# Patient Record
Sex: MALE | Race: WHITE | NOT HISPANIC OR LATINO | Employment: OTHER | URBAN - METROPOLITAN AREA
[De-identification: names, ages, dates, MRNs, and addresses within clinical notes are randomized per-mention and may not be internally consistent; named-entity substitution may affect disease eponyms.]

---

## 2022-09-21 ENCOUNTER — TRANSFERRED RECORDS (OUTPATIENT)
Dept: HEALTH INFORMATION MANAGEMENT | Facility: CLINIC | Age: 47
End: 2022-09-21

## 2022-10-04 ENCOUNTER — MEDICAL CORRESPONDENCE (OUTPATIENT)
Dept: HEALTH INFORMATION MANAGEMENT | Facility: CLINIC | Age: 47
End: 2022-10-04

## 2022-10-11 ENCOUNTER — REFERRAL (OUTPATIENT)
Dept: TRANSPLANT | Facility: CLINIC | Age: 47
End: 2022-10-11

## 2022-10-11 DIAGNOSIS — K75.81 LIVER CIRRHOSIS SECONDARY TO NASH (H): ICD-10-CM

## 2022-10-11 DIAGNOSIS — K76.9 LIVER LESION: ICD-10-CM

## 2022-10-11 DIAGNOSIS — K76.6 PORTAL HYPERTENSION (H): ICD-10-CM

## 2022-10-11 DIAGNOSIS — K75.81 NASH (NONALCOHOLIC STEATOHEPATITIS): Primary | ICD-10-CM

## 2022-10-11 DIAGNOSIS — K74.60 LIVER CIRRHOSIS SECONDARY TO NASH (H): ICD-10-CM

## 2022-10-11 DIAGNOSIS — C22.0 HEPATOCELLULAR CARCINOMA (H): ICD-10-CM

## 2022-10-11 NOTE — LETTER
October 14, 2022      West Van   [address]  [facility] [address]      Dear West,    Thank you for your interest in the Transplant Center at [facility]. We look forward to being a part of your care team and assisting you through the transplant process.    As we discussed, your transplant coordinator is Dillon Quinones (Liver).  You may call your coordinator at any time with questions or concerns at [phone].    Please complete the following.    1. Fill out and return the enclosed forms    Authorization for Electronic Communication    Authorization to Discuss Protected Health Information    Authorization for Release of Protected Health Information    Authorization for Care Everywhere Release of Information    2. Sign up for:    [employer], access to your electronic medical record (see enclosed pamphlet)    [employer], a transplant education website    You can use these tools to learn more about your transplant, communicate with your care team, and track your medical details      Sincerely,      Solid Organ Transplant  [facility]    cc: Referring Physician and PCP

## 2022-10-14 ENCOUNTER — TRANSCRIBE ORDERS (OUTPATIENT)
Dept: OTHER | Age: 47
End: 2022-10-14

## 2022-10-14 VITALS — HEIGHT: 74 IN | WEIGHT: 230 LBS | BODY MASS INDEX: 29.52 KG/M2

## 2022-10-14 DIAGNOSIS — C22.9 LIVER CANCER (H): ICD-10-CM

## 2022-10-14 DIAGNOSIS — K74.60 CIRRHOSIS (H): Primary | ICD-10-CM

## 2022-10-14 NOTE — TELEPHONE ENCOUNTER
Patient was asked the following questions during liver intake call.     Referring Provider: Dr. Walker Ramirez   Referring Diagnosis: RIOS/Liver Lesion   PCP: Cr Martinez, APRN-CNP    1)Do you know why you have liver disease: Yes       If Alcoholic Cirrhosis is present when was your last drink: Doesn't Drink ETOH       Have you ever been through treatment for alcohol: NA  2) Presence of Ascites: No Paracentesis: No  3) Presence of Hepatic Encephalopathy: No Medications: No  4) History of GI Bleeding: None  5) Oxygen Use: None  6) EGD: None  7) Colonoscopy: None   8) MELD Score:   9) Labs available for review from PCP/GI: Yes  10)HCC Diagnosis: No                11)Insurance information: [facility]              Policy Bone: Self               Subscriber/Policy/ID Number: [id number]             Group Number:     Referral intake process completed.  Patient is aware that after financial approval is received, medical records will be requested.   Patient confirmed for a callback from transplant coordinator on 10/19/22.  Tentative evaluation date TBD.    Confirmed coordinator will discuss evaluation process in more detail at the time of their call.   Patient is aware of the need to arrange age appropriate cancer screening, vaccinations, and dental care.  Reminded patient to complete questionnaire, complete medical records release, and review packet prior to evaluation visit .  Assessed patient for special needs (ie--wheelchair, assistance, guardian, and ):  None   Patient instructed to call [phone] with questions.     Patient gave verbal consent during intake call to obtain medical records and documents outside of [facility]:  Yes     NELSY Gambino, LPN       Solid Organ Transplant

## 2022-10-21 NOTE — PROGRESS NOTES
RECORDS STATUS - ALL OTHER DIAGNOSIS    cirrhosis, liver cancer  RECORDS RECEIVED FROM: [facility]/ [facility]/ [facility]    DATE RECEIVED: 10/24/2022    Action    Action Taken 10/21/2022 1:39pm PRETTY     I called [facility] Phone: [phone]    I called pt West  Hasn't had a biopsy, all outside records are at [facility].    2:03pm PRETTY   I resolved imaging in PACS       NOTES STATUS DETAILS   OFFICE NOTE from referring provider Complete Walker Kaiser MD   OFFICE NOTE from medical oncologist Complete [facility]    CLINICAL TRIAL TREATMENTS TO DATE     LABS     PATHOLOGY REPORTS No Biopsy Report    ANYTHING RELATED TO DIAGNOSIS Complete - Labs last updated on 10/11/2022   GENONOMIC TESTING     TYPE:     IMAGING (NEED IMAGES & REPORT)     CT SCANS Complete - [facility]  CT Abdomen Pelvis 6/30/2022    Xray Chest  Complete - [facility]     MRI [facility]  MRI Abdomen 7/14/2022   MAMMO     ULTRASOUND     PET [facility]  PET 10/13/2022

## 2022-10-24 ENCOUNTER — PRE VISIT (OUTPATIENT)
Dept: ONCOLOGY | Facility: CLINIC | Age: 47
End: 2022-10-24

## 2022-10-24 ENCOUNTER — VIRTUAL VISIT (OUTPATIENT)
Dept: ONCOLOGY | Facility: CLINIC | Age: 47
End: 2022-10-24
Attending: INTERNAL MEDICINE
Payer: COMMERCIAL

## 2022-10-24 DIAGNOSIS — K74.60 CIRRHOSIS OF LIVER WITHOUT ASCITES, UNSPECIFIED HEPATIC CIRRHOSIS TYPE (H): ICD-10-CM

## 2022-10-24 DIAGNOSIS — C22.0 HEPATOCELLULAR CARCINOMA (H): Primary | ICD-10-CM

## 2022-10-24 DIAGNOSIS — R91.8 PULMONARY NODULES: ICD-10-CM

## 2022-10-24 PROBLEM — K76.9 LESION OF LIVER: Status: ACTIVE | Noted: 2022-10-04

## 2022-10-24 PROBLEM — F51.04 PSYCHOPHYSIOLOGICAL INSOMNIA: Status: ACTIVE | Noted: 2018-11-26

## 2022-10-24 PROBLEM — M53.3 SI (SACROILIAC) JOINT DYSFUNCTION: Status: ACTIVE | Noted: 2021-09-07

## 2022-10-24 PROBLEM — E66.9 OBESITY (BMI 30-39.9): Status: ACTIVE | Noted: 2017-09-11

## 2022-10-24 PROBLEM — L30.9 ECZEMA: Status: ACTIVE | Noted: 2017-09-11

## 2022-10-24 PROBLEM — D23.4 DERMOID CYST OF SCALP: Status: ACTIVE | Noted: 2017-09-11

## 2022-10-24 PROBLEM — R79.89 ELEVATED LFTS: Status: ACTIVE | Noted: 2022-09-06

## 2022-10-24 PROBLEM — M54.31 SCIATICA OF RIGHT SIDE: Status: ACTIVE | Noted: 2021-09-07

## 2022-10-24 PROCEDURE — 99205 OFFICE O/P NEW HI 60 MIN: CPT | Mod: 95 | Performed by: INTERNAL MEDICINE

## 2022-10-24 PROCEDURE — G0463 HOSPITAL OUTPT CLINIC VISIT: HCPCS | Mod: PN,RTG | Performed by: INTERNAL MEDICINE

## 2022-10-24 RX ORDER — FLUTICASONE PROPIONATE 50 MCG
2 SPRAY, SUSPENSION (ML) NASAL
COMMUNITY
Start: 2022-10-04 | End: 2022-11-06

## 2022-10-24 RX ORDER — FLURBIPROFEN SODIUM 0.3 MG/ML
1 SOLUTION/ DROPS OPHTHALMIC AT BEDTIME
COMMUNITY
Start: 2022-08-15 | End: 2024-07-10

## 2022-10-24 RX ORDER — LISINOPRIL 10 MG/1
10 TABLET ORAL EVERY MORNING
COMMUNITY
Start: 2022-10-18 | End: 2022-12-21

## 2022-10-24 RX ORDER — LISINOPRIL/HYDROCHLOROTHIAZIDE 10-12.5 MG
1 TABLET ORAL EVERY MORNING
COMMUNITY
Start: 2021-12-17 | End: 2023-05-16

## 2022-10-24 RX ORDER — METFORMIN HCL 500 MG
500 TABLET, EXTENDED RELEASE 24 HR ORAL EVERY MORNING
COMMUNITY
Start: 2021-12-03 | End: 2023-03-06

## 2022-10-24 RX ORDER — ATENOLOL 25 MG/1
25 TABLET ORAL 2 TIMES DAILY
Status: ON HOLD | COMMUNITY
Start: 2022-09-16 | End: 2024-07-05

## 2022-10-24 NOTE — LETTER
10/24/2022      RE: West Van  [address]  [facility] [address]       West is a 46 year old who is being evaluated via a billable video visit.      How would you like to obtain your AVS? MyChart  If the video visit is dropped, the invitation should be resent by: Send to e-mail at: [email or web address]  Will anyone else be joining your video visit? No  {If patient encounters technical issues they should call [phone]}      Video-Visit Details    Video Start Time: 3:00    Type of service:  Video Visit    Video End Time:4:00    Originating Location (pt. Location): Home      Distant Location (provider location):  On-site    Platform used for Video Visit: Hai Camejo        West Van is a new patient I been asked to see for possible new hepatocellular carcinoma.    He is a 46-year-old gentleman without previously recognized liver disease who had a CT scan to evaluate for possible stone disease that revealed the presence of a nodular liver.  He has had subsequent imaging which I will describe below interpreted as possible hepatocellular carcinoma.  He tells me he currently feels quite well.  He is somewhat tired and does occasionally get some epigastric or right upper quadrant pain that comes at random intervals.  He is unsure if the symptoms started before he became aware of his possible liver disease.  He has lost a modest amount of meat weight over the last 2 years which has been somewhat intentional as he is trying to manage his diabetes.  He was started on Victoza and also his gotten a dog which is forced him to get out and walk greater than 12,000 steps a day so he significantly increased his caloric expenditure.  He denies any disruption of his sleep-wake cycle or confusion.  He has had no problems with edema or swelling of his abdomen.  He is noted no bleeding or easy bruising.  He has an aunt who was told she had fatty liver disease 40 years ago who remains healthy.  He has had  testing for viral hepatitides which has been negative.  He has been identified as having a low ceruloplasmin level of about 12 and an elevated ferritin of about 1300.    His past medical history is relatively unremarkable and most notable for obesity with associated diabetes and hypertension.  Those of both been better since he has lost some weight and he tells me his last hemoglobin A's 1C was just a little bit over 7.    The only recognized family history of liver disease is the above-mentioned fatty liver disease and 1 aunt.    There are multiple cancers in his family.  On his father side his dad had some sort of skin cancer on his lip.  An uncle had a lymphoma at about the age of 40.  An aunt had a bladder cancer in her mid 70s.  On his mother side, his mother had breast cancer at about age 70.  A half uncle had a tongue cancer.  And 2 uncles had prostate cancer at advanced ages.  A cousin with whom he was quite close on that side  of a melanoma in her 50s.    He is a lifelong non-smoker.  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    GENERAL: Healthy, alert and no distress  EYES: Eyes grossly normal to inspection.  No discharge or erythema, or obvious scleral/conjunctival abnormalities.  RESP: No audible wheeze, cough, or visible cyanosis.  No visible retractions or increased work of breathing.    SKIN: Visible skin clear. No significant rash, abnormal pigmentation or lesions.  NEURO: Cranial nerves grossly intact.  Mentation and speech appropriate for age.  PSYCH: Mentation appears normal, affect normal/bright, judgement and insight intact, normal speech and appearance well-groomed.    I personally reviewed his imaging studies and went over the results at some length with him and his friend.  He has a noncontrast CT scan which shows a nodular liver and is otherwise unremarkable.  He has an MR scan done  with Eovist which shows a 1.7 cm arterial enhancing lesion.  To my review there is little to minimal washout, although it does not retain contrast on the 20-minute biliary phase.  I do not see a visible pseudocapsule or diffusion restriction.  He has another similar size lesion without enhancement and then numerous tiny lesions scattered throughout the lesion that do not seem to enhance. He had a PET scan which shows no areas of significant uptake.  That did however reveal a little over 1 centimeter nodule on his right lung with an SUV of just 1.5. His spleen is mildly enlarged and he probably has some varices.    His outside labs over the last 2 months show normal electrolytes and renal function.   He has a normal albumin and a marginally elevated bilirubin of 2.0.  His transaminases are mildly elevated with an AST of 46 and an ALT of 71.  The alkaline phosphatase is normal.    His blood counts are normal.    An alpha-fetoprotein is mildly elevated at 24.    Hepatitis B and C serologies are negative.    Ferritin is 1300.    The ceruloplasmin is low at 12.7.      Assessment/plan:  1.  Possible hepatocellular carcinoma in a young man with a normal performance status and compensated liver disease.  He needs fresh imaging as the MR scan that is available is already 3 months old and if we do this with a different contrast agent it may more clearly identify if this is in fact hepatocellular carcinoma or not.  I will be reviewing him at our liver tumor conference this Friday to get the consensus of our group as well.  I had a very long discussion with the patient going over the possibilities and implications here.  I explained the uncertainty as to his diagnosis as of yet.  I explained if this is in fact a hepatocellular carcinoma which is certainly possible but not certain and presuming it has not progressed significantly in the last 3 months, the initial plan would be either surgical resection or ablation and in that  setting we probably would not consider a liver transplant.  If his tumor is more advanced or recurs then a transplant following some sort of bridging therapy such as ablation or other liver directed therapy might be in the cards down the road.  Certainly at present his underlying liver disease is not severe enough to be concerned about a transplant anytime in the near future if he does not have a hepatocellular carcinoma.  If the imaging remains uncertain as to the diagnosis, we could consider a biopsy, perhaps biopsy with ablation as a single procedure, or simply further observation over time.  I will follow-up with the patient by phone once we had our discussion at tumor conference and will likely tentatively arrange for MR scan and other needed evaluation down here as appropriate.    2.  Cirrhosis with portal hypertension perhaps related to ROIS or disorders of copper or iron metabolism.  He will need further follow-up with hepatology including evaluation for varices.    3.  Lung nodule-will refer to nodule clinic after we complete evaluation of liver tumor.    4. Type 2 diabetes    5.  Obesity        Michael Sanchez MD

## 2022-10-24 NOTE — NURSING NOTE
Patient states he was having pain this morning on his right side under breast bone. The pain level was around a 5/6. He currently states he does not have pain.     Gisela Camejo

## 2022-10-24 NOTE — PROGRESS NOTES
West is a 46 year old who is being evaluated via a billable video visit.      How would you like to obtain your AVS? MyChart  If the video visit is dropped, the invitation should be resent by: Send to e-mail at: [email or web address]  Will anyone else be joining your video visit? No        Video-Visit Details    Video Start Time: 3:00    Type of service:  Video Visit    Video End Time:4:00    Originating Location (pt. Location): Home      Distant Location (provider location):  On-site    Platform used for Video Visit: Hai Camejo        West Van is a new patient I have been asked to see for possible new hepatocellular carcinoma.    He is a 46-year-old gentleman without previously recognized liver disease who had a CT scan to evaluate for possible stone disease that revealed the presence of a nodular liver.  He has had subsequent imaging which I will describe below interpreted as possible hepatocellular carcinoma.  He tells me he currently feels quite well.  He is somewhat tired and does occasionally get some epigastric or right upper quadrant pain that comes at random intervals.  He is unsure if the symptoms started before he became aware of his possible liver disease.  He has lost a modest amount of meat weight over the last 2 years which has been somewhat intentional as he is trying to manage his diabetes.  He was started on Victoza and also his gotten a dog which is forced him to get out and walk greater than 12,000 steps a day so he significantly increased his caloric expenditure.  He denies any disruption of his sleep-wake cycle or confusion.  He has had no problems with edema or swelling of his abdomen.  He is noted no bleeding or easy bruising.  He has an aunt who was told she had fatty liver disease 40 years ago who remains healthy.  He has had testing for viral hepatitides which has been negative.  He has been identified as having a low ceruloplasmin level of about 12 and an elevated  ferritin of about 1300.    His past medical history is relatively unremarkable and most notable for obesity with associated diabetes and hypertension.  Those of both been better since he has lost some weight and he tells me his last hemoglobin A's 1C was just a little bit over 7.    The only recognized family history of liver disease is the above-mentioned fatty liver disease and 1 aunt.    There are multiple cancers in his family.  On his father side his dad had some sort of skin cancer on his lip.  An uncle had a lymphoma at about the age of 40.  An aunt had a bladder cancer in her mid 70s.  On his mother side, his mother had breast cancer at about age 70.  A half uncle had a tongue cancer.  And 2 uncles had prostate cancer at advanced ages.  A cousin with whom he was quite close on that side  of a melanoma in her 50s.    He is a lifelong non-smoker.  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    GENERAL: Healthy, alert and no distress  EYES: Eyes grossly normal to inspection.  No discharge or erythema, or obvious scleral/conjunctival abnormalities.  RESP: No audible wheeze, cough, or visible cyanosis.  No visible retractions or increased work of breathing.    SKIN: Visible skin clear. No significant rash, abnormal pigmentation or lesions.  NEURO: Cranial nerves grossly intact.  Mentation and speech appropriate for age.  PSYCH: Mentation appears normal, affect normal/bright, judgement and insight intact, normal speech and appearance well-groomed.    I personally reviewed his imaging studies and went over the results at some length with him and his friend.  He has a noncontrast CT scan which shows a nodular liver and is otherwise unremarkable.  He has an MR scan done with Eovist which shows a 1.7 cm arterial enhancing lesion.  To my review there is little to minimal washout, although it does not retain  contrast on the 20-minute biliary phase.  I do not see a visible pseudocapsule or diffusion restriction.  He has another similar size lesion without enhancement and then numerous tiny lesions scattered throughout the lesion that do not seem to enhance. He had a PET scan which shows no areas of significant uptake.  That did however reveal a little over 1 centimeter nodule on his right lung with an SUV of just 1.5. His spleen is mildly enlarged and he probably has some varices.    His outside labs over the last 2 months show normal electrolytes and renal function.   He has a normal albumin and a marginally elevated bilirubin of 2.0.  His transaminases are mildly elevated with an AST of 46 and an ALT of 71.  The alkaline phosphatase is normal.    His blood counts are normal.    An alpha-fetoprotein is mildly elevated at 24.    Hepatitis B and C serologies are negative.    Ferritin is 1300.    The ceruloplasmin is low at 12.7.      Assessment/plan:  1.  Possible hepatocellular carcinoma in a young man with a normal performance status and compensated liver disease.  He needs fresh imaging as the MR scan that is available is already 3 months old and if we do this with a different contrast agent it may more clearly identify if this is in fact hepatocellular carcinoma or not.  I will be reviewing him at our liver tumor conference this Friday to get the consensus of our group as well.  I had a very long discussion with the patient going over the possibilities and implications here.  I explained the uncertainty as to his diagnosis as of yet.  I explained if this is in fact a hepatocellular carcinoma which is certainly possible but not certain and presuming it has not progressed significantly in the last 3 months, the initial plan would be either surgical resection or ablation and in that setting we probably would not consider a liver transplant.  If his tumor is more advanced or recurs then a transplant following some sort of  bridging therapy such as ablation or other liver directed therapy might be in the cards down the road.  Certainly at present his underlying liver disease is not severe enough to be concerned about a transplant anytime in the near future if he does not have a hepatocellular carcinoma.  If the imaging remains uncertain as to the diagnosis, we could consider a biopsy, perhaps biopsy with ablation as a single procedure, or simply further observation over time.  I will follow-up with the patient by phone once we had our discussion at tumor conference and will likely tentatively arrange for MR scan and other needed evaluation down here as appropriate.    2.  Cirrhosis with portal hypertension perhaps related to RIOS or disorders of copper or iron metabolism.  He will need further follow-up with hepatology including evaluation for varices.    3.  Lung nodule-will refer to nodule clinic after we complete evaluation of liver tumor.    4. Type 2 diabetes    5.  Obesity

## 2022-10-24 NOTE — LETTER
10/24/2022      RE: West Van  [address]  [facility] [address]       West is a 46 year old who is being evaluated via a billable video visit.      How would you like to obtain your AVS? Luzhart  If the video visit is dropped, the invitation should be resent by: Send to e-mail at: [email or web address]  Will anyone else be joining your video visit? No        Video-Visit Details    Video Start Time: 3:00    Type of service:  Video Visit    Video End Time:4:00    Originating Location (pt. Location): Home      Distant Location (provider location):  On-site    Platform used for Video Visit: Hai Camejo        West Van is a new patient I have been asked to see for possible new hepatocellular carcinoma.    He is a 46-year-old gentleman without previously recognized liver disease who had a CT scan to evaluate for possible stone disease that revealed the presence of a nodular liver.  He has had subsequent imaging which I will describe below interpreted as possible hepatocellular carcinoma.  He tells me he currently feels quite well.  He is somewhat tired and does occasionally get some epigastric or right upper quadrant pain that comes at random intervals.  He is unsure if the symptoms started before he became aware of his possible liver disease.  He has lost a modest amount of meat weight over the last 2 years which has been somewhat intentional as he is trying to manage his diabetes.  He was started on Victoza and also his gotten a dog which is forced him to get out and walk greater than 12,000 steps a day so he significantly increased his caloric expenditure.  He denies any disruption of his sleep-wake cycle or confusion.  He has had no problems with edema or swelling of his abdomen.  He is noted no bleeding or easy bruising.  He has an aunt who was told she had fatty liver disease 40 years ago who remains healthy.  He has had testing for viral hepatitides which has been negative.  He has been  identified as having a low ceruloplasmin level of about 12 and an elevated ferritin of about 1300.    His past medical history is relatively unremarkable and most notable for obesity with associated diabetes and hypertension.  Those of both been better since he has lost some weight and he tells me his last hemoglobin A's 1C was just a little bit over 7.    The only recognized family history of liver disease is the above-mentioned fatty liver disease and 1 aunt.    There are multiple cancers in his family.  On his father side his dad had some sort of skin cancer on his lip.  An uncle had a lymphoma at about the age of 40.  An aunt had a bladder cancer in her mid 70s.  On his mother side, his mother had breast cancer at about age 70.  A half uncle had a tongue cancer.  And 2 uncles had prostate cancer at advanced ages.  A cousin with whom he was quite close on that side  of a melanoma in her 50s.    He is a lifelong non-smoker.  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    GENERAL: Healthy, alert and no distress  EYES: Eyes grossly normal to inspection.  No discharge or erythema, or obvious scleral/conjunctival abnormalities.  RESP: No audible wheeze, cough, or visible cyanosis.  No visible retractions or increased work of breathing.    SKIN: Visible skin clear. No significant rash, abnormal pigmentation or lesions.  NEURO: Cranial nerves grossly intact.  Mentation and speech appropriate for age.  PSYCH: Mentation appears normal, affect normal/bright, judgement and insight intact, normal speech and appearance well-groomed.    I personally reviewed his imaging studies and went over the results at some length with him and his friend.  He has a noncontrast CT scan which shows a nodular liver and is otherwise unremarkable.  He has an MR scan done with [facility] which shows a 1.7 cm arterial enhancing lesion.  To my  review there is little to minimal washout, although it does not retain contrast on the 20-minute biliary phase.  I do not see a visible pseudocapsule or diffusion restriction.  He has another similar size lesion without enhancement and then numerous tiny lesions scattered throughout the lesion that do not seem to enhance. He had a PET scan which shows no areas of significant uptake.  That did however reveal a little over 1 centimeter nodule on his right lung with an SUV of just 1.5. His spleen is mildly enlarged and he probably has some varices.    His outside labs over the last 2 months show normal electrolytes and renal function.   He has a normal albumin and a marginally elevated bilirubin of 2.0.  His transaminases are mildly elevated with an AST of 46 and an ALT of 71.  The alkaline phosphatase is normal.    His blood counts are normal.    An alpha-fetoprotein is mildly elevated at 24.    Hepatitis B and C serologies are negative.    Ferritin is 1300.    The ceruloplasmin is low at 12.7.      Assessment/plan:  1.  Possible hepatocellular carcinoma in a young man with a normal performance status and compensated liver disease.  He needs fresh imaging as the MR scan that is available is already 3 months old and if we do this with a different contrast agent it may more clearly identify if this is in fact hepatocellular carcinoma or not.  I will be reviewing him at our liver tumor conference this Friday to get the consensus of our group as well.  I had a very long discussion with the patient going over the possibilities and implications here.  I explained the uncertainty as to his diagnosis as of yet.  I explained if this is in fact a hepatocellular carcinoma which is certainly possible but not certain and presuming it has not progressed significantly in the last 3 months, the initial plan would be either surgical resection or ablation and in that setting we probably would not consider a liver transplant.  If his  tumor is more advanced or recurs then a transplant following some sort of bridging therapy such as ablation or other liver directed therapy might be in the cards down the road.  Certainly at present his underlying liver disease is not severe enough to be concerned about a transplant anytime in the near future if he does not have a hepatocellular carcinoma.  If the imaging remains uncertain as to the diagnosis, we could consider a biopsy, perhaps biopsy with ablation as a single procedure, or simply further observation over time.  I will follow-up with the patient by phone once we had our discussion at tumor conference and will likely tentatively arrange for MR scan and other needed evaluation down here as appropriate.    2.  Cirrhosis with portal hypertension perhaps related to RIOS or disorders of copper or iron metabolism.  He will need further follow-up with hepatology including evaluation for varices.    3.  Lung nodule-will refer to nodule clinic after we complete evaluation of liver tumor.    4. Type 2 diabetes    5.  Obesity        Michael Sanchez MD

## 2022-10-25 PROBLEM — R91.8 PULMONARY NODULES: Status: ACTIVE | Noted: 2022-10-25

## 2022-11-01 ENCOUNTER — PATIENT OUTREACH (OUTPATIENT)
Dept: ONCOLOGY | Facility: CLINIC | Age: 47
End: 2022-11-01

## 2022-11-01 NOTE — PROGRESS NOTES
[facility]: Cancer Care                                                                                          Placed call to patient to review liver conference discussion. Agreed imaging looked as though patient may not have HCC. Patient voiced understanding and would like clarification on when he should return to clinic.  RNCC reviewed with Dr Sanchez. Would like patient to have liver MRI at [facility] to ensure proper imaging is preformed 1-2 days prior to follow-up with Dr Sanchez in person. Placed return call to patient to review plan. Patient voiced understanding and agreement. He will await call from scheduling team.        Christy Bailey RN, BSN, OCN   RN Care Coordinator   [facility]

## 2022-11-10 ENCOUNTER — HOSPITAL ENCOUNTER (OUTPATIENT)
Dept: MRI IMAGING | Facility: CLINIC | Age: 47
Discharge: HOME | End: 2022-11-10
Attending: INTERNAL MEDICINE | Admitting: INTERNAL MEDICINE
Payer: COMMERCIAL

## 2022-11-10 DIAGNOSIS — K74.60 CIRRHOSIS OF LIVER WITHOUT ASCITES, UNSPECIFIED HEPATIC CIRRHOSIS TYPE (H): ICD-10-CM

## 2022-11-10 DIAGNOSIS — C22.0 HEPATOCELLULAR CARCINOMA (H): ICD-10-CM

## 2022-11-10 PROCEDURE — 74183 MRI ABD W/O CNTR FLWD CNTR: CPT

## 2022-11-10 PROCEDURE — 255N000002 HC RX 255 OP 636: Performed by: INTERNAL MEDICINE

## 2022-11-10 PROCEDURE — A9585 GADOBUTROL INJECTION: HCPCS | Performed by: INTERNAL MEDICINE

## 2022-11-10 PROCEDURE — 74183 MRI ABD W/O CNTR FLWD CNTR: CPT | Mod: 26 | Performed by: STUDENT IN AN ORGANIZED HEALTH CARE EDUCATION/TRAINING PROGRAM

## 2022-11-10 RX ORDER — GADOBUTROL 604.72 MG/ML
10 INJECTION INTRAVENOUS ONCE
Status: COMPLETED | OUTPATIENT
Start: 2022-11-10 | End: 2022-11-10

## 2022-11-10 RX ADMIN — GADOBUTROL 10 ML: 604.72 INJECTION INTRAVENOUS at 06:57

## 2022-11-14 ENCOUNTER — APPOINTMENT (OUTPATIENT)
Dept: LAB | Facility: CLINIC | Age: 47
End: 2022-11-14
Attending: INTERNAL MEDICINE
Payer: COMMERCIAL

## 2022-11-14 ENCOUNTER — TRANSCRIBE ORDERS (OUTPATIENT)
Dept: OTHER | Age: 47
End: 2022-11-14

## 2022-11-14 ENCOUNTER — ONCOLOGY VISIT (OUTPATIENT)
Dept: ONCOLOGY | Facility: CLINIC | Age: 47
End: 2022-11-14
Attending: INTERNAL MEDICINE
Payer: COMMERCIAL

## 2022-11-14 VITALS
RESPIRATION RATE: 18 BRPM | SYSTOLIC BLOOD PRESSURE: 136 MMHG | OXYGEN SATURATION: 98 % | HEART RATE: 88 BPM | DIASTOLIC BLOOD PRESSURE: 86 MMHG | WEIGHT: 226.7 LBS | TEMPERATURE: 97.4 F | BODY MASS INDEX: 29.11 KG/M2

## 2022-11-14 DIAGNOSIS — R91.8 PULMONARY NODULES: Primary | ICD-10-CM

## 2022-11-14 DIAGNOSIS — C22.0 HEPATOCELLULAR CARCINOMA (H): ICD-10-CM

## 2022-11-14 DIAGNOSIS — K74.60 CIRRHOSIS OF LIVER WITHOUT ASCITES, UNSPECIFIED HEPATIC CIRRHOSIS TYPE (H): ICD-10-CM

## 2022-11-14 LAB
AFP SERPL-MCNC: 28.7 NG/ML
ALBUMIN SERPL BCG-MCNC: 4.4 G/DL (ref 3.5–5.2)
ALP SERPL-CCNC: 95 U/L (ref 40–129)
ALT SERPL W P-5'-P-CCNC: 46 U/L (ref 10–50)
ANION GAP SERPL CALCULATED.3IONS-SCNC: 10 MMOL/L (ref 7–15)
AST SERPL W P-5'-P-CCNC: 33 U/L (ref 10–50)
BASOPHILS # BLD AUTO: 0.1 10E3/UL (ref 0–0.2)
BASOPHILS NFR BLD AUTO: 1 %
BILIRUB SERPL-MCNC: 1.4 MG/DL
BUN SERPL-MCNC: 10 MG/DL (ref 6–20)
CALCIUM SERPL-MCNC: 9.9 MG/DL (ref 8.6–10)
CHLORIDE SERPL-SCNC: 103 MMOL/L (ref 98–107)
CREAT SERPL-MCNC: 0.78 MG/DL (ref 0.67–1.17)
DEPRECATED HCO3 PLAS-SCNC: 27 MMOL/L (ref 22–29)
EOSINOPHIL # BLD AUTO: 0.1 10E3/UL (ref 0–0.7)
EOSINOPHIL NFR BLD AUTO: 1 %
ERYTHROCYTE [DISTWIDTH] IN BLOOD BY AUTOMATED COUNT: 12.8 % (ref 10–15)
GFR SERPL CREATININE-BSD FRML MDRD: >90 ML/MIN/1.73M2
GLUCOSE SERPL-MCNC: 150 MG/DL (ref 70–99)
HCT VFR BLD AUTO: 47.6 % (ref 40–53)
HGB BLD-MCNC: 16.4 G/DL (ref 13.3–17.7)
IMM GRANULOCYTES # BLD: 0 10E3/UL
IMM GRANULOCYTES NFR BLD: 0 %
INR PPP: 1.18 (ref 0.85–1.15)
LYMPHOCYTES # BLD AUTO: 2.5 10E3/UL (ref 0.8–5.3)
LYMPHOCYTES NFR BLD AUTO: 27 %
MCH RBC QN AUTO: 29.5 PG (ref 26.5–33)
MCHC RBC AUTO-ENTMCNC: 34.5 G/DL (ref 31.5–36.5)
MCV RBC AUTO: 86 FL (ref 78–100)
MONOCYTES # BLD AUTO: 1 10E3/UL (ref 0–1.3)
MONOCYTES NFR BLD AUTO: 11 %
NEUTROPHILS # BLD AUTO: 5.6 10E3/UL (ref 1.6–8.3)
NEUTROPHILS NFR BLD AUTO: 60 %
NRBC # BLD AUTO: 0 10E3/UL
NRBC BLD AUTO-RTO: 0 /100
PLATELET # BLD AUTO: 196 10E3/UL (ref 150–450)
POTASSIUM SERPL-SCNC: 3.6 MMOL/L (ref 3.4–5.3)
PROT SERPL-MCNC: 7.2 G/DL (ref 6.4–8.3)
RBC # BLD AUTO: 5.55 10E6/UL (ref 4.4–5.9)
SODIUM SERPL-SCNC: 140 MMOL/L (ref 136–145)
WBC # BLD AUTO: 9.4 10E3/UL (ref 4–11)

## 2022-11-14 PROCEDURE — 85610 PROTHROMBIN TIME: CPT | Performed by: INTERNAL MEDICINE

## 2022-11-14 PROCEDURE — 36415 COLL VENOUS BLD VENIPUNCTURE: CPT | Performed by: INTERNAL MEDICINE

## 2022-11-14 PROCEDURE — 99215 OFFICE O/P EST HI 40 MIN: CPT | Performed by: INTERNAL MEDICINE

## 2022-11-14 PROCEDURE — 82105 ALPHA-FETOPROTEIN SERUM: CPT | Performed by: INTERNAL MEDICINE

## 2022-11-14 PROCEDURE — G0463 HOSPITAL OUTPT CLINIC VISIT: HCPCS

## 2022-11-14 PROCEDURE — 85025 COMPLETE CBC W/AUTO DIFF WBC: CPT | Performed by: INTERNAL MEDICINE

## 2022-11-14 PROCEDURE — 80053 COMPREHEN METABOLIC PANEL: CPT | Performed by: INTERNAL MEDICINE

## 2022-11-14 RX ORDER — FEXOFENADINE HCL 60 MG/1
60 TABLET, FILM COATED ORAL PRN
COMMUNITY
Start: 2022-10-04 | End: 2022-12-21

## 2022-11-14 ASSESSMENT — PAIN SCALES - GENERAL: PAINLEVEL: MILD PAIN (2)

## 2022-11-14 NOTE — PROGRESS NOTES
West Van is here today in follow-up of possible hepatocellular carcinoma.    He is a 46-year-old gentleman with previously unrecognized liver disease who had an incidental finding of a liver mass during evaluation for possible kidney stones.  He had subsequent imaging which is in interpreted elsewhere to be consistent with hepatocellular carcinoma.  He was found to have a low serum neoplasm and a mildly elevated ferritin without other clear etiologies for his liver disease.  He is diabetic and had lost a fair amount of weight over the last 2 years as part of managing his diabetes.  He returns today for follow-up note we had repeat imaging.  He denies any other new events since her last visit.    On physical exam he appears well and has unremarkable vital signs.  He has no icterus.  He has no palpable adenopathy in his neck or supraclavicular spaces.  His lungs are clear to auscultation without dullness to percussion.  His heart rate and rhythm are regular without audible murmur or gallop and his jugular venous pressure appears normal.  His abdomen is soft and nontender.  His spleen tip is just palpable.  His liver margin is not palpable.  There is no demonstrable ascites.  He has no spider angiomata on his chest.  He has marked abdominal striae.  He has areas of hyperpigmentation on either side of his abdomen which he says are his sites of Victoza injections.  He has no peripheral edema and no tenderness in his calves or thighs.  His muscle mass is not very significant for his size.  His speech is fluent and his cranial nerves are grossly intact.  He has an unremarkable gait and station.    I personally reviewed his new MR scan and went over the images with him and his friend who accompanies him today.  In the dome of the liver is a small area of arterial enhancement without washout which radiology classifies as liRADS 4.  By my measurements from his original MR scan in July until now this is grown from 18  mm to 20 mm.  He has innumerable nonenhancing small nodules throughout his liver.  His spleen is moderately enlarged.  He does not have any significant intra-abdominal adenopathy.  1.4 cm right lower lung nodule appears grossly unchanged although the MRI is suboptimal for evaluating that.  I reviewed his previous chest CT and PET scan which shows the nodule to have no significant FDG uptake.    Lab results obtained today show normal electrolytes and renal function.  His albumin and liver enzymes are normal.  His bilirubin is marginally elevated at 1.4.  His blood counts are normal including a platelet count of 196.  An alpha-fetoprotein is pending.    Assessment/plan: Enhancing liver lesion that probably, but not certainly, is HCC in a patient with well compensated liver disease.  I had a long talk today with the patient to review his prognosis and treatment options.  We discussed the probability that this is a cancer but that we would need more definitive diagnostic material.  While we could observe for another few months and see how this develops my recommendations would go ahead and pursue this more actively and he was in vigorous agreement with that.  Given its location the dome of the liver be difficult to approach percutaneously and I think he will need a laparoscopic procedure either to wedge this out or probably to get a biopsy followed by microwave ablation.  We discussed that it 2 cm in size if we ablate this completely we might still consider a liver transplant primarily to remove the risk of further tumors develop in his liver, but given his relatively limited disease I would probably lean towards observation following the definitive procedure.  We talked about his lung nodule.  Given its lack of FDG uptake I am not too concerned about its nature but we deftly want to go ahead and get that worked up and I made a referral to the lung nodule clinic as well.  At the end of this long discussion the patient  had had all his questions answered and expressed a good understanding.  We will get him set up over the next couple weeks to visit with hepatobiliary surgery and the lung nodule clinic and probably be proceeding to a laparoscopic biopsy and ablation.  We will make further follow-up plans once we see the outcome of those evaluations.    Total time by me on the date of service inclusive of the above visit, review of multiple imaging studies, ordering and documentation was greater than 40 minutes.

## 2022-11-14 NOTE — NURSING NOTE
"Oncology Rooming Note    November 14, 2022 1:14 PM   West Van is a 46 year old male who presents for:    Chief Complaint   Patient presents with     Blood Draw     Vpt blood draw by lab RN. Vitals taken and appointment arrived     Oncology Clinic Visit     [facility] RETURN - [facility]     Initial Vitals: /86   Pulse 88   Temp 97.4  F (36.3  C) (Oral)   Resp 18   Wt 102.8 kg (226 lb 11.2 oz)   SpO2 98%   BMI 29.11 kg/m   Estimated body mass index is 29.11 kg/m  as calculated from the following:    Height as of 10/14/22: 1.88 m (6' 2\").    Weight as of this encounter: 102.8 kg (226 lb 11.2 oz). Body surface area is 2.32 meters squared.  Mild Pain (2) Comment: RUQ of abdomen below breast area   No LMP for male patient.  Allergies reviewed: Yes  Medications reviewed: Yes    Medications: Medication refills not needed today.  Pharmacy name entered into [facility]: [id number] - [address] [place]            "

## 2022-11-14 NOTE — LETTER
11/14/2022         RE: West Van  [address]  [facility] [address]          West Van is here today in follow-up of possible hepatocellular carcinoma.    He is a 46-year-old gentleman with previously unrecognized liver disease who had an incidental finding of a liver mass during evaluation for possible kidney stones.  He had subsequent imaging which is in interpreted elsewhere to be consistent with hepatocellular carcinoma.  He was found to have a low serum neoplasm and a mildly elevated ferritin without other clear etiologies for his liver disease.  He is diabetic and had lost a fair amount of weight over the last 2 years as part of managing his diabetes.  He returns today for follow-up note we had repeat imaging.  He denies any other new events since her last visit.    On physical exam he appears well and has unremarkable vital signs.  He has no icterus.  He has no palpable adenopathy in his neck or supraclavicular spaces.  His lungs are clear to auscultation without dullness to percussion.  His heart rate and rhythm are regular without audible murmur or gallop and his jugular venous pressure appears normal.  His abdomen is soft and nontender.  His spleen tip is just palpable.  His liver margin is not palpable.  There is no demonstrable ascites.  He has no spider angiomata on his chest.  He has marked abdominal striae.  He has areas of hyperpigmentation on either side of his abdomen which he says are his sites of Victoza injections.  He has no peripheral edema and no tenderness in his calves or thighs.  His muscle mass is not very significant for his size.  His speech is fluent and his cranial nerves are grossly intact.  He has an unremarkable gait and station.    I personally reviewed his new MR scan and went over the images with him and his friend who accompanies him today.  In the dome of the liver is a small area of arterial enhancement without washout which radiology classifies as liRADS 4.  By my  measurements from his original MR scan in July until now this is grown from 18 mm to 20 mm.  He has innumerable nonenhancing small nodules throughout his liver.  His spleen is moderately enlarged.  He does not have any significant intra-abdominal adenopathy.  1.4 cm right lower lung nodule appears grossly unchanged although the MRI is suboptimal for evaluating that.  I reviewed his previous chest CT and PET scan which shows the nodule to have no significant FDG uptake.    Lab results obtained today show normal electrolytes and renal function.  His albumin and liver enzymes are normal.  His bilirubin is marginally elevated at 1.4.  His blood counts are normal including a platelet count of 196.  An alpha-fetoprotein is pending.    Assessment/plan: Enhancing liver lesion that probably, but not certainly, is HCC in a patient with well compensated liver disease.  I had a long talk today with the patient to review his prognosis and treatment options.  We discussed the probability that this is a cancer but that we would need more definitive diagnostic material.  While we could observe for another few months and see how this develops my recommendations would go ahead and pursue this more actively and he was in vigorous agreement with that.  Given its location the dome of the liver be difficult to approach percutaneously and I think he will need a laparoscopic procedure either to wedge this out or probably to get a biopsy followed by microwave ablation.  We discussed that it 2 cm in size if we ablate this completely we might still consider a liver transplant primarily to remove the risk of further tumors develop in his liver, but given his relatively limited disease I would probably lean towards observation following the definitive procedure.  We talked about his lung nodule.  Given its lack of FDG uptake I am not too concerned about its nature but we deftly want to go ahead and get that worked up and I made a referral to  the lung nodule clinic as well.  At the end of this long discussion the patient had had all his questions answered and expressed a good understanding.  We will get him set up over the next couple weeks to visit with hepatobiliary surgery and the lung nodule clinic and probably be proceeding to a laparoscopic biopsy and ablation.  We will make further follow-up plans once we see the outcome of those evaluations.    Total time by me on the date of service inclusive of the above visit, review of multiple imaging studies, ordering and documentation was greater than 40 minutes.        Michael Sanchez MD

## 2022-11-14 NOTE — NURSING NOTE
Chief Complaint   Patient presents with     Blood Draw     Vpt blood draw by lab RN. Vitals taken and appointment arrived     Clair Valdivia RN

## 2022-11-15 NOTE — CONFIDENTIAL NOTE
RECORDS STATUS - ALL OTHER DIAGNOSIS    cirrhosis, liver cancer  RECORDS RECEIVED FROM: [facility]/ [facility]/ [facility]    DATE RECEIVED: 11/16/2022    Action    Action Taken 10/21/2022 1:39pm PRETTY     I called [facility] Phone: [phone]    I called pt West  Hasn't had a biopsy, all outside records are at [facility].    2:03pm PRETTY   I resolved imaging in PACS       NOTES STATUS DETAILS   OFFICE NOTE from referring provider [facility]   Walker Ramirez MD   OFFICE NOTE from medical oncologist Complete Epic Oncology visit with Dr. Sanchez 11/14/2022    CE- [facility]    CLINICAL TRIAL TREATMENTS TO DATE     LABS     PATHOLOGY REPORTS No Biopsy Report    ANYTHING RELATED TO DIAGNOSIS [facility]- Labs last updated on 10/11/2022   GENONOMIC TESTING     TYPE:     IMAGING (NEED IMAGES & REPORT)     CT SCANS Complete - [facility]  CT Abdomen Pelvis 6/30/2022    Xray Chest  Complete - [facility]     MRI Complete - [facility]  MRI Abdomen 7/14/2022   MAMMO     ULTRASOUND     PET Complete - [facility]  PET 10/13/2022

## 2022-11-16 ENCOUNTER — VIRTUAL VISIT (OUTPATIENT)
Dept: SURGERY | Facility: CLINIC | Age: 47
End: 2022-11-16
Attending: INTERNAL MEDICINE
Payer: COMMERCIAL

## 2022-11-16 ENCOUNTER — PRE VISIT (OUTPATIENT)
Dept: SURGERY | Facility: CLINIC | Age: 47
End: 2022-11-16

## 2022-11-16 DIAGNOSIS — R91.8 PULMONARY NODULES: ICD-10-CM

## 2022-11-16 DIAGNOSIS — K86.2 PANCREAS CYST: ICD-10-CM

## 2022-11-16 DIAGNOSIS — C22.0 HEPATOCELLULAR CARCINOMA (H): ICD-10-CM

## 2022-11-16 DIAGNOSIS — R91.8 PULMONARY NODULES: Primary | ICD-10-CM

## 2022-11-16 DIAGNOSIS — K80.20 CALCULUS OF GALLBLADDER WITHOUT CHOLECYSTITIS WITHOUT OBSTRUCTION: Primary | ICD-10-CM

## 2022-11-16 DIAGNOSIS — K74.60 CIRRHOSIS OF LIVER WITHOUT ASCITES, UNSPECIFIED HEPATIC CIRRHOSIS TYPE (H): ICD-10-CM

## 2022-11-16 PROCEDURE — G0463 HOSPITAL OUTPT CLINIC VISIT: HCPCS | Mod: PN,RTG | Performed by: SURGERY

## 2022-11-16 PROCEDURE — 99205 OFFICE O/P NEW HI 60 MIN: CPT | Mod: 95 | Performed by: SURGERY

## 2022-11-16 NOTE — LETTER
11/16/2022         RE: West Van  [address]  [place] [address]        Dear Colleague,    Thank you for referring your patient, West Van, to the [facility]. Please see a copy of my visit note below.    West is a 46 year old who is being evaluated via a billable video visit.      How would you like to obtain your AVS? MyChart  If the video visit is dropped, the invitation should be resent by: Text to cell phone: [phone]  Will anyone else be joining your video visit? Yes, Joe friend is beside him listening on the conversation.   Jayda Huerta         Video-Visit Details      Type of service:  Video Visit        Originating Location (pt. Location): Home        Distant Location (provider location):  On-site    Platform used for Video Visit: [email or web address]     REASON FOR EVALUATION:  Suspected hepatocellular cancer in the setting of cirrhosis.    HISTORY OF PRESENT ILLNESS:  Mr. Van is a 47-year-old gentleman who had an incidental finding of a liver mass during an evaluation for kidney stones.  Subsequent imaging has been found to be consistent with hepatocellular cancer.  It also shows evidence of cirrhosis of the liver although the etiology of that is not determined.  By our radiology report, he has a lesion in the dome of the liver with arterial enhancement with washout and was classified as a LI-RADS 4 lesion, although it did grow a little bit from 18 to 20 mm.  He also has innumerable nonenhancing small nodules throughout his liver, which complicates things somewhat.  He has a 1.4 cm right lung nodule that does not have any PET avidity, which is somewhat encouraging and certainly will be paid close attention to in the future.  I was asked by Dr. Luis Enrique Sanchez to see Mr. Van for consideration of liver-directed surgical therapy.    I discussed with Mr. Van that based on the size and location of this lesion and the fact that it is certainly worrisome,  although not definitive for cancer, I would agree that we should proceed with liver-directed surgical therapy in the form of a laparoscopic ultrasound-guided microwave ablation.  I also discussed that I do think a biopsy would be reasonable at the same time just to prove diagnosis prior to treatment, particularly in light of the fact that this patient might be a candidate for transplantation in the future.  I think random biopsies may also be beneficial because of the nodules throughout the liver and the uncertain etiology of that.    I discussed the details of a planned laparoscopic ablation, including risks such as bleeding, infection, wound healing complications, venous thromboembolism, and worsening liver function.  I also discussed the fact that I am consultant for [employer] and I do receive compensation to teach other surgeons across the country how to perform microwave ablation.  The patient did not have any concern for conflict of interest.    Finally, we also discussed that he has a very large, multiple centimeter stone in his gallbladder.  Based on that, I did recommend cholecystectomy to avoid future problems, particularly in light of his liver disease.  He was also agreeable to that.  I discussed the risks of that surgery including bile leak, changes to bowel habits, bleeding and infection.    Mr. Van seems to understand the situation quite well and wishes to proceed as described.  We will get him on the operating room schedule for a planned laparoscopic ultrasound-guided microwave ablation of liver tumor, ultrasound-guided liver biopsy and laparoscopic cholecystectomy.    A total of 45 minutes was spent on virtual video visit with Mr. Van today.  An additional 15 minutes was spent in review of his history, imaging and coordination of care.        Armando Munguia MD

## 2022-11-16 NOTE — PROGRESS NOTES
West is a 46 year old who is being evaluated via a billable video visit.      How would you like to obtain your AVS? MyChart  If the video visit is dropped, the invitation should be resent by: Text to cell phone: [phone]  Will anyone else be joining your video visit? Yes, Joe friend is beside him listening on the conversation.   Jayda Huerta         Video-Visit Details      Type of service:  Video Visit        Originating Location (pt. Location): Home        Distant Location (provider location):  On-site    Platform used for Video Visit: [employer]     REASON FOR EVALUATION:  Suspected hepatocellular cancer in the setting of cirrhosis.    HISTORY OF PRESENT ILLNESS:  Mr. Van is a 47-year-old gentleman who had an incidental finding of a liver mass during an evaluation for kidney stones.  Subsequent imaging has been found to be consistent with hepatocellular cancer.  It also shows evidence of cirrhosis of the liver although the etiology of that is not determined.  By our radiology report, he has a lesion in the dome of the liver with arterial enhancement with washout and was classified as a LI-RADS 4 lesion, although it did grow a little bit from 18 to 20 mm.  He also has innumerable nonenhancing small nodules throughout his liver, which complicates things somewhat.  He has a 1.4 cm right lung nodule that does not have any PET avidity, which is somewhat encouraging and certainly will be paid close attention to in the future.  I was asked by Dr. Luis Enrique Sanchez to see Mr. Van for consideration of liver-directed surgical therapy.    I discussed with Mr. Van that based on the size and location of this lesion and the fact that it is certainly worrisome, although not definitive for cancer, I would agree that we should proceed with liver-directed surgical therapy in the form of a laparoscopic ultrasound-guided microwave ablation.  I also discussed that I do think a biopsy would be reasonable at the same  time just to prove diagnosis prior to treatment, particularly in light of the fact that this patient might be a candidate for transplantation in the future.  I think random biopsies may also be beneficial because of the nodules throughout the liver and the uncertain etiology of that.    I discussed the details of a planned laparoscopic ablation, including risks such as bleeding, infection, wound healing complications, venous thromboembolism, and worsening liver function.  I also discussed the fact that I am consultant for [employer] and I do receive compensation to teach other surgeons across the country how to perform microwave ablation.  The patient did not have any concern for conflict of interest.    Finally, we also discussed that he has a very large, multiple centimeter stone in his gallbladder.  Based on that, I did recommend cholecystectomy to avoid future problems, particularly in light of his liver disease.  He was also agreeable to that.  I discussed the risks of that surgery including bile leak, changes to bowel habits, bleeding and infection.    Mr. Van seems to understand the situation quite well and wishes to proceed as described.  We will get him on the operating room schedule for a planned laparoscopic ultrasound-guided microwave ablation of liver tumor, ultrasound-guided liver biopsy and laparoscopic cholecystectomy.    A total of 45 minutes was spent on virtual video visit with Mr. Van today.  An additional 15 minutes was spent in review of his history, imaging and coordination of care.

## 2022-11-22 ENCOUNTER — PATIENT OUTREACH (OUTPATIENT)
Dept: SURGERY | Facility: CLINIC | Age: 47
End: 2022-11-22

## 2022-11-22 ENCOUNTER — MYC MEDICAL ADVICE (OUTPATIENT)
Dept: ONCOLOGY | Facility: CLINIC | Age: 47
End: 2022-11-22

## 2022-11-22 ENCOUNTER — PREP FOR PROCEDURE (OUTPATIENT)
Dept: ONCOLOGY | Facility: CLINIC | Age: 47
End: 2022-11-22

## 2022-11-22 DIAGNOSIS — C22.0 HEPATOCELLULAR CARCINOMA (H): Primary | ICD-10-CM

## 2022-11-22 NOTE — TELEPHONE ENCOUNTER
"Surgical soap mailed to pt, per request of RNCC.    \"Hey team - can someone please mail this patient a bottle of the surgical soap.\"     Edelmira Curry CMA ([facility])            "

## 2022-11-22 NOTE — PROGRESS NOTES
RN Care Coordinator: Dorothea Juarez RN     Surgery is scheduled with Dr. Armando Munguia on 12/6 at the [facility].    Scheduled per case request orders.    H&P to be completed by PAC clinic on 11/30 at 10 am - video visit.     COVID-19 test: patient confirmed he will do at home 1-2 days prior to surgery.     Home rapid test to be completed within 2 days of procedure, patient to bring picture of results. Confirmed with patient.     Post-op: will be scheduled by the clinic    Patient will receive surgery arrival and start time from PAC.      Spoke with the patient and was able to confirm all scheduled information.     The surgery packet was sent via [employer]      11/22/2022 9:04 AM - called and spoke with patient regarding planned surgery dates and pre procedure and post procedure appointment. Confirmed date of 12/6 and that we would work on getting all other appointments finalized and confirm the information prior to end of business day.       Future Appointments   Date Time Provider Department Center   11/30/2022 10:00 AM Farzaneh Omalley APRN CNP [facility]   12/19/2022  6:00 AM  PFL [facility]   12/19/2022  9:00 AM Armando Munguia MD [facility]   12/21/2022  4:00 PM Cr Gray MD [facility]   12/27/2022  1:15 PM [facility]1 [facility]     11/22/2022 2:41 PM - called and spoke with patient, confirmed all the surgery information and appointment changes. All questions answered and he is aware the packet was sent via [employer] and soap will be mailed to him for pre procedure scrub.

## 2022-11-23 NOTE — TELEPHONE ENCOUNTER
FUTURE VISIT INFORMATION      SURGERY INFORMATION:    Date: 22    Location: [facility] or    Surgeon:  Armando Munguia MD    Anesthesia Type:  General    Procedure: Laparoscopic ultrasound guided microwave ablation of liver tumor Laparoscopic cholecystectomy Laparoscopic liver biopsy    Consult: Virtual visit 22    RECORDS REQUESTED FROM:       Primary Care Provider: Cr Martinez APRN-CNP- [facility]    Pertinent Medical History: Pulmonary nodules, hypertension    Most recent EKG+ Tracin22- [facility]    Most recent PFT's: 22

## 2022-11-28 ENCOUNTER — DOCUMENTATION ONLY (OUTPATIENT)
Dept: ONCOLOGY | Facility: CLINIC | Age: 47
End: 2022-11-28

## 2022-11-28 NOTE — NURSING NOTE
"FMLA and STD forms received via fax from pt.      Forms to be completed and put in folder for provider to approve.    Fax #:  FMLA: \"[employer]\" @ [phone]   STD: \"[employer]\" @ [phone]    Edelmira Curry CMA ([facility])      "

## 2022-11-29 NOTE — NURSING NOTE
FMLA and STD forms filled out and put in providers folder for review and signature.      Edelmira Curry CMA ([facility])

## 2022-11-30 ENCOUNTER — VIRTUAL VISIT (OUTPATIENT)
Dept: SURGERY | Facility: CLINIC | Age: 47
End: 2022-11-30
Payer: COMMERCIAL

## 2022-11-30 ENCOUNTER — ANESTHESIA EVENT (OUTPATIENT)
Dept: SURGERY | Facility: CLINIC | Age: 47
End: 2022-11-30
Payer: COMMERCIAL

## 2022-11-30 ENCOUNTER — PRE VISIT (OUTPATIENT)
Dept: SURGERY | Facility: CLINIC | Age: 47
End: 2022-11-30

## 2022-11-30 DIAGNOSIS — Z01.818 PRE-OP EVALUATION: Primary | ICD-10-CM

## 2022-11-30 DIAGNOSIS — E11.9 TYPE 2 DIABETES MELLITUS WITHOUT COMPLICATION, WITHOUT LONG-TERM CURRENT USE OF INSULIN (H): ICD-10-CM

## 2022-11-30 PROCEDURE — 99204 OFFICE O/P NEW MOD 45 MIN: CPT | Mod: 95 | Performed by: NURSE PRACTITIONER

## 2022-11-30 RX ORDER — CALCIUM/MAGNESIUM/ZINC 333-133 MG
1 TABLET ORAL EVERY MORNING
Status: ON HOLD | COMMUNITY
End: 2024-06-28

## 2022-11-30 RX ORDER — ASPIRIN 81 MG/1
81 TABLET, CHEWABLE ORAL EVERY MORNING
Status: ON HOLD | COMMUNITY
End: 2022-12-06

## 2022-11-30 RX ORDER — CALCIUM CARBONATE 300MG(750)
1-2 TABLET,CHEWABLE ORAL EVERY MORNING
Status: ON HOLD | COMMUNITY
End: 2024-07-05

## 2022-11-30 ASSESSMENT — PAIN SCALES - GENERAL: PAINLEVEL: MILD PAIN (3)

## 2022-11-30 ASSESSMENT — LIFESTYLE VARIABLES: TOBACCO_USE: 0

## 2022-11-30 ASSESSMENT — ENCOUNTER SYMPTOMS: ORTHOPNEA: 0

## 2022-11-30 NOTE — PROGRESS NOTES
West is a 47 year old who is being evaluated via a billable video visit.      How would you like to obtain your AVS? MyChart    HPI       Review of Systems         Objective    Vitals - Patient Reported  Pain Score: Mild Pain (3)        Physical Exam       DILLON Zuniga LPN

## 2022-11-30 NOTE — H&P
Pre-Operative H & P     CC:  Preoperative exam to assess for increased cardiopulmonary risk while undergoing surgery and anesthesia.    Date of Encounter: 11/30/2022  Primary Care Physician:  Walker Ramirez     Reason for visit: Pre-operative evaluation    HPI  West Van is a 47 year old male who presents for pre-operative H & P in preparation for  Procedure Information     Case: [id number] Date/Time: 12/06/22 0830    Procedures:       Laparoscopic ultrasound guided microwave ablation of liver tumor (Abdomen)      Laparoscopic cholecystectomy (Abdomen)      Laparoscopic liver biopsy (Abdomen)    Anesthesia type: General    Diagnosis:       Hepatocellular carcinoma (H) [C22.0]      Calculus of gallbladder without cholecystitis without obstruction [K80.20]    Pre-op diagnosis:       Hepatocellular carcinoma (H) [C22.0]      Calculus of gallbladder without cholecystitis without obstruction [K80.20]    Location: [facility] OR  / [facility] OR    Providers: Armando Munguia MD          West Van is a 47-year-old male with a PMH significant for pulmonary nodules, HTN, DM2, obesity, anxiety, and sciatica. He recently had an incidental finding of a liver mass during an evaluation for kidney stones.  Subsequent imaging has been found to be consistent with hepatocellular cancer.  It also shows evidence of cirrhosis of the liver although the etiology of that is not determined. He consulted with Dr. Munguia and reviewed imaging that also showed very large, multiple centimeter stones in his gallbladder. He presents today in preparation for the above recommended procedures.     History is obtained from the patient and chart review    Hx of abnormal bleeding or anti-platelet use: asa 81 mg      Past Medical History  Past Medical History:   Diagnosis Date     Benign essential HTN 4/13/2016     Diabetes (H)      Liver cancer (H) 10/4/2022       Past Surgical History  No past surgical history on file.    Prior to Admission  Medications  Current Outpatient Medications   Medication Sig Dispense Refill     ASHWAGANDHA PO Take by mouth every morning       aspirin (ASA) 81 MG chewable tablet Take 81 mg by mouth every morning       atenolol (TENORMIN) 25 MG tablet Take 25 mg by mouth every morning       cholecalciferol (VITAMIN D3) 125 mcg (5000 units) capsule Take 125 mcg by mouth every morning       lisinopril-hydrochlorothiazide (ZESTORETIC) 10-12.5 MG tablet Take 1 tablet by mouth every morning       magnesium 250 MG tablet Take 1 tablet by mouth every morning       metFORMIN (GLUCOPHAGE XR) 500 MG 24 hr tablet Take 500 mg by mouth every morning       Milk Thistle-Dand-Fennel-Licor (MILK THISTLE XTRA) CAPS capsule Take by mouth every morning       TURMERIC PO Take by mouth every morning       ULTICARE MINI 31G X 6 MM insulin pen needle Inject 1 each Subcutaneous At Bedtime       fexofenadine (ALLEGRA) 60 MG tablet Take 60 mg by mouth as needed       lisinopril (ZESTRIL) 10 MG tablet Take 10 mg by mouth every morning         Allergies  No Known Allergies    Social History  Social History     Socioeconomic History     Marital status: Single     Spouse name: Not on file     Number of children: Not on file     Years of education: Not on file     Highest education level: Not on file   Occupational History     Not on file   Tobacco Use     Smoking status: Never     Smokeless tobacco: Never   Substance and Sexual Activity     Alcohol use: Not Currently     Comment: Last ETOH 1999     Drug use: Never     Sexual activity: Not on file   Other Topics Concern     Not on file   Social History Narrative     Not on file     Social Determinants of Health     Financial Resource Strain: Not on file   Food Insecurity: Not on file   Transportation Needs: Not on file   Physical Activity: Not on file   Stress: Not on file   Social Connections: Not on file   Intimate Partner Violence: Not on file   Housing Stability: Not on file       Family History  No family  history on file.    Review of Systems  The complete review of systems is negative other than noted in the HPI or here.   Anesthesia Evaluation   Pt has had prior anesthetic. Type: MAC.    No history of anesthetic complications       ROS/MED HX  ENT/Pulmonary:     (+) RADHA risk factors, hypertension, daytime somnolence,  (-) tobacco use   Neurologic:  - neg neurologic ROS     Cardiovascular:     (+) hypertension-----Taking blood thinners Pt has not received instructions: Instructions Given to patient: Hold 7 days prior to procedure. Previous cardiac testing   Echo: Date: Results:    Stress Test: Date: Results:    ECG Reviewed: Date: 9/2022 Results:  Sinus rhythm with occasional Premature ventricular complexes   Cath: Date: Results:   (-) CONTI, orthopnea/PND and irregular heartbeat/palpitations   METS/Exercise Tolerance: >4 METS    Hematologic:  - neg hematologic  ROS     Musculoskeletal: Comment: sciatica - neg musculoskeletal ROS     GI/Hepatic: Comment: Hepatocellular Carcinoma  Gallbladder Calculus    (+) cholecystitis/cholelithiasis, liver disease,     Renal/Genitourinary:       Endo:     (+) type II DM, Last HgA1c: 7.1, date: 9/2022, Not using insulin, - not using insulin pump. Normal glucose range: 140-160 , not previously admitted for DM/DKA.     Psychiatric/Substance Use:     (+) psychiatric history anxiety  (-) alcohol abuse history   Infectious Disease:       Malignancy:   (+) Malignancy, History of Other.Other CA Heptacellular carcinoma Active status post.    Other:            Virtual visit -  No vitals were obtained    Physical Exam  Constitutional: Awake, alert, cooperative, no apparent distress, and appears stated age.  Eyes: Pupils equal  HENT: Normocephalic  Respiratory: non labored breathing   Neurologic: Awake, alert, oriented to name, place and time.   Neuropsychiatric: Calm, cooperative. Normal affect.      Prior Labs/Diagnostic Studies   All labs and imaging personally reviewed     MR Abdomen  11/2022  IMPRESSION:   1.  Slight increase in size of hepatic segment 8/4A LI-RADS 4 lesion,  probable HCC compared to 7/14/2022 MRI.  2.  Cirrhosis and sequelae of portal hypertension.  3.  No definite HCC; unchanged multiple fat-containing LI-RADS 3  observation, stable, compared to MRI 7/14/2022. Consider attention on  follow-up.  4.  Based on this exam the patient is/is not within [place] criteria.  5.  Subcentimeter pancreatic cyst; may represent sidebranch IPMN  recommend attention on follow-up per the guidelines detailed below.  6.  Enlarged periportal lymph nodes and few prominent retroperitoneal  lymph nodes. Consider attention on follow-up.  7.  Indeterminate 1.4 cm right midlung nodule, better characterized on  10/13/2022 PET/CT.  7.  Cholelithiasis  OPTN/LIRADS definitions.  LIRADS v2018.    Lab Results   Component Value Date    WBC 9.4 11/14/2022     Lab Results   Component Value Date    RBC 5.55 11/14/2022     Lab Results   Component Value Date    HGB 16.4 11/14/2022     Lab Results   Component Value Date    HCT 47.6 11/14/2022     Lab Results   Component Value Date    MCV 86 11/14/2022     Lab Results   Component Value Date    MCH 29.5 11/14/2022     Lab Results   Component Value Date    MCHC 34.5 11/14/2022     Lab Results   Component Value Date    RDW 12.8 11/14/2022     Lab Results   Component Value Date     11/14/2022     Last Comprehensive Metabolic Panel:  Sodium   Date Value Ref Range Status   11/14/2022 140 136 - 145 mmol/L Final     Potassium   Date Value Ref Range Status   11/14/2022 3.6 3.4 - 5.3 mmol/L Final     Chloride   Date Value Ref Range Status   11/14/2022 103 98 - 107 mmol/L Final     Carbon Dioxide (CO2)   Date Value Ref Range Status   11/14/2022 27 22 - 29 mmol/L Final     Anion Gap   Date Value Ref Range Status   11/14/2022 10 7 - 15 mmol/L Final     Glucose   Date Value Ref Range Status   11/14/2022 150 (H) 70 - 99 mg/dL Final     Urea Nitrogen   Date Value Ref Range  Status   11/14/2022 10.0 6.0 - 20.0 mg/dL Final     Creatinine   Date Value Ref Range Status   11/14/2022 0.78 0.67 - 1.17 mg/dL Final     GFR Estimate   Date Value Ref Range Status   11/14/2022 >90 >60 mL/min/1.73m2 Final     Comment:     Effective December 21, 2021 eGFRcr in adults is calculated using the 2021 CKD-EPI creatinine equation which includes age and gender (Domonique et al., [place], DOI: 10.1056/FMGEoh6367912)     Calcium   Date Value Ref Range Status   11/14/2022 9.9 8.6 - 10.0 mg/dL Final     Lab Results   Component Value Date    AST 33 11/14/2022     Lab Results   Component Value Date    ALT 46 11/14/2022     No results found for: BILICONJ   Lab Results   Component Value Date    BILITOTAL 1.4 11/14/2022     Lab Results   Component Value Date    ALBUMIN 4.4 11/14/2022     Lab Results   Component Value Date    PROTTOTAL 7.2 11/14/2022      Lab Results   Component Value Date    ALKPHOS 95 11/14/2022         EKG/ stress test - if available please see in ROS above   No results found.  No flowsheet data found.      The patient's records and results personally reviewed by this provider.     Outside records reviewed from: Care Everywhere      Assessment      West Van is a 47 year old male seen as a PAC referral for risk assessment and optimization for anesthesia.    Plan/Recommendations  Pt will be optimized for the proposed procedure.  See below for details on the assessment, risk, and preoperative recommendations    NEUROLOGY  - No history of TIA, CVA or seizure    -Post Op delirium risk factors:  No risk identified    ENT  - No current airway concerns.  Will need to be reassessed day of surgery.  Mallampati: Unable to assess  TM: Unable to assess    CARDIAC  - No history of CAD and Afib   No anginal symptoms, Denies palpitations, PND, dizziness or syncope.   - Hypertension- managed on tenormin and Zestril. Will hold Zestril DOS  Doesn't monitor BP at home.   - METS (Metabolic Equivalents)  Patient  "performs 4 or more METS exercise without symptoms            Total Score: 0      RCRI-Very low risk: Class 1 0.4% complication rate            Total Score: 0        PULMONARY  Never smoked, denies SOB or any other pulmonary history.     RADHA Medium Risk            Total Score: 3    RAHDA: Often tired    RADHA: Hypertension    RADHA: Male      - Denies asthma or inhaler use  - Tobacco History      History   Smoking Status     Never   Smokeless Tobacco     Never       GI/LIVER  Gallbladder calculus with Cholecystitis- Procedure scheduled as above.   Hepatocellular carcinoma- Procedure scheduled as above.       PONV Medium Risk  Total Score: 2           1 AN PONV: Patient is not a current smoker    1 AN PONV: Intended Post Op Opioids        /RENAL  - Baseline Creatinine  wnl    ENDOCRINE    - BMI: Estimated body mass index is 29.11 kg/m  as calculated from the following:    Height as of 10/14/22: 1.88 m (6' 2\").    Weight as of 11/14/22: 102.8 kg (226 lb 11.2 oz).  Overweight (BMI 25.0-29.9)  - Diabetes- most recent A1c 7.1 ( 9/2022) patient notices increase in BS at home since diagnosed.   140-160 range  Diabetes Mellitus, Type 2, non-insulin dependent.  Hold morning oral hypoglycemic medications. Recommend close monitoring of the patient's blood glucose levels throughout the perioperative period.    HEME  VTE Medium Risk 1.8%            Total Score: 7    VTE: Male    VTE: Current cancer      - Platelet disfunction second to Aspirin (Dee, many others)  Will hold 7 days prior to procedures.     MSK  Patient is NOT Frail            Total Score: 0          T & S ordered for DOS - patient is not near a  facility.     Different anesthesia methods/types have been discussed with the patient, but they are aware that the final plan will be decided by the assigned anesthesia provider on the date of service.    The patient is optimized for their procedure. AVS with information on surgery time/arrival time, meds and NPO status " given by nursing staff. No further diagnostic testing indicated.    Please refer to the physical examination documented by the anesthesiologist in the anesthesia record on the day of surgery.    Video-Visit Details    Type of service:  Video Visit    Provider received verbal consent for a Video Visit from the patient? Yes    Video Start Time: 9:00am   Video End Time (time video stopped): 9:23    Originating Location (pt. Location): Other work    Distant Location (provider location): Off-site    Mode of Communication:  Video Conference via [employer]  On the day of service:     Prep time: 15 minutes  Visit time: 23 minutes  Documentation time: 10 minutes  ------------------------------------------  Total time: 48 minutes      VALENTINA Arrington CNP  Preoperative Assessment Center  [facility]  Clinic and Surgery Center  Phone: [phone]  Fax: [phone]

## 2022-11-30 NOTE — PATIENT INSTRUCTIONS
Preparing for Your Surgery      Name:  West Van   MRN:  [id number]   :  1975   Today's Date:  2022       Arriving for surgery:  Surgery date:  22  Arrival time:  06:30 am     Surgeries and procedures: Adult patients can have 2 visitors all through the surgery process.     Visiting hours: 8 a.m. to 8:30 p.m.     Hospital: Adult patients and children under age 18 can have 4 visitor at a time     No visitors under the age of 5 are allowed for hospital patients.  Double occupancy rooms: Patients can have only two visitors at a time.     Patients with disabilities: Can have a support person with them (family member, service provider     Or someone well informed about their needs) plus the allowed number of visitors     Patients confirmed or suspected to have symptoms of COVID 19 or flu:     No visitors allowed for adult patients.   Children (under age 18) can have 1 named visitor.     People who are sick or showing symptoms of COVID 19 or flu:    Are not allowed to visit patients--we can only make exceptions in special situations.       Please follow these guidelines for your visit:   Arrive wearing a mask over your mouth and nose; we will give you a medical mask to wear    If you arrive wearing a cloth mask.   Keep it on during your entire visit, even when in patient's room.   If you don't wear a mask we'll ask you to leave.     Clean your hands with alcohol hand . Do this when you arrive at and leave the building and patient room,    And again after you touch your mask or anything in the room.     You can t visit if you have a fever, cough, shortness of breath, muscle aches, headaches, sore throat    Or diarrhea      Stay 6 feet away from others during your visit and between visits     Go directly to and from the room you are visiting.     Stay in the patient s room during your visit. Limit going to other places in the hospital as much as possible     Leave bags and jackets at home  or in the car.     For everyone s health, please don t come and go during your visit. That includes for smoking   during your visit.     Please come to:   [facility] [address]  [address] [place]  [address]  - ? parking is available in front of the hospital    -    Please proceed to [address] on the 3rd floor. [phone]?     - ?If you are in need of directions, wheelchair or escort please stop at the Information Desk in the lobby.  Inform the information person that you are here for surgery; a wheelchair and escort to [address] will be provided.?     What can I eat or drink?  -  You may eat and drink normally up to 8 hours prior to arrival time.   -  You may have clear liquids until 2 hours prior to arrival time.     Examples of clear liquids:  Water  Clear broth  Juices (apple, white grape, white cranberry  and cider) without pulp  Noncarbonated, powder based beverages  (lemonade and Andrew-Aid)  Sodas (Sprite, 7-Up, ginger ale and seltzer)  Coffee or tea (without milk or cream)  Gatorade    -  No Alcohol for at least 24 hours before surgery.     Which medicines can I take?  Hold Aspirin for 7 days before surgery.   Hold Multivitamins for 7 days before surgery.  Hold Supplements for 7 days before surgery.  Hold Ibuprofen (Advil, Motrin) for 1 day before surgery--unless otherwise directed by surgeon.  Hold Naproxen (Aleve) for 4 days before surgery.  -  DO NOT take these medications the day of surgery:  Lisinopril-hydrochlorothiazide (zestoretic) + metformin.    -  PLEASE TAKE these medications the day of surgery:  Tylenol if needed; take other morning medications.    How do I prepare myself?  - Please take 2 showers before surgery using Scrubcare or Hibiclens soap.    Use this soap only from the neck to your toes.     Leave the soap on your skin for one minute--then rinse thoroughly.      You may use your own shampoo and conditioner. No other  hair products.   - Please remove all jewelry and body piercings.  - No lotions, deodorants or fragrance.  - No makeup or fingernail polish.   - Bring your ID and insurance card.    -If you have a Deep Brain Stimulator, Spinal Cord Stimulator, or any Neuro Stimulator device---you must bring the remote control to the hospital.      ALL PATIENTS GOING HOME THE SAME DAY OF SURGERY ARE REQUIRED TO HAVE A RESPONSIBLE ADULT TO DRIVE AND BE IN ATTENDANCE WITH THEM FOR 24 HOURS FOLLOWING SURGERY.      Questions or Concerns:    - For any questions regarding the day of surgery or your hospital stay, please contact the Pre Admission Nursing Office at [phone].       - If you have health changes between today and your surgery, please call your surgeon.       - For questions after surgery, please call your surgeons office.

## 2022-12-06 ENCOUNTER — HOSPITAL ENCOUNTER (OUTPATIENT)
Facility: CLINIC | Age: 47
Discharge: HOME | End: 2022-12-06
Attending: SURGERY | Admitting: SURGERY
Payer: COMMERCIAL

## 2022-12-06 ENCOUNTER — ANESTHESIA (OUTPATIENT)
Dept: SURGERY | Facility: CLINIC | Age: 47
End: 2022-12-06
Payer: COMMERCIAL

## 2022-12-06 VITALS
WEIGHT: 225.97 LBS | TEMPERATURE: 98.5 F | HEART RATE: 113 BPM | RESPIRATION RATE: 16 BRPM | OXYGEN SATURATION: 99 % | DIASTOLIC BLOOD PRESSURE: 98 MMHG | SYSTOLIC BLOOD PRESSURE: 148 MMHG | HEIGHT: 74 IN | BODY MASS INDEX: 29 KG/M2

## 2022-12-06 DIAGNOSIS — K76.9 LESION OF LIVER: Primary | ICD-10-CM

## 2022-12-06 LAB
ABO/RH(D): NORMAL
ANTIBODY SCREEN: NEGATIVE
GLUCOSE BLDC GLUCOMTR-MCNC: 177 MG/DL (ref 70–99)
GLUCOSE BLDC GLUCOMTR-MCNC: 201 MG/DL (ref 70–99)
GLUCOSE BLDC GLUCOMTR-MCNC: 227 MG/DL (ref 70–99)
SPECIMEN EXPIRATION DATE: NORMAL

## 2022-12-06 PROCEDURE — 88304 TISSUE EXAM BY PATHOLOGIST: CPT | Mod: 26 | Performed by: PATHOLOGY

## 2022-12-06 PROCEDURE — 250N000009 HC RX 250: Performed by: ANESTHESIOLOGY

## 2022-12-06 PROCEDURE — 258N000003 HC RX IP 258 OP 636: Performed by: NURSE ANESTHETIST, CERTIFIED REGISTERED

## 2022-12-06 PROCEDURE — 999N000141 HC STATISTIC PRE-PROCEDURE NURSING ASSESSMENT: Performed by: SURGERY

## 2022-12-06 PROCEDURE — 250N000011 HC RX IP 250 OP 636: Performed by: NURSE ANESTHETIST, CERTIFIED REGISTERED

## 2022-12-06 PROCEDURE — 250N000011 HC RX IP 250 OP 636

## 2022-12-06 PROCEDURE — 250N000009 HC RX 250: Performed by: NURSE ANESTHETIST, CERTIFIED REGISTERED

## 2022-12-06 PROCEDURE — 88331 PATH CONSLTJ SURG 1 BLK 1SPC: CPT | Mod: 26 | Performed by: PATHOLOGY

## 2022-12-06 PROCEDURE — 86850 RBC ANTIBODY SCREEN: CPT | Performed by: NURSE ANESTHETIST, CERTIFIED REGISTERED

## 2022-12-06 PROCEDURE — 47562 LAPAROSCOPIC CHOLECYSTECTOMY: CPT | Performed by: SURGERY

## 2022-12-06 PROCEDURE — 370N000017 HC ANESTHESIA TECHNICAL FEE, PER MIN: Performed by: SURGERY

## 2022-12-06 PROCEDURE — 76940 US GUIDE TISSUE ABLATION: CPT | Mod: 26 | Performed by: SURGERY

## 2022-12-06 PROCEDURE — 88331 PATH CONSLTJ SURG 1 BLK 1SPC: CPT | Mod: TC | Performed by: SURGERY

## 2022-12-06 PROCEDURE — 88313 SPECIAL STAINS GROUP 2: CPT | Mod: 26 | Performed by: PATHOLOGY

## 2022-12-06 PROCEDURE — 710N000010 HC RECOVERY PHASE 1, LEVEL 2, PER MIN: Performed by: SURGERY

## 2022-12-06 PROCEDURE — 250N000011 HC RX IP 250 OP 636: Performed by: SURGERY

## 2022-12-06 PROCEDURE — 250N000011 HC RX IP 250 OP 636: Performed by: ANESTHESIOLOGY

## 2022-12-06 PROCEDURE — 272N000001 HC OR GENERAL SUPPLY STERILE: Performed by: SURGERY

## 2022-12-06 PROCEDURE — 250N000025 HC SEVOFLURANE, PER MIN: Performed by: SURGERY

## 2022-12-06 PROCEDURE — 82962 GLUCOSE BLOOD TEST: CPT

## 2022-12-06 PROCEDURE — 47370 LAPARO ABLATE LIVER TUMOR RF: CPT | Mod: 22 | Performed by: SURGERY

## 2022-12-06 PROCEDURE — 88342 IMHCHEM/IMCYTCHM 1ST ANTB: CPT | Mod: 26 | Performed by: PATHOLOGY

## 2022-12-06 PROCEDURE — 36415 COLL VENOUS BLD VENIPUNCTURE: CPT | Performed by: NURSE ANESTHETIST, CERTIFIED REGISTERED

## 2022-12-06 PROCEDURE — 88307 TISSUE EXAM BY PATHOLOGIST: CPT | Mod: 26 | Performed by: PATHOLOGY

## 2022-12-06 PROCEDURE — 710N000012 HC RECOVERY PHASE 2, PER MINUTE: Performed by: SURGERY

## 2022-12-06 PROCEDURE — 86901 BLOOD TYPING SEROLOGIC RH(D): CPT | Performed by: NURSE ANESTHETIST, CERTIFIED REGISTERED

## 2022-12-06 PROCEDURE — C1888 ENDOVAS NON-CARDIAC ABL CATH: HCPCS | Performed by: SURGERY

## 2022-12-06 PROCEDURE — 360N000078 HC SURGERY LEVEL 5, PER MIN: Performed by: SURGERY

## 2022-12-06 RX ORDER — OXYCODONE HYDROCHLORIDE 5 MG/1
5 TABLET ORAL
Status: DISCONTINUED | OUTPATIENT
Start: 2022-12-06 | End: 2022-12-06 | Stop reason: HOSPADM

## 2022-12-06 RX ORDER — NALOXONE HYDROCHLORIDE 0.4 MG/ML
0.2 INJECTION, SOLUTION INTRAMUSCULAR; INTRAVENOUS; SUBCUTANEOUS
Status: DISCONTINUED | OUTPATIENT
Start: 2022-12-06 | End: 2022-12-06 | Stop reason: HOSPADM

## 2022-12-06 RX ORDER — MEPERIDINE HYDROCHLORIDE 25 MG/ML
12.5 INJECTION INTRAMUSCULAR; INTRAVENOUS; SUBCUTANEOUS
Status: DISCONTINUED | OUTPATIENT
Start: 2022-12-06 | End: 2022-12-06 | Stop reason: HOSPADM

## 2022-12-06 RX ORDER — DEXAMETHASONE SODIUM PHOSPHATE 10 MG/ML
INJECTION, SOLUTION INTRAMUSCULAR; INTRAVENOUS
Status: COMPLETED | OUTPATIENT
Start: 2022-12-06 | End: 2022-12-06

## 2022-12-06 RX ORDER — ONDANSETRON 2 MG/ML
4 INJECTION INTRAMUSCULAR; INTRAVENOUS EVERY 30 MIN PRN
Status: DISCONTINUED | OUTPATIENT
Start: 2022-12-06 | End: 2022-12-06 | Stop reason: HOSPADM

## 2022-12-06 RX ORDER — ALBUTEROL SULFATE 0.83 MG/ML
2.5 SOLUTION RESPIRATORY (INHALATION) EVERY 4 HOURS PRN
Status: DISCONTINUED | OUTPATIENT
Start: 2022-12-06 | End: 2022-12-06 | Stop reason: HOSPADM

## 2022-12-06 RX ORDER — BUPIVACAINE HYDROCHLORIDE 2.5 MG/ML
INJECTION, SOLUTION EPIDURAL; INFILTRATION; INTRACAUDAL
Status: COMPLETED | OUTPATIENT
Start: 2022-12-06 | End: 2022-12-06

## 2022-12-06 RX ORDER — ESMOLOL HYDROCHLORIDE 10 MG/ML
INJECTION INTRAVENOUS PRN
Status: DISCONTINUED | OUTPATIENT
Start: 2022-12-06 | End: 2022-12-06

## 2022-12-06 RX ORDER — LIDOCAINE HYDROCHLORIDE 20 MG/ML
INJECTION, SOLUTION INFILTRATION; PERINEURAL PRN
Status: DISCONTINUED | OUTPATIENT
Start: 2022-12-06 | End: 2022-12-06

## 2022-12-06 RX ORDER — LABETALOL 20 MG/4 ML (5 MG/ML) INTRAVENOUS SYRINGE
PRN
Status: DISCONTINUED | OUTPATIENT
Start: 2022-12-06 | End: 2022-12-06

## 2022-12-06 RX ORDER — FENTANYL CITRATE 50 UG/ML
25-50 INJECTION, SOLUTION INTRAMUSCULAR; INTRAVENOUS
Status: DISCONTINUED | OUTPATIENT
Start: 2022-12-06 | End: 2022-12-06 | Stop reason: HOSPADM

## 2022-12-06 RX ORDER — ATENOLOL 25 MG/1
25 TABLET ORAL ONCE
Status: DISCONTINUED | OUTPATIENT
Start: 2022-12-06 | End: 2022-12-06 | Stop reason: HOSPADM

## 2022-12-06 RX ORDER — NALOXONE HYDROCHLORIDE 0.4 MG/ML
0.4 INJECTION, SOLUTION INTRAMUSCULAR; INTRAVENOUS; SUBCUTANEOUS
Status: DISCONTINUED | OUTPATIENT
Start: 2022-12-06 | End: 2022-12-06 | Stop reason: HOSPADM

## 2022-12-06 RX ORDER — SODIUM CHLORIDE, SODIUM LACTATE, POTASSIUM CHLORIDE, CALCIUM CHLORIDE 600; 310; 30; 20 MG/100ML; MG/100ML; MG/100ML; MG/100ML
INJECTION, SOLUTION INTRAVENOUS CONTINUOUS PRN
Status: DISCONTINUED | OUTPATIENT
Start: 2022-12-06 | End: 2022-12-06

## 2022-12-06 RX ORDER — POLYETHYLENE GLYCOL 3350 17 G/17G
1 POWDER, FOR SOLUTION ORAL DAILY
Qty: 510 G | Refills: 1 | Status: SHIPPED | OUTPATIENT
Start: 2022-12-06 | End: 2023-03-06

## 2022-12-06 RX ORDER — DEXAMETHASONE SODIUM PHOSPHATE 4 MG/ML
INJECTION, SOLUTION INTRA-ARTICULAR; INTRALESIONAL; INTRAMUSCULAR; INTRAVENOUS; SOFT TISSUE PRN
Status: DISCONTINUED | OUTPATIENT
Start: 2022-12-06 | End: 2022-12-06

## 2022-12-06 RX ORDER — LEVOFLOXACIN 5 MG/ML
500 INJECTION, SOLUTION INTRAVENOUS ONCE
Status: COMPLETED | OUTPATIENT
Start: 2022-12-06 | End: 2022-12-06

## 2022-12-06 RX ORDER — DEXMEDETOMIDINE HYDROCHLORIDE 4 UG/ML
INJECTION, SOLUTION INTRAVENOUS
Status: COMPLETED | OUTPATIENT
Start: 2022-12-06 | End: 2022-12-06

## 2022-12-06 RX ORDER — FENTANYL CITRATE 50 UG/ML
25 INJECTION, SOLUTION INTRAMUSCULAR; INTRAVENOUS
Status: DISCONTINUED | OUTPATIENT
Start: 2022-12-06 | End: 2022-12-06 | Stop reason: HOSPADM

## 2022-12-06 RX ORDER — ASPIRIN 81 MG/1
81 TABLET, CHEWABLE ORAL EVERY MORNING
COMMUNITY
Start: 2022-12-07 | End: 2022-12-21

## 2022-12-06 RX ORDER — OXYCODONE HYDROCHLORIDE 5 MG/1
5-10 TABLET ORAL EVERY 4 HOURS PRN
Qty: 30 TABLET | Refills: 0 | Status: SHIPPED | OUTPATIENT
Start: 2022-12-06 | End: 2022-12-21

## 2022-12-06 RX ORDER — HYDROMORPHONE HCL IN WATER/PF 6 MG/30 ML
0.4 PATIENT CONTROLLED ANALGESIA SYRINGE INTRAVENOUS EVERY 5 MIN PRN
Status: DISCONTINUED | OUTPATIENT
Start: 2022-12-06 | End: 2022-12-06 | Stop reason: HOSPADM

## 2022-12-06 RX ORDER — FENTANYL CITRATE 50 UG/ML
INJECTION, SOLUTION INTRAMUSCULAR; INTRAVENOUS PRN
Status: DISCONTINUED | OUTPATIENT
Start: 2022-12-06 | End: 2022-12-06

## 2022-12-06 RX ORDER — OXYCODONE HCL 5 MG/5 ML
5 SOLUTION, ORAL ORAL EVERY 4 HOURS PRN
Status: DISCONTINUED | OUTPATIENT
Start: 2022-12-06 | End: 2022-12-06 | Stop reason: HOSPADM

## 2022-12-06 RX ORDER — LABETALOL HYDROCHLORIDE 5 MG/ML
10 INJECTION, SOLUTION INTRAVENOUS ONCE
Status: DISCONTINUED | OUTPATIENT
Start: 2022-12-06 | End: 2022-12-06

## 2022-12-06 RX ORDER — PROPOFOL 10 MG/ML
INJECTION, EMULSION INTRAVENOUS PRN
Status: DISCONTINUED | OUTPATIENT
Start: 2022-12-06 | End: 2022-12-06

## 2022-12-06 RX ORDER — FENTANYL CITRATE 50 UG/ML
25 INJECTION, SOLUTION INTRAMUSCULAR; INTRAVENOUS EVERY 5 MIN PRN
Status: DISCONTINUED | OUTPATIENT
Start: 2022-12-06 | End: 2022-12-06 | Stop reason: HOSPADM

## 2022-12-06 RX ORDER — SODIUM CHLORIDE, SODIUM LACTATE, POTASSIUM CHLORIDE, CALCIUM CHLORIDE 600; 310; 30; 20 MG/100ML; MG/100ML; MG/100ML; MG/100ML
INJECTION, SOLUTION INTRAVENOUS CONTINUOUS
Status: DISCONTINUED | OUTPATIENT
Start: 2022-12-06 | End: 2022-12-06 | Stop reason: HOSPADM

## 2022-12-06 RX ORDER — ONDANSETRON 2 MG/ML
INJECTION INTRAMUSCULAR; INTRAVENOUS PRN
Status: DISCONTINUED | OUTPATIENT
Start: 2022-12-06 | End: 2022-12-06

## 2022-12-06 RX ORDER — LABETALOL HYDROCHLORIDE 5 MG/ML
10 INJECTION, SOLUTION INTRAVENOUS ONCE
Status: COMPLETED | OUTPATIENT
Start: 2022-12-06 | End: 2022-12-06

## 2022-12-06 RX ORDER — ONDANSETRON 4 MG/1
4 TABLET, ORALLY DISINTEGRATING ORAL EVERY 30 MIN PRN
Status: DISCONTINUED | OUTPATIENT
Start: 2022-12-06 | End: 2022-12-06 | Stop reason: HOSPADM

## 2022-12-06 RX ORDER — FLUMAZENIL 0.1 MG/ML
0.2 INJECTION, SOLUTION INTRAVENOUS
Status: DISCONTINUED | OUTPATIENT
Start: 2022-12-06 | End: 2022-12-06 | Stop reason: HOSPADM

## 2022-12-06 RX ORDER — METRONIDAZOLE 500 MG/100ML
500 INJECTION, SOLUTION INTRAVENOUS ONCE
Status: COMPLETED | OUTPATIENT
Start: 2022-12-06 | End: 2022-12-06

## 2022-12-06 RX ORDER — HYDROMORPHONE HCL IN WATER/PF 6 MG/30 ML
0.2 PATIENT CONTROLLED ANALGESIA SYRINGE INTRAVENOUS EVERY 5 MIN PRN
Status: DISCONTINUED | OUTPATIENT
Start: 2022-12-06 | End: 2022-12-06 | Stop reason: HOSPADM

## 2022-12-06 RX ORDER — FENTANYL CITRATE 50 UG/ML
50 INJECTION, SOLUTION INTRAMUSCULAR; INTRAVENOUS EVERY 5 MIN PRN
Status: DISCONTINUED | OUTPATIENT
Start: 2022-12-06 | End: 2022-12-06 | Stop reason: HOSPADM

## 2022-12-06 RX ORDER — ENOXAPARIN SODIUM 100 MG/ML
40 INJECTION SUBCUTANEOUS
Status: COMPLETED | OUTPATIENT
Start: 2022-12-06 | End: 2022-12-06

## 2022-12-06 RX ADMIN — PROPOFOL 200 MG: 10 INJECTION, EMULSION INTRAVENOUS at 08:35

## 2022-12-06 RX ADMIN — Medication 10 MG: at 10:55

## 2022-12-06 RX ADMIN — PROCHLORPERAZINE EDISYLATE 5 MG: 5 INJECTION INTRAMUSCULAR; INTRAVENOUS at 13:44

## 2022-12-06 RX ADMIN — ONDANSETRON 4 MG: 2 INJECTION INTRAMUSCULAR; INTRAVENOUS at 13:08

## 2022-12-06 RX ADMIN — FENTANYL CITRATE 100 MCG: 50 INJECTION, SOLUTION INTRAMUSCULAR; INTRAVENOUS at 08:33

## 2022-12-06 RX ADMIN — PHENYLEPHRINE HYDROCHLORIDE 100 MCG: 10 INJECTION INTRAVENOUS at 09:09

## 2022-12-06 RX ADMIN — HYDROMORPHONE HYDROCHLORIDE 0.5 MG: 1 INJECTION, SOLUTION INTRAMUSCULAR; INTRAVENOUS; SUBCUTANEOUS at 10:04

## 2022-12-06 RX ADMIN — FENTANYL CITRATE 50 MCG: 50 INJECTION, SOLUTION INTRAMUSCULAR; INTRAVENOUS at 08:59

## 2022-12-06 RX ADMIN — BUPIVACAINE HYDROCHLORIDE 60 ML: 2.5 INJECTION, SOLUTION EPIDURAL; INFILTRATION; INTRACAUDAL; PERINEURAL at 07:48

## 2022-12-06 RX ADMIN — SODIUM CHLORIDE, POTASSIUM CHLORIDE, SODIUM LACTATE AND CALCIUM CHLORIDE: 600; 310; 30; 20 INJECTION, SOLUTION INTRAVENOUS at 08:29

## 2022-12-06 RX ADMIN — LEVOFLOXACIN 500 MG: 500 INJECTION, SOLUTION INTRAVENOUS at 07:17

## 2022-12-06 RX ADMIN — Medication 20 MG: at 09:53

## 2022-12-06 RX ADMIN — FENTANYL CITRATE 50 MCG: 50 INJECTION, SOLUTION INTRAMUSCULAR; INTRAVENOUS at 11:45

## 2022-12-06 RX ADMIN — DEXAMETHASONE SODIUM PHOSPHATE 4 MG: 4 INJECTION, SOLUTION INTRA-ARTICULAR; INTRALESIONAL; INTRAMUSCULAR; INTRAVENOUS; SOFT TISSUE at 08:54

## 2022-12-06 RX ADMIN — Medication 20 MG: at 09:20

## 2022-12-06 RX ADMIN — MIDAZOLAM 2 MG: 1 INJECTION INTRAMUSCULAR; INTRAVENOUS at 08:26

## 2022-12-06 RX ADMIN — FENTANYL CITRATE 25 MCG: 50 INJECTION INTRAMUSCULAR; INTRAVENOUS at 12:18

## 2022-12-06 RX ADMIN — METRONIDAZOLE 500 MG: 500 INJECTION, SOLUTION INTRAVENOUS at 08:41

## 2022-12-06 RX ADMIN — LIDOCAINE HYDROCHLORIDE 100 MG: 20 INJECTION, SOLUTION INFILTRATION; PERINEURAL at 08:33

## 2022-12-06 RX ADMIN — ENOXAPARIN SODIUM 40 MG: 40 INJECTION SUBCUTANEOUS at 08:23

## 2022-12-06 RX ADMIN — SODIUM CHLORIDE, POTASSIUM CHLORIDE, SODIUM LACTATE AND CALCIUM CHLORIDE: 600; 310; 30; 20 INJECTION, SOLUTION INTRAVENOUS at 10:21

## 2022-12-06 RX ADMIN — FENTANYL CITRATE 50 MCG: 50 INJECTION, SOLUTION INTRAMUSCULAR; INTRAVENOUS at 07:48

## 2022-12-06 RX ADMIN — FENTANYL CITRATE 50 MCG: 50 INJECTION, SOLUTION INTRAMUSCULAR; INTRAVENOUS at 10:09

## 2022-12-06 RX ADMIN — Medication 65 MG: at 08:35

## 2022-12-06 RX ADMIN — ONDANSETRON 4 MG: 2 INJECTION INTRAMUSCULAR; INTRAVENOUS at 11:09

## 2022-12-06 RX ADMIN — SUGAMMADEX 200 MG: 100 INJECTION, SOLUTION INTRAVENOUS at 11:20

## 2022-12-06 RX ADMIN — LABETALOL HYDROCHLORIDE 10 MG: 5 INJECTION, SOLUTION INTRAVENOUS at 19:35

## 2022-12-06 RX ADMIN — DEXAMETHASONE SODIUM PHOSPHATE 2 MG: 10 INJECTION, SOLUTION INTRAMUSCULAR; INTRAVENOUS at 07:48

## 2022-12-06 RX ADMIN — FENTANYL CITRATE 25 MCG: 50 INJECTION INTRAMUSCULAR; INTRAVENOUS at 12:29

## 2022-12-06 RX ADMIN — MIDAZOLAM 1 MG: 1 INJECTION INTRAMUSCULAR; INTRAVENOUS at 07:49

## 2022-12-06 RX ADMIN — DEXMEDETOMIDINE 40 MCG: 100 INJECTION, SOLUTION, CONCENTRATE INTRAVENOUS at 07:48

## 2022-12-06 RX ADMIN — HYDROMORPHONE HYDROCHLORIDE 0.5 MG: 1 INJECTION, SOLUTION INTRAMUSCULAR; INTRAVENOUS; SUBCUTANEOUS at 09:35

## 2022-12-06 RX ADMIN — FENTANYL CITRATE 50 MCG: 50 INJECTION, SOLUTION INTRAMUSCULAR; INTRAVENOUS at 09:20

## 2022-12-06 RX ADMIN — Medication 5 MG: at 08:33

## 2022-12-06 RX ADMIN — ESMOLOL HYDROCHLORIDE 20 MG: 10 INJECTION, SOLUTION INTRAVENOUS at 10:15

## 2022-12-06 RX ADMIN — PHENYLEPHRINE HYDROCHLORIDE 200 MCG: 10 INJECTION INTRAVENOUS at 09:13

## 2022-12-06 RX ADMIN — LABETALOL 20 MG/4 ML (5 MG/ML) INTRAVENOUS SYRINGE 5 MG: at 10:20

## 2022-12-06 RX ADMIN — HYDROMORPHONE HYDROCHLORIDE 0.5 MG: 1 INJECTION, SOLUTION INTRAMUSCULAR; INTRAVENOUS; SUBCUTANEOUS at 11:18

## 2022-12-06 ASSESSMENT — LIFESTYLE VARIABLES: TOBACCO_USE: 0

## 2022-12-06 ASSESSMENT — ACTIVITIES OF DAILY LIVING (ADL)
ADLS_ACUITY_SCORE: 35

## 2022-12-06 ASSESSMENT — ENCOUNTER SYMPTOMS: ORTHOPNEA: 0

## 2022-12-06 NOTE — OR NURSING
Patient unable to void post procedure. Bladder scanned patient, 400 ml in bladder. Basia Livingston PA-C paged.     PA spoke with Dr. Munguia: okay with discharge patient without voiding.

## 2022-12-06 NOTE — BRIEF OP NOTE
[facility]    Brief Operative Note    Pre-operative diagnosis: Hepatocellular carcinoma (H) [C22.0]  Calculus of gallbladder without cholecystitis without obstruction [K80.20]  Post-operative diagnosis Same as pre-operative diagnosis    Procedure: Procedure(s):  Diagnostic Laparoscopy, Hepatic Ultrasound, Laparoscopic ultrasound guided microwave ablation of liver tumor x1  Laparoscopic cholecystectomy  Laparoscopic Ultrasound Guided liver biopsy  Surgeon: Surgeon(s) and Role:     * Armando Munguia MD - Primary     * Salvador Arnold MD - Assisting  Anesthesia: General with Block   Estimated Blood Loss: 10 ml    Drains: None  Specimens:   ID Type Source Tests Collected by Time Destination   1 : Segment 8 Liver Mass Biopsy Liver SURGICAL PATHOLOGY EXAM Armando Munguia MD 12/6/2022  9:21 AM    2 : Gallbladder  Tissue Gallbladder SURGICAL PATHOLOGY EXAM Armando Munguia MD 12/6/2022 10:51 AM      Findings:   See operative note.  Complications: None.  Implants: * No implants in log *    Plan:  - Hydrate now with 1L IVFs, must be voiding well prior to discharge!  - Discharge from PACU if doing well - Basia Livingston PA-C to give final ok for discharge.    - - - - - - - - - - - - - - - - - -  Annabelle Delacruz MD  General Surgery PGY-5  See [facility] for on call information.   Of note, I was not in this operation; this note is for documentation purposes only.

## 2022-12-06 NOTE — ANESTHESIA PROCEDURE NOTES
Airway       Patient location during procedure: OR       Procedure Start/Stop Times: 12/6/2022 8:38 AM  Staff -        Anesthesiologist:  Kishan Horton MD       CRNA: Aylin Yusuf APRN CRNA       Performed By: CRNAIndications and Patient Condition       Indications for airway management: dong-procedural       Induction type:intravenous       Mask difficulty assessment: 1 - vent by mask    Final Airway Details       Final airway type: endotracheal airway       Successful airway: ETT - single  Endotracheal Airway Details        ETT size (mm): 8.0       Cuffed: yes       Successful intubation technique: direct laryngoscopy       DL Blade Type: Argueta 2       Grade View of Cords: 2       Adjucts: stylet       Position: Right       Secured at (cm): 23       Bite block used: None    Post intubation assessment        Placement verified by: capnometry, equal breath sounds and chest rise        Number of attempts at approach: 1       Number of other approaches attempted: 0       Secured with: pink tape       Ease of procedure: easy       Dentition: Intact and Unchanged    Medication(s) Administered   Medication Administration Time: 12/6/2022 8:38 AM

## 2022-12-06 NOTE — ANESTHESIA PROCEDURE NOTES
TAP Procedure Note    Pre-Procedure   Staff -        Anesthesiologist:  Stephanie Jha MD       Resident/Fellow: Mansoor Wise MD       Performed By: with residents and resident       Procedure performed by resident/fellow/CRNA in presence of a teaching physician.         Location: pre-op       Procedure Start/Stop Times: 12/6/2022 7:48 AM and 12/6/2022 7:59 AM       Pre-Anesthestic Checklist: patient identified, IV checked, site marked, risks and benefits discussed, informed consent, monitors and equipment checked, pre-op evaluation, at physician/surgeon's request and post-op pain management  Timeout:       Correct Patient: Yes        Correct Procedure: Yes        Correct Site: Yes        Correct Position: Yes        Correct Laterality: Yes        Site Marked: Yes  Procedure Documentation  Procedure: TAP       Diagnosis: POSTOPERATIVE PAIN       Laterality: bilateral       Patient Position: supine       Patient Prep/Sterile Barriers: sterile gloves, mask       Skin prep: Chloraprep       Needle Type: short bevel       Needle Gauge: 21.        Needle Length (millimeters): 110        Ultrasound guided       1. Ultrasound was used to identify targeted nerve, plexus, vascular marker, or fascial plane and place a needle adjacent to it in real-time.       2. Ultrasound was used to visualize the spread of anesthetic in close proximity to the above referenced structure.       3. A permanent image is entered into the patient's record.    Assessment/Narrative         The placement was negative for: blood aspirated, painful injection and site bleeding       Paresthesias: No.       Bolus given via needle..        Secured via.        Insertion/Infusion Method: Single Shot       Complications: none       Injection made incrementally with aspirations every 5 mL.    Medication(s) Administered   Bupivacaine 0.25% PF (Infiltration) - Infiltration   60 mL - 12/6/2022 7:48:00 AM  Dexmedetomidine 4 mcg/mL (Perineural) -  "Perineural   40 mcg - 12/6/2022 7:48:00 AM  Dexamethasone 10 mg/mL PF (Perineural) - Perineural   2 mg - 12/6/2022 7:48:00 AM  Medication Administration Time: 12/6/2022 7:48 AM     Comments:  Discussed risks of nerve block, including nerve injury, bleeding, infection, incomplete analgesia. Discussed alternative of not performing a nerve block. Ensured understanding, invited questions and all questions were answered. Patient wishes to proceed.    Informed consent was obtained.   Patient tolerated well. Incremental aspiration every 5 mL. No paresthesia, no heme. Needle tip visualized throughout with appropriate spread of local anesthetic in fascial planes bilaterally  Block was placed at the surgeon's request for post operative pain control.          FOR [facility] ONLY:   Pain Team Contact information: please page the Pain Team Via [employer]. Search \"Pain\". During daytime hours, please page the attending first. At night please page the resident first.    "

## 2022-12-06 NOTE — NURSING NOTE
Signed form received and faxed to [employer], fax # [phone]. Successful transmission verified via rightfax. Copy of forms sent to scanning, original forms mailed to patient's home address on file.    Hue Walker CMA on 12/6/2022 at 6:52 AM

## 2022-12-06 NOTE — DISCHARGE INSTRUCTIONS
[facility]  Same-Day Surgery   Adult Discharge Orders & Instructions     For 24 hours after surgery    Get plenty of rest.  A responsible adult must stay with you for at least 24 hours after you leave the hospital.   Do not drive or use heavy equipment.  If you have weakness or tingling, don't drive or use heavy equipment until this feeling goes away.  Do not drink alcohol.  Avoid strenuous or risky activities.  Ask for help when climbing stairs.   You may feel lightheaded.  IF so, sit for a few minutes before standing.  Have someone help you get up.   If you have nausea (feel sick to your stomach): Drink only clear liquids such as apple juice, ginger ale, broth or 7-Up.  Rest may also help.  Be sure to drink enough fluids.  Move to a regular diet as you feel able.  You may have a slight fever. Call the doctor if your fever is over 100 F (37.7 C) (taken under the tongue) or lasts longer than 24 hours.  You may have a dry mouth, a sore throat, muscle aches or trouble sleeping.  These should go away after 24 hours.  Do not make important or legal decisions.   Call your doctor for any of the followin.  Signs of infection (fever, growing tenderness at the surgery site, a large amount of drainage or bleeding, severe pain, foul-smelling drainage, redness, swelling).    2. It has been over 8 to 10 hours since surgery and you are still not able to urinate (pass water).    3.  Headache for over 24 hours.    4.  Numbness, tingling or weakness the day after surgery (if you had spinal anesthesia).  To contact a doctor, call Dr Munguia's office at [phone] (clinic)       or: [phone] and ask for the resident on call for general (answered 24 hours a day)  '   Emergency Department:    [facility]: [phone]       (TTY for hearing impaired: [phone])    [facility]: [phone]       (TTY for hearing impaired: [phone])

## 2022-12-06 NOTE — ANESTHESIA PREPROCEDURE EVALUATION
Pre-Operative H & P     CC:  Preoperative exam to assess for increased cardiopulmonary risk while undergoing surgery and anesthesia.    Date of Encounter: 11/30/2022  Primary Care Physician:  Walker Ramirez     Reason for visit: Pre-operative evaluation    HPI  West Van is a 47 year old male who presents for pre-operative H & P in preparation for  Procedure Information     Case: [id number] Date/Time: 12/06/22 0830    Procedures:       Laparoscopic ultrasound guided microwave ablation of liver tumor (Abdomen)      Laparoscopic cholecystectomy (Abdomen)      Laparoscopic liver biopsy (Abdomen)    Anesthesia type: General with Block    Diagnosis:       Hepatocellular carcinoma (H) [C22.0]      Calculus of gallbladder without cholecystitis without obstruction [K80.20]    Pre-op diagnosis:       Hepatocellular carcinoma (H) [C22.0]      Calculus of gallbladder without cholecystitis without obstruction [K80.20]    [facility]  / [facility]    Providers: Armando Munguia MD          West Van is a 47-year-old male with a PMH significant for pulmonary nodules, HTN, DM2, obesity, anxiety, and sciatica. He recently had an incidental finding of a liver mass during an evaluation for kidney stones.  Subsequent imaging has been found to be consistent with hepatocellular cancer.  It also shows evidence of cirrhosis of the liver although the etiology of that is not determined. He consulted with Dr. Munguia and reviewed imaging that also showed very large, multiple centimeter stones in his gallbladder. He presents today in preparation for the above recommended procedures.     History is obtained from the patient and chart review    Hx of abnormal bleeding or anti-platelet use: asa 81 mg      Past Medical History  Past Medical History:   Diagnosis Date     Benign essential HTN 4/13/2016     Diabetes (H)      Liver cancer (H) 10/4/2022       Past Surgical History  No past surgical history on file.    Prior to Admission  Medications  No current outpatient medications on file.       Allergies  No Known Allergies    Social History  Social History     Socioeconomic History     Marital status: Single     Spouse name: Not on file     Number of children: Not on file     Years of education: Not on file     Highest education level: Not on file   Occupational History     Not on file   Tobacco Use     Smoking status: Never     Smokeless tobacco: Never   Substance and Sexual Activity     Alcohol use: Not Currently     Comment: Last ETOH 1999     Drug use: Never     Sexual activity: Not on file   Other Topics Concern     Not on file   Social History Narrative     Not on file     Social Determinants of Health     Financial Resource Strain: Not on file   Food Insecurity: Not on file   Transportation Needs: Not on file   Physical Activity: Not on file   Stress: Not on file   Social Connections: Not on file   Intimate Partner Violence: Not on file   Housing Stability: Not on file       Family History  No family history on file.    Review of Systems  The complete review of systems is negative other than noted in the HPI or here.   Anesthesia Evaluation   Pt has had prior anesthetic. Type: MAC.    No history of anesthetic complications       ROS/MED HX  ENT/Pulmonary:     (+) RADHA risk factors, hypertension, daytime somnolence,  (-) tobacco use   Neurologic:  - neg neurologic ROS     Cardiovascular:     (+) hypertension-----Taking blood thinners Pt has not received instructions: Instructions Given to patient: Hold 7 days prior to procedure. Previous cardiac testing   Echo: Date: Results:    Stress Test: Date: Results:    ECG Reviewed: Date: 9/2022 Results:  Sinus rhythm with occasional Premature ventricular complexes   Cath: Date: Results:   (-) CONTI, orthopnea/PND, irregular heartbeat/palpitations and murmur   METS/Exercise Tolerance: >4 METS    Hematologic:  - neg hematologic  ROS     Musculoskeletal: Comment: sciatica - neg musculoskeletal ROS      GI/Hepatic: Comment: Hepatocellular Carcinoma  Gallbladder Calculus    (+) cholecystitis/cholelithiasis, liver disease,     Renal/Genitourinary:       Endo:     (+) type II DM, Last HgA1c: 7.1, date: 9/2022, Not using insulin, - not using insulin pump. Normal glucose range: 140-160 , not previously admitted for DM/DKA.     Psychiatric/Substance Use:     (+) psychiatric history anxiety  (-) alcohol abuse history   Infectious Disease:  - neg infectious disease ROS     Malignancy:   (+) Malignancy, History of Other.Other CA Heptacellular carcinoma Active status post.    Other:  - neg other ROS          Virtual visit -  No vitals were obtained    Physical Exam  Constitutional: Awake, alert, cooperative, no apparent distress, and appears stated age.  Eyes: Pupils equal  HENT: Normocephalic  Respiratory: non labored breathing   Neurologic: Awake, alert, oriented to name, place and time.   Neuropsychiatric: Calm, cooperative. Normal affect.      Prior Labs/Diagnostic Studies   All labs and imaging personally reviewed     MR Abdomen 11/2022  IMPRESSION:   1.  Slight increase in size of hepatic segment 8/4A LI-RADS 4 lesion,  probable HCC compared to 7/14/2022 MRI.  2.  Cirrhosis and sequelae of portal hypertension.  3.  No definite HCC; unchanged multiple fat-containing LI-RADS 3  observation, stable, compared to MRI 7/14/2022. Consider attention on  follow-up.  4.  Based on this exam the patient is/is not within [place] criteria.  5.  Subcentimeter pancreatic cyst; may represent sidebranch IPMN  recommend attention on follow-up per the guidelines detailed below.  6.  Enlarged periportal lymph nodes and few prominent retroperitoneal  lymph nodes. Consider attention on follow-up.  7.  Indeterminate 1.4 cm right midlung nodule, better characterized on  10/13/2022 PET/CT.  7.  Cholelithiasis  OPTN/LIRADS definitions.  LIRADS v2018.    Lab Results   Component Value Date    WBC 9.4 11/14/2022     Lab Results   Component Value  Date    RBC 5.55 11/14/2022     Lab Results   Component Value Date    HGB 16.4 11/14/2022     Lab Results   Component Value Date    HCT 47.6 11/14/2022     Lab Results   Component Value Date    MCV 86 11/14/2022     Lab Results   Component Value Date    MCH 29.5 11/14/2022     Lab Results   Component Value Date    MCHC 34.5 11/14/2022     Lab Results   Component Value Date    RDW 12.8 11/14/2022     Lab Results   Component Value Date     11/14/2022     Last Comprehensive Metabolic Panel:  Sodium   Date Value Ref Range Status   11/14/2022 140 136 - 145 mmol/L Final     Potassium   Date Value Ref Range Status   11/14/2022 3.6 3.4 - 5.3 mmol/L Final     Chloride   Date Value Ref Range Status   11/14/2022 103 98 - 107 mmol/L Final     Carbon Dioxide (CO2)   Date Value Ref Range Status   11/14/2022 27 22 - 29 mmol/L Final     Anion Gap   Date Value Ref Range Status   11/14/2022 10 7 - 15 mmol/L Final     GLUCOSE BY METER POCT   Date Value Ref Range Status   12/06/2022 177 (H) 70 - 99 mg/dL Final     Urea Nitrogen   Date Value Ref Range Status   11/14/2022 10.0 6.0 - 20.0 mg/dL Final     Creatinine   Date Value Ref Range Status   11/14/2022 0.78 0.67 - 1.17 mg/dL Final     GFR Estimate   Date Value Ref Range Status   11/14/2022 >90 >60 mL/min/1.73m2 Final     Comment:     Effective December 21, 2021 eGFRcr in adults is calculated using the 2021 CKD-EPI creatinine equation which includes age and gender (Domonique et al., NEJ, DOI: 10.1056/UFTInh5403276)     Calcium   Date Value Ref Range Status   11/14/2022 9.9 8.6 - 10.0 mg/dL Final     Lab Results   Component Value Date    AST 33 11/14/2022     Lab Results   Component Value Date    ALT 46 11/14/2022     No results found for: BILICONJ   Lab Results   Component Value Date    BILITOTAL 1.4 11/14/2022     Lab Results   Component Value Date    ALBUMIN 4.4 11/14/2022     Lab Results   Component Value Date    PROTTOTAL 7.2 11/14/2022      Lab Results   Component Value Date     ALKPHOS 95 11/14/2022         EKG/ stress test - if available please see in ROS above   No results found.  No flowsheet data found.      The patient's records and results personally reviewed by this provider.     Outside records reviewed from: Care Everywhere      Assessment      West Van is a 47 year old male seen as a PAC referral for risk assessment and optimization for anesthesia.    Plan/Recommendations  Pt will be optimized for the proposed procedure.  See below for details on the assessment, risk, and preoperative recommendations    NEUROLOGY  - No history of TIA, CVA or seizure    -Post Op delirium risk factors:  No risk identified    ENT  - No current airway concerns.  Will need to be reassessed day of surgery.  Mallampati: Unable to assess  TM: Unable to assess    CARDIAC  - No history of CAD and Afib   No anginal symptoms, Denies palpitations, PND, dizziness or syncope.   - Hypertension- managed on tenormin and Zestril. Will hold Zestril DOS  Doesn't monitor BP at home.   - METS (Metabolic Equivalents)  Patient performs 4 or more METS exercise without symptoms            Total Score: 0      RCRI-Very low risk: Class 1 0.4% complication rate            Total Score: 0        PULMONARY  Never smoked, denies SOB or any other pulmonary history.     RADHA Medium Risk            Total Score: 3    RADHA: Often tired    RADHA: Hypertension    RADHA: Male      - Denies asthma or inhaler use  - Tobacco History      History   Smoking Status     Never   Smokeless Tobacco     Never       GI/LIVER  Gallbladder calculus with Cholecystitis- Procedure scheduled as above.   Hepatocellular carcinoma- Procedure scheduled as above.       PONV Medium Risk  Total Score: 2           1 AN PONV: Patient is not a current smoker    1 AN PONV: Intended Post Op Opioids        /RENAL  - Baseline Creatinine  wnl    ENDOCRINE    - BMI: Estimated body mass index is 29.01 kg/m  as calculated from the following:    Height as of an  "earlier encounter on 12/6/22: 1.88 m (6' 2\").    Weight as of an earlier encounter on 12/6/22: 102.5 kg (225 lb 15.5 oz).  Overweight (BMI 25.0-29.9)  - Diabetes- most recent A1c 7.1 ( 9/2022) patient notices increase in BS at home since diagnosed.   140-160 range  Diabetes Mellitus, Type 2, non-insulin dependent.  Hold morning oral hypoglycemic medications. Recommend close monitoring of the patient's blood glucose levels throughout the perioperative period.    HEME  VTE Medium Risk 1.8%            Total Score: 7    VTE: Male    VTE: Current cancer      - Platelet disfunction second to Aspirin (Dee, many others)  Will hold 7 days prior to procedures.     MSK  Patient is NOT Frail            Total Score: 0          T & S ordered for DOS - patient is not near a  facility.     Different anesthesia methods/types have been discussed with the patient, but they are aware that the final plan will be decided by the assigned anesthesia provider on the date of service.    The patient is optimized for their procedure. AVS with information on surgery time/arrival time, meds and NPO status given by nursing staff. No further diagnostic testing indicated.    Please refer to the physical examination documented by the anesthesiologist in the anesthesia record on the day of surgery.    Video-Visit Details    Type of service:  Video Visit    Provider received verbal consent for a Video Visit from the patient? Yes    Video Start Time: 9:00am   Video End Time (time video stopped): 9:23    Originating Location (pt. Location): Other work    Distant Location (provider location): Off-site    Mode of Communication:  Video Conference via [employer]  On the day of service:     Prep time: 15 minutes  Visit time: 23 minutes  Documentation time: 10 minutes  ------------------------------------------  Total time: 48 minutes      VALENTINA Arrington CNP  Preoperative Assessment Center  [facility]  Clinic and Surgery " [place]  Phone: [phone]  Fax: [phone]    Physical Exam    Airway        Mallampati: II   TM distance: > 3 FB   Neck ROM: full   Mouth opening: > 3 cm    Respiratory Devices and Support         Dental  no notable dental history         Cardiovascular          Rhythm and rate: regular and normal (-) no murmur    Pulmonary           breath sounds clear to auscultation             Anesthesia Plan    ASA Status:  3   NPO Status:  NPO Appropriate    Anesthesia Type: General.     - Airway: ETT   Induction: Intravenous.   Maintenance: Inhalation.   Techniques and Equipment:     - Lines/Monitors: 2nd IV     - Blood: T&S     Consents    Anesthesia Plan(s) and associated risks, benefits, and realistic alternatives discussed. Questions answered and patient/representative(s) expressed understanding.    - Discussed:     - Discussed with:  Patient      - Extended Intubation/Ventilatory Support Discussed: No.      - Patient is DNR/DNI Status: No    Use of blood products discussed: No .     Postoperative Care    Pain management: IV analgesics, Oral pain medications.   PONV prophylaxis: Ondansetron (or other 5HT-3)     Comments:    Other Comments: Patient seen and examined.  Risks, benefits and alternatives to GETA discussed.  Questions answered and patient wishes to proceed

## 2022-12-06 NOTE — ANESTHESIA POSTPROCEDURE EVALUATION
Patient: West Van    Procedure: Procedure(s):  Diagnostic Laparoscopy, Hepatic Ultrasound, Laparoscopic ultrasound guided microwave ablation of liver tumor x1  Laparoscopic cholecystectomy  Laparoscopic Ultrasound Guided liver biopsy       Anesthesia Type:  General    Note:  Disposition: Outpatient   Postop Pain Control: Uneventful            Sign Out: Well controlled pain   PONV: Yes            Symptoms: Nausea only            Sign Out: Improving with treatment.   Neuro/Psych: Uneventful            Sign Out: Acceptable/Baseline neuro status   Airway/Respiratory: Uneventful            Sign Out: Acceptable/Baseline resp. status   CV/Hemodynamics: Uneventful            Sign Out: Acceptable CV status; No obvious hypovolemia; No obvious fluid overload   Other NRE: NONE   DID A NON-ROUTINE EVENT OCCUR? No           Last vitals:  Vitals Value Taken Time   /81 12/06/22 1430   Temp 36.7  C (98  F) 12/06/22 1300   Pulse 118 12/06/22 1443   Resp 14 12/06/22 1443   SpO2 97 % 12/06/22 1443   Vitals shown include unvalidated device data.    Electronically Signed By: Mansoor Wise MD  December 6, 2022  2:45 PM

## 2022-12-06 NOTE — ANESTHESIA CARE TRANSFER NOTE
Patient: West Van    Procedure: Procedure(s):  Diagnostic Laparoscopy, Hepatic Ultrasound, Laparoscopic ultrasound guided microwave ablation of liver tumor x1  Laparoscopic cholecystectomy  Laparoscopic Ultrasound Guided liver biopsy       Diagnosis: Hepatocellular carcinoma (H) [C22.0]  Calculus of gallbladder without cholecystitis without obstruction [K80.20]  Diagnosis Additional Information: No value filed.    Anesthesia Type:   General     Note:    Oropharynx: oropharynx clear of all foreign objects and spontaneously breathing  Level of Consciousness: awake  Oxygen Supplementation: face mask  Level of Supplemental Oxygen (L/min / FiO2): 6  Independent Airway: airway patency satisfactory and stable  Dentition: dentition unchanged  Vital Signs Stable: post-procedure vital signs reviewed and stable  Report to RN Given: handoff report given  Patient transferred to: PACU    Handoff Report: Identifed the Patient, Identified the Reponsible Provider, Reviewed the pertinent medical history, Discussed the surgical course, Reviewed Intra-OP anesthesia mangement and issues during anesthesia, Set expectations for post-procedure period and Allowed opportunity for questions and acknowledgement of understanding      Vitals:  Vitals Value Taken Time   /93 12/06/22 1144   Temp     Pulse 102 12/06/22 1150   Resp 15 12/06/22 1150   SpO2 100 % 12/06/22 1150   Vitals shown include unvalidated device data.    Electronically Signed By: VALENTINA Terrell CRNA  December 6, 2022  11:51 AM

## 2022-12-07 ENCOUNTER — TELEPHONE (OUTPATIENT)
Dept: SURGERY | Facility: CLINIC | Age: 47
End: 2022-12-07

## 2022-12-07 NOTE — OR NURSING
Contacted Dr. Clemons regarding patient heart of 130.     Response from Dr. Clemons: give 10mg labetolol IV

## 2022-12-07 NOTE — OP NOTE
Procedure Date: 12/06/2022    SURGEON:  Armando Munguia MD    FIRST ASSISTANT:  Salvador Arnold MD.  Please note that no surgical resident was available for this case.    PREOPERATIVE DIAGNOSES:    1.  Cirrhosis of the liver.  2.  Suspected hepatocellular cancer.  3.  Cholelithiasis with large stone in the gallbladder.    POSTOPERATIVE DIAGNOSES:    1.  Cirrhosis of the liver.  2.  Hepatocellular cancer involving segment 8.  3.  Cholelithiasis.    PROCEDURES PERFORMED:    1.  Exploratory laparoscopy.  2.  Intraoperative hepatic ultrasound.  3.  Core needle liver biopsy of liver tumor, segment 8.  4.  Ultrasound-guided percutaneous microwave ablation of liver tumor, segment 8.  5.  Laparoscopic cholecystectomy.    ANESTHESIA:  General.    ESTIMATED BLOOD LOSS:  10 mL    SPECIMENS:     1.  Segment 8 liver mass for frozen section.  2.  Gallbladder for permanent.    DESCRIPTION OF PROCEDURE:  Mr. Van was put to sleep and positioned by Anesthesia.  He was prepped and draped sterilely.  Pause for the cause was performed and was accurate.  We entered the abdomen in the left upper quadrant using the Veress technique followed by a Visiport entrance without difficulty.  We placed a 5 mm trocar in that position and inspected the serosal surfaces of the abdomen.  There was no evidence of peritoneal disease.  The liver was abnormal with evidence of macronodular cirrhosis.  We placed additional trocars under direct visualization.  We then performed an intraoperative ultrasound.  The liver was very nodular which made interpretation somewhat difficult.  There were innumerable hypoechoic and hyperechoic lesions throughout the liver.  There was 1 somewhat hypodense lesion that we identified in segment 8, it was irregular and was in the location as identified by preoperative imaging.  lesion.  There were no other lesions that matched the position where size of this lesion, and therefore, I was confident that this represented the  lesion seen on preoperative MRI.  We used a percutaneous needle biopsy through a small trocar to biopsy the lesion x 3.  Frozen section revealed evidence of well-differentiated hepatocellular cancer.  We then used 2 SR 25 microwave ablation probes using ultrasound guidance percutaneously and burned at 95 guardado for 5 minutes.  The lesion measured approximately 1.8 cm in greatest dimension on ultrasound.  After 5 minutes of 95 watt burn there was excellent micro bubbling surrounding tumor and the needles were removed.  We did track ablate the needle tracts.  We then turned our attention to the cholecystectomy.  This was actually much more difficult than expected because of the very large size of the stone along with the patient's cirrhosis.  The gallbladder was essentially completely replaced with a large stone and therefore grasping the gallbladder itself was impossible.  We used a Nissen retractor to elevate the gallbladder and then I was able to very carefully dissect out the cystic duct and artery using a Maryland dissector.  Both structures were identified, clipped twice proximally and once distally and then divided.  There was some abnormal varicosities as we were taking the gallbladder off the fossa, so there was one particularly large vein that was clipped prior to its division.  Otherwise, the gallbladder was removed from the fossa uneventfully, placed in an EndoCatch bag and removed through a right lateral 12 mm port site after enlarging it somewhat.  That was sent for permanent pathologic evaluation.  There was a generalized ooze from the undersurface of the liver, which was corrected using Bovie electrocautery followed by Hemoblast.  Beyond that hemostasis was excellent.  We closed the right flank abdominal wall using #1 Vicryl suture in a figure-of-eight fashion.  We irrigated all the incisions and desufflated the abdomen and removed our remaining 5 mm ports.  Subcutaneous tissues were irrigated and the  skin was closed using 4-0 Monocryl followed by Dermabond.  The patient tolerated the procedure well, was extubated in the operating room, transferred to recovery room in stable condition, and I was present throughout the entire procedure as described.  Please note that this case should qualify for a complexity 22 modifier because of the patient's cirrhosis, making visualization very difficult and targeting of the tumor also very difficult because of the firm parenchyma.  Similarly, the gallbladder was significantly more difficult than typical.  Again, because of the patient's cirrhosis as well as the very large stone completely replacing the volume of the gallbladder.    Armando Munguia MD        D: 2022   T: 2022   MT: GLENIS    Name:     DAYNA GREGORY  MRN:      [id number]        Account:        [id number]   :      1975           Procedure Date: 2022     Document: [id number]

## 2022-12-07 NOTE — OR NURSING
Contacted Dr. Clemons at 1950:    Patient heart rate down to 113, heart was 107 preoperatively.     Response: Okay to discharge per Dr. Clemons

## 2022-12-07 NOTE — TELEPHONE ENCOUNTER
Nurse Triage SBAR    Situation: Post operative shoulder pain    Background:    DX: Liver cancer  Provider: Dr. Munguia, Dr. Sanchez, Dorothea Juarez, RNCC  Most recent treatment: 12/6/22: Laparoscopic US guided microwave ablation of liver tumor, cholecystectomy, US guided liver bx  Most recent hospitalization: 12/6/22  Upcoming appointments: 12/19/22 with Dr. Munguia    Assessment:   Pt's friend, Joe is calling in to report that pt is experiencing sharp shoulder pain post procedure. Pt and Joe are aware that this could happen after surgery. She is wondering if there are any other ways to resolve pain other than what she has tried.  Pt has been up and moving. Has used ice. Is taking oxycodone.  Has not had a BM. Took miralax this morning.  Good urine output, no dysuria.  No fever, chills, sob, chest pain, nausea or vomiting.    Recommendation:   Advised the following:  --Increase movement/activity to promote reabsorption of air.  --Heat to shoulders.  --Continue to push fluids, try warm fluids, chicken broth  --Ok to take tylenol but no more than 3000 mg/day.  --Continue to Miralax.  --Call back this afternoon if pain worsens or pt has any other concerns.    Joe states that they live three hours away and are staying with a friend in [place]. They may stay another night to be sure pt is continuing to recover well.  Joe verbalized understanding of the above information and is in agreement with plan of care.     Routed to Dr. Munguia and Dorothea Juarez, RNCC

## 2022-12-10 ENCOUNTER — NURSE TRIAGE (OUTPATIENT)
Dept: NURSING | Facility: CLINIC | Age: 47
End: 2022-12-10

## 2022-12-10 NOTE — TELEPHONE ENCOUNTER
Had liver ablation and gall bladder removal earlier this week. Taking stool softener and Miralax and no bowel movement since Monday.  He will seek out care in the ER since he doesn't have an urgent care near him.  Jory Cordova RN  [facility]      Reason for Disposition    Last bowel movement (BM) > 4 days ago    Additional Information    Negative: Sounds like a life-threatening emergency to the triager    Negative: [1] Abdomen pain is main symptom AND [2] male    Negative: [1] Abdomen pain is main symptom AND [2] adult female    Negative: Rectal bleeding or blood in stool is main symptom    Negative: Rectal pain or itching is main symptom    Negative: [1] Neutropenia known or suspected (e.g., recent cancer chemotherapy) AND [2] fever > 100.4 F (38.0 C)    Negative: [1] Vomiting AND [2] contains bile (green color)    Negative: Patient sounds very sick or weak to the triager    Negative: [1] Vomiting AND [2] abdomen looks much more swollen than usual    Negative: [1] Constant abdominal pain AND [2] present > 2 hours    Negative: [1] Sudden onset rectal pain (straining, rectal pressure or fullness) AND [2] NOT better after SITZ bath, suppository or enema    Negative: [1] Abdominal pain (i.e., mild or intermittent) AND [2] fever    Negative: [1] Abdominal pain (i.e., mild or intermittent) AND [2] abdomen looks much more swollen than usual    Protocols used: CANCER - CONSTIPATION-A-AH

## 2022-12-12 DIAGNOSIS — C22.0 HEPATOCELLULAR CARCINOMA (H): Primary | ICD-10-CM

## 2022-12-12 LAB
PATH REPORT.COMMENTS IMP SPEC: ABNORMAL
PATH REPORT.COMMENTS IMP SPEC: YES
PATH REPORT.FINAL DX SPEC: ABNORMAL
PATH REPORT.GROSS SPEC: ABNORMAL
PATH REPORT.INTRAOP OBS SPEC DOC: ABNORMAL
PATH REPORT.MICROSCOPIC SPEC OTHER STN: ABNORMAL
PATH REPORT.RELEVANT HX SPEC: ABNORMAL
PHOTO IMAGE: ABNORMAL

## 2022-12-19 ENCOUNTER — OFFICE VISIT (OUTPATIENT)
Dept: SURGERY | Facility: CLINIC | Age: 47
End: 2022-12-19
Attending: SURGERY
Payer: COMMERCIAL

## 2022-12-19 VITALS
DIASTOLIC BLOOD PRESSURE: 89 MMHG | OXYGEN SATURATION: 98 % | SYSTOLIC BLOOD PRESSURE: 148 MMHG | WEIGHT: 223.5 LBS | HEART RATE: 104 BPM | TEMPERATURE: 98.3 F | RESPIRATION RATE: 16 BRPM | BODY MASS INDEX: 28.7 KG/M2

## 2022-12-19 DIAGNOSIS — C22.0 HCC (HEPATOCELLULAR CARCINOMA) (H): Primary | ICD-10-CM

## 2022-12-19 DIAGNOSIS — R91.8 PULMONARY NODULES: Primary | ICD-10-CM

## 2022-12-19 LAB
DLCOUNC-%PRED-PRE: 102 %
DLCOUNC-PRE: 34.65 ML/MIN/MMHG
DLCOUNC-PRED: 33.67 ML/MIN/MMHG
ERV-%PRED-PRE: 79 %
ERV-PRE: 1.29 L
ERV-PRED: 1.61 L
EXPTIME-PRE: 3.28 SEC
FEF2575-%PRED-PRE: 96 %
FEF2575-PRE: 3.77 L/SEC
FEF2575-PRED: 3.9 L/SEC
FEFMAX-%PRED-PRE: 80 %
FEFMAX-PRE: 8.8 L/SEC
FEFMAX-PRED: 10.89 L/SEC
FEV1-%PRED-PRE: 81 %
FEV1-PRE: 3.45 L
FEV1FEV6-PRE: 84 %
FEV1FEV6-PRED: 81 %
FEV1FVC-PRE: 84 %
FEV1FVC-PRED: 80 %
FEV1SVC-PRE: 78 %
FEV1SVC-PRED: 71 %
FIFMAX-PRE: 6.41 L/SEC
FRCPLETH-%PRED-PRE: 124 %
FRCPLETH-PRE: 4.66 L
FRCPLETH-PRED: 3.73 L
FVC-%PRED-PRE: 77 %
FVC-PRE: 4.13 L
FVC-PRED: 5.34 L
IC-%PRED-PRE: 71 %
IC-PRE: 3.12 L
IC-PRED: 4.38 L
RVPLETH-%PRED-PRE: 148 %
RVPLETH-PRE: 3.37 L
RVPLETH-PRED: 2.27 L
TLCPLETH-%PRED-PRE: 97 %
TLCPLETH-PRE: 7.78 L
TLCPLETH-PRED: 7.94 L
VA-%PRED-PRE: 80 %
VA-PRE: 6.15 L
VC-%PRED-PRE: 73 %
VC-PRE: 4.4 L
VC-PRED: 5.99 L

## 2022-12-19 PROCEDURE — 94729 DIFFUSING CAPACITY: CPT | Performed by: INTERNAL MEDICINE

## 2022-12-19 PROCEDURE — G0463 HOSPITAL OUTPT CLINIC VISIT: HCPCS

## 2022-12-19 PROCEDURE — 94150 VITAL CAPACITY TEST: CPT | Performed by: INTERNAL MEDICINE

## 2022-12-19 PROCEDURE — 99024 POSTOP FOLLOW-UP VISIT: CPT | Performed by: SURGERY

## 2022-12-19 PROCEDURE — 94375 RESPIRATORY FLOW VOLUME LOOP: CPT | Performed by: INTERNAL MEDICINE

## 2022-12-19 PROCEDURE — 94726 PLETHYSMOGRAPHY LUNG VOLUMES: CPT | Performed by: INTERNAL MEDICINE

## 2022-12-19 RX ORDER — AMOXICILLIN 250 MG
2 CAPSULE ORAL 2 TIMES DAILY PRN
COMMUNITY
Start: 2022-12-10 | End: 2023-03-06

## 2022-12-19 RX ORDER — PREDNISONE 20 MG/1
TABLET ORAL
COMMUNITY
Start: 2022-09-21 | End: 2022-12-21

## 2022-12-19 RX ORDER — DOXYCYCLINE 100 MG/1
CAPSULE ORAL
COMMUNITY
Start: 2022-09-21 | End: 2022-12-21

## 2022-12-19 RX ORDER — BISACODYL 10 MG
10 SUPPOSITORY, RECTAL RECTAL DAILY PRN
COMMUNITY
Start: 2022-12-10 | End: 2023-03-06

## 2022-12-19 NOTE — LETTER
[facility]  [address] [id number] [phone]          December 19, 2022    RE:  West Van                                                                                                                                                       [address]   [facility] [address]            To whom it may concern:    West Van is under my professional care following surgical intervention. He may return to work on 12/21/2022. If you have questions regarding this letter, please contact our office at [phone] or fax any additional paperwork to [phone].     Sincerely,        Armando Munguia MD

## 2022-12-19 NOTE — NURSING NOTE
"Oncology Rooming Note    December 19, 2022 8:54 AM   West Van is a 47 year old male who presents for:    Chief Complaint   Patient presents with     Oncology Clinic Visit     Liver cancer     Initial Vitals: BP (!) 148/89   Pulse 104   Temp 98.3  F (36.8  C) (Oral)   Resp 16   Wt 101.4 kg (223 lb 8 oz)   SpO2 98%   BMI 28.70 kg/m   Estimated body mass index is 28.7 kg/m  as calculated from the following:    Height as of 12/6/22: 1.88 m (6' 2\").    Weight as of this encounter: 101.4 kg (223 lb 8 oz). Body surface area is 2.3 meters squared.  Data Unavailable Comment: Data Unavailable   No LMP for male patient.  Allergies reviewed: Yes  Medications reviewed: Yes    Medications: Medication refills not needed today.  Pharmacy name entered into [facility]: [id number] - [address]    Clinical concerns: none       Deepa Polo EMT            "

## 2022-12-19 NOTE — CONFIDENTIAL NOTE
RECORDS STATUS - ALL OTHER DIAGNOSIS    Hepatocellular carcinoma (H)   Cirrhosis of liver without ascites, unspecified hepatic cirrhosis type (H)   Pulmonary nodules  RECORDS RECEIVED FROM: [facility]/ [facility]   DATE RECEIVED: 12/21/2022    Action    Action Taken 12/19/2022 3:50pm PRETTY     I called [facility] IM[facility] - they will push the Xray abdomen from Dec 10 over soon!     12/20/2022 8:07AM KEB   I resolved the Xray in PACS      NOTES STATUS DETAILS   OFFICE NOTE from referring provider Complete Michael Ren MD   OFFICE NOTE from medical oncologist Complete 11/14/2022    Pulmonary nodules    10/24/2022    Hepatocellular carcinoma (H)    DISCHARGE SUMMARY from hospital     DISCHARGE REPORT from the ER     OPERATIVE REPORT Complete General PFT 12/19/2022    See biopsy in EPIC   MEDICATION LIST Complete [facility]   CLINICAL TRIAL TREATMENTS TO DATE     LABS     PATHOLOGY REPORTS Internal biopsy  12/6/2022 internal biopsy in Epic   A. LIVER, NEEDLE BIOPSY OF A MASS LESION, SEGMENT 8:  - Hepatocellular carcinoma, well-differentiated (see comment)     B. GALLBLADDER, CHOLECYSTECTOMY:   - Chronic cholecystitis with cholelithiasis      ANYTHING RELATED TO DIAGNOSIS     GENONOMIC TESTING     TYPE:     IMAGING (NEED IMAGES & REPORT)     CT SCANS Complete CT Chest 3/10/2022   MRI Complete MRI Abdomen 3/10/2023    More in PACS    MRI Abdomen 7/14/2022 ([place])    MAMMO     Xray Abdomen  Complete- [facility]  12/10/2022   ULTRASOUND     PET Complete- [facility] 10/13/2022

## 2022-12-19 NOTE — PROGRESS NOTES
Looks well  Feels well now  Had significant pain and constipation post op - possible aggravated by snow removal - but much better now    Incisions healing well     Reviewed pathology and gave copy of report - well differentiated HCC    Discussed need for ongoing surveillance with Dr. Sanchez    Follow-up with me prn

## 2022-12-19 NOTE — LETTER
12/19/2022         RE: West Van  [address]  [facility] [address]        Dear Colleague,    Thank you for referring your patient, West Van, to the [facility]. Please see a copy of my visit note below.    Looks well  Feels well now  Had significant pain and constipation post op - possible aggravated by snow removal - but much better now    Incisions healing well     Reviewed pathology and gave copy of report - well differentiated HCC    Discussed need for ongoing surveillance with Dr. Sanchez    Follow-up with me prn    Sincerely,        Armando Munguia MD

## 2022-12-21 ENCOUNTER — PREP FOR PROCEDURE (OUTPATIENT)
Dept: MULTI SPECIALTY CLINIC | Facility: CLINIC | Age: 47
End: 2022-12-21

## 2022-12-21 ENCOUNTER — VIRTUAL VISIT (OUTPATIENT)
Dept: PULMONOLOGY | Facility: CLINIC | Age: 47
End: 2022-12-21
Attending: INTERNAL MEDICINE
Payer: COMMERCIAL

## 2022-12-21 ENCOUNTER — PRE VISIT (OUTPATIENT)
Dept: PULMONOLOGY | Facility: CLINIC | Age: 47
End: 2022-12-21

## 2022-12-21 DIAGNOSIS — R91.1 LUNG NODULE: Primary | ICD-10-CM

## 2022-12-21 DIAGNOSIS — R91.8 PULMONARY NODULES: ICD-10-CM

## 2022-12-21 DIAGNOSIS — C22.0 HEPATOCELLULAR CARCINOMA (H): ICD-10-CM

## 2022-12-21 DIAGNOSIS — K74.60 CIRRHOSIS OF LIVER WITHOUT ASCITES, UNSPECIFIED HEPATIC CIRRHOSIS TYPE (H): ICD-10-CM

## 2022-12-21 PROCEDURE — 99204 OFFICE O/P NEW MOD 45 MIN: CPT | Mod: 95 | Performed by: INTERNAL MEDICINE

## 2022-12-21 PROCEDURE — G0463 HOSPITAL OUTPT CLINIC VISIT: HCPCS | Mod: PN,GT | Performed by: INTERNAL MEDICINE

## 2022-12-21 NOTE — LETTER
12/21/2022       RE: West Van  [address]  [facility] [address]     Dear Colleague,    Thank you for referring your patient, West Van, [facility]. Please see a copy of my visit note below.    LUNG NODULE & INTERVENTIONAL PULMONARY CLINIC  [facility]     West Van MRN# [id number]   Age: 47 year old YOB: 1975       Requesting Physician: Michael Sanchez MD  [address] [address]       Assessment and Plan:    1. New solitary pulmonary lung nodule(s). Given the characteristics on current/previous imaging and risk factors; I would classify this to be Intermediate (6-65%) risk for cancer. PET not very accurate for HCC.  I recommended biopsy now.  We will arrange.    2. HCC: hopeful for cure after ablation.           History:     West Van is a 47 year old male with sig h/o for recent dx and treatment for HCC who is here for evaluation/followup of lung nodule(s).  This was identified during his evaluation for gallbladder issues.  No pulmonary history.      - My interpretation of the images relevant for this visit includes: minimally pet avid RLL nodule   - My interpretation of the PFT's relevant for this visit includes: Normal            Past Medical History:      Past Medical History:   Diagnosis Date     Benign essential HTN 4/13/2016     Diabetes (H)      Liver cancer (H) 10/4/2022           Past Surgical History:      Past Surgical History:   Procedure Laterality Date     LAPAROSCOPIC ABLATION LIVER TUMOR N/A 12/6/2022    Procedure: Diagnostic Laparoscopy, Hepatic Ultrasound, Laparoscopic ultrasound guided microwave ablation of liver tumor x1;  Surgeon: Armando Munguia MD;  Location: [facility] OR     LAPAROSCOPIC BIOPSY LIVER N/A 12/6/2022    Procedure: Laparoscopic Ultrasound Guided liver  biopsy;  Surgeon: Armando Munguia MD;  Location: [facility] OR     LAPAROSCOPIC CHOLECYSTECTOMY N/A 12/6/2022    Procedure: Laparoscopic cholecystectomy;  Surgeon: Armando Munguia MD;  Location: [facility] OR          Social History:     Social History     Tobacco Use     Smoking status: Never     Smokeless tobacco: Never   Substance Use Topics     Alcohol use: Not Currently     Comment: Last ETOH 1999          Family History:   No family history on file.        Allergies:    No Known Allergies       Medications:     Current Outpatient Medications   Medication Sig     Ashwagandha 125 MG CAPS Take 1 capsule by mouth     atenolol (TENORMIN) 25 MG tablet Take 25 mg by mouth every morning     bisacodyl (DULCOLAX) 10 MG suppository Place 20 mg rectally     cholecalciferol (VITAMIN D3) 125 mcg (5000 units) capsule Take 125 mcg by mouth every morning     lisinopril-hydrochlorothiazide (ZESTORETIC) 10-12.5 MG tablet Take 1 tablet by mouth every morning     magnesium 250 MG tablet Take 1 tablet by mouth every morning     Milk Thistle-Dand-Fennel-Licor (MILK THISTLE XTRA) CAPS capsule Take by mouth every morning     polyethylene glycol (MIRALAX) 17 GM/Dose powder Take 17 g (1 capful) by mouth daily Take while taking narcotics to prevent constipation.     senna-docusate (SENOKOT-S/PERICOLACE) 8.6-50 MG tablet Take 2 tablets by mouth     ULTICARE MINI 31G X 6 MM insulin pen needle Inject 1 each Subcutaneous At Bedtime     aspirin (ASA) 81 MG chewable tablet Take 1 tablet (81 mg) by mouth every morning Ok to restart 12/7/2022 after surgery. (Patient not taking: Reported on 12/21/2022)     doxycycline monohydrate (MONODOX) 100 MG capsule TAKE 1 CAPSULE BY MOUTH TWICE DAILY FOR INFECTION FOR 14 DAYS (Patient not taking: Reported on 12/19/2022)     fexofenadine (ALLEGRA) 60 MG tablet Take 60 mg by mouth as needed (Patient not taking: Reported on 12/19/2022)     lisinopril (ZESTRIL) 10 MG tablet Take 10 mg by mouth every morning (Patient not  taking: Reported on 12/19/2022)     metFORMIN (GLUCOPHAGE XR) 500 MG 24 hr tablet Take 500 mg by mouth every morning     oxyCODONE (ROXICODONE) 5 MG tablet Take 1-2 tablets (5-10 mg) by mouth every 4 hours as needed for severe pain (7-10) (Patient not taking: Reported on 12/19/2022)     predniSONE (DELTASONE) 20 MG tablet TAKE 2 TABLETS BY MOUTH ONCE DAILY WITH FOOD FOR 5 DAYS (Patient not taking: Reported on 12/19/2022)     TURMERIC PO Take by mouth every morning     No current facility-administered medications for this visit.          Review of Systems:     See HPI         Physical Exam:   There were no vitals taken for this visit.    Constitutional - looks well, in no apparent distress  Eyes - no redness or discharge  Respiratory -breathing appears comfortable. No wheeze or rhonchi.   Skin - No appreciable discoloration or lesions (very limited exam)  Neurological - No apparent tremors. Speech fluent and articlate  Psychiatric - no signs of delirium or anxiety          Current Laboratory Data:   All laboratory and imaging data reviewed.    No results found for this or any previous visit (from the past 24 hour(s)).                   Again, thank you for allowing me to participate in the care of your patient.      Sincerely,    Cr Gray MD

## 2022-12-21 NOTE — PROGRESS NOTES
West is a 47 year old who is being evaluated via a billable video visit.      How would you like to obtain your AVS? MyChart  If the video visit is dropped, the invitation should be resent by: Text to cell phone: [phone]  Will anyone else be joining your video visit? Zora Mccarthy        Video-Visit Details    Type of service:  Video Visit     Originating Location (pt. Location): Home    Distant Location (provider location):  On-site  Platform used for Video Visit: [facility]     Start time 355pm  415 pm finish time    LUNG NODULE & INTERVENTIONAL PULMONARY CLINIC  CLINICS & SURGERY [place]     West Van MRN# [id number]   Age: 47 year old YOB: 1975       Requesting Physician: Michael Sanchez MD  [address] [address]       Assessment and Plan:    1. New solitary pulmonary lung nodule(s). Given the characteristics on current/previous imaging and risk factors; I would classify this to be Intermediate (6-65%) risk for cancer. PET not very accurate for HCC.  I recommended biopsy now.  We will arrange.    2. HCC: hopeful for cure after ablation.           History:     West Van is a 47 year old male with sig h/o for recent dx and treatment for HCC who is here for evaluation/followup of lung nodule(s).  This was identified during his evaluation for gallbladder issues.  No pulmonary history.      - My interpretation of the images relevant for this visit includes: minimally pet avid RLL nodule   - My interpretation of the PFT's relevant for this visit includes: Normal            Past Medical History:      Past Medical History:   Diagnosis Date     Benign essential HTN 4/13/2016     Diabetes (H)      Liver cancer (H) 10/4/2022           Past Surgical History:      Past Surgical History:   Procedure Laterality Date     LAPAROSCOPIC ABLATION LIVER TUMOR N/A 12/6/2022    Procedure: Diagnostic Laparoscopy,  Hepatic Ultrasound, Laparoscopic ultrasound guided microwave ablation of liver tumor x1;  Surgeon: Armando Munguia MD;  Location: [facility] OR     LAPAROSCOPIC BIOPSY LIVER N/A 12/6/2022    Procedure: Laparoscopic Ultrasound Guided liver biopsy;  Surgeon: Armando Munguia MD;  Location: [facility] OR     LAPAROSCOPIC CHOLECYSTECTOMY N/A 12/6/2022    Procedure: Laparoscopic cholecystectomy;  Surgeon: Armando Munguia MD;  Location: [facility] OR          Social History:     Social History     Tobacco Use     Smoking status: Never     Smokeless tobacco: Never   Substance Use Topics     Alcohol use: Not Currently     Comment: Last ETOH 1999          Family History:   No family history on file.        Allergies:    No Known Allergies       Medications:     Current Outpatient Medications   Medication Sig     Ashwagandha 125 MG CAPS Take 1 capsule by mouth     atenolol (TENORMIN) 25 MG tablet Take 25 mg by mouth every morning     bisacodyl (DULCOLAX) 10 MG suppository Place 20 mg rectally     cholecalciferol (VITAMIN D3) 125 mcg (5000 units) capsule Take 125 mcg by mouth every morning     lisinopril-hydrochlorothiazide (ZESTORETIC) 10-12.5 MG tablet Take 1 tablet by mouth every morning     magnesium 250 MG tablet Take 1 tablet by mouth every morning     Milk Thistle-Dand-Fennel-Licor (MILK THISTLE XTRA) CAPS capsule Take by mouth every morning     polyethylene glycol (MIRALAX) 17 GM/Dose powder Take 17 g (1 capful) by mouth daily Take while taking narcotics to prevent constipation.     senna-docusate (SENOKOT-S/PERICOLACE) 8.6-50 MG tablet Take 2 tablets by mouth     ULTICARE MINI 31G X 6 MM insulin pen needle Inject 1 each Subcutaneous At Bedtime     aspirin (ASA) 81 MG chewable tablet Take 1 tablet (81 mg) by mouth every morning Ok to restart 12/7/2022 after surgery. (Patient not taking: Reported on 12/21/2022)     doxycycline monohydrate (MONODOX) 100 MG capsule TAKE 1 CAPSULE BY MOUTH TWICE DAILY FOR INFECTION FOR 14 DAYS (Patient  not taking: Reported on 12/19/2022)     fexofenadine (ALLEGRA) 60 MG tablet Take 60 mg by mouth as needed (Patient not taking: Reported on 12/19/2022)     lisinopril (ZESTRIL) 10 MG tablet Take 10 mg by mouth every morning (Patient not taking: Reported on 12/19/2022)     metFORMIN (GLUCOPHAGE XR) 500 MG 24 hr tablet Take 500 mg by mouth every morning     oxyCODONE (ROXICODONE) 5 MG tablet Take 1-2 tablets (5-10 mg) by mouth every 4 hours as needed for severe pain (7-10) (Patient not taking: Reported on 12/19/2022)     predniSONE (DELTASONE) 20 MG tablet TAKE 2 TABLETS BY MOUTH ONCE DAILY WITH FOOD FOR 5 DAYS (Patient not taking: Reported on 12/19/2022)     TURMERIC PO Take by mouth every morning     No current facility-administered medications for this visit.          Review of Systems:     See HPI         Physical Exam:   There were no vitals taken for this visit.    Constitutional - looks well, in no apparent distress  Eyes - no redness or discharge  Respiratory -breathing appears comfortable. No wheeze or rhonchi.   Skin - No appreciable discoloration or lesions (very limited exam)  Neurological - No apparent tremors. Speech fluent and articlate  Psychiatric - no signs of delirium or anxiety          Current Laboratory Data:   All laboratory and imaging data reviewed.    No results found for this or any previous visit (from the past 24 hour(s)).

## 2022-12-27 ENCOUNTER — ANCILLARY PROCEDURE (OUTPATIENT)
Dept: MRI IMAGING | Facility: CLINIC | Age: 47
End: 2022-12-27
Attending: SURGERY
Payer: COMMERCIAL

## 2022-12-27 DIAGNOSIS — C22.0 HEPATOCELLULAR CARCINOMA (H): ICD-10-CM

## 2022-12-27 PROCEDURE — A9581 GADOXETATE DISODIUM INJ: HCPCS | Performed by: STUDENT IN AN ORGANIZED HEALTH CARE EDUCATION/TRAINING PROGRAM

## 2022-12-27 PROCEDURE — 74182 MRI ABDOMEN W/CONTRAST: CPT | Mod: GC | Performed by: STUDENT IN AN ORGANIZED HEALTH CARE EDUCATION/TRAINING PROGRAM

## 2023-01-09 ENCOUNTER — MYC MEDICAL ADVICE (OUTPATIENT)
Dept: PULMONOLOGY | Facility: CLINIC | Age: 48
End: 2023-01-09

## 2023-01-11 ENCOUNTER — PREP FOR PROCEDURE (OUTPATIENT)
Dept: PULMONOLOGY | Facility: CLINIC | Age: 48
End: 2023-01-11

## 2023-01-11 ENCOUNTER — TELEPHONE (OUTPATIENT)
Dept: PULMONOLOGY | Facility: CLINIC | Age: 48
End: 2023-01-11

## 2023-01-11 NOTE — TELEPHONE ENCOUNTER
Surgery is scheduled with Dr. Guillaume  Date: 1/17/2023   Location: [facility]  Scheduled per: next available date      H&P already completed by Dr. Isaac GARCIA test: Patient to take an at-home COVID-19 test 1-2 days before their surgery date. Patient to take a picture of the result and either upload this to [employer], or show the surgeon the picture during pre-op.     Patient will receive a phone call from pre-admission nurses 1-2 days prior to surgery with arrival and start time.      RNCC to contact patient. RNCC will contact writer if writer needs to reach out to the patient. No further action needed at this time.       Patient questions/concerns: N/A       Surgery packet to be sent via US mail and via [employer]    __    Candace Juarez, 1/11/2023 on 2:46 PM  P: [phone]

## 2023-01-17 ENCOUNTER — APPOINTMENT (OUTPATIENT)
Dept: GENERAL RADIOLOGY | Facility: CLINIC | Age: 48
End: 2023-01-17
Attending: STUDENT IN AN ORGANIZED HEALTH CARE EDUCATION/TRAINING PROGRAM
Payer: COMMERCIAL

## 2023-01-17 ENCOUNTER — HOSPITAL ENCOUNTER (OUTPATIENT)
Dept: CT IMAGING | Facility: CLINIC | Age: 48
Discharge: HOME | End: 2023-01-17
Attending: INTERNAL MEDICINE | Admitting: STUDENT IN AN ORGANIZED HEALTH CARE EDUCATION/TRAINING PROGRAM
Payer: COMMERCIAL

## 2023-01-17 ENCOUNTER — ANESTHESIA EVENT (OUTPATIENT)
Dept: SURGERY | Facility: CLINIC | Age: 48
End: 2023-01-17
Payer: COMMERCIAL

## 2023-01-17 ENCOUNTER — HOSPITAL ENCOUNTER (OUTPATIENT)
Facility: CLINIC | Age: 48
Discharge: HOME | End: 2023-01-17
Attending: STUDENT IN AN ORGANIZED HEALTH CARE EDUCATION/TRAINING PROGRAM | Admitting: STUDENT IN AN ORGANIZED HEALTH CARE EDUCATION/TRAINING PROGRAM
Payer: COMMERCIAL

## 2023-01-17 ENCOUNTER — ANESTHESIA (OUTPATIENT)
Dept: SURGERY | Facility: CLINIC | Age: 48
End: 2023-01-17
Payer: COMMERCIAL

## 2023-01-17 VITALS
DIASTOLIC BLOOD PRESSURE: 75 MMHG | HEART RATE: 90 BPM | TEMPERATURE: 98 F | WEIGHT: 203.04 LBS | BODY MASS INDEX: 26.06 KG/M2 | RESPIRATION RATE: 18 BRPM | OXYGEN SATURATION: 99 % | HEIGHT: 74 IN | SYSTOLIC BLOOD PRESSURE: 118 MMHG

## 2023-01-17 DIAGNOSIS — R91.1 LUNG NODULE: ICD-10-CM

## 2023-01-17 LAB
GLUCOSE BLDC GLUCOMTR-MCNC: 129 MG/DL (ref 70–99)
GLUCOSE BLDC GLUCOMTR-MCNC: 186 MG/DL (ref 70–99)

## 2023-01-17 PROCEDURE — 999N000181 XR SURGERY CARM FLUORO GREATER THAN 5 MIN W STILLS: Mod: TC

## 2023-01-17 PROCEDURE — 88341 IMHCHEM/IMCYTCHM EA ADD ANTB: CPT | Mod: 26 | Performed by: PATHOLOGY

## 2023-01-17 PROCEDURE — 31627 NAVIGATIONAL BRONCHOSCOPY: CPT | Performed by: STUDENT IN AN ORGANIZED HEALTH CARE EDUCATION/TRAINING PROGRAM

## 2023-01-17 PROCEDURE — 88342 IMHCHEM/IMCYTCHM 1ST ANTB: CPT | Mod: 26 | Performed by: PATHOLOGY

## 2023-01-17 PROCEDURE — 250N000009 HC RX 250: Performed by: NURSE ANESTHETIST, CERTIFIED REGISTERED

## 2023-01-17 PROCEDURE — 88173 CYTOPATH EVAL FNA REPORT: CPT | Mod: 26 | Performed by: PATHOLOGY

## 2023-01-17 PROCEDURE — 71250 CT THORAX DX C-: CPT | Mod: 26 | Performed by: RADIOLOGY

## 2023-01-17 PROCEDURE — 31652 BRONCH EBUS SAMPLNG 1/2 NODE: CPT | Performed by: STUDENT IN AN ORGANIZED HEALTH CARE EDUCATION/TRAINING PROGRAM

## 2023-01-17 PROCEDURE — 31629 BRONCHOSCOPY/NEEDLE BX EACH: CPT | Mod: 51 | Performed by: STUDENT IN AN ORGANIZED HEALTH CARE EDUCATION/TRAINING PROGRAM

## 2023-01-17 PROCEDURE — 999N000141 HC STATISTIC PRE-PROCEDURE NURSING ASSESSMENT: Performed by: STUDENT IN AN ORGANIZED HEALTH CARE EDUCATION/TRAINING PROGRAM

## 2023-01-17 PROCEDURE — 88184 FLOWCYTOMETRY/ TC 1 MARKER: CPT | Performed by: STUDENT IN AN ORGANIZED HEALTH CARE EDUCATION/TRAINING PROGRAM

## 2023-01-17 PROCEDURE — 88360 TUMOR IMMUNOHISTOCHEM/MANUAL: CPT | Mod: 26 | Performed by: PATHOLOGY

## 2023-01-17 PROCEDURE — 88189 FLOWCYTOMETRY/READ 16 & >: CPT | Mod: GC | Performed by: PATHOLOGY

## 2023-01-17 PROCEDURE — 710N000012 HC RECOVERY PHASE 2, PER MINUTE: Performed by: STUDENT IN AN ORGANIZED HEALTH CARE EDUCATION/TRAINING PROGRAM

## 2023-01-17 PROCEDURE — 272N000001 HC OR GENERAL SUPPLY STERILE: Performed by: STUDENT IN AN ORGANIZED HEALTH CARE EDUCATION/TRAINING PROGRAM

## 2023-01-17 PROCEDURE — 82962 GLUCOSE BLOOD TEST: CPT

## 2023-01-17 PROCEDURE — 258N000003 HC RX IP 258 OP 636: Performed by: NURSE ANESTHETIST, CERTIFIED REGISTERED

## 2023-01-17 PROCEDURE — 71045 X-RAY EXAM CHEST 1 VIEW: CPT | Mod: 26 | Performed by: RADIOLOGY

## 2023-01-17 PROCEDURE — 999N000065 XR CHEST PORT 1 VIEW

## 2023-01-17 PROCEDURE — 250N000011 HC RX IP 250 OP 636: Performed by: NURSE ANESTHETIST, CERTIFIED REGISTERED

## 2023-01-17 PROCEDURE — 710N000009 HC RECOVERY PHASE 1, LEVEL 1, PER MIN: Performed by: STUDENT IN AN ORGANIZED HEALTH CARE EDUCATION/TRAINING PROGRAM

## 2023-01-17 PROCEDURE — 31654 BRONCH EBUS IVNTJ PERPH LES: CPT | Performed by: STUDENT IN AN ORGANIZED HEALTH CARE EDUCATION/TRAINING PROGRAM

## 2023-01-17 PROCEDURE — 88344 IMHCHEM/IMCYTCHM EA MLT ANTB: CPT | Mod: 26 | Performed by: PATHOLOGY

## 2023-01-17 PROCEDURE — 272N000002 HC OR SUPPLY OTHER OPNP: Performed by: STUDENT IN AN ORGANIZED HEALTH CARE EDUCATION/TRAINING PROGRAM

## 2023-01-17 PROCEDURE — 360N000084 HC SURGERY LEVEL 4 W/ FLUORO, PER MIN: Performed by: STUDENT IN AN ORGANIZED HEALTH CARE EDUCATION/TRAINING PROGRAM

## 2023-01-17 PROCEDURE — 370N000017 HC ANESTHESIA TECHNICAL FEE, PER MIN: Performed by: STUDENT IN AN ORGANIZED HEALTH CARE EDUCATION/TRAINING PROGRAM

## 2023-01-17 PROCEDURE — 88305 TISSUE EXAM BY PATHOLOGIST: CPT | Mod: 26 | Performed by: PATHOLOGY

## 2023-01-17 PROCEDURE — 88185 FLOWCYTOMETRY/TC ADD-ON: CPT | Performed by: STUDENT IN AN ORGANIZED HEALTH CARE EDUCATION/TRAINING PROGRAM

## 2023-01-17 PROCEDURE — 88342 IMHCHEM/IMCYTCHM 1ST ANTB: CPT | Mod: TC | Performed by: STUDENT IN AN ORGANIZED HEALTH CARE EDUCATION/TRAINING PROGRAM

## 2023-01-17 PROCEDURE — 71250 CT THORAX DX C-: CPT

## 2023-01-17 RX ORDER — DEXAMETHASONE SODIUM PHOSPHATE 4 MG/ML
INJECTION, SOLUTION INTRA-ARTICULAR; INTRALESIONAL; INTRAMUSCULAR; INTRAVENOUS; SOFT TISSUE PRN
Status: DISCONTINUED | OUTPATIENT
Start: 2023-01-17 | End: 2023-01-17

## 2023-01-17 RX ORDER — HYDROMORPHONE HCL IN WATER/PF 6 MG/30 ML
0.4 PATIENT CONTROLLED ANALGESIA SYRINGE INTRAVENOUS EVERY 5 MIN PRN
Status: DISCONTINUED | OUTPATIENT
Start: 2023-01-17 | End: 2023-01-17 | Stop reason: HOSPADM

## 2023-01-17 RX ORDER — SODIUM CHLORIDE, SODIUM LACTATE, POTASSIUM CHLORIDE, CALCIUM CHLORIDE 600; 310; 30; 20 MG/100ML; MG/100ML; MG/100ML; MG/100ML
INJECTION, SOLUTION INTRAVENOUS CONTINUOUS PRN
Status: DISCONTINUED | OUTPATIENT
Start: 2023-01-17 | End: 2023-01-17

## 2023-01-17 RX ORDER — FENTANYL CITRATE 50 UG/ML
25 INJECTION, SOLUTION INTRAMUSCULAR; INTRAVENOUS
Status: CANCELLED | OUTPATIENT
Start: 2023-01-17

## 2023-01-17 RX ORDER — ONDANSETRON 2 MG/ML
INJECTION INTRAMUSCULAR; INTRAVENOUS PRN
Status: DISCONTINUED | OUTPATIENT
Start: 2023-01-17 | End: 2023-01-17

## 2023-01-17 RX ORDER — FENTANYL CITRATE 50 UG/ML
50 INJECTION, SOLUTION INTRAMUSCULAR; INTRAVENOUS EVERY 5 MIN PRN
Status: DISCONTINUED | OUTPATIENT
Start: 2023-01-17 | End: 2023-01-17 | Stop reason: HOSPADM

## 2023-01-17 RX ORDER — SODIUM CHLORIDE, SODIUM LACTATE, POTASSIUM CHLORIDE, CALCIUM CHLORIDE 600; 310; 30; 20 MG/100ML; MG/100ML; MG/100ML; MG/100ML
INJECTION, SOLUTION INTRAVENOUS CONTINUOUS
Status: DISCONTINUED | OUTPATIENT
Start: 2023-01-17 | End: 2023-01-17 | Stop reason: HOSPADM

## 2023-01-17 RX ORDER — ONDANSETRON 2 MG/ML
4 INJECTION INTRAMUSCULAR; INTRAVENOUS EVERY 30 MIN PRN
Status: DISCONTINUED | OUTPATIENT
Start: 2023-01-17 | End: 2023-01-17 | Stop reason: HOSPADM

## 2023-01-17 RX ORDER — LIRAGLUTIDE 6 MG/ML
1.8 INJECTION SUBCUTANEOUS EVERY EVENING
Status: ON HOLD | COMMUNITY
End: 2024-06-26

## 2023-01-17 RX ORDER — HYDROMORPHONE HCL IN WATER/PF 6 MG/30 ML
0.2 PATIENT CONTROLLED ANALGESIA SYRINGE INTRAVENOUS EVERY 5 MIN PRN
Status: DISCONTINUED | OUTPATIENT
Start: 2023-01-17 | End: 2023-01-17 | Stop reason: HOSPADM

## 2023-01-17 RX ORDER — MEPERIDINE HYDROCHLORIDE 25 MG/ML
12.5 INJECTION INTRAMUSCULAR; INTRAVENOUS; SUBCUTANEOUS
Status: DISCONTINUED | OUTPATIENT
Start: 2023-01-17 | End: 2023-01-17 | Stop reason: HOSPADM

## 2023-01-17 RX ORDER — FENTANYL CITRATE 50 UG/ML
INJECTION, SOLUTION INTRAMUSCULAR; INTRAVENOUS PRN
Status: DISCONTINUED | OUTPATIENT
Start: 2023-01-17 | End: 2023-01-17

## 2023-01-17 RX ORDER — FENTANYL CITRATE 50 UG/ML
25 INJECTION, SOLUTION INTRAMUSCULAR; INTRAVENOUS EVERY 5 MIN PRN
Status: DISCONTINUED | OUTPATIENT
Start: 2023-01-17 | End: 2023-01-17 | Stop reason: HOSPADM

## 2023-01-17 RX ORDER — LIDOCAINE HYDROCHLORIDE 20 MG/ML
INJECTION, SOLUTION INFILTRATION; PERINEURAL PRN
Status: DISCONTINUED | OUTPATIENT
Start: 2023-01-17 | End: 2023-01-17

## 2023-01-17 RX ORDER — PROPOFOL 10 MG/ML
INJECTION, EMULSION INTRAVENOUS PRN
Status: DISCONTINUED | OUTPATIENT
Start: 2023-01-17 | End: 2023-01-17

## 2023-01-17 RX ORDER — ONDANSETRON 4 MG/1
4 TABLET, ORALLY DISINTEGRATING ORAL EVERY 30 MIN PRN
Status: DISCONTINUED | OUTPATIENT
Start: 2023-01-17 | End: 2023-01-17 | Stop reason: HOSPADM

## 2023-01-17 RX ADMIN — LIDOCAINE HYDROCHLORIDE 100 MG: 20 INJECTION, SOLUTION INFILTRATION; PERINEURAL at 10:11

## 2023-01-17 RX ADMIN — PROPOFOL 200 MG: 10 INJECTION, EMULSION INTRAVENOUS at 10:11

## 2023-01-17 RX ADMIN — Medication 50 MG: at 10:11

## 2023-01-17 RX ADMIN — Medication 10 MG: at 10:56

## 2023-01-17 RX ADMIN — ONDANSETRON 4 MG: 2 INJECTION INTRAMUSCULAR; INTRAVENOUS at 10:11

## 2023-01-17 RX ADMIN — DEXAMETHASONE SODIUM PHOSPHATE 10 MG: 4 INJECTION, SOLUTION INTRA-ARTICULAR; INTRALESIONAL; INTRAMUSCULAR; INTRAVENOUS; SOFT TISSUE at 10:11

## 2023-01-17 RX ADMIN — SUGAMMADEX 200 MG: 100 INJECTION, SOLUTION INTRAVENOUS at 11:24

## 2023-01-17 RX ADMIN — PHENYLEPHRINE HYDROCHLORIDE 100 MCG: 10 INJECTION INTRAVENOUS at 10:37

## 2023-01-17 RX ADMIN — PROPOFOL 175 MCG/KG/MIN: 10 INJECTION, EMULSION INTRAVENOUS at 10:16

## 2023-01-17 RX ADMIN — FENTANYL CITRATE 100 MCG: 50 INJECTION, SOLUTION INTRAMUSCULAR; INTRAVENOUS at 10:11

## 2023-01-17 RX ADMIN — MIDAZOLAM 2 MG: 1 INJECTION INTRAMUSCULAR; INTRAVENOUS at 09:55

## 2023-01-17 RX ADMIN — SODIUM CHLORIDE, POTASSIUM CHLORIDE, SODIUM LACTATE AND CALCIUM CHLORIDE: 600; 310; 30; 20 INJECTION, SOLUTION INTRAVENOUS at 10:11

## 2023-01-17 ASSESSMENT — ACTIVITIES OF DAILY LIVING (ADL)
ADLS_ACUITY_SCORE: 35

## 2023-01-17 ASSESSMENT — LIFESTYLE VARIABLES: TOBACCO_USE: 0

## 2023-01-17 ASSESSMENT — ENCOUNTER SYMPTOMS: ORTHOPNEA: 0

## 2023-01-17 NOTE — ANESTHESIA CARE TRANSFER NOTE
Patient: West Van    Procedure: Procedure(s):  BRONCHOSCOPY, USING OPTICAL TRACKING SYSTEM, ion  endobronchial  ultrasound and transbronchial biopsies       Diagnosis: Lung nodule [R91.1]  Diagnosis Additional Information: No value filed.    Anesthesia Type:   General     Note:    Oropharynx: oropharynx clear of all foreign objects and spontaneously breathing  Level of Consciousness: awake  Oxygen Supplementation: face mask  Level of Supplemental Oxygen (L/min / FiO2): 6  Independent Airway: airway patency satisfactory and stable  Dentition: dentition unchanged  Vital Signs Stable: post-procedure vital signs reviewed and stable  Report to RN Given: handoff report given  Patient transferred to: PACU    Handoff Report: Identifed the Patient, Identified the Reponsible Provider, Reviewed the pertinent medical history, Discussed the surgical course, Reviewed Intra-OP anesthesia mangement and issues during anesthesia, Set expectations for post-procedure period and Allowed opportunity for questions and acknowledgement of understanding      Vitals:  Vitals Value Taken Time   BP     Temp     Pulse     Resp     SpO2         Electronically Signed By: VALENTINA Augustin CRNA  January 17, 2023  11:36 AM

## 2023-01-17 NOTE — ANESTHESIA PROCEDURE NOTES
Airway       Patient location during procedure: OR       Procedure Start/Stop Times: 1/17/2023 10:16 AM  Staff -        Performed By: CRNA and resident  Consent for Airway        Urgency: elective  Indications and Patient Condition       Indications for airway management: dong-procedural       Induction type:intravenous       Mask difficulty assessment: 1 - vent by mask    Final Airway Details       Final airway type: endotracheal airway       Successful airway: ETT - single  Endotracheal Airway Details        ETT size (mm): 8.5       Cuffed: yes       Successful intubation technique: video laryngoscopy (for teaching purposes)       VL Blade Size: MAC 4       Grade View of Cords: 1       Adjucts: stylet       Position: Right       Measured from: lips       Secured at (cm): 21       Bite block used: None    Post intubation assessment        Placement verified by: capnometry, equal breath sounds and chest rise        Number of attempts at approach: 1       Secured with: pink tape       Ease of procedure: easy       Dentition: Intact and Unchanged    Medication(s) Administered   Medication Administration Time: 1/17/2023 10:16 AM    Additional Comments       Meme Shook MD PGY4 Medicine-Pediatrics

## 2023-01-17 NOTE — ANESTHESIA POSTPROCEDURE EVALUATION
Patient: West Van    Procedure: Procedure(s):  BRONCHOSCOPY, USING OPTICAL TRACKING SYSTEM, ion  endobronchial  ultrasound and transbronchial biopsies       Anesthesia Type:  General    Note:  Disposition: Outpatient   Postop Pain Control: Uneventful            Sign Out: Well controlled pain   PONV: No   Neuro/Psych: Uneventful            Sign Out: Acceptable/Baseline neuro status   Airway/Respiratory: Uneventful            Sign Out: Acceptable/Baseline resp. status   CV/Hemodynamics: Uneventful            Sign Out: Acceptable CV status; No obvious hypovolemia; No obvious fluid overload   Other NRE: NONE   DID A NON-ROUTINE EVENT OCCUR? No           Last vitals:  Vitals Value Taken Time   /80 01/17/23 1215   Temp 36.2  C (97.2  F) 01/17/23 1133   Pulse 85 01/17/23 1225   Resp 16 01/17/23 1215   SpO2 99 % 01/17/23 1225   Vitals shown include unvalidated device data.    Electronically Signed By: Leslie Goldberg, MD  January 17, 2023  12:26 PM

## 2023-01-17 NOTE — DISCHARGE INSTRUCTIONS
Post-Procedure Patient Instructions:    January 17, 2023  West Van    Your procedure navigational bronchoscopy with biopsy, endobronchial ultrasound with transbronchial needle aspiration was completed without any immediate complications.    You may cough up scant amount of blood for the next 12-24 hours. If you have excessive cough with blood, chest pain, shortness of breath or other concerning symptoms, please report to the closest emergency room.    You may experience low grade (less than 100.5 F) fever next 24 hours for which you can take Tylenol. If the fever persists more than 24 hours contact our office or your primary care provider.    You can resume your regular diet as it was prior to procedure.    You may resume your medications after the procedure unless you are instructed to do differently.     Should you have any question, please do not hesitate to call our office. [phone]    Our office (Thoracic/Pulmonary--[phone]) will call you with the results of any samples taken during the procedure.     Please note that you may get a result notification through  My Chart  before us calling you as the Laboratories are instructed to release the results as soon as they are available to the patients and providers at the same time. Please allow your provider 24 hours call you to discuss the results.      Sixto Blackman   Interventional pulmonary fellow    [facility], [place]  Same-Day Surgery   Adult Discharge Orders & Instructions     For 24 hours after surgery    Get plenty of rest.  A responsible adult must stay with you for at least 24 hours after you leave the hospital.   Do not drive or use heavy equipment.  If you have weakness or tingling, don't drive or use heavy equipment until this feeling goes away.  Do not drink alcohol.  Avoid strenuous or risky activities.  Ask for help when climbing stairs.   You may feel lightheaded.  IF so, sit for a few minutes before  standing.  Have someone help you get up.   If you have nausea (feel sick to your stomach): Drink only clear liquids such as apple juice, ginger ale, broth or 7-Up.  Rest may also help.  Be sure to drink enough fluids.  Move to a regular diet as you feel able.  You may have a slight fever. Call the doctor if your fever is over 100 F (37.7 C) (taken under the tongue) or lasts longer than 24 hours.  You may have a dry mouth, a sore throat, muscle aches or trouble sleeping.  These should go away after 24 hours.  Do not make important or legal decisions.   Call your doctor for any of the followin.  Signs of infection (fever, growing tenderness at the surgery site, a large amount of drainage or bleeding, severe pain, foul-smelling drainage, redness, swelling).    2. It has been over 8 to 10 hours since surgery and you are still not able to urinate (pass water).    3.  Headache for over 24 hours.    4.  Numbness, tingling or weakness the day after surgery (if you had spinal anesthesia).  To contact a doctor, call  Dr. Guillaume or:    [phone] and ask for the resident on call for   [facility] Pulmonary (answered 24 hours a day)

## 2023-01-17 NOTE — PROCEDURES
INTERVENTIONAL PULMONOLOGY       Procedure(s):    A flexible bronchoscopy  Airway exam  EBUS-TBNA (1 sites)  Fluoroscopic guidance   Radial EBUS Navigation (1 sites)  Therapeutic suctioning (2 sites)  Transbronchial fine needle aspiration (1 sites)  Transbronchial forcep biopsies (1 sites)  ION Robotic Bronchoscopy     Indication:  Right lung nodule    Attending of Record:  Ani Guillaume MD     Interventional Pulmonary Fellow   Sixto Blackman    Trainees Present:   None     Medications:    General Anesthesia - See anesthesia flowsheet for details    Sedation Time:   Per Anesthesia Care Provider    Time Out:  Performed    The patient's medical record has been reviewed.  The indication for the procedure was reviewed.  The necessary history and physical examination was performed and reviewed.  The risks, benefits and alternatives of the procedure were discussed with the the patient in detail and he had the opportunity to ask questions.  I discussed in particular the potential complications including risks of minor or life-threatening bleeding and/or infection, respiratory failure, vocal cord trauma / paralysis, pneumothorax, and discomfort. Sedation risks were also discussed including abnormal heart rhythms, low blood pressure, and respiratory failure. All questions were answered to the best of my ability.  Verbal and written informed consent was obtained.  The proposed procedure and the patient's identification were verified prior to the procedure by the physician and the nurse.    The patient was assessed for the adequacy for the procedure and to receive medications.   Mental Status:  Alert and oriented x 3  Airway examination:  Class I (Complete visualization of soft palate)  Pulmonary:  Non labored respirations  CV:  Regular rate  ASA Grade:  (II)  Mild systemic disease    After clinical evaluation and reviewing the indication, risks, alternatives and benefits of the procedure the patient was deemed to be in  satisfactory condition to undergo the procedure.           A Tuberculosis risk assessment was performed:  The patient has no known RISK of Tuberculosis    The procedure was performed in a negative airflow room: The patient could not be moved to a negative airflow room because of needed OR for the procedure    Maneuvers / Procedure:      Airway Examination: A complete airway examination was performed from the distal trachea to the subsegmental level in each lobe of both lungs.  Pertinent findings include normal appearing airways.       EBUS-TBNA (LN): The EBUS scope was inserted and evaluation included:  11L: < 5 mm  4L: < 5 mm  7: < 5 mm  4R: biopsied 4x  11R: unable to visualize  RLL mass: biopsied 4x.     ION Robotic Bronchoscopy: Planning was performed on the laptop prior to the procedure. The ION successfully completed its pre-procedural check phase. The ION arm was oriented towards the ETT and docked successfully. The robotic catheter was inserted into the ETT and the guide was clamped down. Registration was then completed successfully. The catheter was advanced towards the RLL lesion/nodule using the vision probe. Local confirmation of the lesion utilized with Radial EBUS and Fluoroscopy .. The lesion was biopsied using 21G FNA Needle. A total of 8 passes were performed including 3 transbronchial forcep biopsies. NADEEM was present throughout the procedure. EBL was minimal. Guide catheter and vision probe was removed successfully. The ION platform was undocked without issues.     Any disposable equipment was visually inspected and deemed to be intact immediately post procedure.      Relevant Pictures  None     Assessment and Recommendations:     1. Successful completion of bronchoscopy with biopsy RLL mass, EBUS TBNA station 4R and RLL mass.   2. Ok to discharge once medically stable.   3. Follow up FNA results.           Sixto Blackman  Interventional pulmonary fellow  Pager: [phone]

## 2023-01-17 NOTE — ANESTHESIA PREPROCEDURE EVALUATION
Anesthesia Pre-Procedure Evaluation    Patient: West Van   MRN: [id number] : 1975        Procedure : Procedure(s):  BRONCHOSCOPY, USING OPTICAL TRACKING SYSTEM, ion  endobronchial  ultrasound and transbronchial biopsies          Past Medical History:   Diagnosis Date     Benign essential HTN 2016     Diabetes (H)      Liver cancer (H) 10/04/2022     PONV (postoperative nausea and vomiting)       Past Surgical History:   Procedure Laterality Date     LAPAROSCOPIC ABLATION LIVER TUMOR N/A 2022    Procedure: Diagnostic Laparoscopy, Hepatic Ultrasound, Laparoscopic ultrasound guided microwave ablation of liver tumor x1;  Surgeon: Armando Munguia MD;  Location: [facility] OR     LAPAROSCOPIC BIOPSY LIVER N/A 2022    Procedure: Laparoscopic Ultrasound Guided liver biopsy;  Surgeon: Armando Munguia MD;  Location: [facility] OR     LAPAROSCOPIC CHOLECYSTECTOMY N/A 2022    Procedure: Laparoscopic cholecystectomy;  Surgeon: Armando Munguia MD;  Location: [facility] OR      No Known Allergies   Social History     Tobacco Use     Smoking status: Never     Smokeless tobacco: Never   Substance Use Topics     Alcohol use: Not Currently     Comment: Last ETOH       Wt Readings from Last 1 Encounters:   23 92.1 kg (203 lb 0.7 oz)        Anesthesia Evaluation   Pt has had prior anesthetic. Type: MAC and General.    No history of anesthetic complications       ROS/MED HX  ENT/Pulmonary:     (+) RADHA risk factors, hypertension, daytime somnolence,  (-) tobacco use   Neurologic:  - neg neurologic ROS     Cardiovascular:     (+) hypertension-----Taking blood thinners Pt has not received instructions: Instructions Given to patient: Hold 7 days prior to procedure. Previous cardiac testing   Echo: Date: Results:    Stress Test: Date: Results:    ECG Reviewed: Date: 2022 Results:  Sinus rhythm with occasional Premature ventricular complexes   Cath: Date: Results:   (-) CONTI, orthopnea/PND and irregular  heartbeat/palpitations   METS/Exercise Tolerance: >4 METS    Hematologic:  - neg hematologic  ROS     Musculoskeletal: Comment: sciatica - neg musculoskeletal ROS     GI/Hepatic: Comment: Hepatocellular Carcinoma  Gallbladder Calculus    (+) cholecystitis/cholelithiasis, liver disease,     Renal/Genitourinary:       Endo:     (+) type II DM, Last HgA1c: 7.1, date: 9/2022, Not using insulin, - not using insulin pump. Normal glucose range: 140-160 , not previously admitted for DM/DKA.     Psychiatric/Substance Use:     (+) psychiatric history anxiety  (-) alcohol abuse history   Infectious Disease:  - neg infectious disease ROS     Malignancy:   (+) Malignancy, History of Other.Other CA Heptacellular carcinoma Active status post.    Other:  - neg other ROS          Physical Exam    Airway        Mallampati: I   TM distance: > 3 FB   Neck ROM: full   Mouth opening: > 3 cm    Respiratory Devices and Support         Dental  no notable dental history         Cardiovascular          Rhythm and rate: regular and normal     Pulmonary           breath sounds clear to auscultation           OUTSIDE LABS:  CBC:   Lab Results   Component Value Date    WBC 9.4 11/14/2022    HGB 16.4 11/14/2022    HCT 47.6 11/14/2022     11/14/2022     BMP:   Lab Results   Component Value Date     11/14/2022    POTASSIUM 3.6 11/14/2022    CHLORIDE 103 11/14/2022    CO2 27 11/14/2022    BUN 10.0 11/14/2022    CR 0.78 11/14/2022     (H) 12/06/2022     (H) 12/06/2022     COAGS:   Lab Results   Component Value Date    INR 1.18 (H) 11/14/2022     POC: No results found for: BGM, HCG, HCGS  HEPATIC:   Lab Results   Component Value Date    ALBUMIN 4.4 11/14/2022    PROTTOTAL 7.2 11/14/2022    ALT 46 11/14/2022    AST 33 11/14/2022    ALKPHOS 95 11/14/2022    BILITOTAL 1.4 (H) 11/14/2022     OTHER:   Lab Results   Component Value Date    CHUCK 9.9 11/14/2022       Anesthesia Plan    ASA Status:  3      Anesthesia Type: General.      - Airway: ETT   Induction: Intravenous.   Maintenance: TIVA.        Consents    Anesthesia Plan(s) and associated risks, benefits, and realistic alternatives discussed. Questions answered and patient/representative(s) expressed understanding.    - Discussed:     - Discussed with:  Patient      - Extended Intubation/Ventilatory Support Discussed: No.      - Patient is DNR/DNI Status: No    Use of blood products discussed: No .     Postoperative Care    Pain management: IV analgesics.   PONV prophylaxis: Ondansetron (or other 5HT-3), Dexamethasone or Solumedrol     Comments:    Other Comments: Pt had a lot of pain after liver surgery in December, doing much better now. Had 1 episode of vomiting on the drive home, related to pain. No recent URI.        H&P reviewed: Unable to attach H&P to encounter due to EHR limitations. H&P Update: appropriate H&P reviewed, patient examined. No interval changes since H&P (within 30 days).         June Torres MD

## 2023-01-18 LAB
PATH REPORT.COMMENTS IMP SPEC: NORMAL
PATH REPORT.FINAL DX SPEC: NORMAL
PATH REPORT.MICROSCOPIC SPEC OTHER STN: NORMAL
PATH REPORT.RELEVANT HX SPEC: NORMAL

## 2023-01-19 LAB
PATH REPORT.COMMENTS IMP SPEC: ABNORMAL
PATH REPORT.COMMENTS IMP SPEC: ABNORMAL
PATH REPORT.COMMENTS IMP SPEC: YES
PATH REPORT.FINAL DX SPEC: ABNORMAL
PATH REPORT.GROSS SPEC: ABNORMAL
PATH REPORT.MICROSCOPIC SPEC OTHER STN: ABNORMAL
PATH REPORT.RELEVANT HX SPEC: ABNORMAL

## 2023-01-24 ENCOUNTER — PATIENT OUTREACH (OUTPATIENT)
Dept: ONCOLOGY | Facility: CLINIC | Age: 48
End: 2023-01-24
Payer: COMMERCIAL

## 2023-01-24 ENCOUNTER — TELEPHONE (OUTPATIENT)
Dept: ONCOLOGY | Facility: CLINIC | Age: 48
End: 2023-01-24
Payer: COMMERCIAL

## 2023-01-24 DIAGNOSIS — C7A.8 PRIMARY MALIGNANT NEUROENDOCRINE TUMOR OF LUNG (H): Primary | ICD-10-CM

## 2023-01-24 NOTE — PROGRESS NOTES
New Patient Oncology Nurse Navigator Note     Referring provider: Dr. Michael Sanchez    Referring Clinic/Organization: [facility]     Referred to: Thoracic Surgery    Requested provider (if applicable): First available - did not specify     Referral Received: 01/24/23       Evaluation for :   Diagnosis   C7A.8 (ICD-10-CM) - Primary malignant neuroendocrine tumor of lung (H       Clinical History (per Nurse review of records provided):      12/21/22 Pt seen in Interventional Pulmonology by Dr. Gray for a new solitary pulmonary nodule seen on PET scan from [place] on 10/13/2022.     01/17/23 CT chest (bookmarked) showed:   IMPRESSION:  1. Increased size of the suspicious 2.0 x 1.3 cm solid perivascular  nodule in the right lower lobe, previously 1.7 cm in greatest  dimension. There is a newly apparent abutting versus contiguous  nodularity inferomedial to this lesion.     2. Increased size of a hypodense 6.6 cm hepatic lesion in the dome,  suspicious for HCC in the setting of underlying cirrhosis. This likely  corresponds to the previous 1.7 cm LR4 observation in segment 8 on  11/10/2022 MRI.    01/17/23   BRONCHOSCOPY, USING OPTICAL TRACKING SYSTEM, ion   endobronchial  ultrasound and transbronchial biopsies     01/17/23 Fine Needle Aspirate Lung, Lower Lobe, Right (bookmarked) showed:   Final Diagnosis   Specimen A                 Interpretation:                  Positive for neuroendocrine neoplasm (see comment)                 Adequacy:                 Satisfactory for evaluation        Clinical Assessment / Barriers to Care (Per Nurse):    PFTs on file from 12/19/22      Records Location: [facility]     Records Needed:     N/A    Additional testing needed prior to consult:     Per inbasket message from Dr. Haas 1/24, Dotatate PET is not necessary prior to meeting with Thoracic surgery.    Referral updates and Plan:     Writer called West to discuss the referral. He agreed to an in person appt with   Waldo Tuesday Jan [place]st at the [facility] [place]. Dotatate PET is scheduled for Feb 7th. Message sent to Dr. Haas to see if he needs the PET done first. Will keep pt updated.    1/25/23: Writer called West and updated him that Dr. Haas does not need the dotatate PET prior to meeting with him. West agreed to the Tuesday Jan 31st appt. Scheduling instructions sent to [facility]PS. West had no further questions or concerns at this time.    LUANN Orourke, CLAUDYN, RN, LAc, OCN  [facility] Oncology Nurse Navigator  [phone] / [phone]

## 2023-01-24 NOTE — TELEPHONE ENCOUNTER
I called the patient to follow-up on his recent liver MRI and lung biopsy.    The MRI shows the expected post-ablation changes in his liver.  The lung nodule biopsy shows a low grade neuroendocrine cancer. This is probably of pulmonary origin and we have not seen evidence of metastases. He needs further [id number] a dotatate-PET scan to complete his staging. We will refer him to Thoracic surgery for curative intent resection.

## 2023-01-26 NOTE — CONFIDENTIAL NOTE
RECORDS STATUS - ALL OTHER DIAGNOSIS    Primary malignant neuroendocrine tumor of lung per Dr Sanchez  RECORDS RECEIVED FROM: [facility]   DATE RECEIVED: 1/31/2023   NOTES STATUS DETAILS   OFFICE NOTE from referring provider Complete Michael Ren MD   OFFICE NOTE from medical oncologist Complete [facility]   OPERATIVE REPORT Complete See Biopsy in EPIC   MEDICATION LIST Complete [facility]   CLINICAL TRIAL TREATMENTS TO DATE     LABS     PATHOLOGY REPORTS Complete 1/17/2023  Fine Needle Aspirate Lung, Lower Lobe, Right: [id number]   Positive for neuroendocrine neoplasm      ANYTHING RELATED TO DIAGNOSIS     GENONOMIC TESTING     TYPE:     IMAGING (NEED IMAGES & REPORT)     CT SCANS Complete CT Chest 1/17/2023   MRI Complete MRI abdomen 12/27/2022, 11/10/2022   MAMMO     ULTRASOUND     PET Complete 10/13/2022

## 2023-01-31 ENCOUNTER — VIRTUAL VISIT (OUTPATIENT)
Dept: SURGERY | Facility: CLINIC | Age: 48
End: 2023-01-31
Attending: INTERNAL MEDICINE
Payer: COMMERCIAL

## 2023-01-31 ENCOUNTER — PREP FOR PROCEDURE (OUTPATIENT)
Dept: SURGERY | Facility: CLINIC | Age: 48
End: 2023-01-31

## 2023-01-31 ENCOUNTER — PRE VISIT (OUTPATIENT)
Dept: SURGERY | Facility: CLINIC | Age: 48
End: 2023-01-31
Payer: COMMERCIAL

## 2023-01-31 DIAGNOSIS — D3A.090 CARCINOID TUMOR OF RIGHT LUNG (H): Primary | ICD-10-CM

## 2023-01-31 DIAGNOSIS — C7A.8 PRIMARY MALIGNANT NEUROENDOCRINE TUMOR OF LUNG (H): ICD-10-CM

## 2023-01-31 PROCEDURE — 99203 OFFICE O/P NEW LOW 30 MIN: CPT | Mod: 95 | Performed by: THORACIC SURGERY (CARDIOTHORACIC VASCULAR SURGERY)

## 2023-01-31 RX ORDER — ENOXAPARIN SODIUM 100 MG/ML
40 INJECTION SUBCUTANEOUS
Status: CANCELLED | OUTPATIENT
Start: 2023-01-31

## 2023-01-31 RX ORDER — CEFAZOLIN SODIUM 2 G/50ML
2 SOLUTION INTRAVENOUS
Status: CANCELLED | OUTPATIENT
Start: 2023-01-31

## 2023-01-31 RX ORDER — CEFAZOLIN SODIUM 2 G/50ML
2 SOLUTION INTRAVENOUS SEE ADMIN INSTRUCTIONS
Status: CANCELLED | OUTPATIENT
Start: 2023-01-31

## 2023-01-31 NOTE — NURSING NOTE
Patient verified medications and allergies are correct via eCheck-in. Patient confirms no changes at this time and/or since last reviewed by clinic staff.    Anna Marsh, Virtual Facilitator

## 2023-01-31 NOTE — PROGRESS NOTES
West is a 47 year old who is being evaluated via a billable video visit.      Patient stated he is in the state of [place] for the visit today.    How would you like to obtain your AVS? MyChart  If the video visit is dropped, the invitation should be resent by: Text to cell phone: [phone]  Will anyone else be joining your video visit? Yes, parents    Anna Marsh, Virtual Visit Facilitator      Video-Visit Details    Type of service:  Video Visit   Video Start Time: 2:02 PM  Video End Time:2:30 PM     Originating Location (pt. Location): Home    Distant Location (provider location):  Off-site  Platform used for Video Visit: [facility]      THORACIC SURGERY - NEW PATIENT OFFICE VISIT      Dear Dr. Ramirez,    I saw West Van at Dr. Sanchez's request in consultation for the evaluation and treatment of a nodule of the RIGHT lower lobe.    HPI  West Van is a 47 year old male patient who presents with a right lower lobe pulmonary carcinoid tumor. In September, 2022, he had cold symptoms and was found to have a lung nodule on chest x-ray. His weight has been stable. He underwent ablation of a hepatocellular carcinoma on 12/6/2023. He is back to work and feels like he is recovered from surgery at this point. He walks a couple of miles daily with his dog. He has not have COVID infection.    ECOG performance status  0- Fully active, without restriction                 Previsit Tests   PFTs (12/19/2022): FEV1 81 % predicted, 3.45 L, DLCO 102 %  Pathology (1/17/2023): Right lower lobe FNA: Low grade neuroendocrine tumor - carcinoid  CT scan (1/17/2023): RIGHT lower lobe 2 x 1.3 cm pulmonary Nodule, also abutting a new 1 x 0.5 nodule, without mediastinal adenopathy, with no pleural effusion        PET scan (10/13/2022): FDG avid 1.7 x 1.2 cm RIGHT lower lobe Nodule (Max SUV 1.5). No suspicious hypermetabolic activity elsewhere    Covid vaccination status: Vaccinated    PMH  Reviewed, as below    Past Medical History:  "  Diagnosis Date     Benign essential HTN 04/13/2016     Diabetes (H)      Liver cancer (H) 10/04/2022     PONV (postoperative nausea and vomiting)         PSH  Reviewed, as below    Past Surgical History:   Procedure Laterality Date     BRONCHOSCOPY, WITH BIOPSY, ROBOT ASSISTED N/A 1/17/2023    Procedure: BRONCHOSCOPY, USING OPTICAL TRACKING SYSTEM, ion;  Surgeon: Ani Guillaume MD;  Location: [facility]     ENDOBRONCHIAL ULTRASOUND FLEXIBLE N/A 1/17/2023    Procedure: endobronchial  ultrasound and transbronchial biopsies;  Surgeon: Ani Guillaume MD;  [facility]: [facility]     LAPAROSCOPIC ABLATION LIVER TUMOR N/A 12/6/2022    Procedure: Diagnostic Laparoscopy, Hepatic Ultrasound, Laparoscopic ultrasound guided microwave ablation of liver tumor x1;  Surgeon: Armando Munguia MD;  [facility]     LAPAROSCOPIC BIOPSY LIVER N/A 12/6/2022    Procedure: Laparoscopic Ultrasound Guided liver biopsy;  Surgeon: Armando Munguia MD;  [facility]     LAPAROSCOPIC CHOLECYSTECTOMY N/A 12/6/2022    Procedure: Laparoscopic cholecystectomy;  Surgeon: Armando Munguia MD;  Location: [facility]        No Known Allergies    Current Outpatient Medications   Medication     Ashwagandha 125 MG CAPS     atenolol (TENORMIN) 25 MG tablet     bisacodyl (DULCOLAX) 10 MG suppository     cholecalciferol (VITAMIN D3) 125 mcg (5000 units) capsule     liraglutide (VICTOZA) 18 MG/3ML solution     lisinopril-hydrochlorothiazide (ZESTORETIC) 10-12.5 MG tablet     magnesium 250 MG tablet     metFORMIN (GLUCOPHAGE XR) 500 MG 24 hr tablet     Milk Thistle-Dand-Fennel-Licor (MILK THISTLE XTRA) CAPS capsule     polyethylene glycol (MIRALAX) 17 GM/Dose powder     senna-docusate (SENOKOT-S/PERICOLACE) 8.6-50 MG tablet     TURMERIC PO     ULTICARE MINI 31G X 6 MM insulin pen needle     No current facility-administered medications for this visit.       ETOH: None (last 1999)  TOBACCO: Never smoker    Physical examination  Height: 6'2\"  Weight: 217 " pounds  Physical Exam  Constitutional:       Appearance: Normal appearance. He is normal weight.   Eyes:      Conjunctiva/sclera: Conjunctivae normal.   Pulmonary:      Effort: Pulmonary effort is normal.   Neurological:      Mental Status: He is alert and oriented to person, place, and time.   Psychiatric:         Mood and Affect: Mood normal.         Behavior: Behavior normal.         Thought Content: Thought content normal.         Judgment: Judgment normal.          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 His dad is on the video visit.     IMPRESSION   47 year old male patient with RIGHT lower lobe nodule, consistent with carcinoid tumor. He is a good surgical candidate.    Stage: Clinical stage T3N0 if both nodules are carcinoid    PLAN  I spent 40 min on the date of the encounter in chart review, patient visit, review of tests, documentation and/or discussion with other providers about the issues documented above. I reviewed the plan as follows:  Procedure planned: Robot-assisted right lower lobectomy, mediastinal lymph node dissection.  I discussed the risks and benefits of the operation, including obtaining a diagnosis, resecting and staging the cancer. The risks include bleeding, infection, arrhythmia requiring medication or anticoagulation, prolonged air leak, chylothorax, deep venous thrombosis and pulmonary embolism, and death.  There is also a risk of prolonged pain, which could require further treatment.  Prolonged air leak could be treated with bronchoscopy and endobronchial valve insertion  or going home with a chest tube.  Postoperative bleeding (rare) could require a return to the OR.     He would like to set up a stay at the [facility] for him and for his parents around the time of surgery.  Necessary Preop Tests & Appointments: Preoperative assessment clinic, Dotatate PET scan pending on 2/7/2023  Regional Anesthesia Plan: Intraoperative  intercostal nerve block and Intraoperative intercostal cryoablation  Anticoagulation Plan: Prophylactic Lovenox    I appreciate the opportunity to participate in the care of your patient and will keep you updated.  Sincerely,    Matheus Haas MD

## 2023-01-31 NOTE — LETTER
1/31/2023         RE: West Van  [address]  [facility] [address]        Dear Colleague,    Thank you for referring your patient, West Van, to the [facility]. Please see a copy of my visit note below.    THORACIC SURGERY - NEW PATIENT OFFICE VISIT      Dear Dr. Ramirez,    I saw West Van at Dr. Sanchez's request in consultation for the evaluation and treatment of a nodule of the RIGHT lower lobe.    HPI  West Van is a 47 year old male patient who presents with a right lower lobe pulmonary carcinoid tumor. In September, 2022, he had cold symptoms and was found to have a lung nodule on chest x-ray. His weight has been stable. He underwent ablation of a hepatocellular carcinoma on 12/6/2023. He is back to work and feels like he is recovered from surgery at this point. He walks a couple of miles daily with his dog. He has not have COVID infection.    ECOG performance status  0- Fully active, without restriction                 Previsit Tests   PFTs (12/19/2022): FEV1 81 % predicted, 3.45 L, DLCO 102 %  Pathology (1/17/2023): Right lower lobe FNA: Low grade neuroendocrine tumor - carcinoid  CT scan (1/17/2023): RIGHT lower lobe 2 x 1.3 cm pulmonary Nodule, also abutting a new 1 x 0.5 nodule, without mediastinal adenopathy, with no pleural effusion        PET scan (10/13/2022): FDG avid 1.7 x 1.2 cm RIGHT lower lobe Nodule (Max SUV 1.5). No suspicious hypermetabolic activity elsewhere    Covid vaccination status: Vaccinated    PMH  Reviewed, as below    Past Medical History:   Diagnosis Date     Benign essential HTN 04/13/2016     Diabetes (H)      Liver cancer (H) 10/04/2022     PONV (postoperative nausea and vomiting)         PSH  Reviewed, as below    Past Surgical History:   Procedure Laterality Date     BRONCHOSCOPY, WITH BIOPSY, ROBOT ASSISTED N/A 1/17/2023    Procedure: BRONCHOSCOPY, USING OPTICAL TRACKING SYSTEM, ion;  Surgeon: Ani Guillaume MD;  Location:   "OR     ENDOBRONCHIAL ULTRASOUND FLEXIBLE N/A 1/17/2023    Procedure: endobronchial  ultrasound and transbronchial biopsies;  Surgeon: Ani Guillaume MD;  [facility]     LAPAROSCOPIC ABLATION LIVER TUMOR N/A 12/6/2022    Procedure: Diagnostic Laparoscopy, Hepatic Ultrasound, Laparoscopic ultrasound guided microwave ablation of liver tumor x1;  Surgeon: Armando Munguia MD;  [facility]     LAPAROSCOPIC BIOPSY LIVER N/A 12/6/2022    Procedure: Laparoscopic Ultrasound Guided liver biopsy;  Surgeon: Armando Munguia MD;  [facility]     LAPAROSCOPIC CHOLECYSTECTOMY N/A 12/6/2022    Procedure: Laparoscopic cholecystectomy;  Surgeon: Armando Munguia MD;  [facility]        No Known Allergies    Current Outpatient Medications   Medication     Ashwagandha 125 MG CAPS     atenolol (TENORMIN) 25 MG tablet     bisacodyl (DULCOLAX) 10 MG suppository     cholecalciferol (VITAMIN D3) 125 mcg (5000 units) capsule     liraglutide (VICTOZA) 18 MG/3ML solution     lisinopril-hydrochlorothiazide (ZESTORETIC) 10-12.5 MG tablet     magnesium 250 MG tablet     metFORMIN (GLUCOPHAGE XR) 500 MG 24 hr tablet     Milk Thistle-Dand-Fennel-Licor (MILK THISTLE XTRA) CAPS capsule     polyethylene glycol (MIRALAX) 17 GM/Dose powder     senna-docusate (SENOKOT-S/PERICOLACE) 8.6-50 MG tablet     TURMERIC PO     ULTICARE MINI 31G X 6 MM insulin pen needle     No current facility-administered medications for this visit.       ETOH: None (last 1999)  TOBACCO: Never smoker    Physical examination  Height: 6'2\"  Weight: 217 pounds  Physical Exam  Constitutional:       Appearance: Normal appearance. He is normal weight.   Eyes:      Conjunctiva/sclera: Conjunctivae normal.   Pulmonary:      Effort: Pulmonary effort is normal.   Neurological:      Mental Status: He is alert and oriented to person, place, and time.   Psychiatric:         Mood and Affect: Mood normal.         Behavior: Behavior normal.         Thought Content: " Thought content normal.         Judgment: Judgment normal.          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 His dad is on the video visit.     IMPRESSION   47 year old male patient with RIGHT lower lobe nodule, consistent with carcinoid tumor. He is a good surgical candidate.    Stage: Clinical stage T3N0 if both nodules are carcinoid    PLAN  I spent 40 min on the date of the encounter in chart review, patient visit, review of tests, documentation and/or discussion with other providers about the issues documented above. I reviewed the plan as follows:  Procedure planned: Robot-assisted right lower lobectomy, mediastinal lymph node dissection.  I discussed the risks and benefits of the operation, including obtaining a diagnosis, resecting and staging the cancer. The risks include bleeding, infection, arrhythmia requiring medication or anticoagulation, prolonged air leak, chylothorax, deep venous thrombosis and pulmonary embolism, and death.  There is also a risk of prolonged pain, which could require further treatment.  Prolonged air leak could be treated with bronchoscopy and endobronchial valve insertion  or going home with a chest tube.  Postoperative bleeding (rare) could require a return to the OR.     He would like to set up a stay at the [facility] for him and for his parents around the time of surgery.  Necessary Preop Tests & Appointments: Preoperative assessment clinic, Dotatate PET scan pending on 2/7/2023  Regional Anesthesia Plan: Intraoperative intercostal nerve block and Intraoperative intercostal cryoablation  Anticoagulation Plan: Prophylactic Lovenox  I appreciate the opportunity to participate in the care of your patient and will keep you updated.  Sincerely,    Matheus Haas MD

## 2023-02-03 ENCOUNTER — TELEPHONE (OUTPATIENT)
Dept: ONCOLOGY | Facility: CLINIC | Age: 48
End: 2023-02-03
Payer: COMMERCIAL

## 2023-02-03 NOTE — TELEPHONE ENCOUNTER
[place] paperwork received via fax from [facility]. Will be placed in provider folder for signature upon completion.     Fax: [phone]       Herrera Randle

## 2023-02-07 ENCOUNTER — TELEPHONE (OUTPATIENT)
Dept: SURGERY | Facility: CLINIC | Age: 48
End: 2023-02-07
Payer: COMMERCIAL

## 2023-02-07 ENCOUNTER — HOSPITAL ENCOUNTER (OUTPATIENT)
Dept: PET IMAGING | Facility: CLINIC | Age: 48
Discharge: HOME | End: 2023-02-07
Attending: INTERNAL MEDICINE | Admitting: INTERNAL MEDICINE
Payer: COMMERCIAL

## 2023-02-07 DIAGNOSIS — C7A.8 PRIMARY MALIGNANT NEUROENDOCRINE TUMOR OF LUNG (H): ICD-10-CM

## 2023-02-07 LAB
CREAT BLD-MCNC: 0.7 MG/DL (ref 0.7–1.3)
GFR SERPL CREATININE-BSD FRML MDRD: >60 ML/MIN/1.73M2

## 2023-02-07 PROCEDURE — 74177 CT ABD & PELVIS W/CONTRAST: CPT

## 2023-02-07 PROCEDURE — 82565 ASSAY OF CREATININE: CPT

## 2023-02-07 PROCEDURE — 78816 PET IMAGE W/CT FULL BODY: CPT | Mod: PI

## 2023-02-07 PROCEDURE — 78816 PET IMAGE W/CT FULL BODY: CPT | Mod: 26 | Performed by: RADIOLOGY

## 2023-02-07 PROCEDURE — 250N000011 HC RX IP 250 OP 636: Performed by: INTERNAL MEDICINE

## 2023-02-07 PROCEDURE — 71260 CT THORAX DX C+: CPT | Mod: 26 | Performed by: RADIOLOGY

## 2023-02-07 PROCEDURE — A9592 HC RX IP 250 OP 636: HCPCS | Performed by: INTERNAL MEDICINE

## 2023-02-07 PROCEDURE — 74177 CT ABD & PELVIS W/CONTRAST: CPT | Mod: 26 | Performed by: RADIOLOGY

## 2023-02-07 RX ORDER — IOPAMIDOL 755 MG/ML
20-135 INJECTION, SOLUTION INTRAVASCULAR ONCE
Status: COMPLETED | OUTPATIENT
Start: 2023-02-07 | End: 2023-02-07

## 2023-02-07 RX ADMIN — COPPER CU 64 DOTATATE 4.15 MCI.: 1 INJECTION, SOLUTION INTRAVENOUS at 13:09

## 2023-02-07 RX ADMIN — IOPAMIDOL 112 ML: 755 INJECTION, SOLUTION INTRAVENOUS at 13:51

## 2023-02-07 NOTE — TELEPHONE ENCOUNTER
Spoke with patient to schedule procedure with Dr. Haas   Procedure was scheduled on 03/08/23 at  [facility]  Patient will have H&P with PAC    Patient is aware a COVID-19 test is needed before their procedure.   (Patient is aware IP/Thoracic are still requiring COVID-19 test)     Patient will complete a PCR COVID-19 test due to inpatient status, patient will schedule [facility]    Patient is aware a / is needed day of surgery.   Surgery Letter was sent via "[employer]",     Patient has my direct contact information for any further questions.     Feb 27, 2023  1:30 PM  (Arrive by 1:15 PM)  PAC EVALUATION with VALENTINA Real CNP  [facility] ([facility] [place] ) [phone]   Mar 06, 2023  2:30 PM  Testing Only with PH COVID LAB  [facility] ([facility] ) [phone]      Apr 05, 2023 12:20 PM  (Arrive by 12:05 PM)  XR CHEST 2 VIEWS with [id number]  [facility] ([facility] [place] ) [phone]

## 2023-02-07 NOTE — TELEPHONE ENCOUNTER
Signed form received and faxed to [employer], fax # [phone]. Successful transmission verified via rightfax. Copy of forms sent to scanning, original forms mailed to patient's home address on file.    Herrera Randle on 2/7/2023 at 7:56 AM

## 2023-02-07 NOTE — TELEPHONE ENCOUNTER
Called and spoke with pt while he was waiting to get checked in for his scan. Pt had very bad reception and kept cutting in and out. Informed pt writer would give him a call back once he finishes with his scan..    Will Offer 03/08 at . Per surgeon, if pt declines due to location, 3/29 at [facility] is just fine.    Padmaja Mcmahon on 2/7/2023 at 12:42 PM

## 2023-02-08 NOTE — TELEPHONE ENCOUNTER
FUTURE VISIT INFORMATION      SURGERY INFORMATION:    Date: 3/8/23    Location:  or    Surgeon:  Matheus Haas MD    Anesthesia Type:  general    Procedure: Robot-assisted right thoracoscopic lower lobectomy, mediastinal lymph node dissection    Consult: virtual visit 23    RECORDS REQUESTED FROM:        Primary Care Provider: Walker Ramirez MD    Pertinent Medical History: hypertension    Most recent EKG+ Tracin22- [facility]    Most recent PFT's: 22    Most recent Sleep Study:

## 2023-02-10 ENCOUNTER — DOCUMENTATION ONLY (OUTPATIENT)
Dept: SURGERY | Facility: CLINIC | Age: 48
End: 2023-02-10
Payer: COMMERCIAL

## 2023-02-10 NOTE — PROGRESS NOTES
[facility] paperwork faxed to [phone] regarding patient needing to be off of work for surgery.

## 2023-02-12 ENCOUNTER — HEALTH MAINTENANCE LETTER (OUTPATIENT)
Age: 48
End: 2023-02-12

## 2023-02-24 ENCOUNTER — PATIENT OUTREACH (OUTPATIENT)
Dept: CARE COORDINATION | Facility: CLINIC | Age: 48
End: 2023-02-24
Payer: COMMERCIAL

## 2023-02-27 ENCOUNTER — ANESTHESIA EVENT (OUTPATIENT)
Dept: SURGERY | Facility: CLINIC | Age: 48
End: 2023-02-27
Payer: COMMERCIAL

## 2023-02-27 ENCOUNTER — PRE VISIT (OUTPATIENT)
Dept: SURGERY | Facility: CLINIC | Age: 48
End: 2023-02-27

## 2023-02-27 NOTE — TELEPHONE ENCOUNTER
FUTURE VISIT INFORMATION        SURGERY INFORMATION:    Date: 3/8/23    Location:  or    Surgeon:  Matheus Haas MD    Anesthesia Type:  general    Procedure: Robot-assisted right thoracoscopic lower lobectomy, mediastinal lymph node dissection    Consult: virtual visit 23     RECORDS REQUESTED FROM:         Primary Care Provider: Walker Ramirez MD     Pertinent Medical History: hypertension     Most recent EKG+ Tracin22- [facility]     Most recent PFT's: 22

## 2023-02-28 NOTE — PROGRESS NOTES
Social Work Note: Telephone Call  Oncology Clinic     Data/Intervention:  Patient Name:  West Van  /Age:  1975 (47 year old)           Reason for Call:  [employer] [place] reservation needed    Intervention/Assessment:  Per Patient's request,  completed and securely emailed [employer] request for lodging dates 3/7/23-3/14/23.     SW also discussed supportive resources available to patient and per patient's request completed an application for [employer] grants ( and Deanna). SW informed patient that [employer] would be in communication with patient regarding approval and next steps with [employer]. Patient was appreciative of supportive resources. No further concerns were expressed at this time. SW provided phone for [employer] and contact information for patient to reach SW for ongoing supportive needs.       Plan:  [employer] will contact Patient for confirmation of reservation. [employer] will contact patient regarding approval of grants.  will continue to provide support as needed.    Chanell COPELAND, [facility]  - Oncology  Phone : [phone]  Pager: [phone]

## 2023-03-02 LAB
ABO/RH(D): NORMAL
ANTIBODY SCREEN: NEGATIVE
SPECIMEN EXPIRATION DATE: NORMAL

## 2023-03-03 ENCOUNTER — PRE VISIT (OUTPATIENT)
Dept: SURGERY | Facility: CLINIC | Age: 48
End: 2023-03-03

## 2023-03-03 ENCOUNTER — LAB (OUTPATIENT)
Dept: LAB | Facility: CLINIC | Age: 48
End: 2023-03-03
Payer: COMMERCIAL

## 2023-03-03 ENCOUNTER — OFFICE VISIT (OUTPATIENT)
Dept: SURGERY | Facility: CLINIC | Age: 48
End: 2023-03-03
Payer: COMMERCIAL

## 2023-03-03 VITALS
HEART RATE: 70 BPM | DIASTOLIC BLOOD PRESSURE: 84 MMHG | SYSTOLIC BLOOD PRESSURE: 130 MMHG | HEIGHT: 74 IN | WEIGHT: 224.4 LBS | BODY MASS INDEX: 28.8 KG/M2 | OXYGEN SATURATION: 97 % | RESPIRATION RATE: 16 BRPM | TEMPERATURE: 97.6 F

## 2023-03-03 DIAGNOSIS — Z01.818 PRE-OP EVALUATION: Primary | ICD-10-CM

## 2023-03-03 DIAGNOSIS — Z01.818 PRE-OP EVALUATION: ICD-10-CM

## 2023-03-03 DIAGNOSIS — C7A.8 PRIMARY MALIGNANT NEUROENDOCRINE TUMOR OF LUNG (H): ICD-10-CM

## 2023-03-03 LAB
ALBUMIN SERPL BCG-MCNC: 4 G/DL (ref 3.5–5.2)
ALP SERPL-CCNC: 105 U/L (ref 40–129)
ALT SERPL W P-5'-P-CCNC: 38 U/L (ref 10–50)
ANION GAP SERPL CALCULATED.3IONS-SCNC: 9 MMOL/L (ref 7–15)
AST SERPL W P-5'-P-CCNC: 27 U/L (ref 10–50)
BILIRUB SERPL-MCNC: 0.9 MG/DL
BUN SERPL-MCNC: 10.3 MG/DL (ref 6–20)
CALCIUM SERPL-MCNC: 9.6 MG/DL (ref 8.6–10)
CHLORIDE SERPL-SCNC: 104 MMOL/L (ref 98–107)
CREAT SERPL-MCNC: 0.67 MG/DL (ref 0.67–1.17)
DEPRECATED HCO3 PLAS-SCNC: 26 MMOL/L (ref 22–29)
ERYTHROCYTE [DISTWIDTH] IN BLOOD BY AUTOMATED COUNT: 12.6 % (ref 10–15)
GFR SERPL CREATININE-BSD FRML MDRD: >90 ML/MIN/1.73M2
GLUCOSE SERPL-MCNC: 216 MG/DL (ref 70–99)
HCT VFR BLD AUTO: 44.2 % (ref 40–53)
HGB BLD-MCNC: 15.1 G/DL (ref 13.3–17.7)
MCH RBC QN AUTO: 29.4 PG (ref 26.5–33)
MCHC RBC AUTO-ENTMCNC: 34.2 G/DL (ref 31.5–36.5)
MCV RBC AUTO: 86 FL (ref 78–100)
PLATELET # BLD AUTO: 141 10E3/UL (ref 150–450)
POTASSIUM SERPL-SCNC: 3.5 MMOL/L (ref 3.4–5.3)
PROT SERPL-MCNC: 6.6 G/DL (ref 6.4–8.3)
RBC # BLD AUTO: 5.13 10E6/UL (ref 4.4–5.9)
SARS-COV-2 RNA RESP QL NAA+PROBE: NEGATIVE
SODIUM SERPL-SCNC: 139 MMOL/L (ref 136–145)
WBC # BLD AUTO: 6.4 10E3/UL (ref 4–11)

## 2023-03-03 PROCEDURE — 99215 OFFICE O/P EST HI 40 MIN: CPT | Mod: 24 | Performed by: NURSE PRACTITIONER

## 2023-03-03 PROCEDURE — 80053 COMPREHEN METABOLIC PANEL: CPT | Performed by: PATHOLOGY

## 2023-03-03 PROCEDURE — 36415 COLL VENOUS BLD VENIPUNCTURE: CPT | Performed by: PATHOLOGY

## 2023-03-03 PROCEDURE — 86850 RBC ANTIBODY SCREEN: CPT | Performed by: PATHOLOGY

## 2023-03-03 PROCEDURE — U0003 INFECTIOUS AGENT DETECTION BY NUCLEIC ACID (DNA OR RNA); SEVERE ACUTE RESPIRATORY SYNDROME CORONAVIRUS 2 (SARS-COV-2) (CORONAVIRUS DISEASE [COVID-19]), AMPLIFIED PROBE TECHNIQUE, MAKING USE OF HIGH THROUGHPUT TECHNOLOGIES AS DESCRIBED BY CMS-2020-01-R: HCPCS | Performed by: PATHOLOGY

## 2023-03-03 PROCEDURE — 86901 BLOOD TYPING SEROLOGIC RH(D): CPT | Performed by: PATHOLOGY

## 2023-03-03 PROCEDURE — U0005 INFEC AGEN DETEC AMPLI PROBE: HCPCS | Performed by: PATHOLOGY

## 2023-03-03 PROCEDURE — 85027 COMPLETE CBC AUTOMATED: CPT | Performed by: PATHOLOGY

## 2023-03-03 PROCEDURE — 99000 SPECIMEN HANDLING OFFICE-LAB: CPT | Performed by: PATHOLOGY

## 2023-03-03 PROCEDURE — 86900 BLOOD TYPING SEROLOGIC ABO: CPT | Performed by: PATHOLOGY

## 2023-03-03 ASSESSMENT — PAIN SCALES - GENERAL: PAINLEVEL: NO PAIN (0)

## 2023-03-03 ASSESSMENT — ENCOUNTER SYMPTOMS: ORTHOPNEA: 0

## 2023-03-03 ASSESSMENT — LIFESTYLE VARIABLES: TOBACCO_USE: 0

## 2023-03-03 NOTE — OR NURSING
Was asked by Lisseth HERRERA to go over medications with West Van for the upcoming surgery at [facility] on 3/8/23. Verbally given medication instructions and written instructions. Given Sc[employer]care soap; West has no questions at this time.

## 2023-03-03 NOTE — H&P
Pre-Operative H & P     CC:  Preoperative exam to assess for increased cardiopulmonary risk while undergoing surgery and anesthesia.    Date of Encounter: 3/3/2023  Primary Care Physician:  Walker Ramirez     Reason for visit: Pre-operative evaluation    HPI  West Van is a 47 year old male who presents for pre-operative H & P in preparation for  Procedure Information     Case: [id number] Date/Time: 03/08/23 1235    Procedure: Robot-assisted right thoracoscopic lower lobectomy, mediastinal lymph node dissection (Right: Chest)    Anesthesia type: General    Diagnosis: Carcinoid tumor of right lung [D3A.090]    Pre-op diagnosis: Carcinoid tumor of right lung [D3A.090]    Location:  OR [address] OR    Providers: Matheus Haas MD           West Van is a 47-year-old male with a PMH significant for Hepatocellular carcinoma s/p ablation of liver tumor (12/6/2022) HTN, DM2,  Calculus of gallbladder s/p cholecystectomy 12/6/2022, anxiety, and sciatica. In September 2022, he had cold symptoms and was found to have a lung nodule on chest x-ray. CT showed RIGHT lower lobe 2 x 1.3 cm pulmonary Nodule, also abutting a new 1 x 0.5 nodule, without mediastinal adenopathy. Pathology (1/17/2023) confirmed low grade neuroendocrine carcinoid tumor. He met with Dr. Haas and presents today in preparation for the above recommended procedure.        History is obtained from the patient and chart review    Hx of abnormal bleeding or anti-platelet use: no      Past Medical History  Past Medical History:   Diagnosis Date     Benign essential HTN 04/13/2016     Diabetes (H)      Liver cancer (H) 10/04/2022     PONV (postoperative nausea and vomiting)        Past Surgical History  Past Surgical History:   Procedure Laterality Date     BRONCHOSCOPY, WITH BIOPSY, ROBOT ASSISTED N/A 1/17/2023    Procedure: BRONCHOSCOPY, USING OPTICAL TRACKING SYSTEM, ion;  Surgeon: Ani Guillaume MD;  Location: [facility] OR     ENDOBRONCHIAL ULTRASOUND  FLEXIBLE N/A 1/17/2023    Procedure: endobronchial  ultrasound and transbronchial biopsies;  Surgeon: Ani Guillaume MD;  Location: [facility] OR     LAPAROSCOPIC ABLATION LIVER TUMOR N/A 12/6/2022    Procedure: Diagnostic Laparoscopy, Hepatic Ultrasound, Laparoscopic ultrasound guided microwave ablation of liver tumor x1;  Surgeon: Armando Munguia MD;  Location: [facility] OR     LAPAROSCOPIC BIOPSY LIVER N/A 12/6/2022    Procedure: Laparoscopic Ultrasound Guided liver biopsy;  Surgeon: Armando Munguia MD;  Location: [facility] OR     LAPAROSCOPIC CHOLECYSTECTOMY N/A 12/6/2022    Procedure: Laparoscopic cholecystectomy;  Surgeon: Armando Munguia MD;  Location: [facility] OR       Prior to Admission Medications  Current Outpatient Medications   Medication Sig Dispense Refill     acetaminophen (TYLENOL) 500 MG tablet Take 500-1,000 mg by mouth every 8 hours as needed for mild pain       Ashwagandha 125 MG CAPS Take 1 capsule by mouth       atenolol (TENORMIN) 25 MG tablet Take 25 mg by mouth every morning       bisacodyl (DULCOLAX) 10 MG suppository Place 10 mg rectally daily as needed       cholecalciferol (VITAMIN D3) 125 mcg (5000 units) capsule Take 125 mcg by mouth every morning       liraglutide (VICTOZA) 18 MG/3ML solution Inject 1.8 mg Subcutaneous every evening       lisinopril-hydrochlorothiazide (ZESTORETIC) 10-12.5 MG tablet Take 1 tablet by mouth every morning       magnesium 250 MG tablet Take 1 tablet by mouth every morning       metFORMIN (GLUCOPHAGE XR) 500 MG 24 hr tablet Take 500 mg by mouth every morning       Milk Thistle-Dand-Fennel-Licor (MILK THISTLE XTRA) CAPS capsule Take by mouth every morning       polyethylene glycol (MIRALAX) 17 GM/Dose powder Take 17 g (1 capful) by mouth daily Take while taking narcotics to prevent constipation. (Patient taking differently: Take 1 capful. by mouth daily as needed Take while taking narcotics to prevent constipation.) 510 g 1     senna-docusate (SENOKOT-S/PERICOLACE) 8.6-50  MG tablet Take 2 tablets by mouth 2 times daily as needed       TURMERIC PO Take by mouth every morning       ULTICARE MINI 31G X 6 MM insulin pen needle Inject 1 each Subcutaneous At Bedtime         Allergies  No Known Allergies    Social History  Social History     Socioeconomic History     Marital status: Single     Spouse name: Not on file     Number of children: Not on file     Years of education: Not on file     Highest education level: Not on file   Occupational History     Not on file   Tobacco Use     Smoking status: Never     Smokeless tobacco: Never   Substance and Sexual Activity     Alcohol use: Not Currently     Comment: Last ETOH 1999     Drug use: Never     Sexual activity: Not on file   Other Topics Concern     Not on file   Social History Narrative     Not on file     Social Determinants of Health     Financial Resource Strain: Not on file   Food Insecurity: Not on file   Transportation Needs: Not on file   Physical Activity: Not on file   Stress: Not on file   Social Connections: Not on file   Intimate Partner Violence: Not on file   Housing Stability: Not on file       Family History  No family history on file.    Review of Systems  The complete review of systems is negative other than noted in the HPI or here.   Anesthesia Evaluation   Pt has had prior anesthetic. Type: MAC and General.    History of anesthetic complications  - PONV.  urinary retention s/p liver ablation 12/2022.    ROS/MED HX  ENT/Pulmonary:     (+) RADHA risk factors, hypertension, daytime somnolence,  (-) tobacco use   Neurologic:  - neg neurologic ROS     Cardiovascular:     (+) hypertension-----Previous cardiac testing   Echo: Date: Results:    Stress Test: Date: Results:    ECG Reviewed: Date: 9/2022 Results:  Sinus rhythm with occasional Premature ventricular complexes   Cath: Date: Results:   (-) taking anticoagulants/antiplatelets, CONTI, orthopnea/PND and irregular heartbeat/palpitations   METS/Exercise Tolerance: >4 METS   "  Hematologic:  - neg hematologic  ROS     Musculoskeletal: Comment: sciatica      GI/Hepatic: Comment: Hepatocellular Carcinoma s/p ablation of liver tumor (12/6/2022)  Gallbladder Calculus s/p colectomy 12/6/2022    (+) cholecystitis/cholelithiasis, liver disease,     Renal/Genitourinary:  - neg Renal ROS     Endo:     (+) type II DM, Last HgA1c: 7.1, date: 9/2022, Not using insulin, - not using insulin pump. Normal glucose range: 140-160 , not previously admitted for DM/DKA.     Psychiatric/Substance Use:     (+) psychiatric history anxiety  (-) alcohol abuse history   Infectious Disease:  - neg infectious disease ROS     Malignancy:   (+) Malignancy, History of Other and Lung.  Lung CA Active status post.  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    Other:            /84 (BP Location: Right arm, Patient Position: Sitting, Cuff Size: Adult Regular)   Pulse 70   Temp 97.6  F (36.4  C) (Oral)   Resp 16   Ht 1.88 m (6' 2\")   Wt 101.8 kg (224 lb 6.4 oz)   SpO2 97%   BMI 28.81 kg/m      Physical Exam   Constitutional: Awake, alert, cooperative, no apparent distress, and appears stated age.  Eyes: Pupils equal, round and reactive to light, extra ocular muscles intact, sclera clear, conjunctiva normal.  HENT: Normocephalic, oral pharynx with moist mucus membranes, good dentition. No goiter appreciated.   Respiratory: Clear to auscultation bilaterally, no crackles or wheezing.  Cardiovascular: Regular rate and rhythm, normal S1 and S2, and no murmur noted.  Carotids +2, no bruits. No edema. Palpable pulses to radial  DP and PT arteries.   GI: Normal bowel sounds, soft, non-distended, non-tender, no masses palpated, no hepatosplenomegaly.    Lymph/Hematologic: No cervical lymphadenopathy and no supraclavicular lymphadenopathy.  Genitourinary:  deferred  Skin: Warm and dry.  No rashes at anticipated surgical site.   Musculoskeletal: Full ROM of neck. There is no redness, warmth, or " swelling of the joints. Gross motor strength is normal.    Neurologic: Awake, alert, oriented to name, place and time. Cranial nerves II-XII are grossly intact. Gait is normal.   Neuropsychiatric: Calm, cooperative. Normal affect.     Prior Labs/Diagnostic Studies   All labs and imaging personally reviewed     EKG/ stress test - if available please see in ROS above   No results found.  PFT Latest Ref Rng & Units 12/19/2022   FVC L 4.13   FEV1 L 3.45   FVC% % 77   FEV1% % 81         The patient's records and results personally reviewed by this provider.     Outside records reviewed from: Care Everywhere    LAB/DIAGNOSTIC STUDIES TODAY:      Type & Screen  Last Comprehensive Metabolic Panel:  Lab Results   Component Value Date     03/03/2023    POTASSIUM 3.5 03/03/2023    CHLORIDE 104 03/03/2023    CO2 26 03/03/2023    ANIONGAP 9 03/03/2023     (H) 03/03/2023    BUN 10.3 03/03/2023    CR 0.67 03/03/2023    GFRESTIMATED >90 03/03/2023    CHUCK 9.6 03/03/2023       Lab Results   Component Value Date    AST 27 03/03/2023     Lab Results   Component Value Date    ALT 38 03/03/2023     No results found for: BILICONJ   Lab Results   Component Value Date    BILITOTAL 0.9 03/03/2023     Lab Results   Component Value Date    ALBUMIN 4.0 03/03/2023     Lab Results   Component Value Date    PROTTOTAL 6.6 03/03/2023      Lab Results   Component Value Date    ALKPHOS 105 03/03/2023         Lab Results   Component Value Date    WBC 6.4 03/03/2023     Lab Results   Component Value Date    RBC 5.13 03/03/2023     Lab Results   Component Value Date    HGB 15.1 03/03/2023     Lab Results   Component Value Date    HCT 44.2 03/03/2023     No components found for: MCT  Lab Results   Component Value Date    MCV 86 03/03/2023     Lab Results   Component Value Date    MCH 29.4 03/03/2023     Lab Results   Component Value Date    MCHC 34.2 03/03/2023     Lab Results   Component Value Date    RDW 12.6 03/03/2023     Lab Results  "  Component Value Date     03/03/2023         Assessment      West Van is a 47 year old male seen as a PAC referral for risk assessment and optimization for anesthesia.    Plan/Recommendations  Pt will be optimized for the proposed procedure.  See below for details on the assessment, risk, and preoperative recommendations    NEUROLOGY  - No history of TIA, CVA or seizure  -Post Op delirium risk factors:  No risk identified    ENT  - No current airway concerns.  Will need to be reassessed day of surgery.  Mallampati: I  TM: > 3    CARDIAC  - No history of CAD and Afib   No anginal symptoms, Denies palpitations, PND, dizziness or syncope.   HTN managed on atenolol and  Lisinopril /hydrochlorothiazide    - METS (Metabolic Equivalents)  Patient performs 4 or more METS exercise without symptoms            Total Score: 0      RCRI-Very low risk: Class 1 0.4% complication rate            Total Score: 0        PULMONARY    Pulmonary nodules-  Carcinoid tumor of right lung- Procedure scheduled as above.     RADHA Medium Risk            Total Score: 3    RADHA: Often tired    RADHA: Hypertension    RADHA: Male      - Denies asthma or inhaler use  - Tobacco History      History   Smoking Status     Never   Smokeless Tobacco     Never       GI  Hx of hepatocellular carcinoma s/p ablation of liver tumor (12/6/2022)  Calculus of gallbladder s/p cholecystectomy 12/6/2022    PONV High Risk  Total Score: 3           1 AN PONV: Patient is not a current smoker    1 AN PONV: Patient has history of PONV    1 AN PONV: Intended Post Op Opioids        /RENAL  - Baseline Creatinine  WNL    ENDOCRINE    - BMI: Estimated body mass index is 28.81 kg/m  as calculated from the following:    Height as of this encounter: 1.88 m (6' 2\").    Weight as of this encounter: 101.8 kg (224 lb 6.4 oz).  Overweight (BMI 25.0-29.9)  - Diabetes- most recent A1c 7.1 ( 9/2022) patient notices increase in BS at home since diagnosed. 140-160 " range  Diabetes Mellitus, Type 2, non-insulin dependent.  Hold morning oral hypoglycemic medications. Recommend close monitoring of the patient's blood glucose levels throughout the perioperative period.    HEME  VTE Medium Risk 1.8%            Total Score: 7    VTE: Male    VTE: Current cancer      - No history of abnormal bleeding or antiplatelet use.      MSK    Hx of sciatica     Patient is NOT Frail            Total Score: 0          PSYCH  - hx of anxiety        Different anesthesia methods/types have been discussed with the patient, but they are aware that the final plan will be decided by the assigned anesthesia provider on the date of service.      The patient is optimized for their procedure. AVS with information on surgery time/arrival time, meds and NPO status given by nursing staff. No further diagnostic testing indicated.      On the day of service:     Prep time: 15 minutes  Visit time: 20 minutes  Documentation time: 10 minutes  ------------------------------------------  Total time: 45 minutes      VALENTINA Arrington CNP  Preoperative Assessment Center  [facility]  Phone: [phone]  Fax: [phone]

## 2023-03-03 NOTE — PATIENT INSTRUCTIONS
Name:  West Van   MRN:  [id number]   :  1975   Today's Date:  3/3/2023         You were seen today for a pre-operative assessment in the:    Pre-operative Anesthesia Assessment Center(PAC)  [facility]  [address] [place] [id number]  phone [phone]      You will be receiving a call with location, date, arrival time and diet instructions from Preadmission Nursing at your surgical site:    -[facility]: [phone]   -[facility]: [phone]  -[facility]: [phone]  -[facility]: [phone]  -[facility]: [phone]        Anesthesia recommendations for medications:    Hold Aspirin for 7 days before procedure.  Hold Multivitamins for 7 days before procedure.   Hold Herbal medications and Supplements for 7 days before procedure. Hold Ashwagandha, Milk Thistle-Dand-Fennel-Licor, and Turmeric starting now if you have not already, and hold until surgery.  Hold Ibuprofen for 1 day before procedure.   Hold Naproxen for 4 days before procedure.         Please DO NOT take the following medications the day of procedure:  Lisinopril-Hydrochlorothiazide (Zestoretic)  Metformin  Magnesium  Senna-Docusate (Senokot)  Miralax  Vitamin D3      Please take these medications the day of procedure:  Atenolol (Tenormin)  Acetaminophen (Tylenol) if needed      For questions or appointments, call:    For further questions regarding your surgery please call your surgeon's office.

## 2023-03-06 DIAGNOSIS — Z01.818 PRE-OP EVALUATION: Primary | ICD-10-CM

## 2023-03-06 RX ORDER — METFORMIN HCL 500 MG
500 TABLET, EXTENDED RELEASE 24 HR ORAL EVERY MORNING
Status: ON HOLD | COMMUNITY
End: 2024-07-05

## 2023-03-06 NOTE — H&P (VIEW-ONLY)
THORACIC SURGERY - FOLLOW UP PATIENT OFFICE VISIT      Dear Dr. Ramirez,    I saw West Van in follow-up for the evaluation and treatment of a nodule of the RIGHT lower lobe.    HPI  West Van is a 47 year old male patient who presented last month with a right lower lobe pulmonary carcinoid tumor. In September, 2022, he had cold symptoms and was found to have a lung nodule on chest x-ray. His weight has been stable. He underwent ablation of a hepatocellular carcinoma on 12/6/2023. Since out visit last month he feels well. He underwent Dotatate Scan on 2/7/2023. Otherwise, No changes.    ECOG performance status  0- Fully active, without restriction                 Previsit Tests   PFTs (12/19/2022): FEV1 81 % predicted, 3.45 L, DLCO 102 %  Pathology (1/17/2023): Right lower lobe FNA: Low grade neuroendocrine tumor - carcinoid  CT scan (1/17/2023): RIGHT lower lobe 2 x 1.3 cm pulmonary Nodule, also abutting a new 1 x 0.5 nodule, without mediastinal adenopathy, with no pleural effusion        PET scan (10/13/2022): FDG avid 1.7 x 1.2 cm RIGHT lower lobe Nodule (Max SUV 1.5). No suspicious hypermetabolic activity elsewhere    Dotatate Scan (2/7/2023): 1.8cm dotatate positive right lower lobe nodule, adjacent 4mm nodule too small to identify on PET    Covid vaccination status: Vaccinated    PMH  Reviewed, as below    Past Medical History:   Diagnosis Date     Benign essential HTN 04/13/2016     Diabetes (H)      Liver cancer (H) 10/04/2022     PONV (postoperative nausea and vomiting)         PSH  Reviewed, as below    Past Surgical History:   Procedure Laterality Date     BRONCHOSCOPY, WITH BIOPSY, ROBOT ASSISTED N/A 1/17/2023    Procedure: BRONCHOSCOPY, USING OPTICAL TRACKING SYSTEM, ion;  Surgeon: Ani Guillaume MD;  Location: [facility] OR     ENDOBRONCHIAL ULTRASOUND FLEXIBLE N/A 1/17/2023    Procedure: endobronchial  ultrasound and transbronchial biopsies;  Surgeon: Ani Guillaume MD;  Location: [facility] OR      "LAPAROSCOPIC ABLATION LIVER TUMOR N/A 12/6/2022    Procedure: Diagnostic Laparoscopy, Hepatic Ultrasound, Laparoscopic ultrasound guided microwave ablation of liver tumor x1;  Surgeon: Armando Munguia MD;  Location: [facility] OR     LAPAROSCOPIC BIOPSY LIVER N/A 12/6/2022    Procedure: Laparoscopic Ultrasound Guided liver biopsy;  Surgeon: Armando Munguia MD;  Location: [facility]     LAPAROSCOPIC CHOLECYSTECTOMY N/A 12/6/2022    Procedure: Laparoscopic cholecystectomy;  Surgeon: Armando Munguia MD;  Location: [facility] OR        No Known Allergies    Current Outpatient Medications   Medication     Ashwagandha 125 MG CAPS     atenolol (TENORMIN) 25 MG tablet     liraglutide (VICTOZA) 18 MG/3ML solution     lisinopril-hydrochlorothiazide (ZESTORETIC) 10-12.5 MG tablet     magnesium 250 MG tablet     metFORMIN (GLUCOPHAGE XR) 500 MG 24 hr tablet     Milk Thistle-Dand-Fennel-Licor (MILK THISTLE XTRA) CAPS capsule     TURMERIC PO     ULTICARE MINI 31G X 6 MM insulin pen needle     Vitamin D3 (CHOLECALCIFEROL) 125 MCG (5000 UT) tablet     No current facility-administered medications for this visit.       ETOH: None (last 1999)  TOBACCO: Never smoker    Physical examination  Height: 6'2\"  Weight: 217 pounds  Physical Exam  Constitutional:       Appearance: Normal appearance. He is normal weight.   Eyes:      Conjunctiva/sclera: Conjunctivae normal.   Pulmonary:      Effort: Pulmonary effort is normal.   Neurological:      Mental Status: He is alert and oriented to person, place, and time.   Psychiatric:         Mood and Affect: Mood normal.         Behavior: Behavior normal.         Thought Content: Thought content normal.         Judgment: Judgment normal.          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     IMPRESSION   47 year old male patient with RIGHT lower lobe nodule, consistent with carcinoid tumor. He is a good surgical candidate.    Stage: Clinical stage T3N0 if both " nodules are carcinoid    PLAN  I spent 15 min on the date of the encounter in chart review, patient visit, review of tests, documentation and/or discussion with other providers about the issues documented above. I reviewed the plan as follows:  Procedure planned: Robot-assisted right lower lobectomy, mediastinal lymph node dissection. Scheduled for 3/8/2023  I previously discussed the risks and benefits of the operation, including obtaining a diagnosis, resecting and staging the cancer. The risks include bleeding, infection, arrhythmia requiring medication or anticoagulation, prolonged air leak, chylothorax, deep venous thrombosis and pulmonary embolism, and death.  There is also a risk of prolonged pain, which could require further treatment.  Prolonged air leak could be treated with bronchoscopy and endobronchial valve insertion  or going home with a chest tube.  Postoperative bleeding (rare) could require a return to the OR.   Surgery is currently scheduled for tomorrow 3/8/2023  Necessary Preop Tests & Appointments: None  Regional Anesthesia Plan: Intraoperative intercostal nerve block and Intraoperative intercostal cryoablation  Anticoagulation Plan: Prophylactic Lovenox    I appreciate the opportunity to participate in the care of your patient and will keep you updated.  Sincerely,    Matheus Haas MD

## 2023-03-07 ENCOUNTER — ONCOLOGY VISIT (OUTPATIENT)
Dept: SURGERY | Facility: CLINIC | Age: 48
End: 2023-03-07
Attending: THORACIC SURGERY (CARDIOTHORACIC VASCULAR SURGERY)
Payer: COMMERCIAL

## 2023-03-07 ENCOUNTER — LAB (OUTPATIENT)
Dept: LAB | Facility: CLINIC | Age: 48
End: 2023-03-07
Payer: COMMERCIAL

## 2023-03-07 VITALS
SYSTOLIC BLOOD PRESSURE: 137 MMHG | TEMPERATURE: 98.2 F | DIASTOLIC BLOOD PRESSURE: 82 MMHG | WEIGHT: 223.4 LBS | BODY MASS INDEX: 28.67 KG/M2 | HEART RATE: 90 BPM | RESPIRATION RATE: 18 BRPM | OXYGEN SATURATION: 99 % | HEIGHT: 74 IN

## 2023-03-07 DIAGNOSIS — C7A.8 PRIMARY MALIGNANT NEUROENDOCRINE TUMOR OF LUNG (H): Primary | ICD-10-CM

## 2023-03-07 DIAGNOSIS — Z01.818 PRE-OP EVALUATION: ICD-10-CM

## 2023-03-07 LAB — SARS-COV-2 RNA RESP QL NAA+PROBE: NEGATIVE

## 2023-03-07 PROCEDURE — 99000 SPECIMEN HANDLING OFFICE-LAB: CPT | Performed by: PATHOLOGY

## 2023-03-07 PROCEDURE — 99212 OFFICE O/P EST SF 10 MIN: CPT | Mod: 57 | Performed by: THORACIC SURGERY (CARDIOTHORACIC VASCULAR SURGERY)

## 2023-03-07 PROCEDURE — U0003 INFECTIOUS AGENT DETECTION BY NUCLEIC ACID (DNA OR RNA); SEVERE ACUTE RESPIRATORY SYNDROME CORONAVIRUS 2 (SARS-COV-2) (CORONAVIRUS DISEASE [COVID-19]), AMPLIFIED PROBE TECHNIQUE, MAKING USE OF HIGH THROUGHPUT TECHNOLOGIES AS DESCRIBED BY CMS-2020-01-R: HCPCS | Performed by: PATHOLOGY

## 2023-03-07 PROCEDURE — 99212 OFFICE O/P EST SF 10 MIN: CPT | Performed by: THORACIC SURGERY (CARDIOTHORACIC VASCULAR SURGERY)

## 2023-03-07 PROCEDURE — U0005 INFEC AGEN DETEC AMPLI PROBE: HCPCS | Performed by: PATHOLOGY

## 2023-03-07 RX ORDER — POLYETHYLENE GLYCOL 3350 17 G/17G
17 POWDER, FOR SOLUTION ORAL PRN
Status: ON HOLD | COMMUNITY
Start: 2022-12-06 | End: 2024-06-28

## 2023-03-07 ASSESSMENT — PAIN SCALES - GENERAL: PAINLEVEL: NO PAIN (0)

## 2023-03-07 NOTE — ANESTHESIA PREPROCEDURE EVALUATION
Anesthesia Pre-Procedure Evaluation    Patient: West Van   MRN: [id number] : 1975        Procedure : Procedure(s):  Robot-assisted right thoracoscopic lower lobectomy, mediastinal lymph node dissection          Past Medical History:   Diagnosis Date     Benign essential HTN 2016     Diabetes (H)      Liver cancer (H) 10/04/2022     PONV (postoperative nausea and vomiting)       Past Surgical History:   Procedure Laterality Date     BRONCHOSCOPY, WITH BIOPSY, ROBOT ASSISTED N/A 2023    Procedure: BRONCHOSCOPY, USING OPTICAL TRACKING SYSTEM, ion;  Surgeon: Ani Guillaume MD;  Location: [facility] OR     ENDOBRONCHIAL ULTRASOUND FLEXIBLE N/A 2023    Procedure: endobronchial  ultrasound and transbronchial biopsies;  Surgeon: Ani Guillaume MD;  Location: [facility]     LAPAROSCOPIC ABLATION LIVER TUMOR N/A 2022    Procedure: Diagnostic Laparoscopy, Hepatic Ultrasound, Laparoscopic ultrasound guided microwave ablation of liver tumor x1;  Surgeon: Armando Munguia MD;  Location: [facility]     LAPAROSCOPIC BIOPSY LIVER N/A 2022    Procedure: Laparoscopic Ultrasound Guided liver biopsy;  Surgeon: Armando Munguia MD;  Location: [facility]     LAPAROSCOPIC CHOLECYSTECTOMY N/A 2022    Procedure: Laparoscopic cholecystectomy;  Surgeon: Armando Munguia MD;  Location: [facility]      No Known Allergies   Social History     Tobacco Use     Smoking status: Never     Smokeless tobacco: Never   Substance Use Topics     Alcohol use: Not Currently     Comment: Last ETOH       Wt Readings from Last 1 Encounters:   23 101.8 kg (224 lb 6.4 oz)        Anesthesia Evaluation   Pt has had prior anesthetic. Type: General.    History of anesthetic complications  - PONV.      ROS/MED HX  ENT/Pulmonary:       Neurologic:       Cardiovascular:     (+) hypertension-----    METS/Exercise Tolerance:     Hematologic:  - neg hematologic  ROS     Musculoskeletal:  - neg musculoskeletal ROS     GI/Hepatic:  Comment: H/o hepatocellular CA s/p liver ablation 12/16/22    (+) liver disease,     Renal/Genitourinary:       Endo:     (+) type II DM, Not using insulin, Obesity,     Psychiatric/Substance Use:     (+) psychiatric history anxiety     Infectious Disease:       Malignancy:       Other:            Physical Exam    Airway        Mallampati: II   TM distance: > 3 FB   Neck ROM: full   Mouth opening: > 3 cm    Respiratory Devices and Support         Dental       (+) Minor Abnormalities - some fillings, tiny chips      Cardiovascular   cardiovascular exam normal          Pulmonary   pulmonary exam normal                OUTSIDE LABS:  CBC:   Lab Results   Component Value Date    WBC 6.4 03/03/2023    WBC 9.4 11/14/2022    HGB 15.1 03/03/2023    HGB 16.4 11/14/2022    HCT 44.2 03/03/2023    HCT 47.6 11/14/2022     (L) 03/03/2023     11/14/2022     BMP:   Lab Results   Component Value Date     03/03/2023     11/14/2022    POTASSIUM 3.5 03/03/2023    POTASSIUM 3.6 11/14/2022    CHLORIDE 104 03/03/2023    CHLORIDE 103 11/14/2022    CO2 26 03/03/2023    CO2 27 11/14/2022    BUN 10.3 03/03/2023    BUN 10.0 11/14/2022    CR 0.67 03/03/2023    CR 0.7 02/07/2023     (H) 03/03/2023     (H) 01/17/2023     COAGS:   Lab Results   Component Value Date    INR 1.18 (H) 11/14/2022     POC: No results found for: BGM, HCG, HCGS  HEPATIC:   Lab Results   Component Value Date    ALBUMIN 4.0 03/03/2023    PROTTOTAL 6.6 03/03/2023    ALT 38 03/03/2023    AST 27 03/03/2023    ALKPHOS 105 03/03/2023    BILITOTAL 0.9 03/03/2023     OTHER:   Lab Results   Component Value Date    CHUCK 9.6 03/03/2023       Anesthesia Plan    ASA Status:  3   NPO Status:  NPO Appropriate    Anesthesia Type: General.     - Airway: ETT   Induction: Intravenous, Propofol.   Maintenance: Balanced.   Techniques and Equipment:     - Airway: Double lumen ETT, Fiberoptic Bronchoscope     - Lines/Monitors: 2nd IV, Arterial Line      Consents    Anesthesia Plan(s) and associated risks, benefits, and realistic alternatives discussed. Questions answered and patient/representative(s) expressed understanding.    - Discussed:     - Discussed with:  Patient         Postoperative Care    Pain management: IV analgesics.   PONV prophylaxis: Ondansetron (or other 5HT-3), Dexamethasone or Solumedrol, Background Propofol Infusion     Comments:                Ang Frey, DO, DO

## 2023-03-07 NOTE — LETTER
3/7/2023         RE: West Van  [address]  [facility] [address]        Dear Colleague,    Thank you for referring your patient, West Van, to the [facility]. Please see a copy of my visit note below.      THORACIC SURGERY - FOLLOW UP PATIENT OFFICE VISIT      Dear Dr. Ramirez,    I saw West Van in follow-up for the evaluation and treatment of a nodule of the RIGHT lower lobe.    HPI  West Van is a 47 year old male patient who presented last month with a right lower lobe pulmonary carcinoid tumor. In September, 2022, he had cold symptoms and was found to have a lung nodule on chest x-ray. His weight has been stable. He underwent ablation of a hepatocellular carcinoma on 12/6/2023. Since out visit last month he feels well. He underwent Dotatate Scan on 2/7/2023. Otherwise, No changes.    ECOG performance status  0- Fully active, without restriction                 Previsit Tests   PFTs (12/19/2022): FEV1 81 % predicted, 3.45 L, DLCO 102 %  Pathology (1/17/2023): Right lower lobe FNA: Low grade neuroendocrine tumor - carcinoid  CT scan (1/17/2023): RIGHT lower lobe 2 x 1.3 cm pulmonary Nodule, also abutting a new 1 x 0.5 nodule, without mediastinal adenopathy, with no pleural effusion        PET scan (10/13/2022): FDG avid 1.7 x 1.2 cm RIGHT lower lobe Nodule (Max SUV 1.5). No suspicious hypermetabolic activity elsewhere    Dotatate Scan (2/7/2023): 1.8cm dotatate positive right lower lobe nodule, adjacent 4mm nodule too small to identify on PET    Covid vaccination status: Vaccinated    PMH  Reviewed, as below    Past Medical History:   Diagnosis Date     Benign essential HTN 04/13/2016     Diabetes (H)      Liver cancer (H) 10/04/2022     PONV (postoperative nausea and vomiting)         PSH  Reviewed, as below    Past Surgical History:   Procedure Laterality Date     BRONCHOSCOPY, WITH BIOPSY, ROBOT ASSISTED N/A 1/17/2023    Procedure: BRONCHOSCOPY, USING OPTICAL  "TRACKING SYSTEM, ion;  Surgeon: Ani Guillaume MD;  Location: [facility] OR     ENDOBRONCHIAL ULTRASOUND FLEXIBLE N/A 1/17/2023    Procedure: endobronchial  ultrasound and transbronchial biopsies;  Surgeon: Ani Guillaume MD;  Location: [facility] OR     LAPAROSCOPIC ABLATION LIVER TUMOR N/A 12/6/2022    Procedure: Diagnostic Laparoscopy, Hepatic Ultrasound, Laparoscopic ultrasound guided microwave ablation of liver tumor x1;  Surgeon: Armando Munguia MD;  Location: [facility] OR     LAPAROSCOPIC BIOPSY LIVER N/A 12/6/2022    Procedure: Laparoscopic Ultrasound Guided liver biopsy;  Surgeon: Armando Munguia MD;  Location: [facility] OR     LAPAROSCOPIC CHOLECYSTECTOMY N/A 12/6/2022    Procedure: Laparoscopic cholecystectomy;  Surgeon: Armando Munguia MD;  Location: [facility] OR        No Known Allergies    Current Outpatient Medications   Medication     Ashwagandha 125 MG CAPS     atenolol (TENORMIN) 25 MG tablet     liraglutide (VICTOZA) 18 MG/3ML solution     lisinopril-hydrochlorothiazide (ZESTORETIC) 10-12.5 MG tablet     magnesium 250 MG tablet     metFORMIN (GLUCOPHAGE XR) 500 MG 24 hr tablet     Milk Thistle-Dand-Fennel-Licor (MILK THISTLE XTRA) CAPS capsule     TURMERIC PO     ULTICARE MINI 31G X 6 MM insulin pen needle     Vitamin D3 (CHOLECALCIFEROL) 125 MCG (5000 UT) tablet     No current facility-administered medications for this visit.       ETOH: None (last 1999)  TOBACCO: Never smoker    Physical examination  Height: 6'2\"  Weight: 217 pounds  Physical Exam  Constitutional:       Appearance: Normal appearance. He is normal weight.   Eyes:      Conjunctiva/sclera: Conjunctivae normal.   Pulmonary:      Effort: Pulmonary effort is normal.   Neurological:      Mental Status: He is alert and oriented to person, place, and time.   Psychiatric:         Mood and Affect: Mood normal.         Behavior: Behavior normal.         Thought Content: Thought content normal.         Judgment: Judgment normal.          From a personal perspective, " he does computer/IT help desk. He lives alone with his dog.     IMPRESSION   47 year old male patient with RIGHT lower lobe nodule, consistent with carcinoid tumor. He is a good surgical candidate.    Stage: Clinical stage T3N0 if both nodules are carcinoid    PLAN  I spent 15 min on the date of the encounter in chart review, patient visit, review of tests, documentation and/or discussion with other providers about the issues documented above. I reviewed the plan as follows:  Procedure planned: Robot-assisted right lower lobectomy, mediastinal lymph node dissection. Scheduled for 3/8/2023  I previously discussed the risks and benefits of the operation, including obtaining a diagnosis, resecting and staging the cancer. The risks include bleeding, infection, arrhythmia requiring medication or anticoagulation, prolonged air leak, chylothorax, deep venous thrombosis and pulmonary embolism, and death.  There is also a risk of prolonged pain, which could require further treatment.  Prolonged air leak could be treated with bronchoscopy and endobronchial valve insertion  or going home with a chest tube.  Postoperative bleeding (rare) could require a return to the OR.   Surgery is currently scheduled for tomorrow 3/8/2023  Necessary Preop Tests & Appointments: None  Regional Anesthesia Plan: Intraoperative intercostal nerve block and Intraoperative intercostal cryoablation  Anticoagulation Plan: Prophylactic Lovenox    I appreciate the opportunity to participate in the care of your patient and will keep you updated.  Sincerely,  Matheus Haas MD

## 2023-03-07 NOTE — NURSING NOTE
"Oncology Rooming Note    March 7, 2023 4:54 PM   West Van is a 47 year old male who presents for:    Chief Complaint   Patient presents with     Oncology Clinic Visit     RETURN - MALIGNANT NEUROENDOCRINE TUMOR OF LUNG     Initial Vitals: /82 (BP Location: Right arm, Patient Position: Sitting, Cuff Size: Adult Regular)   Pulse 90   Temp 98.2  F (36.8  C) (Oral)   Resp 18   Ht 1.88 m (6' 2.02\")   Wt 101.3 kg (223 lb 6.4 oz)   SpO2 99%   BMI 28.67 kg/m   Estimated body mass index is 28.67 kg/m  as calculated from the following:    Height as of this encounter: 1.88 m (6' 2.02\").    Weight as of this encounter: 101.3 kg (223 lb 6.4 oz). Body surface area is 2.3 meters squared.  No Pain (0) Comment: Data Unavailable   No LMP for male patient.  Allergies reviewed: Yes  Medications reviewed: Yes    Medications: Medication refills not needed today.  Pharmacy name entered into [facility]: [id number] - [address]    Clinical concerns: No new clinical concerns other than reason for visit today.        Miroslava Blakely [facility]            "

## 2023-03-07 NOTE — PROGRESS NOTES
PTA medications updated by Medication Scribe prior to surgery via phone call with patient (last doses completed by Nurse)     Medication history sources: Patient, Surescripts and H&P  In the past week, patient estimated taking medication this percent of the time: Greater than 90%  Adherence assessment: N/A Not Observed    Significant changes made to the medication list:  None      Additional medication history information:   None    Medication reconciliation completed by provider prior to medication history? No    Time spent in this activity: 35 MINUTES    The information provided in this note is only as accurate as the sources available at the time of update(s)      Prior to Admission medications    Medication Sig Last Dose Taking? Auth Provider Long Term End Date   Ashwagandha 125 MG CAPS Take 1 capsule by mouth every morning  at AM Yes Reported, Patient     atenolol (TENORMIN) 25 MG tablet Take 25 mg by mouth every morning  at AM Yes Reported, Patient Yes    liraglutide (VICTOZA) 18 MG/3ML solution Inject 1.8 mg Subcutaneous every evening  at PM Yes Reported, Patient Yes    lisinopril-hydrochlorothiazide (ZESTORETIC) 10-12.5 MG tablet Take 1 tablet by mouth every morning  at AM Yes Reported, Patient Yes    magnesium 250 MG tablet Take 1 tablet by mouth every morning  at AM Yes Reported, Patient     metFORMIN (GLUCOPHAGE XR) 500 MG 24 hr tablet Take 500 mg by mouth every morning  at AM Yes Reported, Patient No    Milk Thistle-Dand-Fennel-Licor (MILK THISTLE XTRA) CAPS capsule Take 1 capsule by mouth every morning  at AM Yes Reported, Patient     TURMERIC PO Take 1 capsule by mouth every morning  at AM Yes Reported, Patient     Vitamin D3 (CHOLECALCIFEROL) 125 MCG (5000 UT) tablet Take 125 mcg by mouth every morning  at AM Yes Reported, Patient     ULTICARE MINI 31G X 6 MM insulin pen needle Inject 1 each Subcutaneous At Bedtime   Reported, Patient

## 2023-03-08 ENCOUNTER — DOCUMENTATION ONLY (OUTPATIENT)
Dept: SURGERY | Facility: CLINIC | Age: 48
End: 2023-03-08

## 2023-03-08 ENCOUNTER — HOSPITAL ENCOUNTER (INPATIENT)
Facility: CLINIC | Age: 48
LOS: 2 days | Discharge: HOME | End: 2023-03-10
Attending: THORACIC SURGERY (CARDIOTHORACIC VASCULAR SURGERY) | Admitting: THORACIC SURGERY (CARDIOTHORACIC VASCULAR SURGERY)
Payer: COMMERCIAL

## 2023-03-08 ENCOUNTER — APPOINTMENT (OUTPATIENT)
Dept: GENERAL RADIOLOGY | Facility: CLINIC | Age: 48
End: 2023-03-08
Attending: THORACIC SURGERY (CARDIOTHORACIC VASCULAR SURGERY)
Payer: COMMERCIAL

## 2023-03-08 ENCOUNTER — ANESTHESIA (OUTPATIENT)
Dept: SURGERY | Facility: CLINIC | Age: 48
End: 2023-03-08
Payer: COMMERCIAL

## 2023-03-08 DIAGNOSIS — D3A.090 CARCINOID TUMOR OF LUNG, UNSPECIFIED WHETHER MALIGNANT (H): Primary | ICD-10-CM

## 2023-03-08 LAB
ABO/RH(D): NORMAL
ANTIBODY SCREEN: NEGATIVE
BLD PROD TYP BPU: NORMAL
BLD PROD TYP BPU: NORMAL
BLOOD COMPONENT TYPE: NORMAL
BLOOD COMPONENT TYPE: NORMAL
CODING SYSTEM: NORMAL
CODING SYSTEM: NORMAL
CREAT SERPL-MCNC: 0.68 MG/DL (ref 0.67–1.17)
CROSSMATCH: NORMAL
CROSSMATCH: NORMAL
GFR SERPL CREATININE-BSD FRML MDRD: >90 ML/MIN/1.73M2
GLUCOSE BLDC GLUCOMTR-MCNC: 179 MG/DL (ref 70–99)
GLUCOSE BLDC GLUCOMTR-MCNC: 193 MG/DL (ref 70–99)
GLUCOSE BLDC GLUCOMTR-MCNC: 256 MG/DL (ref 70–99)
GLUCOSE BLDC GLUCOMTR-MCNC: 276 MG/DL (ref 70–99)
GLUCOSE SERPL-MCNC: 178 MG/DL (ref 70–99)
POTASSIUM SERPL-SCNC: 3.5 MMOL/L (ref 3.4–5.3)
SPECIMEN EXPIRATION DATE: NORMAL
UNIT ABO/RH: NORMAL
UNIT ABO/RH: NORMAL
UNIT NUMBER: NORMAL
UNIT NUMBER: NORMAL
UNIT STATUS: NORMAL
UNIT STATUS: NORMAL
UNIT TYPE ISBT: 6200
UNIT TYPE ISBT: 6200

## 2023-03-08 PROCEDURE — C2618 PROBE/NEEDLE, CRYO: HCPCS | Performed by: THORACIC SURGERY (CARDIOTHORACIC VASCULAR SURGERY)

## 2023-03-08 PROCEDURE — 07T74ZZ RESECTION OF THORAX LYMPHATIC, PERCUTANEOUS ENDOSCOPIC APPROACH: ICD-10-PCS | Performed by: THORACIC SURGERY (CARDIOTHORACIC VASCULAR SURGERY)

## 2023-03-08 PROCEDURE — 36415 COLL VENOUS BLD VENIPUNCTURE: CPT | Performed by: ANESTHESIOLOGY

## 2023-03-08 PROCEDURE — 250N000012 HC RX MED GY IP 250 OP 636 PS 637: Performed by: THORACIC SURGERY (CARDIOTHORACIC VASCULAR SURGERY)

## 2023-03-08 PROCEDURE — 250N000011 HC RX IP 250 OP 636: Performed by: ANESTHESIOLOGY

## 2023-03-08 PROCEDURE — 82947 ASSAY GLUCOSE BLOOD QUANT: CPT | Performed by: ANESTHESIOLOGY

## 2023-03-08 PROCEDURE — 250N000009 HC RX 250: Performed by: THORACIC SURGERY (CARDIOTHORACIC VASCULAR SURGERY)

## 2023-03-08 PROCEDURE — 84132 ASSAY OF SERUM POTASSIUM: CPT | Performed by: ANESTHESIOLOGY

## 2023-03-08 PROCEDURE — 32674 THORACOSCOPY LYMPH NODE EXC: CPT | Mod: GC | Performed by: THORACIC SURGERY (CARDIOTHORACIC VASCULAR SURGERY)

## 2023-03-08 PROCEDURE — 86923 COMPATIBILITY TEST ELECTRIC: CPT

## 2023-03-08 PROCEDURE — 258N000003 HC RX IP 258 OP 636: Performed by: ANESTHESIOLOGY

## 2023-03-08 PROCEDURE — 272N000001 HC OR GENERAL SUPPLY STERILE: Performed by: THORACIC SURGERY (CARDIOTHORACIC VASCULAR SURGERY)

## 2023-03-08 PROCEDURE — 88341 IMHCHEM/IMCYTCHM EA ADD ANTB: CPT | Mod: 26 | Performed by: PATHOLOGY

## 2023-03-08 PROCEDURE — 250N000025 HC SEVOFLURANE, PER MIN: Performed by: THORACIC SURGERY (CARDIOTHORACIC VASCULAR SURGERY)

## 2023-03-08 PROCEDURE — 250N000011 HC RX IP 250 OP 636: Performed by: THORACIC SURGERY (CARDIOTHORACIC VASCULAR SURGERY)

## 2023-03-08 PROCEDURE — 710N000009 HC RECOVERY PHASE 1, LEVEL 1, PER MIN: Performed by: THORACIC SURGERY (CARDIOTHORACIC VASCULAR SURGERY)

## 2023-03-08 PROCEDURE — 250N000009 HC RX 250: Performed by: ANESTHESIOLOGY

## 2023-03-08 PROCEDURE — 120N000001 HC R&B MED SURG/OB

## 2023-03-08 PROCEDURE — 999N000141 HC STATISTIC PRE-PROCEDURE NURSING ASSESSMENT: Performed by: THORACIC SURGERY (CARDIOTHORACIC VASCULAR SURGERY)

## 2023-03-08 PROCEDURE — 88305 TISSUE EXAM BY PATHOLOGIST: CPT | Mod: TC | Performed by: THORACIC SURGERY (CARDIOTHORACIC VASCULAR SURGERY)

## 2023-03-08 PROCEDURE — 0BTF4ZZ RESECTION OF RIGHT LOWER LUNG LOBE, PERCUTANEOUS ENDOSCOPIC APPROACH: ICD-10-PCS | Performed by: THORACIC SURGERY (CARDIOTHORACIC VASCULAR SURGERY)

## 2023-03-08 PROCEDURE — 999N000063 XR CHEST PORT 1 VIEW

## 2023-03-08 PROCEDURE — 360N000080 HC SURGERY LEVEL 7, PER MIN: Performed by: THORACIC SURGERY (CARDIOTHORACIC VASCULAR SURGERY)

## 2023-03-08 PROCEDURE — 370N000017 HC ANESTHESIA TECHNICAL FEE, PER MIN: Performed by: THORACIC SURGERY (CARDIOTHORACIC VASCULAR SURGERY)

## 2023-03-08 PROCEDURE — 8E0W4CZ ROBOTIC ASSISTED PROCEDURE OF TRUNK REGION, PERCUTANEOUS ENDOSCOPIC APPROACH: ICD-10-PCS | Performed by: THORACIC SURGERY (CARDIOTHORACIC VASCULAR SURGERY)

## 2023-03-08 PROCEDURE — 258N000003 HC RX IP 258 OP 636: Performed by: THORACIC SURGERY (CARDIOTHORACIC VASCULAR SURGERY)

## 2023-03-08 PROCEDURE — 88309 TISSUE EXAM BY PATHOLOGIST: CPT | Mod: 26 | Performed by: PATHOLOGY

## 2023-03-08 PROCEDURE — 88342 IMHCHEM/IMCYTCHM 1ST ANTB: CPT | Mod: 26 | Performed by: PATHOLOGY

## 2023-03-08 PROCEDURE — 86850 RBC ANTIBODY SCREEN: CPT | Performed by: CLINICAL NURSE SPECIALIST

## 2023-03-08 PROCEDURE — 32663 THORACOSCOPY W/LOBECTOMY: CPT | Mod: RT | Performed by: THORACIC SURGERY (CARDIOTHORACIC VASCULAR SURGERY)

## 2023-03-08 PROCEDURE — 88360 TUMOR IMMUNOHISTOCHEM/MANUAL: CPT | Mod: 26 | Performed by: PATHOLOGY

## 2023-03-08 PROCEDURE — 250N000013 HC RX MED GY IP 250 OP 250 PS 637: Performed by: THORACIC SURGERY (CARDIOTHORACIC VASCULAR SURGERY)

## 2023-03-08 PROCEDURE — 82565 ASSAY OF CREATININE: CPT | Performed by: THORACIC SURGERY (CARDIOTHORACIC VASCULAR SURGERY)

## 2023-03-08 PROCEDURE — 88305 TISSUE EXAM BY PATHOLOGIST: CPT | Mod: 26 | Performed by: PATHOLOGY

## 2023-03-08 RX ORDER — AMOXICILLIN 250 MG
1 CAPSULE ORAL 2 TIMES DAILY
Status: DISCONTINUED | OUTPATIENT
Start: 2023-03-08 | End: 2023-03-10 | Stop reason: HOSPADM

## 2023-03-08 RX ORDER — ENOXAPARIN SODIUM 100 MG/ML
40 INJECTION SUBCUTANEOUS
Status: COMPLETED | OUTPATIENT
Start: 2023-03-08 | End: 2023-03-08

## 2023-03-08 RX ORDER — OXYCODONE HYDROCHLORIDE 5 MG/1
5 TABLET ORAL EVERY 4 HOURS PRN
Status: DISCONTINUED | OUTPATIENT
Start: 2023-03-08 | End: 2023-03-10 | Stop reason: HOSPADM

## 2023-03-08 RX ORDER — BISACODYL 10 MG
10 SUPPOSITORY, RECTAL RECTAL DAILY PRN
Status: DISCONTINUED | OUTPATIENT
Start: 2023-03-08 | End: 2023-03-10 | Stop reason: HOSPADM

## 2023-03-08 RX ORDER — DEXAMETHASONE SODIUM PHOSPHATE 4 MG/ML
INJECTION, SOLUTION INTRA-ARTICULAR; INTRALESIONAL; INTRAMUSCULAR; INTRAVENOUS; SOFT TISSUE PRN
Status: DISCONTINUED | OUTPATIENT
Start: 2023-03-08 | End: 2023-03-08

## 2023-03-08 RX ORDER — SODIUM CHLORIDE, SODIUM LACTATE, POTASSIUM CHLORIDE, CALCIUM CHLORIDE 600; 310; 30; 20 MG/100ML; MG/100ML; MG/100ML; MG/100ML
INJECTION, SOLUTION INTRAVENOUS CONTINUOUS
Status: DISCONTINUED | OUTPATIENT
Start: 2023-03-08 | End: 2023-03-08 | Stop reason: HOSPADM

## 2023-03-08 RX ORDER — HYDROMORPHONE HCL IN WATER/PF 6 MG/30 ML
0.2 PATIENT CONTROLLED ANALGESIA SYRINGE INTRAVENOUS
Status: DISCONTINUED | OUTPATIENT
Start: 2023-03-08 | End: 2023-03-10 | Stop reason: HOSPADM

## 2023-03-08 RX ORDER — OXYCODONE HYDROCHLORIDE 5 MG/1
10 TABLET ORAL EVERY 4 HOURS PRN
Status: DISCONTINUED | OUTPATIENT
Start: 2023-03-08 | End: 2023-03-10 | Stop reason: HOSPADM

## 2023-03-08 RX ORDER — HYDROMORPHONE HCL IN WATER/PF 6 MG/30 ML
0.4 PATIENT CONTROLLED ANALGESIA SYRINGE INTRAVENOUS
Status: DISCONTINUED | OUTPATIENT
Start: 2023-03-08 | End: 2023-03-10 | Stop reason: HOSPADM

## 2023-03-08 RX ORDER — FAMOTIDINE 20 MG/1
20 TABLET, FILM COATED ORAL 2 TIMES DAILY
Status: DISCONTINUED | OUTPATIENT
Start: 2023-03-08 | End: 2023-03-10 | Stop reason: HOSPADM

## 2023-03-08 RX ORDER — PROPOFOL 10 MG/ML
INJECTION, EMULSION INTRAVENOUS PRN
Status: DISCONTINUED | OUTPATIENT
Start: 2023-03-08 | End: 2023-03-08

## 2023-03-08 RX ORDER — NALOXONE HYDROCHLORIDE 0.4 MG/ML
0.4 INJECTION, SOLUTION INTRAMUSCULAR; INTRAVENOUS; SUBCUTANEOUS
Status: DISCONTINUED | OUTPATIENT
Start: 2023-03-08 | End: 2023-03-10 | Stop reason: HOSPADM

## 2023-03-08 RX ORDER — HYDROMORPHONE HCL IN WATER/PF 6 MG/30 ML
0.4 PATIENT CONTROLLED ANALGESIA SYRINGE INTRAVENOUS EVERY 5 MIN PRN
Status: DISCONTINUED | OUTPATIENT
Start: 2023-03-08 | End: 2023-03-08 | Stop reason: HOSPADM

## 2023-03-08 RX ORDER — ONDANSETRON 2 MG/ML
INJECTION INTRAMUSCULAR; INTRAVENOUS PRN
Status: DISCONTINUED | OUTPATIENT
Start: 2023-03-08 | End: 2023-03-08

## 2023-03-08 RX ORDER — NICOTINE POLACRILEX 4 MG
15-30 LOZENGE BUCCAL
Status: DISCONTINUED | OUTPATIENT
Start: 2023-03-08 | End: 2023-03-10 | Stop reason: HOSPADM

## 2023-03-08 RX ORDER — ALBUTEROL SULFATE 90 UG/1
4 AEROSOL, METERED RESPIRATORY (INHALATION)
Status: DISCONTINUED | OUTPATIENT
Start: 2023-03-08 | End: 2023-03-10 | Stop reason: HOSPADM

## 2023-03-08 RX ORDER — DEXTROSE MONOHYDRATE 25 G/50ML
25-50 INJECTION, SOLUTION INTRAVENOUS
Status: DISCONTINUED | OUTPATIENT
Start: 2023-03-08 | End: 2023-03-10 | Stop reason: HOSPADM

## 2023-03-08 RX ORDER — PROPOFOL 10 MG/ML
INJECTION, EMULSION INTRAVENOUS CONTINUOUS PRN
Status: DISCONTINUED | OUTPATIENT
Start: 2023-03-08 | End: 2023-03-08

## 2023-03-08 RX ORDER — NALOXONE HYDROCHLORIDE 0.4 MG/ML
0.2 INJECTION, SOLUTION INTRAMUSCULAR; INTRAVENOUS; SUBCUTANEOUS
Status: DISCONTINUED | OUTPATIENT
Start: 2023-03-08 | End: 2023-03-10 | Stop reason: HOSPADM

## 2023-03-08 RX ORDER — DIPHENHYDRAMINE HCL 25 MG
25 CAPSULE ORAL EVERY 6 HOURS PRN
Status: DISCONTINUED | OUTPATIENT
Start: 2023-03-08 | End: 2023-03-10 | Stop reason: HOSPADM

## 2023-03-08 RX ORDER — ACETAMINOPHEN 325 MG/1
975 TABLET ORAL EVERY 8 HOURS SCHEDULED
Status: DISCONTINUED | OUTPATIENT
Start: 2023-03-08 | End: 2023-03-10 | Stop reason: HOSPADM

## 2023-03-08 RX ORDER — LIDOCAINE 40 MG/G
CREAM TOPICAL
Status: DISCONTINUED | OUTPATIENT
Start: 2023-03-08 | End: 2023-03-08 | Stop reason: HOSPADM

## 2023-03-08 RX ORDER — ACETAMINOPHEN 325 MG/1
650 TABLET ORAL EVERY 4 HOURS PRN
Status: DISCONTINUED | OUTPATIENT
Start: 2023-03-11 | End: 2023-03-10 | Stop reason: HOSPADM

## 2023-03-08 RX ORDER — HYDROMORPHONE HCL IN WATER/PF 6 MG/30 ML
0.2 PATIENT CONTROLLED ANALGESIA SYRINGE INTRAVENOUS EVERY 5 MIN PRN
Status: DISCONTINUED | OUTPATIENT
Start: 2023-03-08 | End: 2023-03-08 | Stop reason: HOSPADM

## 2023-03-08 RX ORDER — METOPROLOL TARTRATE 1 MG/ML
5 INJECTION, SOLUTION INTRAVENOUS EVERY 6 HOURS
Status: DISCONTINUED | OUTPATIENT
Start: 2023-03-09 | End: 2023-03-09

## 2023-03-08 RX ORDER — ONDANSETRON 4 MG/1
4 TABLET, ORALLY DISINTEGRATING ORAL EVERY 30 MIN PRN
Status: DISCONTINUED | OUTPATIENT
Start: 2023-03-08 | End: 2023-03-08 | Stop reason: HOSPADM

## 2023-03-08 RX ORDER — DEXAMETHASONE SODIUM PHOSPHATE 10 MG/ML
INJECTION INTRAMUSCULAR; INTRAVENOUS PRN
Status: DISCONTINUED | OUTPATIENT
Start: 2023-03-08 | End: 2023-03-08 | Stop reason: HOSPADM

## 2023-03-08 RX ORDER — SODIUM CHLORIDE 9 MG/ML
INJECTION, SOLUTION INTRAVENOUS CONTINUOUS PRN
Status: DISCONTINUED | OUTPATIENT
Start: 2023-03-08 | End: 2023-03-08

## 2023-03-08 RX ORDER — CEFAZOLIN SODIUM/WATER 2 G/20 ML
2 SYRINGE (ML) INTRAVENOUS SEE ADMIN INSTRUCTIONS
Status: DISCONTINUED | OUTPATIENT
Start: 2023-03-08 | End: 2023-03-08 | Stop reason: HOSPADM

## 2023-03-08 RX ORDER — FENTANYL CITRATE 0.05 MG/ML
25 INJECTION, SOLUTION INTRAMUSCULAR; INTRAVENOUS EVERY 5 MIN PRN
Status: DISCONTINUED | OUTPATIENT
Start: 2023-03-08 | End: 2023-03-08 | Stop reason: HOSPADM

## 2023-03-08 RX ORDER — DIPHENHYDRAMINE HYDROCHLORIDE 50 MG/ML
25 INJECTION INTRAMUSCULAR; INTRAVENOUS EVERY 6 HOURS PRN
Status: DISCONTINUED | OUTPATIENT
Start: 2023-03-08 | End: 2023-03-10 | Stop reason: HOSPADM

## 2023-03-08 RX ORDER — DEXMEDETOMIDINE HYDROCHLORIDE 4 UG/ML
INJECTION, SOLUTION INTRAVENOUS PRN
Status: DISCONTINUED | OUTPATIENT
Start: 2023-03-08 | End: 2023-03-08

## 2023-03-08 RX ORDER — ONDANSETRON 2 MG/ML
4 INJECTION INTRAMUSCULAR; INTRAVENOUS EVERY 30 MIN PRN
Status: DISCONTINUED | OUTPATIENT
Start: 2023-03-08 | End: 2023-03-08 | Stop reason: HOSPADM

## 2023-03-08 RX ORDER — LIDOCAINE HYDROCHLORIDE 20 MG/ML
INJECTION, SOLUTION INFILTRATION; PERINEURAL PRN
Status: DISCONTINUED | OUTPATIENT
Start: 2023-03-08 | End: 2023-03-08

## 2023-03-08 RX ORDER — CEFAZOLIN SODIUM/WATER 2 G/20 ML
2 SYRINGE (ML) INTRAVENOUS
Status: COMPLETED | OUTPATIENT
Start: 2023-03-08 | End: 2023-03-08

## 2023-03-08 RX ORDER — FENTANYL CITRATE 0.05 MG/ML
50 INJECTION, SOLUTION INTRAMUSCULAR; INTRAVENOUS EVERY 5 MIN PRN
Status: DISCONTINUED | OUTPATIENT
Start: 2023-03-08 | End: 2023-03-08 | Stop reason: HOSPADM

## 2023-03-08 RX ORDER — METHOCARBAMOL 750 MG/1
750 TABLET, FILM COATED ORAL EVERY 6 HOURS PRN
Status: DISCONTINUED | OUTPATIENT
Start: 2023-03-08 | End: 2023-03-10 | Stop reason: HOSPADM

## 2023-03-08 RX ORDER — ONDANSETRON 4 MG/1
4 TABLET, ORALLY DISINTEGRATING ORAL EVERY 6 HOURS PRN
Status: DISCONTINUED | OUTPATIENT
Start: 2023-03-08 | End: 2023-03-10 | Stop reason: HOSPADM

## 2023-03-08 RX ORDER — POLYETHYLENE GLYCOL 3350 17 G/17G
17 POWDER, FOR SOLUTION ORAL DAILY
Status: DISCONTINUED | OUTPATIENT
Start: 2023-03-09 | End: 2023-03-10 | Stop reason: HOSPADM

## 2023-03-08 RX ORDER — BUPIVACAINE HYDROCHLORIDE 2.5 MG/ML
INJECTION, SOLUTION INFILTRATION; PERINEURAL PRN
Status: DISCONTINUED | OUTPATIENT
Start: 2023-03-08 | End: 2023-03-08 | Stop reason: HOSPADM

## 2023-03-08 RX ORDER — DEXTROSE MONOHYDRATE, SODIUM CHLORIDE, AND POTASSIUM CHLORIDE 50; 1.49; 4.5 G/1000ML; G/1000ML; G/1000ML
INJECTION, SOLUTION INTRAVENOUS CONTINUOUS
Status: DISCONTINUED | OUTPATIENT
Start: 2023-03-08 | End: 2023-03-10 | Stop reason: HOSPADM

## 2023-03-08 RX ORDER — ONDANSETRON 2 MG/ML
4 INJECTION INTRAMUSCULAR; INTRAVENOUS EVERY 6 HOURS PRN
Status: DISCONTINUED | OUTPATIENT
Start: 2023-03-08 | End: 2023-03-10 | Stop reason: HOSPADM

## 2023-03-08 RX ORDER — HYDROMORPHONE HYDROCHLORIDE 1 MG/ML
INJECTION, SOLUTION INTRAMUSCULAR; INTRAVENOUS; SUBCUTANEOUS PRN
Status: DISCONTINUED | OUTPATIENT
Start: 2023-03-08 | End: 2023-03-08

## 2023-03-08 RX ORDER — FENTANYL CITRATE 50 UG/ML
INJECTION, SOLUTION INTRAMUSCULAR; INTRAVENOUS PRN
Status: DISCONTINUED | OUTPATIENT
Start: 2023-03-08 | End: 2023-03-08

## 2023-03-08 RX ORDER — ENOXAPARIN SODIUM 100 MG/ML
40 INJECTION SUBCUTANEOUS EVERY 24 HOURS
Status: DISCONTINUED | OUTPATIENT
Start: 2023-03-09 | End: 2023-03-10 | Stop reason: HOSPADM

## 2023-03-08 RX ORDER — GABAPENTIN 300 MG/1
300 CAPSULE ORAL 2 TIMES DAILY
Status: DISCONTINUED | OUTPATIENT
Start: 2023-03-08 | End: 2023-03-10 | Stop reason: HOSPADM

## 2023-03-08 RX ORDER — DEXMEDETOMIDINE HYDROCHLORIDE 4 UG/ML
INJECTION, SOLUTION INTRAVENOUS PRN
Status: DISCONTINUED | OUTPATIENT
Start: 2023-03-08 | End: 2023-03-08 | Stop reason: HOSPADM

## 2023-03-08 RX ORDER — PROCHLORPERAZINE MALEATE 10 MG
10 TABLET ORAL EVERY 6 HOURS PRN
Status: DISCONTINUED | OUTPATIENT
Start: 2023-03-08 | End: 2023-03-10 | Stop reason: HOSPADM

## 2023-03-08 RX ADMIN — SODIUM CHLORIDE, POTASSIUM CHLORIDE, SODIUM LACTATE AND CALCIUM CHLORIDE: 600; 310; 30; 20 INJECTION, SOLUTION INTRAVENOUS at 06:55

## 2023-03-08 RX ADMIN — PROPOFOL 20 MCG/KG/MIN: 10 INJECTION, EMULSION INTRAVENOUS at 08:28

## 2023-03-08 RX ADMIN — MIDAZOLAM 2 MG: 1 INJECTION INTRAMUSCULAR; INTRAVENOUS at 07:56

## 2023-03-08 RX ADMIN — GABAPENTIN 300 MG: 300 CAPSULE ORAL at 21:00

## 2023-03-08 RX ADMIN — DEXMEDETOMIDINE HYDROCHLORIDE 8 MCG: 200 INJECTION INTRAVENOUS at 09:49

## 2023-03-08 RX ADMIN — SUGAMMADEX 200 MG: 100 INJECTION, SOLUTION INTRAVENOUS at 11:46

## 2023-03-08 RX ADMIN — PHENYLEPHRINE HYDROCHLORIDE 50 MCG: 10 INJECTION INTRAVENOUS at 11:01

## 2023-03-08 RX ADMIN — PROPOFOL 20 MG: 10 INJECTION, EMULSION INTRAVENOUS at 08:35

## 2023-03-08 RX ADMIN — PHENYLEPHRINE HYDROCHLORIDE 0.4 MCG/KG/MIN: 10 INJECTION INTRAVENOUS at 08:28

## 2023-03-08 RX ADMIN — ACETAMINOPHEN 975 MG: 325 TABLET ORAL at 15:06

## 2023-03-08 RX ADMIN — PROPOFOL 40 MG: 10 INJECTION, EMULSION INTRAVENOUS at 08:04

## 2023-03-08 RX ADMIN — POTASSIUM CHLORIDE, DEXTROSE MONOHYDRATE AND SODIUM CHLORIDE: 150; 5; 450 INJECTION, SOLUTION INTRAVENOUS at 14:57

## 2023-03-08 RX ADMIN — OXYCODONE HYDROCHLORIDE 10 MG: 5 TABLET ORAL at 16:52

## 2023-03-08 RX ADMIN — PROPOFOL 200 MG: 10 INJECTION, EMULSION INTRAVENOUS at 07:59

## 2023-03-08 RX ADMIN — ACETAMINOPHEN 975 MG: 325 TABLET ORAL at 23:44

## 2023-03-08 RX ADMIN — Medication 2 G: at 07:54

## 2023-03-08 RX ADMIN — PHENYLEPHRINE HYDROCHLORIDE 50 MCG: 10 INJECTION INTRAVENOUS at 09:11

## 2023-03-08 RX ADMIN — SODIUM CHLORIDE: 9 INJECTION, SOLUTION INTRAVENOUS at 08:19

## 2023-03-08 RX ADMIN — DEXAMETHASONE SODIUM PHOSPHATE 8 MG: 4 INJECTION, SOLUTION INTRA-ARTICULAR; INTRALESIONAL; INTRAMUSCULAR; INTRAVENOUS; SOFT TISSUE at 08:33

## 2023-03-08 RX ADMIN — SENNOSIDES AND DOCUSATE SODIUM 1 TABLET: 50; 8.6 TABLET ORAL at 21:00

## 2023-03-08 RX ADMIN — ROCURONIUM BROMIDE 20 MG: 50 INJECTION, SOLUTION INTRAVENOUS at 08:31

## 2023-03-08 RX ADMIN — ENOXAPARIN SODIUM 40 MG: 40 INJECTION SUBCUTANEOUS at 07:10

## 2023-03-08 RX ADMIN — LIDOCAINE HYDROCHLORIDE 100 MG: 20 INJECTION, SOLUTION INFILTRATION; PERINEURAL at 07:59

## 2023-03-08 RX ADMIN — FENTANYL CITRATE 50 MCG: 50 INJECTION, SOLUTION INTRAMUSCULAR; INTRAVENOUS at 08:34

## 2023-03-08 RX ADMIN — OXYCODONE HYDROCHLORIDE 10 MG: 5 TABLET ORAL at 21:00

## 2023-03-08 RX ADMIN — ROCURONIUM BROMIDE 10 MG: 50 INJECTION, SOLUTION INTRAVENOUS at 08:55

## 2023-03-08 RX ADMIN — PHENYLEPHRINE HYDROCHLORIDE 100 MCG: 10 INJECTION INTRAVENOUS at 11:28

## 2023-03-08 RX ADMIN — MIDAZOLAM 2 MG: 1 INJECTION INTRAMUSCULAR; INTRAVENOUS at 07:15

## 2023-03-08 RX ADMIN — ROCURONIUM BROMIDE 50 MG: 50 INJECTION, SOLUTION INTRAVENOUS at 08:00

## 2023-03-08 RX ADMIN — PHENYLEPHRINE HYDROCHLORIDE 100 MCG: 10 INJECTION INTRAVENOUS at 11:33

## 2023-03-08 RX ADMIN — PHENYLEPHRINE HYDROCHLORIDE 100 MCG: 10 INJECTION INTRAVENOUS at 11:04

## 2023-03-08 RX ADMIN — FAMOTIDINE 20 MG: 20 TABLET ORAL at 21:00

## 2023-03-08 RX ADMIN — FENTANYL CITRATE 50 MCG: 50 INJECTION, SOLUTION INTRAMUSCULAR; INTRAVENOUS at 07:59

## 2023-03-08 RX ADMIN — ONDANSETRON 4 MG: 2 INJECTION INTRAMUSCULAR; INTRAVENOUS at 10:54

## 2023-03-08 RX ADMIN — PHENYLEPHRINE HYDROCHLORIDE 100 MCG: 10 INJECTION INTRAVENOUS at 11:08

## 2023-03-08 RX ADMIN — DEXMEDETOMIDINE HYDROCHLORIDE 4 MCG: 200 INJECTION INTRAVENOUS at 10:57

## 2023-03-08 RX ADMIN — ROCURONIUM BROMIDE 20 MG: 50 INJECTION, SOLUTION INTRAVENOUS at 10:00

## 2023-03-08 RX ADMIN — ROCURONIUM BROMIDE 20 MG: 50 INJECTION, SOLUTION INTRAVENOUS at 09:21

## 2023-03-08 RX ADMIN — INSULIN ASPART 3 UNITS: 100 INJECTION, SOLUTION INTRAVENOUS; SUBCUTANEOUS at 17:30

## 2023-03-08 RX ADMIN — FENTANYL CITRATE 50 MCG: 50 INJECTION, SOLUTION INTRAMUSCULAR; INTRAVENOUS at 13:36

## 2023-03-08 RX ADMIN — DEXMEDETOMIDINE HYDROCHLORIDE 8 MCG: 200 INJECTION INTRAVENOUS at 08:38

## 2023-03-08 RX ADMIN — ROCURONIUM BROMIDE 20 MG: 50 INJECTION, SOLUTION INTRAVENOUS at 10:27

## 2023-03-08 RX ADMIN — HYDROMORPHONE HYDROCHLORIDE 0.5 MG: 1 INJECTION, SOLUTION INTRAMUSCULAR; INTRAVENOUS; SUBCUTANEOUS at 08:40

## 2023-03-08 ASSESSMENT — ACTIVITIES OF DAILY LIVING (ADL)
ADLS_ACUITY_SCORE: 22
ADLS_ACUITY_SCORE: 28
ADLS_ACUITY_SCORE: 25
ADLS_ACUITY_SCORE: 22
ADLS_ACUITY_SCORE: 28

## 2023-03-08 NOTE — OP NOTE
Preoperative diagnosis:                         Lung carcinoid tumor  Postoperative diagnosis:                       Lung carcinoid tumor     Procedure:   1. Right robot-assisted thoracoscopic right lower lobectomy, mediastinal lymph node dissection     Anesthesia: General   Surgeon: Matheus Haas   Assistant:  Cr Preciado PA-C; Brenda Rodriguez MD  EBL: 10 mL     Complications: none immediate     Description of procedure  The patient was brought to the room and placed supine upon the table.  After confirming the patient's identity and verifying the consent, appropriate monitoring devices were placed, as well as SCD boots. General anesthesia was administered.  The patient was intubated with a double-lumen endotracheal tube.  Proper position was confirmed by fiberoptic bronchoscopy.  Intravenous antibiotic was administered within 1 hour prior to the incision. The patient was turned to the left lateral decubitus position and all pressure points were padded. The bed was flexed, and the patient was secured to the table and the right arm was placed on an airplane board.  The right chest was prepped with chlorhexidine and  draped in the standard surgical fashion.       An 8 mm incision was made in the seventh intercostal space in line with the anterior superior iliac spine.  This was carried down to the pleural cavity.  An 8 mm port was placed and an 8 mm, 30-degree thoracoscope was inserted into the pleural cavity.  There were no adhesions.  A 12 mm port was placed anteriorly and another 12 mm port placed posteriorly, approximately 7 cm apart.  A final 8 mm port was placed 7 mm more posterior from the posterior 12 mm port, approximately 8 cm anterior to the spinous processes.  Finally, a 15 mm assistant port was made in the low anterior chest.  The robot was docked without difficulty.  The inferior pulmonary ligament and posterior mediastinal pleura were divided and a level 9 lymph node station was removed.  The  level 7 packet was dissected free and removed, as was the right level 4 and right level 10 stations.  The anterior mediastinal pleura was divided and dissection continued in the fissure, at the confluence of the lobes. The inferior   pulmonary vein branches were divided with the robotic stapler with a vascular load. The lower lobe PA branches were located and the fissure was divided using bipolar and serial fires of the robotic stapler with blue loads.  The middle lobe and posterior ascending PA branches were identified. The lower lobe PA branches were divided with a vascular load of the stapler.  The lower lobe bronchus was dissected free and divided with a blue load after confirming appropriate placement with the bronchoscope. The lower lobe was brought out through the enlarged assistant port. Hemostasis was noted. A multiple level intercostal nerve block was administered and then we performed intercostal nerve cryoablation at 6 levels. A 28 Latvian chest tube was then placed going posteriorly to the apex  through the initial camera port site.  This was secured to the skin using 0 silk suture.  The lung was observed to inflate appropriately.  The utility incision was closed in layers, irrigating each layer prior to closure.  The smaller incisions were closed in layers. The areas were cleaned and dried and benzoin and steri-strips were applied. At  the end of the case, sponge, needle, and instrument counts were correct.     There were no qualified residents available to be at the bedside, so ABIGAIL Preciado acted as my assistant.

## 2023-03-08 NOTE — ANESTHESIA POSTPROCEDURE EVALUATION
Patient: West Van    Procedure: Procedure(s):  Robot-assisted right thoracoscopic lower lobectomy, mediastinal lymph node dissection, intercostal nerve cryoablation        Anesthesia Type:  General    Note:  Disposition: Admission   Postop Pain Control: Uneventful            Sign Out: Well controlled pain   PONV: No   Neuro/Psych: Uneventful            Sign Out: Acceptable/Baseline neuro status   Airway/Respiratory: Uneventful            Sign Out: Acceptable/Baseline resp. status   CV/Hemodynamics: Uneventful            Sign Out: Acceptable CV status; No obvious hypovolemia; No obvious fluid overload   Other NRE: NONE   DID A NON-ROUTINE EVENT OCCUR?            Last vitals:  Vitals Value Taken Time   /80 03/08/23 1350   Temp 36.1  C (97  F) 03/08/23 1350   Pulse 105 03/08/23 1358   Resp 21 03/08/23 1358   SpO2 99 % 03/08/23 1401   Vitals shown include unvalidated device data.    Electronically Signed By: Ang Frey DO, DO  March 8, 2023  2:51 PM

## 2023-03-08 NOTE — ANESTHESIA PROCEDURE NOTES
Arterial Line Procedure Note    Pre-Procedure   Staff -        Anesthesiologist:  Ang Frey DO       Performed By: anesthesiologist       Location: pre-op       Pre-Anesthestic Checklist: patient identified, IV checked, risks and benefits discussed, informed consent, monitors and equipment checked, pre-op evaluation and at physician/surgeon's request  Timeout:       Correct Patient: Yes        Correct Procedure: Yes        Correct Site: Yes        Correct Position: Yes   Line Placement:   This line was placed Pre Induction starting at 3/8/2023 7:01 AM and ending at 3/8/2023 7:03 AM  Procedure   Procedure: arterial line       Insertion Site: radial.  Sterile Prep        All elements of maximal sterile barrier technique followed       Patient Prep/Sterile Barriers: hand hygiene, sterile gloves, hat, mask, draped, sterile gown, draped       Skin prep: Chloraprep  Insertion/Injection        Technique: Seldinger Technique and ultrasound guided        1. Ultrasound was used to evaluate the access site.       2. Artery evaluated via ultrasound for patency/adequacy.       3. Using real-time ultrasound the needle/catheter was observed entering the artery/vein.       4. Permanent image was captured and entered into the patient's record.       5. The visualized structures were anatomically normal.       6. There were no apparent abnormal pathologic findings.       Catheter Type/Size: 20 gauge, 1.75 in/4.5 cm quick cath (integral wire)  Narrative        Tegaderm dressing used.       Arterial waveform: Yes        IBP within 10% of NIBP: Yes

## 2023-03-08 NOTE — INTERVAL H&P NOTE
"I have reviewed the surgical (or preoperative) H&P that is linked to this encounter, and examined the patient. There are no significant changes    Clinical Conditions Present on Arrival:  Clinically Significant Risk Factors Present on Admission                    # Overweight: Estimated body mass index is 28.37 kg/m  as calculated from the following:    Height as of this encounter: 1.88 m (6' 2\").    Weight as of this encounter: 100.2 kg (221 lb).       "

## 2023-03-08 NOTE — ANESTHESIA PROCEDURE NOTES
Airway       Patient location during procedure: OR       Procedure Start/Stop Times: 3/8/2023 8:03 AM  Staff -        CRNA: Sumi Garcia APRN CRNA       Other Anesthesia Staff: Sumi Santos       Performed By: SRNAIndications and Patient Condition       Indications for airway management: dong-procedural       Induction type:intravenous       Mask difficulty assessment: 1 - vent by mask    Final Airway Details       Final airway type: endotracheal airway       Successful airway: ETT - double lumen left  Endotracheal Airway Details        Cuffed: yes       Successful intubation technique: video laryngoscopy and flexible bronchoscopy       VL Blade Size: Glidescope 4       Grade View of Cords: 1       Adjucts: stylet       Position: Right       Measured from: gums/teeth       Secured at (cm): 27       ETT Double lumen (fr): 37    Post intubation assessment        Placement verified by: capnometry, equal breath sounds and chest rise        Number of attempts at approach: 1       Secured with: pink tape       Ease of procedure: easy       Dentition: Intact and Unchanged    Medication(s) Administered   Medication Administration Time: 3/8/2023 8:03 AM

## 2023-03-08 NOTE — PROGRESS NOTES
Reason for admission: Robot-assisted right thoracoscopic lower lobectomy, mediastinal lymph node dissection, intercostal nerve cryoablation   Dx: Carcinoid tumor of right lung   POD#: 0  Mental Status: x4  Activity: Due to get OOB  Diet: Regular, tolerating.  Pain: Controlled W/ PO meds  Urination: Has some acute retention after surgery. Voided around 250ml, PVR was 483. Refusing straigh cath for now and would like to try to urinate more later. Tele/Restraints/Iso: NA  LDA: CT in place fr28 posteriorly to the apex connected to a chest drainage system to -20mmhg suction..  Expected D/C Date: TBD  Other Info: Hx of Benign essential HTN, Liver cancer, Obesity, T2DM, and Calculus of gallbladder .

## 2023-03-08 NOTE — BRIEF OP NOTE
[facility]    Brief Operative Note    Pre-operative diagnosis: Carcinoid tumor of right lung [D3A.090]  Post-operative diagnosis Same as pre-operative diagnosis    Procedure: Procedure(s):  Robot-assisted right thoracoscopic lower lobectomy, mediastinal lymph node dissection  Surgeon: Surgeon(s) and Role:     * Matheus Haas MD - Primary     * Cr Preciado PA-C - Assisting  Anesthesia: General   Estimated Blood Loss: Less than 10 ml    Drains: 28 Fr chest tube  Specimens:   ID Type Source Tests Collected by Time Destination   1 : Level 9R Tissue Lymph Node(s) SURGICAL PATHOLOGY EXAM Matheus Haas MD 3/8/2023  9:02 AM    2 : Level 7 Tissue Lymph Node(s) SURGICAL PATHOLOGY EXAM Matheus Haas MD 3/8/2023  9:15 AM    3 : level 4R Tissue Lymph Node(s) SURGICAL PATHOLOGY EXAM Matheus Haas MD 3/8/2023  9:21 AM    4 : Level 10R Tissue Lymph Node(s) SURGICAL PATHOLOGY EXAM Matheus Haas MD 3/8/2023  9:26 AM    5 : Level 11R Tissue Lymph Node(s) SURGICAL PATHOLOGY EXAM Matheus Haas MD 3/8/2023  9:48 AM    6 : right lung lower lobe Tissue Lung, Lower Lobe, Right SURGICAL PATHOLOGY EXAM Matheus Haas MD 3/8/2023 10:56 AM      Findings:   None.  Complications: None.  Implants: * No implants in log *

## 2023-03-08 NOTE — PROGRESS NOTES
SPIRITUAL HEALTH SERVICES  [facility] Pre-Op  PRE-SURGERY VISIT    Had pre-surgery visit with West and his mother, [place].  Provided spiritual support, prayer.     Clair Posey  Associate   Phone: [phone]

## 2023-03-08 NOTE — ANESTHESIA CARE TRANSFER NOTE
Patient: West Van    Procedure: Procedure(s):  Robot-assisted right thoracoscopic lower lobectomy, mediastinal lymph node dissection, intercostal nerve cryoablation        Diagnosis: Carcinoid tumor of right lung [D3A.090]  Diagnosis Additional Information: No value filed.    Anesthesia Type:   General     Note:    Oropharynx: oropharynx clear of all foreign objects  Level of Consciousness: awake  Oxygen Supplementation: face mask    Independent Airway: airway patency satisfactory and stable  Dentition: dentition unchanged  Vital Signs Stable: post-procedure vital signs reviewed and stable  Report to RN Given: handoff report given  Patient transferred to: PACU    Handoff Report: Identifed the Patient, Identified the Reponsible Provider, Reviewed the pertinent medical history, Discussed the surgical course, Reviewed Intra-OP anesthesia mangement and issues during anesthesia, Set expectations for post-procedure period and Allowed opportunity for questions and acknowledgement of understanding      Vitals:  Vitals Value Taken Time   /62    Temp     Pulse 100    Resp 14    SpO2 99        Electronically Signed By: VALENTINA Patton CRNA  March 8, 2023  12:05 PM

## 2023-03-08 NOTE — OR NURSING
"PATIENT BLADDER SCAN  ML, UNABLE TO VOID ON HIS OWN. THIS WRITER ATTEMPTED TO STRAIGHT CATHETER X 3 TIMES. PATIENT STATED\" I'M TERRIFYING OF CATHETER, I WOULD RATHER TO VOID ON MY OWN\" PT REFUSED TO BE CATH. PATIENT'S MOTHER AT BEDSIDE.   "

## 2023-03-09 ENCOUNTER — APPOINTMENT (OUTPATIENT)
Dept: GENERAL RADIOLOGY | Facility: CLINIC | Age: 48
End: 2023-03-09
Attending: THORACIC SURGERY (CARDIOTHORACIC VASCULAR SURGERY)
Payer: COMMERCIAL

## 2023-03-09 ENCOUNTER — APPOINTMENT (OUTPATIENT)
Dept: PHYSICAL THERAPY | Facility: CLINIC | Age: 48
End: 2023-03-09
Attending: THORACIC SURGERY (CARDIOTHORACIC VASCULAR SURGERY)
Payer: COMMERCIAL

## 2023-03-09 LAB
ANION GAP SERPL CALCULATED.3IONS-SCNC: 7 MMOL/L (ref 7–15)
BUN SERPL-MCNC: 14.6 MG/DL (ref 6–20)
CALCIUM SERPL-MCNC: 9.2 MG/DL (ref 8.6–10)
CHLORIDE SERPL-SCNC: 104 MMOL/L (ref 98–107)
CREAT SERPL-MCNC: 0.73 MG/DL (ref 0.67–1.17)
DEPRECATED HCO3 PLAS-SCNC: 27 MMOL/L (ref 22–29)
ERYTHROCYTE [DISTWIDTH] IN BLOOD BY AUTOMATED COUNT: 12.9 % (ref 10–15)
GFR SERPL CREATININE-BSD FRML MDRD: >90 ML/MIN/1.73M2
GLUCOSE BLDC GLUCOMTR-MCNC: 253 MG/DL (ref 70–99)
GLUCOSE BLDC GLUCOMTR-MCNC: 255 MG/DL (ref 70–99)
GLUCOSE BLDC GLUCOMTR-MCNC: 257 MG/DL (ref 70–99)
GLUCOSE BLDC GLUCOMTR-MCNC: 274 MG/DL (ref 70–99)
GLUCOSE SERPL-MCNC: 227 MG/DL (ref 70–99)
HCT VFR BLD AUTO: 42.5 % (ref 40–53)
HGB BLD-MCNC: 14.5 G/DL (ref 13.3–17.7)
MCH RBC QN AUTO: 29.5 PG (ref 26.5–33)
MCHC RBC AUTO-ENTMCNC: 34.1 G/DL (ref 31.5–36.5)
MCV RBC AUTO: 87 FL (ref 78–100)
PLATELET # BLD AUTO: 155 10E3/UL (ref 150–450)
POTASSIUM SERPL-SCNC: 4.4 MMOL/L (ref 3.4–5.3)
RBC # BLD AUTO: 4.91 10E6/UL (ref 4.4–5.9)
SODIUM SERPL-SCNC: 138 MMOL/L (ref 136–145)
WBC # BLD AUTO: 14.9 10E3/UL (ref 4–11)

## 2023-03-09 PROCEDURE — 97116 GAIT TRAINING THERAPY: CPT | Mod: GP

## 2023-03-09 PROCEDURE — 71045 X-RAY EXAM CHEST 1 VIEW: CPT

## 2023-03-09 PROCEDURE — 999N000111 HC STATISTIC OT IP EVAL DEFER

## 2023-03-09 PROCEDURE — 250N000013 HC RX MED GY IP 250 OP 250 PS 637: Performed by: THORACIC SURGERY (CARDIOTHORACIC VASCULAR SURGERY)

## 2023-03-09 PROCEDURE — 120N000001 HC R&B MED SURG/OB

## 2023-03-09 PROCEDURE — 250N000011 HC RX IP 250 OP 636: Performed by: THORACIC SURGERY (CARDIOTHORACIC VASCULAR SURGERY)

## 2023-03-09 PROCEDURE — 97161 PT EVAL LOW COMPLEX 20 MIN: CPT | Mod: GP

## 2023-03-09 PROCEDURE — 250N000009 HC RX 250: Performed by: THORACIC SURGERY (CARDIOTHORACIC VASCULAR SURGERY)

## 2023-03-09 PROCEDURE — 80048 BASIC METABOLIC PNL TOTAL CA: CPT | Performed by: THORACIC SURGERY (CARDIOTHORACIC VASCULAR SURGERY)

## 2023-03-09 PROCEDURE — 36415 COLL VENOUS BLD VENIPUNCTURE: CPT | Performed by: THORACIC SURGERY (CARDIOTHORACIC VASCULAR SURGERY)

## 2023-03-09 PROCEDURE — 71045 X-RAY EXAM CHEST 1 VIEW: CPT | Mod: 77

## 2023-03-09 PROCEDURE — 85027 COMPLETE CBC AUTOMATED: CPT | Performed by: THORACIC SURGERY (CARDIOTHORACIC VASCULAR SURGERY)

## 2023-03-09 PROCEDURE — 258N000003 HC RX IP 258 OP 636: Performed by: THORACIC SURGERY (CARDIOTHORACIC VASCULAR SURGERY)

## 2023-03-09 RX ORDER — ATENOLOL 25 MG/1
25 TABLET ORAL DAILY
Status: DISCONTINUED | OUTPATIENT
Start: 2023-03-09 | End: 2023-03-10 | Stop reason: HOSPADM

## 2023-03-09 RX ORDER — METOPROLOL TARTRATE 1 MG/ML
5 INJECTION, SOLUTION INTRAVENOUS EVERY 4 HOURS PRN
Status: DISCONTINUED | OUTPATIENT
Start: 2023-03-09 | End: 2023-03-10 | Stop reason: HOSPADM

## 2023-03-09 RX ADMIN — OXYCODONE HYDROCHLORIDE 5 MG: 5 TABLET ORAL at 11:00

## 2023-03-09 RX ADMIN — GABAPENTIN 300 MG: 300 CAPSULE ORAL at 08:07

## 2023-03-09 RX ADMIN — ATENOLOL 25 MG: 25 TABLET ORAL at 11:00

## 2023-03-09 RX ADMIN — ACETAMINOPHEN 975 MG: 325 TABLET ORAL at 21:51

## 2023-03-09 RX ADMIN — METOPROLOL TARTRATE 5 MG: 5 INJECTION INTRAVENOUS at 11:00

## 2023-03-09 RX ADMIN — ENOXAPARIN SODIUM 40 MG: 40 INJECTION SUBCUTANEOUS at 08:06

## 2023-03-09 RX ADMIN — ACETAMINOPHEN 975 MG: 325 TABLET ORAL at 13:46

## 2023-03-09 RX ADMIN — OXYCODONE HYDROCHLORIDE 5 MG: 5 TABLET ORAL at 18:22

## 2023-03-09 RX ADMIN — FAMOTIDINE 20 MG: 20 TABLET ORAL at 21:51

## 2023-03-09 RX ADMIN — INSULIN ASPART 3 UNITS: 100 INJECTION, SOLUTION INTRAVENOUS; SUBCUTANEOUS at 18:27

## 2023-03-09 RX ADMIN — ACETAMINOPHEN 975 MG: 325 TABLET ORAL at 05:57

## 2023-03-09 RX ADMIN — METOPROLOL TARTRATE 5 MG: 5 INJECTION INTRAVENOUS at 08:11

## 2023-03-09 RX ADMIN — GABAPENTIN 300 MG: 300 CAPSULE ORAL at 21:51

## 2023-03-09 RX ADMIN — FAMOTIDINE 20 MG: 20 TABLET ORAL at 08:07

## 2023-03-09 RX ADMIN — INSULIN ASPART 3 UNITS: 100 INJECTION, SOLUTION INTRAVENOUS; SUBCUTANEOUS at 13:48

## 2023-03-09 RX ADMIN — POLYETHYLENE GLYCOL 3350 17 G: 17 POWDER, FOR SOLUTION ORAL at 08:07

## 2023-03-09 RX ADMIN — METHOCARBAMOL 750 MG: 750 TABLET ORAL at 18:22

## 2023-03-09 RX ADMIN — POTASSIUM CHLORIDE, DEXTROSE MONOHYDRATE AND SODIUM CHLORIDE: 150; 5; 450 INJECTION, SOLUTION INTRAVENOUS at 11:11

## 2023-03-09 RX ADMIN — INSULIN ASPART 3 UNITS: 100 INJECTION, SOLUTION INTRAVENOUS; SUBCUTANEOUS at 08:13

## 2023-03-09 RX ADMIN — SENNOSIDES AND DOCUSATE SODIUM 1 TABLET: 50; 8.6 TABLET ORAL at 21:51

## 2023-03-09 RX ADMIN — OXYCODONE HYDROCHLORIDE 10 MG: 5 TABLET ORAL at 05:01

## 2023-03-09 RX ADMIN — SENNOSIDES AND DOCUSATE SODIUM 1 TABLET: 50; 8.6 TABLET ORAL at 08:07

## 2023-03-09 RX ADMIN — METHOCARBAMOL 750 MG: 750 TABLET ORAL at 08:07

## 2023-03-09 ASSESSMENT — ACTIVITIES OF DAILY LIVING (ADL)
ADLS_ACUITY_SCORE: 25

## 2023-03-09 NOTE — PROGRESS NOTES
Occupational Therapy: Orders received. Chart reviewed and discussed with care team.? Occupational Therapy not indicated due to pt with no ADL needs or OT needs.? Defer discharge recommendations to care team.? Will complete orders.

## 2023-03-09 NOTE — PLAN OF CARE
Goal Outcome Evaluation:    POD1- Robot-assisted right thoracoscopic lower lobectomy, mediastinal lymph node dissection    Pt A&Ox4, able to let needs be known. Right posterior back dressings intact with some drainage. Chest tube to suction in place with 136ml bloody output, intermittent air leak noted- respiratory status intact. Urinary retention noted: bladder scanned pt for 568ml, pt agreeable to a straight cath- pt straight cathed for 800ml out. Tachycardia noted: 90's-1teens. Temps- 99.3, 99 and 98.8. IS explained and encouraged. Utilizing 2L of o2 via NC- weaned pt to RA and saturations >92%. Right shoulder and surgical right chest cavity pain- administered PRN Oxycodone and scheduled Tylenol- pt sleeping upon reassessments. Pt ambulated the nursing unit 1x- tolerated and did well. Chest x-ray completed- no pneumothorax seen. Call light within reach.     Writer assumed care of pt from [id number].   Kaye Pedro RN on 3/9/2023 at 6:35 AM

## 2023-03-09 NOTE — PROGRESS NOTES
THORACIC & FOREGUT SURGERY    S:  No overnight events.  Pt seen at bedside resting comfortably.       O:  Vitals:    03/09/23 0230 03/09/23 0400 03/09/23 0500 03/09/23 0719   BP:  119/84  134/88   BP Location:  Right arm  Right arm   Patient Position:  Semi-Bianchi's     Cuff Size:  Adult Regular     Pulse: 103 99  95   Resp:  18  18   Temp: 99  F (37.2  C) 98.8  F (37.1  C)  98.5  F (36.9  C)   TempSrc:  Oral  Oral   SpO2: 96% 97%  98%   Weight:   96.8 kg (213 lb 6.5 oz)    Height:       Chest tube 130, intermittent air leak      A&Ox3, NAD  Breathing non-labored  RRR  Soft, NDNT  Distal extremities warm.   No calf tenderness.  Chest tube intermittent air leak on suction    CXR: expected postop changes  Labs: OK    A/P: West Van is a 47 year old male POD# 1 s/p RLL lobectomy for carcinoid tumor  1) Chest tube to water seal  2) Repeat CXR  3) Ambulation  4) Discharge home tomorrow with [facility] if air-leak persists      POSTOP COMPLICATIONS: None     Parker Thomas MD

## 2023-03-09 NOTE — PROGRESS NOTES
THORACIC & FOREGUT SURGERY    Seen and examined.  Doing well.  Pain mainly contrlled.    Vitals:    03/08/23 1613 03/08/23 1700 03/08/23 2003 03/08/23 2145   BP: 133/86 120/81 (!) 141/98    BP Location: Right arm Right arm Right arm    Pulse: 108 114 120 117   Resp: 20 21 20    Temp: 98.8  F (37.1  C) 97.8  F (36.6  C) 97.9  F (36.6  C)    TempSrc: Oral Oral Oral    SpO2: 98% 95% 97%    Weight:       Height:            Most recent labs and Imaging Reviewed   CXR pneumothorax      A/P: 47 year old male POD# 0 status post right upper lobectomy  Pain control  Chest tube to suction  Ambulate  Incentive spirometry  Regular diet    Matheus Haas MD

## 2023-03-09 NOTE — PROGRESS NOTES
03/09/23 1400   Appointment Info   Signing Clinician's Name / Credentials (PT) KAMLA CullenT, PT   Living Environment   People in Home alone   Current Living Arrangements house   Home Accessibility stairs to enter home   Number of Stairs, Main Entrance 7   Stair Railings, Main Entrance railings safe and in good condition   Transportation Anticipated car, drives self   Living Environment Comments Pt lives IND at home alone in single story house.  works at a desk job. Laundry in basement. IND with all mobility and ADLs.   Self-Care   Usual Activity Tolerance good   Current Activity Tolerance moderate   Regular Exercise No   Equipment Currently Used at Home none   Fall history within last six months yes   Number of times patient has fallen within last six months 1  (slipped on ice while shoveling)   General Information   Onset of Illness/Injury or Date of Surgery 03/08/23   Referring Physician Matheus Haas MD   Patient/Family Therapy Goals Statement (PT) return home   Pertinent History of Current Problem (include personal factors and/or comorbidities that impact the POC) 47 year old male patient who presented last month with a right lower lobe pulmonary carcinoid tumor. In September, 2022, he had cold symptoms and was found to have a lung nodule on chest x-ray. His weight has been stable. He underwent ablation of a hepatocellular carcinoma on 12/6/2023. Since out visit last month he feels well. He underwent Dotatate Scan on 2/7/2023. Otherwise, No changes.   Existing Precautions/Restrictions no known precautions/restrictions   General Observations pleasant and agreeable to PT   Cognition   Orientation Status (Cognition) oriented x 4   Follows Commands (Cognition) WFL   Integumentary/Edema   Integumentary/Edema no deficits were identifed   Posture    Posture Not impaired   Range of Motion (ROM)   Range of Motion ROM is WNL   Strength (Manual Muscle Testing)   Strength (Manual Muscle Testing) strength is WNL    Bed Mobility   Comment, (Bed Mobility) supine to sit with IND   Transfers   Comment, (Transfers) STS with FWW and SBAx1   Gait/Stairs (Locomotion)   Comment, (Gait/Stairs) Ambulates 20ft with FWW and SBAx1   Balance   Balance Comments no balance deficits with FWW   Clinical Impression   Criteria for Skilled Therapeutic Intervention Yes, treatment indicated   PT Diagnosis (PT) impaired functional mobility   Influenced by the following impairments fatigue, SOB   Clinical Presentation (PT Evaluation Complexity) Stable/Uncomplicated   Clinical Presentation Rationale clinical judgement   Clinical Decision Making (Complexity) low complexity   Planned Therapy Interventions (PT) gait training;stair training;progressive activity/exercise   Risk & Benefits of therapy have been explained evaluation/treatment results reviewed;care plan/treatment goals reviewed;risks/benefits reviewed;current/potential barriers reviewed;participants voiced agreement with care plan;participants included;patient   PT Total Evaluation Time   PT Eval, Low Complexity Minutes (29072) 10   Plan of Care Review   Plan of Care Reviewed With patient   Physical Therapy Goals   PT Frequency One time eval and treatment only   PT Goals Transfers;Gait;Stairs   PT: Transfers Independent;Sit to/from stand;Bed to/from chair   PT: Gait Independent;100 feet   PT: Stairs Modified independent;8 stairs;Rail on left   Interventions   Interventions Quick Adds Gait Training   Gait Training   Gait Training Minutes (43377) 26   Treatment Detail/Skilled Intervention PT: Encountered sitting in recliner and agreeable to PT. Transfers and stands IND while holding urinal. Ambulates 300ft x2 progressing from FWW to IV pole to no AD.  Pt reports SOB and requires standing rest breaks x2 for 30-60 sec. Instructed pt to increase step length and speed, is able to modify parameters moderately. Ascends and descends 4 steps with single raling and SBA. No LOB.  Safe to discharge home.  Should continue amb program with RN.   PT Discharge Planning   PT Plan d/c. pt nearing baseline   PT Discharge Recommendation (DC Rec) home   PT Rationale for DC Rec Pt is nearing baseline and will not need any additional PT in order to continue progressing.  Should ambulate with nursing 4-5 x/day at least in order to increase strength and mobility without AD. safe to d/c home   PT Brief overview of current status Ambulates 300+ ft with SBAx1.   Total Session Time   Timed Code Treatment Minutes 26   Total Session Time (sum of timed and untimed services) 36

## 2023-03-09 NOTE — PROGRESS NOTES
CLINICAL NUTRITION SERVICES  -  ASSESSMENT NOTE      Recommendations Ordered by Registered Dietitian (RD):   Continue regular diet  Order Glucerna chocolate with dinner   Malnutrition: 3/9  % Weight Loss:  > 2% in 1 week (severe malnutrition)  % Intake:  Decreased intake does not meet criteria for malnutrition   Subcutaneous Fat Loss:  None observed  Muscle Loss:  Temporal region Mild depletion, Clavicle bone region Moderate depletion, Patellar region Moderate depletion and Anterior thigh region Moderate depletion  Fluid Retention:  None noted    Malnutrition Diagnosis: Moderate malnutrition  In Context of:  Acute illness or injury      REASON FOR ASSESSMENT  West Van is a 47 year old male seen by Registered Dietitian for Admission Nutrition Risk Screen for unsure weight loss      NUTRITION HISTORY  Pt presented for lobectomy for carcinoid tumor, POD1. Previous hx of liver cancer.    Visited with pt today. States a usual day of eating looks like:    B: oatmeal and [employer] breakfast sandwich  L: PB&J  D: Variety, often convenience food    Pt states typically having small meals and snacks on a given day. Says his eating habits haven't been great with all of the appointments and travel to get to appointments lately. Has had to eat on the go, often fast food. He has had a variable appetite - this summer appetite significantly decreased, currently stable. Diarrhea has also been issue on and off.    CURRENT NUTRITION ORDERS  Diet Order:     Regular     Current Intake/Tolerance:  Consumed 75% of 2 meals yest. Had breakfast today of [place] toast, eggs, yogurt, and fruit that he ate all of. He is surprised how well he is feeling after procedure as he expected worse.      NUTRITION FOCUSED PHYSICAL ASSESSMENT FOR DIAGNOSING MALNUTRITION)  Yes           Observed:    Muscle wasting (refer to documentation in Malnutrition section)    Obtained from Chart/Interdisciplinary Team:  R chest tube  No BM  "yet    ANTHROPOMETRICS  Height: 6' 2\"  Weight: 213 lbs 6.48 oz (96.8 kg)  Body mass index is 27.4 kg/m .  Weight Status:  Overweight BMI 25-29.9  IBW: 86.4 kg  % IBW: 112%  Weight History: 4.9% wt loss in 1 wk  Pt states UBW for him past 2 mo has been ~217lbs  States his dog makes him very active, he does a lot of walking  Wt Readings from Last 10 Encounters:   23 96.8 kg (213 lb 6.5 oz)   23 101.3 kg (223 lb 6.4 oz)   23 101.8 kg (224 lb 6.4 oz)   23 92.1 kg (203 lb 0.7 oz)   22 101.4 kg (223 lb 8 oz)   22 102.5 kg (225 lb 15.5 oz)   22 102.8 kg (226 lb 11.2 oz)   10/14/22 104.3 kg (230 lb)     LABS  B - 253     MEDICATIONS  Novolog  Miralax  Senna  D5 @ 50 mL/hr (204 kcals)      ASSESSED NUTRITION NEEDS PER APPROVED PRACTICE GUIDELINES:    Dosing Weight 96.8 kg  Estimated Energy Needs: 2420 - 2904 kcals (25-30 Kcal/Kg)  Justification: maintenance and post-op  Estimated Protein Needs: 116 - 145  grams protein (1.2-1.5 g pro/Kg)  Justification: post-op  Estimated Fluid Needs: 2400 - 2900 mL (1 mL/Kcal)  Justification: maintenance    MALNUTRITION:  % Weight Loss:  > 2% in 1 week (severe malnutrition)  % Intake:  Decreased intake does not meet criteria for malnutrition   Subcutaneous Fat Loss:  None observed  Muscle Loss:  Temporal region Mild depletion, Clavicle bone region Moderate depletion, Patellar region Moderate depletion and Anterior thigh region Moderate depletion  Fluid Retention:  None noted    Malnutrition Diagnosis: Moderate malnutrition  In Context of:  Acute illness or injury    NUTRITION DIAGNOSIS:  Predicted suboptimal nutrient intake related to clinical history/increased needs       NUTRITION INTERVENTIONS  Recommendations / Nutrition Prescription  Continue regular diet  Order Glucerna chocolate with dinner      Implementation  Nutrition education: Discussed dietitian role, importance of protein/energy for healing, small frequent meals, and " supplementation  Medical Food Supplement      Nutrition Goals  Pt to consume >75% meals TID      MONITORING AND EVALUATION:  Progress towards goals will be monitored and evaluated per protocol and Practice Guidelines      Lisseth Patel  Dietetic Intern

## 2023-03-10 ENCOUNTER — APPOINTMENT (OUTPATIENT)
Dept: GENERAL RADIOLOGY | Facility: CLINIC | Age: 48
End: 2023-03-10
Attending: THORACIC SURGERY (CARDIOTHORACIC VASCULAR SURGERY)
Payer: COMMERCIAL

## 2023-03-10 VITALS
OXYGEN SATURATION: 97 % | HEART RATE: 84 BPM | WEIGHT: 218.92 LBS | DIASTOLIC BLOOD PRESSURE: 80 MMHG | HEIGHT: 74 IN | BODY MASS INDEX: 28.1 KG/M2 | SYSTOLIC BLOOD PRESSURE: 129 MMHG | TEMPERATURE: 97.9 F | RESPIRATION RATE: 20 BRPM

## 2023-03-10 LAB
GLUCOSE BLDC GLUCOMTR-MCNC: 190 MG/DL (ref 70–99)
GLUCOSE BLDC GLUCOMTR-MCNC: 215 MG/DL (ref 70–99)
GLUCOSE BLDC GLUCOMTR-MCNC: 310 MG/DL (ref 70–99)

## 2023-03-10 PROCEDURE — 250N000011 HC RX IP 250 OP 636: Performed by: THORACIC SURGERY (CARDIOTHORACIC VASCULAR SURGERY)

## 2023-03-10 PROCEDURE — 71045 X-RAY EXAM CHEST 1 VIEW: CPT

## 2023-03-10 PROCEDURE — 250N000013 HC RX MED GY IP 250 OP 250 PS 637: Performed by: THORACIC SURGERY (CARDIOTHORACIC VASCULAR SURGERY)

## 2023-03-10 PROCEDURE — 258N000003 HC RX IP 258 OP 636: Performed by: THORACIC SURGERY (CARDIOTHORACIC VASCULAR SURGERY)

## 2023-03-10 RX ORDER — AMOXICILLIN 250 MG
1 CAPSULE ORAL 2 TIMES DAILY
Qty: 50 TABLET | Refills: 0 | Status: ON HOLD | OUTPATIENT
Start: 2023-03-10 | End: 2024-06-28

## 2023-03-10 RX ORDER — ACETAMINOPHEN 325 MG/1
975 TABLET ORAL EVERY 8 HOURS
COMMUNITY
Start: 2023-03-10 | End: 2024-01-29

## 2023-03-10 RX ORDER — METHOCARBAMOL 750 MG/1
750 TABLET, FILM COATED ORAL EVERY 6 HOURS PRN
Qty: 30 TABLET | Refills: 0 | Status: SHIPPED | OUTPATIENT
Start: 2023-03-10 | End: 2023-07-03

## 2023-03-10 RX ORDER — OXYCODONE HYDROCHLORIDE 5 MG/1
5-10 TABLET ORAL EVERY 4 HOURS PRN
Qty: 40 TABLET | Refills: 0 | Status: SHIPPED | OUTPATIENT
Start: 2023-03-10 | End: 2023-07-03

## 2023-03-10 RX ADMIN — INSULIN ASPART 4 UNITS: 100 INJECTION, SOLUTION INTRAVENOUS; SUBCUTANEOUS at 13:07

## 2023-03-10 RX ADMIN — GABAPENTIN 300 MG: 300 CAPSULE ORAL at 08:12

## 2023-03-10 RX ADMIN — POTASSIUM CHLORIDE, DEXTROSE MONOHYDRATE AND SODIUM CHLORIDE: 150; 5; 450 INJECTION, SOLUTION INTRAVENOUS at 06:12

## 2023-03-10 RX ADMIN — POLYETHYLENE GLYCOL 3350 17 G: 17 POWDER, FOR SOLUTION ORAL at 08:12

## 2023-03-10 RX ADMIN — ATENOLOL 25 MG: 25 TABLET ORAL at 08:12

## 2023-03-10 RX ADMIN — ACETAMINOPHEN 975 MG: 325 TABLET ORAL at 06:07

## 2023-03-10 RX ADMIN — SENNOSIDES AND DOCUSATE SODIUM 1 TABLET: 50; 8.6 TABLET ORAL at 08:12

## 2023-03-10 RX ADMIN — ENOXAPARIN SODIUM 40 MG: 40 INJECTION SUBCUTANEOUS at 08:12

## 2023-03-10 RX ADMIN — ACETAMINOPHEN 975 MG: 325 TABLET ORAL at 13:07

## 2023-03-10 RX ADMIN — INSULIN ASPART 2 UNITS: 100 INJECTION, SOLUTION INTRAVENOUS; SUBCUTANEOUS at 08:12

## 2023-03-10 RX ADMIN — FAMOTIDINE 20 MG: 20 TABLET ORAL at 08:12

## 2023-03-10 ASSESSMENT — ACTIVITIES OF DAILY LIVING (ADL)
ADLS_ACUITY_SCORE: 25

## 2023-03-10 NOTE — PROGRESS NOTES
THORACIC & FOREGUT SURGERY    S:  No overnight events.  Pt seen at bedside resting comfortably.       O:  Vitals:    03/09/23 1543 03/09/23 2330 03/10/23 0600 03/10/23 0741   BP: 116/75 102/67  119/73   BP Location: Right arm Right arm  Right arm   Patient Position:  Semi-Bianchi's     Cuff Size:  Adult Regular     Pulse: 89 81  78   Resp: 20 20  16   Temp: 98.3  F (36.8  C) 98  F (36.7  C)  98.5  F (36.9  C)   TempSrc: Oral Oral  Oral   SpO2: 96% 95%  97%   Weight:   99.3 kg (218 lb 14.7 oz)    Height:           Chest tube 25 ml/24h, tiny air leak  CXR 3/10/2023: Tiny right pneumothorax    A&Ox3, NAD  Breathing non-labored  RRR  Soft, NDNT  Distal extremities warm.   No calf tenderness.    A/P: West Van is a 47 year old male POD# 2 s/p RLL lobectomy for carcinoid tumor    1) Switched him to a mini-atrium  2) Discharge home today  3) Follow-up in clinic on Monday 3/13/2023    Parker Thomas MD

## 2023-03-10 NOTE — PLAN OF CARE
Goal Outcome Evaluation: POD #1 Right thoracoscopic lower lobectomy, mediastinal lymph node dissection. CT to water seal 14ml bloody output. Pt urine output increasing as the day goes; 150-300ml at a time. Bladder scans unremarkable. Pt is CONTI, sats WDL. Uses I.S. appropriately.Pain well managed with Oxycodone and Tylenol. Tolerating regular idet; no BM, passing flatus. Plan: Daily Xray.

## 2023-03-10 NOTE — DISCHARGE SUMMARY
NAME: West Van   MRN: [id number]   : 1975     DATE OF ADMISSION: 3/8/2023     Preoperative diagnosis:                         Lung carcinoid tumor  Postoperative diagnosis:                       Lung carcinoid tumor     Procedure:   1. Right robot-assisted thoracoscopic right lower lobectomy, mediastinal lymph node dissection    PATHOLOGY RESULTS: Final pathology pending at time of discharge.      CULTURE RESULTS: None     INTRAOPERATIVE COMPLICATIONS: None     POSTOPERATIVE MEDICAL ISSUES: Air leak, discharged with chest tube    DRAINS/TUBES PRESENT AT DISCHARGE: Chest tube    DATE OF DISCHARGE:  March 10, 2023     HOSPITAL COURSE: West Van is a 47 year old male who on 3/8/2023 underwent the above-named procedures.  He tolerated the operation well and postoperatively was transferred to the general post-surgical unit.  The remainder of his course was essentially uncomplicated.  Prior to discharge, his pain was controlled well, he was able to perform ADLs and ambulate independently without difficulty, and had full return of bowel and bladder function.  On March 10, 2023, he was discharged to home in stable condition with chest tube in place.    DISCHARGE EXAM:   A&O, NAD  Resp non-labored  Distal extremities warm    Incisions healing well     DISCHARGE INSTRUCTIONS:  Discharge Procedure Orders   XR Chest 2 Views   Standing Status: Future Standing Exp. Date: 03/10/24     Order Specific Question Answer Comments   Priority Routine      Tubes   Order Comments: CHEST TUBE INSTRUCTIONS    Chest Tube: To gravity drainage, please record drainage daily until seen in follow up to assist with the decision for removal.    DO NOT CLAMP OR PINCH YOUR CHEST TUBE.  ENSURE THAT THE TUBE IS NOT KINKED AFTER REPOSITIONING, SITTING, OR LYING DOWN.      You should change your chest tube dressing every 24-48 hours, a single split gauze with minimal tape should be adequate.    Keep a Paul tube attachment  device on your chest tube at all times for additional securement.  There should be a small amount of slack between the Steward tube attachment device and the insertion/suture site.  This will help to ensure the tube is not inadvertently removed.  ________________________________    RN- Please provide for the patient prior to discharge;  1.) 5- 60cc Luer lock syringes  2.) 5- alcohol swabs  3.) 5- drainage basins.   4.) 2- Steward tube attachment devices for securing the tube  5.) Split gauze and medi-pore tape for dressing changes    _________________________________________________     Reason for your hospital stay   Order Comments: surgery     Follow-up and recommended labs and tests    Order Comments: 1.) Follow up with primary care physician, Cr Martinez, in 1-2 weeks.  2.) Follow up with Dr. Matheus Haas, in Thoracic Surgery clinic on 3/13, prior to which a CXR should be performed (CXR should be on the same day as clinic appointment).     Activity   Order Comments: As in discharge instruction section     Order Specific Question Answer Comments   Is discharge order? Yes      Discharge Instructions   Order Comments: THORACIC SURGERY DISCHARGE INSTRUCTIONS    DIET: Regular diet - as prior to admission     If your plans upon discharge include prolonged periods of sitting (i.e a lengthy car or plane ride), it is highly beneficial to get up and walk at least once per hour to help prevent swelling and blood clots.     You may remove chest tube dressing 48 hours after tube removal and bandage the site at your own discretion thereafter.  Small amounts of leakage are normal for 2-3 days after removal.  Feel free to call with questions.    You may get incision wet 2 days after operation. Do not submerge, soak, or scrub incision or swim until seen in follow-up.    Take incentive spirometer home for continued frequent use    Activity as tolerated, no strenous activity until seen in follow-up, no lifting greater  "than 20 pounds for the next 1-2 weeks.    Stay hydrated. Take over the counter fiber (metamucil or benefiber) and stool softeners (Miralax, docusate or senna) if becoming constipated.     Call for fever greater than 101.5, chills, increased size of incision, red skin around incision, vision changes, muscle strength changes, sensation changes, shortness of breath, or other concerns.    No driving while taking narcotic pain medication.    Transition to ibuprofen or tylenol/acetaminophen for pain control. Do not take tylenol/acetaminophen and acetaminophen containing narcotic (e.g., percocet or vicodin) at the same time. If you have known ulcer problems, or kidney trouble (elevated creatinine) do not take the ibuprofen.    In emergencies, call 911    For other Questions or Concerns;   A.) During weekday working hours (Monday through Friday 8am to 4:30pm)   call [phone] and ask to speak to a thoracic surgery nurse (RN or LPN).     B.) At nights (after 4:30pm), on weekends, or if urgent call [phone] and   tell the  \"I would like to page job code [phone], the thoracic surgery   fellow on call, please.\"     Diet   Order Comments: As in discharge instruction section     Order Specific Question Answer Comments   Is discharge order? Yes        DISCHARGE MEDICATIONS:   Current Discharge Medication List      START taking these medications    Details   acetaminophen (TYLENOL) 325 MG tablet Take 3 tablets (975 mg) by mouth every 8 hours    Associated Diagnoses: Carcinoid tumor of lung, unspecified whether malignant      methocarbamol (ROBAXIN) 750 MG tablet Take 1 tablet (750 mg) by mouth every 6 hours as needed for muscle spasms  Qty: 30 tablet, Refills: 0    Associated Diagnoses: Carcinoid tumor of lung, unspecified whether malignant      oxyCODONE (ROXICODONE) 5 MG tablet Take 1-2 tablets (5-10 mg) by mouth every 4 hours as needed for moderate pain (4-6)  Qty: 40 tablet, Refills: 0    Associated " Diagnoses: Carcinoid tumor of lung, unspecified whether malignant      senna-docusate (SENOKOT-S/PERICOLACE) 8.6-50 MG tablet Take 1 tablet by mouth 2 times daily Reduce dose or temporarily discontinue if having loose stools.  Qty: 50 tablet, Refills: 0    Associated Diagnoses: Carcinoid tumor of lung, unspecified whether malignant         CONTINUE these medications which have NOT CHANGED    Details   Ashwagandha 125 MG CAPS Take 1 capsule by mouth every morning      atenolol (TENORMIN) 25 MG tablet Take 25 mg by mouth every morning      liraglutide (VICTOZA) 18 MG/3ML solution Inject 1.8 mg Subcutaneous every evening      lisinopril-hydrochlorothiazide (ZESTORETIC) 10-12.5 MG tablet Take 1 tablet by mouth every morning      magnesium 250 MG tablet Take 1 tablet by mouth every morning      metFORMIN (GLUCOPHAGE XR) 500 MG 24 hr tablet Take 500 mg by mouth every morning      Milk Thistle-Dand-Fennel-Licor (MILK THISTLE XTRA) CAPS capsule Take 1 capsule by mouth every morning      polyethylene glycol (MIRALAX) 17 GM/Dose powder Take 17 g by mouth      TURMERIC PO Take 1 capsule by mouth every morning      Vitamin D3 (CHOLECALCIFEROL) 125 MCG (5000 UT) tablet Take 125 mcg by mouth every morning      ULTICARE MINI 31G X 6 MM insulin pen needle Inject 1 each Subcutaneous At Bedtime

## 2023-03-10 NOTE — PROGRESS NOTES
Patient A/O X4, up and watching hockey games late. Drinking and voiding in large amounts without problems. Declined pain medications when offered but accepted scheduled Tylenol. Chest tube dressing CDI, 3 tiny incisions covered with dressings, small amount of dry drainage not changed. Bedtime blood glucose was 274, 2 units given per sliding scale, late night level was 215, no coverage ordered. Patient reports feeling okay, no new concerns at this time.

## 2023-03-11 NOTE — PROGRESS NOTES
Pt discharged home in stable condition with his mother. Education/teaching completed about chest tube, and pt verbalized understanding and questions answered. Pt belongings were sent with him.

## 2023-03-13 ENCOUNTER — ANCILLARY PROCEDURE (OUTPATIENT)
Dept: GENERAL RADIOLOGY | Facility: CLINIC | Age: 48
End: 2023-03-13
Attending: THORACIC SURGERY (CARDIOTHORACIC VASCULAR SURGERY)
Payer: COMMERCIAL

## 2023-03-13 ENCOUNTER — ONCOLOGY VISIT (OUTPATIENT)
Dept: ONCOLOGY | Facility: CLINIC | Age: 48
End: 2023-03-13
Attending: THORACIC SURGERY (CARDIOTHORACIC VASCULAR SURGERY)
Payer: COMMERCIAL

## 2023-03-13 ENCOUNTER — PATIENT OUTREACH (OUTPATIENT)
Dept: CARE COORDINATION | Facility: CLINIC | Age: 48
End: 2023-03-13

## 2023-03-13 ENCOUNTER — HOSPITAL ENCOUNTER (OUTPATIENT)
Dept: GENERAL RADIOLOGY | Facility: CLINIC | Age: 48
Discharge: HOME | End: 2023-03-13
Attending: PHYSICIAN ASSISTANT
Payer: COMMERCIAL

## 2023-03-13 VITALS
HEART RATE: 97 BPM | RESPIRATION RATE: 16 BRPM | DIASTOLIC BLOOD PRESSURE: 90 MMHG | OXYGEN SATURATION: 98 % | BODY MASS INDEX: 28.23 KG/M2 | WEIGHT: 220 LBS | HEIGHT: 74 IN | SYSTOLIC BLOOD PRESSURE: 137 MMHG

## 2023-03-13 DIAGNOSIS — C7A.8 PRIMARY MALIGNANT NEUROENDOCRINE TUMOR OF LUNG (H): Primary | ICD-10-CM

## 2023-03-13 DIAGNOSIS — C7A.8 PRIMARY MALIGNANT NEUROENDOCRINE TUMOR OF LUNG (H): ICD-10-CM

## 2023-03-13 DIAGNOSIS — D3A.090 CARCINOID TUMOR OF LUNG, UNSPECIFIED WHETHER MALIGNANT (H): ICD-10-CM

## 2023-03-13 LAB — RADIOLOGIST FLAGS: ABNORMAL

## 2023-03-13 PROCEDURE — 99024 POSTOP FOLLOW-UP VISIT: CPT | Performed by: THORACIC SURGERY (CARDIOTHORACIC VASCULAR SURGERY)

## 2023-03-13 PROCEDURE — 71046 X-RAY EXAM CHEST 2 VIEWS: CPT

## 2023-03-13 PROCEDURE — 99212 OFFICE O/P EST SF 10 MIN: CPT | Performed by: THORACIC SURGERY (CARDIOTHORACIC VASCULAR SURGERY)

## 2023-03-13 PROCEDURE — 71046 X-RAY EXAM CHEST 2 VIEWS: CPT | Mod: 77

## 2023-03-13 ASSESSMENT — PAIN SCALES - GENERAL: PAINLEVEL: MILD PAIN (3)

## 2023-03-13 NOTE — PROGRESS NOTES
"Oncology Rooming Note    March 13, 2023 12:52 PM   West Van is a 47 year old male who presents for:    Chief Complaint   Patient presents with     Oncology Clinic Visit     Initial Vitals: There were no vitals taken for this visit. Estimated body mass index is 28.11 kg/m  as calculated from the following:    Height as of 3/8/23: 1.88 m (6' 2\").    Weight as of 3/10/23: 99.3 kg (218 lb 14.7 oz). There is no height or weight on file to calculate BSA.  Data Unavailable Comment: Data Unavailable   No LMP for male patient.  Allergies reviewed: Yes  Medications reviewed: Yes    Medications: Medication refills not needed today.  Pharmacy name entered into [employer]: [id number] - [address]    Clinical concerns:  doctor was notified.      Babita Robins MA            "

## 2023-03-13 NOTE — LETTER
"    3/13/2023         RE: West Van  [address]  [place] [address]        Dear Colleague,    Thank you for referring your patient, West Van, to the [facility]. Please see a copy of my visit note below.    THORACIC SURGERY FOLLOW UP VISIT    Dear Dr. Martinez,  I saw Mr. Van in follow-up today. The clinical summary follows:     PREOP DIAGNOSIS   Carcinoid tumor    PROCEDURE   Right robot-assisted thoracoscopic right lower lobectomy, mediastinal lymph node dissection    DATE OF PROCEDURE  3/8/2023    HISTOPATHOLOGY   Pending    COMPLICATIONS  None Discharged home with chest tube for air leak (not yet prolonged)    INTERVAL STUDIES  CXR 3/13/2023:        BP (!) 137/90   Pulse 97   Resp 16   Ht 1.88 m (6' 2\")   Wt 99.8 kg (220 lb)   SpO2 98%   BMI 28.25 kg/m     Physical Exam  Constitutional:       Appearance: Normal appearance.   Eyes:      Conjunctiva/sclera: Conjunctivae normal.   Cardiovascular:      Rate and Rhythm: Normal rate.   Pulmonary:      Effort: Pulmonary effort is normal.      Comments: Chest tube no air leak  Musculoskeletal:         General: Normal range of motion.   Skin:     General: Skin is warm and dry.      Comments: Incisions healing well   Neurological:      Mental Status: He is alert and oriented to person, place, and time.   Psychiatric:         Mood and Affect: Mood normal.         Behavior: Behavior normal.         Thought Content: Thought content normal.         Judgment: Judgment normal.        SUBJECTIVE   Mr. Van has well-controlled pain and the most pain is in the right shoulder. He is breathing well and eating well. He is walking without difficulty.     From a personal perspective, he is here with his mother.    IMPRESSION (C7A.8) Primary malignant neuroendocrine tumor of lung (H)  (primary encounter diagnosis)      This patient is a 47 year old man who is recovering well after undergoing a right lower lobectomy for a carcinoid tumor " "of the lung. There is no air leak    PLAN  I spent 25 min on the date of the encounter in chart review, patient visit, review of tests, documentation and/or discussion with other providers about the issues documented above. I reviewed the plan as follows:  I removed the chest tube and there was significant drainage of serous fluid. I placed a stitch and then a dressing.   1. Necessary Tests & Appointments: Follow up in one week for stitch removal and three weeks for normal postoperative visit.  CXR tomorrow.  Post-pull CXR showed small pneumothorax. May be related to when the stitch was placed for the drainage.    All questions were answered and the patient and present family were in agreement with the plan.  I appreciate the opportunity to participate in the care of your patient and will keep you updated.  Sincerely,     Matheus Haas MD     Oncology Rooming Note    March 13, 2023 12:52 PM   West Van is a 47 year old male who presents for:    Chief Complaint   Patient presents with     Oncology Clinic Visit     Initial Vitals: There were no vitals taken for this visit. Estimated body mass index is 28.11 kg/m  as calculated from the following:    Height as of 3/8/23: 1.88 m (6' 2\").    Weight as of 3/10/23: 99.3 kg (218 lb 14.7 oz). There is no height or weight on file to calculate BSA.  Data Unavailable Comment: Data Unavailable   No LMP for male patient.  Allergies reviewed: Yes  Medications reviewed: Yes    Medications: Medication refills not needed today.  Pharmacy name entered into [facility]: [id number] [address]    Clinical concerns:  doctor was notified.      Babita Robins MA                Again, thank you for allowing me to participate in the care of your patient.        Sincerely,        Matheus Haas MD    "

## 2023-03-13 NOTE — PROGRESS NOTES
"THORACIC SURGERY FOLLOW UP VISIT    Dear Dr. Martinez,  I saw Mr. Van in follow-up today. The clinical summary follows:     PREOP DIAGNOSIS   Carcinoid tumor    PROCEDURE   Right robot-assisted thoracoscopic right lower lobectomy, mediastinal lymph node dissection    DATE OF PROCEDURE  3/8/2023    HISTOPATHOLOGY   Pending    COMPLICATIONS  None Discharged home with chest tube for air leak (not yet prolonged)    INTERVAL STUDIES  CXR 3/13/2023:        BP (!) 137/90   Pulse 97   Resp 16   Ht 1.88 m (6' 2\")   Wt 99.8 kg (220 lb)   SpO2 98%   BMI 28.25 kg/m     Physical Exam  Constitutional:       Appearance: Normal appearance.   Eyes:      Conjunctiva/sclera: Conjunctivae normal.   Cardiovascular:      Rate and Rhythm: Normal rate.   Pulmonary:      Effort: Pulmonary effort is normal.      Comments: Chest tube no air leak  Musculoskeletal:         General: Normal range of motion.   Skin:     General: Skin is warm and dry.      Comments: Incisions healing well   Neurological:      Mental Status: He is alert and oriented to person, place, and time.   Psychiatric:         Mood and Affect: Mood normal.         Behavior: Behavior normal.         Thought Content: Thought content normal.         Judgment: Judgment normal.        SUBJECTIVE   Mr. Van has well-controlled pain and the most pain is in the right shoulder. He is breathing well and eating well. He is walking without difficulty.     From a personal perspective, he is here with his mother.    IMPRESSION (C7A.8) Primary malignant neuroendocrine tumor of lung (H)  (primary encounter diagnosis)      This patient is a 47 year old man who is recovering well after undergoing a right lower lobectomy for a carcinoid tumor of the lung. There is no air leak    PLAN  I spent 25 min on the date of the encounter in chart review, patient visit, review of tests, documentation and/or discussion with other providers about the issues documented above. I reviewed the plan " as follows:  I removed the chest tube and there was significant drainage of serous fluid. I placed a stitch and then a dressing.   1. Necessary Tests & Appointments: Follow up in one week for stitch removal and three weeks for normal postoperative visit.  CXR tomorrow.  Post-pull CXR showed small pneumothorax. May be related to when the stitch was placed for the drainage.    All questions were answered and the patient and present family were in agreement with the plan.  I appreciate the opportunity to participate in the care of your patient and will keep you updated.  Sincerely,     Matheus Haas MD

## 2023-03-13 NOTE — PROGRESS NOTES
[facility]    Background: Transitional Care Management program auto-identified and prompting a chart review by [facility] team.    Assessment: Upon chart review, [facility] Team member will Enroll this episode of Transitional Care Management program due to reason below:    Upon chart review, patient is followed by [facility] team who follow their patients closely. [facility] will not conduct outreach to avoid duplication of outreach to patient.     Plan: Transitional Care Management episode enrolled per reason above.      *Connected Care Resource Team does NOT follow patient ongoing. Referrals are identified based on internal discharge reports and the outreach is to ensure patient has an understanding of their discharge instructions.

## 2023-03-14 ENCOUNTER — PATIENT OUTREACH (OUTPATIENT)
Dept: ONCOLOGY | Facility: CLINIC | Age: 48
End: 2023-03-14

## 2023-03-14 ENCOUNTER — ANCILLARY PROCEDURE (OUTPATIENT)
Dept: GENERAL RADIOLOGY | Facility: CLINIC | Age: 48
End: 2023-03-14
Attending: THORACIC SURGERY (CARDIOTHORACIC VASCULAR SURGERY)
Payer: COMMERCIAL

## 2023-03-14 DIAGNOSIS — C7A.8 PRIMARY MALIGNANT NEUROENDOCRINE TUMOR OF LUNG (H): Primary | ICD-10-CM

## 2023-03-14 DIAGNOSIS — C7A.8 PRIMARY MALIGNANT NEUROENDOCRINE TUMOR OF LUNG (H): ICD-10-CM

## 2023-03-14 LAB
PATH REPORT.COMMENTS IMP SPEC: ABNORMAL
PATH REPORT.COMMENTS IMP SPEC: ABNORMAL
PATH REPORT.COMMENTS IMP SPEC: YES
PATH REPORT.FINAL DX SPEC: ABNORMAL
PATH REPORT.GROSS SPEC: ABNORMAL
PATH REPORT.MICROSCOPIC SPEC OTHER STN: ABNORMAL
PATH REPORT.MICROSCOPIC SPEC OTHER STN: ABNORMAL
PATH REPORT.RELEVANT HX SPEC: ABNORMAL
PATHOLOGY SYNOPTIC REPORT: ABNORMAL
PHOTO IMAGE: ABNORMAL

## 2023-03-14 PROCEDURE — 71046 X-RAY EXAM CHEST 2 VIEWS: CPT | Mod: GC | Performed by: RADIOLOGY

## 2023-03-14 NOTE — PROGRESS NOTES
"Called patient to check status and follow up on CXR, plan moving forward   \"For the most part I feel pretty good\"  No SOB, breathing well. Slight cough.  Using IS    Sent CXR order to [facility] radiology dept to schedule CXR for tomorrow.   Fax - [phone]  "

## 2023-03-15 ENCOUNTER — PATIENT OUTREACH (OUTPATIENT)
Dept: ONCOLOGY | Facility: CLINIC | Age: 48
End: 2023-03-15
Payer: COMMERCIAL

## 2023-03-15 DIAGNOSIS — C7A.8 PRIMARY MALIGNANT NEUROENDOCRINE TUMOR OF LUNG (H): Primary | ICD-10-CM

## 2023-03-15 NOTE — PROGRESS NOTES
[facility]: Cancer Care                                                                                          Patient is doing well. CXR from today still shows pneumothorax. He is tolerating activity well with rests in between. He is still using IS. Per Dr. Haas, he will need another CXR on Monday. Will send order to facility near his home.    Signature:  Monae Hector RN

## 2023-03-21 NOTE — PROGRESS NOTES
THORACIC SURGERY FOLLOW UP VISIT    Dear Dr. Haas,  I saw Mr. Van in follow-up today. The clinical summary follows:     PREOP DIAGNOSIS   Lung carcinoid    PROCEDURE   Right robot-assisted thoracoscopic right lower lobectomy, mediastinal lymph node dissection    DATE OF PROCEDURE  03/08/2023    HISTOPATHOLOGY   Typical carcinoid    COMPLICATIONS  Air leak-managed with chest tube (chest tube was removed 03/13/2023)    INTERVAL STUDIES  CXR: no new findings, continued right hydropneumothorax without significant change and continued air-fluid level in the small effusion in the right lung base    ETOH no  TOB never smoker    SUBJECTIVE   West is doing good. He is slowly recovering from surgery and still has some surgical pain, shortness of breath and an occasional dry cough. He does notice improvement compared to just one week ago and realized it is a slow process and he will continue to slowly improve as he gets further and further out from surgery.    OBJECTIVE  /60 (BP Location: Left arm, Patient Position: Chair, Cuff Size: Adult Regular)   Pulse 102   Wt 98.9 kg (218 lb)   SpO2 97%   BMI 27.99 kg/m      His surgical incisions are healed and slightly red from irritation. No concern for infection. I removed one suture from his chest tube site.     From a personal perspective, he is here with his aunt.    IMPRESSION   47 year-old male status post right lower lobectomy for a stage IA1 typical carcinoid. He had his chest tube removed in clinic with Dr. Haas on 03/13/2023 after which Dr. Haas placed a suture to close the site. He is here today for suture removal.    PLAN  I spent 15 min on the date of the encounter in chart review, patient visit, review of tests, documentation and/or discussion with other providers about the issues documented above. I reviewed the plan as follows:  Keep your regularly scheduled one month post operative follow up with me on April 5th.  All questions were  answered and the patient and present family were in agreement with the plan.  I appreciate the opportunity to participate in the care of your patient and will keep you updated.  Sincerely,

## 2023-03-23 ENCOUNTER — PATIENT OUTREACH (OUTPATIENT)
Dept: ONCOLOGY | Facility: CLINIC | Age: 48
End: 2023-03-23

## 2023-03-23 ENCOUNTER — HOSPITAL ENCOUNTER (OUTPATIENT)
Dept: GENERAL RADIOLOGY | Facility: HOSPITAL | Age: 48
Discharge: HOME | End: 2023-03-23
Attending: THORACIC SURGERY (CARDIOTHORACIC VASCULAR SURGERY) | Admitting: THORACIC SURGERY (CARDIOTHORACIC VASCULAR SURGERY)
Payer: COMMERCIAL

## 2023-03-23 ENCOUNTER — OFFICE VISIT (OUTPATIENT)
Dept: PULMONOLOGY | Facility: CLINIC | Age: 48
End: 2023-03-23
Payer: COMMERCIAL

## 2023-03-23 VITALS
SYSTOLIC BLOOD PRESSURE: 112 MMHG | OXYGEN SATURATION: 97 % | HEART RATE: 102 BPM | DIASTOLIC BLOOD PRESSURE: 60 MMHG | WEIGHT: 218 LBS | BODY MASS INDEX: 27.99 KG/M2

## 2023-03-23 DIAGNOSIS — C7A.090 MALIGNANT CARCINOID TUMOR OF LUNG (H): Primary | ICD-10-CM

## 2023-03-23 DIAGNOSIS — C7A.8 PRIMARY MALIGNANT NEUROENDOCRINE TUMOR OF LUNG (H): ICD-10-CM

## 2023-03-23 LAB — RADIOLOGIST FLAGS: ABNORMAL

## 2023-03-23 PROCEDURE — 71046 X-RAY EXAM CHEST 2 VIEWS: CPT

## 2023-03-23 PROCEDURE — 99214 OFFICE O/P EST MOD 30 MIN: CPT | Performed by: CLINICAL NURSE SPECIALIST

## 2023-03-23 NOTE — PROGRESS NOTES
[facility]: Cancer Care                                                                                          See CXR results. Patient has appt with Jackelyn Pratt PA-C    Signature:  Monae Hector RN

## 2023-03-27 ENCOUNTER — TELEPHONE (OUTPATIENT)
Dept: ONCOLOGY | Facility: CLINIC | Age: 48
End: 2023-03-27
Payer: COMMERCIAL

## 2023-03-27 DIAGNOSIS — Z90.2 STATUS POST LOBECTOMY OF LUNG: ICD-10-CM

## 2023-03-27 DIAGNOSIS — R91.8 PULMONARY NODULES: Primary | ICD-10-CM

## 2023-03-27 NOTE — TELEPHONE ENCOUNTER
"Pt calling in to report symptoms of \"fullness\" in the right side of his chest. Pt had Right robot-assisted thoracoscopic right lower lobectomy, mediastinal lymph node dissection completed on 3/8 with Dr. Haas, and has a known hydropneumothorax that the team has been following closely.    Pt states that he noticed the increased sense of \"fullness\" yesterday, and thinks that he may have \"overdone it\" on Saturday by going to the grocery store. Pt denies pain in this area, and feels that his breathing is essentially the same as it has been. He notes that when he bends forward or lays down he can occasionally hear a \"gurgling sound\" but this does not impede his breathing.    Pt denies drainage from the incision site, fevers, pain, increased shortness of breath, swelling, chest pain, signs of bleeding, nausea, or vomiting.    He is currently in [facility] and is wondering if he should be seen by the clinic or UC in the area due to his symptoms. He is next scheduled for an CXR on Wed 4/5 for his scheduled follow-up visit.    Will route to care team for recommendations.  "

## 2023-03-27 NOTE — TELEPHONE ENCOUNTER
Writer communicated with Monae COOK about plan that was made after discussion with team. Order has been faxed to [facility] for pt to have CXR completed and they will call to have him scheduled for either today or tomorrow. If pt begins to have worsening symptoms he should be seen in the ED.    Writer called pt to inform him of these recommendations. Pt verbalized understanding and will follow recommendations. Will await CXR results.

## 2023-04-04 ENCOUNTER — ANCILLARY PROCEDURE (OUTPATIENT)
Dept: GENERAL RADIOLOGY | Facility: CLINIC | Age: 48
End: 2023-04-04
Attending: THORACIC SURGERY (CARDIOTHORACIC VASCULAR SURGERY)
Payer: COMMERCIAL

## 2023-04-04 ENCOUNTER — ONCOLOGY VISIT (OUTPATIENT)
Dept: SURGERY | Facility: CLINIC | Age: 48
End: 2023-04-04
Attending: THORACIC SURGERY (CARDIOTHORACIC VASCULAR SURGERY)
Payer: COMMERCIAL

## 2023-04-04 VITALS
OXYGEN SATURATION: 97 % | SYSTOLIC BLOOD PRESSURE: 128 MMHG | WEIGHT: 225.3 LBS | HEART RATE: 104 BPM | DIASTOLIC BLOOD PRESSURE: 81 MMHG | BODY MASS INDEX: 28.93 KG/M2 | TEMPERATURE: 97.9 F

## 2023-04-04 DIAGNOSIS — C7A.8 PRIMARY MALIGNANT NEUROENDOCRINE TUMOR OF LUNG (H): ICD-10-CM

## 2023-04-04 DIAGNOSIS — C7A.8 PRIMARY MALIGNANT NEUROENDOCRINE TUMOR OF LUNG (H): Primary | ICD-10-CM

## 2023-04-04 PROCEDURE — 99212 OFFICE O/P EST SF 10 MIN: CPT | Performed by: THORACIC SURGERY (CARDIOTHORACIC VASCULAR SURGERY)

## 2023-04-04 PROCEDURE — 71046 X-RAY EXAM CHEST 2 VIEWS: CPT | Mod: GC | Performed by: RADIOLOGY

## 2023-04-04 PROCEDURE — 99024 POSTOP FOLLOW-UP VISIT: CPT | Performed by: THORACIC SURGERY (CARDIOTHORACIC VASCULAR SURGERY)

## 2023-04-04 RX ORDER — FUROSEMIDE 20 MG
10 TABLET ORAL
COMMUNITY
Start: 2023-03-30 | End: 2023-11-21

## 2023-04-04 ASSESSMENT — PAIN SCALES - GENERAL: PAINLEVEL: MILD PAIN (3)

## 2023-04-04 NOTE — LETTER
4/4/2023         RE: West Van  [address]  [facility] [address]        Dear Colleague,    Thank you for referring your patient, West Van, to the [facility]. Please see a copy of my visit note below.    THORACIC SURGERY FOLLOW UP VISIT     Dear Dr. Martinez,    I saw Mr. Van in follow-up today. The clinical summary follows:     PREOP DIAGNOSIS   Carcinoid tumor     PROCEDURE   Right robot-assisted thoracoscopic right lower lobectomy, mediastinal lymph node dissection     DATE OF PROCEDURE  3/8/2023     HISTOPATHOLOGY   SPECIMEN   Procedure  Lobectomy    Specimen Laterality  Right    TUMOR   Tumor Focality  Single focus    Tumor Site  Lower lobe of lung    Tumor Size     Total Tumor Size (size of entire tumor)  Greatest Dimension (Centimeters): 1.1 cm   Histologic Type  Typical carcinoid / Neuroendocrine tumor, grade 1    Histologic Grade  G1, well differentiated    Spread Through Air Spaces (BRIE)  Not identified    Visceral Pleura Invasion  Not identified    Direct Invasion of Adjacent Structures  Not applicable (no adjacent structures present)    Treatment Effect  No known presurgical therapy    Lymphovascular Invasion  Not identified    MARGINS   Margin Status for Invasive Carcinoma  All margins negative for invasive carcinoma    Closest Margin(s) to Invasive Carcinoma  Parenchymal    Distance from Invasive Carcinoma to Closest Margin  2.7 cm   Margin Status for Non-Invasive Tumor  Not applicable    REGIONAL LYMPH NODES   Lymph Node(s) from Prior Procedures  No known prior lymph node sampling performed    Regional Lymph Node Status  All regional lymph nodes negative for tumor    Number of Lymph Nodes Examined  11    Flavia Site(s) Examined  4R: Lower paratracheal      9R: Pulmonary ligament      10R: Hilar      11R: Interlobar      7: Subcarinal    PATHOLOGIC STAGE CLASSIFICATION (pTNM, AJCC 8th Edition)       pT Category  pT1b    pN Category  pN0            COMPLICATIONS  None      INTERVAL STUDIES  CXR 4/4/2023:    No pneumothorax.  Small pleural effusion    CXR 3/13/2023:        /81 (BP Location: Right arm, Patient Position: Sitting, Cuff Size: Adult Regular)   Pulse 104   Temp 97.9  F (36.6  C) (Oral)   Wt 102.2 kg (225 lb 4.8 oz)   SpO2 97%   BMI 28.93 kg/m     Physical Exam  Constitutional:       Appearance: Normal appearance.   Eyes:      Conjunctiva/sclera: Conjunctivae normal.   Cardiovascular:      Rate and Rhythm: Normal rate.   Pulmonary:      Effort: Pulmonary effort is normal.   Musculoskeletal:         General: Normal range of motion.   Skin:     General: Skin is warm and dry.      Comments: Incisions healing well   Neurological:      Mental Status: He is alert and oriented to person, place, and time.   Psychiatric:         Mood and Affect: Mood normal.         Behavior: Behavior normal.         Thought Content: Thought content normal.         Judgment: Judgment normal.         SUBJECTIVE   Mr. Van has well-controlled pain with extra strength tylenol. He has no burning or shooting pain. He is breathing well and eating well. He is walking without difficulty.      From a personal perspective, he is here with his mother.     IMPRESSION (C7A.8) Primary malignant neuroendocrine tumor of lung (H)  (primary encounter diagnosis)        This patient is a 47 year old man who is recovering well after undergoing a right lower lobectomy for a carcinoid tumor of the lung.      PLAN  I spent 25 min on the date of the encounter in chart review, patient visit, review of tests, documentation and/or discussion with other providers about the issues documented above. I reviewed the plan as follows:      1. Necessary Tests & Appointments: CT chest one year       All questions were answered and the patient and present family were in agreement with the plan.    I appreciate the opportunity to participate in the care of your patient and will keep you  updated.    Sincerely,         Matheus Haas MD

## 2023-04-04 NOTE — PROGRESS NOTES
THORACIC SURGERY FOLLOW UP VISIT     Dear Dr. Martinez,    I saw Mr. Van in follow-up today. The clinical summary follows:     PREOP DIAGNOSIS   Carcinoid tumor     PROCEDURE   Right robot-assisted thoracoscopic right lower lobectomy, mediastinal lymph node dissection     DATE OF PROCEDURE  3/8/2023     HISTOPATHOLOGY   SPECIMEN   Procedure  Lobectomy    Specimen Laterality  Right    TUMOR   Tumor Focality  Single focus    Tumor Site  Lower lobe of lung    Tumor Size     Total Tumor Size (size of entire tumor)  Greatest Dimension (Centimeters): 1.1 cm   Histologic Type  Typical carcinoid / Neuroendocrine tumor, grade 1    Histologic Grade  G1, well differentiated    Spread Through Air Spaces (BRIE)  Not identified    Visceral Pleura Invasion  Not identified    Direct Invasion of Adjacent Structures  Not applicable (no adjacent structures present)    Treatment Effect  No known presurgical therapy    Lymphovascular Invasion  Not identified    MARGINS   Margin Status for Invasive Carcinoma  All margins negative for invasive carcinoma    Closest Margin(s) to Invasive Carcinoma  Parenchymal    Distance from Invasive Carcinoma to Closest Margin  2.7 cm   Margin Status for Non-Invasive Tumor  Not applicable    REGIONAL LYMPH NODES   Lymph Node(s) from Prior Procedures  No known prior lymph node sampling performed    Regional Lymph Node Status  All regional lymph nodes negative for tumor    Number of Lymph Nodes Examined  11    Flavia Site(s) Examined  4R: Lower paratracheal      9R: Pulmonary ligament      10R: Hilar      11R: Interlobar      7: Subcarinal    PATHOLOGIC STAGE CLASSIFICATION (pTNM, AJCC 8th Edition)       pT Category  pT1b    pN Category  pN0           COMPLICATIONS  None      INTERVAL STUDIES  CXR 4/4/2023:    No pneumothorax.  Small pleural effusion    CXR 3/13/2023:        /81 (BP Location: Right arm, Patient Position: Sitting, Cuff Size: Adult Regular)   Pulse 104   Temp 97.9  F (36.6  C)  (Oral)   Wt 102.2 kg (225 lb 4.8 oz)   SpO2 97%   BMI 28.93 kg/m     Physical Exam  Constitutional:       Appearance: Normal appearance.   Eyes:      Conjunctiva/sclera: Conjunctivae normal.   Cardiovascular:      Rate and Rhythm: Normal rate.   Pulmonary:      Effort: Pulmonary effort is normal.   Musculoskeletal:         General: Normal range of motion.   Skin:     General: Skin is warm and dry.      Comments: Incisions healing well   Neurological:      Mental Status: He is alert and oriented to person, place, and time.   Psychiatric:         Mood and Affect: Mood normal.         Behavior: Behavior normal.         Thought Content: Thought content normal.         Judgment: Judgment normal.         SUBJECTIVE   Mr. Van has well-controlled pain with extra strength tylenol. He has no burning or shooting pain. He is breathing well and eating well. He is walking without difficulty.      From a personal perspective, he is here with his mother.     IMPRESSION (C7A.8) Primary malignant neuroendocrine tumor of lung (H)  (primary encounter diagnosis)        This patient is a 47 year old man who is recovering well after undergoing a right lower lobectomy for a carcinoid tumor of the lung.      PLAN  I spent 25 min on the date of the encounter in chart review, patient visit, review of tests, documentation and/or discussion with other providers about the issues documented above. I reviewed the plan as follows:      1. Necessary Tests & Appointments: CT chest one year       All questions were answered and the patient and present family were in agreement with the plan.    I appreciate the opportunity to participate in the care of your patient and will keep you updated.    Sincerely,         Matheus Haas MD

## 2023-04-04 NOTE — NURSING NOTE
"Oncology Rooming Note    April 4, 2023 2:26 PM   West Van is a 47 year old male who presents for:    Chief Complaint   Patient presents with     Oncology Clinic Visit     Primary malignant neuroendocrine tumor of lung      Initial Vitals: /81 (BP Location: Right arm, Patient Position: Sitting, Cuff Size: Adult Regular)   Pulse 104   Temp 97.9  F (36.6  C) (Oral)   Wt 102.2 kg (225 lb 4.8 oz)   SpO2 97%   BMI 28.93 kg/m   Estimated body mass index is 28.93 kg/m  as calculated from the following:    Height as of 3/13/23: 1.88 m (6' 2\").    Weight as of this encounter: 102.2 kg (225 lb 4.8 oz). Body surface area is 2.31 meters squared.  Mild Pain (3) Comment: Data Unavailable   No LMP for male patient.  Allergies reviewed: Yes  Medications reviewed: Yes    Medications: Medication refills not needed today.  Pharmacy name entered into [facility]: [id number] - [address]    Clinical concerns: Would like to know which stage the cancers are in.    Would like to know if he needs to stop working or continue.       Ash Hatch            "

## 2023-04-27 ENCOUNTER — MYC MEDICAL ADVICE (OUTPATIENT)
Dept: SURGERY | Facility: CLINIC | Age: 48
End: 2023-04-27
Payer: COMMERCIAL

## 2023-04-28 NOTE — TELEPHONE ENCOUNTER
Oncology Nurse Triage - Reporting Symptoms    Situation:   West reporting the following symptoms: lingering cough after the lobectomy and returning to work    Background:   Treating Provider:    Dr. Haas: on 3/8/23: Right robot-assisted thoracoscopic right lower lobectomy, mediastinal lymph node dissection for carcinoid tumor of the lung; CT removed on 3/13/23  Dr. Sanchez: Liver Transplant referral, last visit was 11/14/22    Assessment  Onset of symptoms: After the lobectomy and returning to work    Home Treatment: Drinking water, sucking on cough drops and hard candy    Call made to West who reports:  --he sent a myc message to his PCP about his cough. PCP said to come in today, which pt did, and PCP also advised letting cancer team know about the cough. Pt states he is just letting Care Team know about his cough at the recommendation of the PCP.  --When he saw PCP today, PCP thought lisinopril was causing the cough. Discontinued lisinopril and is putting pt on atenolol and another water pill.    Pt reports that he gets up and hacks for 10-12 hours during the day.  Pt does not think he coughs during the night but PCP said it could be happening because pt wakes up not fully rested.   Coughing occurs during work when pt is doing a lot of talking, but otherwise can happen while he is sitting, and it is not associated with activity necessarily.   It is like an irritation that forces him to cough. Usually drinking water and taking cough drop or piece of hard candy helps. Pt doesn't think the cough is as bad as it was following the lobectomy.   Cough is mostly dry and hacking without mucous production.  No chest pain unless he coughs really hard which makes his chest hurt, but mostly it is throat irritation.  No fever, chills, sob, wheezing, weight loss, or night sweats.    Informed pt that this information will be sent to his Care Team as an FYI. If Care Team has further recommendations, they will be shared with  pt.   Pt voiced understanding of this and is in agreement with this plan of care.     Recommendations:   Routed to Thoracic Care Coordination, Tiffany Lepe, RNCC, Monae Hector RNCC

## 2023-05-11 ENCOUNTER — ANCILLARY PROCEDURE (OUTPATIENT)
Dept: CT IMAGING | Facility: CLINIC | Age: 48
End: 2023-05-11
Attending: INTERNAL MEDICINE
Payer: COMMERCIAL

## 2023-05-11 ENCOUNTER — LAB (OUTPATIENT)
Dept: LAB | Facility: CLINIC | Age: 48
End: 2023-05-11
Payer: COMMERCIAL

## 2023-05-11 ENCOUNTER — ANCILLARY PROCEDURE (OUTPATIENT)
Dept: MRI IMAGING | Facility: CLINIC | Age: 48
End: 2023-05-11
Attending: INTERNAL MEDICINE
Payer: COMMERCIAL

## 2023-05-11 DIAGNOSIS — C22.0 HEPATOCELLULAR CARCINOMA (H): ICD-10-CM

## 2023-05-11 LAB
AFP SERPL-MCNC: 29.9 NG/ML
ALBUMIN SERPL-MCNC: 3.9 G/DL (ref 3.4–5)
ALP SERPL-CCNC: 100 U/L (ref 40–150)
ALT SERPL W P-5'-P-CCNC: 49 U/L (ref 0–70)
ANION GAP SERPL CALCULATED.3IONS-SCNC: 5 MMOL/L (ref 3–14)
AST SERPL W P-5'-P-CCNC: 24 U/L (ref 0–45)
BASOPHILS # BLD AUTO: 0.1 10E3/UL (ref 0–0.2)
BASOPHILS NFR BLD AUTO: 1 %
BILIRUB SERPL-MCNC: 1.5 MG/DL (ref 0.2–1.3)
BUN SERPL-MCNC: 12 MG/DL (ref 7–30)
CALCIUM SERPL-MCNC: 9.5 MG/DL (ref 8.5–10.1)
CHLORIDE BLD-SCNC: 110 MMOL/L (ref 94–109)
CO2 SERPL-SCNC: 29 MMOL/L (ref 20–32)
CREAT SERPL-MCNC: 0.75 MG/DL (ref 0.66–1.25)
EOSINOPHIL # BLD AUTO: 0.2 10E3/UL (ref 0–0.7)
EOSINOPHIL NFR BLD AUTO: 2 %
ERYTHROCYTE [DISTWIDTH] IN BLOOD BY AUTOMATED COUNT: 12.4 % (ref 10–15)
GFR SERPL CREATININE-BSD FRML MDRD: >90 ML/MIN/1.73M2
GLUCOSE BLD-MCNC: 138 MG/DL (ref 70–99)
HCT VFR BLD AUTO: 45.1 % (ref 40–53)
HGB BLD-MCNC: 15.5 G/DL (ref 13.3–17.7)
IMM GRANULOCYTES # BLD: 0 10E3/UL
IMM GRANULOCYTES NFR BLD: 0 %
LYMPHOCYTES # BLD AUTO: 1.9 10E3/UL (ref 0.8–5.3)
LYMPHOCYTES NFR BLD AUTO: 27 %
MCH RBC QN AUTO: 29.6 PG (ref 26.5–33)
MCHC RBC AUTO-ENTMCNC: 34.4 G/DL (ref 31.5–36.5)
MCV RBC AUTO: 86 FL (ref 78–100)
MONOCYTES # BLD AUTO: 0.7 10E3/UL (ref 0–1.3)
MONOCYTES NFR BLD AUTO: 11 %
NEUTROPHILS # BLD AUTO: 4.2 10E3/UL (ref 1.6–8.3)
NEUTROPHILS NFR BLD AUTO: 59 %
NRBC # BLD AUTO: 0 10E3/UL
NRBC BLD AUTO-RTO: 0 /100
PLATELET # BLD AUTO: 143 10E3/UL (ref 150–450)
POTASSIUM BLD-SCNC: 4 MMOL/L (ref 3.4–5.3)
PROT SERPL-MCNC: 7.1 G/DL (ref 6.8–8.8)
RBC # BLD AUTO: 5.23 10E6/UL (ref 4.4–5.9)
SODIUM SERPL-SCNC: 144 MMOL/L (ref 133–144)
WBC # BLD AUTO: 7.1 10E3/UL (ref 4–11)

## 2023-05-11 PROCEDURE — 71260 CT THORAX DX C+: CPT | Mod: GC | Performed by: RADIOLOGY

## 2023-05-11 PROCEDURE — 36415 COLL VENOUS BLD VENIPUNCTURE: CPT

## 2023-05-11 PROCEDURE — A9585 GADOBUTROL INJECTION: HCPCS | Performed by: STUDENT IN AN ORGANIZED HEALTH CARE EDUCATION/TRAINING PROGRAM

## 2023-05-11 PROCEDURE — 80053 COMPREHEN METABOLIC PANEL: CPT

## 2023-05-11 PROCEDURE — 82105 ALPHA-FETOPROTEIN SERUM: CPT

## 2023-05-11 PROCEDURE — 85025 COMPLETE CBC W/AUTO DIFF WBC: CPT

## 2023-05-11 PROCEDURE — 74183 MRI ABD W/O CNTR FLWD CNTR: CPT | Mod: GC | Performed by: STUDENT IN AN ORGANIZED HEALTH CARE EDUCATION/TRAINING PROGRAM

## 2023-05-11 RX ORDER — IOPAMIDOL 755 MG/ML
110 INJECTION, SOLUTION INTRAVASCULAR ONCE
Status: COMPLETED | OUTPATIENT
Start: 2023-05-11 | End: 2023-05-11

## 2023-05-11 RX ORDER — GADOBUTROL 604.72 MG/ML
10 INJECTION INTRAVENOUS ONCE
Status: COMPLETED | OUTPATIENT
Start: 2023-05-11 | End: 2023-05-11

## 2023-05-11 RX ADMIN — GADOBUTROL 10 ML: 604.72 INJECTION INTRAVENOUS at 09:59

## 2023-05-11 RX ADMIN — IOPAMIDOL 110 ML: 755 INJECTION, SOLUTION INTRAVASCULAR at 10:03

## 2023-05-15 ENCOUNTER — VIRTUAL VISIT (OUTPATIENT)
Dept: ONCOLOGY | Facility: CLINIC | Age: 48
End: 2023-05-15
Attending: INTERNAL MEDICINE
Payer: COMMERCIAL

## 2023-05-15 ENCOUNTER — NURSE TRIAGE (OUTPATIENT)
Dept: ONCOLOGY | Facility: CLINIC | Age: 48
End: 2023-05-15
Payer: COMMERCIAL

## 2023-05-15 DIAGNOSIS — E11.9 TYPE 2 DIABETES MELLITUS WITHOUT COMPLICATION, WITHOUT LONG-TERM CURRENT USE OF INSULIN (H): ICD-10-CM

## 2023-05-15 DIAGNOSIS — C22.0 HEPATOCELLULAR CARCINOMA (H): Primary | ICD-10-CM

## 2023-05-15 DIAGNOSIS — C7A.8 PRIMARY MALIGNANT NEUROENDOCRINE TUMOR OF LUNG (H): ICD-10-CM

## 2023-05-15 PROCEDURE — 99214 OFFICE O/P EST MOD 30 MIN: CPT | Mod: VID | Performed by: INTERNAL MEDICINE

## 2023-05-15 NOTE — TELEPHONE ENCOUNTER
URGENT FINDING  1113Dawn from  imaging outreach center calling to let Dr. Sanchez know about URGENT FINDING.   Dr. Sanchez was paged twice that MRI of abdomen from 5/11/23 is ready for review.     1117 Dr. Zimmer paged on URGENT FINDING        1113 Per Care Team, Result is noted. Dr Sanchez will be seeing patient today and review with him.

## 2023-05-15 NOTE — LETTER
5/15/2023         RE: West Van  [address]  [facility] [address]        I am seeing West Van today in follow-up of hepatocellular carcinoma and neuroendocrine tumor of the lung.    He is 47 years old without previously recognized liver disease who had an incidental finding of a liver mass during evaluation for his kidney stones.  He has since had ablation of his solitary lesion with a biopsy confirming hepatocellular carcinoma.  During evaluation that he was also found to have a solitary lung nodule which proved to be a low-grade neuroendocrine tumor which was resected about 2 months ago.  He returns today for follow-up of both cancers.    He tells me overall he is feeling fairly well and almost back to his normal self after all of his procedures.  He still does not have quite enough stamina but he is getting out walking his dog every day and managing at least 10,000 steps.  He still has a little bit of a cough but that seems to be getting better after his lisinopril was switched to atenolol and he had a course of antibiotics.  He has some chronic pain in his right shoulder ever since his last surgery and is just starting some physical therapy for that.  He continues to work on his diabetes but his last A1c was over 7.  His appetite is certainly good and currently his weight is stable.    GENERAL: Healthy, alert and no distress  EYES: Eyes grossly normal to inspection.  No discharge or erythema, or obvious scleral/conjunctival abnormalities.  RESP: No audible wheeze, cough, or visible cyanosis.  No visible retractions or increased work of breathing.    SKIN: Visible skin clear. No significant rash, abnormal pigmentation or lesions.  NEURO: Cranial nerves grossly intact.  Mentation and speech appropriate for age.  PSYCH: Mentation appears normal, affect normal/bright, judgement and insight intact, normal speech and appearance well-groomed.    I personally reviewed his scans and went over the results with  him.  He has a little bit more of a pleural effusion on the right side.  He has the ablation defect in his liver which is getting a little bit smaller.  He has 2 stable liRADS 3 lesions in his liver.    His lab work is notable for an elevated but stable alpha-fetoprotein of about 30.  His electrolytes and renal function are normal.  His bilirubin is marginally elevated at 1.5 with a normal albumin and liver enzymes.  He has stable mild thrombocytopenia with platelets of 143 and otherwise normal blood counts.    Assessment/plan:  1.  Hepatocellular carcinoma.  Currently there is no clear radiographic evidence of disease but with his underlying chronic liver disease and to liRADS 3 lesions he warrants ongoing close surveillance.  I think the mild elevation of his alpha-fetoprotein is a function of his chronic liver disease rather than indication of residual tumor.  We will plan on follow-up imaging and labs in about 3 months.  2.  Chronic liver disease presumed secondary to metabolic syndrome.  He will need ongoing follow-up locally.  I explained to him that thing he can do most to help with this is to work on losing weight and managing his diabetes.  3.  Low-grade neuroendocrine tumor of the lung.  His risk of recurrence from this is very low and I do not think warrants any additional imaging beyond what we will be doing to follow-up for his hepatocellular carcinoma.  4.  Diabetes and obesity.  I discussed the importance of working on this with managing his diet and increasing his exercise.  We discussed making small but sustainable changes in his lifestyle to deal with this over the long haul.  He will continue to work with his primary care physician on these issues.            Michael Sanchez MD

## 2023-05-15 NOTE — PROGRESS NOTES
Virtual Visit Details    Type of service:  Video Visit   Video Start Time: 2:00  Video End Time:2:30     Originating Location (pt. Location): Home  Distant Location (provider location):  On-site  Platform used for Video Visit: Hai         I am seeing West Van today in follow-up of hepatocellular carcinoma and neuroendocrine tumor of the lung.    He is 47 years old without previously recognized liver disease who had an incidental finding of a liver mass during evaluation for his kidney stones.  He has since had ablation of his solitary lesion with a biopsy confirming hepatocellular carcinoma.  During evaluation that he was also found to have a solitary lung nodule which proved to be a low-grade neuroendocrine tumor which was resected about 2 months ago.  He returns today for follow-up of both cancers.    He tells me overall he is feeling fairly well and almost back to his normal self after all of his procedures.  He still does not have quite enough stamina but he is getting out walking his dog every day and managing at least 10,000 steps.  He still has a little bit of a cough but that seems to be getting better after his lisinopril was switched to atenolol and he had a course of antibiotics.  He has some chronic pain in his right shoulder ever since his last surgery and is just starting some physical therapy for that.  He continues to work on his diabetes but his last A1c was over 7.  His appetite is certainly good and currently his weight is stable.    GENERAL: Healthy, alert and no distress  EYES: Eyes grossly normal to inspection.  No discharge or erythema, or obvious scleral/conjunctival abnormalities.  RESP: No audible wheeze, cough, or visible cyanosis.  No visible retractions or increased work of breathing.    SKIN: Visible skin clear. No significant rash, abnormal pigmentation or lesions.  NEURO: Cranial nerves grossly intact.  Mentation and speech appropriate for age.  PSYCH: Mentation appears  normal, affect normal/bright, judgement and insight intact, normal speech and appearance well-groomed.    I personally reviewed his scans and went over the results with him.  He has a little bit more of a pleural effusion on the right side.  He has the ablation defect in his liver which is getting a little bit smaller.  He has 2 stable liRADS 3 lesions in his liver.    His lab work is notable for an elevated but stable alpha-fetoprotein of about 30.  His electrolytes and renal function are normal.  His bilirubin is marginally elevated at 1.5 with a normal albumin and liver enzymes.  He has stable mild thrombocytopenia with platelets of 143 and otherwise normal blood counts.    Assessment/plan:  1.  Hepatocellular carcinoma.  Currently there is no clear radiographic evidence of disease but with his underlying chronic liver disease and to liRADS 3 lesions he warrants ongoing close surveillance.  I think the mild elevation of his alpha-fetoprotein is a function of his chronic liver disease rather than indication of residual tumor.  We will plan on follow-up imaging and labs in about 3 months.  2.  Chronic liver disease presumed secondary to metabolic syndrome.  He will need ongoing follow-up locally.  I explained to him that thing he can do most to help with this is to work on losing weight and managing his diabetes.  3.  Low-grade neuroendocrine tumor of the lung.  His risk of recurrence from this is very low and I do not think warrants any additional imaging beyond what we will be doing to follow-up for his hepatocellular carcinoma.  4.  Diabetes and obesity.  I discussed the importance of working on this with managing his diet and increasing his exercise.  We discussed making small but sustainable changes in his lifestyle to deal with this over the long haul.  He will continue to work with his primary care physician on these issues.    5/17 Addendum: Radiology called to let me know they revised their interpretation,  noting an area of enhancement on edge of the ablation defect. We will review at tumor conference to discuss if this is clear cut enough to warrant another ablation versuss observing for now.    5/19 Addendum: Review at tumor conference confirms recurrence at edge of ablation defect. We will get him set up for another ablation. His transplant evaluation is in the works. I have called and discussed with him.

## 2023-05-15 NOTE — NURSING NOTE
Is the patient currently in the state of [place]? YES    Visit mode:VIDEO    If the visit is dropped, the patient can be reconnected by: VIDEO VISIT: Send to e-mail at: [email or web address]    Will anyone else be joining the visit? NO      How would you like to obtain your AVS? MyChart    Are changes needed to the allergy or medication list? NO  Patient verified medications and allergies are correct via eCheck-in. Patient confirms no changes at this time and/or since last reviewed by clinic staff.      Reason for visit: Oncology Video Visit Return (HCC, f/u)  West Van 47 year old male presents today for f/u on HCC and review labs/CT results.  Anna Marsh, Virtual Facilitator

## 2023-05-15 NOTE — LETTER
5/15/2023         RE: West Van  [address]  [facility] [address]        Dear Colleague,    Thank you for referring your patient, West Van, to the [facility]. Please see a copy of my visit note below.      I am seeing West Van today in follow-up of hepatocellular carcinoma and neuroendocrine tumor of the lung.    He is 47 years old without previously recognized liver disease who had an incidental finding of a liver mass during evaluation for his kidney stones.  He has since had ablation of his solitary lesion with a biopsy confirming hepatocellular carcinoma.  During evaluation that he was also found to have a solitary lung nodule which proved to be a low-grade neuroendocrine tumor which was resected about 2 months ago.  He returns today for follow-up of both cancers.    He tells me overall he is feeling fairly well and almost back to his normal self after all of his procedures.  He still does not have quite enough stamina but he is getting out walking his dog every day and managing at least 10,000 steps.  He still has a little bit of a cough but that seems to be getting better after his lisinopril was switched to atenolol and he had a course of antibiotics.  He has some chronic pain in his right shoulder ever since his last surgery and is just starting some physical therapy for that.  He continues to work on his diabetes but his last A1c was over 7.  His appetite is certainly good and currently his weight is stable.    GENERAL: Healthy, alert and no distress  EYES: Eyes grossly normal to inspection.  No discharge or erythema, or obvious scleral/conjunctival abnormalities.  RESP: No audible wheeze, cough, or visible cyanosis.  No visible retractions or increased work of breathing.    SKIN: Visible skin clear. No significant rash, abnormal pigmentation or lesions.  NEURO: Cranial nerves grossly intact.  Mentation and speech appropriate for age.  PSYCH: Mentation  appears normal, affect normal/bright, judgement and insight intact, normal speech and appearance well-groomed.    I personally reviewed his scans and went over the results with him.  He has a little bit more of a pleural effusion on the right side.  He has the ablation defect in his liver which is getting a little bit smaller.  He has 2 stable liRADS 3 lesions in his liver.    His lab work is notable for an elevated but stable alpha-fetoprotein of about 30.  His electrolytes and renal function are normal.  His bilirubin is marginally elevated at 1.5 with a normal albumin and liver enzymes.  He has stable mild thrombocytopenia with platelets of 143 and otherwise normal blood counts.    Assessment/plan:  1.  Hepatocellular carcinoma.  Currently there is no clear radiographic evidence of disease but with his underlying chronic liver disease and to liRADS 3 lesions he warrants ongoing close surveillance.  I think the mild elevation of his alpha-fetoprotein is a function of his chronic liver disease rather than indication of residual tumor.  We will plan on follow-up imaging and labs in about 3 months.  2.  Chronic liver disease presumed secondary to metabolic syndrome.  He will need ongoing follow-up locally.  I explained to him that thing he can do most to help with this is to work on losing weight and managing his diabetes.  3.  Low-grade neuroendocrine tumor of the lung.  His risk of recurrence from this is very low and I do not think warrants any additional imaging beyond what we will be doing to follow-up for his hepatocellular carcinoma.  4.  Diabetes and obesity.  I discussed the importance of working on this with managing his diet and increasing his exercise.  We discussed making small but sustainable changes in his lifestyle to deal with this over the long haul.  He will continue to work with his primary care physician on these issues.        Again, thank you for allowing me to participate in the care of your  patient.        Sincerely,        Michael Sanchez MD

## 2023-05-16 PROBLEM — F41.9 ANXIETY: Status: ACTIVE | Noted: 2018-10-08

## 2023-05-16 PROBLEM — C22.9 MALIGNANT NEOPLASM OF LIVER (H): Status: ACTIVE | Noted: 2022-10-04

## 2023-05-16 PROBLEM — C34.31 MALIGNANT NEOPLASM OF LOWER LOBE OF RIGHT LUNG (H): Status: ACTIVE | Noted: 2023-01-10

## 2023-05-16 PROBLEM — C34.31 MALIGNANT NEOPLASM OF LOWER LOBE OF RIGHT LUNG (H): Status: RESOLVED | Noted: 2023-01-10 | Resolved: 2023-05-16

## 2023-05-16 PROBLEM — C22.0 HEPATOCELLULAR CARCINOMA (H): Status: ACTIVE | Noted: 2023-01-10

## 2023-05-16 PROBLEM — K80.20 CALCULUS OF GALLBLADDER WITHOUT CHOLECYSTITIS WITHOUT OBSTRUCTION: Status: ACTIVE | Noted: 2022-10-04

## 2023-05-16 PROBLEM — E11.9 TYPE 2 DIABETES MELLITUS WITHOUT COMPLICATION, WITHOUT LONG-TERM CURRENT USE OF INSULIN (H): Status: ACTIVE | Noted: 2019-10-14

## 2023-05-16 PROBLEM — C22.0 HEPATOCELLULAR CARCINOMA (H): Status: RESOLVED | Noted: 2023-01-10 | Resolved: 2023-05-16

## 2023-05-17 LAB — RADIOLOGIST FLAGS: ABNORMAL

## 2023-05-19 ENCOUNTER — DOCUMENTATION ONLY (OUTPATIENT)
Dept: TRANSPLANT | Facility: CLINIC | Age: 48
End: 2023-05-19
Payer: COMMERCIAL

## 2023-05-19 NOTE — PROGRESS NOTES
5/11/23 MRI  1.) treatment zone of 5.4 but >   > Nodular area - 2 cm enhancement that calling residual HCC  2.) other LR-3s to watch        Recs:  - Dr. Sanchez to discuss with patient getting re-treatment with Dr. Munguia  - committee in agreement with proceeding with liver transplant evaluation

## 2023-05-24 NOTE — TELEPHONE ENCOUNTER
West Van 1975  Referral initial call 1/2/2023  MELD: 10  Blood Type: A POS  PCP:  Cr Martinez, APRN-CNP  Referring:  Dr. Walekr Ramirez   Have you had care at a [facility] facility before/seen by any of our doctors? Yes  Where do you get care/labs done? [facility] [address], [place] [address]  Insurance: [facility]    Social Hx: lives in [facility]. Originally from [place] [place] [facility].  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  Education: technical school.  Functional status: having fatigue but good functional status.   Family/social support: single, close friend Joe. Lives alone with Hermes schaffer. No kids. Parents alive, live in [place]. Brother Prabhjot lives in [place] [address]. No nieces/nephews. Last 8 months have been hell.     Liver DX/History: first told of liver disease: June 30, 2022 had kidney stone. MRI in July 2022, went in October 2022 for follow up.   Dx: RIOS/liver lesion  HE/Meds: Mild HE prior to ablation, No Meds.  Hematemesis/GIB/Dark stools: No Hematemesis, No GIB.  EGD/Colonoscopy: No EGD, No [place].  Ascites/Paracentesis/Diuretics/TIPS/SBP: No Ascites, No Para s, no Meds.  Etoh & Drug use/Legal issues/Chem Dep: last drink 1997, no drug use, no legal problems, no chem dep.  HCC diagnosis: yes, 12/6/22  ex lap, segment 8 liver biopsy, MWA, lap gris.  Surgeries: liver ablation 12/2022, 3/8/23 R lower lobectomy, mediastinal lymph node dissection. Had back surgery beginning 2022, [id number] anal fissure.  LDLT discussed: yes discussed in-detail.    Other Medical hx:   Neuro/Strokes/Seizures: none  Cardiac/MIs, Echos/Angios: none, HTN taking atenolol.   Lungs/Smoking/O2 use: pulmonary nodules. Never smoked. No O2. Allergies taking flonase / Allegra.  Kidneys/Dialysis: kidney stones.  Cancer/PSA: no skin, prostate, colon cancer.   Vaccines/Hepatitis/COVID: Vaccinated for Covid X3, unsure of other  vaccinations.  Diabetes: Yes DM2. Taking metformin. Never checks BG.  Nutrition: having weight loss. Was 217lb prior to lobectomy, 209 now. Trying to eat well.  Dentist: has a dentist, up to date on cleanings.  Mental health: anxiety, taking prednisone.    Plan: Do you have someone who can join you at the evaluation? Yes friend Joe will accompany, plan to come for evaluation on 6/20.   What is your scheduling/availability like: Works M-F 8-4:30, needs to request time off for appointments in advance.

## 2023-06-02 PROBLEM — K75.81 NASH (NONALCOHOLIC STEATOHEPATITIS): Status: ACTIVE | Noted: 2023-06-02

## 2023-06-16 ENCOUNTER — ANCILLARY PROCEDURE (OUTPATIENT)
Dept: CARDIOLOGY | Facility: CLINIC | Age: 48
End: 2023-06-16
Attending: INTERNAL MEDICINE
Payer: COMMERCIAL

## 2023-06-16 DIAGNOSIS — K75.81 NASH (NONALCOHOLIC STEATOHEPATITIS): ICD-10-CM

## 2023-06-16 DIAGNOSIS — K76.6 PORTAL HYPERTENSION (H): ICD-10-CM

## 2023-06-16 DIAGNOSIS — C22.0 HEPATOCELLULAR CARCINOMA (H): ICD-10-CM

## 2023-06-16 DIAGNOSIS — K75.81 LIVER CIRRHOSIS SECONDARY TO NASH (H): ICD-10-CM

## 2023-06-16 DIAGNOSIS — K74.60 LIVER CIRRHOSIS SECONDARY TO NASH (H): ICD-10-CM

## 2023-06-16 DIAGNOSIS — K76.9 LIVER LESION: ICD-10-CM

## 2023-06-16 LAB — LVEF ECHO: NORMAL

## 2023-06-16 PROCEDURE — 93306 TTE W/DOPPLER COMPLETE: CPT | Performed by: INTERNAL MEDICINE

## 2023-06-19 PROCEDURE — 99358 PROLONG SERVICE W/O CONTACT: CPT | Performed by: INTERNAL MEDICINE

## 2023-06-20 ENCOUNTER — COMMITTEE REVIEW (OUTPATIENT)
Dept: TRANSPLANT | Facility: CLINIC | Age: 48
End: 2023-06-20

## 2023-06-20 ENCOUNTER — OFFICE VISIT (OUTPATIENT)
Dept: GASTROENTEROLOGY | Facility: CLINIC | Age: 48
End: 2023-06-20
Attending: INTERNAL MEDICINE
Payer: COMMERCIAL

## 2023-06-20 ENCOUNTER — OFFICE VISIT (OUTPATIENT)
Dept: TRANSPLANT | Facility: CLINIC | Age: 48
End: 2023-06-20
Attending: INTERNAL MEDICINE
Payer: COMMERCIAL

## 2023-06-20 ENCOUNTER — LAB (OUTPATIENT)
Dept: LAB | Facility: CLINIC | Age: 48
End: 2023-06-20
Attending: INTERNAL MEDICINE
Payer: COMMERCIAL

## 2023-06-20 VITALS
DIASTOLIC BLOOD PRESSURE: 93 MMHG | HEART RATE: 122 BPM | WEIGHT: 214.3 LBS | BODY MASS INDEX: 26.64 KG/M2 | SYSTOLIC BLOOD PRESSURE: 149 MMHG | OXYGEN SATURATION: 96 % | HEIGHT: 75 IN

## 2023-06-20 DIAGNOSIS — C22.0 HEPATOCELLULAR CARCINOMA (H): ICD-10-CM

## 2023-06-20 DIAGNOSIS — K76.9 LIVER LESION: ICD-10-CM

## 2023-06-20 DIAGNOSIS — K75.81 LIVER CIRRHOSIS SECONDARY TO NASH (H): ICD-10-CM

## 2023-06-20 DIAGNOSIS — K76.6 PORTAL HYPERTENSION (H): ICD-10-CM

## 2023-06-20 DIAGNOSIS — N20.0 KIDNEY STONE: ICD-10-CM

## 2023-06-20 DIAGNOSIS — K74.60 LIVER CIRRHOSIS SECONDARY TO NASH (H): ICD-10-CM

## 2023-06-20 DIAGNOSIS — K75.81 NASH (NONALCOHOLIC STEATOHEPATITIS): ICD-10-CM

## 2023-06-20 DIAGNOSIS — K75.81 NASH (NONALCOHOLIC STEATOHEPATITIS): Primary | ICD-10-CM

## 2023-06-20 DIAGNOSIS — E11.69 TYPE 2 DIABETES MELLITUS WITH OTHER SPECIFIED COMPLICATION, WITHOUT LONG-TERM CURRENT USE OF INSULIN (H): ICD-10-CM

## 2023-06-20 DIAGNOSIS — K74.60 LIVER CIRRHOSIS SECONDARY TO NONALCOHOLIC STEATOHEPATITIS (NASH) (H): ICD-10-CM

## 2023-06-20 DIAGNOSIS — Z01.818 ENCOUNTER FOR PRE-TRANSPLANT EVALUATION FOR CHRONIC LIVER DISEASE: ICD-10-CM

## 2023-06-20 DIAGNOSIS — C7A.090 MALIGNANT CARCINOID TUMOR OF LUNG (H): Primary | ICD-10-CM

## 2023-06-20 DIAGNOSIS — K75.81 LIVER CIRRHOSIS SECONDARY TO NONALCOHOLIC STEATOHEPATITIS (NASH) (H): ICD-10-CM

## 2023-06-20 LAB
6 MIN WALK (FT): 1630 FT
6 MIN WALK (M): 497 M
AFP SERPL-MCNC: 28.8 NG/ML
ALBUMIN MFR UR ELPH: 13.9 MG/DL
ALBUMIN SERPL BCG-MCNC: 4.2 G/DL (ref 3.5–5.2)
ALBUMIN UR-MCNC: 10 MG/DL
ALP SERPL-CCNC: 106 U/L (ref 40–129)
ALT SERPL W P-5'-P-CCNC: 30 U/L (ref 0–70)
AMORPH CRY #/AREA URNS HPF: ABNORMAL /HPF
ANION GAP SERPL CALCULATED.3IONS-SCNC: 7 MMOL/L (ref 7–15)
APPEARANCE UR: ABNORMAL
AST SERPL W P-5'-P-CCNC: 24 U/L (ref 0–45)
BILIRUB DIRECT SERPL-MCNC: 0.42 MG/DL (ref 0–0.3)
BILIRUB SERPL-MCNC: 1.2 MG/DL
BILIRUB UR QL STRIP: NEGATIVE
BUN SERPL-MCNC: 12.5 MG/DL (ref 6–20)
CALCIUM SERPL-MCNC: 9.9 MG/DL (ref 8.6–10)
CHLORIDE SERPL-SCNC: 105 MMOL/L (ref 98–107)
CHOLEST SERPL-MCNC: 140 MG/DL
CMV IGG SERPL IA-ACNC: <0.2 U/ML
CMV IGG SERPL IA-ACNC: NORMAL
COLOR UR AUTO: YELLOW
CREAT SERPL-MCNC: 0.79 MG/DL (ref 0.67–1.17)
CREAT UR-MCNC: 229 MG/DL
DEPRECATED CALCIDIOL+CALCIFEROL SERPL-MC: 56 UG/L (ref 20–75)
DEPRECATED HCO3 PLAS-SCNC: 28 MMOL/L (ref 22–29)
EBV VCA IGG SER IA-ACNC: <10 U/ML
EBV VCA IGG SER IA-ACNC: NORMAL
ERYTHROCYTE [DISTWIDTH] IN BLOOD BY AUTOMATED COUNT: 12.5 % (ref 10–15)
FERRITIN SERPL-MCNC: 1115 NG/ML (ref 31–409)
GFR SERPL CREATININE-BSD FRML MDRD: >90 ML/MIN/1.73M2
GLUCOSE SERPL-MCNC: 162 MG/DL (ref 70–99)
GLUCOSE UR STRIP-MCNC: NEGATIVE MG/DL
HAV IGG SER QL IA: NONREACTIVE
HBA1C MFR BLD: 7 %
HBV CORE AB SERPL QL IA: NONREACTIVE
HBV SURFACE AB SERPL IA-ACNC: 0.71 M[IU]/ML
HBV SURFACE AB SERPL IA-ACNC: NONREACTIVE M[IU]/ML
HBV SURFACE AG SERPL QL IA: NONREACTIVE
HCT VFR BLD AUTO: 45.1 % (ref 40–53)
HCV AB SERPL QL IA: NONREACTIVE
HDLC SERPL-MCNC: 57 MG/DL
HGB BLD-MCNC: 15.4 G/DL (ref 13.3–17.7)
HGB UR QL STRIP: NEGATIVE
HIV 1+2 AB+HIV1 P24 AG SERPL QL IA: NONREACTIVE
INR PPP: 1.22 (ref 0.85–1.15)
IRON BINDING CAPACITY (ROCHE): 239 UG/DL (ref 240–430)
IRON SATN MFR SERPL: 56 % (ref 15–46)
IRON SERPL-MCNC: 134 UG/DL (ref 61–157)
KETONES UR STRIP-MCNC: NEGATIVE MG/DL
LDLC SERPL CALC-MCNC: 63 MG/DL
LEUKOCYTE ESTERASE UR QL STRIP: NEGATIVE
MCH RBC QN AUTO: 29.7 PG (ref 26.5–33)
MCHC RBC AUTO-ENTMCNC: 34.1 G/DL (ref 31.5–36.5)
MCV RBC AUTO: 87 FL (ref 78–100)
MUCOUS THREADS #/AREA URNS LPF: PRESENT /LPF
NITRATE UR QL: NEGATIVE
NONHDLC SERPL-MCNC: 83 MG/DL
PH UR STRIP: 6.5 [PH] (ref 5–7)
PHOSPHATE SERPL-MCNC: 2.8 MG/DL (ref 2.5–4.5)
PLATELET # BLD AUTO: 146 10E3/UL (ref 150–450)
POTASSIUM SERPL-SCNC: 4 MMOL/L (ref 3.4–5.3)
PROT SERPL-MCNC: 7.2 G/DL (ref 6.4–8.3)
PROT/CREAT 24H UR: 0.06 MG/MG CR (ref 0–0.2)
PSA SERPL DL<=0.01 NG/ML-MCNC: 1.02 NG/ML (ref 0–2.5)
RBC # BLD AUTO: 5.19 10E6/UL (ref 4.4–5.9)
RBC URINE: <1 /HPF
SODIUM SERPL-SCNC: 140 MMOL/L (ref 136–145)
SP GR UR STRIP: 1.02 (ref 1–1.03)
SQUAMOUS EPITHELIAL: <1 /HPF
T PALLIDUM AB SER QL: NONREACTIVE
TRANSFERRIN SERPL-MCNC: 204 MG/DL (ref 200–360)
TRIGL SERPL-MCNC: 102 MG/DL
TSH SERPL DL<=0.005 MIU/L-ACNC: 2.37 UIU/ML (ref 0.3–4.2)
UROBILINOGEN UR STRIP-MCNC: NORMAL MG/DL
WBC # BLD AUTO: 4.6 10E3/UL (ref 4–11)
WBC URINE: 1 /HPF

## 2023-06-20 PROCEDURE — 86481 TB AG RESPONSE T-CELL SUSP: CPT | Performed by: INTERNAL MEDICINE

## 2023-06-20 PROCEDURE — 83036 HEMOGLOBIN GLYCOSYLATED A1C: CPT

## 2023-06-20 PROCEDURE — 99215 OFFICE O/P EST HI 40 MIN: CPT | Performed by: TRANSPLANT SURGERY

## 2023-06-20 PROCEDURE — 99205 OFFICE O/P NEW HI 60 MIN: CPT | Performed by: INTERNAL MEDICINE

## 2023-06-20 PROCEDURE — 87340 HEPATITIS B SURFACE AG IA: CPT

## 2023-06-20 PROCEDURE — 83540 ASSAY OF IRON: CPT | Performed by: PATHOLOGY

## 2023-06-20 PROCEDURE — 80061 LIPID PANEL: CPT | Performed by: PATHOLOGY

## 2023-06-20 PROCEDURE — 85027 COMPLETE CBC AUTOMATED: CPT | Performed by: PATHOLOGY

## 2023-06-20 PROCEDURE — 86665 EPSTEIN-BARR CAPSID VCA: CPT

## 2023-06-20 PROCEDURE — 82306 VITAMIN D 25 HYDROXY: CPT

## 2023-06-20 PROCEDURE — 84466 ASSAY OF TRANSFERRIN: CPT

## 2023-06-20 PROCEDURE — 80053 COMPREHEN METABOLIC PANEL: CPT | Performed by: PATHOLOGY

## 2023-06-20 PROCEDURE — 86704 HEP B CORE ANTIBODY TOTAL: CPT

## 2023-06-20 PROCEDURE — 82728 ASSAY OF FERRITIN: CPT | Performed by: PATHOLOGY

## 2023-06-20 PROCEDURE — 99214 OFFICE O/P EST MOD 30 MIN: CPT | Performed by: TRANSPLANT SURGERY

## 2023-06-20 PROCEDURE — 84443 ASSAY THYROID STIM HORMONE: CPT | Performed by: PATHOLOGY

## 2023-06-20 PROCEDURE — 94729 DIFFUSING CAPACITY: CPT | Performed by: INTERNAL MEDICINE

## 2023-06-20 PROCEDURE — 86644 CMV ANTIBODY: CPT

## 2023-06-20 PROCEDURE — 94618 PULMONARY STRESS TESTING: CPT | Performed by: INTERNAL MEDICINE

## 2023-06-20 PROCEDURE — 84100 ASSAY OF PHOSPHORUS: CPT | Performed by: PATHOLOGY

## 2023-06-20 PROCEDURE — 82248 BILIRUBIN DIRECT: CPT | Performed by: PATHOLOGY

## 2023-06-20 PROCEDURE — 36415 COLL VENOUS BLD VENIPUNCTURE: CPT | Performed by: PATHOLOGY

## 2023-06-20 PROCEDURE — 94726 PLETHYSMOGRAPHY LUNG VOLUMES: CPT | Performed by: INTERNAL MEDICINE

## 2023-06-20 PROCEDURE — 86803 HEPATITIS C AB TEST: CPT

## 2023-06-20 PROCEDURE — 80321 ALCOHOLS BIOMARKERS 1OR 2: CPT | Mod: 90 | Performed by: PATHOLOGY

## 2023-06-20 PROCEDURE — 86706 HEP B SURFACE ANTIBODY: CPT

## 2023-06-20 PROCEDURE — 82105 ALPHA-FETOPROTEIN SERUM: CPT

## 2023-06-20 PROCEDURE — 83550 IRON BINDING TEST: CPT | Performed by: PATHOLOGY

## 2023-06-20 PROCEDURE — 81001 URINALYSIS AUTO W/SCOPE: CPT | Mod: XU | Performed by: PATHOLOGY

## 2023-06-20 PROCEDURE — 94375 RESPIRATORY FLOW VOLUME LOOP: CPT | Performed by: INTERNAL MEDICINE

## 2023-06-20 PROCEDURE — 84156 ASSAY OF PROTEIN URINE: CPT | Performed by: PATHOLOGY

## 2023-06-20 PROCEDURE — 85610 PROTHROMBIN TIME: CPT | Performed by: PATHOLOGY

## 2023-06-20 PROCEDURE — G0103 PSA SCREENING: HCPCS | Performed by: PATHOLOGY

## 2023-06-20 PROCEDURE — 86780 TREPONEMA PALLIDUM: CPT

## 2023-06-20 PROCEDURE — 99000 SPECIMEN HANDLING OFFICE-LAB: CPT | Performed by: PATHOLOGY

## 2023-06-20 PROCEDURE — 86708 HEPATITIS A ANTIBODY: CPT

## 2023-06-20 RX ORDER — BENZONATATE 200 MG/1
200 CAPSULE ORAL
Status: ON HOLD | COMMUNITY
Start: 2023-06-02 | End: 2024-06-26

## 2023-06-20 NOTE — PROGRESS NOTES
"HPI      ROS      Physical Exam    \"Much or all of the text in this note was generated through the use of Dragon Dictate voice to text software. Errors in spelling or words which appear to be out of context are unintentional, may be present due having escaped editing\"    Assessment and Plan:  1. liver transplant evaluation - patient is a good candidate overall. Benefits and surgical risks of a liver transplantation were discussed.  2.  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  History of lung lesion metastatic neuroendocrine tumor resected.    Surgical evaluation:  1. Portal Vein:Patent  2. Hepatic Artery: Open  3. TIPS: absent  4. Previous Abdominal Surgery: Cholecystectomy and liver ablation  5. Hepatocellular Carcinoma: Yes-ablated  6. Ascites: Present - minimal  7. Costal Angle: wide  8. Portopulmonary Hypertension: absent  9. Hepatopulmonary Syndrome: absent  10. Cardiac Evaluation: needs stress echocardiogram  11. Nutritional Status: Good    12. Meets guidelines to receive Living Donor  No  13. Potential Living donors No    Recommendations:     He needs a full work-up.  The work-up should also include further details about the neuroendocrine lesion resected from the lung advertent renal      Patients overall evaluation will be discussed at the Liver Transplant selection committee meeting with a final recommendation on the patients suitability for transplant to be made at that time.    Consult Full  Details:  West Van was seen in consultation at the request of Dr. Sanchez  for evaluation as a potential liver transplant recipient.    Reason for Visit:  West Van is a 47 year old year old male with nonalcoholic steatopheatitis, who presents for liver transplant evaluation.    HPI:  Presenting complaint: Liver cancer    Approximately a year ago, he was noted by his primary care physician to have liver issues.  He was then referred to a GI doctor.  In the meantime imaging of the " liver showed liver cancer.  He was seen by Dr. Sacnhez and then advised ablation of the liver lesion.  On the MRI multiple lesions were noted.  Last year he also had a lung lesion excised by Dr. Haas which was noted to be a low-grade neuroendocrine tumor.  He does have easy fatigability.  No ascites or variceal bleeding or encephalopathy.  He is an IT person by occupation.  Present with him were to friends who are very supportive.  Past Medical History:   Diagnosis Date     Benign essential HTN 04/13/2016     Diabetes (H)      Liver cancer (H) 10/04/2022     RIOS (nonalcoholic steatohepatitis) 6/2/2023     PONV (postoperative nausea and vomiting)      Past Surgical History:   Procedure Laterality Date     BRONCHOSCOPY, WITH BIOPSY, ROBOT ASSISTED N/A 1/17/2023    Procedure: BRONCHOSCOPY, USING OPTICAL TRACKING SYSTEM, ion;  Surgeon: Ani Guillaume MD;  Location: [facility]     DAVINCI LOBECTOMY LUNG Right 3/8/2023    Procedure: Robot-assisted right thoracoscopic lower lobectomy, mediastinal lymph node dissection, intercostal nerve cryoablation ;  Surgeon: Matheus Haas MD;  Location: [facility]     ENDOBRONCHIAL ULTRASOUND FLEXIBLE N/A 1/17/2023    Procedure: endobronchial  ultrasound and transbronchial biopsies;  Surgeon: Ani Guillaume MD;  Location: [facility]     LAPAROSCOPIC ABLATION LIVER TUMOR N/A 12/6/2022    Procedure: Diagnostic Laparoscopy, Hepatic Ultrasound, Laparoscopic ultrasound guided microwave ablation of liver tumor x1;  Surgeon: Armando Munguia MD;  Location: [facility]     LAPAROSCOPIC BIOPSY LIVER N/A 12/6/2022    Procedure: Laparoscopic Ultrasound Guided liver biopsy;  Surgeon: Armando Munguia MD;  Location: [facility]     LAPAROSCOPIC CHOLECYSTECTOMY N/A 12/6/2022    Procedure: Laparoscopic cholecystectomy;  Surgeon: Armando Munguia MD;  Location: [facility]     Past Surgical History:   Procedure Laterality Date     BRONCHOSCOPY, WITH BIOPSY, ROBOT ASSISTED N/A 1/17/2023    Procedure: BRONCHOSCOPY, USING  OPTICAL TRACKING SYSTEM, ion;  Surgeon: Ani Guillaume MD;  Location: [facility]     DAVINCI LOBECTOMY LUNG Right 3/8/2023    Procedure: Robot-assisted right thoracoscopic lower lobectomy, mediastinal lymph node dissection, intercostal nerve cryoablation ;  Surgeon: Matheus Haas MD;  Location: [facility]     ENDOBRONCHIAL ULTRASOUND FLEXIBLE N/A 1/17/2023    Procedure: endobronchial  ultrasound and transbronchial biopsies;  Surgeon: Ani Guillaume MD;  Location: [facility]     LAPAROSCOPIC ABLATION LIVER TUMOR N/A 12/6/2022    Procedure: Diagnostic Laparoscopy, Hepatic Ultrasound, Laparoscopic ultrasound guided microwave ablation of liver tumor x1;  Surgeon: Armando Munguia MD;  Location: [facility]     LAPAROSCOPIC BIOPSY LIVER N/A 12/6/2022    Procedure: Laparoscopic Ultrasound Guided liver biopsy;  Surgeon: Armando Munguia MD;  Location: [facility]     LAPAROSCOPIC CHOLECYSTECTOMY N/A 12/6/2022    Procedure: Laparoscopic cholecystectomy;  Surgeon: Armando Munguia MD;  Location: [facility]     No family history on file.  No Known Allergies  Prior to Admission medications    Medication Sig Start Date End Date Taking? Authorizing Provider   acetaminophen (TYLENOL) 325 MG tablet Take 3 tablets (975 mg) by mouth every 8 hours 3/10/23  Yes Reece Muir, TIM   Ashwagandha 125 MG CAPS Take 1 capsule by mouth every morning   Yes Reported, Patient   atenolol (TENORMIN) 25 MG tablet Take 25 mg by mouth every morning 9/16/22  Yes Reported, Patient   benzonatate (TESSALON) 200 MG capsule Take 200 mg by mouth 6/2/23  Yes Reported, Patient   liraglutide (VICTOZA) 18 MG/3ML solution Inject 1.8 mg Subcutaneous every evening   Yes Reported, Patient   magnesium 250 MG tablet Take 1 tablet by mouth every morning   Yes Reported, Patient   metFORMIN (GLUCOPHAGE XR) 500 MG 24 hr tablet Take 500 mg by mouth every morning   Yes Reported, Patient   Milk Thistle-Dand-Fennel-Licor (MILK THISTLE XTRA) CAPS capsule Take 1 capsule by mouth every  "morning   Yes Reported, Patient   polyethylene glycol (MIRALAX) 17 GM/Dose powder Take 17 g by mouth 12/6/22  Yes Reported, Patient   senna-docusate (SENOKOT-S/PERICOLACE) 8.6-50 MG tablet Take 1 tablet by mouth 2 times daily Reduce dose or temporarily discontinue if having loose stools. 3/10/23  Yes Reece Muir PA-C   TURMERIC PO Take 1 capsule by mouth every morning   Yes Reported, Patient   ULTICARE MINI 31G X 6 MM insulin pen needle Inject 1 each Subcutaneous At Bedtime 8/15/22  Yes Reported, Patient   Vitamin D3 (CHOLECALCIFEROL) 125 MCG (5000 UT) tablet Take 125 mcg by mouth every morning 10/18/22  Yes Reported, Patient   furosemide (LASIX) 20 MG tablet Take 10 mg by mouth 3/30/23 4/29/23  Reported, Patient   methocarbamol (ROBAXIN) 750 MG tablet Take 1 tablet (750 mg) by mouth every 6 hours as needed for muscle spasms  Patient not taking: Reported on 6/20/2023 3/10/23   Reece Muir PA-C   oxyCODONE (ROXICODONE) 5 MG tablet Take 1-2 tablets (5-10 mg) by mouth every 4 hours as needed for moderate pain (4-6)  Patient not taking: Reported on 6/20/2023 3/10/23   Reece Muir PA-C       Previous Transplant Hx: No    Cardiovascular Hx:       h/o Cardiac Issues: No       Exercise Tolerance: shortness of breath with exertion.    Potential Donor(s): No    ROS:    REVIEW OF SYSTEMS (check box if normal)  [x]                GENERAL  [x]                  PULMONARY [x]                 GENITOURINARY  [x]                 CNS                 [x]                  CARDIAC  [x]                  ENDOCRINE  [x]                 EARS,NOSE,THROAT [x]                  GASTROINTESTINAL [x]                  NEUROLOGIC    [x]                 MUSCLOSKELTAL  [x]                   HEMATOLOGY    Examination:     Vitals:  BP (!) 149/93   Pulse (!) 122   Ht 1.91 m (6' 3.2\")   Wt 97.2 kg (214 lb 4.8 oz)   SpO2 96%   BMI 26.65 kg/m      GENERAL APPEARANCE: alert and no distress  EYES: PERRL  HENT: mouth without ulcers " or lesions  NECK: supple, no adenopathy  RESP: lungs clear to auscultation - no rales, rhonchi or wheezes  CV: regular rhythm, normal rate, no rub   ABDOMEN:  soft, nontender, no tenderness or masses felt  MS: extremities normal- no gross deformities noted, no evidence of inflammation in joints, no muscle tenderness  SKIN: no rash  NEURO: Normal strength and tone, sensory exam grossly normal, mentation intact and speech normal  PSYCH: mentation appears normal. and affect normal/bright      Results:   Recent Results (from the past 168 hour(s))   Echocardiogram Complete    Collection Time: 06/16/23  2:19 PM   Result Value Ref Range    LVEF  55-60%    6 minute walk test    Collection Time: 06/20/23 12:00 AM   Result Value Ref Range    6 min walk (FT) 1,630 1,868 ft    6 Min Walk (M) 497 569 m   General PFT Lab (Please always keep checked)    Collection Time: 06/20/23  6:52 AM   Result Value Ref Range    FVC-Pred 5.34 L    FVC-Pre 3.91 L    FVC-%Pred-Pre 73 %    FEV1-Pre 3.13 L    FEV1-%Pred-Pre 73 %    FEV1FVC-Pred 80 %    FEV1FVC-Pre 80 %    FEFMax-Pred 10.89 L/sec    FEFMax-Pre 8.86 L/sec    FEFMax-%Pred-Pre 81 %    FEF2575-Pred 3.90 L/sec    FEF2575-Pre 2.77 L/sec    WYD7558-%Pred-Pre 70 %    ExpTime-Pre 6.59 sec    FIFMax-Pre 8.18 L/sec    VC-Pred 6.19 L    VC-Pre 3.68 L    VC-%Pred-Pre 59 %    IC-Pred 4.35 L    IC-Pre 2.24 L    IC-%Pred-Pre 51 %    ERV-Pred 1.81 L    ERV-Pre 1.44 L    ERV-%Pred-Pre 79 %    FEV1FEV6-Pred 81 %    FEV1FEV6-Pre 79 %    FRCPleth-Pred 3.98 L    FRCPleth-Pre 4.61 L    FRCPleth-%Pred-Pre 115 %    RVPleth-Pred 2.02 L    RVPleth-Pre 3.17 L    RVPleth-%Pred-Pre 156 %    TLCPleth-Pred 8.19 L    TLCPleth-Pre 6.85 L    TLCPleth-%Pred-Pre 83 %    DLCOunc-Pred 33.43 ml/min/mmHg    DLCOunc-Pre 28.77 ml/min/mmHg    DLCOunc-%Pred-Pre 86 %    VA-Pre 5.73 L    VA-%Pred-Pre 75 %    FEV1SVC-Pred 68 %    FEV1SVC-Pre 85 %   Lipid Profile    Collection Time: 06/20/23  8:13 AM   Result Value Ref Range     Cholesterol 140 <200 mg/dL    Triglycerides 102 <150 mg/dL    Direct Measure HDL 57 >=40 mg/dL    LDL Cholesterol Calculated 63 <=100 mg/dL    Non HDL Cholesterol 83 <130 mg/dL   Basic metabolic panel    Collection Time: 06/20/23  8:13 AM   Result Value Ref Range    Sodium 140 136 - 145 mmol/L    Potassium 4.0 3.4 - 5.3 mmol/L    Chloride 105 98 - 107 mmol/L    Carbon Dioxide (CO2) 28 22 - 29 mmol/L    Anion Gap 7 7 - 15 mmol/L    Urea Nitrogen 12.5 6.0 - 20.0 mg/dL    Creatinine 0.79 0.67 - 1.17 mg/dL    Calcium 9.9 8.6 - 10.0 mg/dL    Glucose 162 (H) 70 - 99 mg/dL    GFR Estimate >90 >60 mL/min/1.73m2   Hepatic panel    Collection Time: 06/20/23  8:13 AM   Result Value Ref Range    Protein Total 7.2 6.4 - 8.3 g/dL    Albumin 4.2 3.5 - 5.2 g/dL    Bilirubin Total 1.2 <=1.2 mg/dL    Alkaline Phosphatase 106 40 - 129 U/L    AST 24 0 - 45 U/L    ALT 30 0 - 70 U/L    Bilirubin Direct 0.42 (H) 0.00 - 0.30 mg/dL   Ferritin    Collection Time: 06/20/23  8:13 AM   Result Value Ref Range    Ferritin 1,115 (H) 31 - 409 ng/mL   Iron and iron binding capacity    Collection Time: 06/20/23  8:13 AM   Result Value Ref Range    Iron 134 61 - 157 ug/dL    Iron Binding Capacity 239 (L) 240 - 430 ug/dL    Iron Sat Index 56 (H) 15 - 46 %   Phosphorus    Collection Time: 06/20/23  8:13 AM   Result Value Ref Range    Phosphorus 2.8 2.5 - 4.5 mg/dL   Prostate spec antigen screen    Collection Time: 06/20/23  8:13 AM   Result Value Ref Range    Prostate Specific Antigen Screen 1.02 0.00 - 2.50 ng/mL   TSH with free T4 reflex    Collection Time: 06/20/23  8:13 AM   Result Value Ref Range    TSH 2.37 0.30 - 4.20 uIU/mL   INR    Collection Time: 06/20/23  8:13 AM   Result Value Ref Range    INR 1.22 (H) 0.85 - 1.15   CBC with platelets    Collection Time: 06/20/23  8:13 AM   Result Value Ref Range    WBC Count 4.6 4.0 - 11.0 10e3/uL    RBC Count 5.19 4.40 - 5.90 10e6/uL    Hemoglobin 15.4 13.3 - 17.7 g/dL    Hematocrit 45.1 40.0 - 53.0 %     MCV 87 78 - 100 fL    MCH 29.7 26.5 - 33.0 pg    MCHC 34.1 31.5 - 36.5 g/dL    RDW 12.5 10.0 - 15.0 %    Platelet Count 146 (L) 150 - 450 10e3/uL   Protein  random urine    Collection Time: 06/20/23  8:15 AM   Result Value Ref Range    Total Protein Urine mg/dL 13.9   mg/dL    Total Protein UR MG/MG CR 0.06 0.00 - 0.20 mg/mg Cr    Creatinine Urine mg/dL 229.0 mg/dL   UA Macroscopic with reflex to Microscopic and Culture    Collection Time: 06/20/23  8:15 AM    Specimen: Urine, Midstream   Result Value Ref Range    Color Urine Yellow Colorless, Straw, Light Yellow, Yellow    Appearance Urine Slightly Cloudy (A) Clear    Glucose Urine Negative Negative mg/dL    Bilirubin Urine Negative Negative    Ketones Urine Negative Negative mg/dL    Specific Gravity Urine 1.021 1.003 - 1.035    Blood Urine Negative Negative    pH Urine 6.5 5.0 - 7.0    Protein Albumin Urine 10 (A) Negative mg/dL    Urobilinogen Urine Normal Normal, 2.0 mg/dL    Nitrite Urine Negative Negative    Leukocyte Esterase Urine Negative Negative    Mucus Urine Present (A) None Seen /LPF    Amorphous Crystals Urine Few (A) None Seen /HPF    RBC Urine <1 <=2 /HPF    WBC Urine 1 <=5 /HPF    Squamous Epithelials Urine <1 <=1 /HPF     I had a long discussion with the patient regarding liver transplantation which included but was not limited to  the following points:    1. Liver transplant selection committee process.  2. The federal rules for cadaveric waiting list, the size and blood type matching of the organ. The availability of living-related donor transplantation.  3. The types of donors: brain death donors, non-heart beating donors, partial liver grafts: splits and living donor grafts  4. Extended criteria  Donors (older age, steasosis) and the increased  risk of primary non-function using the extended criteria donors  5. The CDC high risk donors,  Risk of donor transmitted infections and donor transmitted malignancy  6. The liver transplant operation  and the associated risks and technical complications which can include intraoperative death, post operative death,  Primary non-function, bleeding requiring re-operations, arterial and biliary complications, bowel perforations, and intra abdominal abscess. Some of these complicaitons may require a second operation.  7. The postoperative course, the ICU stay and risk of postoperative complications which can include sepsis, MI, stroke, brain injury, pneumonia, pleural effusions, and renal dysfunction.  8. The current 1 year and 5 year graft and patient survivals.  9. The need for life long immunosuppressive therapy and the side effects of these medications, including the possibility of toxicity, opportunistic infections, risk of cancer including lymphoma, and the possibility of rejection even if the patient is taking the medication exactly as prescribed.  10. The need for compliance with medications and follow-up visits in the clinic and thereafter.  11. The patient and family understand these risks and wish to proceed to transplantation  Review of prior external note(s) from - CareEverywhere information from other speciality providers reviewed  60 minutes spent by me on the date of the encounter doing chart review, history and exam, documentation and further activities per the note

## 2023-06-20 NOTE — PROGRESS NOTES
[facility]  Outpatient MNT: Liver Transplant Evaluation    Current BMI: 26.65 (HT 75 in, .3 lbs/97.2 kg)  BMI is within recommendation of <45 for liver transplant    Fried Frailty-- Not frail (2/5 points for weight loss and exhaustion)     FraiLT-- Pre Frail (index 3.96)    Recommended Interventions to Address Frailty:  None      Time Spent: 30 minutes   Visit Type: Initial   Referring Physician: Dr. Valenzuela   Pt accompanied by: Friend shital and her      History of previous txp: No    Nutrition Assessment  - Appetite: Improving compared to last fall   - Food allergies/intolerances: No  - Meal prep & grocery shopping: Pt   - Previous RD education: Couple years ago for diabetes   - Issues chewing or swallowing: No  - N/V/D/C: No  - Food access concerns: No    Vitamins, Supplements, Pertinent Meds: Lasix, Victoza, Magnesium, Metfromin, Milk Thistle Xtra, Oxycodone PRN, Miralax PRN, Senokot PRN, Vitmain D3.   Herbal Medicines/Supplements: Pt reports after discussion with MD: Estrella - pt reports being told to discontinue. Turmeric - was taking pill form and was recommended to take powder form or discontinue.   Protein supplement: No    Edema: No    Weight hx:   West reports weight loss as unintentional.   11 lb (5 %) weight loss over 2.5 months.    4.6 lb (2 %) weight loss over 3 months.    9 lb (4 %) weight loss over 6 months.    Wt Readings from Last Encounters:   06/20/23 97.2 kg (214 lb 4.8 oz)   04/04/23 102.2 kg (225 lb 4.8 oz)   03/23/23 98.9 kg (218 lb)   03/13/23 99.8 kg (220 lb)   03/10/23 99.3 kg (218 lb 14.7 oz)   03/07/23 101.3 kg (223 lb 6.4 oz)   03/03/23 101.8 kg (224 lb 6.4 oz)   01/17/23 92.1 kg (203 lb 0.7 oz)   12/19/22 101.4 kg (223 lb 8 oz)   12/06/22 102.5 kg (225 lb 15.5 oz)   11/14/22 102.8 kg (226 lb 11.2 oz)   10/14/22 104.3 kg (230 lb)     Diet Recall  Breakfast Sausage and egg and cheese biscuit    Lunch Stir mandel   Dinner Sub sandwich  yesterday due to traveling   Has typically eaten stir mandel meal    Snacks Apple   Granola bar  Fruit snacks  Mini candy   Beverages Glucerna   Water   Orange juice  Coffee   Pop when occasionally when eating out   Dining out 1-2 x week      Physical Activity  Reports walking dog almost daily for about 30 minutes. Household chores 2-3x/week. Outdoor chores about 1x every 2 weeks     Nutrition Diagnosis  No nutrition diagnosis identified at this time     Nutrition Intervention  Nutrition education provided:  Discussed sodium intake (low sodium foods and drinks, seasoning food without salt and tips for low sodium diet).    Reviewed adequate protein intake. Encouraged receiving protein from both animal and plant based sources.     Reviewed post txp diet guidelines in brief (will review in further detail post txp):  (1) Review of proper food safety measures d/t immunosuppressant therapy post-op and increased risk for food-borne illness    (2) Avoid the following post txp d/t risk for rejection, unknown effects on the organs, and/or potential interactions with immunosuppressants:  - Herbal, Chinese, holistic, chiropractic, natural, alternative medicines and supplements  - Detoxes and cleanses  - Weight loss pills  - Protein powders or other products with extracts or herbs (ie green tea extract)    (3) Med regimen and possible side effects    Patient Understanding: Pt verbalized understanding of education provided.  Expected Engagement: Good  Follow-Up Plans: PRN     Nutrition Goals  No nutrition goals identified at this time    Silvia Firoe RD, LD

## 2023-06-20 NOTE — PROGRESS NOTES
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    Was having issues with abdominal pain in the fall 2022 and underwent imaging in September which raise concerns for liver lesion.  He ultimately had follow-up dedicated imaging in November 2022 which demonstrated a 2 cm lesion that was consistent with an LR 4.  He was referred to oncology at the [facility], the imaging was reviewed at our multidisciplinary tumor board, and ultimately he underwent a surgical biopsy and microwave ablation of the lesion.  The biopsy ultimately demonstrated well differentiated hepatocellular carcinoma.  In the process of all this imaging was also noted that he had a right lower lobe nodule that was approximately 1 cm.  He ultimately underwent a right lower lobe lobectomy on March 8, 2023 with pathology demonstrating the patient had a low-grade neuroendocrine tumor consistent with a carcinoid tumor that was measured 1.1 cm and all nodes that were sampled were negative for metastatic disease.    Reports that he has had a diagnosis of diabetes for several years and that he has been on medical therapy for at least 5 years.  He denies a significant history of drinking alcohol, and notes that his last alcohol use was approximately 20 years ago.    There is no overt known history of liver disease or cirrhosis in his family.  Notes that his mother had breast cancer, his father had oropharyngeal cancer, he has a cousin who has melanoma.    He presents today on several supplements  including alfredda and turmeric    Past Medical History:  Past Medical History:   Diagnosis Date     Benign essential HTN 04/13/2016     Diabetes (H)      Kidney stone      Liver cancer (H) 10/04/2022     Liver cirrhosis secondary to nonalcoholic steatohepatitis (RIOS) (H)      RIOS (nonalcoholic steatohepatitis) 06/02/2023     Neuroendocrine carcinoma of lung (H)     right lung, lobectomy 3/2023     PONV (postoperative nausea and vomiting)        Surgical History:  Past Surgical History:   Procedure Laterality Date     BRONCHOSCOPY, WITH BIOPSY, ROBOT ASSISTED N/A 1/17/2023    Procedure: BRONCHOSCOPY, USING OPTICAL TRACKING SYSTEM, ion;  Surgeon: Ani Guillaume MD;  Location: [facility] OR     DAVINCI LOBECTOMY LUNG Right 3/8/2023    Procedure: Robot-assisted right thoracoscopic lower lobectomy, mediastinal lymph node dissection, intercostal nerve cryoablation ;  Surgeon: Matheus Haas MD;  Location: [facility] OR     ENDOBRONCHIAL ULTRASOUND FLEXIBLE N/A 1/17/2023    Procedure: endobronchial  ultrasound and transbronchial biopsies;  Surgeon: Ani Guillaume MD;  Location: [facility] OR     LAPAROSCOPIC ABLATION LIVER TUMOR N/A 12/6/2022    Procedure: Diagnostic Laparoscopy, Hepatic Ultrasound, Laparoscopic ultrasound guided microwave ablation of liver tumor x1;  Surgeon: Armando Munguia MD;  Location: [facility] OR     LAPAROSCOPIC BIOPSY LIVER N/A 12/6/2022    Procedure: Laparoscopic Ultrasound Guided liver biopsy;  Surgeon: Armando Munguia MD;  Location: [facility] OR     LAPAROSCOPIC CHOLECYSTECTOMY N/A 12/6/2022    Procedure: Laparoscopic cholecystectomy;  Surgeon: Armando Munguia MD;  Location: [facility] OR       Social History:  Social History     Tobacco Use     Smoking status: Never     Smokeless tobacco: Never   Vaping Use     Vaping Use: Never used   Substance Use Topics     Alcohol use: Not Currently     Comment: Last ETOH 1999     Drug use: Never       Family History:  Family History   Problem Relation Age of Onset     Breast Cancer  Mother      Cancer Father         lip     Melanoma Cousin      Cirrhosis No family hx of        Medications:  Current Outpatient Medications   Medication     acetaminophen (TYLENOL) 325 MG tablet     Ashwagandha 125 MG CAPS     atenolol (TENORMIN) 25 MG tablet     benzonatate (TESSALON) 200 MG capsule     furosemide (LASIX) 20 MG tablet     liraglutide (VICTOZA) 18 MG/3ML solution     magnesium 250 MG tablet     metFORMIN (GLUCOPHAGE XR) 500 MG 24 hr tablet     methocarbamol (ROBAXIN) 750 MG tablet     Milk Thistle-Dand-Fennel-Licor (MILK THISTLE XTRA) CAPS capsule     oxyCODONE (ROXICODONE) 5 MG tablet     polyethylene glycol (MIRALAX) 17 GM/Dose powder     senna-docusate (SENOKOT-S/PERICOLACE) 8.6-50 MG tablet     TURMERIC PO     ULTICARE MINI 31G X 6 MM insulin pen needle     Vitamin D3 (CHOLECALCIFEROL) 125 MCG (5000 UT) tablet     No current facility-administered medications for this visit.       Review of Systems  A complete 10 point review of systems was asked and answered in the negative unless specifically commented upon in the HPI    Objective:         There were no vitals filed for this visit.  There is no height or weight on file to calculate BMI.     Physical Exam  Constitutional: Well-developed, well-nourished, in no apparent distress.    HEENT: Normocephalic. no scleral icterus.   Neck/Lymph: Normal ROM, supple.  Cardiac:  Regular rate  Respiratory: Normal Respiratory excursion  GI:  Abdomen soft, not distended, non-tender.   Skin:  Skin is warm and dry. no jaundice. no spider nevi noted.  no palmar erythema  Peripheral Vascular: no lower extremity edema.  Musculoskeletal:  ROM intact, normal muscle bulk    Psychiatric: Normal mood and affect. Behavior is normal.  Neuro:  no asterixis    Labs and Diagnostic tests:  Lab Results   Component Value Date     06/20/2023    POTASSIUM 4.0 06/20/2023    POTASSIUM 4.0 05/11/2023    CHLORIDE 105 06/20/2023    CHLORIDE 110 05/11/2023    CO2 28 06/20/2023     CO2 29 05/11/2023    BUN 12.5 06/20/2023    BUN 12 05/11/2023    CR 0.79 06/20/2023     Lab Results   Component Value Date    BILITOTAL 1.2 06/20/2023    ALT 30 06/20/2023    AST 24 06/20/2023    ALKPHOS 106 06/20/2023     Lab Results   Component Value Date    ALBUMIN 4.2 06/20/2023    ALBUMIN 3.9 05/11/2023    PROTTOTAL 7.2 06/20/2023      Lab Results   Component Value Date    WBC 4.6 06/20/2023    HGB 15.4 06/20/2023    MCV 87 06/20/2023     06/20/2023     Lab Results   Component Value Date    INR 1.22 06/20/2023       MELD-Na score: 9 at 6/20/2023  8:13 AM  MELD score: 9 at 6/20/2023  8:13 AM  Calculated from:  Serum Creatinine: 0.79 mg/dL (Using min of 1 mg/dL) at 6/20/2023  8:13 AM  Serum Sodium: 140 mmol/L (Using max of 137 mmol/L) at 6/20/2023  8:13 AM  Total Bilirubin: 1.2 mg/dL at 6/20/2023  8:13 AM  INR(ratio): 1.22 at 6/20/2023  8:13 AM  Age: 47 years    Imaging:  MRI Abd 5/11/2023  FINDINGS:     Comparison study: MRI abdomen 12/27/2022 and 11/10/2022     Liver: Evolving posttreatment changes in the treated segment 8 lesion  which currently measure measures 5.4 x 4.0 cm, previously 6.2 cm x 4.5  cm on portal venous phase (series 15, image 61). Faint arterial phase  enhancement is seen along the superior margin (series 13, image 66)  and also along the inferior margin (series 13, image 45). There is  increased signal seen on the T2-weighted images and diffusion-weighted  images along the superior margin (series 13, image 64), series 24,  image 35 and series 5, image 33), concerning for possible  residual/recurrent tumor.     Lesion 1: There is a 15 mm nonenhancing lesion in hepatic segment  8  (series 15, image 62). Unchanged since prior.  Pseudocapsule is not  identified. There is no associated increased T2 signal.  There is no  increased signal on diffusion weighted images. No significant change  in size compared to prior imaging There is no evidence of venous  invasion.  OPTN/LIRADS class  3     Lesion 2: There is a 10 mm non-enhancing lesion in hepatic segment   5/6 (series 15, image 39. Pseudocapsule is not identified. There is no  associated increased T2 signal.  There is no increased signal on  diffusion weighted images. Previously this measured approximately 8 mm  with minimal interval growth. There is no evidence of venous invasion.   OPTN/LIRADS class 3     Similar additional observation in segment 8 measuring 1 cm (series 15,  image 66).     Gallbladder: Surgically absent     Spleen: Splenomegaly measuring 15 cm. Progressed.     Kidneys: No mass or hydronephrosis. No definite cysts or calculus  seen.     Adrenal glands: Both adrenal glands appear normal     Pancreas: Stable 8 mm cyst in the pancreatic tail, unchanged.  Pancreatic ducts are not dilated. No additional lesion.     Bowel: Visualized bowel appears unremarkable.     Lymph nodes: Similar appearing enlarged lymph node near the pancreatic  head measuring 14 mm, unchanged (series 6, image 15), unchanged left  para-aortic lymph node (series 5, image 9 measuring about 8 mm in  short axis.     Blood vessels: No abdominal aortic aneurysm. No definite thrombus  demonstrated.     Lung bases: There is new mild right-sided pleural effusion seen on  prior study. Remaining visualized lung bases appear unremarkable     Bones and soft tissues: No osseous lesion.      Mesentery and abdominal wall: Postsurgical changes in the anterior  abdominal wall      Ascites: None      CT Chest 5/11/2023  FINDINGS:     Chest:     Thyroid gland is unremarkable. Heart size is normal. No pericardial  effusion. No significant coronary artery calcifications. Thoracic  aorta and pulmonary arteries are normal in caliber with conventional  three-vessel branching pattern of the aortic arch. Esophagus is  unremarkable.     No mediastinal, hilar, or axillary adenopathy.     Postsurgical right lower lobectomy changes. Trachea and central  airways are clear. Moderate right  pleural effusion. No pneumothorax.  No suspicious pulmonary nodules.     Abdomen: The examination of the upper abdomen is limited.   Cirrhotic liver. Posttreatment changes in hepatic segment 4A measuring  5.4 x 3.9 cm, previously measuring 5.9 x 4.5 cm to 7/20/2023.  Additional lesion in hepatic segment 8 measuring 1.5 x 1.3 cm,  previously measuring 1.5 x 1.1 cm on abdominal MR 12/27/2022.  Postsurgical cholecystectomy changes. Splenomegaly. Adrenal glands are  unremarkable     Bones: No suspicious osseous lesion. Multilevel degenerative changes  of the thoracic spine with flowing anterior endplate osteophytes  compatible with DISH.       Soft Tissues: No suspicious mass.      Procedures:  Pathology:  1) Liver biopsy 12/6/2022  Hepatocellular carcinoma, well-differentiated    2) Right lobectomy, Lymph Nodes  A(1).  Lymph node biopsies, level 9R:  - One (1) lymph node negative for metastatic tumor (0/1).  B(2).  Lymph node biopsies, level 7:  - One (1) lymph node negative for metastatic tumor (0/1).  C(3).  Lymph node biopsies, level 4R:  - One (1) lymph node negative for metastatic tumor (0/1).  D(4).  Lymph node biopsies, level 10R:  - One (1) lymph node negative for metastatic tumor (0/1).  E(5).  Lymph node biopsies, level 11R:  - One (1) lymph node negative for metastatic tumor (0/1).  F(6). Right lung lower lobe, lobectomy:  -Well-differentiated neuroendocrine neoplasm, grade 1/typical carcinoid tumor (1.1 cm).  -Six (6) lymph nodes negative for metastatic carcinoma (0/6).  -See synoptic report for further details    Assessment and Plan:    Cirrhosis:    - Likely secondary to non-alcohol related steatohepatitis  - No overt risk factors for other causes of chronic liver disease  - Well compensated, my have some mild HE  - MELD-Na: 9  - Labs per routine    Liver Transplant Candidacy:   - We discussed need for transplantation, organ availability and prioritization process for liver transplantation in the [place].  We discussed the guiding principals of justice and equality that dictate transplant candidacy  - We discussed the MELD score, the variables that are followed, and how the MELD score impacts transplantation  - We discussed the evaluation process for candidacy  - We discussed donor types - including  donors (DBD, DCD) and living donors  - We discussed factors that can impact quality of organs available, including extended criteria donor.  - We specifically discussed the potential for use of organs that may test positive for infection (hepatitis B or hepatitis C.)    - Blood Type: A POS   - Outstanding Issues regarding candidacy:   - HCC surveillance   - Will need oncology clearance of lung NET in setting of transplant   - Needs EGD and colonoscopy   - Cardiac eval    Hepatocellular Cancer:   - had a 2.0cm lesion that was biopsy-proven   - Will reach out to Surg Onc for repeat ablation given residual disease on MRI from   - has had metastatic eval  - Recommend screening for HCC every 6 months with either abdominal ultrasound or by alternating abdominal ultrasound with EITHER a triple/quad phase CT Liver with IV contrast OR a Quad phase MRI Liver with IV contrast.  AFP levels should be checked every 6 months at time of imaging screen.    Ascites/Volume Overload:  - no acute issues  - Continue to follow a sodium restricted (<2g sodium diet)     Hepatic Encephalopathy:  - concern that he is having symptoms of encephalopathy  - Will start him on lactulose with goal 2-3 BMs daily    Esophageal Varices:   - given diagnosis of cirrhosis needs screening  - Recommend repeating an upper GI endoscopy now .  If evidence of medium/large esophageal varices, we recommend esophageal varix band ligation, and  if band ligation is performed, please continue to repeat every 4-8 weeks until eraditation of varices.  - If band ligation is deferred, we strongly recommend the initiation of a non-selective beta-blocker  carvedilol and titration of this medication to a heart reate of 55BPM    Carcinoid Tumor of the Lung:  - Low grade neuroendocrine tumor of the right lung  - Nodes negative for mets  - Will need formal comment from oncology on   - Risk of mets, needs for further eval prior to transplant   - Transplant effect on NET    Immobility/Frailty:   - We discussed the imperative need to remain physically active and to participate in active exercise to maintain muscle mass and mobility    Nutrition:  As with most patients with chronic liver disease, there is a significant degree of protein malnutrition.    - Dicussed need to change dietary habits to improve overall protein balance.  Discussed the importance of eating between 1.2-1.5g/kg/day lean protein like eggs, fish, poultry, nuts, legumes, and dairy - in addition to a diet rich in fresh fruits and vegetables.    - Continue to follow a sodium restricted (<2g sodium diet) and discussed the need to minimize the intake of carbohydrates and sugars, to avoid obesity.   - Strongly encourage protein supplements 2-3 times daily (Boost, Ensure, [place] Instant Milk, whey protein powder) to meet protein and caloric intake.  - Discussed important need to have a bedtime snack with protein to minimize risk of muscle breakdown    Routine Health Care in Patient with Chronic Liver Disease:  - NEEDS to stop taking ashwaganda and turmeric supplements    Follow Up:  6 months     Thank you very much for the opportunity to participate in the care of this patient.  If you have any further questions, please don't hesitate to contact our office.    Thomas M. Leventhal, M.D.   of Medicine  Advanced & Transplant Hepatology  [facility]     Approximately 35 minutes of non face-to-face time were spent in review of the patient's medical record on 6/19/2023.  This included review of previous: clinic visits, hospital records, lab results, imaging  studies, and procedural documentation.  The findings from this review are summarized in the above note.

## 2023-06-20 NOTE — PROGRESS NOTES
Psychosocial Assessment  West Van was seen in the Transplant Center as part of his evaluation as a potential liver transplant recipient.  His two friends, Joe and Abena accompanied him to the appointment.   Living Situation: West lives alone in a single family home in [place], [place]. He does not report any concerns with his living situation.   [place], [place] is about [place] miles away from our transplant center. I discussed requirement to remain local for at least 30 days with a caregiver. He indicated that his mother and father would be his caregivers post transplant. I provided local lodging resources. I encouraged him to call his insurance to inquire about travel/lodging benefits. He would also be able to stay at the [facility] for appointments and at the time of transplant.   Education/Employment:  West completed his certification in to being. He works as a health desk technician, and has been employed by the same company for 10 years. He last worked June 2, 2023 and is waiting for his short term disability to start.   Financial /Income: He does not have an income at this time. He has been utilizing credit cards and savings. I discussed SSDI, and resources through the [employer]. He does not have any financial concerns at this time.   Health Insurance:  This writer talked with West about the financial risks of transplant, particularly about the high cost of transplant related medications and the importance of maintaining adequate health insurance coverage.  Family/Social Support: This writer stressed the importance of having a stable and involved support network before and after transplant.  Provided West  with education about the relationship between a stable support system and better surgical and post-transplant outcomes compared to patients with a limited support system.    Functional Status: No functional concerns related to transplantation.   Chemical Dependency:  No concerns  with misuse/over use of alcohol. West reports that his last use of alcohol was 1997. No history of tobacco use, misuse/over use of pain medication or illicit substances.   Mental Health: West reports some anxiety, and feels like it is well managed. He had a trial of an anti-anxiety medication in the past, and did not like how it made him feel. No history of psychotherapy, psychiatric hospitalizations, or past/current suicidal thoughts.   Adjustment to Illness:  This writer provided West  with supportive counseling throughout this interview.  This writer also encouraged West to attend the liver transplant support group for additional support and encouragement.   Impression/Recommendations:   West and his two friends verbalizes understanding the psychosocial risks of transplant and teaching provided during this evaluation.  West has met the sobriety criteria recommended for listing. This writer does not recommend a substance use assessment as he does not have a history of any misuse/over use of any non-prescribed or prescribed substance. PETh pending.   There are no contraindications to transplant from a psychosocial perspective.  Wset has adequate finances and health insurance for transplant, and an intact support system. West is an acceptable transplant candidate from a psychosocial perspective.  This writer will remain available to assist patient throughout the evaluation process and will follow patient through transplant if he is listed.  It was a pleasure to evaluate this patient for liver transplant.   Teaching completed during assessment:  1.     Housing and relocation needs post transplant.  2.     Caregiver needs post transplant.  3.     Financial issues related to transplant.  4.     Risks of alcohol use post transplant.  5.     Common psychosocial stressors pre/post transplant.        6.     Liver Transplant support group availability.        7.     Advanced Health Care Directive              Psychosocial Risks of Transplant Reviewed:  1.     Increased stress related to your emotional, family, social, employment, or   financial situation.  2.     Affect on work and/or disability benefits.  3.     Affect on future health and life insurance.  4.     Transplant outcome expectations may not be met.  5.     Mental Health risks: anxiety, depression, PTSD, guilt, grief and chronic fatigue.     Twyla Ko, MIN, LICSW

## 2023-06-20 NOTE — COMMITTEE REVIEW
Abdominal Committee Review Note     Evaluation Date: 6/20/2023  Committee Review Date: 6/20/2023    Organ being evaluated for: Liver    Transplant Phase: Evaluation  Transplant Status: Active    Transplant Coordinator: Dillon Quinones  Transplant Surgeon:   Matthias Valenzuela    Referring Physician: Walker Ramirez    Primary Diagnosis: Cirrhosis: Fatty Liver (RIOS)  Secondary Diagnosis: Primary Liver Malignancy: Hepatoma, Hepatocellular Carcinoma (HCC)    Committee Review Members:  Gastroenterology Migue Menjivar MD   Nurse Medina Cope, [facility]   Nutrition Silvia Fiore RD   Pharmacist Aylin Lopez, [facility]    - Clinical Twyla Ko, [facility]   Transplant Vera Mitchell, RN, Cecilia Forbes, RN, Nellie Dumont LPN, Jr Hugh Quintero, YUNI, Alessia Saldaña, APRN CNP, Patrick Turcios, RN, Dillon Quinones, RN, Clair Meza, RN   Transplant Hepatology  Diane Samuel MD, Nando Weiner MD, Jannet Marquez MD, Agustín Dumont MD, Bright Sheffield MD, Thomas M. Leventhal, MD   Transplant Surgery Dilan Delacruz MD, Layne Bonilla, APRN CNP, Monae Connor NP, Radu Dodson MD, Matthias Valenzuela MD, Delta Palmer MD       Transplant Eligibility: HCC    Committee Review Decision: Needs Re-presentation    Relative Contraindications:     Absolute Contraindications:     Committee Chair Leventhal, Thomas Michael, MD verbally attested to the committee's decision.    Committee Discussion Details: Represent after evaluation completion and Dr. Sanchez visit on 8/17.

## 2023-06-20 NOTE — Clinical Note
Wasn't sure if anyone reached out yet on this dude but I know Ed wanted repeat ablation based on MRI from 5/11 and didn't see anything scheduled.  TL

## 2023-06-20 NOTE — LETTER
"    6/20/2023         RE: West Van  [address]  [facility] [address]        Dear Colleague,    Thank you for referring your patient, West Van, to the [facility]. Please see a copy of my visit note below.    HPI      ROS      Physical Exam    \"Much or all of the text in this note was generated through the use of Dragon Dictate voice to text software. Errors in spelling or words which appear to be out of context are unintentional, may be present due having escaped editing\"    Assessment and Plan:  1. liver transplant evaluation - patient is a good candidate overall. Benefits and surgical risks of a liver transplantation were discussed.  2.  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  History of lung lesion metastatic neuroendocrine tumor resected.    Surgical evaluation:  1. Portal Vein:Patent  2. Hepatic Artery: Open  3. TIPS: absent  4. Previous Abdominal Surgery: Cholecystectomy and liver ablation  5. Hepatocellular Carcinoma: Yes-ablated  6. Ascites: Present - minimal  7. Costal Angle: wide  8. Portopulmonary Hypertension: absent  9. Hepatopulmonary Syndrome: absent  10. Cardiac Evaluation: needs stress echocardiogram  11. Nutritional Status: Good    12. Meets guidelines to receive Living Donor  No  13. Potential Living donors No    Recommendations:     He needs a full work-up.  The work-up should also include further details about the neuroendocrine lesion resected from the lung advertent renal      Patients overall evaluation will be discussed at the Liver Transplant selection committee meeting with a final recommendation on the patients suitability for transplant to be made at that time.    Consult Full  Details:  West Van was seen in consultation at the request of Dr. Sanchez  for evaluation as a potential liver transplant recipient.    Reason for Visit:  West Van is a 47 year old year old male with nonalcoholic steatopheatitis, who " presents for liver transplant evaluation.    HPI:  Presenting complaint: Liver cancer    Approximately a year ago, he was noted by his primary care physician to have liver issues.  He was then referred to a GI doctor.  In the meantime imaging of the liver showed liver cancer.  He was seen by Dr. Sanchez and then advised ablation of the liver lesion.  On the MRI multiple lesions were noted.  Last year he also had a lung lesion excised by Dr. Haas which was noted to be a low-grade neuroendocrine tumor.  He does have easy fatigability.  No ascites or variceal bleeding or encephalopathy.  He is an IT person by occupation.  Present with him were to friends who are very supportive.  Past Medical History:   Diagnosis Date    Benign essential HTN 04/13/2016    Diabetes (H)     Liver cancer (H) 10/04/2022    RIOS (nonalcoholic steatohepatitis) 6/2/2023    PONV (postoperative nausea and vomiting)      Past Surgical History:   Procedure Laterality Date    BRONCHOSCOPY, WITH BIOPSY, ROBOT ASSISTED N/A 1/17/2023    Procedure: BRONCHOSCOPY, USING OPTICAL TRACKING SYSTEM, ion;  Surgeon: Ani Guillaume MD;  Location: [facility] OR    DAVINCI LOBECTOMY LUNG Right 3/8/2023    Procedure: Robot-assisted right thoracoscopic lower lobectomy, mediastinal lymph node dissection, intercostal nerve cryoablation ;  Surgeon: Matheus Haas MD;  Location: [facility] OR    ENDOBRONCHIAL ULTRASOUND FLEXIBLE N/A 1/17/2023    Procedure: endobronchial  ultrasound and transbronchial biopsies;  Surgeon: Ani Guillaume MD;  Location: [facility] OR    LAPAROSCOPIC ABLATION LIVER TUMOR N/A 12/6/2022    Procedure: Diagnostic Laparoscopy, Hepatic Ultrasound, Laparoscopic ultrasound guided microwave ablation of liver tumor x1;  Surgeon: Armando Munguia MD;  Location: [facility] OR    LAPAROSCOPIC BIOPSY LIVER N/A 12/6/2022    Procedure: Laparoscopic Ultrasound Guided liver biopsy;  Surgeon: Armando Munguia MD;  Location: [facility]    LAPAROSCOPIC CHOLECYSTECTOMY N/A 12/6/2022     Procedure: Laparoscopic cholecystectomy;  Surgeon: Armando Munguia MD;  Location: [facility]     Past Surgical History:   Procedure Laterality Date    BRONCHOSCOPY, WITH BIOPSY, ROBOT ASSISTED N/A 1/17/2023    Procedure: BRONCHOSCOPY, USING OPTICAL TRACKING SYSTEM, ion;  Surgeon: Ani Guillaume MD;  Location: [facility]    DAVINCI LOBECTOMY LUNG Right 3/8/2023    Procedure: Robot-assisted right thoracoscopic lower lobectomy, mediastinal lymph node dissection, intercostal nerve cryoablation ;  Surgeon: Matheus Haas MD;  Location: [facility]    ENDOBRONCHIAL ULTRASOUND FLEXIBLE N/A 1/17/2023    Procedure: endobronchial  ultrasound and transbronchial biopsies;  Surgeon: Ani Guillaume MD;  Location: [facility]    LAPAROSCOPIC ABLATION LIVER TUMOR N/A 12/6/2022    Procedure: Diagnostic Laparoscopy, Hepatic Ultrasound, Laparoscopic ultrasound guided microwave ablation of liver tumor x1;  Surgeon: Armando Munguia MD;  Location: [facility]    LAPAROSCOPIC BIOPSY LIVER N/A 12/6/2022    Procedure: Laparoscopic Ultrasound Guided liver biopsy;  Surgeon: Armando Munguia MD;  Location[facility]    LAPAROSCOPIC CHOLECYSTECTOMY N/A 12/6/2022    Procedure: Laparoscopic cholecystectomy;  Surgeon: Armando Munguia MD;  Location: [facility]     No family history on file.  No Known Allergies  Prior to Admission medications    Medication Sig Start Date End Date Taking? Authorizing Provider   acetaminophen (TYLENOL) 325 MG tablet Take 3 tablets (975 mg) by mouth every 8 hours 3/10/23  Yes Reece Muir PA-C   Ashwagandha 125 MG CAPS Take 1 capsule by mouth every morning   Yes Reported, Patient   atenolol (TENORMIN) 25 MG tablet Take 25 mg by mouth every morning 9/16/22  Yes Reported, Patient   benzonatate (TESSALON) 200 MG capsule Take 200 mg by mouth 6/2/23  Yes Reported, Patient   liraglutide (VICTOZA) 18 MG/3ML solution Inject 1.8 mg Subcutaneous every evening   Yes Reported, Patient   magnesium 250 MG tablet Take 1 tablet by mouth every morning    Yes Reported, Patient   metFORMIN (GLUCOPHAGE XR) 500 MG 24 hr tablet Take 500 mg by mouth every morning   Yes Reported, Patient   Milk Thistle-Dand-Fennel-Licor (MILK THISTLE XTRA) CAPS capsule Take 1 capsule by mouth every morning   Yes Reported, Patient   polyethylene glycol (MIRALAX) 17 GM/Dose powder Take 17 g by mouth 12/6/22  Yes Reported, Patient   senna-docusate (SENOKOT-S/PERICOLACE) 8.6-50 MG tablet Take 1 tablet by mouth 2 times daily Reduce dose or temporarily discontinue if having loose stools. 3/10/23  Yes Reece Muir PA-C   TURMERIC PO Take 1 capsule by mouth every morning   Yes Reported, Patient   ULTICARE MINI 31G X 6 MM insulin pen needle Inject 1 each Subcutaneous At Bedtime 8/15/22  Yes Reported, Patient   Vitamin D3 (CHOLECALCIFEROL) 125 MCG (5000 UT) tablet Take 125 mcg by mouth every morning 10/18/22  Yes Reported, Patient   furosemide (LASIX) 20 MG tablet Take 10 mg by mouth 3/30/23 4/29/23  Reported, Patient   methocarbamol (ROBAXIN) 750 MG tablet Take 1 tablet (750 mg) by mouth every 6 hours as needed for muscle spasms  Patient not taking: Reported on 6/20/2023 3/10/23   Reece Muir PA-C   oxyCODONE (ROXICODONE) 5 MG tablet Take 1-2 tablets (5-10 mg) by mouth every 4 hours as needed for moderate pain (4-6)  Patient not taking: Reported on 6/20/2023 3/10/23   Reece Muir PA-C       Previous Transplant Hx: No    Cardiovascular Hx:       h/o Cardiac Issues: No       Exercise Tolerance: shortness of breath with exertion.    Potential Donor(s): No    ROS:    REVIEW OF SYSTEMS (check box if normal)  [x]                GENERAL  [x]                  PULMONARY [x]                 GENITOURINARY  [x]                 CNS                 [x]                  CARDIAC  [x]                  ENDOCRINE  [x]                 EARS,NOSE,THROAT [x]                  GASTROINTESTINAL [x]                  NEUROLOGIC    [x]                 MUSCLOSKELTAL  [x]                    "HEMATOLOGY    Examination:     Vitals:  BP (!) 149/93   Pulse (!) 122   Ht 1.91 m (6' 3.2\")   Wt 97.2 kg (214 lb 4.8 oz)   SpO2 96%   BMI 26.65 kg/m      GENERAL APPEARANCE: alert and no distress  EYES: PERRL  HENT: mouth without ulcers or lesions  NECK: supple, no adenopathy  RESP: lungs clear to auscultation - no rales, rhonchi or wheezes  CV: regular rhythm, normal rate, no rub   ABDOMEN:  soft, nontender, no tenderness or masses felt  MS: extremities normal- no gross deformities noted, no evidence of inflammation in joints, no muscle tenderness  SKIN: no rash  NEURO: Normal strength and tone, sensory exam grossly normal, mentation intact and speech normal  PSYCH: mentation appears normal. and affect normal/bright      Results:   Recent Results (from the past 168 hour(s))   Echocardiogram Complete    Collection Time: 06/16/23  2:19 PM   Result Value Ref Range    LVEF  55-60%    6 minute walk test    Collection Time: 06/20/23 12:00 AM   Result Value Ref Range    6 min walk (FT) 1,630 1,868 ft    6 Min Walk (M) 497 569 m   General PFT Lab (Please always keep checked)    Collection Time: 06/20/23  6:52 AM   Result Value Ref Range    FVC-Pred 5.34 L    FVC-Pre 3.91 L    FVC-%Pred-Pre 73 %    FEV1-Pre 3.13 L    FEV1-%Pred-Pre 73 %    FEV1FVC-Pred 80 %    FEV1FVC-Pre 80 %    FEFMax-Pred 10.89 L/sec    FEFMax-Pre 8.86 L/sec    FEFMax-%Pred-Pre 81 %    FEF2575-Pred 3.90 L/sec    FEF2575-Pre 2.77 L/sec    FTY4199-%Pred-Pre 70 %    ExpTime-Pre 6.59 sec    FIFMax-Pre 8.18 L/sec    VC-Pred 6.19 L    VC-Pre 3.68 L    VC-%Pred-Pre 59 %    IC-Pred 4.35 L    IC-Pre 2.24 L    IC-%Pred-Pre 51 %    ERV-Pred 1.81 L    ERV-Pre 1.44 L    ERV-%Pred-Pre 79 %    FEV1FEV6-Pred 81 %    FEV1FEV6-Pre 79 %    FRCPleth-Pred 3.98 L    FRCPleth-Pre 4.61 L    FRCPleth-%Pred-Pre 115 %    RVPleth-Pred 2.02 L    RVPleth-Pre 3.17 L    RVPleth-%Pred-Pre 156 %    TLCPleth-Pred 8.19 L    TLCPleth-Pre 6.85 L    TLCPleth-%Pred-Pre 83 %    " DLCOunc-Pred 33.43 ml/min/mmHg    DLCOunc-Pre 28.77 ml/min/mmHg    DLCOunc-%Pred-Pre 86 %    VA-Pre 5.73 L    VA-%Pred-Pre 75 %    FEV1SVC-Pred 68 %    FEV1SVC-Pre 85 %   Lipid Profile    Collection Time: 06/20/23  8:13 AM   Result Value Ref Range    Cholesterol 140 <200 mg/dL    Triglycerides 102 <150 mg/dL    Direct Measure HDL 57 >=40 mg/dL    LDL Cholesterol Calculated 63 <=100 mg/dL    Non HDL Cholesterol 83 <130 mg/dL   Basic metabolic panel    Collection Time: 06/20/23  8:13 AM   Result Value Ref Range    Sodium 140 136 - 145 mmol/L    Potassium 4.0 3.4 - 5.3 mmol/L    Chloride 105 98 - 107 mmol/L    Carbon Dioxide (CO2) 28 22 - 29 mmol/L    Anion Gap 7 7 - 15 mmol/L    Urea Nitrogen 12.5 6.0 - 20.0 mg/dL    Creatinine 0.79 0.67 - 1.17 mg/dL    Calcium 9.9 8.6 - 10.0 mg/dL    Glucose 162 (H) 70 - 99 mg/dL    GFR Estimate >90 >60 mL/min/1.73m2   Hepatic panel    Collection Time: 06/20/23  8:13 AM   Result Value Ref Range    Protein Total 7.2 6.4 - 8.3 g/dL    Albumin 4.2 3.5 - 5.2 g/dL    Bilirubin Total 1.2 <=1.2 mg/dL    Alkaline Phosphatase 106 40 - 129 U/L    AST 24 0 - 45 U/L    ALT 30 0 - 70 U/L    Bilirubin Direct 0.42 (H) 0.00 - 0.30 mg/dL   Ferritin    Collection Time: 06/20/23  8:13 AM   Result Value Ref Range    Ferritin 1,115 (H) 31 - 409 ng/mL   Iron and iron binding capacity    Collection Time: 06/20/23  8:13 AM   Result Value Ref Range    Iron 134 61 - 157 ug/dL    Iron Binding Capacity 239 (L) 240 - 430 ug/dL    Iron Sat Index 56 (H) 15 - 46 %   Phosphorus    Collection Time: 06/20/23  8:13 AM   Result Value Ref Range    Phosphorus 2.8 2.5 - 4.5 mg/dL   Prostate spec antigen screen    Collection Time: 06/20/23  8:13 AM   Result Value Ref Range    Prostate Specific Antigen Screen 1.02 0.00 - 2.50 ng/mL   TSH with free T4 reflex    Collection Time: 06/20/23  8:13 AM   Result Value Ref Range    TSH 2.37 0.30 - 4.20 uIU/mL   INR    Collection Time: 06/20/23  8:13 AM   Result Value Ref Range    INR  1.22 (H) 0.85 - 1.15   CBC with platelets    Collection Time: 06/20/23  8:13 AM   Result Value Ref Range    WBC Count 4.6 4.0 - 11.0 10e3/uL    RBC Count 5.19 4.40 - 5.90 10e6/uL    Hemoglobin 15.4 13.3 - 17.7 g/dL    Hematocrit 45.1 40.0 - 53.0 %    MCV 87 78 - 100 fL    MCH 29.7 26.5 - 33.0 pg    MCHC 34.1 31.5 - 36.5 g/dL    RDW 12.5 10.0 - 15.0 %    Platelet Count 146 (L) 150 - 450 10e3/uL   Protein  random urine    Collection Time: 06/20/23  8:15 AM   Result Value Ref Range    Total Protein Urine mg/dL 13.9   mg/dL    Total Protein UR MG/MG CR 0.06 0.00 - 0.20 mg/mg Cr    Creatinine Urine mg/dL 229.0 mg/dL   UA Macroscopic with reflex to Microscopic and Culture    Collection Time: 06/20/23  8:15 AM    Specimen: Urine, Midstream   Result Value Ref Range    Color Urine Yellow Colorless, Straw, Light Yellow, Yellow    Appearance Urine Slightly Cloudy (A) Clear    Glucose Urine Negative Negative mg/dL    Bilirubin Urine Negative Negative    Ketones Urine Negative Negative mg/dL    Specific Gravity Urine 1.021 1.003 - 1.035    Blood Urine Negative Negative    pH Urine 6.5 5.0 - 7.0    Protein Albumin Urine 10 (A) Negative mg/dL    Urobilinogen Urine Normal Normal, 2.0 mg/dL    Nitrite Urine Negative Negative    Leukocyte Esterase Urine Negative Negative    Mucus Urine Present (A) None Seen /LPF    Amorphous Crystals Urine Few (A) None Seen /HPF    RBC Urine <1 <=2 /HPF    WBC Urine 1 <=5 /HPF    Squamous Epithelials Urine <1 <=1 /HPF     I had a long discussion with the patient regarding liver transplantation which included but was not limited to  the following points:    Liver transplant selection committee process.  The federal rules for cadaveric waiting list, the size and blood type matching of the organ. The availability of living-related donor transplantation.  The types of donors: brain death donors, non-heart beating donors, partial liver grafts: splits and living donor grafts  Extended criteria  Donors  (older age, steasosis) and the increased  risk of primary non-function using the extended criteria donors  The CDC high risk donors,  Risk of donor transmitted infections and donor transmitted malignancy  The liver transplant operation and the associated risks and technical complications which can include intraoperative death, post operative death,  Primary non-function, bleeding requiring re-operations, arterial and biliary complications, bowel perforations, and intra abdominal abscess. Some of these complicaitons may require a second operation.  The postoperative course, the ICU stay and risk of postoperative complications which can include sepsis, MI, stroke, brain injury, pneumonia, pleural effusions, and renal dysfunction.  The current 1 year and 5 year graft and patient survivals.  The need for life long immunosuppressive therapy and the side effects of these medications, including the possibility of toxicity, opportunistic infections, risk of cancer including lymphoma, and the possibility of rejection even if the patient is taking the medication exactly as prescribed.  The need for compliance with medications and follow-up visits in the clinic and thereafter.  The patient and family understand these risks and wish to proceed to transplantation  Review of prior external note(s) from - CareEverywhere information from other speciality providers reviewed  60 minutes spent by me on the date of the encounter doing chart review, history and exam, documentation and further activities per the note        Again, thank you for allowing me to participate in the care of your patient.        Sincerely,        Matthias Valenzuela MD

## 2023-06-20 NOTE — LETTER
6/20/2023         RE: West Van  [address]  [facility] [address]        Dear Colleague,    Thank you for referring your patient, West Van, to the [facility] [place]. Please see a copy of my visit note below.    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    Was having issues with abdominal pain in the fall 2022 and underwent imaging in September which raise concerns for liver lesion.  He ultimately had follow-up dedicated imaging in November 2022 which demonstrated a 2 cm lesion that was consistent with an LR 4.  He was referred to oncology at the [facility], the imaging was reviewed at our multidisciplinary tumor board, and ultimately he underwent a surgical biopsy and microwave ablation of the lesion.  The biopsy ultimately demonstrated well differentiated hepatocellular carcinoma.  In the process of all this imaging was also noted that he had a right lower lobe nodule that was approximately 1 cm.  He ultimately underwent a right lower lobe lobectomy on March 8, 2023 with pathology demonstrating the patient had a low-grade neuroendocrine tumor consistent with a carcinoid tumor that was measured 1.1 cm and all nodes that were sampled were negative for metastatic disease.    Reports that he has had a diagnosis of diabetes for several years and that he has been on medical therapy for at least 5 years.  He denies a significant history of drinking alcohol, and notes that his last alcohol use was approximately 20  years ago.    There is no overt known history of liver disease or cirrhosis in his family.  Notes that his mother had breast cancer, his father had oropharyngeal cancer, he has a cousin who has melanoma.    He presents today on several supplements including ashwaganda and turmeric    Past Medical History:  Past Medical History:   Diagnosis Date     Benign essential HTN 04/13/2016     Diabetes (H)      Kidney stone      Liver cancer (H) 10/04/2022     Liver cirrhosis secondary to nonalcoholic steatohepatitis (RIOS) (H)      RIOS (nonalcoholic steatohepatitis) 06/02/2023     Neuroendocrine carcinoma of lung (H)     right lung, lobectomy 3/2023     PONV (postoperative nausea and vomiting)        Surgical History:  Past Surgical History:   Procedure Laterality Date     BRONCHOSCOPY, WITH BIOPSY, ROBOT ASSISTED N/A 1/17/2023    Procedure: BRONCHOSCOPY, USING OPTICAL TRACKING SYSTEM, ion;  Surgeon: Ani Guillaume MD;  Location: [facility] OR     DAVINCI LOBECTOMY LUNG Right 3/8/2023    Procedure: Robot-assisted right thoracoscopic lower lobectomy, mediastinal lymph node dissection, intercostal nerve cryoablation ;  Surgeon: Matheus Haas MD;  Location: [facility] OR     ENDOBRONCHIAL ULTRASOUND FLEXIBLE N/A 1/17/2023    Procedure: endobronchial  ultrasound and transbronchial biopsies;  Surgeon: Ani Guillaume MD;  Location: [facility] OR     LAPAROSCOPIC ABLATION LIVER TUMOR N/A 12/6/2022    Procedure: Diagnostic Laparoscopy, Hepatic Ultrasound, Laparoscopic ultrasound guided microwave ablation of liver tumor x1;  Surgeon: Armando Munguia MD;  Location: [facility] OR     LAPAROSCOPIC BIOPSY LIVER N/A 12/6/2022    Procedure: Laparoscopic Ultrasound Guided liver biopsy;  Surgeon: Armando Munguia MD;  Location: [facility] OR     LAPAROSCOPIC CHOLECYSTECTOMY N/A 12/6/2022    Procedure: Laparoscopic cholecystectomy;  Surgeon: Armando Munguia MD;  Location: [facility] OR       Social History:  Social History     Tobacco Use     Smoking status: Never      Smokeless tobacco: Never   Vaping Use     Vaping Use: Never used   Substance Use Topics     Alcohol use: Not Currently     Comment: Last ETOH 1999     Drug use: Never       Family History:  Family History   Problem Relation Age of Onset     Breast Cancer Mother      Cancer Father         lip     Melanoma Cousin      Cirrhosis No family hx of        Medications:  Current Outpatient Medications   Medication     acetaminophen (TYLENOL) 325 MG tablet     Ashwagandha 125 MG CAPS     atenolol (TENORMIN) 25 MG tablet     benzonatate (TESSALON) 200 MG capsule     furosemide (LASIX) 20 MG tablet     liraglutide (VICTOZA) 18 MG/3ML solution     magnesium 250 MG tablet     metFORMIN (GLUCOPHAGE XR) 500 MG 24 hr tablet     methocarbamol (ROBAXIN) 750 MG tablet     Milk Thistle-Dand-Fennel-Licor (MILK THISTLE XTRA) CAPS capsule     oxyCODONE (ROXICODONE) 5 MG tablet     polyethylene glycol (MIRALAX) 17 GM/Dose powder     senna-docusate (SENOKOT-S/PERICOLACE) 8.6-50 MG tablet     TURMERIC PO     ULTICARE MINI 31G X 6 MM insulin pen needle     Vitamin D3 (CHOLECALCIFEROL) 125 MCG (5000 UT) tablet     No current facility-administered medications for this visit.       Review of Systems  A complete 10 point review of systems was asked and answered in the negative unless specifically commented upon in the HPI    Objective:         There were no vitals filed for this visit.  There is no height or weight on file to calculate BMI.     Physical Exam  Constitutional: Well-developed, well-nourished, in no apparent distress.    HEENT: Normocephalic. no scleral icterus.   Neck/Lymph: Normal ROM, supple.  Cardiac:  Regular rate  Respiratory: Normal Respiratory excursion  GI:  Abdomen soft, not distended, non-tender.   Skin:  Skin is warm and dry. no jaundice. no spider nevi noted.  no palmar erythema  Peripheral Vascular: no lower extremity edema.  Musculoskeletal:  ROM intact, normal muscle bulk    Psychiatric: Normal mood and affect.  Behavior is normal.  Neuro:  no asterixis    Labs and Diagnostic tests:  Lab Results   Component Value Date     06/20/2023    POTASSIUM 4.0 06/20/2023    POTASSIUM 4.0 05/11/2023    CHLORIDE 105 06/20/2023    CHLORIDE 110 05/11/2023    CO2 28 06/20/2023    CO2 29 05/11/2023    BUN 12.5 06/20/2023    BUN 12 05/11/2023    CR 0.79 06/20/2023     Lab Results   Component Value Date    BILITOTAL 1.2 06/20/2023    ALT 30 06/20/2023    AST 24 06/20/2023    ALKPHOS 106 06/20/2023     Lab Results   Component Value Date    ALBUMIN 4.2 06/20/2023    ALBUMIN 3.9 05/11/2023    PROTTOTAL 7.2 06/20/2023      Lab Results   Component Value Date    WBC 4.6 06/20/2023    HGB 15.4 06/20/2023    MCV 87 06/20/2023     06/20/2023     Lab Results   Component Value Date    INR 1.22 06/20/2023       MELD-Na score: 9 at 6/20/2023  8:13 AM  MELD score: 9 at 6/20/2023  8:13 AM  Calculated from:  Serum Creatinine: 0.79 mg/dL (Using min of 1 mg/dL) at 6/20/2023  8:13 AM  Serum Sodium: 140 mmol/L (Using max of 137 mmol/L) at 6/20/2023  8:13 AM  Total Bilirubin: 1.2 mg/dL at 6/20/2023  8:13 AM  INR(ratio): 1.22 at 6/20/2023  8:13 AM  Age: 47 years    Imaging:  MRI Abd 5/11/2023  FINDINGS:     Comparison study: MRI abdomen 12/27/2022 and 11/10/2022     Liver: Evolving posttreatment changes in the treated segment 8 lesion  which currently measure measures 5.4 x 4.0 cm, previously 6.2 cm x 4.5  cm on portal venous phase (series 15, image 61). Faint arterial phase  enhancement is seen along the superior margin (series 13, image 66)  and also along the inferior margin (series 13, image 45). There is  increased signal seen on the T2-weighted images and diffusion-weighted  images along the superior margin (series 13, image 64), series 24,  image 35 and series 5, image 33), concerning for possible  residual/recurrent tumor.     Lesion 1: There is a 15 mm nonenhancing lesion in hepatic segment  8  (series 15, image 62). Unchanged since prior.   Pseudocapsule is not  identified. There is no associated increased T2 signal.  There is no  increased signal on diffusion weighted images. No significant change  in size compared to prior imaging There is no evidence of venous  invasion.  OPTN/LIRADS class 3     Lesion 2: There is a 10 mm non-enhancing lesion in hepatic segment   5/6 (series 15, image 39. Pseudocapsule is not identified. There is no  associated increased T2 signal.  There is no increased signal on  diffusion weighted images. Previously this measured approximately 8 mm  with minimal interval growth. There is no evidence of venous invasion.   OPTN/LIRADS class 3     Similar additional observation in segment 8 measuring 1 cm (series 15,  image 66).     Gallbladder: Surgically absent     Spleen: Splenomegaly measuring 15 cm. Progressed.     Kidneys: No mass or hydronephrosis. No definite cysts or calculus  seen.     Adrenal glands: Both adrenal glands appear normal     Pancreas: Stable 8 mm cyst in the pancreatic tail, unchanged.  Pancreatic ducts are not dilated. No additional lesion.     Bowel: Visualized bowel appears unremarkable.     Lymph nodes: Similar appearing enlarged lymph node near the pancreatic  head measuring 14 mm, unchanged (series 6, image 15), unchanged left  para-aortic lymph node (series 5, image 9 measuring about 8 mm in  short axis.     Blood vessels: No abdominal aortic aneurysm. No definite thrombus  demonstrated.     Lung bases: There is new mild right-sided pleural effusion seen on  prior study. Remaining visualized lung bases appear unremarkable     Bones and soft tissues: No osseous lesion.      Mesentery and abdominal wall: Postsurgical changes in the anterior  abdominal wall      Ascites: None      CT Chest 5/11/2023  FINDINGS:     Chest:     Thyroid gland is unremarkable. Heart size is normal. No pericardial  effusion. No significant coronary artery calcifications. Thoracic  aorta and pulmonary arteries are normal in  caliber with conventional  three-vessel branching pattern of the aortic arch. Esophagus is  unremarkable.     No mediastinal, hilar, or axillary adenopathy.     Postsurgical right lower lobectomy changes. Trachea and central  airways are clear. Moderate right pleural effusion. No pneumothorax.  No suspicious pulmonary nodules.     Abdomen: The examination of the upper abdomen is limited.   Cirrhotic liver. Posttreatment changes in hepatic segment 4A measuring  5.4 x 3.9 cm, previously measuring 5.9 x 4.5 cm to 7/20/2023.  Additional lesion in hepatic segment 8 measuring 1.5 x 1.3 cm,  previously measuring 1.5 x 1.1 cm on abdominal MR 12/27/2022.  Postsurgical cholecystectomy changes. Splenomegaly. Adrenal glands are  unremarkable     Bones: No suspicious osseous lesion. Multilevel degenerative changes  of the thoracic spine with flowing anterior endplate osteophytes  compatible with DISH.       Soft Tissues: No suspicious mass.      Procedures:  Pathology:  1) Liver biopsy 12/6/2022  Hepatocellular carcinoma, well-differentiated    2) Right lobectomy, Lymph Nodes  A(1).  Lymph node biopsies, level 9R:  - One (1) lymph node negative for metastatic tumor (0/1).  B(2).  Lymph node biopsies, level 7:  - One (1) lymph node negative for metastatic tumor (0/1).  C(3).  Lymph node biopsies, level 4R:  - One (1) lymph node negative for metastatic tumor (0/1).  D(4).  Lymph node biopsies, level 10R:  - One (1) lymph node negative for metastatic tumor (0/1).  E(5).  Lymph node biopsies, level 11R:  - One (1) lymph node negative for metastatic tumor (0/1).  F(6). Right lung lower lobe, lobectomy:  -Well-differentiated neuroendocrine neoplasm, grade 1/typical carcinoid tumor (1.1 cm).  -Six (6) lymph nodes negative for metastatic carcinoma (0/6).  -See synoptic report for further details    Assessment and Plan:    Cirrhosis:    - Likely secondary to non-alcohol related steatohepatitis  - No overt risk factors for other causes of  chronic liver disease  - Well compensated, my have some mild HE  - MELD-Na: 9  - Labs per routine    Liver Transplant Candidacy:   - We discussed need for transplantation, organ availability and prioritization process for liver transplantation in the [place].  We discussed the guiding principals of justice and equality that dictate transplant candidacy  - We discussed the MELD score, the variables that are followed, and how the MELD score impacts transplantation  - We discussed the evaluation process for candidacy  - We discussed donor types - including  donors (DBD, DCD) and living donors  - We discussed factors that can impact quality of organs available, including extended criteria donor.  - We specifically discussed the potential for use of organs that may test positive for infection (hepatitis B or hepatitis C.)    - Blood Type: A POS   - Outstanding Issues regarding candidacy:   - HCC surveillance   - Will need oncology clearance of lung NET in setting of transplant   - Needs EGD and colonoscopy   - Cardiac eval    Hepatocellular Cancer:   - had a 2.0cm lesion that was biopsy-proven   - Will reach out to Surg Onc for repeat ablation given residual disease on MRI from   - has had metastatic eval  - Recommend screening for HCC every 6 months with either abdominal ultrasound or by alternating abdominal ultrasound with EITHER a triple/quad phase CT Liver with IV contrast OR a Quad phase MRI Liver with IV contrast.  AFP levels should be checked every 6 months at time of imaging screen.    Ascites/Volume Overload:  - no acute issues  - Continue to follow a sodium restricted (<2g sodium diet)     Hepatic Encephalopathy:  - concern that he is having symptoms of encephalopathy  - Will start him on lactulose with goal 2-3 BMs daily    Esophageal Varices:   - given diagnosis of cirrhosis needs screening  - Recommend repeating an upper GI endoscopy now .  If evidence of medium/large esophageal varices,  we recommend esophageal varix band ligation, and  if band ligation is performed, please continue to repeat every 4-8 weeks until eraditation of varices.  - If band ligation is deferred, we strongly recommend the initiation of a non-selective beta-blocker carvedilol and titration of this medication to a heart reate of 55BPM    Carcinoid Tumor of the Lung:  - Low grade neuroendocrine tumor of the right lung  - Nodes negative for mets  - Will need formal comment from oncology on   - Risk of mets, needs for further eval prior to transplant   - Transplant effect on NET    Immobility/Frailty:   - We discussed the imperative need to remain physically active and to participate in active exercise to maintain muscle mass and mobility    Nutrition:  As with most patients with chronic liver disease, there is a significant degree of protein malnutrition.    - Dicussed need to change dietary habits to improve overall protein balance.  Discussed the importance of eating between 1.2-1.5g/kg/day lean protein like eggs, fish, poultry, nuts, legumes, and dairy - in addition to a diet rich in fresh fruits and vegetables.    - Continue to follow a sodium restricted (<2g sodium diet) and discussed the need to minimize the intake of carbohydrates and sugars, to avoid obesity.   - Strongly encourage protein supplements 2-3 times daily (Boost, Ensure, [place] Instant Milk, whey protein powder) to meet protein and caloric intake.  - Discussed important need to have a bedtime snack with protein to minimize risk of muscle breakdown    Routine Health Care in Patient with Chronic Liver Disease:  - NEEDS to stop taking ashwaganda and turmeric supplements    Follow Up:  6 months     Thank you very much for the opportunity to participate in the care of this patient.  If you have any further questions, please don't hesitate to contact our office.    Thomas M. Leventhal, M.D.   of Medicine  Advanced & Transplant Hepatology  [facility]     Approximately 35 minutes of non face-to-face time were spent in review of the patient's medical record on 6/19/2023.  This included review of previous: clinic visits, hospital records, lab results, imaging studies, and procedural documentation.  The findings from this review are summarized in the above note.

## 2023-06-20 NOTE — PATIENT INSTRUCTIONS
- please stop the ashwaganda    - We will start a medication called lactulose.  Please take this once daily    - Okay to take the milk thistle    Cirrhosis Education  Nutrition  - For patients with cirrhosis, it is very important to eat the right types and amounts of foods.  We recommend a diet low in carbohydrates/sugars and high in fresh fruits/vegetables, with the right amount of protein.  We typically recommend 1.5gm/kg/day of protein, or  grams of protein every day.  - In regard to protein intake, you can focus on: All meats, cooked fish and seafood, vegetable-based protein meat products, tofu, eggs, legumes, dairy products (including milk and yogurt and cheese), unsalted nuts.  - You should eat at least three meals a day and three to four snacks between meals  - Bedtime snacks are especially important (preferrably something with some protein)    - Patients with malnutrition and/or loss of muscular mass can improve their nutrition and muscular mass by drinking two cans of dietary supplements daily, particularly at bedtime.  These would include: Ensure, Boost, [place] Instant Milk, Glucerna, (or similar supplements)  - Please avoid eating raw seafood, especially shellfish, because of risk of serious illness  - We recommend all patients with cirrhosis limit their daily sodium intake to less than 2,000mg      General Liver Health  - Continue to avoid the use of all non-steroid anti-inflammatory medicines (ibuprofen, Aleve, naproxen, aspirin, etc.) as this can cause serious injury to your kidneys in the setting of liver disease  - If you see a doctor and they prescribe you a new medication, please contact our clinic to let us know what changes are being made  - If you become acutely ill and present to an ER or are admitted to a hospital, please let us know as soon as you are able.  - We recommend that all patients with chronic liver disease be vaccinated against hepatitis A and B.  Please discuss with your  primary provider the need for these vaccines  - As patients with liver disease are at an increased risk of developing osteoporosis, we recommend that you have a DEXA scan with appropriate follow up and treatment.   - We recommend screening for Vitamin D deficiency (at least yearly) and aggressive replacement/supplementation as warranted.    Sleep Troubles:  - Sleep issues are very common in patients with chronic liver disease  - Recommend strict avoidance of medications such as narcotics, sedatives and sleep aids.    - Low dose benadryl or melatonin can be used for insomnia, if needed.

## 2023-06-21 LAB
DLCOUNC-%PRED-PRE: 86 %
DLCOUNC-PRE: 28.77 ML/MIN/MMHG
DLCOUNC-PRED: 33.43 ML/MIN/MMHG
ERV-%PRED-PRE: 79 %
ERV-PRE: 1.44 L
ERV-PRED: 1.81 L
EXPTIME-PRE: 6.59 SEC
FEF2575-%PRED-PRE: 70 %
FEF2575-PRE: 2.77 L/SEC
FEF2575-PRED: 3.9 L/SEC
FEFMAX-%PRED-PRE: 81 %
FEFMAX-PRE: 8.86 L/SEC
FEFMAX-PRED: 10.89 L/SEC
FEV1-%PRED-PRE: 73 %
FEV1-PRE: 3.13 L
FEV1FEV6-PRE: 79 %
FEV1FEV6-PRED: 81 %
FEV1FVC-PRE: 80 %
FEV1FVC-PRED: 80 %
FEV1SVC-PRE: 85 %
FEV1SVC-PRED: 68 %
FIFMAX-PRE: 8.18 L/SEC
FRCPLETH-%PRED-PRE: 115 %
FRCPLETH-PRE: 4.61 L
FRCPLETH-PRED: 3.98 L
FVC-%PRED-PRE: 73 %
FVC-PRE: 3.91 L
FVC-PRED: 5.34 L
GAMMA INTERFERON BACKGROUND BLD IA-ACNC: 0 IU/ML
IC-%PRED-PRE: 51 %
IC-PRE: 2.24 L
IC-PRED: 4.35 L
M TB IFN-G BLD-IMP: NEGATIVE
M TB IFN-G CD4+ BCKGRND COR BLD-ACNC: 10 IU/ML
MITOGEN IGNF BCKGRD COR BLD-ACNC: 0 IU/ML
MITOGEN IGNF BCKGRD COR BLD-ACNC: 0 IU/ML
QUANTIFERON MITOGEN: 10 IU/ML
QUANTIFERON NIL TUBE: 0 IU/ML
QUANTIFERON TB1 TUBE: 0 IU/ML
QUANTIFERON TB2 TUBE: 0
RVPLETH-%PRED-PRE: 156 %
RVPLETH-PRE: 3.17 L
RVPLETH-PRED: 2.02 L
TLCPLETH-%PRED-PRE: 83 %
TLCPLETH-PRE: 6.85 L
TLCPLETH-PRED: 8.19 L
VA-%PRED-PRE: 75 %
VA-PRE: 5.73 L
VC-%PRED-PRE: 59 %
VC-PRE: 3.68 L
VC-PRED: 6.19 L

## 2023-06-22 ENCOUNTER — HOSPITAL ENCOUNTER (OUTPATIENT)
Dept: CT IMAGING | Facility: CLINIC | Age: 48
Discharge: HOME | End: 2023-06-22
Attending: INTERNAL MEDICINE | Admitting: INTERNAL MEDICINE
Payer: COMMERCIAL

## 2023-06-22 VITALS — DIASTOLIC BLOOD PRESSURE: 80 MMHG | HEART RATE: 77 BPM | SYSTOLIC BLOOD PRESSURE: 124 MMHG

## 2023-06-22 DIAGNOSIS — K76.9 LIVER LESION: ICD-10-CM

## 2023-06-22 DIAGNOSIS — K74.60 LIVER CIRRHOSIS SECONDARY TO NASH (H): ICD-10-CM

## 2023-06-22 DIAGNOSIS — K75.81 NASH (NONALCOHOLIC STEATOHEPATITIS): ICD-10-CM

## 2023-06-22 DIAGNOSIS — K76.6 PORTAL HYPERTENSION (H): ICD-10-CM

## 2023-06-22 DIAGNOSIS — K75.81 LIVER CIRRHOSIS SECONDARY TO NASH (H): ICD-10-CM

## 2023-06-22 DIAGNOSIS — C22.0 HEPATOCELLULAR CARCINOMA (H): ICD-10-CM

## 2023-06-22 PROBLEM — N20.0 KIDNEY STONE: Status: ACTIVE | Noted: 2023-06-22

## 2023-06-22 LAB
LABORATORY REPORT: NORMAL
PLPETH BLD-MCNC: <10 NG/ML
POPETH BLD-MCNC: <10 NG/ML

## 2023-06-22 PROCEDURE — 75574 CT ANGIO HRT W/3D IMAGE: CPT | Mod: 26 | Performed by: STUDENT IN AN ORGANIZED HEALTH CARE EDUCATION/TRAINING PROGRAM

## 2023-06-22 PROCEDURE — 250N000011 HC RX IP 250 OP 636: Performed by: INTERNAL MEDICINE

## 2023-06-22 PROCEDURE — 250N000013 HC RX MED GY IP 250 OP 250 PS 637: Performed by: STUDENT IN AN ORGANIZED HEALTH CARE EDUCATION/TRAINING PROGRAM

## 2023-06-22 PROCEDURE — 250N000009 HC RX 250: Performed by: STUDENT IN AN ORGANIZED HEALTH CARE EDUCATION/TRAINING PROGRAM

## 2023-06-22 PROCEDURE — 75574 CT ANGIO HRT W/3D IMAGE: CPT

## 2023-06-22 RX ORDER — METOPROLOL TARTRATE 25 MG/1
25-100 TABLET, FILM COATED ORAL
Status: COMPLETED | OUTPATIENT
Start: 2023-06-22 | End: 2023-06-22

## 2023-06-22 RX ORDER — DIPHENHYDRAMINE HCL 25 MG
25 CAPSULE ORAL
Status: DISCONTINUED | OUTPATIENT
Start: 2023-06-22 | End: 2023-06-23 | Stop reason: HOSPADM

## 2023-06-22 RX ORDER — ONDANSETRON 2 MG/ML
4 INJECTION INTRAMUSCULAR; INTRAVENOUS
Status: DISCONTINUED | OUTPATIENT
Start: 2023-06-22 | End: 2023-06-23 | Stop reason: HOSPADM

## 2023-06-22 RX ORDER — METHYLPREDNISOLONE SODIUM SUCCINATE 125 MG/2ML
125 INJECTION, POWDER, LYOPHILIZED, FOR SOLUTION INTRAMUSCULAR; INTRAVENOUS
Status: DISCONTINUED | OUTPATIENT
Start: 2023-06-22 | End: 2023-06-23 | Stop reason: HOSPADM

## 2023-06-22 RX ORDER — DIPHENHYDRAMINE HYDROCHLORIDE 50 MG/ML
25-50 INJECTION INTRAMUSCULAR; INTRAVENOUS
Status: DISCONTINUED | OUTPATIENT
Start: 2023-06-22 | End: 2023-06-23 | Stop reason: HOSPADM

## 2023-06-22 RX ORDER — METOPROLOL TARTRATE 1 MG/ML
5-15 INJECTION, SOLUTION INTRAVENOUS
Status: DISCONTINUED | OUTPATIENT
Start: 2023-06-22 | End: 2023-06-23 | Stop reason: HOSPADM

## 2023-06-22 RX ORDER — NITROGLYCERIN 0.4 MG/1
.4-.8 TABLET SUBLINGUAL
Status: DISCONTINUED | OUTPATIENT
Start: 2023-06-22 | End: 2023-06-23 | Stop reason: HOSPADM

## 2023-06-22 RX ORDER — IVABRADINE 5 MG/1
5-15 TABLET, FILM COATED ORAL
Status: COMPLETED | OUTPATIENT
Start: 2023-06-22 | End: 2023-06-22

## 2023-06-22 RX ORDER — ACYCLOVIR 200 MG/1
0-1 CAPSULE ORAL
Status: DISCONTINUED | OUTPATIENT
Start: 2023-06-22 | End: 2023-06-23 | Stop reason: HOSPADM

## 2023-06-22 RX ORDER — IOPAMIDOL 755 MG/ML
120 INJECTION, SOLUTION INTRAVASCULAR ONCE
Status: COMPLETED | OUTPATIENT
Start: 2023-06-22 | End: 2023-06-22

## 2023-06-22 RX ADMIN — IVABRADINE 10 MG: 5 TABLET, FILM COATED ORAL at 12:13

## 2023-06-22 RX ADMIN — METOPROLOL TARTRATE 10 MG: 5 INJECTION INTRAVENOUS at 13:20

## 2023-06-22 RX ADMIN — NITROGLYCERIN 0.8 MG: 0.4 TABLET SUBLINGUAL at 13:23

## 2023-06-22 RX ADMIN — METOPROLOL TARTRATE 50 MG: 50 TABLET, FILM COATED ORAL at 12:13

## 2023-06-22 RX ADMIN — IOPAMIDOL 120 ML: 755 INJECTION, SOLUTION INTRAVENOUS at 13:16

## 2023-06-22 NOTE — PROGRESS NOTES
Pt arrived for Coronary CT angiogram. Test, meds and side effects reviewed with pt. Resting HR 76 bpm. Given 50 mg PO Metoprolol + 10 mg PO Ivabradine per verbal order. Administered 0.8 mg SL nitro and 10 mg IV metoprolol on CTA table per order. CTA completed. Patient tolerated procedure well and denies symptoms of allergic reaction. Post monitoring completed and VSS. D/C instructions reviewed with pt whom verbalized understanding of need to increase PO fluids today. D/C to gold waiting room accompanied by staff.

## 2023-06-23 RX ORDER — LACTULOSE 20 G/30ML
20 SOLUTION ORAL 2 TIMES DAILY
Qty: 946 ML | Refills: 3 | OUTPATIENT
Start: 2023-06-23 | End: 2023-06-23 | Stop reason: DRUGHIGH

## 2023-06-23 RX ORDER — LACTULOSE 20 G/30ML
20 SOLUTION ORAL DAILY
Qty: 946 ML | Refills: 3 | Status: SHIPPED | OUTPATIENT
Start: 2023-06-23 | End: 2023-07-12

## 2023-06-26 ENCOUNTER — DOCUMENTATION ONLY (OUTPATIENT)
Dept: OTHER | Facility: CLINIC | Age: 48
End: 2023-06-26
Payer: COMMERCIAL

## 2023-06-26 ENCOUNTER — TELEPHONE (OUTPATIENT)
Dept: TRANSPLANT | Facility: CLINIC | Age: 48
End: 2023-06-26
Payer: COMMERCIAL

## 2023-06-26 NOTE — TELEPHONE ENCOUNTER
Spoke to patient regarding order for Endoscopy and Colonoscopy faxed to [facility].     Patient also had questions about FMLA forms, provided SOT fax number for him to send.     NELSY Gambino, LPN   Pre-Liver/TPIAT Transplant

## 2023-06-28 NOTE — TELEPHONE ENCOUNTER
RECORDS RECEIVED FROM:    DATE RECEIVED:    NOTES STATUS DETAILS   OFFICE NOTE from referring provider  Internal SOT   OFFICE NOTE from other cardiologists  N/A    RECORDS from hospital/ED N/A    MEDICATION LIST Internal    GENERAL CARDIO RECORDS   (ALL APPOINTMENT TYPES)     LABS (CBC,BMP,CMP, TSH) Internal    EKG (STRIPS & REPORTS) In process 9-21-22 Requested   MONITORS (STRIPS & REPORTS) N/A    ECHOS (IMAGES AND REPORTS) Internal 6-16-23   STRESS TESTS (IMAGES AND REPORTS) N/A    cMRI (IMAGES AND REPORTS) N/A    CT/CTA (IMAGES AND REPORTS) Internal 6-22-23 CTA coronary     Action 6/28/23 [facility]  Fax# [phone]   Action Taken Requested EKG 9-21-22    Sent EKG to scanning 6/29

## 2023-06-29 ENCOUNTER — TELEPHONE (OUTPATIENT)
Dept: TRANSPLANT | Facility: CLINIC | Age: 48
End: 2023-06-29
Payer: COMMERCIAL

## 2023-06-29 NOTE — TELEPHONE ENCOUNTER
Transplant Social Work Services Phone Call    Data: West is in evaluation for liver transplant  Intervention: I called his mother, Virginia to provide caregiver education and confirm his caregiver plan. West has made Virginia aware that I was going to be calling him to discuss post transplant cares/cargiver. Virginia is able to stay with him post transplant locally, and be his primary caregiver. She inquired about lodging options due to his pet that he will want to bring with him. I provided information re: [facility] [place]. She reported that if they can find a place that can take a dog that would be his first choice, however they do have a family member who is able to take care of the dog while he is locally. No further questions at this time.   Assessment: West is an acceptable candidate from a psychosocial perspective  Education provided by SW: Caregiver education   Plan:This writer will continue to follow for psychosocial needs.     MIN Heller, [facility]  Liver Transplant   [facility]  Phone: [phone]  Pager: [phone]

## 2023-07-03 ENCOUNTER — OFFICE VISIT (OUTPATIENT)
Dept: CARDIOLOGY | Facility: CLINIC | Age: 48
End: 2023-07-03
Attending: INTERNAL MEDICINE
Payer: COMMERCIAL

## 2023-07-03 ENCOUNTER — ONCOLOGY VISIT (OUTPATIENT)
Dept: SURGERY | Facility: CLINIC | Age: 48
End: 2023-07-03
Attending: SURGERY
Payer: COMMERCIAL

## 2023-07-03 ENCOUNTER — PRE VISIT (OUTPATIENT)
Dept: CARDIOLOGY | Facility: CLINIC | Age: 48
End: 2023-07-03
Payer: COMMERCIAL

## 2023-07-03 VITALS
HEART RATE: 91 BPM | WEIGHT: 215.4 LBS | SYSTOLIC BLOOD PRESSURE: 146 MMHG | OXYGEN SATURATION: 94 % | BODY MASS INDEX: 26.78 KG/M2 | DIASTOLIC BLOOD PRESSURE: 91 MMHG | HEIGHT: 75 IN

## 2023-07-03 VITALS
HEIGHT: 75 IN | TEMPERATURE: 97.9 F | HEART RATE: 99 BPM | SYSTOLIC BLOOD PRESSURE: 132 MMHG | WEIGHT: 214 LBS | BODY MASS INDEX: 26.61 KG/M2 | RESPIRATION RATE: 18 BRPM | OXYGEN SATURATION: 100 % | DIASTOLIC BLOOD PRESSURE: 81 MMHG

## 2023-07-03 DIAGNOSIS — K74.60 LIVER CIRRHOSIS SECONDARY TO NASH (H): ICD-10-CM

## 2023-07-03 DIAGNOSIS — K76.9 LIVER LESION: ICD-10-CM

## 2023-07-03 DIAGNOSIS — K75.81 LIVER CIRRHOSIS SECONDARY TO NASH (H): ICD-10-CM

## 2023-07-03 DIAGNOSIS — C22.0 HEPATOCELLULAR CARCINOMA (H): ICD-10-CM

## 2023-07-03 DIAGNOSIS — K76.6 PORTAL HYPERTENSION (H): ICD-10-CM

## 2023-07-03 DIAGNOSIS — K75.81 NASH (NONALCOHOLIC STEATOHEPATITIS): ICD-10-CM

## 2023-07-03 DIAGNOSIS — C22.0 HCC (HEPATOCELLULAR CARCINOMA) (H): Primary | ICD-10-CM

## 2023-07-03 PROCEDURE — 99204 OFFICE O/P NEW MOD 45 MIN: CPT | Performed by: INTERNAL MEDICINE

## 2023-07-03 PROCEDURE — 93005 ELECTROCARDIOGRAM TRACING: CPT

## 2023-07-03 PROCEDURE — 99213 OFFICE O/P EST LOW 20 MIN: CPT | Performed by: SURGERY

## 2023-07-03 PROCEDURE — 93010 ELECTROCARDIOGRAM REPORT: CPT | Performed by: INTERNAL MEDICINE

## 2023-07-03 PROCEDURE — 99213 OFFICE O/P EST LOW 20 MIN: CPT | Mod: 27 | Performed by: INTERNAL MEDICINE

## 2023-07-03 RX ORDER — HYDROCHLOROTHIAZIDE 25 MG/1
12.5 TABLET ORAL EVERY MORNING
Status: ON HOLD | COMMUNITY
Start: 2023-04-28 | End: 2024-06-28

## 2023-07-03 ASSESSMENT — PAIN SCALES - GENERAL
PAINLEVEL: NO PAIN (0)
PAINLEVEL: NO PAIN (0)

## 2023-07-03 NOTE — PROGRESS NOTES
HPI:     I had the privilege to evaluate and examine   Mr West Van, who is a 47 yr old male with diabetes, hypertension, RIOS, liver cancer, S/P lung lobectomy for a neuroendocrine carcinoma  for pre-op clearance for liver Tx.    Patient denies chest pain, shortness of breath, palpitations and intermittent claudication.    PAST MEDICAL HISTORY:  Past Medical History:   Diagnosis Date     Benign essential HTN 04/13/2016     Diabetes (H)      Kidney stone      Liver cancer (H) 10/04/2022     Liver cirrhosis secondary to nonalcoholic steatohepatitis (RIOS) (H)      RIOS (nonalcoholic steatohepatitis) 06/02/2023     Neuroendocrine carcinoma of lung (H)     right lung, lobectomy 3/2023     PONV (postoperative nausea and vomiting)        CURRENT MEDICATIONS:  Current Outpatient Medications   Medication Sig Dispense Refill     acetaminophen (TYLENOL) 325 MG tablet Take 3 tablets (975 mg) by mouth every 8 hours       atenolol (TENORMIN) 25 MG tablet Take 25 mg by mouth every morning       benzonatate (TESSALON) 200 MG capsule Take 200 mg by mouth       hydrochlorothiazide (HYDRODIURIL) 25 MG tablet Take 12.5 mg by mouth       liraglutide (VICTOZA) 18 MG/3ML solution Inject 1.8 mg Subcutaneous every evening       magnesium 250 MG tablet Take 1 tablet by mouth every morning       metFORMIN (GLUCOPHAGE XR) 500 MG 24 hr tablet Take 500 mg by mouth every morning       Milk Thistle-Dand-Fennel-Licor (MILK THISTLE XTRA) CAPS capsule Take 1 capsule by mouth every morning       polyethylene glycol (MIRALAX) 17 GM/Dose powder Take 17 g by mouth       senna-docusate (SENOKOT-S/PERICOLACE) 8.6-50 MG tablet Take 1 tablet by mouth 2 times daily Reduce dose or temporarily discontinue if having loose stools. 50 tablet 0     TURMERIC PO Take 1 capsule by mouth every morning       ULTICARE MINI 31G X 6 MM insulin pen needle Inject 1 each Subcutaneous At Bedtime       Vitamin D3 (CHOLECALCIFEROL) 125 MCG (5000 UT) tablet Take 125 mcg  by mouth every morning       Ashwagandha 125 MG CAPS Take 1 capsule by mouth every morning (Patient not taking: Reported on 7/3/2023)       furosemide (LASIX) 20 MG tablet Take 10 mg by mouth       lactulose 20 GM/30ML solution Take 30 mLs (20 g) by mouth daily (Patient not taking: Reported on 7/3/2023) 946 mL 3       PAST SURGICAL HISTORY:  Past Surgical History:   Procedure Laterality Date     BRONCHOSCOPY, WITH BIOPSY, ROBOT ASSISTED N/A 1/17/2023    Procedure: BRONCHOSCOPY, USING OPTICAL TRACKING SYSTEM, ion;  Surgeon: Ani Guillaume MD;  Location: [facility] OR     DAVINCI LOBECTOMY LUNG Right 3/8/2023    Procedure: Robot-assisted right thoracoscopic lower lobectomy, mediastinal lymph node dissection, intercostal nerve cryoablation ;  Surgeon: Matheus Haas MD;  Location: [facility] OR     ENDOBRONCHIAL ULTRASOUND FLEXIBLE N/A 1/17/2023    Procedure: endobronchial  ultrasound and transbronchial biopsies;  Surgeon: Ani Guillaume MD;  Location: [facility] OR     LAPAROSCOPIC ABLATION LIVER TUMOR N/A 12/6/2022    Procedure: Diagnostic Laparoscopy, Hepatic Ultrasound, Laparoscopic ultrasound guided microwave ablation of liver tumor x1;  Surgeon: Armando Munguia MD;  Location: [facility] OR     LAPAROSCOPIC BIOPSY LIVER N/A 12/6/2022    Procedure: Laparoscopic Ultrasound Guided liver biopsy;  Surgeon: Armando Munguia MD;  Location: [facility] OR     LAPAROSCOPIC CHOLECYSTECTOMY N/A 12/6/2022    Procedure: Laparoscopic cholecystectomy;  Surgeon: Armando Munguia MD;  Location: [facility] OR       ALLERGIES   No Known Allergies    FAMILY HISTORY:  Family History   Problem Relation Age of Onset     Breast Cancer Mother      Cancer Father         lip     Melanoma Cousin      Cirrhosis No family hx of        SOCIAL HISTORY:  Social History     Socioeconomic History     Marital status: Single     Spouse name: None     Number of children: None     Years of education: None     Highest education level: None   Tobacco Use     Smoking status: Never      "Smokeless tobacco: Never   Vaping Use     Vaping Use: Never used   Substance and Sexual Activity     Alcohol use: Not Currently     Comment: Last ETOH 1999     Drug use: Never       ROS:   Constitutional: No fever, chills, or sweats. No weight gain/loss   ENT: No visual disturbance, ear ache, epistaxis, sore throat  Allergies/Immunologic: Negative.   Respiratory: No cough, hemoptysia  Cardiovascular: As per HPI  GI: No nausea, vomiting, hematemesis, melena, or hematochezia  : No urinary frequency, dysuria, or hematuria  Integument: Negative  Psychiatric: Negative  Neuro: Negative  Endocrinology: Negative   Musculoskeletal: Negative    EXAM:  BP (!) 146/91 (BP Location: Right arm, Patient Position: Sitting, Cuff Size: Adult Regular)   Pulse 91   Ht 1.912 m (6' 3.28\")   Wt 97.7 kg (215 lb 6.4 oz)   SpO2 94%   BMI 26.73 kg/m    In general, the patient is a pleasant male in no apparent distress.    HEENT: NC/AT.  PERRLA.  EOMI.  Sclerae white, not injected.  Nares clear.  Pharynx without erythema or exudate.  Dentition intact.    Neck: No adenopathy.  No thyromegaly. Carotids +4/4 bilaterally without bruits.  No jugular venous distension.   Heart: RRR. Normal S1, S2 splits physiologically. No murmur, rub, click, or gallop. The PMI is in the 5th ICS in the midclavicular line. There is no heave.    Lungs: CTA.  No ronchi, wheezes, rales.  No dullness to percussion.   Abdomen: Soft, nontender, nondistended. No organomegaly.  No bruits.   Extremities: No clubbing, cyanosis, or edema.  The pulses are +4/4 at the radial, brachial, femoral, popliteal, DP, and PT sites bilaterally.  No bruits are noted.  Neurologic: Alert and oriented to person/place/time, normal speech, gait and affect  Skin: No petechiae, purpura or rash.    BP at the end 132/76    Labs:  LIPID RESULTS:  Lab Results   Component Value Date    CHOL 140 06/20/2023    HDL 57 06/20/2023    LDL 63 06/20/2023    TRIG 102 06/20/2023    NHDL 83 06/20/2023 "       LIVER ENZYME RESULTS:  Lab Results   Component Value Date    AST 24 2023    ALT 30 2023       CBC RESULTS:  Lab Results   Component Value Date    WBC 4.6 2023    RBC 5.19 2023    HGB 15.4 2023    HCT 45.1 2023    MCV 87 2023    MCH 29.7 2023    MCHC 34.1 2023    RDW 12.5 2023     (L) 2023       BMP RESULTS:  Lab Results   Component Value Date     2023    POTASSIUM 4.0 2023    POTASSIUM 4.0 2023    CHLORIDE 105 2023    CHLORIDE 110 (H) 2023    CO2 28 2023    CO2 29 2023    ANIONGAP 7 2023    ANIONGAP 5 2023     (H) 2023     (H) 2023    BUN 12.5 2023    BUN 12 2023    CR 0.79 2023    GFRESTIMATED >90 2023    GFRESTIMATED >60 2023    CHUCK 9.9 2023        A1C RESULTS:  Lab Results   Component Value Date    A1C 7.0 (H) 2023       INR RESULTS:  Lab Results   Component Value Date    INR 1.22 (H) 2023    INR 1.18 (H) 2022       Procedures:    EKG: sin rhythm , occasional ventricular ectopic beat    Coronary CTA:    IMPRESSION:  1.  Normal coronary artery anatomy without detectable coronary artery  disease  2.  There is mild dilation of the proximal ascending aorta at the  level of the sinus of valsalva 4.2 cm x 4.0 cm x 3.8 cm (measured cusp  to cusp).   3.  Total Agatston score 0 placing the patient in the lowest  percentile when compared to an age- and gender-matched control group.  4.  Please review the separate Radiology report for incidental  noncardiac findings.     FINDINGS:     CORONARY CALCIUM SCORE     Total Agatston calcium score: 0   Left main: 0  Left anterior descendin  Left circumflex: 0  Right coronary artery: 0   This places the patient in the lowest  percentile when compared to an  age- and gender-matched control group.     CORONARY ANGIOGRAPHY     DOMINANCE: Left dominant system.    Normal coronary origins and course.     LEFT MAIN:   The LM is a short large caliber vessel. The left circumflex and LAD  almost have separate ostia.    The left main arises normally from the left coronary cusp and is  widely patent without detectable atherosclerosis or stenosis.      LEFT ANTERIOR DESCENDING:   The LAD is a large caliber vessel. It has a type III morphology. It  gives rise to diminutive first diagonal, medium caliber second  diagonal.   The left anterior descending and its major diagonal branches are  widely patent without detectable atherosclerosis or stenosis.     LEFT CIRCUMFLEX:   The LCx is a large caliber vessel. It gives rise to a medium caliber  first obtuse marginal, medium caliber second obtuse marginal, and  medium caliber posterior descending artery.   The left circumflex and its major branches are widely patent without  detectable atherosclerosis or stenosis.     RIGHT CORONARY ARTERY:   The RCA is a small caliber vessel.    The right coronary artery and its major branches are widely patent  without detectable atherosclerosis or stenosis.     ADDITIONAL FINDINGS:   Small accessory SERINA is present.   The proximal ascending aorta is mildly dilated at the level of the  sinus of valsalva 4.2 cm x 4.0 cm x 3.8 cm (measured cusp to cusp).   There is a single right sided pulmonary vein and two left sided  pulmonary veins. All three pulmonary veins drain into the left atrium.     There is no left ventricular mass or thrombus.   Normal pericardial thickness. There is no pericardial effusion    RX reading:  Lungs: The trachea and central airways are patent. No pneumothorax.  Trace right pleural effusion. No focal airspace opacity. 5 mm solid  nodule in the lingula on image 21, series 5 is grossly similar to  prior. Right lower lobectomy changes.     Mediastinum: The heart size is within normal limits. Incidental LV  apical anteroseptal cleft. No pericardial effusion. The ascending  aorta and  main pulmonary artery diameters are within normal limits.  Coronary artery calcification is absent. No suspicious mediastinal,  hilar, or axillary lymph nodes. Mild esophageal wall thickening.     Bones and soft tissues: No suspicious bone findings.      Echocardiogram    Global and regional left ventricular function is normal with an EF of 55-60%.  Global right ventricular function is normal.  Pulmonary artery systolic pressure is normal. The right ventricular systolic  pressure is approximated at 31 mmHg  No significant valvular abnormalities present.  Estimated mean right atrial pressure is normal.  No pericardial effusion is present.  ______________________________________________________________________________  Left Ventricle  Left ventricular size is normal. Left ventricular wall thickness is normal.  Global and regional left ventricular function is normal with an EF of 55-60%.  No regional wall motion abnormalities are seen.     Right Ventricle  The right ventricle is normal size. Global right ventricular function is  normal.     Atria  Both atria appear normal.     Mitral Valve  The mitral valve is normal. Trace mitral insufficiency is present.     Aortic Valve  Aortic valve is normal in structure and function.     Tricuspid Valve  The tricuspid valve is normal. Trace tricuspid insufficiency is present. The  right ventricular systolic pressure is approximated at 27.7 mmHg plus the  right atrial pressure. Pulmonary artery systolic pressure is normal.     Pulmonic Valve  The pulmonic valve is normal.     Vessels  The aorta root is normal. Sinuses of Valsalva 3.9 cm. Ascending aorta 3.4 cm.  Estimated mean right atrial pressure is normal.     Pericardium  No pericardial effusion is present.     Miscellaneous  No significant valvular abnormalities present.     Compared to Previous Study  Previous study not available for  comparison.  ______________________________________________________________________________  MMode/2D Measurements & Calculations  IVSd: 0.99 cm  LVIDd: 4.7 cm  LVIDs: 3.4 cm  LVPWd: 1.1 cm  FS: 27.3 %  LV mass(C)d: 168.9 grams  LV mass(C)dI: 73.9 grams/m2  Ao root diam: 3.9 cm  asc Aorta Diam: 3.4 cm  LVOT diam: 2.4 cm  LVOT area: 4.6 cm2  LA Volume (BP): 69.6 ml     LA Volume Index (BP): 30.5 ml/m2  RV Base: 3.4 cm  RWT: 0.45  TAPSE: 1.9 cm     Doppler Measurements & Calculations  MV E max herber: 88.3 cm/sec  MV A max herber: 79.4 cm/sec  MV E/A: 1.1  MV dec slope: 364.8 cm/sec2  MV dec time: 0.24 sec  PA acc time: 0.13 sec  TR max herber: 263.0 cm/sec  TR max P.7 mmHg  E/E' av.6  Lateral E/e': 7.1  Medial E/e': 10.1  RV S Herber: 14.1 cm/sec         Assessment and Plan:     I discussed the results with patient    Medication Changes: None     Account the EKG, coronary CTA and Echocardiography - patient can be cleared for liver Tx from cardiovascular point of view.    Driss Paris MD, PhD  Professor of Medicine  Division of Cardiology    CC  Patient Care Team:  Cr Martinez NP as PCP - General  Michael Sanchez MD as MD (Hematology & Oncology)  Michael Sanchez MD as Assigned Cancer Care Provider  Christy Bailey RN as Specialty Care Coordinator (Hematology & Oncology)  Armando Munguia MD as MD (Surgery)  Farzaneh Omalley APRN CNP as Nurse Practitioner (Anesthesiology)  Cr Gray MD as MD (Critical Care)  Armando Munguia MD as Assigned Surgical Provider  Cr Gray MD as Assigned Pulmonology Provider  Matheus Haas MD as MD (Cardiovascular & Thoracic Surgery)  Matheus Haas MD as Assigned Heart and Vascular Provider  Tiffany Lepe LPN as LPN (Thoracic Surgery)  Monae Hector, RN as Specialty Care Coordinator (Hematology & Oncology)  Dillon Quinones, RN as Transplant Coordinator (Transplant Hepatology )  Driss Paris MD as MD (Cardiovascular Disease)  Leventhal,  Hugh Holley MD as MD (Gastroenterology)  Ramona Redding RD as Registered Dietitian (Dietitian, Registered)  Matthias Valenzuela MD as MD (Surgery)  Twyla Ko LICSW as   Nhan Ordonez MD as MD (Critical Care)  LEVENTHAL, THOMAS MICHAEL

## 2023-07-03 NOTE — Clinical Note
7/3/2023      RE: West Van  [address]  [facility] [address]       Dear Colleague,    Thank you for the opportunity to participate in the care of your patient, West Van, at the [facility]. Please see a copy of my visit note below.    No notes on file    Please do not hesitate to contact me if you have any questions/concerns.     Sincerely,     Driss Paris MD

## 2023-07-03 NOTE — PATIENT INSTRUCTIONS
July 3, 2023    Cardiology Provider You Saw During Your Visit: Dr. Paris      Medication Changes: None      Follow Up: Dr. Paris will clear you from a cardiovascular standpoint for transplant. No further cardiology follow up or testing needed.        Follow the American Heart Association Diet and Lifestyle Recommendations:  -Limit saturated fat, trans fat, sodium, red meat, sweets and sugar-sweetened beverages. If you choose to eat red meat, compare labels and select the leanest cuts available.  -Aim for at least 150 minutes of moderate physical activity or 75 minutes of vigorous physical activity - or an equal combination of both - each week.      To Reach Us:  -During business hours: [phone], press option # 1 to schedule an appointment or to leave a message for your care team.     -After hours, weekends or holidays: [phone], press option #4 and ask to speak to the on-call cardiologist. Inform them you are a patient at the [facility].        **If you have a cardiac device, please make sure you schedule an in-person device check just prior to your cardiology provider appointments**        Danielle Burnett RN  Cardiology Care Coordinator - General Cardiology  [facility]

## 2023-07-03 NOTE — PROGRESS NOTES
REASON FOR EVALUATION:  Suspected possible local recurrence of HCC after ablation.    Mr. Van is a 47-year-old gentleman well known to me.  I performed a laparoscopic ablation as well as cholecystectomy in 12/2022 for an approximately 1.8 cm liver lesion lying in segment 4A/8.  We performed a large ablation with 2 SR 25 probes.  There has been some vascular abnormality on the periphery of the ablation since that time seen on MRI, which has raised the concern for possible residual tumor.  I was asked to see him again for consideration of retreatment.    On my review of the imaging, I am not entirely convinced that this represents recurrence and it may be a vascular anomaly related to the ablation.  The ablation zone is far larger than the original tumor was and so I am not sure it makes clinical sense that the area they are concerned about really represents recurrence.  Having said that, certainly it is a concern, and we will review it.  I will be getting a new MRI because the previous MRI is already over a month old and I think it will help us determine whether this is truly malignant versus a transient process.    In addition, I also discussed with Mr. Van that he has 2 additional LI-RADS 3 lesions in his liver, and in the event that we have to retreat his ablation site, I would plan on treating those as well just to completely eliminate them from his imaging findings in the future.  I would hate to have to see him have repeat procedures if those were to progress to LI-RADS 4 or 5.  He was very much in agreement with that.    We will get Mr. Van a new MRI to reevaluate his liver.  If we are convinced that there is an area of recurrence, we will plan for retreatment with the laparoscopic approach along with treatment of his 2 LI-RADS 3 lesions.  If it is not definitively diagnosed on MRI, I would prefer a period of longer surveillance prior to any further surgical intervention, which he was also in  agreement with.    We will ge the MRI set up and I will meet with  Janellsebastien thereafter to discuss the final plan.    A total of just 5 minutes was spent in face-to-face time today with Sravan Carlota.  10 minutes was spent in review of his history, imaging.  5 minutes was spent on documentation.

## 2023-07-03 NOTE — NURSING NOTE
"Oncology Rooming Note    July 3, 2023 12:44 PM   West Van is a 47 year old male who presents for:    Chief Complaint   Patient presents with     Oncology Clinic Visit     Discuss liver surgery      Initial Vitals: /81 (BP Location: Right arm, Patient Position: Sitting, Cuff Size: Adult Large)   Pulse 99   Temp 97.9  F (36.6  C) (Oral)   Resp 18   Ht 1.905 m (6' 3\")   Wt 97.1 kg (214 lb)   SpO2 100%   BMI 26.75 kg/m   Estimated body mass index is 26.75 kg/m  as calculated from the following:    Height as of this encounter: 1.905 m (6' 3\").    Weight as of this encounter: 97.1 kg (214 lb). Body surface area is 2.27 meters squared.  No Pain (0) Comment: Data Unavailable   No LMP for male patient.  Allergies reviewed: Yes  Medications reviewed: Yes    Medications: Medication refills not needed today.  Pharmacy name entered into [employer]: [id number] - [address]    Clinical concerns: none at this time.     Dorothea Juarez RN              "

## 2023-07-03 NOTE — NURSING NOTE
Chief Complaint   Patient presents with     New Patient     Dexter Paris pt, referred for tx clearance       Vitals were taken, medications reconciled and EKG performed.    Medina Diaz, EMT   2:30 PM

## 2023-07-03 NOTE — LETTER
7/3/2023         RE: West Van  [address]  [facility] [address]        Dear Colleague,    Thank you for referring your patient, West Van, to the [facility]. Please see a copy of my visit note below.     Dictation on: 07/03/2023  1:54 PM by: OPAL LUQUE [[id number]]         REASON FOR EVALUATION:  Suspected possible local recurrence of HCC after ablation.    Mr. Van is a 47-year-old gentleman well known to me.  I performed a laparoscopic ablation as well as cholecystectomy in 12/2022 for an approximately 1.8 cm liver lesion lying in segment 4A/8.  We performed a large ablation with 2 SR 25 probes.  There has been some vascular abnormality on the periphery of the ablation since that time seen on MRI, which has raised the concern for possible residual tumor.  I was asked to see him again for consideration of retreatment.    On my review of the imaging, I am not entirely convinced that this represents recurrence and it may be a vascular anomaly related to the ablation.  The ablation zone is far larger than the original tumor was and so I am not sure it makes clinical sense that the area they are concerned about really represents recurrence.  Having said that, certainly it is a concern, and we will review it.  I will be getting a new MRI because the previous MRI is already over a month old and I think it will help us determine whether this is truly malignant versus a transient process.    In addition, I also discussed with Mr. Van that he has 2 additional LI-RADS 3 lesions in his liver, and in the event that we have to retreat his ablation site, I would plan on treating those as well just to completely eliminate them from his imaging findings in the future.  I would hate to have to see him have repeat procedures if those were to progress to LI-RADS 4 or 5.  He was very much in agreement with that.    We will get Mr. Van a new MRI to reevaluate his liver.  If we  are convinced that there is an area of recurrence, we will plan for retreatment with the laparoscopic approach along with treatment of his 2 LI-RADS 3 lesions.  If it is not definitively diagnosed on MRI, I would prefer a period of longer surveillance prior to any further surgical intervention, which he was also in agreement with.    We will ge the MRI set up and I will meet with Mr. Van thereafter to discuss the final plan.    A total of just 5 minutes was spent in face-to-face time today with Mr. Van.  10 minutes was spent in review of his history, imaging.  5 minutes was spent on documentation.        Armando Munguia MD

## 2023-07-03 NOTE — NURSING NOTE
July 3, 2023      Medication Changes: None      Follow Up: Dr. Paris will clear pt from a cardiovascular standpoint for transplant. No further cardiology follow up or testing needed.    Patient verbalized understanding of all health information given and agreed to call with further questions or concerns.      Danielle Burnett RN

## 2023-07-06 NOTE — CONFIDENTIAL NOTE
RECORDS RECEIVED FROM: internal /ce   DATE RECEIVED: 9.6.23    NOTES STATUS DETAILS   OFFICE NOTE from referring provider frantz Webster in [facility]   OFFICE NOTE from other specialist internal  [facility] services      MEDICATION LIST internal     IMAGING  (NEED IMAGES AND REPORTS)     CT SCAN internal  5.11.23, 2/7/23, 1.17.23,    CHEST XRAY (CXR) internal /ce [facility]- 6.2.23, 5.19.23, 4.11.23, 3.30.23,     [facility]- 3.29.23, 3.20.23, 3.15.23    Internal- 4.28.23, 4.4.23, 3.23.23,    TESTS     PULMONARY FUNCTION TESTING (PFT) internal         Action 7/6/23 sv    Action Taken Imaging request sent to [facility] for    CXR- 6.2.23, 5.19.23, 4.11.23, 3.30.23, --received --

## 2023-07-08 LAB
ATRIAL RATE - MUSE: 87 BPM
DIASTOLIC BLOOD PRESSURE - MUSE: NORMAL MMHG
INTERPRETATION ECG - MUSE: NORMAL
P AXIS - MUSE: 18 DEGREES
PR INTERVAL - MUSE: 170 MS
QRS DURATION - MUSE: 100 MS
QT - MUSE: 388 MS
QTC - MUSE: 466 MS
R AXIS - MUSE: -4 DEGREES
SYSTOLIC BLOOD PRESSURE - MUSE: NORMAL MMHG
T AXIS - MUSE: 40 DEGREES
VENTRICULAR RATE- MUSE: 87 BPM

## 2023-07-12 ENCOUNTER — MYC MEDICAL ADVICE (OUTPATIENT)
Dept: OTHER | Age: 48
End: 2023-07-12

## 2023-07-12 DIAGNOSIS — K75.81 NASH (NONALCOHOLIC STEATOHEPATITIS): ICD-10-CM

## 2023-07-12 DIAGNOSIS — C22.0 HEPATOCELLULAR CARCINOMA (H): ICD-10-CM

## 2023-07-12 RX ORDER — LACTULOSE 20 G/30ML
20 SOLUTION ORAL DAILY
Qty: 946 ML | Refills: 3 | Status: ON HOLD | OUTPATIENT
Start: 2023-07-12 | End: 2024-07-05

## 2023-07-14 ENCOUNTER — ANCILLARY PROCEDURE (OUTPATIENT)
Dept: BONE DENSITY | Facility: CLINIC | Age: 48
End: 2023-07-14
Attending: INTERNAL MEDICINE
Payer: COMMERCIAL

## 2023-07-14 DIAGNOSIS — K76.6 PORTAL HYPERTENSION (H): ICD-10-CM

## 2023-07-14 DIAGNOSIS — K75.81 LIVER CIRRHOSIS SECONDARY TO NASH (H): ICD-10-CM

## 2023-07-14 DIAGNOSIS — K75.81 NASH (NONALCOHOLIC STEATOHEPATITIS): ICD-10-CM

## 2023-07-14 DIAGNOSIS — K74.60 LIVER CIRRHOSIS SECONDARY TO NASH (H): ICD-10-CM

## 2023-07-14 DIAGNOSIS — K76.9 LIVER LESION: ICD-10-CM

## 2023-07-14 DIAGNOSIS — C22.0 HEPATOCELLULAR CARCINOMA (H): ICD-10-CM

## 2023-07-14 PROCEDURE — 77080 DXA BONE DENSITY AXIAL: CPT

## 2023-07-26 ENCOUNTER — HOSPITAL ENCOUNTER (OUTPATIENT)
Dept: MRI IMAGING | Facility: CLINIC | Age: 48
Discharge: HOME | End: 2023-07-26
Attending: SURGERY
Payer: COMMERCIAL

## 2023-07-26 ENCOUNTER — HOSPITAL ENCOUNTER (OUTPATIENT)
Dept: NUCLEAR MEDICINE | Facility: CLINIC | Age: 48
Setting detail: NUCLEAR MEDICINE
Discharge: HOME | End: 2023-07-26
Attending: INTERNAL MEDICINE
Payer: COMMERCIAL

## 2023-07-26 DIAGNOSIS — K75.81 NASH (NONALCOHOLIC STEATOHEPATITIS): ICD-10-CM

## 2023-07-26 DIAGNOSIS — C22.0 HEPATOCELLULAR CARCINOMA (H): ICD-10-CM

## 2023-07-26 DIAGNOSIS — K74.60 LIVER CIRRHOSIS SECONDARY TO NASH (H): ICD-10-CM

## 2023-07-26 DIAGNOSIS — K76.6 PORTAL HYPERTENSION (H): ICD-10-CM

## 2023-07-26 DIAGNOSIS — K76.9 LIVER LESION: ICD-10-CM

## 2023-07-26 DIAGNOSIS — K75.81 LIVER CIRRHOSIS SECONDARY TO NASH (H): ICD-10-CM

## 2023-07-26 DIAGNOSIS — C22.0 HCC (HEPATOCELLULAR CARCINOMA) (H): ICD-10-CM

## 2023-07-26 PROCEDURE — 74183 MRI ABD W/O CNTR FLWD CNTR: CPT | Mod: 26 | Performed by: RADIOLOGY

## 2023-07-26 PROCEDURE — 74183 MRI ABD W/O CNTR FLWD CNTR: CPT

## 2023-07-26 PROCEDURE — A9503 TC99M MEDRONATE: HCPCS | Performed by: INTERNAL MEDICINE

## 2023-07-26 PROCEDURE — 343N000001 HC RX 343: Performed by: INTERNAL MEDICINE

## 2023-07-26 PROCEDURE — A9581 GADOXETATE DISODIUM INJ: HCPCS | Performed by: SURGERY

## 2023-07-26 PROCEDURE — 78306 BONE IMAGING WHOLE BODY: CPT

## 2023-07-26 PROCEDURE — 255N000002 HC RX 255 OP 636: Performed by: SURGERY

## 2023-07-26 PROCEDURE — 78306 BONE IMAGING WHOLE BODY: CPT | Mod: 26 | Performed by: RADIOLOGY

## 2023-07-26 RX ORDER — TC 99M MEDRONATE 20 MG/10ML
20-30 INJECTION, POWDER, LYOPHILIZED, FOR SOLUTION INTRAVENOUS ONCE
Status: COMPLETED | OUTPATIENT
Start: 2023-07-26 | End: 2023-07-26

## 2023-07-26 RX ADMIN — TC 99M MEDRONATE 27.2 MILLICURIE: 20 INJECTION, POWDER, LYOPHILIZED, FOR SOLUTION INTRAVENOUS at 10:30

## 2023-07-26 RX ADMIN — GADOXETATE DISODIUM 9.5 ML: 181.43 INJECTION, SOLUTION INTRAVENOUS at 13:23

## 2023-07-28 ENCOUNTER — DOCUMENTATION ONLY (OUTPATIENT)
Dept: TRANSPLANT | Facility: CLINIC | Age: 48
End: 2023-07-28
Payer: COMMERCIAL

## 2023-07-28 NOTE — PROGRESS NOTES
July 28, 2023 12:08 PM - Dillon Quinones RN:     [facility] LIVER TUMOR BOARD NOTE     DATE OF TUMOR BOARD: July 28, 2023      SCAN REVIEWED: 7/26/23 MRI, Reviewed by Dr. Evaristo Gibson    Discussion:   -treatment changes in R lobe, no viable tumor on MR  -CT = lobectomy changes  -no evidence of current malignancy    Plan:  -Represent to Selection Committee after patient meets with Dr. Sanchez on 8/17. Continue with surveillance monitoring.

## 2023-08-07 ENCOUNTER — VIRTUAL VISIT (OUTPATIENT)
Dept: SURGERY | Facility: CLINIC | Age: 48
End: 2023-08-07
Attending: SURGERY
Payer: COMMERCIAL

## 2023-08-07 VITALS — BODY MASS INDEX: 25.74 KG/M2 | HEIGHT: 75 IN | WEIGHT: 207 LBS

## 2023-08-07 DIAGNOSIS — C22.0 HCC (HEPATOCELLULAR CARCINOMA) (H): Primary | ICD-10-CM

## 2023-08-07 PROCEDURE — 99212 OFFICE O/P EST SF 10 MIN: CPT | Mod: VID | Performed by: SURGERY

## 2023-08-07 ASSESSMENT — PAIN SCALES - GENERAL: PAINLEVEL: MILD PAIN (2)

## 2023-08-07 NOTE — PROGRESS NOTES
Virtual Visit Details    Type of service:  Video Visit     Originating Location (pt. Location): Home    Distant Location (provider location):  On-site  Platform used for Video Visit: Hai    Patient sounds good today.  Imaging reveals a good ablation with no evidence of residual or recurrent tumor.  He has additional lesions in the liver which will be followed closely by medical oncology and hepatology.  From a surgical standpoint he has recovered well.    I will plan to see the patient on an as-needed basis moving forward with any new questions or concerns.  He was very happy with that plan.      10 minutes was spent on review of his recent imaging.  5 minutes was spent in discussion with the patient.  5 minutes was spent in documentation.

## 2023-08-07 NOTE — NURSING NOTE
Is the patient currently in the state of [place]? YES    Visit mode:VIDEO    If the visit is dropped, the patient can be reconnected by: VIDEO VISIT: Text to cell phone: [phone]    Will anyone else be joining the visit? NO    How would you like to obtain your AVS? MyChart    Are changes needed to the allergy or medication list? NO    Reason for visit: CORNELIUS Thompson, EL/JAZMINE

## 2023-08-07 NOTE — LETTER
8/7/2023         RE: West Van  [address]  [facility] [address]        Dear Colleague,    Thank you for referring your patient, West Van, to the [facility]. Please see a copy of my visit note below.    Virtual Visit Details    Type of service:  Video Visit     Originating Location (pt. Location): Home    Distant Location (provider location):  On-site  Platform used for Video Visit: AmWell    Patient sounds good today.  Imaging reveals a good ablation with no evidence of residual or recurrent tumor.  He has additional lesions in the liver which will be followed closely by medical oncology and hepatology.  From a surgical standpoint he has recovered well.    I will plan to see the patient on an as-needed basis moving forward with any new questions or concerns.  He was very happy with that plan.      10 minutes was spent on review of his recent imaging.  5 minutes was spent in discussion with the patient.  5 minutes was spent in documentation.    Sincerely,        Armando Munguia MD

## 2023-08-14 ENCOUNTER — LAB (OUTPATIENT)
Dept: LAB | Facility: CLINIC | Age: 48
End: 2023-08-14
Payer: COMMERCIAL

## 2023-08-14 ENCOUNTER — HOSPITAL ENCOUNTER (OUTPATIENT)
Dept: MRI IMAGING | Facility: CLINIC | Age: 48
Discharge: HOME | End: 2023-08-14
Attending: INTERNAL MEDICINE | Admitting: INTERNAL MEDICINE
Payer: COMMERCIAL

## 2023-08-14 DIAGNOSIS — C7A.8 PRIMARY MALIGNANT NEUROENDOCRINE TUMOR OF LUNG (H): ICD-10-CM

## 2023-08-14 DIAGNOSIS — C22.0 HEPATOCELLULAR CARCINOMA (H): ICD-10-CM

## 2023-08-14 DIAGNOSIS — E11.9 TYPE 2 DIABETES MELLITUS WITHOUT COMPLICATION, WITHOUT LONG-TERM CURRENT USE OF INSULIN (H): ICD-10-CM

## 2023-08-14 LAB
AFP SERPL-MCNC: 27.4 NG/ML
ALBUMIN SERPL BCG-MCNC: 4.3 G/DL (ref 3.5–5.2)
ALP SERPL-CCNC: 115 U/L (ref 40–129)
ALT SERPL W P-5'-P-CCNC: 37 U/L (ref 0–70)
ANION GAP SERPL CALCULATED.3IONS-SCNC: 12 MMOL/L (ref 7–15)
AST SERPL W P-5'-P-CCNC: 26 U/L (ref 0–45)
BASOPHILS # BLD AUTO: 0.1 10E3/UL (ref 0–0.2)
BASOPHILS NFR BLD AUTO: 1 %
BILIRUB DIRECT SERPL-MCNC: 0.32 MG/DL (ref 0–0.3)
BILIRUB SERPL-MCNC: 1.2 MG/DL
BUN SERPL-MCNC: 12.7 MG/DL (ref 6–20)
CALCIUM SERPL-MCNC: 9.7 MG/DL (ref 8.6–10)
CHLORIDE SERPL-SCNC: 104 MMOL/L (ref 98–107)
CREAT SERPL-MCNC: 0.79 MG/DL (ref 0.67–1.17)
DEPRECATED HCO3 PLAS-SCNC: 25 MMOL/L (ref 22–29)
EOSINOPHIL # BLD AUTO: 0.2 10E3/UL (ref 0–0.7)
EOSINOPHIL NFR BLD AUTO: 3 %
ERYTHROCYTE [DISTWIDTH] IN BLOOD BY AUTOMATED COUNT: 12.7 % (ref 10–15)
GFR SERPL CREATININE-BSD FRML MDRD: >90 ML/MIN/1.73M2
GLUCOSE SERPL-MCNC: 150 MG/DL (ref 70–99)
HCT VFR BLD AUTO: 47.1 % (ref 40–53)
HGB BLD-MCNC: 15.9 G/DL (ref 13.3–17.7)
IMM GRANULOCYTES # BLD: 0 10E3/UL
IMM GRANULOCYTES NFR BLD: 0 %
LYMPHOCYTES # BLD AUTO: 1.8 10E3/UL (ref 0.8–5.3)
LYMPHOCYTES NFR BLD AUTO: 28 %
MCH RBC QN AUTO: 29 PG (ref 26.5–33)
MCHC RBC AUTO-ENTMCNC: 33.8 G/DL (ref 31.5–36.5)
MCV RBC AUTO: 86 FL (ref 78–100)
MONOCYTES # BLD AUTO: 0.8 10E3/UL (ref 0–1.3)
MONOCYTES NFR BLD AUTO: 12 %
NEUTROPHILS # BLD AUTO: 3.6 10E3/UL (ref 1.6–8.3)
NEUTROPHILS NFR BLD AUTO: 56 %
NRBC # BLD AUTO: 0 10E3/UL
NRBC BLD AUTO-RTO: 0 /100
PLATELET # BLD AUTO: 164 10E3/UL (ref 150–450)
POTASSIUM SERPL-SCNC: 4.2 MMOL/L (ref 3.4–5.3)
PROT SERPL-MCNC: 7.4 G/DL (ref 6.4–8.3)
RBC # BLD AUTO: 5.48 10E6/UL (ref 4.4–5.9)
SODIUM SERPL-SCNC: 141 MMOL/L (ref 136–145)
WBC # BLD AUTO: 6.4 10E3/UL (ref 4–11)

## 2023-08-14 PROCEDURE — 85025 COMPLETE CBC W/AUTO DIFF WBC: CPT

## 2023-08-14 PROCEDURE — 255N000002 HC RX 255 OP 636: Mod: JZ | Performed by: INTERNAL MEDICINE

## 2023-08-14 PROCEDURE — 74183 MRI ABD W/O CNTR FLWD CNTR: CPT

## 2023-08-14 PROCEDURE — 82248 BILIRUBIN DIRECT: CPT

## 2023-08-14 PROCEDURE — 36415 COLL VENOUS BLD VENIPUNCTURE: CPT

## 2023-08-14 PROCEDURE — 82105 ALPHA-FETOPROTEIN SERUM: CPT

## 2023-08-14 PROCEDURE — A9585 GADOBUTROL INJECTION: HCPCS | Mod: JZ | Performed by: INTERNAL MEDICINE

## 2023-08-14 PROCEDURE — 80053 COMPREHEN METABOLIC PANEL: CPT

## 2023-08-14 RX ORDER — GADOBUTROL 604.72 MG/ML
10 INJECTION INTRAVENOUS ONCE
Status: COMPLETED | OUTPATIENT
Start: 2023-08-14 | End: 2023-08-14

## 2023-08-14 RX ADMIN — GADOBUTROL 10 ML: 604.72 INJECTION INTRAVENOUS at 10:33

## 2023-08-17 ENCOUNTER — VIRTUAL VISIT (OUTPATIENT)
Dept: ONCOLOGY | Facility: CLINIC | Age: 48
End: 2023-08-17
Attending: INTERNAL MEDICINE
Payer: COMMERCIAL

## 2023-08-17 VITALS — WEIGHT: 210 LBS | BODY MASS INDEX: 26.95 KG/M2 | HEIGHT: 74 IN

## 2023-08-17 DIAGNOSIS — R05.3 CHRONIC COUGH: ICD-10-CM

## 2023-08-17 DIAGNOSIS — C22.0 HEPATOCELLULAR CARCINOMA (H): Primary | ICD-10-CM

## 2023-08-17 DIAGNOSIS — E66.9 OBESITY (BMI 30-39.9): ICD-10-CM

## 2023-08-17 DIAGNOSIS — C7A.8 PRIMARY MALIGNANT NEUROENDOCRINE TUMOR OF LUNG (H): ICD-10-CM

## 2023-08-17 DIAGNOSIS — K74.60 LIVER CIRRHOSIS SECONDARY TO NONALCOHOLIC STEATOHEPATITIS (NASH) (H): ICD-10-CM

## 2023-08-17 DIAGNOSIS — E11.9 TYPE 2 DIABETES MELLITUS WITHOUT COMPLICATION, WITHOUT LONG-TERM CURRENT USE OF INSULIN (H): ICD-10-CM

## 2023-08-17 DIAGNOSIS — K75.81 LIVER CIRRHOSIS SECONDARY TO NONALCOHOLIC STEATOHEPATITIS (NASH) (H): ICD-10-CM

## 2023-08-17 PROBLEM — J30.89 ENVIRONMENTAL AND SEASONAL ALLERGIES: Status: ACTIVE | Noted: 2023-06-02

## 2023-08-17 PROCEDURE — 99215 OFFICE O/P EST HI 40 MIN: CPT | Mod: VID | Performed by: INTERNAL MEDICINE

## 2023-08-17 ASSESSMENT — PAIN SCALES - GENERAL: PAINLEVEL: MILD PAIN (2)

## 2023-08-17 NOTE — PROGRESS NOTES
West Van returns today in follow-up of hepatocellular carcinoma and neuroendocrine tumor of the lung.    He is a 47-year-old gentleman who had an incidental finding of a liver mass during evaluation for kidney stones.  He had ablation of that lesion earlier this year with a biopsy confirming that that was hepatocellular carcinoma.  He also had an incidental finding of a solitary lung nodule which has now been resected and proved to be a low-grade neuroendocrine tumor.  He returns today for follow-up of both cancers.    He tells me overall he feels relatively well.  He however has had problems with a chronic dry cough since his lung surgery.  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  He has had trouble with his appetite being up and down and he eats somewhat irregularly.  His symptoms are better since gastrology gave him some lactulose to use.  In spite of his best efforts to try and lose weight he is actually gained a couple of pounds.  He is out walking more than 10,000 steps a day with his dog.    GENERAL: Healthy, alert and no distress  EYES: Eyes grossly normal to inspection.  No discharge or erythema, or obvious scleral/conjunctival abnormalities.  RESP: No audible wheeze, cough, or visible cyanosis.  No visible retractions or increased work of breathing.    SKIN: Visible skin clear. No significant rash, abnormal pigmentation or lesions.  NEURO: Cranial nerves grossly intact.  Mentation and speech appropriate for age.  PSYCH: Mentation appears normal, affect normal/bright, judgement and insight intact, normal speech and appearance well-groomed.    I personally reviewed his MR scan and went over the results with him.  He has an unchanged ablation defect in his cirrhotic liver.  Radiology notes a faint area of abnormal diffusion within part of that lesion but nothing that is clearly representative of recurrent  tumor.    His alpha-fetoprotein is stable at 28.  His normal electrolytes and renal function.  His normal bilirubin, albumin and liver enzymes.  His blood counts are normal.    Assessment/plan:  1.  Hepatocellular carcinoma status post ablation.  He is currently without clear evidence of disease but remains at significant risk of recurrence.  He is undergoing evaluation for liver transplantation and will require ongoing surveillance for recurrence.  We will plan on seeing him back in about 3 months with a repeat MR scan and lab work.  2.  Well-differentiated neuroendocrine tumor of the lung, status post resection and currently without evidence of disease.  His risk of recurrence from this is quite low and should not interfere with any potential plans for liver transplant.  3.  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  He has ongoing close follow-up with hepatology.  4.  Chronic cough.  He does have a small pleural effusion residual from his prior surgery.  Not exactly sure the etiology of his cough and he has follow-up planned shortly with pulmonology.  5.  Diabetes mellitus.  6.  Obesity.

## 2023-08-17 NOTE — PROGRESS NOTES
Virtual Visit Details    Type of service:  Video Visit     Originating Location (pt. Location): Home    Distant Location (provider location):  Off-site  Platform used for Video Visit: Hai

## 2023-08-17 NOTE — LETTER
8/17/2023         RE: West Van  [address]  [facility] [address]        Dear Colleague,    Thank you for referring your patient, West Van, to the [facility]. Please see a copy of my visit note below.        West Van returns today in follow-up of hepatocellular carcinoma and neuroendocrine tumor of the lung.    He is a 47-year-old gentleman who had an incidental finding of a liver mass during evaluation for kidney stones.  He had ablation of that lesion earlier this year with a biopsy confirming that that was hepatocellular carcinoma.  He also had an incidental finding of a solitary lung nodule which has now been resected and proved to be a low-grade neuroendocrine tumor.  He returns today for follow-up of both cancers.    He tells me overall he feels relatively well.  He however has had problems with a chronic dry cough since his lung surgery.  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  He has had trouble with his appetite being up and down and he eats somewhat irregularly.  His symptoms are better since gastrology gave him some lactulose to use.  In spite of his best efforts to try and lose weight he is actually gained a couple of pounds.  He is out walking more than 10,000 steps a day with his dog.    GENERAL: Healthy, alert and no distress  EYES: Eyes grossly normal to inspection.  No discharge or erythema, or obvious scleral/conjunctival abnormalities.  RESP: No audible wheeze, cough, or visible cyanosis.  No visible retractions or increased work of breathing.    SKIN: Visible skin clear. No significant rash, abnormal pigmentation or lesions.  NEURO: Cranial nerves grossly intact.  Mentation and speech appropriate for age.  PSYCH: Mentation appears normal, affect normal/bright, judgement and insight intact, normal speech and appearance well-groomed.    I personally reviewed his   scan and went over the results with him.  He has an unchanged ablation defect in his cirrhotic liver.  Radiology notes a faint area of abnormal diffusion within part of that lesion but nothing that is clearly representative of recurrent tumor.    His alpha-fetoprotein is stable at 28.  His normal electrolytes and renal function.  His normal bilirubin, albumin and liver enzymes.  His blood counts are normal.    Assessment/plan:  1.  Hepatocellular carcinoma status post ablation.  He is currently without clear evidence of disease but remains at significant risk of recurrence.  He is undergoing evaluation for liver transplantation and will require ongoing surveillance for recurrence.  We will plan on seeing him back in about 3 months with a repeat MR scan and lab work.  2.  Well-differentiated neuroendocrine tumor of the lung, status post resection and currently without evidence of disease.  His risk of recurrence from this is quite low and should not interfere with any potential plans for liver transplant.  3.  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  He has ongoing close follow-up with hepatology.  4.  Chronic cough.  He does have a small pleural effusion residual from his prior surgery.  Not exactly sure the etiology of his cough and he has follow-up planned shortly with pulmonology.  5.  Diabetes mellitus.  6.  Obesity.      Again, thank you for allowing me to participate in the care of your patient.        Sincerely,        Michael Sanchez MD

## 2023-08-17 NOTE — NURSING NOTE
Pt confirmed, during eCheck-in, allergies and medications are up to date.     Is the patient currently in the state of [place]? YES    Visit mode:VIDEO    If the visit is dropped, the patient can be reconnected by: VIDEO VISIT: Send to e-mail at: [email or web address]    Will anyone else be joining the visit? NO      How would you like to obtain your AVS? MyChart    Are changes needed to the allergy or medication list? NO    Reason for visit: RECHECK      Carmela Temple

## 2023-08-17 NOTE — LETTER
8/17/2023         RE: West Van  [address]  [facility] [address]          West Van returns today in follow-up of hepatocellular carcinoma and neuroendocrine tumor of the lung.    He is a 47-year-old gentleman who had an incidental finding of a liver mass during evaluation for kidney stones.  He had ablation of that lesion earlier this year with a biopsy confirming that that was hepatocellular carcinoma.  He also had an incidental finding of a solitary lung nodule which has now been resected and proved to be a low-grade neuroendocrine tumor.  He returns today for follow-up of both cancers.    He tells me overall he feels relatively well.  He however has had problems with a chronic dry cough since his lung surgery.  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  He has had trouble with his appetite being up and down and he eats somewhat irregularly.  His symptoms are better since gastrology gave him some lactulose to use.  In spite of his best efforts to try and lose weight he is actually gained a couple of pounds.  He is out walking more than 10,000 steps a day with his dog.    GENERAL: Healthy, alert and no distress  EYES: Eyes grossly normal to inspection.  No discharge or erythema, or obvious scleral/conjunctival abnormalities.  RESP: No audible wheeze, cough, or visible cyanosis.  No visible retractions or increased work of breathing.    SKIN: Visible skin clear. No significant rash, abnormal pigmentation or lesions.  NEURO: Cranial nerves grossly intact.  Mentation and speech appropriate for age.  PSYCH: Mentation appears normal, affect normal/bright, judgement and insight intact, normal speech and appearance well-groomed.    I personally reviewed his MR scan and went over the results with him.  He has an unchanged ablation defect in his cirrhotic liver.  Radiology notes a faint area of abnormal diffusion within part of  that lesion but nothing that is clearly representative of recurrent tumor.    His alpha-fetoprotein is stable at 28.  His normal electrolytes and renal function.  His normal bilirubin, albumin and liver enzymes.  His blood counts are normal.    Assessment/plan:  1.  Hepatocellular carcinoma status post ablation.  He is currently without clear evidence of disease but remains at significant risk of recurrence.  He is undergoing evaluation for liver transplantation and will require ongoing surveillance for recurrence.  We will plan on seeing him back in about 3 months with a repeat MR scan and lab work.  2.  Well-differentiated neuroendocrine tumor of the lung, status post resection and currently without evidence of disease.  His risk of recurrence from this is quite low and should not interfere with any potential plans for liver transplant.  3.  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  He has ongoing close follow-up with hepatology.  4.  Chronic cough.  He does have a small pleural effusion residual from his prior surgery.  Not exactly sure the etiology of his cough and he has follow-up planned shortly with pulmonology.  5.  Diabetes mellitus.  6.  Obesity.            Michael Sanchez MD

## 2023-08-22 ENCOUNTER — COMMITTEE REVIEW (OUTPATIENT)
Dept: TRANSPLANT | Facility: CLINIC | Age: 48
End: 2023-08-22
Payer: COMMERCIAL

## 2023-08-22 NOTE — COMMITTEE REVIEW
Abdominal Committee Review Note     Evaluation Date: 6/20/2023  Committee Review Date: 8/22/2023    Organ being evaluated for: Liver    Transplant Phase: Evaluation  Transplant Status: Active    Transplant Coordinator: Dillon Quinones  Transplant Surgeon:   Matthias Valenzuela    Referring Physician: Walker Ramirez    Primary Diagnosis: Cirrhosis: Fatty Liver (RIOS)  Secondary Diagnosis: Primary Liver Malignancy: Hepatoma, Hepatocellular Carcinoma (HCC)    Committee Review Members:  Genetics, Clinical Tatiana Kitchen MD   Nutrition Miroslava Redding, STAICE   Pharmacist Aylin Lopez, [facility], Chris De La Paz, DO    - Clinical Kevin Scott, MSFARA, Twyla Ko, [facility]   Transplant June Stout, [facility], Brittni Freitas LPN, Cecilia Forbes, RN, Jr Hugh Quintero, RN, Alessia Saldaña, APRN CNP, Marquise Nielsen, RN, Dillon Quinones, RN, Clair Meza RN   Transplant Hepatology  Maria Dolores Ozuna MD, Diane Samuel MD, Nando Weiner MD, Dana Umaña MD, Jannet Marquez MD, Thomas M. Leventhal, MD   Transplant Surgery Maurisio Morris MD, Viri Scott NP, Monae Connor NP, Radu Dodson MD, Matthias Valenzuela MD, Delta Palmer MD       Transplant Eligibility: HCC, Cirrhosis with MELD, RIOS    Committee Review Decision: Approved    Relative Contraindications:     Absolute Contraindications:     Committee Chair Diane Samuel MD verbally attested to the committee's decision.    Committee Discussion Details: Approved to List. All results have been reviewed including PFT's, no concerns raised.

## 2023-08-25 ENCOUNTER — PATIENT OUTREACH (OUTPATIENT)
Dept: CARE COORDINATION | Facility: CLINIC | Age: 48
End: 2023-08-25
Payer: COMMERCIAL

## 2023-08-25 NOTE — PROGRESS NOTES
Social Work - Distress Screen Intervention  [facility]    Identified Concern and Score from Distress Screenin. How concerned are you about your ability to eat? 5     2. How concerned are you about unintended weight loss or your current weight? 6     3. How concerned are you about feeling depressed or very sad?  1     4. How concerned are you about feeling anxious or very scared?  2     5. Do you struggle with the loss of meaning and josie in your life?  Somewhat     6. How concerned are you about work and home life issues that may be affected by your cancer?  (!) 9     7. How concerned are you about knowing what resources are available to help you?  (!) 8     8. Do you currently have what you would describe as Advent or spiritual struggles? Somewhat     9. If you want to be contacted by one of our professionals, I can send a message to them right now.  No data recorded     Date of Distress Screen: 23  Data: At time of last visit, patient scored positive on distress screening.  outreached to patient today to follow up on elevated distress and introduce psychosocial services and support.  Intervention/Education provided:  contacted patient by phone to discuss distress screening results. No answer, Left voicemail message, and Provided  contact information and requested return call  Follow-up Required:  to remain available for support and await return call from patient    Chanell COPELAND, [facility]  - Oncology  Phone : [phone]  Pager: [phone]

## 2023-08-30 DIAGNOSIS — C22.0 HEPATOCELLULAR CARCINOMA (H): Primary | ICD-10-CM

## 2023-08-30 NOTE — LETTER
PHYSICIAN ORDERS    Faxed to [facility] at [phone]  DATE & TIME ISSUED: 2023 3:55 PM  PATIENT NAME: West Van   : 1975     [facility] MR# : [id number]     DIAGNOSIS:  Hepatocellcular carcinoma  ICD-10 CODE: C22.0     Orders  1 year after issue. Please enter as standing order to be drawn once weekly as needed, for up to 52 occurrences. These labs must be drawn all together on the same day when done:   INR   BMP   Hepatic panel   AFP     PLEASE FAX RESULTS AS SOON AS POSSIBLE TO [phone], ATTN:LabDE    FOR CLINICAL QUESTIONS, PLEASE CALL Dillon Quinones RN at [phone].  Thank you kindly,    Thomas M. Leventhal, M.D.   of Medicine  Advanced & Transplant Hepatology  [facility]

## 2023-08-30 NOTE — PROGRESS NOTES
Pre-Liver Transplant Evaluation/Teaching    TEACHING TOPICS: Evaluation Process, Evaluation Items, Diagnostic Studies, Consultation, Chemical Dependency Policy, CD Eval, Donor Source, Liver Allocation, MELD System, UNOS, Waiting List, Follow up while on transplant list, Follow up after transplantation, Infection and Rejection, Immunosuppression , Medication Teaching, Lab Recording after transplant, Laboratory Frequency after transplant , Consent for evaluation and One year survival rates    INSTRUCTIONAL MATERIAL USED/GIVEN:   Liver Transplant Handbook, MELD Booklet, Donor Booklet, Web Sites Options, Verbal instructions, Multiple Listing Brochure , Consent for evaluation signed, One year survival rates and SRTR (Scientific Registry) Data    Person(s) involved in teaching: patient, mother [place]  Asks Questions: YES  Eager to Learn: YES  Cooperative: YES  Receptive (willing/able to accept information): YES  Reason for the appointment, diagnosis and treatment plan: YES  Knowledge of proper use of medications and conditions for which they are ordered (with special attention to potential side effects or drug interactions): YES  Which situations necessitate calling provider and whom to contact: YES    Teaching Concerns Addressed   Comments: very attentive, asking several pertinent good questions  Nutritional needs and diet plan: YES  How and/when to access community resources: YES  Patient is aware of and agrees to required commitment to post-op care and long term follow-up: YES    Patient open to all Extended Criteria organ offers (underutilized donors): Yes or no: Yes  Details: Consent signed 23, open to all Extended Criteria    Patient has name and phone number of transplant coordinator.   Time Spent over the phone teachin minutes.

## 2023-08-31 ENCOUNTER — DOCUMENTATION ONLY (OUTPATIENT)
Dept: TRANSPLANT | Facility: CLINIC | Age: 48
End: 2023-08-31
Payer: COMMERCIAL

## 2023-08-31 NOTE — LETTER
August 31, 2023    [place]   [address]  [facility] [address]    Dear Mr. Van,    This letter is sent to confirm that you have completed your transplant work-up and you are a candidate in the liver transplant program at the [facility].  You were placed on the liver active waitlist on 8/31/2023.      Now that the evaluation process is complete, the general hepatology team will take over the management of your cares. This team also works with your hepatologist and will be your main contact for symptom management, appointments, medications, and any questions that are not specific to transplant. You can reach this team by calling [phone] or [employer] message your hepatology provider. The transplant team will continue to monitor MELD labs and notify you when MELD labs are due.     When you are active on the waitlist and an organ becomes available, a coordinator will need to speak to you immediately.  You could be contacted at any time during the day or night as an organ could become available at any time.  Please make certain our office always has your current telephone numbers and address.      Items we will need from you:    We have received approval from your insurance company for the transplant procedure.  It is critical that you notify us if there is any change in your insurance.  It is also important that you familiarize yourself with the details of your specific insurance policy.  Our patient  is available to assist you if you should have any questions regarding your coverage.    You will need to have labs drawn frequently while you are listed. The frequency is based on your MELD score. Your current listing MELD is 11. Below is a chart to help you understand how often to have labs drawn.  If you are uncertain of your MELD score/how often you need labs, please contact your Transplant Coordinator.  Additionally, if you would like to  have labs drawn outside the [facility] system, please notify me to make arrangements to have labs ordered.  It will be your responsibility to remind your physician to forward your results to the Transplant Office.                 MELD greater than 25 - Weekly labs              MELD 18-24 - Monthly labs        MELD 17 or less- every 3 months    During this waiting period, we may request additional periodic laboratory tests with your primary physician.  It will be your responsibility to remind your physician to forward your results to the Transplant Office.    We need to be kept informed of any changes in your medical condition such as:    changes in your medications,   significant changes in your health  significant infections (such as pneumonia or abscesses)  blood transfusions  any condition which requires hospitalization  any surgery    Remember to complete any routine cancer screening tests required before your transplant.  This includes colonoscopy; prostate screening for men, and mammogram and gynecologic testing for women, as well as dental work.  Your primary care clinic can assist you with arranging for these exams.  Remind your caregivers to forward copies of the records and final reports.    We want you to know that our program has physician and surgeon coverage 24 hours a day, 365 days a year. In addition, our transplant surgeons and physicians will not be on call for two or more transplant programs more than 30 miles apart unless the circumstances have been reviewed and approved by the [employer]aring (UN[employer]) Membership and Professional Standards Committee (MPSC). Finally, our primary physician and primary surgeons are not designated as the primary surgeon or primary physician at more than 1 transplant hospital. If this coverage changes or there are substantial program changes, you will be notified in writing by letter.     Attached is a letter from UN[employer] that describes the services  and information offered to patients by UNOS and the Organ Procurement and Transplantation Network (OPTN).    We appreciate having had the opportunity to participate in your care.  If you have questions, please feel free to call the Transplant Office at [phone] or [phone].    Sincerely,  Dillon Quinones RN  Pre-Liver Transplant Coordinator  [phone] (direct)  [phone] (fax)    Solid Organ Transplant  [facility]      Enclosures: UNOS Letter  cc: Care Team      The Organ Procurement and Transplantation Network Toll-free patient services line:  [phone]  Your resource for organ transplant information    Staffed 8:30 am - 5:00 pm ET Monday - Friday Leave a message 24/7 to receive a call back    The Organ Procurement and Transplantation Network (OPTN) is the national transplant system. It makes the policies that decide how donated organs are matched to patients waiting for a transplant. The OPTN:    Makes sure donated organs get matched to people on the transplant waiting list  Tells people about the donation and transplant processes  Makes sure that the public knows about the need for more organ and tissue donations    The OPTN has a free patient services line that you can call to:  Get more information about:  Organ donation and organ transplants  Donation and transplant policies  Get an information kit with:  A list of transplant hospitals  Waiting list information  Talk about any questions you may have about your transplant hospital or organ procurement organization. The staff will do their best to help you or point you to others who may help.  Find out how you can volunteer with the OPTN and help shape transplant policy     The patient services line number is: 2-668-652-3884    Patient services line staff CANNOT answer questions about your own medical care, including:  Waiting list  status  Test results  Medical records  You will need to call your transplant hospital for this information.    The following websites have more information about transplantation and donation:    OPTN: https://optn.transplant.hrsa.gov/    For potential living donors and transplant recipients:    Living with transplant: https://www.transplantliving.org/    Living donation process: https://optn.transplant.hrsa.gov/living-donation/    Financial assistance: https://www.livingdonorassistance.org/    Transplantation data: https://www.srtr.org/    Organ donation: https://www.organdonor.gov/    Volunteer with the OPTN: https://optn.transplant.hrsa.gov/get-involved/

## 2023-08-31 NOTE — PROGRESS NOTES
August 31, 2023 4:18 PM - Dillon Quinones RN:     New Liver Listing    Listing MELD: 11  ABO: A POS  Diagnosis: HCC    Patient is Open to All Extended Criteria. Not currently approved for Living Donor.

## 2023-09-06 ENCOUNTER — PRE VISIT (OUTPATIENT)
Dept: PULMONOLOGY | Facility: CLINIC | Age: 48
End: 2023-09-06
Payer: COMMERCIAL

## 2023-09-06 ENCOUNTER — OFFICE VISIT (OUTPATIENT)
Dept: PULMONOLOGY | Facility: CLINIC | Age: 48
End: 2023-09-06
Attending: INTERNAL MEDICINE
Payer: COMMERCIAL

## 2023-09-06 VITALS
OXYGEN SATURATION: 98 % | BODY MASS INDEX: 26.31 KG/M2 | RESPIRATION RATE: 17 BRPM | DIASTOLIC BLOOD PRESSURE: 93 MMHG | HEART RATE: 95 BPM | WEIGHT: 205 LBS | SYSTOLIC BLOOD PRESSURE: 147 MMHG | HEIGHT: 74 IN

## 2023-09-06 DIAGNOSIS — C22.0 HEPATOCELLULAR CARCINOMA (H): ICD-10-CM

## 2023-09-06 DIAGNOSIS — K74.60 LIVER CIRRHOSIS SECONDARY TO NASH (H): ICD-10-CM

## 2023-09-06 DIAGNOSIS — Z01.811 ENCOUNTER FOR PREOPERATIVE PULMONARY EXAMINATION: Primary | ICD-10-CM

## 2023-09-06 DIAGNOSIS — K75.81 LIVER CIRRHOSIS SECONDARY TO NASH (H): ICD-10-CM

## 2023-09-06 DIAGNOSIS — K76.9 LIVER LESION: ICD-10-CM

## 2023-09-06 DIAGNOSIS — K76.6 PORTAL HYPERTENSION (H): ICD-10-CM

## 2023-09-06 DIAGNOSIS — K75.81 NASH (NONALCOHOLIC STEATOHEPATITIS): ICD-10-CM

## 2023-09-06 PROCEDURE — 99214 OFFICE O/P EST MOD 30 MIN: CPT | Mod: 25 | Performed by: INTERNAL MEDICINE

## 2023-09-06 PROCEDURE — 99213 OFFICE O/P EST LOW 20 MIN: CPT | Performed by: INTERNAL MEDICINE

## 2023-09-06 ASSESSMENT — PAIN SCALES - GENERAL: PAINLEVEL: MILD PAIN (2)

## 2023-09-06 NOTE — NURSING NOTE
Chief Complaint   Patient presents with    Consult     New Transplant Eval    Medications reviewed and vital signs taken.   Amalia Munson, CMA
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
## 2023-09-22 ENCOUNTER — TELEPHONE (OUTPATIENT)
Dept: TRANSPLANT | Facility: CLINIC | Age: 48
End: 2023-09-22
Payer: COMMERCIAL

## 2023-09-22 NOTE — TELEPHONE ENCOUNTER
Transplant Social Work Services Phone Call    Data: West is listed for liver transplant.  Intervention: I received a call from Sulema, patient's [place]  (ph: [phone]). She asked about transportation resources for patient as he voiced worry about getting to our transplant center when called in for transplant. I indicated that I will call patient regarding this, but voiced that we typically talk to patient about family/friends transporting. Patient has a $10,000 life time benefit for travel/lodging. I inquired if he were to get a taxi at the time of transplant, if this would be covered. Sulema will inquire.   I called West to travel to our transplant center. I reviewed options such as family/friends, taxi, etc. He indicated that he had a falling out with some of his friends that came to clinic with him and his parents would be his primary caregivers. He will start receiving SSDI in October. He is currently paying for COBRA. He is no longer working and is unsure if he is receiving LT disability benefits through his employer.     Assessment: No new assessment at this time  Education provided by SW: N/A   Plan: I will continue to be available for ongoing psychosocial support.      MIN Heller, [facility]  Liver Transplant   [facility]  Phone: [phone]  Pager: [phone]

## 2023-10-02 ASSESSMENT — ENCOUNTER SYMPTOMS
SHORTNESS OF BREATH: 0
COUGH: 1
SPUTUM PRODUCTION: 0
HEMOPTYSIS: 0
CHILLS: 0
FEVER: 0
WHEEZING: 0

## 2023-10-22 ENCOUNTER — HEALTH MAINTENANCE LETTER (OUTPATIENT)
Age: 48
End: 2023-10-22

## 2023-11-02 ENCOUNTER — DOCUMENTATION ONLY (OUTPATIENT)
Dept: OTHER | Facility: CLINIC | Age: 48
End: 2023-11-02
Payer: COMMERCIAL

## 2023-11-02 ENCOUNTER — TELEPHONE (OUTPATIENT)
Dept: TRANSPLANT | Facility: CLINIC | Age: 48
End: 2023-11-02
Payer: COMMERCIAL

## 2023-11-02 NOTE — TELEPHONE ENCOUNTER
I received patients new health care directive and sent to honoring choices. West asked for this writer to speak his Aunt, and indicated that his aunt may be calling me. I have not heard from Darryl (Aunt) at this time. Will only discuss resources with them, as there is not a consent to communicate on file.

## 2023-11-17 ENCOUNTER — LAB (OUTPATIENT)
Dept: LAB | Facility: CLINIC | Age: 48
End: 2023-11-17
Payer: COMMERCIAL

## 2023-11-17 ENCOUNTER — HOSPITAL ENCOUNTER (OUTPATIENT)
Dept: MRI IMAGING | Facility: CLINIC | Age: 48
Discharge: HOME | End: 2023-11-17
Attending: INTERNAL MEDICINE | Admitting: INTERNAL MEDICINE
Payer: COMMERCIAL

## 2023-11-17 DIAGNOSIS — K74.60 LIVER CIRRHOSIS SECONDARY TO NONALCOHOLIC STEATOHEPATITIS (NASH) (H): ICD-10-CM

## 2023-11-17 DIAGNOSIS — E66.9 OBESITY (BMI 30-39.9): ICD-10-CM

## 2023-11-17 DIAGNOSIS — C7A.8 PRIMARY MALIGNANT NEUROENDOCRINE TUMOR OF LUNG (H): ICD-10-CM

## 2023-11-17 DIAGNOSIS — E11.9 TYPE 2 DIABETES MELLITUS WITHOUT COMPLICATION, WITHOUT LONG-TERM CURRENT USE OF INSULIN (H): ICD-10-CM

## 2023-11-17 DIAGNOSIS — K75.81 LIVER CIRRHOSIS SECONDARY TO NONALCOHOLIC STEATOHEPATITIS (NASH) (H): ICD-10-CM

## 2023-11-17 DIAGNOSIS — C22.0 HEPATOCELLULAR CARCINOMA (H): ICD-10-CM

## 2023-11-17 DIAGNOSIS — R05.3 CHRONIC COUGH: ICD-10-CM

## 2023-11-17 LAB
AFP SERPL-MCNC: 28.3 NG/ML
ALBUMIN SERPL BCG-MCNC: 4.1 G/DL (ref 3.5–5.2)
ALP SERPL-CCNC: 112 U/L (ref 40–150)
ALT SERPL W P-5'-P-CCNC: 39 U/L (ref 0–70)
ANION GAP SERPL CALCULATED.3IONS-SCNC: 8 MMOL/L (ref 7–15)
AST SERPL W P-5'-P-CCNC: 24 U/L (ref 0–45)
BASOPHILS # BLD AUTO: 0.1 10E3/UL (ref 0–0.2)
BASOPHILS NFR BLD AUTO: 1 %
BILIRUB SERPL-MCNC: 1.3 MG/DL
BUN SERPL-MCNC: 12.5 MG/DL (ref 6–20)
CALCIUM SERPL-MCNC: 9.5 MG/DL (ref 8.6–10)
CHLORIDE SERPL-SCNC: 103 MMOL/L (ref 98–107)
CREAT SERPL-MCNC: 0.81 MG/DL (ref 0.67–1.17)
DEPRECATED HCO3 PLAS-SCNC: 28 MMOL/L (ref 22–29)
EGFRCR SERPLBLD CKD-EPI 2021: >90 ML/MIN/1.73M2
EOSINOPHIL # BLD AUTO: 0.1 10E3/UL (ref 0–0.7)
EOSINOPHIL NFR BLD AUTO: 2 %
ERYTHROCYTE [DISTWIDTH] IN BLOOD BY AUTOMATED COUNT: 13.6 % (ref 10–15)
GLUCOSE SERPL-MCNC: 303 MG/DL (ref 70–99)
HCT VFR BLD AUTO: 46.4 % (ref 40–53)
HGB BLD-MCNC: 15.5 G/DL (ref 13.3–17.7)
IMM GRANULOCYTES # BLD: 0 10E3/UL
IMM GRANULOCYTES NFR BLD: 0 %
LYMPHOCYTES # BLD AUTO: 1.5 10E3/UL (ref 0.8–5.3)
LYMPHOCYTES NFR BLD AUTO: 24 %
MCH RBC QN AUTO: 29 PG (ref 26.5–33)
MCHC RBC AUTO-ENTMCNC: 33.4 G/DL (ref 31.5–36.5)
MCV RBC AUTO: 87 FL (ref 78–100)
MONOCYTES # BLD AUTO: 0.6 10E3/UL (ref 0–1.3)
MONOCYTES NFR BLD AUTO: 10 %
NEUTROPHILS # BLD AUTO: 3.9 10E3/UL (ref 1.6–8.3)
NEUTROPHILS NFR BLD AUTO: 63 %
NRBC # BLD AUTO: 0 10E3/UL
NRBC BLD AUTO-RTO: 0 /100
PLATELET # BLD AUTO: 166 10E3/UL (ref 150–450)
POTASSIUM SERPL-SCNC: 3.9 MMOL/L (ref 3.4–5.3)
PROT SERPL-MCNC: 6.8 G/DL (ref 6.4–8.3)
RBC # BLD AUTO: 5.34 10E6/UL (ref 4.4–5.9)
SODIUM SERPL-SCNC: 139 MMOL/L (ref 135–145)
WBC # BLD AUTO: 6.2 10E3/UL (ref 4–11)

## 2023-11-17 PROCEDURE — 85025 COMPLETE CBC W/AUTO DIFF WBC: CPT

## 2023-11-17 PROCEDURE — A9585 GADOBUTROL INJECTION: HCPCS | Mod: JZ | Performed by: INTERNAL MEDICINE

## 2023-11-17 PROCEDURE — 255N000002 HC RX 255 OP 636: Mod: JZ | Performed by: INTERNAL MEDICINE

## 2023-11-17 PROCEDURE — 80053 COMPREHEN METABOLIC PANEL: CPT

## 2023-11-17 PROCEDURE — 36415 COLL VENOUS BLD VENIPUNCTURE: CPT

## 2023-11-17 PROCEDURE — 74183 MRI ABD W/O CNTR FLWD CNTR: CPT

## 2023-11-17 PROCEDURE — 82105 ALPHA-FETOPROTEIN SERUM: CPT

## 2023-11-17 RX ORDER — GADOBUTROL 604.72 MG/ML
10 INJECTION INTRAVENOUS ONCE
Status: COMPLETED | OUTPATIENT
Start: 2023-11-17 | End: 2023-11-17

## 2023-11-17 RX ADMIN — GADOBUTROL 10 ML: 604.72 INJECTION INTRAVENOUS at 10:49

## 2023-11-21 ENCOUNTER — VIRTUAL VISIT (OUTPATIENT)
Dept: ONCOLOGY | Facility: CLINIC | Age: 48
End: 2023-11-21
Attending: INTERNAL MEDICINE
Payer: COMMERCIAL

## 2023-11-21 VITALS — WEIGHT: 202.2 LBS | BODY MASS INDEX: 25.95 KG/M2 | HEIGHT: 74 IN

## 2023-11-21 DIAGNOSIS — E11.9 TYPE 2 DIABETES MELLITUS WITHOUT COMPLICATION, WITHOUT LONG-TERM CURRENT USE OF INSULIN (H): ICD-10-CM

## 2023-11-21 DIAGNOSIS — C7A.8 PRIMARY MALIGNANT NEUROENDOCRINE TUMOR OF LUNG (H): ICD-10-CM

## 2023-11-21 DIAGNOSIS — R05.3 CHRONIC COUGH: ICD-10-CM

## 2023-11-21 DIAGNOSIS — K75.81 LIVER CIRRHOSIS SECONDARY TO NONALCOHOLIC STEATOHEPATITIS (NASH) (H): ICD-10-CM

## 2023-11-21 DIAGNOSIS — K74.60 LIVER CIRRHOSIS SECONDARY TO NONALCOHOLIC STEATOHEPATITIS (NASH) (H): ICD-10-CM

## 2023-11-21 DIAGNOSIS — C22.0 HEPATOCELLULAR CARCINOMA (H): ICD-10-CM

## 2023-11-21 DIAGNOSIS — E66.9 OBESITY (BMI 30-39.9): ICD-10-CM

## 2023-11-21 PROCEDURE — 99417 PROLNG OP E/M EACH 15 MIN: CPT | Mod: 95 | Performed by: NURSE PRACTITIONER

## 2023-11-21 PROCEDURE — 99215 OFFICE O/P EST HI 40 MIN: CPT | Mod: 95 | Performed by: NURSE PRACTITIONER

## 2023-11-21 ASSESSMENT — PAIN SCALES - GENERAL: PAINLEVEL: SEVERE PAIN (6)

## 2023-11-21 NOTE — PROGRESS NOTES
*Provider's note was locked, created new note*    Virtual Visit Details    Type of service:  Video Visit     Originating Location (pt. Location): Home    Distant Location (provider location):  On-site  Platform used for Video Visit: Hai    Start time: 1011  Stop time: 1040

## 2023-11-21 NOTE — PROGRESS NOTES
Reason for Visit: seen in follow-up of hepatocellular carcinoma and neuroendocrine tumor of the lung.     Oncology HPI:   He is a 48-year-old gentleman who had an incidental finding of a liver mass during evaluation for kidney stones.  He had microwave ablation of that lesion with Dr. Munguia in December 2022, with a biopsy confirming that that was hepatocellular carcinoma.  He also had an incidental finding of a solitary lung nodule, s/p right robot-assisted thoracoscopic RLL, mediastinal LN dissection on 3/8/23.  Pathology was consistent with low-grade neuroendocrine tumor.  He returns today for follow-up of both cancers.     Interval history:   West notes he has had increased anxiety in the past few weeks due to results of scans.  Over the course of the past few months, he has had persisting pain in the right chest that let to imaging of the chest.   Pain is located in the rilght, lower/anterior rib area. He notes it when walking his dog. He has sometimes felt short of breath, but no issues in the past week.  He walks 4-5 times a day and has good stamina.   -appetite is good, but eating less, snacks on Glucerna. Has lost about 30 in the past year.  -bowels: sometimes slow, then 1-2 days of looser stools.  -diabetes: on liraglutide and metformin 500 mg daily for the past 4 years, has follow-up with his PCP soon, last hgb A1C, 6.5  -no fevers or infection concerns  -has been having more stress related to his cancer diagnosis as well as the multiple medical appointments in the past year.      Current Outpatient Medications   Medication Sig Dispense Refill    atenolol (TENORMIN) 25 MG tablet Take 25 mg by mouth 2 times daily      hydrochlorothiazide (HYDRODIURIL) 25 MG tablet Take 12.5 mg by mouth      lactulose 20 GM/30ML solution Take 30 mLs (20 g) by mouth daily 946 mL 3    liraglutide (VICTOZA) 18 MG/3ML solution Inject 1.8 mg Subcutaneous every evening      metFORMIN (GLUCOPHAGE XR) 500 MG 24 hr tablet Take 500  mg by mouth every morning      Milk Thistle-Dand-Fennel-Licor (MILK THISTLE XTRA) CAPS capsule Take 1 capsule by mouth every morning      ULTICARE MINI 31G X 6 MM insulin pen needle Inject 1 each Subcutaneous At Bedtime      Vitamin D3 (CHOLECALCIFEROL) 125 MCG (5000 UT) tablet Take 125 mcg by mouth every morning      acetaminophen (TYLENOL) 325 MG tablet Take 3 tablets (975 mg) by mouth every 8 hours      benzonatate (TESSALON) 200 MG capsule Take 200 mg by mouth (Patient not taking: Reported on 11/21/2023)      CALCIUM PO Take 600 mg by mouth      magnesium 250 MG tablet Take 1 tablet by mouth every morning      polyethylene glycol (MIRALAX) 17 GM/Dose powder Take 17 g by mouth      senna-docusate (SENOKOT-S/PERICOLACE) 8.6-50 MG tablet Take 1 tablet by mouth 2 times daily Reduce dose or temporarily discontinue if having loose stools. 50 tablet 0        No Known Allergies      Video physical exam  General: Patient appears well in no acute distress.   Skin: No visualized rash or lesions on visualized skin. He was able to show me the abdomen and lower chest on video screen. He points to the right lower/anterior rib cage and notes he feels a small lump between the ribs.  Eyes: EOMI, no erythema, sclera icterus or discharge noted  Resp: Appears to be breathing comfortably without accessory muscle usage, speaking in full sentences, no cough  MSK: Appears to have normal range of motion based on visualized movements  Neurologic: No apparent tremors, facial movements symmetric  Psych: affect engaged, pleasant, alert and oriented   There were no vitals taken for this visit.  Wt Readings from Last 4 Encounters:   09/06/23 93 kg (205 lb)   08/17/23 95.3 kg (210 lb)   08/07/23 93.9 kg (207 lb)   07/03/23 97.7 kg (215 lb 6.4 oz)         Labs:    Latest Reference Range & Units 11/17/23 09:28   Sodium 135 - 145 mmol/L 139   Potassium 3.4 - 5.3 mmol/L 3.9   Chloride 98 - 107 mmol/L 103   Carbon Dioxide (CO2) 22 - 29 mmol/L 28    Urea Nitrogen 6.0 - 20.0 mg/dL 12.5   Creatinine 0.67 - 1.17 mg/dL 0.81   GFR Estimate >60 mL/min/1.73m2 >90   Calcium 8.6 - 10.0 mg/dL 9.5   Anion Gap 7 - 15 mmol/L 8   Albumin 3.5 - 5.2 g/dL 4.1   Protein Total 6.4 - 8.3 g/dL 6.8   Alkaline Phosphatase 40 - 150 U/L 112   ALT 0 - 70 U/L 39   AST 0 - 45 U/L 24   AFP tumor marker <=8.3 ng/mL 28.3 (H)   Bilirubin Total <=1.2 mg/dL 1.3 (H)   Glucose 70 - 99 mg/dL 303 (H)   WBC 4.0 - 11.0 10e3/uL 6.2   Hemoglobin 13.3 - 17.7 g/dL 15.5   Hematocrit 40.0 - 53.0 % 46.4   Platelet Count 150 - 450 10e3/uL 166   RBC Count 4.40 - 5.90 10e6/uL 5.34   MCV 78 - 100 fL 87   MCH 26.5 - 33.0 pg 29.0   MCHC 31.5 - 36.5 g/dL 33.4   RDW 10.0 - 15.0 % 13.6   % Neutrophils % 63   % Lymphocytes % 24   % Monocytes % 10   % Eosinophils % 2   % Basophils % 1   Absolute Basophils 0.0 - 0.2 10e3/uL 0.1   Absolute Eosinophils 0.0 - 0.7 10e3/uL 0.1   Absolute Immature Granulocytes <=0.4 10e3/uL 0.0   Absolute Lymphocytes 0.8 - 5.3 10e3/uL 1.5   Absolute Monocytes 0.0 - 1.3 10e3/uL 0.6   % Immature Granulocytes % 0   Absolute Neutrophils 1.6 - 8.3 10e3/uL 3.9   Absolute NRBCs 10e3/uL 0.0   NRBCs per 100 WBC <1 /100 0   (H): Data is abnormally high    Imaging: MRI ABDOMEN WITH CONTRAST     CLINICAL HISTORY:  Hepatocellular carcinoma status post ablation     TECHNIQUE: MRI of the abdomen without and with intravenous contrast.  Contrast dose: 10 ml of Gadavist     Comparison study: 8/14/2023     FINDINGS:     Liver: Cirrhotic morphology of the liver with nodular contour and T2  hyperintense reticulations. Mild hepatic steatosis.     Redemonstration of nonenhancing ablation defect in segment SHADY  measuring 5.0 x 3.6 cm (series 15, image 23) without concerning  nodular enhancement in the lesion. Mild diffusion signal along the  periphery of this lesion is stable, likely related to treatment  changes. LR-TR nonviable.     A T2 hypointense and T1 hypointense nonenhancing 1.3 cm nodule at the  junction  of segment IV and VIII and a 1.0 cm nonenhancing nodule in  segment VIII (series 15, image 19 and 16) are also stable, likely  regenerative nodules. No new suspicious lesions in the liver.     Other findings: Cholecystectomy. Stable mild splenomegaly. Stable 0.8  cm cyst in the pancreatic tail, likely a side branch intraductal  papillary mucinous neoplasm. The adrenal glands and kidneys are within  normal limits. The visualized loops of small bowel and colon are  normal in caliber. Stable mildly enlarged portacaval lymphadenopathy.  For example, a 1.2 cm peripancreatic lymph node (series 5, image 24)  and a 1.0 cm left para-aortic lymph node (series 5, image 32) are  stable. No abdominal aortic aneurysm.     Stable partially visualized small right pleural effusion. No ascites.  Stable mild wall thickening of the distal esophagus and a few  paraesophageal varices.                                                                      IMPRESSION:    1. Cirrhosis and evidence of portal hypertension.  2. Stable ablated lesion in segment SHADY. LR-TR nonviable.   3. No new lesions in the liver.  4. Based on this exam only, the patient is within Murdock criteria.  5. Stable 8 mm cyst in the pancreatic tail, likely a side branch  intraductal papillary mucinous neoplasm.  6. Stable small right pleural effusion.     CLARE PARSONS MD    CTA Chest with Contrast  Order: [id number]  Narrative       Patient Name:   DAYNA GREGORY   YOB: 1975   Procedure: CTA CHEST   Date of Service: 11/11/2023      EXAM: CTA CHEST  INDICATION: Pulmonary embolism (PE) suspected, positive D-dimer, Right lower rib pain.  Positive D-dimer.  History of lung cancer, liver cancer.  TECHNIQUE: CT angiogram of the chest with IV contrast. Axial, sagittal and coronal images obtained. 3-D MIPS reconstructions were performed.  COMPARISON: 11/9/2023.    FINDINGS:  LOWER NECK AND CHEST WALL: Within normal limits.  MEDIASTINUM AND MARIAH: No  lymphadenopathy.  CARDIOVASCULAR: Adequate study. No filling defects to suggest acute pulmonary emboli.  UPPER ABDOMEN:Unchanged multiple low-density liver lesions. Status post cholecystectomy. Cirrhosis. Splenomegaly. Unchanged 14 mm soft tissue nodule anterior to the liver (series 5 image 245).  LUNG AND PLEURA: No focal consolidation, pleural effusion, or pneumothorax. Surgical changes in the right lung.  BONES: No acute osseous abnormality.    IMPRESSION:  No acute pulmonary emboli.    Finalized by: KATI GROSS MD on 11/11/2023 7:44 PM CST     Patient Name:   DAYNA GREGORY    YOB: 1975    Procedure: CT CHEST WITH CONTRAST    Date of Service: 11/09/2023       EXAM: CT CHEST WITH CONTRAST     INDICATION: ICD-10 C34.31 Malignant neoplasm of lower lobe of right lung   pain     COMPARISON: PET/CT 10/13/2022. CT chest 5/11/2023.     TECHNIQUE: CT of the chest was performed with IV contrast. 2-D axial MIP images of the chest were generated and reviewed.     FINDINGS: Postsurgical changes of right lower lobectomy. Small amount of pleural fluid in the right lower lung has decreased since the prior exam. There are a few scattered pulmonary micronodules which are unchanged. No acute lung consolidation. No new suspicious nodules identified.     No mediastinal or hilar lymphadenopathy. No thoracic aortic aneurysm.     Heterogeneous nodular liver compatible with cirrhosis. 5.2 cm low-density lesion in segment 4A of the liver is presumed secondary to ablation changes of ablation. Additional liver nodules are grossly unchanged but suboptimally characterized on this study. Cholecystectomy. Splenomegaly and upper abdominal varices.     There is an 1.4 x 0.5 cm soft tissue nodule in the intercostal space between the right sixth and seventh ribs anteriorly which is mildly progressive (coronal image 16).     No lytic or blastic osseus lesion.     IMPRESSION:   1. Nonspecific nodule in the intercostal  space between the right sixth and seventh ribs anteriorly, mildly progressive. This is entirely nonspecific and could be benign or neoplastic. The concern is this represents seeding of the liver neoplasm ablation tract.   2. Right lower lobectomy. Decreased small right pleural effusion. No acute lung pathology identified.   3. Additional findings above.     Finalized by: Deepak Aquino MD on 11/9/2023 2:23 PM CST       Impression/plan:   Hepatocellular carcinoma s/p bx and ablation in Dec 2022. I reviewed his most recent MRI as well as outside CT imaging and prior imaging dating back to January 2023.  Within the liver there is no evidence of recurrence. There is a small soft tissue nodule near the anterior liver margin in the intercostal space. This has been present on prior imaging, dating back to January, but is clearly more prominent now.  He has been aware of these results as he was in the ER for further evaluation on 11/11/23.  We discussed recommendations to review this at tumor conference and discuss US guided bx to evaluate for recurrence vs continued observation. Our next tumor conference is on 12/1/23. We will be in contact with him regarding a plan after that.  He was in agreement.    Resected pulmonary carcinoid-no evidence of recurrence on CT imaging. Will continue to follow. Was due for a repeat CT in 1 year--April 2024.    60 minutes spent on the date of the encounter doing chart review, review of outside records, review of test results, interpretation of tests, patient visit, documentation, and discussion with other provider(s)

## 2023-11-21 NOTE — LETTER
11/21/2023         RE: West Van  [address]  [facility] [address]        Dear Colleague,    Thank you for referring your patient, West Van, to the [facility]. Please see a copy of my visit note below.    Reason for Visit: seen in follow-up of hepatocellular carcinoma and neuroendocrine tumor of the lung.     Oncology HPI:   He is a 48-year-old gentleman who had an incidental finding of a liver mass during evaluation for kidney stones.  He had microwave ablation of that lesion with Dr. Munguia in December 2022, with a biopsy confirming that that was hepatocellular carcinoma.  He also had an incidental finding of a solitary lung nodule, s/p right robot-assisted thoracoscopic RLL, mediastinal LN dissection on 3/8/23.  Pathology was consistent with low-grade neuroendocrine tumor.  He returns today for follow-up of both cancers.     Interval history:   West notes he has had increased anxiety in the past few weeks due to results of scans.  Over the course of the past few months, he has had persisting pain in the right chest that let to imaging of the chest.   Pain is located in the rilght, lower/anterior rib area. He notes it when walking his dog. He has sometimes felt short of breath, but no issues in the past week.  He walks 4-5 times a day and has good stamina.   -appetite is good, but eating less, snacks on Glucerna. Has lost about 30 in the past year.  -bowels: sometimes slow, then 1-2 days of looser stools.  -diabetes: on liraglutide and metformin 500 mg daily for the past 4 years, has follow-up with his PCP soon, last hgb A1C, 6.5  -no fevers or infection concerns  -has been having more stress related to his cancer diagnosis as well as the multiple medical appointments in the past year.      Current Outpatient Medications   Medication Sig Dispense Refill     atenolol (TENORMIN) 25 MG tablet Take 25 mg by mouth 2 times daily       hydrochlorothiazide (HYDRODIURIL) 25 MG  tablet Take 12.5 mg by mouth       lactulose 20 GM/30ML solution Take 30 mLs (20 g) by mouth daily 946 mL 3     liraglutide (VICTOZA) 18 MG/3ML solution Inject 1.8 mg Subcutaneous every evening       metFORMIN (GLUCOPHAGE XR) 500 MG 24 hr tablet Take 500 mg by mouth every morning       Milk Thistle-Dand-Fennel-Licor (MILK THISTLE XTRA) CAPS capsule Take 1 capsule by mouth every morning       ULTICARE MINI 31G X 6 MM insulin pen needle Inject 1 each Subcutaneous At Bedtime       Vitamin D3 (CHOLECALCIFEROL) 125 MCG (5000 UT) tablet Take 125 mcg by mouth every morning       acetaminophen (TYLENOL) 325 MG tablet Take 3 tablets (975 mg) by mouth every 8 hours       benzonatate (TESSALON) 200 MG capsule Take 200 mg by mouth (Patient not taking: Reported on 11/21/2023)       CALCIUM PO Take 600 mg by mouth       magnesium 250 MG tablet Take 1 tablet by mouth every morning       polyethylene glycol (MIRALAX) 17 GM/Dose powder Take 17 g by mouth       senna-docusate (SENOKOT-S/PERICOLACE) 8.6-50 MG tablet Take 1 tablet by mouth 2 times daily Reduce dose or temporarily discontinue if having loose stools. 50 tablet 0        No Known Allergies      Video physical exam  General: Patient appears well in no acute distress.   Skin: No visualized rash or lesions on visualized skin. He was able to show me the abdomen and lower chest on video screen. He points to the right lower/anterior rib cage and notes he feels a small lump between the ribs.  Eyes: EOMI, no erythema, sclera icterus or discharge noted  Resp: Appears to be breathing comfortably without accessory muscle usage, speaking in full sentences, no cough  MSK: Appears to have normal range of motion based on visualized movements  Neurologic: No apparent tremors, facial movements symmetric  Psych: affect engaged, pleasant, alert and oriented   There were no vitals taken for this visit.  Wt Readings from Last 4 Encounters:   09/06/23 93 kg (205 lb)   08/17/23 95.3 kg (210 lb)    08/07/23 93.9 kg (207 lb)   07/03/23 97.7 kg (215 lb 6.4 oz)         Labs:    Latest Reference Range & Units 11/17/23 09:28   Sodium 135 - 145 mmol/L 139   Potassium 3.4 - 5.3 mmol/L 3.9   Chloride 98 - 107 mmol/L 103   Carbon Dioxide (CO2) 22 - 29 mmol/L 28   Urea Nitrogen 6.0 - 20.0 mg/dL 12.5   Creatinine 0.67 - 1.17 mg/dL 0.81   GFR Estimate >60 mL/min/1.73m2 >90   Calcium 8.6 - 10.0 mg/dL 9.5   Anion Gap 7 - 15 mmol/L 8   Albumin 3.5 - 5.2 g/dL 4.1   Protein Total 6.4 - 8.3 g/dL 6.8   Alkaline Phosphatase 40 - 150 U/L 112   ALT 0 - 70 U/L 39   AST 0 - 45 U/L 24   AFP tumor marker <=8.3 ng/mL 28.3 (H)   Bilirubin Total <=1.2 mg/dL 1.3 (H)   Glucose 70 - 99 mg/dL 303 (H)   WBC 4.0 - 11.0 10e3/uL 6.2   Hemoglobin 13.3 - 17.7 g/dL 15.5   Hematocrit 40.0 - 53.0 % 46.4   Platelet Count 150 - 450 10e3/uL 166   RBC Count 4.40 - 5.90 10e6/uL 5.34   MCV 78 - 100 fL 87   MCH 26.5 - 33.0 pg 29.0   MCHC 31.5 - 36.5 g/dL 33.4   RDW 10.0 - 15.0 % 13.6   % Neutrophils % 63   % Lymphocytes % 24   % Monocytes % 10   % Eosinophils % 2   % Basophils % 1   Absolute Basophils 0.0 - 0.2 10e3/uL 0.1   Absolute Eosinophils 0.0 - 0.7 10e3/uL 0.1   Absolute Immature Granulocytes <=0.4 10e3/uL 0.0   Absolute Lymphocytes 0.8 - 5.3 10e3/uL 1.5   Absolute Monocytes 0.0 - 1.3 10e3/uL 0.6   % Immature Granulocytes % 0   Absolute Neutrophils 1.6 - 8.3 10e3/uL 3.9   Absolute NRBCs 10e3/uL 0.0   NRBCs per 100 WBC <1 /100 0   (H): Data is abnormally high    Imaging: MRI ABDOMEN WITH CONTRAST     CLINICAL HISTORY:  Hepatocellular carcinoma status post ablation     TECHNIQUE: MRI of the abdomen without and with intravenous contrast.  Contrast dose: 10 ml of Gadavist     Comparison study: 8/14/2023     FINDINGS:     Liver: Cirrhotic morphology of the liver with nodular contour and T2  hyperintense reticulations. Mild hepatic steatosis.     Redemonstration of nonenhancing ablation defect in segment SHADY  measuring 5.0 x 3.6 cm (series 15, image 23)  without concerning  nodular enhancement in the lesion. Mild diffusion signal along the  periphery of this lesion is stable, likely related to treatment  changes. LR-TR nonviable.     A T2 hypointense and T1 hypointense nonenhancing 1.3 cm nodule at the  junction of segment IV and VIII and a 1.0 cm nonenhancing nodule in  segment VIII (series 15, image 19 and 16) are also stable, likely  regenerative nodules. No new suspicious lesions in the liver.     Other findings: Cholecystectomy. Stable mild splenomegaly. Stable 0.8  cm cyst in the pancreatic tail, likely a side branch intraductal  papillary mucinous neoplasm. The adrenal glands and kidneys are within  normal limits. The visualized loops of small bowel and colon are  normal in caliber. Stable mildly enlarged portacaval lymphadenopathy.  For example, a 1.2 cm peripancreatic lymph node (series 5, image 24)  and a 1.0 cm left para-aortic lymph node (series 5, image 32) are  stable. No abdominal aortic aneurysm.     Stable partially visualized small right pleural effusion. No ascites.  Stable mild wall thickening of the distal esophagus and a few  paraesophageal varices.                                                                      IMPRESSION:    1. Cirrhosis and evidence of portal hypertension.  2. Stable ablated lesion in segment SHADY. LR-TR nonviable.   3. No new lesions in the liver.  4. Based on this exam only, the patient is within Shade criteria.  5. Stable 8 mm cyst in the pancreatic tail, likely a side branch  intraductal papillary mucinous neoplasm.  6. Stable small right pleural effusion.     CLARE PARSONS MD    CTA Chest with Contrast  Order: [id number]  Narrative       Patient Name:   DAYNA GREGORY   YOB: 1975   Procedure: CTA CHEST   Date of Service: 11/11/2023      EXAM: CTA CHEST  INDICATION: Pulmonary embolism (PE) suspected, positive D-dimer, Right lower rib pain.  Positive D-dimer.  History of lung cancer, liver  cancer.  TECHNIQUE: CT angiogram of the chest with IV contrast. Axial, sagittal and coronal images obtained. 3-D MIPS reconstructions were performed.  COMPARISON: 11/9/2023.    FINDINGS:  LOWER NECK AND CHEST WALL: Within normal limits.  MEDIASTINUM AND MARIAH: No lymphadenopathy.  CARDIOVASCULAR: Adequate study. No filling defects to suggest acute pulmonary emboli.  UPPER ABDOMEN:Unchanged multiple low-density liver lesions. Status post cholecystectomy. Cirrhosis. Splenomegaly. Unchanged 14 mm soft tissue nodule anterior to the liver (series 5 image 245).  LUNG AND PLEURA: No focal consolidation, pleural effusion, or pneumothorax. Surgical changes in the right lung.  BONES: No acute osseous abnormality.    IMPRESSION:  No acute pulmonary emboli.    Finalized by: KATI GROSS MD on 11/11/2023 7:44 PM CST     Patient Name:   DAYNA GREGORY    YOB: 1975    Procedure: CT CHEST WITH CONTRAST    Date of Service: 11/09/2023       EXAM: CT CHEST WITH CONTRAST     INDICATION: ICD-10 C34.31 Malignant neoplasm of lower lobe of right lung   pain     COMPARISON: PET/CT 10/13/2022. CT chest 5/11/2023.     TECHNIQUE: CT of the chest was performed with IV contrast. 2-D axial MIP images of the chest were generated and reviewed.     FINDINGS: Postsurgical changes of right lower lobectomy. Small amount of pleural fluid in the right lower lung has decreased since the prior exam. There are a few scattered pulmonary micronodules which are unchanged. No acute lung consolidation. No new suspicious nodules identified.     No mediastinal or hilar lymphadenopathy. No thoracic aortic aneurysm.     Heterogeneous nodular liver compatible with cirrhosis. 5.2 cm low-density lesion in segment 4A of the liver is presumed secondary to ablation changes of ablation. Additional liver nodules are grossly unchanged but suboptimally characterized on this study. Cholecystectomy. Splenomegaly and upper abdominal varices.     There is  an 1.4 x 0.5 cm soft tissue nodule in the intercostal space between the right sixth and seventh ribs anteriorly which is mildly progressive (coronal image 16).     No lytic or blastic osseus lesion.     IMPRESSION:   1. Nonspecific nodule in the intercostal space between the right sixth and seventh ribs anteriorly, mildly progressive. This is entirely nonspecific and could be benign or neoplastic. The concern is this represents seeding of the liver neoplasm ablation tract.   2. Right lower lobectomy. Decreased small right pleural effusion. No acute lung pathology identified.   3. Additional findings above.     Finalized by: Deepak Aquino MD on 11/9/2023 2:23 PM CST       Impression/plan:   Hepatocellular carcinoma s/p bx and ablation in Dec 2022. I reviewed his most recent MRI as well as outside CT imaging and prior imaging dating back to January 2023.  Within the liver there is no evidence of recurrence. There is a small soft tissue nodule near the anterior liver margin in the intercostal space. This has been present on prior imaging, dating back to January, but is clearly more prominent now.  He has been aware of these results as he was in the ER for further evaluation on 11/11/23.  We discussed recommendations to review this at tumor conference and discuss US guided bx to evaluate for recurrence vs continued observation. Our next tumor conference is on 12/1/23. We will be in contact with him regarding a plan after that.  He was in agreement.    Resected pulmonary carcinoid-no evidence of recurrence on CT imaging. Will continue to follow. Was due for a repeat CT in 1 year--April 2024.    60 minutes spent on the date of the encounter doing chart review, review of outside records, review of test results, interpretation of tests, patient visit, documentation, and discussion with other provider(s)         *Provider's note was locked, created new note*    Virtual Visit Details    Type of service:  Video Visit      Originating Location (pt. Location): Home    Distant Location (provider location):  On-site  Platform used for Video Visit: Hai    Start time: 1011  Stop time: 1048      Again, thank you for allowing me to participate in the care of your patient.        Sincerely,        VALENTINA Pierre CNP

## 2023-12-01 ENCOUNTER — DOCUMENTATION ONLY (OUTPATIENT)
Dept: TRANSPLANT | Facility: CLINIC | Age: 48
End: 2023-12-01
Payer: COMMERCIAL

## 2023-12-01 NOTE — PROGRESS NOTES
Review of possible met?    [facility] CTA Chest 11/11/23  1.) LN ultimately indeterminate, but still needs confirmation (neuroendocrine? HCC met? Normal LN?)  2.) lesion is growing slowly per future review      Recs:  - IR to biopsy to confirm (Sherrill, RN with IR setting up)

## 2023-12-04 ENCOUNTER — TELEPHONE (OUTPATIENT)
Dept: TRANSPLANT | Facility: CLINIC | Age: 48
End: 2023-12-04
Payer: COMMERCIAL

## 2023-12-04 DIAGNOSIS — C7A.8 PRIMARY MALIGNANT NEUROENDOCRINE TUMOR OF LUNG (H): ICD-10-CM

## 2023-12-04 DIAGNOSIS — C34.31 MALIGNANT NEOPLASM OF LOWER LOBE OF RIGHT LUNG (H): ICD-10-CM

## 2023-12-04 DIAGNOSIS — C22.0 HEPATOCELLULAR CARCINOMA (H): Primary | ICD-10-CM

## 2023-12-04 NOTE — TELEPHONE ENCOUNTER
Patient sent this writer an email indicated that he has an appointment with Dr. Leventhal next week on 12/14. He inquired if him, aunt/uncle could get into [facility]. I replied and indicated that he can self refer as he has stayed there before. I encouraged him to give them a call. He voiced understanding.

## 2023-12-13 ENCOUNTER — MYC MEDICAL ADVICE (OUTPATIENT)
Dept: INTERVENTIONAL RADIOLOGY/VASCULAR | Facility: CLINIC | Age: 48
End: 2023-12-13
Payer: COMMERCIAL

## 2023-12-14 ENCOUNTER — OFFICE VISIT (OUTPATIENT)
Dept: GASTROENTEROLOGY | Facility: CLINIC | Age: 48
End: 2023-12-14
Attending: INTERNAL MEDICINE
Payer: COMMERCIAL

## 2023-12-14 ENCOUNTER — ANCILLARY PROCEDURE (OUTPATIENT)
Dept: ULTRASOUND IMAGING | Facility: CLINIC | Age: 48
End: 2023-12-14
Attending: INTERNAL MEDICINE
Payer: COMMERCIAL

## 2023-12-14 ENCOUNTER — LAB (OUTPATIENT)
Dept: LAB | Facility: CLINIC | Age: 48
End: 2023-12-14
Payer: COMMERCIAL

## 2023-12-14 VITALS
BODY MASS INDEX: 26.32 KG/M2 | OXYGEN SATURATION: 99 % | TEMPERATURE: 98.1 F | WEIGHT: 205 LBS | DIASTOLIC BLOOD PRESSURE: 71 MMHG | HEART RATE: 100 BPM | RESPIRATION RATE: 20 BRPM | SYSTOLIC BLOOD PRESSURE: 113 MMHG

## 2023-12-14 DIAGNOSIS — R22.2 CHEST WALL MASS: ICD-10-CM

## 2023-12-14 DIAGNOSIS — C7A.8 PRIMARY MALIGNANT NEUROENDOCRINE TUMOR OF LUNG (H): Primary | ICD-10-CM

## 2023-12-14 DIAGNOSIS — K75.81 NASH (NONALCOHOLIC STEATOHEPATITIS): ICD-10-CM

## 2023-12-14 DIAGNOSIS — C22.0 HEPATOCELLULAR CARCINOMA (H): ICD-10-CM

## 2023-12-14 DIAGNOSIS — E44.1 MILD PROTEIN-CALORIE MALNUTRITION (H): ICD-10-CM

## 2023-12-14 DIAGNOSIS — Z01.818 ENCOUNTER FOR PRE-TRANSPLANT EVALUATION FOR CHRONIC LIVER DISEASE: ICD-10-CM

## 2023-12-14 LAB
AFP SERPL-MCNC: 33.5 NG/ML
ALBUMIN SERPL BCG-MCNC: 4.5 G/DL (ref 3.5–5.2)
ALP SERPL-CCNC: 107 U/L (ref 40–150)
ALT SERPL W P-5'-P-CCNC: 34 U/L (ref 0–70)
ANION GAP SERPL CALCULATED.3IONS-SCNC: 11 MMOL/L (ref 7–15)
AST SERPL W P-5'-P-CCNC: 29 U/L (ref 0–45)
BILIRUB DIRECT SERPL-MCNC: 0.57 MG/DL (ref 0–0.3)
BILIRUB SERPL-MCNC: 1.9 MG/DL
BUN SERPL-MCNC: 16.9 MG/DL (ref 6–20)
CALCIUM SERPL-MCNC: 10 MG/DL (ref 8.6–10)
CHLORIDE SERPL-SCNC: 103 MMOL/L (ref 98–107)
CREAT SERPL-MCNC: 0.81 MG/DL (ref 0.67–1.17)
DEPRECATED HCO3 PLAS-SCNC: 29 MMOL/L (ref 22–29)
EGFRCR SERPLBLD CKD-EPI 2021: >90 ML/MIN/1.73M2
ERYTHROCYTE [DISTWIDTH] IN BLOOD BY AUTOMATED COUNT: 13.1 % (ref 10–15)
GLUCOSE SERPL-MCNC: 172 MG/DL (ref 70–99)
HCT VFR BLD AUTO: 48.2 % (ref 40–53)
HGB BLD-MCNC: 16.5 G/DL (ref 13.3–17.7)
INR PPP: 1.25 (ref 0.85–1.15)
MCH RBC QN AUTO: 29.2 PG (ref 26.5–33)
MCHC RBC AUTO-ENTMCNC: 34.2 G/DL (ref 31.5–36.5)
MCV RBC AUTO: 85 FL (ref 78–100)
PLATELET # BLD AUTO: 188 10E3/UL (ref 150–450)
POTASSIUM SERPL-SCNC: 3.8 MMOL/L (ref 3.4–5.3)
PROT SERPL-MCNC: 7.7 G/DL (ref 6.4–8.3)
RBC # BLD AUTO: 5.65 10E6/UL (ref 4.4–5.9)
SODIUM SERPL-SCNC: 143 MMOL/L (ref 135–145)
WBC # BLD AUTO: 7.2 10E3/UL (ref 4–11)

## 2023-12-14 PROCEDURE — 36415 COLL VENOUS BLD VENIPUNCTURE: CPT | Performed by: PATHOLOGY

## 2023-12-14 PROCEDURE — 76700 US EXAM ABDOM COMPLETE: CPT | Mod: GC | Performed by: RADIOLOGY

## 2023-12-14 PROCEDURE — 99215 OFFICE O/P EST HI 40 MIN: CPT | Performed by: INTERNAL MEDICINE

## 2023-12-14 PROCEDURE — 99000 SPECIMEN HANDLING OFFICE-LAB: CPT | Performed by: PATHOLOGY

## 2023-12-14 PROCEDURE — 82248 BILIRUBIN DIRECT: CPT | Performed by: PATHOLOGY

## 2023-12-14 PROCEDURE — 80053 COMPREHEN METABOLIC PANEL: CPT | Performed by: PATHOLOGY

## 2023-12-14 PROCEDURE — 85027 COMPLETE CBC AUTOMATED: CPT | Performed by: PATHOLOGY

## 2023-12-14 PROCEDURE — 85610 PROTHROMBIN TIME: CPT | Performed by: PATHOLOGY

## 2023-12-14 PROCEDURE — 99213 OFFICE O/P EST LOW 20 MIN: CPT | Performed by: INTERNAL MEDICINE

## 2023-12-14 PROCEDURE — 93975 VASCULAR STUDY: CPT | Mod: GC | Performed by: RADIOLOGY

## 2023-12-14 PROCEDURE — 82105 ALPHA-FETOPROTEIN SERUM: CPT | Performed by: INTERNAL MEDICINE

## 2023-12-14 ASSESSMENT — PAIN SCALES - GENERAL: PAINLEVEL: SEVERE PAIN (7)

## 2023-12-14 NOTE — PROGRESS NOTES
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    Since last seen for his transplant evaluation, he had been doing fairly well clinically.  In late October he started noticing discomfort in his lower right ribs, and this progressed.  He underwent surveillance imaging with a CT of the chest with contrast on November 9 which demonstrated a 1.4 cm soft tissue nodule in the intercostal space between the right sixth and seventh ribs anteriorly that had progressed since previous chest imaging.  Several days later he developed significant shortness of breath, ultimately presenting to care on November 11.  He underwent a CT angio of the chest to evaluate for pulmonary embolism.  There was no evidence to suggest PE, however, a 1.4 cm soft tissue nodule was noted anterior to the liver.  Since the time of this exam in this report, he admits to experiencing significant anxiety regarding his overall health status, and what the soft tissue mass means for his overall prognosis and care plan.  His imaging was reviewed at the [facility] liver transplant tumor board on December 1 and concern was for abnormal appearing lymph node, neuroendocrine tumor metastasis, or hepatocellular carcinoma metastasis.  He has been scheduled with interventional radiology on December 27 for further sampling of this lesion.    He notes that due to frequent clinical visits for his medical issues his FMLA ran out, and ultimately was terminated from his job.  Notes that he does have Social Security at this time  and is in the process of applying for Medicaid.  He lives by himself in [facility], but does have parents, aunts and uncles, and close friends that live nearby that provide support.    He admits that he is not taking in adequate or sufficient amounts of protein, so a good deal of time was spent on discussing ways to do that today's clinic visit      Past Medical History:  Past Medical History:   Diagnosis Date    Benign essential HTN 04/13/2016    Diabetes (H)     Kidney stone     Liver cancer (H) 10/04/2022    Liver cirrhosis secondary to nonalcoholic steatohepatitis (RIOS) (H)     RIOS (nonalcoholic steatohepatitis) 06/02/2023    Neuroendocrine carcinoma of lung (H)     right lung, lobectomy 3/2023    PONV (postoperative nausea and vomiting)        Surgical History:  Past Surgical History:   Procedure Laterality Date    BRONCHOSCOPY, WITH BIOPSY, ROBOT ASSISTED N/A 1/17/2023    Procedure: BRONCHOSCOPY, USING OPTICAL TRACKING SYSTEM, ion;  Surgeon: Ani Guillaume MD;  Location: [facility]    DAVINCI LOBECTOMY LUNG Right 3/8/2023    Procedure: Robot-assisted right thoracoscopic lower lobectomy, mediastinal lymph node dissection, intercostal nerve cryoablation ;  Surgeon: Matheus Haas MD;  Location: [facility] OR    ENDOBRONCHIAL ULTRASOUND FLEXIBLE N/A 1/17/2023    Procedure: endobronchial  ultrasound and transbronchial biopsies;  Surgeon: Ani Guillaume MD;  [facility]    LAPAROSCOPIC ABLATION LIVER TUMOR N/A 12/6/2022    Procedure: Diagnostic Laparoscopy, Hepatic Ultrasound, Laparoscopic ultrasound guided microwave ablation of liver tumor x1;  Surgeon: Armando Munguia MD;  Location[facility]    LAPAROSCOPIC BIOPSY LIVER N/A 12/6/2022    Procedure: Laparoscopic Ultrasound Guided liver biopsy;  Surgeon: Armando Munguia MD;  [facility]    LAPAROSCOPIC CHOLECYSTECTOMY N/A 12/6/2022    Procedure: Laparoscopic cholecystectomy;  Surgeon: Armando Munguia MD;  Location: [facility]       Social History:  Social  History     Tobacco Use    Smoking status: Never    Smokeless tobacco: Never   Vaping Use    Vaping Use: Never used   Substance Use Topics    Alcohol use: Not Currently     Comment: Last ETOH 1999    Drug use: Never       Family History:  Family History   Problem Relation Age of Onset    Breast Cancer Mother     Cancer Father         lip    Melanoma Cousin     Cirrhosis No family hx of        Medications:  Current Outpatient Medications   Medication    acetaminophen (TYLENOL) 325 MG tablet    atenolol (TENORMIN) 25 MG tablet    benzonatate (TESSALON) 200 MG capsule    CALCIUM PO    hydrochlorothiazide (HYDRODIURIL) 25 MG tablet    lactulose 20 GM/30ML solution    liraglutide (VICTOZA) 18 MG/3ML solution    magnesium 250 MG tablet    metFORMIN (GLUCOPHAGE XR) 500 MG 24 hr tablet    Milk Thistle-Dand-Fennel-Licor (MILK THISTLE XTRA) CAPS capsule    polyethylene glycol (MIRALAX) 17 GM/Dose powder    senna-docusate (SENOKOT-S/PERICOLACE) 8.6-50 MG tablet    ULTICARE MINI 31G X 6 MM insulin pen needle    Vitamin D3 (CHOLECALCIFEROL) 125 MCG (5000 UT) tablet     No current facility-administered medications for this visit.       Review of Systems  A complete 10 point review of systems was asked and answered in the negative unless specifically commented upon in the HPI    Objective:         Vitals:    12/14/23 1037   BP: 113/71   BP Location: Left arm   Patient Position: Sitting   Cuff Size: Adult Regular   Pulse: 100   Resp: 20   Temp: 98.1  F (36.7  C)   TempSrc: Oral   SpO2: 99%   Weight: 93 kg (205 lb)     Body mass index is 26.32 kg/m .     Physical Exam  Constitutional: Well-developed, well-nourished, in no apparent distress.    HEENT: Normocephalic. no scleral icterus.   Neck/Lymph: Normal ROM, supple.  Cardiac:  Regular rate  Chest: Normal Respiratory excursion, tenderness over the anterior-axillary line of right chest on lower rib cage - no overt mass palpated.  GI:  Abdomen soft, not distended, non-tender.  Redundant tissue   Skin:  Skin is warm and dry. no jaundice. no spider nevi noted.  no palmar erythema  Peripheral Vascular: no lower extremity edema.  Musculoskeletal:  ROM intact, normal muscle bulk    Psychiatric: Anxious mood and affect. Behavior is normal.  Neuro:  no asterixis    Labs and Diagnostic tests:  MELD 3.0: 11 at 2023  9:22 AM  MELD-Na: 12 at 2023  9:22 AM  Calculated from:  Serum Creatinine: 0.81 mg/dL (Using min of 1 mg/dL) at 2023  9:22 AM  Serum Sodium: 143 mmol/L (Using max of 137 mmol/L) at 2023  9:22 AM  Total Bilirubin: 1.9 mg/dL at 2023  9:22 AM  Serum Albumin: 4.5 g/dL (Using max of 3.5 g/dL) at 2023  9:22 AM  INR(ratio): 1.25 at 2023  9:22 AM  Age at listin years  Sex: Male at 2023  9:22 AM      Imaging:  Reviewed  - CT Chest   - CT Angio Chest   - MRI Abd w/wo 2023      Procedures:  Pathology:  1) Liver biopsy 2022  Hepatocellular carcinoma, well-differentiated    2) Right lobectomy, Lymph Nodes  A(1).  Lymph node biopsies, level 9R:  - One (1) lymph node negative for metastatic tumor (0/1).  B(2).  Lymph node biopsies, level 7:  - One (1) lymph node negative for metastatic tumor (0/1).  C(3).  Lymph node biopsies, level 4R:  - One (1) lymph node negative for metastatic tumor (0/1).  D(4).  Lymph node biopsies, level 10R:  - One (1) lymph node negative for metastatic tumor (0/1).  E(5).  Lymph node biopsies, level 11R:  - One (1) lymph node negative for metastatic tumor (0/1).  F(6). Right lung lower lobe, lobectomy:  -Well-differentiated neuroendocrine neoplasm, grade 1/typical carcinoid tumor (1.1 cm).  -Six (6) lymph nodes negative for metastatic carcinoma (0/6).  -See synoptic report for further details    Colonscopy 2023  - Impression:       - Non-bleeding internal hemorrhoids.        - Two small polyps in the proximal rectum and at the recto-sigmoid        colon, removed with a jumbo cold forceps. Resected  and retrieved.        - The cecum is normal. Biopsied.        - The examination was otherwise normal on direct and retroflexion views.        - The examined portion of the ileum was normal. Biopsied.     EGD 2023:  Impression:       - Grade I esophageal varices.        - Normal esophagus. Biopsied.        - Chronic gastritis. Biopsied. (Antrum and Body)        - Normal Duodenum     Assessment and Plan:    Cirrhosis:    - Likely secondary to non-alcohol related steatohepatitis  - No overt risk factors for other causes of chronic liver disease  - Well compensated mild HE and varices  - MELD 3.0: 11  - Labs per routine    Liver Transplant Candidacy:   - We discussed need for transplantation, organ availability and prioritization process for liver transplantation in the [facility].  We discussed the guiding principals of justice and equality that dictate transplant candidacy  - We discussed the MELD score, the variables that are followed, and how the MELD score impacts transplantation  - We discussed the evaluation process for candidacy  - We discussed donor types - including  donors (DBD, DCD) and living donors  - We discussed factors that can impact quality of organs available, including extended criteria donor.  - We specifically discussed the potential for use of organs that may test positive for infection (hepatitis B or hepatitis C.)    - Blood Type: A POS   - Outstanding Issues regarding candidacy:   - At this time the only outstanding issue is this newly defined rib chest mass lesion.  This will need to be sampled and assessed, scheduled for , prior to any further plans for transplantation      - Would like to speak with SW and Financial Counselor       - Would like to speak with Dietician    Hepatocellular Cancer:   - had a 2.0cm lesion that was biopsy-proven   - Will biopsy right chest lesion this month  - MRI surveillance in 2024    Ascites/Volume Overload:  - no acute issues  -  Continue to follow a sodium restricted (<2g sodium diet)     Hepatic Encephalopathy:  - lactulose with goal 2-3 BMs daily    Esophageal Varices:   - EGD 7/2023 with small varices  - Repeat in 1 year: If evidence of medium/large esophageal varices, we recommend esophageal varix band ligation, and  if band ligation is performed, please continue to repeat every 4-8 weeks until eraditation of varices.  - If band ligation is deferred, we strongly recommend the initiation of a non-selective beta-blocker carvedilol and titration of this medication to a heart reate of 55BPM    Carcinoid Tumor of the Lung:  - Low grade neuroendocrine tumor of the right lung  - Nodes negative for mets  - NET is not considered a contraindication to his transplant candidacy    Immobility/Frailty:   - We discussed the imperative need to remain physically active and to participate in active exercise to maintain muscle mass and mobility    Nutrition:  As with most patients with chronic liver disease, there is a significant degree of protein malnutrition.    - Dicussed need to change dietary habits to improve overall protein balance.  Discussed the importance of eating between 1.2-1.5g/kg/day lean protein like eggs, fish, poultry, nuts, legumes, and dairy - in addition to a diet rich in fresh fruits and vegetables.    - Continue to follow a sodium restricted (<2g sodium diet) and discussed the need to minimize the intake of carbohydrates and sugars, to avoid obesity.   - Strongly encourage protein supplements 2-3 times daily (Boost, Ensure, [place] Instant Milk, whey protein powder) to meet protein and caloric intake.  - Discussed important need to have a bedtime snack with protein to minimize risk of muscle breakdown      Follow Up:  6 months     Thank you very much for the opportunity to participate in the care of this patient.  If you have any further questions, please don't hesitate to contact our office.    Thomas M. Leventhal,  M.D.   of Medicine  Advanced & Transplant Hepatology  [facility]

## 2023-12-14 NOTE — LETTER
12/14/2023         RE: West Van  [address]  [facility] [address]        Dear Colleague,    Thank you for referring your patient, Wets Van, to the [facility]. Please see a copy of my visit note below.    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    Since last seen for his transplant evaluation, he had been doing fairly well clinically.  In late October he started noticing discomfort in his lower right ribs, and this progressed.  He underwent surveillance imaging with a CT of the chest with contrast on November 9 which demonstrated a 1.4 cm soft tissue nodule in the intercostal space between the right sixth and seventh ribs anteriorly that had progressed since previous chest imaging.  Several days later he developed significant shortness of breath, ultimately presenting to care on November 11.  He underwent a CT angio of the chest to evaluate for pulmonary embolism.  There was no evidence to suggest PE, however, a 1.4 cm soft tissue nodule was noted anterior to the liver.  Since the time of this exam in this report, he admits to experiencing significant anxiety regarding his overall health status, and what the soft tissue mass means for his overall prognosis and care plan.  His imaging was reviewed at the [facility] liver transplant tumor board on December 1 and concern was for abnormal appearing lymph node, neuroendocrine tumor metastasis, or hepatocellular carcinoma metastasis.  He has been scheduled with  interventional radiology on December 27 for further sampling of this lesion.    He notes that due to frequent clinical visits for his medical issues his FMLA ran out, and ultimately was terminated from his job.  Notes that he does have Social Security at this time and is in the process of applying for Medicaid.  He lives by himself in [facility], but does have parents, aunts and uncles, and close friends that live nearby that provide support.    He admits that he is not taking in adequate or sufficient amounts of protein, so a good deal of time was spent on discussing ways to do that today's clinic visit      Past Medical History:  Past Medical History:   Diagnosis Date     Benign essential HTN 04/13/2016     Diabetes (H)      Kidney stone      Liver cancer (H) 10/04/2022     Liver cirrhosis secondary to nonalcoholic steatohepatitis (RIOS) (H)      RIOS (nonalcoholic steatohepatitis) 06/02/2023     Neuroendocrine carcinoma of lung (H)     right lung, lobectomy 3/2023     PONV (postoperative nausea and vomiting)        Surgical History:  Past Surgical History:   Procedure Laterality Date     BRONCHOSCOPY, WITH BIOPSY, ROBOT ASSISTED N/A 1/17/2023    Procedure: BRONCHOSCOPY, USING OPTICAL TRACKING SYSTEM, ion;  Surgeon: Ani Guillaume MD;  Location: [facility] OR     DAVINCI LOBECTOMY LUNG Right 3/8/2023    Procedure: Robot-assisted right thoracoscopic lower lobectomy, mediastinal lymph node dissection, intercostal nerve cryoablation ;  Surgeon: Matheus Haas MD;  Location: [facility] OR     ENDOBRONCHIAL ULTRASOUND FLEXIBLE N/A 1/17/2023    Procedure: endobronchial  ultrasound and transbronchial biopsies;  Surgeon: Ani Guillaume MD;  Location: [facility] OR     LAPAROSCOPIC ABLATION LIVER TUMOR N/A 12/6/2022    Procedure: Diagnostic Laparoscopy, Hepatic Ultrasound, Laparoscopic ultrasound guided microwave ablation of liver tumor x1;  Surgeon: Armando Munguia MD;  Location: [facility] OR     LAPAROSCOPIC BIOPSY LIVER N/A 12/6/2022     Procedure: Laparoscopic Ultrasound Guided liver biopsy;  Surgeon: Armando Munguia MD;  Location: [facility]     LAPAROSCOPIC CHOLECYSTECTOMY N/A 12/6/2022    Procedure: Laparoscopic cholecystectomy;  Surgeon: Armando Munguia MD;  Location: [facility] OR       Social History:  Social History     Tobacco Use     Smoking status: Never     Smokeless tobacco: Never   Vaping Use     Vaping Use: Never used   Substance Use Topics     Alcohol use: Not Currently     Comment: Last ETOH 1999     Drug use: Never       Family History:  Family History   Problem Relation Age of Onset     Breast Cancer Mother      Cancer Father         lip     Melanoma Cousin      Cirrhosis No family hx of        Medications:  Current Outpatient Medications   Medication     acetaminophen (TYLENOL) 325 MG tablet     atenolol (TENORMIN) 25 MG tablet     benzonatate (TESSALON) 200 MG capsule     CALCIUM PO     hydrochlorothiazide (HYDRODIURIL) 25 MG tablet     lactulose 20 GM/30ML solution     liraglutide (VICTOZA) 18 MG/3ML solution     magnesium 250 MG tablet     metFORMIN (GLUCOPHAGE XR) 500 MG 24 hr tablet     Milk Thistle-Dand-Fennel-Licor (MILK THISTLE XTRA) CAPS capsule     polyethylene glycol (MIRALAX) 17 GM/Dose powder     senna-docusate (SENOKOT-S/PERICOLACE) 8.6-50 MG tablet     ULTICARE MINI 31G X 6 MM insulin pen needle     Vitamin D3 (CHOLECALCIFEROL) 125 MCG (5000 UT) tablet     No current facility-administered medications for this visit.       Review of Systems  A complete 10 point review of systems was asked and answered in the negative unless specifically commented upon in the HPI    Objective:         Vitals:    12/14/23 1037   BP: 113/71   BP Location: Left arm   Patient Position: Sitting   Cuff Size: Adult Regular   Pulse: 100   Resp: 20   Temp: 98.1  F (36.7  C)   TempSrc: Oral   SpO2: 99%   Weight: 93 kg (205 lb)     Body mass index is 26.32 kg/m .     Physical Exam  Constitutional: Well-developed, well-nourished, in no apparent  distress.    HEENT: Normocephalic. no scleral icterus.   Neck/Lymph: Normal ROM, supple.  Cardiac:  Regular rate  Chest: Normal Respiratory excursion, tenderness over the anterior-axillary line of right chest on lower rib cage - no overt mass palpated.  GI:  Abdomen soft, not distended, non-tender. Redundant tissue   Skin:  Skin is warm and dry. no jaundice. no spider nevi noted.  no palmar erythema  Peripheral Vascular: no lower extremity edema.  Musculoskeletal:  ROM intact, normal muscle bulk    Psychiatric: Anxious mood and affect. Behavior is normal.  Neuro:  no asterixis    Labs and Diagnostic tests:  MELD 3.0: 11 at 2023  9:22 AM  MELD-Na: 12 at 2023  9:22 AM  Calculated from:  Serum Creatinine: 0.81 mg/dL (Using min of 1 mg/dL) at 2023  9:22 AM  Serum Sodium: 143 mmol/L (Using max of 137 mmol/L) at 2023  9:22 AM  Total Bilirubin: 1.9 mg/dL at 2023  9:22 AM  Serum Albumin: 4.5 g/dL (Using max of 3.5 g/dL) at 2023  9:22 AM  INR(ratio): 1.25 at 2023  9:22 AM  Age at listin years  Sex: Male at 2023  9:22 AM      Imaging:  Reviewed  - CT Chest   - CT Angio Chest   - MRI Abd w/wo 2023      Procedures:  Pathology:  1) Liver biopsy 2022  Hepatocellular carcinoma, well-differentiated    2) Right lobectomy, Lymph Nodes  A(1).  Lymph node biopsies, level 9R:  - One (1) lymph node negative for metastatic tumor (0/1).  B(2).  Lymph node biopsies, level 7:  - One (1) lymph node negative for metastatic tumor (0/1).  C(3).  Lymph node biopsies, level 4R:  - One (1) lymph node negative for metastatic tumor (0/1).  D(4).  Lymph node biopsies, level 10R:  - One (1) lymph node negative for metastatic tumor (0/1).  E(5).  Lymph node biopsies, level 11R:  - One (1) lymph node negative for metastatic tumor (0/1).  F(6). Right lung lower lobe, lobectomy:  -Well-differentiated neuroendocrine neoplasm, grade 1/typical carcinoid tumor (1.1 cm).  -Six (6) lymph  nodes negative for metastatic carcinoma (0/6).  -See synoptic report for further details    Colonscopy 2023  - Impression:       - Non-bleeding internal hemorrhoids.        - Two small polyps in the proximal rectum and at the recto-sigmoid        colon, removed with a jumbo cold forceps. Resected and retrieved.        - The cecum is normal. Biopsied.        - The examination was otherwise normal on direct and retroflexion views.        - The examined portion of the ileum was normal. Biopsied.     EGD 2023:  Impression:       - Grade I esophageal varices.        - Normal esophagus. Biopsied.        - Chronic gastritis. Biopsied. (Antrum and Body)        - Normal Duodenum     Assessment and Plan:    Cirrhosis:    - Likely secondary to non-alcohol related steatohepatitis  - No overt risk factors for other causes of chronic liver disease  - Well compensated mild HE and varices  - MELD 3.0: 11  - Labs per routine    Liver Transplant Candidacy:   - We discussed need for transplantation, organ availability and prioritization process for liver transplantation in the [place].  We discussed the guiding principals of justice and equality that dictate transplant candidacy  - We discussed the MELD score, the variables that are followed, and how the MELD score impacts transplantation  - We discussed the evaluation process for candidacy  - We discussed donor types - including  donors (DBD, DCD) and living donors  - We discussed factors that can impact quality of organs available, including extended criteria donor.  - We specifically discussed the potential for use of organs that may test positive for infection (hepatitis B or hepatitis C.)    - Blood Type: A POS   - Outstanding Issues regarding candidacy:   - At this time the only outstanding issue is this newly defined rib chest mass lesion.  This will need to be sampled and assessed, scheduled for , prior to any further plans for transplantation       - Would like to speak with SW and Financial Counselor       - Would like to speak with Dietician    Hepatocellular Cancer:   - had a 2.0cm lesion that was biopsy-proven   - Will biopsy right chest lesion this month  - MRI surveillance in 2/2024    Ascites/Volume Overload:  - no acute issues  - Continue to follow a sodium restricted (<2g sodium diet)     Hepatic Encephalopathy:  - lactulose with goal 2-3 BMs daily    Esophageal Varices:   - EGD 7/2023 with small varices  - Repeat in 1 year: If evidence of medium/large esophageal varices, we recommend esophageal varix band ligation, and  if band ligation is performed, please continue to repeat every 4-8 weeks until eraditation of varices.  - If band ligation is deferred, we strongly recommend the initiation of a non-selective beta-blocker carvedilol and titration of this medication to a heart reate of 55BPM    Carcinoid Tumor of the Lung:  - Low grade neuroendocrine tumor of the right lung  - Nodes negative for mets  - NET is not considered a contraindication to his transplant candidacy    Immobility/Frailty:   - We discussed the imperative need to remain physically active and to participate in active exercise to maintain muscle mass and mobility    Nutrition:  As with most patients with chronic liver disease, there is a significant degree of protein malnutrition.    - Dicussed need to change dietary habits to improve overall protein balance.  Discussed the importance of eating between 1.2-1.5g/kg/day lean protein like eggs, fish, poultry, nuts, legumes, and dairy - in addition to a diet rich in fresh fruits and vegetables.    - Continue to follow a sodium restricted (<2g sodium diet) and discussed the need to minimize the intake of carbohydrates and sugars, to avoid obesity.   - Strongly encourage protein supplements 2-3 times daily (Boost, Ensure, [place] Instant Milk, whey protein powder) to meet protein and caloric intake.  - Discussed important need to have a  bedtime snack with protein to minimize risk of muscle breakdown      Follow Up:  6 months     Thank you very much for the opportunity to participate in the care of this patient.  If you have any further questions, please don't hesitate to contact our office.    Thomas M. Leventhal, M.D.   of Medicine  Advanced & Transplant Hepatology  [facility]

## 2023-12-14 NOTE — NURSING NOTE
Chief Complaint   Patient presents with    RECHECK     Follow up liver       /71 (BP Location: Left arm, Patient Position: Sitting, Cuff Size: Adult Regular)   Pulse 100   Temp 98.1  F (36.7  C) (Oral)   Resp 20   Wt 93 kg (205 lb)   SpO2 99%   BMI 26.32 kg/m    Ady Prieto CMA on 12/14/2023 at 10:43 AM

## 2023-12-14 NOTE — Clinical Note
This is the gent I was chatting about Miroslava.  Would benefit from a re-discussion given protein calorie malnutrition. Thanks!

## 2023-12-16 DIAGNOSIS — C22.0 HEPATOCELLULAR CARCINOMA (H): Primary | ICD-10-CM

## 2023-12-18 ENCOUNTER — TELEPHONE (OUTPATIENT)
Dept: TRANSPLANT | Facility: CLINIC | Age: 48
End: 2023-12-18
Payer: COMMERCIAL

## 2023-12-18 NOTE — TELEPHONE ENCOUNTER
Patient is listed for transplantation and he had questions about insurance and finances. I called patient to review his concerns. He has not been working since June 2023, and is likely not going to return to his employer. He is utilizing COBRA at this time and paying about $1,000/mo. He wanted to make sure that if he switches to a market plan that would be OK for transplant. Message sent to Clair GARCIA re: this. I also reviewed other potential options such as [place], [employer] and potentially even [place] if he meets those guidelines. Information sent to patient via e-mail re: those programs. He also inquired about other financial assistance, information sent to his e-mail.

## 2023-12-20 ENCOUNTER — TELEPHONE (OUTPATIENT)
Dept: GASTROENTEROLOGY | Facility: CLINIC | Age: 48
End: 2023-12-20
Payer: COMMERCIAL

## 2023-12-20 NOTE — TELEPHONE ENCOUNTER
Connected with patient letting him know per Dr. Leventhal it is perfectly ok to take Cephalexin.    Abena LUO LPN  Hepatology Clinic     -----------  [facility] Call Center    Phone Message    May a detailed message be left on voicemail: yes     Reason for Call: Other: Pt got a change in medication Cephalexin recently, this was prescribed by Genesis Anguiano. Pt wanting to make sure that this is ok, pt is to take it 3 times a day for 7 days. This is for a finger that will not heal.       Action Taken: Other: hepa     Travel Screening: Not Applicable

## 2023-12-27 ENCOUNTER — APPOINTMENT (OUTPATIENT)
Dept: INTERVENTIONAL RADIOLOGY/VASCULAR | Facility: CLINIC | Age: 48
End: 2023-12-27
Attending: RADIOLOGY
Payer: COMMERCIAL

## 2023-12-27 ENCOUNTER — APPOINTMENT (OUTPATIENT)
Dept: MEDSURG UNIT | Facility: CLINIC | Age: 48
End: 2023-12-27
Attending: INTERNAL MEDICINE
Payer: COMMERCIAL

## 2023-12-27 ENCOUNTER — HOSPITAL ENCOUNTER (OUTPATIENT)
Facility: CLINIC | Age: 48
Discharge: HOME | End: 2023-12-27
Attending: INTERNAL MEDICINE | Admitting: RADIOLOGY
Payer: COMMERCIAL

## 2023-12-27 VITALS
HEART RATE: 63 BPM | RESPIRATION RATE: 14 BRPM | SYSTOLIC BLOOD PRESSURE: 106 MMHG | OXYGEN SATURATION: 99 % | DIASTOLIC BLOOD PRESSURE: 71 MMHG | TEMPERATURE: 97.5 F

## 2023-12-27 DIAGNOSIS — C22.0 HEPATOCELLULAR CARCINOMA (H): ICD-10-CM

## 2023-12-27 DIAGNOSIS — C7A.8 PRIMARY MALIGNANT NEUROENDOCRINE TUMOR OF LUNG (H): ICD-10-CM

## 2023-12-27 DIAGNOSIS — C34.31 MALIGNANT NEOPLASM OF LOWER LOBE OF RIGHT LUNG (H): ICD-10-CM

## 2023-12-27 LAB
GLUCOSE BLDC GLUCOMTR-MCNC: 128 MG/DL (ref 70–99)
GLUCOSE BLDC GLUCOMTR-MCNC: 190 MG/DL (ref 70–99)

## 2023-12-27 PROCEDURE — 258N000003 HC RX IP 258 OP 636: Performed by: NURSE PRACTITIONER

## 2023-12-27 PROCEDURE — 99152 MOD SED SAME PHYS/QHP 5/>YRS: CPT

## 2023-12-27 PROCEDURE — 20206 BIOPSY MUSCLE PERQ NEEDLE: CPT

## 2023-12-27 PROCEDURE — 272N000506 HC NEEDLE CR6

## 2023-12-27 PROCEDURE — 82962 GLUCOSE BLOOD TEST: CPT

## 2023-12-27 PROCEDURE — 20206 BIOPSY MUSCLE PERQ NEEDLE: CPT | Mod: RT | Performed by: RADIOLOGY

## 2023-12-27 PROCEDURE — 99152 MOD SED SAME PHYS/QHP 5/>YRS: CPT | Mod: GC | Performed by: RADIOLOGY

## 2023-12-27 PROCEDURE — 88333 PATH CONSLTJ SURG CYTO XM 1: CPT | Mod: 26 | Performed by: PATHOLOGY

## 2023-12-27 PROCEDURE — 250N000011 HC RX IP 250 OP 636: Performed by: STUDENT IN AN ORGANIZED HEALTH CARE EDUCATION/TRAINING PROGRAM

## 2023-12-27 PROCEDURE — 88305 TISSUE EXAM BY PATHOLOGIST: CPT | Mod: 26 | Performed by: PATHOLOGY

## 2023-12-27 PROCEDURE — 88333 PATH CONSLTJ SURG CYTO XM 1: CPT | Mod: TC | Performed by: INTERNAL MEDICINE

## 2023-12-27 PROCEDURE — 250N000009 HC RX 250: Performed by: STUDENT IN AN ORGANIZED HEALTH CARE EDUCATION/TRAINING PROGRAM

## 2023-12-27 PROCEDURE — 999N000142 HC STATISTIC PROCEDURE PREP ONLY

## 2023-12-27 PROCEDURE — 77012 CT SCAN FOR NEEDLE BIOPSY: CPT | Mod: 26 | Performed by: RADIOLOGY

## 2023-12-27 PROCEDURE — 999N000133 HC STATISTIC PP CARE STAGE 2

## 2023-12-27 RX ORDER — FENTANYL CITRATE 50 UG/ML
25-50 INJECTION, SOLUTION INTRAMUSCULAR; INTRAVENOUS EVERY 5 MIN PRN
Status: DISCONTINUED | OUTPATIENT
Start: 2023-12-27 | End: 2023-12-27 | Stop reason: HOSPADM

## 2023-12-27 RX ORDER — LIDOCAINE 40 MG/G
CREAM TOPICAL
Status: DISCONTINUED | OUTPATIENT
Start: 2023-12-27 | End: 2023-12-27 | Stop reason: HOSPADM

## 2023-12-27 RX ORDER — SODIUM CHLORIDE 9 MG/ML
INJECTION, SOLUTION INTRAVENOUS CONTINUOUS
Status: DISCONTINUED | OUTPATIENT
Start: 2023-12-27 | End: 2023-12-27 | Stop reason: HOSPADM

## 2023-12-27 RX ORDER — ONDANSETRON 2 MG/ML
4 INJECTION INTRAMUSCULAR; INTRAVENOUS
Status: DISCONTINUED | OUTPATIENT
Start: 2023-12-27 | End: 2023-12-27 | Stop reason: HOSPADM

## 2023-12-27 RX ORDER — FLUMAZENIL 0.1 MG/ML
0.2 INJECTION, SOLUTION INTRAVENOUS
Status: DISCONTINUED | OUTPATIENT
Start: 2023-12-27 | End: 2023-12-27 | Stop reason: HOSPADM

## 2023-12-27 RX ORDER — NALOXONE HYDROCHLORIDE 0.4 MG/ML
0.4 INJECTION, SOLUTION INTRAMUSCULAR; INTRAVENOUS; SUBCUTANEOUS
Status: DISCONTINUED | OUTPATIENT
Start: 2023-12-27 | End: 2023-12-27 | Stop reason: HOSPADM

## 2023-12-27 RX ORDER — DEXTROSE MONOHYDRATE 25 G/50ML
25-50 INJECTION, SOLUTION INTRAVENOUS
Status: DISCONTINUED | OUTPATIENT
Start: 2023-12-27 | End: 2023-12-27 | Stop reason: HOSPADM

## 2023-12-27 RX ORDER — NALOXONE HYDROCHLORIDE 0.4 MG/ML
0.2 INJECTION, SOLUTION INTRAMUSCULAR; INTRAVENOUS; SUBCUTANEOUS
Status: DISCONTINUED | OUTPATIENT
Start: 2023-12-27 | End: 2023-12-27 | Stop reason: HOSPADM

## 2023-12-27 RX ORDER — NICOTINE POLACRILEX 4 MG
15-30 LOZENGE BUCCAL
Status: DISCONTINUED | OUTPATIENT
Start: 2023-12-27 | End: 2023-12-27 | Stop reason: HOSPADM

## 2023-12-27 RX ADMIN — FENTANYL CITRATE 50 MCG: 50 INJECTION, SOLUTION INTRAMUSCULAR; INTRAVENOUS at 10:53

## 2023-12-27 RX ADMIN — MIDAZOLAM 1 MG: 1 INJECTION INTRAMUSCULAR; INTRAVENOUS at 11:01

## 2023-12-27 RX ADMIN — LIDOCAINE HYDROCHLORIDE 7 ML: 10 INJECTION, SOLUTION EPIDURAL; INFILTRATION; INTRACAUDAL; PERINEURAL at 11:08

## 2023-12-27 RX ADMIN — FENTANYL CITRATE 50 MCG: 50 INJECTION, SOLUTION INTRAMUSCULAR; INTRAVENOUS at 11:16

## 2023-12-27 RX ADMIN — MIDAZOLAM 1 MG: 1 INJECTION INTRAMUSCULAR; INTRAVENOUS at 11:06

## 2023-12-27 RX ADMIN — FENTANYL CITRATE 50 MCG: 50 INJECTION, SOLUTION INTRAMUSCULAR; INTRAVENOUS at 11:06

## 2023-12-27 RX ADMIN — MIDAZOLAM 1 MG: 1 INJECTION INTRAMUSCULAR; INTRAVENOUS at 11:16

## 2023-12-27 RX ADMIN — SODIUM CHLORIDE: 9 INJECTION, SOLUTION INTRAVENOUS at 09:11

## 2023-12-27 RX ADMIN — FENTANYL CITRATE 50 MCG: 50 INJECTION, SOLUTION INTRAMUSCULAR; INTRAVENOUS at 11:00

## 2023-12-27 RX ADMIN — MIDAZOLAM 1 MG: 1 INJECTION INTRAMUSCULAR; INTRAVENOUS at 10:53

## 2023-12-27 ASSESSMENT — ACTIVITIES OF DAILY LIVING (ADL): ADLS_ACUITY_SCORE: 35

## 2023-12-27 NOTE — DISCHARGE INSTRUCTIONS
[facility]    Interventional Radiology  Patient Instructions Following Biopsy    AFTER YOU GO HOME  If you were given sedation, for the first 24 hours: we recommend that an adult stay with you, DO NOT drive or operate machinery at home or at work, DO NOT smoke, DO NOT make any important or legal decisions.  DO NOT drink alcoholic beverages the day of your procedure  Drink plenty of fluids   Resume your regular diet, unless otherwise instructed by your Primary Physician  Relax and take it easy for 48 hours  DO NOT do any strenuous exercise or lifting (> 10 lbs) for at least 7 days following your procedure  Keep the dressing dry and in place for 24 hours. Replace with Band aid for 2 days.  Never leave a wet dressing in place.  Do not take a shower for at least 12 hours following your procedure. No tub bath, hot tub, or swimming for 5 days.  There should be minimum drainage from the biopsy site    CALL THE PHYSICIAN IF:  You start bleeding from the procedure site.  If you do start to bleed from that site, lie down flat and hold pressure on the site for a minimum of 10 minutes.  Your physician will tell you if you need to return to the hospital  You develop nausea or vomiting  You have excessive swelling, redness, or tenderness at the site  You have drainage that looks like it is infected.  You experience severe pain  You develop hives or a rash or unexplained itching  You develop shortness of breath  You develop a temperature of 101 degrees F or greater      ADDITIONAL INSTRUCTIONS  If you are taking Coumadin, restart tonight.  Follow up with your Coumadin Clinic or Primary Care MD to have your INR rechecked.    [facility] INTERVENTIONAL RADIOLOGY DEPARTMENT  Procedure Physician: Dr. Behzadi                              Date of procedure: December 27, 2023  Telephone Numbers: [phone]      Monday-Friday 7:30 am to 4:00 pm  [phone] After 4:00 pm Monday-Friday, Weekends & Holidays.   Ask for the  Interventional Radiologist on call.  Someone is on call 24 hrs/day  [facility] toll free number: [phone] Monday-Friday 8:00 am to 4:30 pm  [facility] Emergency Dept: [phone]

## 2023-12-27 NOTE — PROGRESS NOTES
Pt tolerated right chest wall lesion biopsy recovery well. Pt alert and oriented. VSS. Sats >92% on RA. Pt denies any biopsy pain. Pt has baseline chest pain, relieved with ice packs. Pt's biopsy on right chest WNL. No bleeding or hematoma. Primapore in place. Pt tolerated oral intake and liquids. Ambulated without any difficulty. Urinated. Discharge instructions reviewed with patient. Pt verbalized understanding of instructions. Pt's dad Judd transported patient home. Pt discharged at 1408 from .

## 2023-12-27 NOTE — PROGRESS NOTES
Pt returned from CT right chest wall lesion biopsy @ 1140 to 2A.  Pt alert and oriented. VSS. Sats >92% on RA. Pt pain is unchanged. . Pt biopsy site on right chest WNL, Drainage marked and unchanged. Continue to monitor pt status and notify MD with any changes or concerns.

## 2023-12-27 NOTE — PROCEDURES
[facility]    Procedure: CY guided right chest wall lesion biopsy    Date/Time: 12/27/2023 11:34 AM    Performed by: Behzadi, Heshmatzadeh, MD  Authorized by: Behzadi, Heshmatzadeh, MD  IR Fellow Physician: Ashkan Behzadi      UNIVERSAL PROTOCOL   Site Marked: NA  Prior Images Obtained and Reviewed:  Yes  Required items: Required blood products, implants, devices and special equipment available    Patient identity confirmed:  Verbally with patient, arm band, provided demographic data and hospital-assigned identification number  Patient was reevaluated immediately before administering moderate or deep sedation or anesthesia  Confirmation Checklist:  Patient's identity using two indicators, relevant allergies, procedure was appropriate and matched the consent or emergent situation and correct equipment/implants were available  Time out: Immediately prior to the procedure a time out was called    Universal Protocol: the Joint Commission Universal Protocol was followed    Preparation: Patient was prepped and draped in usual sterile fashion       ANESTHESIA    Anesthesia: Local infiltration  Local Anesthetic:  Lidocaine 1% without epinephrine      SEDATION  Patient Sedated: Yes    Sedation:  Fentanyl and midazolam  Vital signs: Vital signs monitored during sedation    See dictated procedure note for full details.  Findings: -    Specimens: none    Complications: None    Condition: Stable    Plan: Bedrest for 1 hour      PROCEDURE  Describe Procedure: Successful CT guided right chest wall lesion biopsy.   Patient Tolerance:  Patient tolerated the procedure well with no immediate complications  Length of time physician/provider present for 1:1 monitoring during sedation: 20

## 2023-12-27 NOTE — PRE-PROCEDURE
GENERAL PRE-PROCEDURE:   Procedure:  CT guided right chest wall biopsy.   Date/Time:  12/27/2023 9:30 AM    Verbal consent obtained?: Yes    Written consent obtained?: Yes    Risks and benefits: Risks, benefits and alternatives were discussed    Consent given by:  Parent  Patient states understanding of procedure being performed: Yes    Patient's understanding of procedure matches consent: Yes    Procedure consent matches procedure scheduled: Yes    Appropriately NPO:  Yes  ASA Class:  2  Mallampati  :  Grade 2- soft palate, base of uvula, tonsillar pillars, and portion of posterior pharyngeal wall visible  Lungs:  Lungs clear with good breath sounds bilaterally  Heart:  Normal heart sounds and rate  History & Physical reviewed:  History and physical reviewed and no updates needed  Statement of review:  I have reviewed the lab findings, diagnostic data, medications, and the plan for sedation

## 2023-12-27 NOTE — IR NOTE
Patient Name: West Van  Medical Record Number: [id number]  Today's Date: 12/27/2023    Procedure: CT-Guided Right Chest Wall Lesion Biopsy  Proceduralist: Dr. Yung and Dr. Heshmatzaday Behzadi.    Pathology present: Yes    Procedure Start: 1050  Procedure end: 1125  Sedation medications administered: Midazolam 4 mg, Fentanyl 200 mcg       Report given to:  RN  : NA    Other Notes: Pt arrived to IR room CT-2 from . Consent reviewed. Pt denies any questions or concerns regarding procedure. Pt positioned supine and monitored per protocol. Pt tolerated procedure without any noted complications. Pt transferred back to .    5 passes made and samples sent to lab as ordered. Patient to be on bedrest for 2 hours.

## 2023-12-27 NOTE — PROGRESS NOTES
Pt prepped for chest wall biopsy. Pt alert and oriented. VSS. Sats >92% on RA. Pt has been having intermittent baseline pain on chest from mass growth. Pt took Tylenol this morning w/some relief.  NaCl infusing @ 75ml/hr. Pt's audrey Whaley at bedside and will transport patient home.

## 2023-12-28 LAB
PATH REPORT.COMMENTS IMP SPEC: NORMAL
PATH REPORT.FINAL DX SPEC: NORMAL
PATH REPORT.GROSS SPEC: NORMAL
PATH REPORT.MICROSCOPIC SPEC OTHER STN: NORMAL
PATH REPORT.RELEVANT HX SPEC: NORMAL
PHOTO IMAGE: NORMAL

## 2024-01-04 NOTE — PROGRESS NOTES
Pt evaluated via billable telephone visit. Time spent: 15 min  [place] [place]     [facility]  OUTPATIENT MNT: TRANSPLANT WAITLIST    TIME SPENT: 15 minutes  VISIT TYPE: follow up (saw RD for eval 6/2023)   REFERRING PHYSICIAN: Leventhal   PT ACCOMPANIED BY: self    History of previous txp: no; active on liver waitlist since 8/2023    NUTRITION ASSESSMENT  RIOS, DM II- last A1c 6.6 in Dec 2023  Has been IP lately  Dr Leventhal suggest we meet to revisit adequate dietary protein intake  West reports lunch and dinner meals may be challenging- due to living alone/cooking for one, meal prep, not always sure what to make.    Protein supplement: Glucerna at night (a few times/week); [employer] [place] sweet & salty nut bars (occasional)    Has tried whey protein powder many years ago- is open to trying this again     Weight hx: down ~10 lbs since eval in June; ?mild muscle loss per pt  Wt Readings from Last 10 Encounters:   12/14/23 93 kg (205 lb)   11/21/23 91.7 kg (202 lb 3.2 oz)   09/06/23 93 kg (205 lb)   08/17/23 95.3 kg (210 lb)   08/07/23 93.9 kg (207 lb)   07/03/23 97.7 kg (215 lb 6.4 oz)   07/03/23 97.1 kg (214 lb)   06/20/23 97.2 kg (214 lb 4.8 oz)   04/04/23 102.2 kg (225 lb 4.8 oz)   03/23/23 98.9 kg (218 lb)     DIET RECALL   Breakfast Oatmeal, egg, sausage biscuit    Lunch Tries to get some sort of protein    Dinner Pork chops & baked potato; chicken & baked potato; some beef/fish as well    Snacks Cashews, almonds, yogurt, applesauce    Beverages Water, seldom soda (1-2/month), orange juice (4 oz/day), 1-2 cups coffee   Alcohol Not asked    Dining out If out- stops at [employer] for fish meal      PHYSICAL ACTIVITY  Walks the dog- pending weather- was doing 8-10 k steps/day until recently    Unable to lift weights currently d/t some concern with his chest    NUTRITION DIAGNOSIS  Predicted suboptimal nutrient intake (protein) r/t increased needs w/ liver disease.      NUTRITION INTERVENTION  Nutrition education provided:  - Encouraged 1 protein supplement/day- Glucerna, protein bars, protein powder. Can make shake w/ protein powder or mix into oatmeal, etc.   - Reviewed some quick protein options for meals if not wanting to meal prep/cook as much. Consider boiled eggs, egg muffin cups, tuna fish, peanut butter, cheese sticks, crockpot protein, etc    Patient Understanding: Pt verbalized understanding of education provided.  Expected Engagement: Good  Follow-Up Plans: PRN     NUTRITION GOALS  Aim for 1 protein supplement/day     Miroslava Redding, RD, LD, CCTD

## 2024-01-05 DIAGNOSIS — C22.0 HEPATOCELLULAR CARCINOMA (H): Primary | ICD-10-CM

## 2024-01-05 NOTE — LETTER
PHYSICIAN ORDERS    Faxed to [facility] at [phone]  DATE & TIME ISSUED: 2024 8:36 AM  PATIENT NAME: West Van   : 1975     [facility] MR# : [id number]     DIAGNOSIS:  Hepatocellular carcinoma  ICD-10 CODE: C22     Orders  1 year after issue. Please enter as standing order to be completed every 3 months as needed, for up to 4 occurrences.   MRI abdomen w/ & w/o contrast     PLEASE FAX RESULTS AS SOON AS POSSIBLE TO [phone], ATTN:Bernie    FOR CLINICAL QUESTIONS, PLEASE CALL Dillon Quinones RN at [phone].    Thank you kindly,    Thomas M. Leventhal, M.D.   of Medicine  Advanced & Transplant Hepatology  [facility]

## 2024-01-05 NOTE — PROGRESS NOTES
January 5, 2024 8:42 AM - Dillon Quinones RN:     Order for MRI abdomen w/ & w/o contrast faxed to [facility] at [phone].    January 5, 2024 1:50 PM - Dillon Quinones RN:     Urgent referral to general surgery for excisional biopsy of nodule in the intercostal space between the right sixth and seventh ribs anterior ordered per Dr. Leventhal/Dr. Mccoy.

## 2024-01-06 ENCOUNTER — TELEPHONE (OUTPATIENT)
Dept: NURSING | Facility: CLINIC | Age: 49
End: 2024-01-06
Payer: COMMERCIAL

## 2024-01-06 NOTE — TELEPHONE ENCOUNTER
Pt calling to report that he was prescribed Paxlovid for COVID-19 and has started it for a couple of days and is asking about comparability with his current diagnosis list. He was advised to follow up with his PCP as he does not have a PCP at [facility]. He states that he will contact his PCP.     Herlinda Alcantara RN  [facility] Nurse Advisor 2:22 PM 1/6/2024

## 2024-01-08 ENCOUNTER — VIRTUAL VISIT (OUTPATIENT)
Dept: TRANSPLANT | Facility: CLINIC | Age: 49
End: 2024-01-08
Attending: INTERNAL MEDICINE
Payer: COMMERCIAL

## 2024-01-08 DIAGNOSIS — C22.0 HEPATOCELLULAR CARCINOMA (H): ICD-10-CM

## 2024-01-08 NOTE — LETTER
1/8/2024         RE: West Van  [address]  [place] [address]        Dear Colleague,    Thank you for referring your patient, West Van, to the [facility] TRANSPLANT [facility]. Please see a copy of my visit note below.    Pt evaluated via billable telephone visit. Time spent: 15 min  Provider location: onsite ([facility] [place]     [facility] SOLID ORGAN TRANSPLANT  OUTPATIENT MNT: TRANSPLANT WAITLIST    TIME SPENT: 15 minutes  VISIT TYPE: follow up (saw RD for eval 6/2023)   REFERRING PHYSICIAN: Leventhal   PT ACCOMPANIED BY: self    History of previous txp: no; active on liver waitlist since 8/2023    NUTRITION ASSESSMENT  RIOS, DM II- last A1c 6.6 in Dec 2023  Has been IP lately  Dr Leventhal suggest we meet to revisit adequate dietary protein intake  Wets reports lunch and dinner meals may be challenging- due to living alone/cooking for one, meal prep, not always sure what to make.    Protein supplement: Glucerna at night (a few times/week); [employer] [place] sweet & salty nut bars (occasional)    Has tried whey protein powder many years ago- is open to trying this again     Weight hx: down ~10 lbs since eval in June; ?mild muscle loss per pt  Wt Readings from Last 10 Encounters:   12/14/23 93 kg (205 lb)   11/21/23 91.7 kg (202 lb 3.2 oz)   09/06/23 93 kg (205 lb)   08/17/23 95.3 kg (210 lb)   08/07/23 93.9 kg (207 lb)   07/03/23 97.7 kg (215 lb 6.4 oz)   07/03/23 97.1 kg (214 lb)   06/20/23 97.2 kg (214 lb 4.8 oz)   04/04/23 102.2 kg (225 lb 4.8 oz)   03/23/23 98.9 kg (218 lb)     DIET RECALL   Breakfast Oatmeal, egg, sausage biscuit    Lunch Tries to get some sort of protein    Dinner Pork chops & baked potato; chicken & baked potato; some beef/fish as well    Snacks Cashews, almonds, yogurt, applesauce    Beverages Water, seldom soda (1-2/month), orange juice (4 oz/day), 1-2 cups coffee   Alcohol Not asked    Dining out If out- stops at [employer] for fish meal      PHYSICAL ACTIVITY  Walks the dog-  pending weather- was doing 8-10 k steps/day until recently    Unable to lift weights currently d/t some concern with his chest    NUTRITION DIAGNOSIS  Predicted suboptimal nutrient intake (protein) r/t increased needs w/ liver disease.     NUTRITION INTERVENTION  Nutrition education provided:  - Encouraged 1 protein supplement/day- Glucerna, protein bars, protein powder. Can make shake w/ protein powder or mix into oatmeal, etc.   - Reviewed some quick protein options for meals if not wanting to meal prep/cook as much. Consider boiled eggs, egg muffin cups, tuna fish, peanut butter, cheese sticks, crockpot protein, etc    Patient Understanding: Pt verbalized understanding of education provided.  Expected Engagement: Good  Follow-Up Plans: PRN     NUTRITION GOALS  Aim for 1 protein supplement/day     Miroslava Redding RD, LD, CCTD                                  Again, thank you for allowing me to participate in the care of your patient.        Sincerely,        Miroslava Redding RD

## 2024-01-08 NOTE — PATIENT INSTRUCTIONS
"Manjit Dodson,   Nice talking with you today.   - I would aim for 1 protein drink or bar per day. This could be your Glucerna, a protein bar, or whey protein powder. You can mix whey into oatmeal or in a smoothie with milk, Greek yogurt, fruit, peanut butter, etc.  - Some good protein bars I like: Atkins wafer cookie, Power Crunch protein bar, RX Bar, Quest.   - Consider using tuna fish for quick protein, boiled eggs, making egg muffin cups (mix eggs + add veggies and sprinkle of cheese + seasonings- cumin, garlic powder, onion powder, etc and bake), Greek yogurt, cottage cheese, low sodium deli meat, nuts/peanut butter, string cheese sticks.  - Cook up a pack of chicken or pork in the crockpot to limit time cooking. Freeze portion of pulled pork if it makes too much. Make a batch of taco meat with low sodium taco seasoning and freeze in ziploc bags to have smaller portions ready later.     Nutrition & Frailty for Transplant Recipients  With end-stage organ disease, you are at higher risk for malnutrition, deconditioning, frailty, and a reduced functional status. The goal is to have a well-maintained nutritional status (or a good nutritional reserve). Once you \"lose\" your nutritional buffer or functional status, it is very hard to get back. The more functional you are, the better you will do with recovering from surgery, etc.     Functional Status:  - Combined with good nutrition, maintaining functional status will help keep you strong enough for surgery.   - Do what activity you can tolerate. Please ask your Physician for a Physical Therapy referral if you would find this helpful.     Nutrition:  - Protein foods are the building blocks of muscle. Try to get protein at each meal and snack time. Including protein before bed is also helpful to carry you through the night when your muscle is more prone to breakdown.   - Sometimes eating protein is more work than drinking liquids. If this is the case, please supplement with " a pre-made protein shake or make your own smoothie with protein powder, milk/water, yogurt, fruit, nuts/bolivar seeds, etc    Sources of Protein  For animal proteins (chicken, beef, fish), deck of cards size is approximately 3 ounces or 24 grams protein.  Food Portion  Grams of Protein   Animal proteins (chicken, turkey, venison, fish/seafood, red meat) 1 ounce  7-9   Egg  1  6   Cottage cheese  1/4 cup  7    Cheese  1 ounce (1 slice, 1 cheese stick) 6   Milk (cow's) 4 ounces or 1/2 cup  4   Dry skim milk powder 2 Tbsp  4   Ricotta cheese  1/4 cup  7    [place] Instant Breakfast (made with milk) 1/2 cup  7   Pudding 1/2 cup  4   Yogurt (ie Yoplait) 1/2 cup  5   Greek yogurt 5 oz container 15   Tofu  1/4 cup  5   Soymilk  1/2 cup  3   Tempeh  1/4 cup  8   Lentils  1/4 cup 5   Kidney beans, black beans, etc 1/4 cup  4   Chickpeas 1/4 cup  4   Veggie burger  1 zeenat  7   Soy nuts  1/4 cup  17   Sunflower seeds  2 Tbsp  8   Peanuts  1 ounce 7   Almonds  15 6   Pistachios 25 6   Peanut/almond butter 2 Tbsp  8        Liver Health & Nutrition     Visit [email or web address] to review valuable information about nutrition and exercise     Nutrition  - For patients with cirrhosis, it is very important to eat the right types and amounts of foods.  We recommend a diet low in carbohydrates/sugars and high in fresh fruits/vegetables, with the right amount of protein.  We typically recommend  grams of protein every day. Your Registered Dietitian will be able to calculate your estimated protein needs based on your weight and degree of muscle wasting associated with your liver disease.  - In regard to protein intake, you can focus on: red meat, poultry, cooked fish and seafood, vegetable-based protein meat products/meat substitutes (Beyond Burgers, etc), tofu and other soy products, eggs, beans/legumes, dairy products (including milk and yogurt and cheese), unsalted nuts, nut butters, etc.  - You should eat at least three  "meals a day and three to four snacks between meals. Your goal is to include protein at each meal and snack. Focus on eating protein first, then if still hungry, go for the vegetables, fruit, starches, etc.   - Bedtime snacks are especially important (preferrably something with some protein) - toast or fruit with peanut butter, spoonful of peanut butter, Greek yogurt, handful of nuts, string cheese stick, boiled egg, protein bar  - Patients with malnutrition and/or loss of muscle mass can improve their nutrition and muscle mass by drinking at least 2 protein drinks per day. A good time of day to drink one of these is before bed, when your body is preparing to be in the \"fasted\" state all night and muscle mass may break down more readily. Some options include: Ensure, Boost, Glucerna, Premier Protein, FairLife, Orgain, or powdered whey protein (or similar supplements) mixed with milk, yogurt, fruit, peanut butter, etc.   - Please avoid eating raw seafood, especially shellfish, because of risk of serious illness  - We recommend all patients with cirrhosis limit their daily sodium intake to less than 2000 mg. Consider tracking your current sodium intake for 3 days on pen/paper or with an mathew like [employer] to understand where most of your sodium is coming from, what you might need to modify in your diet to remain within the 2000 mg/day budget, etc.     Miroslava LAWRENCE [facility]  Transplant Dietitian         "

## 2024-01-16 ENCOUNTER — VIRTUAL VISIT (OUTPATIENT)
Dept: SURGERY | Facility: CLINIC | Age: 49
End: 2024-01-16
Attending: THORACIC SURGERY (CARDIOTHORACIC VASCULAR SURGERY)
Payer: COMMERCIAL

## 2024-01-16 ENCOUNTER — PREP FOR PROCEDURE (OUTPATIENT)
Dept: SURGERY | Facility: CLINIC | Age: 49
End: 2024-01-16
Payer: COMMERCIAL

## 2024-01-16 VITALS
SYSTOLIC BLOOD PRESSURE: 112 MMHG | HEIGHT: 74 IN | BODY MASS INDEX: 27.46 KG/M2 | WEIGHT: 214 LBS | DIASTOLIC BLOOD PRESSURE: 62 MMHG

## 2024-01-16 DIAGNOSIS — R22.2 MASS OF CHEST WALL, RIGHT: Primary | ICD-10-CM

## 2024-01-16 DIAGNOSIS — R22.2 CHEST WALL MASS: Primary | ICD-10-CM

## 2024-01-16 PROCEDURE — 99214 OFFICE O/P EST MOD 30 MIN: CPT | Mod: 95 | Performed by: THORACIC SURGERY (CARDIOTHORACIC VASCULAR SURGERY)

## 2024-01-16 RX ORDER — ENOXAPARIN SODIUM 100 MG/ML
40 INJECTION SUBCUTANEOUS
Status: CANCELLED | OUTPATIENT
Start: 2024-01-16

## 2024-01-16 ASSESSMENT — PAIN SCALES - GENERAL: PAINLEVEL: MODERATE PAIN (4)

## 2024-01-16 NOTE — LETTER
1/16/2024         RE: West Van  [address]  [facility] [address]        Dear Colleague,    Thank you for referring your patient, West Van, to the [facility]. Please see a copy of my visit note below.    Virtual Visit Details    Type of service:  Video Visit     Originating Location (pt. Location): Home    Distant Location (provider location):  On-site  Platform used for Video Visit: [facility]      THORACIC SURGERY FOLLOW UP VISIT     Dear Dr. Martinez,     I saw Mr. Van in follow-up today. The clinical summary follows:     PREOP DIAGNOSIS   Carcinoid tumor     PROCEDURE   Right robot-assisted thoracoscopic right lower lobectomy, mediastinal lymph node dissection     DATE OF PROCEDURE  3/8/2023     HISTOPATHOLOGY        SPECIMEN   Procedure   Lobectomy    Specimen Laterality   Right    TUMOR   Tumor Focality   Single focus    Tumor Site   Lower lobe of lung    Tumor Size       Total Tumor Size (size of entire tumor)   Greatest Dimension (Centimeters): 1.1 cm   Histologic Type   Typical carcinoid / Neuroendocrine tumor, grade 1    Histologic Grade   G1, well differentiated    Spread Through Air Spaces (BRIE)   Not identified    Visceral Pleura Invasion   Not identified    Direct Invasion of Adjacent Structures   Not applicable (no adjacent structures present)    Treatment Effect   No known presurgical therapy    Lymphovascular Invasion   Not identified    MARGINS   Margin Status for Invasive Carcinoma   All margins negative for invasive carcinoma    Closest Margin(s) to Invasive Carcinoma   Parenchymal    Distance from Invasive Carcinoma to Closest Margin   2.7 cm   Margin Status for Non-Invasive Tumor   Not applicable    REGIONAL LYMPH NODES   Lymph Node(s) from Prior Procedures   No known prior lymph node sampling performed    Regional Lymph Node Status   All regional lymph nodes negative for tumor    Number of Lymph Nodes Examined   11    Flavia Site(s) Examined   4R:  Lower paratracheal        9R: Pulmonary ligament        10R: Hilar        11R: Interlobar        7: Subcarinal    PATHOLOGIC STAGE CLASSIFICATION (pTNM, AJCC 8th Edition)       pT Category   pT1b    pN Category   pN0             COMPLICATIONS  None      INTERVAL STUDIES  Pathology 12/27/2023:  Soft tissue, right chest wall nodule, needle biopsy:  - Skeletal muscle and fibroadipose tissue    CTA chest 11/9/2023:  1.4 x 0.5 cm soft tissue nodule in intercostal space between right sixth and 7th ribs          CT chest 5/11/2023:      PET CT 2/7/2023:         ETOH: None (last 1999)  TOBACCO: Never smoker       SUBJECTIVE   Mr. Van has been undergoing workup for possible liver transplant. He was found to have a slowly growing nodule in the anterior sixth intercostal space. He has some pain above that area in the right chest above that area. His weight has been stable.   He probably walks 5-6,000 steps in the winter. This week he has been down to 1-2,000 steps.     From a personal perspective, he is here with his mother.     IMPRESSION (C7A.8) Primary malignant neuroendocrine tumor of lung (H)  (primary encounter diagnosis)        This patient is a 48 year-old man with a slowly growing nodule in the 6th intercostal space anteriorly.       PLAN  I spent 30 min on the date of the encounter in chart review, patient visit, review of tests, documentation and/or discussion with other providers about the issues documented above. I reviewed the plan as follows:    Procedure planned: Right chest wall mass excision.    We discussed the risks and benefits, which include bleeding and infection and pain.     Necessary Preop Tests & Appointments: Preoperative assessment clinic and PET scan    Regional Anesthesia Plan: Intraoperative intercostal nerve block    Anticoagulation Plan: Prophylactic Lovenox       I appreciate the opportunity to participate in the care of your patient and will keep you updated.      Sincerely,      Matheus  MD Waldo

## 2024-01-16 NOTE — NURSING NOTE
Is the patient currently in the state of [place]? YES    Visit mode:VIDEO    If the visit is dropped, the patient can be reconnected by: VIDEO VISIT: Send to e-mail at: [email or web address]    Will anyone else be joining the visit? NO  (If patient encounters technical issues they should call [phone]    How would you like to obtain your AVS? MyChart    Are changes needed to the allergy or medication list? Pt stated no changes to allergies and Pt stated no med changes    Reason for visit: CORNELIUS DAMONF

## 2024-01-16 NOTE — PROGRESS NOTES
Virtual Visit Details    Type of service:  Video Visit     Originating Location (pt. Location): Home    Distant Location (provider location):  On-site  Platform used for Video Visit: [facility]      THORACIC SURGERY FOLLOW UP VISIT     Dear Dr. Martinez,     I saw Mr. Van in follow-up today. The clinical summary follows:     PREOP DIAGNOSIS   Carcinoid tumor     PROCEDURE   Right robot-assisted thoracoscopic right lower lobectomy, mediastinal lymph node dissection     DATE OF PROCEDURE  3/8/2023     HISTOPATHOLOGY        SPECIMEN   Procedure   Lobectomy    Specimen Laterality   Right    TUMOR   Tumor Focality   Single focus    Tumor Site   Lower lobe of lung    Tumor Size       Total Tumor Size (size of entire tumor)   Greatest Dimension (Centimeters): 1.1 cm   Histologic Type   Typical carcinoid / Neuroendocrine tumor, grade 1    Histologic Grade   G1, well differentiated    Spread Through Air Spaces (BRIE)   Not identified    Visceral Pleura Invasion   Not identified    Direct Invasion of Adjacent Structures   Not applicable (no adjacent structures present)    Treatment Effect   No known presurgical therapy    Lymphovascular Invasion   Not identified    MARGINS   Margin Status for Invasive Carcinoma   All margins negative for invasive carcinoma    Closest Margin(s) to Invasive Carcinoma   Parenchymal    Distance from Invasive Carcinoma to Closest Margin   2.7 cm   Margin Status for Non-Invasive Tumor   Not applicable    REGIONAL LYMPH NODES   Lymph Node(s) from Prior Procedures   No known prior lymph node sampling performed    Regional Lymph Node Status   All regional lymph nodes negative for tumor    Number of Lymph Nodes Examined   11    Flavia Site(s) Examined   4R: Lower paratracheal        9R: Pulmonary ligament        10R: Hilar        11R: Interlobar        7: Subcarinal    PATHOLOGIC STAGE CLASSIFICATION (pTNM, AJCC 8th Edition)       pT Category   pT1b    pN Category   pN0             COMPLICATIONS  None       INTERVAL STUDIES  Pathology 12/27/2023:  Soft tissue, right chest wall nodule, needle biopsy:  - Skeletal muscle and fibroadipose tissue    CTA chest 11/9/2023:  1.4 x 0.5 cm soft tissue nodule in intercostal space between right sixth and 7th ribs          CT chest 5/11/2023:      PET CT 2/7/2023:         ETOH: None (last 1999)  TOBACCO: Never smoker       SUBJECTIVE   Mr. Van has been undergoing workup for possible liver transplant. He was found to have a slowly growing nodule in the anterior sixth intercostal space. He has some pain above that area in the right chest above that area. His weight has been stable.   He probably walks 5-6,000 steps in the winter. This week he has been down to 1-2,000 steps.     From a personal perspective, he is here with his mother.     IMPRESSION (C7A.8) Primary malignant neuroendocrine tumor of lung (H)  (primary encounter diagnosis)        This patient is a 48 year-old man with a slowly growing nodule in the 6th intercostal space anteriorly.       PLAN  I spent 30 min on the date of the encounter in chart review, patient visit, review of tests, documentation and/or discussion with other providers about the issues documented above. I reviewed the plan as follows:    Procedure planned: Right chest wall mass excision.    We discussed the risks and benefits, which include bleeding and infection and pain.     Necessary Preop Tests & Appointments: Preoperative assessment clinic and PET scan    Regional Anesthesia Plan: Intraoperative intercostal nerve block    Anticoagulation Plan: Prophylactic Lovenox       I appreciate the opportunity to participate in the care of your patient and will keep you updated.      Sincerely,      Matheus Haas MD

## 2024-01-18 ENCOUNTER — TELEPHONE (OUTPATIENT)
Dept: ONCOLOGY | Facility: CLINIC | Age: 49
End: 2024-01-18
Payer: COMMERCIAL

## 2024-01-18 ENCOUNTER — DOCUMENTATION ONLY (OUTPATIENT)
Dept: TRANSPLANT | Facility: CLINIC | Age: 49
End: 2024-01-18
Payer: COMMERCIAL

## 2024-01-18 DIAGNOSIS — R22.2 MASS OF CHEST WALL, RIGHT: Primary | ICD-10-CM

## 2024-01-18 DIAGNOSIS — D3A.090 CARCINOID TUMOR OF RIGHT LUNG (H): ICD-10-CM

## 2024-01-18 DIAGNOSIS — R22.2 CHEST WALL MASS: ICD-10-CM

## 2024-01-18 DIAGNOSIS — C22.0 HEPATOCELLULAR CARCINOMA (H): Primary | ICD-10-CM

## 2024-01-18 RX ORDER — OXYCODONE HYDROCHLORIDE 5 MG/1
5 TABLET ORAL EVERY 6 HOURS PRN
Qty: 20 TABLET | Refills: 0 | Status: SHIPPED | OUTPATIENT
Start: 2024-01-18 | End: 2024-01-23

## 2024-01-18 NOTE — PROGRESS NOTES
January 18, 2024 3:50 PM - Dillon Quinones, RN:     This RN called and spoke with patient, who reported continual R upper chest/nipple pain unrelieved with Tylenol. Dr. Leventhal to send order for one-time prescription for PRN Oxycodone to [facility] pharmacy in [facility].    This RN instructed patient to keep his upcoming appointment on Monday 1/22 with his PCP to discuss further.    Will message providers to verify timing of upcoming MRI on 2/5, prior to 2/8 scheduled chest node resection.

## 2024-01-18 NOTE — TELEPHONE ENCOUNTER
Called and spoke with pt to let him know we can move his surgery up from 2/15 to 2/8. Pt stated that would be his preference to go earlier.     Padmaja Mcmahon on 1/18/2024 at 12:10 PM

## 2024-01-18 NOTE — TELEPHONE ENCOUNTER
Spoke with patient to schedule procedure with Dr. Haas   Procedure was scheduled on 2/15 at [facility] OR  Patient will have H&P with PAC    Informed pt of surgery details:  Date:    Thursday, February 15, 2023  Arrival Time:  5:30 am    Pt is aware surgery start time may change, and someone will reach out with the new arrival time, if so.    Patient is aware a COVID-19 test is needed before their procedure ONLY IF symptomatic.   (Patient is aware Thoracic is no longer requiring COVID-19 test)       Patient is aware a / is needed day of surgery.   Surgery Letter was sent via [employer] ([facility]),     Pt stated he will need to get [facility] set up prior to and a couple of days after surgery. Pt stated he wants to remain close to the  before traveling back home, so he Is requesting a day or 2 stay after surgery as well as the day before surgery.     Patient has my direct contact information for any further questions.

## 2024-01-19 ENCOUNTER — DOCUMENTATION ONLY (OUTPATIENT)
Dept: SURGERY | Facility: CLINIC | Age: 49
End: 2024-01-19
Payer: COMMERCIAL

## 2024-01-19 NOTE — TELEPHONE ENCOUNTER
FUTURE VISIT INFORMATION      SURGERY INFORMATION:  Date: 24  Location: uu or  Surgeon:  Matheus Haas MD   Anesthesia Type:  general  Procedure: xcision virtual visit wall mass   Consult: virtual visit 24    RECORDS REQUESTED FROM:       Primary Care Provider: David    Most recent EKG+ Tracin/3/23    Most recent ECHO: 23       room air

## 2024-01-24 ENCOUNTER — PATIENT OUTREACH (OUTPATIENT)
Dept: CARE COORDINATION | Facility: CLINIC | Age: 49
End: 2024-01-24
Payer: COMMERCIAL

## 2024-01-24 NOTE — PROGRESS NOTES
Social Work - Follow-Up  [facility]    Data/Intervention:    Patient Name: West Van Goes By: West    CHARLIE/Age: 1975 (48 year old)    Reason for Follow-Up:  lodging    Collaborated With:    -patient  -    Intervention/Education/Resources Provided:  Patient will be traveling from [place] qualifies for [facility] stay due to distance traveling.   Patient is familiar with [employer] and has stayed at [facility] in the past.     Patient not interested in staying at [facility]. Patient prefers not to drive into the city to get to [facility]. Patient states this causes some stress and has made alternative arrangements instead.     SW validated patient's concerns and  reviewed free parking at [facility] and patient family shuttle available to patient and family, should they change arrangements or for future reference /need.       Assessment/Plan:  Patient has made arrangements for lodging at a hotel in [place] and plans to take an uber into their appointment.     Patient agreed to follow up with SW as needed for ongoing supports.     Previously provided patient/family with writer's contact information and availability.      Chanell COPELAND, [facility] HospitalSW  - Oncology  Phone : [phone]  Pager: [phone]

## 2024-01-26 DIAGNOSIS — D3A.090 CARCINOID TUMOR OF RIGHT LUNG (H): Primary | ICD-10-CM

## 2024-01-26 NOTE — TELEPHONE ENCOUNTER
Called and spoke with pt to let him  know we have a small opening on 2/1 and pt agreed. Needing approval from  manager and will follow up with pt to confirm next week. Pt understood and agreed.    Padmaja Mcmahon on 1/26/2024 at 4:51 PM

## 2024-01-28 LAB
ABO/RH(D): NORMAL
ANTIBODY SCREEN: NEGATIVE
SPECIMEN EXPIRATION DATE: NORMAL

## 2024-01-29 ENCOUNTER — HOSPITAL ENCOUNTER (OUTPATIENT)
Dept: PET IMAGING | Facility: CLINIC | Age: 49
Discharge: HOME | End: 2024-01-29
Attending: THORACIC SURGERY (CARDIOTHORACIC VASCULAR SURGERY) | Admitting: THORACIC SURGERY (CARDIOTHORACIC VASCULAR SURGERY)
Payer: COMMERCIAL

## 2024-01-29 ENCOUNTER — PRE VISIT (OUTPATIENT)
Dept: SURGERY | Facility: CLINIC | Age: 49
End: 2024-01-29

## 2024-01-29 ENCOUNTER — ANESTHESIA EVENT (OUTPATIENT)
Dept: SURGERY | Facility: CLINIC | Age: 49
End: 2024-01-29
Payer: COMMERCIAL

## 2024-01-29 ENCOUNTER — OFFICE VISIT (OUTPATIENT)
Dept: SURGERY | Facility: CLINIC | Age: 49
End: 2024-01-29
Payer: COMMERCIAL

## 2024-01-29 ENCOUNTER — LAB (OUTPATIENT)
Dept: LAB | Facility: CLINIC | Age: 49
End: 2024-01-29
Payer: COMMERCIAL

## 2024-01-29 VITALS
OXYGEN SATURATION: 99 % | HEART RATE: 66 BPM | DIASTOLIC BLOOD PRESSURE: 92 MMHG | WEIGHT: 211 LBS | SYSTOLIC BLOOD PRESSURE: 131 MMHG | HEIGHT: 74 IN | RESPIRATION RATE: 16 BRPM | TEMPERATURE: 97.6 F | BODY MASS INDEX: 27.08 KG/M2

## 2024-01-29 DIAGNOSIS — Z01.818 PRE-OP EVALUATION: Primary | ICD-10-CM

## 2024-01-29 DIAGNOSIS — Z01.818 PRE-OP EVALUATION: ICD-10-CM

## 2024-01-29 DIAGNOSIS — D3A.090 CARCINOID TUMOR OF RIGHT LUNG (H): ICD-10-CM

## 2024-01-29 PROCEDURE — 36415 COLL VENOUS BLD VENIPUNCTURE: CPT | Performed by: PATHOLOGY

## 2024-01-29 PROCEDURE — 250N000011 HC RX IP 250 OP 636: Performed by: THORACIC SURGERY (CARDIOTHORACIC VASCULAR SURGERY)

## 2024-01-29 PROCEDURE — 71260 CT THORAX DX C+: CPT | Mod: 26 | Performed by: STUDENT IN AN ORGANIZED HEALTH CARE EDUCATION/TRAINING PROGRAM

## 2024-01-29 PROCEDURE — 86900 BLOOD TYPING SEROLOGIC ABO: CPT | Performed by: NURSE PRACTITIONER

## 2024-01-29 PROCEDURE — 343N000001 HC RX 343: Performed by: THORACIC SURGERY (CARDIOTHORACIC VASCULAR SURGERY)

## 2024-01-29 PROCEDURE — A9552 F18 FDG: HCPCS | Performed by: THORACIC SURGERY (CARDIOTHORACIC VASCULAR SURGERY)

## 2024-01-29 PROCEDURE — 78815 PET IMAGE W/CT SKULL-THIGH: CPT | Mod: 26 | Performed by: STUDENT IN AN ORGANIZED HEALTH CARE EDUCATION/TRAINING PROGRAM

## 2024-01-29 PROCEDURE — 74177 CT ABD & PELVIS W/CONTRAST: CPT | Mod: 26 | Performed by: STUDENT IN AN ORGANIZED HEALTH CARE EDUCATION/TRAINING PROGRAM

## 2024-01-29 PROCEDURE — 99215 OFFICE O/P EST HI 40 MIN: CPT | Performed by: NURSE PRACTITIONER

## 2024-01-29 PROCEDURE — 71260 CT THORAX DX C+: CPT

## 2024-01-29 PROCEDURE — 78815 PET IMAGE W/CT SKULL-THIGH: CPT | Mod: PS

## 2024-01-29 RX ORDER — IOPAMIDOL 755 MG/ML
10-135 INJECTION, SOLUTION INTRAVASCULAR ONCE
Status: COMPLETED | OUTPATIENT
Start: 2024-01-29 | End: 2024-01-29

## 2024-01-29 RX ORDER — ACETAMINOPHEN 500 MG
1000 TABLET ORAL 2 TIMES DAILY
Status: ON HOLD | COMMUNITY
End: 2024-06-26

## 2024-01-29 RX ADMIN — FLUDEOXYGLUCOSE F-18 12.58 MILLICURIE: 500 INJECTION, SOLUTION INTRAVENOUS at 14:37

## 2024-01-29 RX ADMIN — IOPAMIDOL 130 ML: 755 INJECTION, SOLUTION INTRAVENOUS at 15:28

## 2024-01-29 ASSESSMENT — LIFESTYLE VARIABLES: TOBACCO_USE: 0

## 2024-01-29 ASSESSMENT — PAIN SCALES - GENERAL: PAINLEVEL: SEVERE PAIN (7)

## 2024-01-29 ASSESSMENT — ENCOUNTER SYMPTOMS: ORTHOPNEA: 0

## 2024-01-29 NOTE — PATIENT INSTRUCTIONS
Name:  West Van   MRN:  [id number]   :  1975   Today's Date:  2024         You were seen today for a pre-operative assessment in the:    Pre-operative Anesthesia Assessment Center(PAC)  [facility]  [address] [place] [id number]  phone [phone]      You will be receiving a call with location, date, arrival time and diet instructions from Preadmission Nursing at your surgical site:    -[facility]: [phone]   -[facility]: [phone]  -[facility]: [phone]  -[facility]: [phone]  -[facility]: [phone]  -[facility]: [phone]  -[facility]: [phone]        Anesthesia recommendations for medications:    Hold Aspirin for 7 days before procedure.  Hold Multivitamins for 7 days before procedure.   Hold Herbal medications and Supplements for 7 days before procedure.  Hold Ibuprofen for 1 day before procedure.   Hold Naproxen for 4 days before procedure.     No alcohol or cannabis products for 24 hours before your procedure           Please DO NOT take the following medications the day of procedure:    Calcium  Hydrochlorothiazide  Lactulose  Do not take Liraglutide (Victoza) the evening before surgery  Magnesium  Metformin (Glucophage)  Milk thistle - stop now  Polyethylene glycol (Miralax)  Senokot  Vitamin D3    Please take these medications per your usual routine:    Tylenol  Atenolol (Tenormin)  Tessalon if needed      How do I prepare myself?  - Please take 2 showers (one the night prior to surgery and one the morning of surgery) using Scrubcare or Hibiclens soap.    Use this soap only from the neck to your toes.     Leave the soap on your skin for one minute--then rinse thoroughly.      You may use your own shampoo and conditioner. No other hair products.   - Please remove all jewelry and body piercings.  - No lotions, deodorants or fragrance.  - No makeup or fingernail  polish.   - Bring your ID and insurance card.    -If you have a Deep Brain Stimulator, a Spinal Cord Stimulator, or any implanted Neuro Device, you must bring the remote to your appointment         For further questions regarding your surgery please call your surgeon's office.

## 2024-01-29 NOTE — H&P
Pre-Operative H & P     CC:  Preoperative exam to assess for increased cardiopulmonary risk while undergoing surgery and anesthesia.    Date of Encounter: 1/29/2024  Primary Care Physician:  Reddy Hood     Reason for visit: Pre-operative evaluation      HPI  West Van is a 48 year old male who presents for pre-operative H & P in preparation for  Procedure Information       Case: [id number] Date/Time: 02/01/24 1030    Procedure: Excision chest wall mass (Right: Chest)    Anesthesia type: General    Diagnosis: Chest wall mass [R22.2]    Pre-op diagnosis: Chest wall mass [R22.2]    Location:  OR  30 /  OR    Providers: Matheus Haas MD          West Van is a 48 year old male with a PMH  significant for Hepatocellular carcinoma s/p ablation of liver tumor (12/6/2022), 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 He has been undergoing workup for possible liver transplant. He was found to have a slowly growing nodule in the right anterior sixth intercostal space. He has some pain above that area in the right chest above that area. He has met with Dr. Haas and presents today in preparation for the above recommended procedure.         History is obtained from the patient and chart review    Hx of abnormal bleeding or anti-platelet use: denies      Past Medical History  Past Medical History:   Diagnosis Date    Benign essential HTN 04/13/2016    Diabetes (H)     Kidney stone     Liver cancer (H) 10/04/2022    Liver cirrhosis secondary to nonalcoholic steatohepatitis (RIOS) (H)     RIOS (nonalcoholic steatohepatitis) 06/02/2023    Neuroendocrine carcinoma of lung (H)     right lung, lobectomy 3/2023    PONV (postoperative nausea and vomiting)        Past Surgical History  Past Surgical History:   Procedure Laterality Date    BRONCHOSCOPY, WITH BIOPSY,  ROBOT ASSISTED N/A 1/17/2023    Procedure: BRONCHOSCOPY, USING OPTICAL TRACKING SYSTEM, ion;  Surgeon: Ani Guillaume MD;  Location: [facility] OR    DAVINCI LOBECTOMY LUNG Right 3/8/2023    Procedure: Robot-assisted right thoracoscopic lower lobectomy, mediastinal lymph node dissection, intercostal nerve cryoablation ;  Surgeon: Matheus Haas MD;  Location: [facility] OR    ENDOBRONCHIAL ULTRASOUND FLEXIBLE N/A 1/17/2023    Procedure: endobronchial  ultrasound and transbronchial biopsies;  Surgeon: Ani Guillaume MD;  Location: [facility] OR    LAPAROSCOPIC ABLATION LIVER TUMOR N/A 12/6/2022    Procedure: Diagnostic Laparoscopy, Hepatic Ultrasound, Laparoscopic ultrasound guided microwave ablation of liver tumor x1;  Surgeon: Armando Munguia MD;  Location: [facility] OR    LAPAROSCOPIC BIOPSY LIVER N/A 12/6/2022    Procedure: Laparoscopic Ultrasound Guided liver biopsy;  Surgeon: Armando Munguia MD;  Location: [facility] OR    LAPAROSCOPIC CHOLECYSTECTOMY N/A 12/6/2022    Procedure: Laparoscopic cholecystectomy;  Surgeon: Armando Munguia MD;  Location: [facility] OR       Prior to Admission Medications  Current Outpatient Medications   Medication Sig Dispense Refill    acetaminophen (TYLENOL) 325 MG tablet Take 3 tablets (975 mg) by mouth every 8 hours      acetaminophen (TYLENOL) 500 MG tablet Take 1,000 mg by mouth 2 times daily      atenolol (TENORMIN) 25 MG tablet Take 25 mg by mouth 2 times daily      benzonatate (TESSALON) 200 MG capsule Take 200 mg by mouth      CALCIUM PO Take 600 mg by mouth every morning      hydrochlorothiazide (HYDRODIURIL) 25 MG tablet Take 12.5 mg by mouth every morning      lactulose 20 GM/30ML solution Take 30 mLs (20 g) by mouth daily (Patient taking differently: Take 20 g by mouth every morning) 946 mL 3    liraglutide (VICTOZA) 18 MG/3ML solution Inject 1.8 mg Subcutaneous every evening      magnesium 250 MG tablet Take 1 tablet by mouth every morning      metFORMIN (GLUCOPHAGE XR) 500 MG 24 hr tablet Take 500 mg  by mouth every morning      Milk Thistle-Dand-Fennel-Licor (MILK THISTLE XTRA) CAPS capsule Take 1 capsule by mouth every morning      polyethylene glycol (MIRALAX) 17 GM/Dose powder Take 17 g by mouth as needed      senna-docusate (SENOKOT-S/PERICOLACE) 8.6-50 MG tablet Take 1 tablet by mouth 2 times daily Reduce dose or temporarily discontinue if having loose stools. (Patient taking differently: Take 1 tablet by mouth daily as needed Reduce dose or temporarily discontinue if having loose stools.) 50 tablet 0    Vitamin D3 (CHOLECALCIFEROL) 125 MCG (5000 UT) tablet Take 125 mcg by mouth every morning      ULTICARE MINI 31G X 6 MM insulin pen needle Inject 1 each Subcutaneous At Bedtime         Allergies  No Known Allergies    Social History  Social History     Socioeconomic History    Marital status: Single     Spouse name: Not on file    Number of children: Not on file    Years of education: Not on file    Highest education level: Not on file   Occupational History    Not on file   Tobacco Use    Smoking status: Never     Passive exposure: Past    Smokeless tobacco: Never   Vaping Use    Vaping Use: Never used   Substance and Sexual Activity    Alcohol use: Not Currently     Comment: Last ETOH 1999    Drug use: Never    Sexual activity: Not on file   Other Topics Concern    Not on file   Social History Narrative    Not on file     Social Determinants of Health     Financial Resource Strain: Not on file   Food Insecurity: Not on file   Transportation Needs: Not on file   Physical Activity: Not on file   Stress: Not on file   Social Connections: Not on file   Interpersonal Safety: Not on file   Housing Stability: Not on file       Family History  Family History   Problem Relation Age of Onset    Breast Cancer Mother     Cancer Father         lip    Melanoma Cousin     Cirrhosis No family hx of        Review of Systems  The complete review of systems is negative other than noted in the HPI or here.   Anesthesia  Evaluation   Pt has had prior anesthetic. Type: MAC and General.    History of anesthetic complications  - PONV.  urinary retention s/p liver ablation 12/2022 and again after lobectomy 3/2023.    ROS/MED HX  ENT/Pulmonary: Comment: Pulmonary nodules    (+)     RADHA risk factors,  hypertension,  daytime somnolence,                            (-) tobacco use   Neurologic:  - neg neurologic ROS     Cardiovascular:     (+)  hypertension- -   -  - -                                 Previous cardiac testing   Echo: Date: 6/2023 Results:    Stress Test:  Date: Results:    ECG Reviewed:  Date: 7/2023 Results:  Sinus rhythm with occasional Premature ventricular complexes   Cath:  Date: Results:   (-) taking anticoagulants/antiplatelets, CONTI, orthopnea/PND and irregular heartbeat/palpitations   METS/Exercise Tolerance: >4 METS    Hematologic:  - neg hematologic  ROS     Musculoskeletal: Comment: sciatica      GI/Hepatic: Comment: Hepatocellular Carcinoma s/p ablation of liver tumor (12/6/2022)  Gallbladder Calculus s/p colectomy 12/6/2022  RIOS (nonalcoholic steatohepatitis)   Liver lesion   Liver cirrhosis secondary to RIOS   Portal hypertension (H)         (+)          cholecystitis/cholelithiasis,   liver disease,       Renal/Genitourinary:  - neg Renal ROS     Endo:     (+)  type II DM, Last HgA1c: 7.0, date: 6/2023, Not using insulin, - not using insulin pump. Normal glucose range: 140-160 , not previously admitted for DM/DKA.              Psychiatric/Substance Use:     (+) psychiatric history anxiety    (-) alcohol abuse history   Infectious Disease: Comment: + covid ( 1/5/2024)  Asymptomatic currently - neg infectious disease ROS     Malignancy:   (+) Malignancy, History of Other and Lung.  Lung CA Remission status post Surgery.  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    Other: Comment: Chest wall mass            BP (!) 131/92 (BP Location: Right arm, Patient Position: Sitting, Cuff Size: Adult  "Regular)   Pulse 66   Temp 97.6  F (36.4  C) (Oral)   Resp 16   Ht 1.877 m (6' 1.9\")   Wt 95.7 kg (211 lb)   SpO2 99%   BMI 27.17 kg/m      Physical Exam   Constitutional: Awake, alert, cooperative, no apparent distress, and appears stated age.  Eyes: Pupils equal, round and reactive to light, extra ocular muscles intact, sclera clear, conjunctiva normal.  HENT: Normocephalic, oral pharynx with moist mucus membranes, good dentition. No goiter appreciated.   Respiratory: Clear to auscultation bilaterally, no crackles or wheezing.  Cardiovascular: Regular rate and rhythm, normal S1 and S2, and no murmur noted.  Carotids +2, no bruits. No edema. Palpable pulses to radial  DP and PT arteries.   GI: Normal bowel sounds, soft, non-distended, non-tender, no masses palpated, no hepatosplenomegaly.  Surgical scars: healed  Lymph/Hematologic: No cervical lymphadenopathy and no supraclavicular lymphadenopathy.  Genitourinary:  deferred  Skin: Warm and dry.  No rashes at anticipated surgical site.   Musculoskeletal: Full ROM of neck. There is no redness, warmth, or swelling of the joints. Gross motor strength is normal.    Neurologic: Awake, alert, oriented to name, place and time. Cranial nerves II-XII are grossly intact. Gait is normal.   Neuropsychiatric: Calm, cooperative. Normal affect.     Prior Labs/Diagnostic Studies   All labs and imaging personally reviewed     Lab Results   Component Value Date    WBC 7.2 12/14/2023     Lab Results   Component Value Date    RBC 5.65 12/14/2023     Lab Results   Component Value Date    HGB 16.5 12/14/2023     Lab Results   Component Value Date    HCT 48.2 12/14/2023     No components found for: \"MCT\"  Lab Results   Component Value Date    MCV 85 12/14/2023     Lab Results   Component Value Date    MCH 29.2 12/14/2023     Lab Results   Component Value Date    MCHC 34.2 12/14/2023     Lab Results   Component Value Date    RDW 13.1 12/14/2023     Lab Results   Component Value Date " "    12/14/2023     Last Comprehensive Metabolic Panel:  Lab Results   Component Value Date     12/14/2023    POTASSIUM 3.8 12/14/2023    CHLORIDE 103 12/14/2023    CO2 29 12/14/2023    ANIONGAP 11 12/14/2023     (H) 12/27/2023    BUN 16.9 12/14/2023    CR 0.81 12/14/2023    GFRESTIMATED >90 12/14/2023    CHUCK 10.0 12/14/2023       Lab Results   Component Value Date    AST 29 12/14/2023     Lab Results   Component Value Date    ALT 34 12/14/2023     No results found for: \"BILICONJ\"   Lab Results   Component Value Date    BILITOTAL 1.9 12/14/2023     Lab Results   Component Value Date    ALBUMIN 4.5 12/14/2023    ALBUMIN 3.9 05/11/2023     Lab Results   Component Value Date    PROTTOTAL 7.7 12/14/2023      Lab Results   Component Value Date    ALKPHOS 107 12/14/2023         CT chest 11/9/2023  IMPRESSION:   1. Nonspecific nodule in the intercostal space between the right sixth and seventh ribs anteriorly, mildly progressive. This is entirely nonspecific and could be benign or neoplastic. The concern is this represents seeding of the liver neoplasm ablation tract.   2. Right lower lobectomy. Decreased small right pleural effusion. No acute lung pathology identified.     -1.4 x 0.5 cm soft tissue nodule in intercostal space between right sixth and 7th ribs      CTA angiogram coronary 6/22/23  IMPRESSION:  1.  Normal coronary artery anatomy without detectable coronary artery  disease  2.  There is mild dilation of the proximal ascending aorta at the  level of the sinus of valsalva 4.2 cm x 4.0 cm x 3.8 cm (measured cusp  to cusp).   3.  Total Agatston score 0 placing the patient in the lowest  percentile when compared to an age- and gender-matched control group.  4.  Please review the separate Radiology report for incidental  noncardiac findings.     6/16/2023  Echo result w/o MOPS: Interpretation SummaryGlobal and regional left ventricular function is normal with an EF of 55-60%.Global right " ventricular function is normal.Pulmonary artery systolic pressure is normal. The right ventricular systolicpressure is approximated at 31 mmHgNo significant valvular abnormalities present.Estimated mean right atrial pressure is normal.No pericardial effusion is present.          Latest Ref Rng & Units 6/20/2023     6:52 AM   PFT   FVC L 3.91    FEV1 L 3.13    FVC% % 73    FEV1% % 73          The patient's records and results personally reviewed by this provider.     Outside records reviewed from: Care Everywhere    LAB/DIAGNOSTIC STUDIES TODAY:    Type and Screen     Assessment    West Van is a 48 year old male seen as a PAC referral for risk assessment and optimization for anesthesia.    Plan/Recommendations  Pt will be optimized for the proposed procedure.  See below for details on the assessment, risk, and preoperative recommendations    NEUROLOGY  - No history of TIA, CVA or seizure    -Post Op delirium risk factors:  No risk identified    ENT  - No current airway concerns.  Will need to be reassessed day of surgery.  Mallampati: I  TM: > 3    CARDIAC  - No history of CAD and Afib    No anginal symptoms, Denies palpitations, PND, dizziness or syncope.   HTN managed on atenolol and  (Lisinopril /hydrochlorothiazide- hold DOS)  - METS (Metabolic Equivalents)  Patient performs 4 or more METS exercise without symptoms            Total Score: 0      RCRI-Very low risk: Class 1 0.4% complication rate            Total Score: 0        PULMONARY    Hx of Pulmonary nodules - carcinoid tumor right lung s/p Robot-assisted right thoracoscopic lower lobectomy, mediastinal lymph node dissection 3/2023 with Dr. Haas.        RADHA Medium Risk            Total Score: 3    RADHA: Often tired    RADHA: Hypertension    RADHA: Male      - Denies asthma or inhaler use  - Tobacco History    History   Smoking Status    Never   Smokeless Tobacco    Never       GI/Hepatic     PONV High Risk  Total Score: 3           1 AN PONV: Patient  "is not a current smoker    1 AN PONV: Patient has history of PONV    1 AN PONV: Intended Post Op Opioids      Hx of Hepatocellular carcinoma s/p ablation of liver tumor ( 12/6/2022)  Calculus of gallbladder s/p cholecystectomy 12/6/2022   RIOS (nonalcoholic steatohepatitis)   Liver lesion   Liver cirrhosis secondary to RIOS   Portal hypertension (H)     /RENAL  - Baseline Creatinine  WNL    ENDOCRINE    - BMI: Estimated body mass index is 27.17 kg/m  as calculated from the following:    Height as of this encounter: 1.877 m (6' 1.9\").    Weight as of this encounter: 95.7 kg (211 lb).  Overweight (BMI 25.0-29.9)  - Diabetes  Hemoglobin A1C (%)   Date Value   06/20/2023 7.0 (H)     Diabetes Mellitus, Type 2, non-insulin dependent.  Hold morning oral hypoglycemic medications. Recommend close monitoring of the patient's blood glucose levels throughout the perioperative period.    HEME  VTE Medium Risk 1.8%            Total Score: 7    VTE: Male    VTE: Current cancer      - No history of abnormal bleeding or antiplatelet use.      MSK  Patient is NOT Frail            Total Score: 0       sciatica    Other:  Right Chest wall mass Procedure scheduled as above.       Different anesthesia methods/types have been discussed with the patient, but they are aware that the final plan will be decided by the assigned anesthesia provider on the date of service.      The patient is optimized for their procedure. AVS with information on surgery time/arrival time, meds and NPO status given by nursing staff. No further diagnostic testing indicated.      On the day of service:     Prep time: 15 minutes  Visit time: 22 minutes  Documentation time: 10 minutes  ------------------------------------------  Total time: 47 minutes      VALENTINA Arrington CNP  Preoperative Assessment Center  [facility]  Phone: [phone]  Fax: [phone]    "

## 2024-01-29 NOTE — TELEPHONE ENCOUNTER
FUTURE VISIT INFORMATION        SURGERY INFORMATION:  Date: 24  [facility] or  Surgeon:  Matheus Haas MD   Anesthesia Type:  general  Procedure: xcision virtual visit wall mass   Consult: virtual visit 24     RECORDS REQUESTED FROM:         Primary Care Provider: David     Most recent EKG+ Tracin/3/23     Most recent ECHO: 23

## 2024-01-30 NOTE — TELEPHONE ENCOUNTER
Called and spoke with pt to let him know the surgery [facility] has changed from  to [facility] Same day but a 2pm start with a noon arrival. Pt understood and agreed.    Padmaja Mcmahon on 1/30/2024 at 10:42 AM

## 2024-01-31 ASSESSMENT — LIFESTYLE VARIABLES: TOBACCO_USE: 0

## 2024-01-31 ASSESSMENT — ENCOUNTER SYMPTOMS: ORTHOPNEA: 0

## 2024-01-31 NOTE — ANESTHESIA PREPROCEDURE EVALUATION
Anesthesia Pre-Procedure Evaluation    Patient: West Van   MRN: [id number] : 1975        Procedure : Procedure(s):  Excision chest wall mass          Past Medical History:   Diagnosis Date    Benign essential HTN 2016    Diabetes (H)     Kidney stone     Liver cancer (H) 10/04/2022    Liver cirrhosis secondary to nonalcoholic steatohepatitis (RIOS) (H)     RIOS (nonalcoholic steatohepatitis) 2023    Neuroendocrine carcinoma of lung (H)     right lung, lobectomy 3/2023    PONV (postoperative nausea and vomiting)       Past Surgical History:   Procedure Laterality Date    BRONCHOSCOPY, WITH BIOPSY, ROBOT ASSISTED N/A 2023    Procedure: BRONCHOSCOPY, USING OPTICAL TRACKING SYSTEM, ion;  Surgeon: Ani Guillaume MD;  [facility]    DAVINCI LOBECTOMY LUNG Right 3/8/2023    Procedure: Robot-assisted right thoracoscopic lower lobectomy, mediastinal lymph node dissection, intercostal nerve cryoablation ;  Surgeon: Matheus Haas MD;  [facility]    ENDOBRONCHIAL ULTRASOUND FLEXIBLE N/A 2023    Procedure: endobronchial  ultrasound and transbronchial biopsies;  Surgeon: Ani Guillaume MD;  [facility]    LAPAROSCOPIC ABLATION LIVER TUMOR N/A 2022    Procedure: Diagnostic Laparoscopy, Hepatic Ultrasound, Laparoscopic ultrasound guided microwave ablation of liver tumor x1;  Surgeon: Armando Munguia MD;  [facility]    LAPAROSCOPIC BIOPSY LIVER N/A 2022    Procedure: Laparoscopic Ultrasound Guided liver biopsy;  Surgeon: Armando Munguia MD;  [facility]    LAPAROSCOPIC CHOLECYSTECTOMY N/A 2022    Procedure: Laparoscopic cholecystectomy;  Surgeon: Armando Munguia MD;  [facility]      No Known Allergies   Social History     Tobacco Use    Smoking status: Never     Passive exposure: Past    Smokeless tobacco: Never   Substance Use Topics    Alcohol use: Not Currently     Comment: Last ETOH       Wt Readings from Last 1 Encounters:    01/29/24 95.7 kg (211 lb)        Anesthesia Evaluation   Pt has had prior anesthetic. Type: MAC and General.    History of anesthetic complications  - PONV.  urinary retention s/p liver ablation 12/2022 and again after lobectomy 3/2023.    ROS/MED HX  ENT/Pulmonary: Comment: Pulmonary nodules    (+)     RADHA risk factors,  hypertension,  daytime somnolence,                            (-) tobacco use   Neurologic:  - neg neurologic ROS     Cardiovascular:     (+)  hypertension- -   -  - -                                 Previous cardiac testing   Echo: Date: 6/2023 Results:  Interpretation Summary  Global and regional left ventricular function is normal with an EF of 55-60%.  Global right ventricular function is normal.  Pulmonary artery systolic pressure is normal. The right ventricular systolic  pressure is approximated at 31 mmHg  No significant valvular abnormalities present.  Estimated mean right atrial pressure is normal.  No pericardial effusion is present.  ______________________________________________________________________________  Left Ventricle  Left ventricular size is normal. Left ventricular wall thickness is normal.  Global and regional left ventricular function is normal with an EF of 55-60%.  No regional wall motion abnormalities are seen.     Right Ventricle  The right ventricle is normal size. Global right ventricular function is  normal.     Atria  Both atria appear normal.     Mitral Valve  The mitral valve is normal. Trace mitral insufficiency is present.     Aortic Valve  Aortic valve is normal in structure and function.     Tricuspid Valve  The tricuspid valve is normal. Trace tricuspid insufficiency is present. The  right ventricular systolic pressure is approximated at 27.7 mmHg plus the  right atrial pressure. Pulmonary artery systolic pressure is normal.     Pulmonic Valve  The pulmonic valve is normal.     Vessels  The aorta root is normal. Sinuses of Valsalva 3.9 cm. Ascending aorta  3.4 cm.  Estimated mean right atrial pressure is normal.     Pericardium  No pericardial effusion is present.     Miscellaneous  No significant valvular abnormalities present.     Compared to Previous Study  Previous study not available for comparison.    Stress Test:  Date: Results:    ECG Reviewed:  Date: 7/2023 Results:  Sinus rhythm with occasional Premature ventricular complexes   Cath:  Date: Results:   (-) taking anticoagulants/antiplatelets, CONTI, orthopnea/PND and irregular heartbeat/palpitations   METS/Exercise Tolerance: >4 METS    Hematologic:  - neg hematologic  ROS     Musculoskeletal: Comment: sciatica      GI/Hepatic: Comment: Hepatocellular Carcinoma s/p ablation of liver tumor (12/6/2022)  Gallbladder Calculus s/p colectomy 12/6/2022  RIOS (nonalcoholic steatohepatitis)   Liver lesion   Liver cirrhosis secondary to RIOS   Portal hypertension (H)         (+)          cholecystitis/cholelithiasis,   liver disease,       Renal/Genitourinary:  - neg Renal ROS     Endo:     (+)  type II DM, Last HgA1c: 7.0, date: 6/2023, Not using insulin, - not using insulin pump. Normal glucose range: 140-160 , not previously admitted for DM/DKA.       Obesity,       Psychiatric/Substance Use:     (+) psychiatric history anxiety    (-) alcohol abuse history   Infectious Disease: Comment: + covid ( 1/5/2024)  Asymptomatic currently - neg infectious disease ROS     Malignancy:   (+) Malignancy, History of Other and Lung.  Lung CA Remission status post Surgery.  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    Other: Comment: Chest wall mass            Physical Exam    Airway  airway exam normal      Mallampati: I   TM distance: > 3 FB   Neck ROM: full   Mouth opening: > 3 cm    Respiratory Devices and Support         Dental       (+) Minor Abnormalities - some fillings, tiny chips      Cardiovascular   cardiovascular exam normal       Rhythm and rate: regular and normal     Pulmonary   pulmonary exam  "normal                OUTSIDE LABS:  CBC:   Lab Results   Component Value Date    WBC 7.2 12/14/2023    WBC 6.2 11/17/2023    HGB 16.5 12/14/2023    HGB 15.5 11/17/2023    HCT 48.2 12/14/2023    HCT 46.4 11/17/2023     12/14/2023     11/17/2023     BMP:   Lab Results   Component Value Date     12/14/2023     11/17/2023    POTASSIUM 3.8 12/14/2023    POTASSIUM 3.9 11/17/2023    CHLORIDE 103 12/14/2023    CHLORIDE 103 11/17/2023    CO2 29 12/14/2023    CO2 28 11/17/2023    BUN 16.9 12/14/2023    BUN 12.5 11/17/2023    CR 0.81 12/14/2023    CR 0.81 11/17/2023     (H) 12/27/2023     (H) 12/27/2023     COAGS:   Lab Results   Component Value Date    INR 1.25 (H) 12/14/2023     POC: No results found for: \"BGM\", \"HCG\", \"HCGS\"  HEPATIC:   Lab Results   Component Value Date    ALBUMIN 4.5 12/14/2023    PROTTOTAL 7.7 12/14/2023    ALT 34 12/14/2023    AST 29 12/14/2023    ALKPHOS 107 12/14/2023    BILITOTAL 1.9 (H) 12/14/2023     OTHER:   Lab Results   Component Value Date    A1C 7.0 (H) 06/20/2023    CHUCK 10.0 12/14/2023    PHOS 2.8 06/20/2023    TSH 2.37 06/20/2023       Anesthesia Plan    ASA Status:  3    NPO Status:  NPO Appropriate    Anesthesia Type: MAC.              Consents    Anesthesia Plan(s) and associated risks, benefits, and realistic alternatives discussed. Questions answered and patient/representative(s) expressed understanding.     - Discussed: Risks, Benefits and Alternatives for BOTH SEDATION and the PROCEDURE were discussed     - Discussed with:  Patient, Parent (Mother and/or Father)      - Extended Intubation/Ventilatory Support Discussed: Yes.      Use of blood products discussed: Yes.     - Discussed with: Patient.     - Consented: consented to blood products     Postoperative Care    Pain management: IV analgesics.   PONV prophylaxis: Ondansetron (or other 5HT-3), Aprepitant     Comments:               Corrie Corrales MD    I have reviewed the pertinent notes and " "labs in the chart from the past 30 days and (re)examined the patient.  Any updates or changes from those notes are reflected in this note.              # Overweight: Estimated body mass index is 27.17 kg/m  as calculated from the following:    Height as of 1/29/24: 1.877 m (6' 1.9\").    Weight as of 1/29/24: 95.7 kg (211 lb).      "

## 2024-02-01 ENCOUNTER — HOSPITAL ENCOUNTER (OUTPATIENT)
Facility: CLINIC | Age: 49
Discharge: HOME | End: 2024-02-01
Attending: THORACIC SURGERY (CARDIOTHORACIC VASCULAR SURGERY) | Admitting: THORACIC SURGERY (CARDIOTHORACIC VASCULAR SURGERY)
Payer: COMMERCIAL

## 2024-02-01 ENCOUNTER — APPOINTMENT (OUTPATIENT)
Dept: GENERAL RADIOLOGY | Facility: CLINIC | Age: 49
End: 2024-02-01
Payer: COMMERCIAL

## 2024-02-01 ENCOUNTER — ANESTHESIA (OUTPATIENT)
Dept: SURGERY | Facility: CLINIC | Age: 49
End: 2024-02-01
Payer: COMMERCIAL

## 2024-02-01 VITALS
SYSTOLIC BLOOD PRESSURE: 125 MMHG | RESPIRATION RATE: 16 BRPM | HEIGHT: 74 IN | HEART RATE: 80 BPM | TEMPERATURE: 98.5 F | BODY MASS INDEX: 26.91 KG/M2 | DIASTOLIC BLOOD PRESSURE: 80 MMHG | OXYGEN SATURATION: 96 % | WEIGHT: 209.66 LBS

## 2024-02-01 DIAGNOSIS — R22.2 MASS OF CHEST WALL, RIGHT: Primary | ICD-10-CM

## 2024-02-01 PROCEDURE — 272N000001 HC OR GENERAL SUPPLY STERILE: Performed by: THORACIC SURGERY (CARDIOTHORACIC VASCULAR SURGERY)

## 2024-02-01 PROCEDURE — 250N000009 HC RX 250: Performed by: THORACIC SURGERY (CARDIOTHORACIC VASCULAR SURGERY)

## 2024-02-01 PROCEDURE — 71045 X-RAY EXAM CHEST 1 VIEW: CPT | Mod: 26 | Performed by: RADIOLOGY

## 2024-02-01 PROCEDURE — 250N000011 HC RX IP 250 OP 636: Performed by: STUDENT IN AN ORGANIZED HEALTH CARE EDUCATION/TRAINING PROGRAM

## 2024-02-01 PROCEDURE — 999N000065 XR CHEST PORT 1 VIEW

## 2024-02-01 PROCEDURE — 250N000011 HC RX IP 250 OP 636: Performed by: THORACIC SURGERY (CARDIOTHORACIC VASCULAR SURGERY)

## 2024-02-01 PROCEDURE — 88305 TISSUE EXAM BY PATHOLOGIST: CPT | Mod: 26 | Performed by: PATHOLOGY

## 2024-02-01 PROCEDURE — 250N000013 HC RX MED GY IP 250 OP 250 PS 637

## 2024-02-01 PROCEDURE — 88304 TISSUE EXAM BY PATHOLOGIST: CPT | Mod: TC | Performed by: THORACIC SURGERY (CARDIOTHORACIC VASCULAR SURGERY)

## 2024-02-01 PROCEDURE — 82962 GLUCOSE BLOOD TEST: CPT

## 2024-02-01 PROCEDURE — 250N000013 HC RX MED GY IP 250 OP 250 PS 637: Performed by: STUDENT IN AN ORGANIZED HEALTH CARE EDUCATION/TRAINING PROGRAM

## 2024-02-01 PROCEDURE — 370N000017 HC ANESTHESIA TECHNICAL FEE, PER MIN: Performed by: THORACIC SURGERY (CARDIOTHORACIC VASCULAR SURGERY)

## 2024-02-01 PROCEDURE — 999N000141 HC STATISTIC PRE-PROCEDURE NURSING ASSESSMENT: Performed by: THORACIC SURGERY (CARDIOTHORACIC VASCULAR SURGERY)

## 2024-02-01 PROCEDURE — 360N000078 HC SURGERY LEVEL 5, PER MIN: Performed by: THORACIC SURGERY (CARDIOTHORACIC VASCULAR SURGERY)

## 2024-02-01 PROCEDURE — 258N000003 HC RX IP 258 OP 636: Performed by: NURSE ANESTHETIST, CERTIFIED REGISTERED

## 2024-02-01 PROCEDURE — 710N000010 HC RECOVERY PHASE 1, LEVEL 2, PER MIN: Performed by: THORACIC SURGERY (CARDIOTHORACIC VASCULAR SURGERY)

## 2024-02-01 PROCEDURE — 88331 PATH CONSLTJ SURG 1 BLK 1SPC: CPT | Mod: TC | Performed by: THORACIC SURGERY (CARDIOTHORACIC VASCULAR SURGERY)

## 2024-02-01 PROCEDURE — 250N000011 HC RX IP 250 OP 636: Performed by: NURSE ANESTHETIST, CERTIFIED REGISTERED

## 2024-02-01 PROCEDURE — 88304 TISSUE EXAM BY PATHOLOGIST: CPT | Mod: 26 | Performed by: PATHOLOGY

## 2024-02-01 PROCEDURE — 710N000012 HC RECOVERY PHASE 2, PER MINUTE: Performed by: THORACIC SURGERY (CARDIOTHORACIC VASCULAR SURGERY)

## 2024-02-01 PROCEDURE — 258N000003 HC RX IP 258 OP 636: Performed by: STUDENT IN AN ORGANIZED HEALTH CARE EDUCATION/TRAINING PROGRAM

## 2024-02-01 PROCEDURE — 21550 BIOPSY OF NECK/CHEST: CPT | Mod: GC | Performed by: THORACIC SURGERY (CARDIOTHORACIC VASCULAR SURGERY)

## 2024-02-01 PROCEDURE — 250N000009 HC RX 250: Performed by: NURSE ANESTHETIST, CERTIFIED REGISTERED

## 2024-02-01 PROCEDURE — 88331 PATH CONSLTJ SURG 1 BLK 1SPC: CPT | Mod: 26 | Performed by: STUDENT IN AN ORGANIZED HEALTH CARE EDUCATION/TRAINING PROGRAM

## 2024-02-01 RX ORDER — OXYCODONE HYDROCHLORIDE 5 MG/1
5-10 TABLET ORAL EVERY 4 HOURS PRN
Qty: 15 TABLET | Refills: 0 | Status: ON HOLD | OUTPATIENT
Start: 2024-02-01 | End: 2024-06-28

## 2024-02-01 RX ORDER — SODIUM CHLORIDE, SODIUM LACTATE, POTASSIUM CHLORIDE, CALCIUM CHLORIDE 600; 310; 30; 20 MG/100ML; MG/100ML; MG/100ML; MG/100ML
INJECTION, SOLUTION INTRAVENOUS CONTINUOUS
Status: DISCONTINUED | OUTPATIENT
Start: 2024-02-01 | End: 2024-02-01 | Stop reason: HOSPADM

## 2024-02-01 RX ORDER — ACETAMINOPHEN 325 MG/1
650 TABLET ORAL
Status: DISCONTINUED | OUTPATIENT
Start: 2024-02-01 | End: 2024-02-01 | Stop reason: HOSPADM

## 2024-02-01 RX ORDER — SODIUM CHLORIDE, SODIUM LACTATE, POTASSIUM CHLORIDE, CALCIUM CHLORIDE 600; 310; 30; 20 MG/100ML; MG/100ML; MG/100ML; MG/100ML
INJECTION, SOLUTION INTRAVENOUS CONTINUOUS PRN
Status: DISCONTINUED | OUTPATIENT
Start: 2024-02-01 | End: 2024-02-01

## 2024-02-01 RX ORDER — ENOXAPARIN SODIUM 100 MG/ML
40 INJECTION SUBCUTANEOUS
Status: COMPLETED | OUTPATIENT
Start: 2024-02-01 | End: 2024-02-01

## 2024-02-01 RX ORDER — PROPOFOL 10 MG/ML
INJECTION, EMULSION INTRAVENOUS CONTINUOUS PRN
Status: DISCONTINUED | OUTPATIENT
Start: 2024-02-01 | End: 2024-02-01

## 2024-02-01 RX ORDER — CEFAZOLIN SODIUM/WATER 2 G/20 ML
2 SYRINGE (ML) INTRAVENOUS
Status: DISCONTINUED | OUTPATIENT
Start: 2024-02-01 | End: 2024-02-01 | Stop reason: HOSPADM

## 2024-02-01 RX ORDER — FENTANYL CITRATE 50 UG/ML
INJECTION, SOLUTION INTRAMUSCULAR; INTRAVENOUS PRN
Status: DISCONTINUED | OUTPATIENT
Start: 2024-02-01 | End: 2024-02-01

## 2024-02-01 RX ORDER — ACETAMINOPHEN 325 MG/1
975 TABLET ORAL ONCE
Status: COMPLETED | OUTPATIENT
Start: 2024-02-01 | End: 2024-02-01

## 2024-02-01 RX ORDER — ONDANSETRON 4 MG/1
4 TABLET, ORALLY DISINTEGRATING ORAL EVERY 30 MIN PRN
Status: DISCONTINUED | OUTPATIENT
Start: 2024-02-01 | End: 2024-02-01 | Stop reason: HOSPADM

## 2024-02-01 RX ORDER — FENTANYL CITRATE 50 UG/ML
50 INJECTION, SOLUTION INTRAMUSCULAR; INTRAVENOUS EVERY 5 MIN PRN
Status: DISCONTINUED | OUTPATIENT
Start: 2024-02-01 | End: 2024-02-01 | Stop reason: HOSPADM

## 2024-02-01 RX ORDER — HYDROMORPHONE HCL IN WATER/PF 6 MG/30 ML
0.4 PATIENT CONTROLLED ANALGESIA SYRINGE INTRAVENOUS EVERY 5 MIN PRN
Status: DISCONTINUED | OUTPATIENT
Start: 2024-02-01 | End: 2024-02-01 | Stop reason: HOSPADM

## 2024-02-01 RX ORDER — CEFAZOLIN SODIUM/WATER 2 G/20 ML
2 SYRINGE (ML) INTRAVENOUS SEE ADMIN INSTRUCTIONS
Status: DISCONTINUED | OUTPATIENT
Start: 2024-02-01 | End: 2024-02-01 | Stop reason: HOSPADM

## 2024-02-01 RX ORDER — OXYCODONE HYDROCHLORIDE 10 MG/1
10 TABLET ORAL
Status: DISCONTINUED | OUTPATIENT
Start: 2024-02-01 | End: 2024-02-01 | Stop reason: HOSPADM

## 2024-02-01 RX ORDER — HYDROMORPHONE HCL IN WATER/PF 6 MG/30 ML
0.2 PATIENT CONTROLLED ANALGESIA SYRINGE INTRAVENOUS EVERY 5 MIN PRN
Status: DISCONTINUED | OUTPATIENT
Start: 2024-02-01 | End: 2024-02-01 | Stop reason: HOSPADM

## 2024-02-01 RX ORDER — LIDOCAINE 40 MG/G
CREAM TOPICAL
Status: DISCONTINUED | OUTPATIENT
Start: 2024-02-01 | End: 2024-02-01 | Stop reason: HOSPADM

## 2024-02-01 RX ORDER — DEXAMETHASONE SODIUM PHOSPHATE 4 MG/ML
INJECTION, SOLUTION INTRA-ARTICULAR; INTRALESIONAL; INTRAMUSCULAR; INTRAVENOUS; SOFT TISSUE PRN
Status: DISCONTINUED | OUTPATIENT
Start: 2024-02-01 | End: 2024-02-01

## 2024-02-01 RX ORDER — ONDANSETRON 2 MG/ML
INJECTION INTRAMUSCULAR; INTRAVENOUS PRN
Status: DISCONTINUED | OUTPATIENT
Start: 2024-02-01 | End: 2024-02-01

## 2024-02-01 RX ORDER — SODIUM CHLORIDE, SODIUM GLUCONATE, SODIUM ACETATE, POTASSIUM CHLORIDE AND MAGNESIUM CHLORIDE 526; 502; 368; 37; 30 MG/100ML; MG/100ML; MG/100ML; MG/100ML; MG/100ML
INJECTION, SOLUTION INTRAVENOUS CONTINUOUS PRN
Status: DISCONTINUED | OUTPATIENT
Start: 2024-02-01 | End: 2024-02-01

## 2024-02-01 RX ORDER — BUPIVACAINE HYDROCHLORIDE AND EPINEPHRINE 2.5; 5 MG/ML; UG/ML
INJECTION, SOLUTION EPIDURAL; INFILTRATION; INTRACAUDAL; PERINEURAL PRN
Status: DISCONTINUED | OUTPATIENT
Start: 2024-02-01 | End: 2024-02-01 | Stop reason: HOSPADM

## 2024-02-01 RX ORDER — ACETAMINOPHEN 325 MG/1
650 TABLET ORAL EVERY 4 HOURS PRN
Qty: 50 TABLET | Refills: 0 | Status: ON HOLD | OUTPATIENT
Start: 2024-02-01 | End: 2024-06-26

## 2024-02-01 RX ORDER — OXYCODONE HYDROCHLORIDE 5 MG/1
5 TABLET ORAL
Status: COMPLETED | OUTPATIENT
Start: 2024-02-01 | End: 2024-02-01

## 2024-02-01 RX ORDER — ONDANSETRON 2 MG/ML
4 INJECTION INTRAMUSCULAR; INTRAVENOUS EVERY 30 MIN PRN
Status: DISCONTINUED | OUTPATIENT
Start: 2024-02-01 | End: 2024-02-01 | Stop reason: HOSPADM

## 2024-02-01 RX ORDER — FENTANYL CITRATE 50 UG/ML
25 INJECTION, SOLUTION INTRAMUSCULAR; INTRAVENOUS EVERY 5 MIN PRN
Status: DISCONTINUED | OUTPATIENT
Start: 2024-02-01 | End: 2024-02-01 | Stop reason: HOSPADM

## 2024-02-01 RX ADMIN — SODIUM CHLORIDE, SODIUM GLUCONATE, SODIUM ACETATE, POTASSIUM CHLORIDE AND MAGNESIUM CHLORIDE: 526; 502; 368; 37; 30 INJECTION, SOLUTION INTRAVENOUS at 14:54

## 2024-02-01 RX ADMIN — FENTANYL CITRATE 25 MCG: 50 INJECTION INTRAMUSCULAR; INTRAVENOUS at 15:08

## 2024-02-01 RX ADMIN — DEXMEDETOMIDINE HYDROCHLORIDE 8 MCG: 100 INJECTION, SOLUTION INTRAVENOUS at 15:40

## 2024-02-01 RX ADMIN — ACETAMINOPHEN 975 MG: 325 TABLET, FILM COATED ORAL at 12:43

## 2024-02-01 RX ADMIN — ENOXAPARIN SODIUM 40 MG: 40 INJECTION SUBCUTANEOUS at 12:41

## 2024-02-01 RX ADMIN — Medication 2 G: at 15:13

## 2024-02-01 RX ADMIN — PROPOFOL 100 MCG/KG/MIN: 10 INJECTION, EMULSION INTRAVENOUS at 15:00

## 2024-02-01 RX ADMIN — OXYCODONE HYDROCHLORIDE 5 MG: 5 TABLET ORAL at 17:23

## 2024-02-01 RX ADMIN — DEXAMETHASONE SODIUM PHOSPHATE 4 MG: 4 INJECTION, SOLUTION INTRA-ARTICULAR; INTRALESIONAL; INTRAMUSCULAR; INTRAVENOUS; SOFT TISSUE at 14:54

## 2024-02-01 RX ADMIN — FENTANYL CITRATE 25 MCG: 50 INJECTION INTRAMUSCULAR; INTRAVENOUS at 15:04

## 2024-02-01 RX ADMIN — PHENYLEPHRINE HYDROCHLORIDE 100 MCG: 10 INJECTION INTRAVENOUS at 15:35

## 2024-02-01 RX ADMIN — FENTANYL CITRATE 50 MCG: 50 INJECTION INTRAMUSCULAR; INTRAVENOUS at 15:45

## 2024-02-01 RX ADMIN — FOSAPREPITANT 150 MG: 150 INJECTION, POWDER, LYOPHILIZED, FOR SOLUTION INTRAVENOUS at 15:27

## 2024-02-01 RX ADMIN — PHENYLEPHRINE HYDROCHLORIDE 100 MCG: 10 INJECTION INTRAVENOUS at 15:49

## 2024-02-01 RX ADMIN — FENTANYL CITRATE 50 MCG: 50 INJECTION INTRAMUSCULAR; INTRAVENOUS at 15:44

## 2024-02-01 RX ADMIN — DEXMEDETOMIDINE HYDROCHLORIDE 8 MCG: 100 INJECTION, SOLUTION INTRAVENOUS at 15:11

## 2024-02-01 RX ADMIN — FENTANYL CITRATE 50 MCG: 50 INJECTION INTRAMUSCULAR; INTRAVENOUS at 15:20

## 2024-02-01 RX ADMIN — DEXMEDETOMIDINE HYDROCHLORIDE 12 MCG: 100 INJECTION, SOLUTION INTRAVENOUS at 15:21

## 2024-02-01 RX ADMIN — ACETAMINOPHEN 650 MG: 325 TABLET, FILM COATED ORAL at 18:10

## 2024-02-01 RX ADMIN — ONDANSETRON 4 MG: 2 INJECTION INTRAMUSCULAR; INTRAVENOUS at 14:54

## 2024-02-01 RX ADMIN — MIDAZOLAM 2 MG: 1 INJECTION INTRAMUSCULAR; INTRAVENOUS at 14:54

## 2024-02-01 RX ADMIN — PHENYLEPHRINE HYDROCHLORIDE 100 MCG: 10 INJECTION INTRAVENOUS at 15:58

## 2024-02-01 RX ADMIN — PHENYLEPHRINE HYDROCHLORIDE 50 MCG: 10 INJECTION INTRAVENOUS at 15:31

## 2024-02-01 RX ADMIN — PHENYLEPHRINE HYDROCHLORIDE 50 MCG: 10 INJECTION INTRAVENOUS at 15:25

## 2024-02-01 ASSESSMENT — ACTIVITIES OF DAILY LIVING (ADL)
ADLS_ACUITY_SCORE: 35

## 2024-02-01 NOTE — BRIEF OP NOTE
[facility]    Brief Operative Note    Pre-operative diagnosis: Chest wall mass [R22.2]  Post-operative diagnosis Same as pre-operative diagnosis    Procedure: Excision chest wall mass, Right - Chest    Surgeon: Surgeon(s) and Role:     * Matheus Haas MD - Primary     * Fifi Salinas MD - Resident - Assisting     * Gavin Jacinto MD - Fellow - Assisting  Anesthesia: MAC   Estimated Blood Loss: Minimal    Drains: None  Specimens:   ID Type Source Tests Collected by Time Destination   1 : Right  Chest wall mass Tissue Chest SURGICAL PATHOLOGY EXAM Matheus Haas MD 2/1/2024  3:34 PM    2 : Right Chest wall Nodule Tissue Chest SURGICAL PATHOLOGY EXAM Matheus Haas MD 2/1/2024  3:49 PM      Findings:   Small, smooth mass excised, sent for permanent .  Complications: None.  Implants: * No implants in log *      CXR in PACU  Okay to discharge     Fifi Salinas MD  Surgery PGY-3

## 2024-02-01 NOTE — DISCHARGE INSTRUCTIONS
Contacting your Doctor -   To contact a doctor, call Dr Haas's clinic at [phone]  or:  [phone] and ask for the resident on call for Thoracic surgery (answered 24 hours a day)   Emergency Department:  [facility]: [phone]  [facility]: [phone] 911 if you are in need of immediate or emergent help

## 2024-02-01 NOTE — ANESTHESIA POSTPROCEDURE EVALUATION
Patient: West Van    Procedure: Procedure(s):  Excision chest wall mass       Anesthesia Type:  MAC    Note:  Disposition: Outpatient   Postop Pain Control: Uneventful            Sign Out: Well controlled pain   PONV: No   Neuro/Psych: Uneventful            Sign Out: Acceptable/Baseline neuro status   Airway/Respiratory: Uneventful            Sign Out: Acceptable/Baseline resp. status   CV/Hemodynamics: Uneventful            Sign Out: Acceptable CV status; No obvious hypovolemia; No obvious fluid overload   Other NRE: NONE   DID A NON-ROUTINE EVENT OCCUR? No           Last vitals:  Vitals Value Taken Time   /70 02/01/24 1720   Temp 36.5  C (97.7  F) 02/01/24 1715   Pulse 92 02/01/24 1725   Resp 16 02/01/24 1715   SpO2 95 % 02/01/24 1725   Vitals shown include unfiled device data.    Electronically Signed By: Ang Tristan MD  February 1, 2024  5:26 PM

## 2024-02-01 NOTE — ANESTHESIA CARE TRANSFER NOTE
Patient: West Van    Procedure: Procedure(s):  Excision chest wall mass       Diagnosis: Chest wall mass [R22.2]  Diagnosis Additional Information: No value filed.    Anesthesia Type:   MAC     Note:    Oropharynx: spontaneously breathing and oropharynx clear of all foreign objects  Level of Consciousness: drowsy  Oxygen Supplementation: face mask  Level of Supplemental Oxygen (L/min / FiO2): 6  Independent Airway: airway patency satisfactory and stable  Dentition: dentition unchanged  Vital Signs Stable: post-procedure vital signs reviewed and stable  Report to RN Given: handoff report given  Patient transferred to: PACU    Handoff Report: Identifed the Patient, Identified the Reponsible Provider, Reviewed the pertinent medical history, Discussed the surgical course, Reviewed Intra-OP anesthesia mangement and issues during anesthesia, Set expectations for post-procedure period and Allowed opportunity for questions and acknowledgement of understanding      Vitals:  Vitals Value Taken Time   BP 97/56 02/01/24 1622   Temp     Pulse 84 02/01/24 1625   Resp     SpO2 98 % 02/01/24 1625   Vitals shown include unfiled device data.    Electronically Signed By: VALENTINA Brasher CRNA  February 1, 2024  4:26 PM

## 2024-02-01 NOTE — OP NOTE
Preoperative diagnosis:                         Chest wall mass  Postoperative diagnosis:                       As above    Procedure:   Right excision of chest wall mass    Anesthesia: General   Surgeon: Matheus Haas   Resident surgeon: Gavin Jacitno MD; Fifi Salinas MD  EBL:   1 ml     Complications: none immediate     Description of procedure    The patient was brought to the room and placed supine upon the table.  After confirming the patient's identity and verifying the consent, appropriate monitoring devices were placed as well as SCD boots.  Moderate sedation was administered. Intravenous Ancef was administered within one hour prior to the incision.  The patient was secured to the table while remaining supine.  The right chest and abdomen was prepped with chlorhexidine and draped in standard surgical fashion. We localized the area using landmarks and infiltrated with local. An incision was made and carried down to the 6th intercostal space. The space was opened and a flat nodule was located. This was passed off for permanent pathologic analysis. Hemostasis was noted and the area was irrigated. We closed in layers with 0 vicryl, 2-0 vicryl and 4-0 monocryl. The area was cleaned and dried and benzoin and steri-strips applied.

## 2024-02-02 ENCOUNTER — PATIENT OUTREACH (OUTPATIENT)
Dept: SURGERY | Facility: CLINIC | Age: 49
End: 2024-02-02
Payer: COMMERCIAL

## 2024-02-02 LAB
GLUCOSE BLDC GLUCOMTR-MCNC: 147 MG/DL (ref 70–99)
GLUCOSE BLDC GLUCOMTR-MCNC: 155 MG/DL (ref 70–99)
GLUCOSE BLDC GLUCOMTR-MCNC: 178 MG/DL (ref 70–99)
GLUCOSE BLDC GLUCOMTR-MCNC: 187 MG/DL (ref 70–99)

## 2024-02-02 NOTE — PROGRESS NOTES
Post Op Discharge Call    Surgery: Right excision of chest wall mass     Surgery date: 2/1/24    Discharge Date:  2/1/24    Immediate Concerns:  pain- what to expect    Pain:  Using pain medications as recommended with appropriate relief.   Discussed use of ice and heat, recommended alternating between the two and using 20 mins on and 20 mins off.     Incision:   No concerns, healing well, no redness, drainage or edema reported.     Drains:   No drain.     Diet:   Regular diet     Bowels:   Patient reports he has NOT had bowel movement post op. Encouraged West to take Miralax if he is going to take Oxycodone as we do not want him to be constipated.    Activity:   No difficulty with ADLs reported.   Patient is up independently at home.   Encourage patient to continue to stay active.     Post op/follow up plans:   Post op appointment scheduled,confirmed date and time with patient.       Future Appointments   Date Time Provider Department Center   3/11/2024  3:40 PM [id number]   3/11/2024  4:30 PM Jackelyn Pratt APRN [facility]   5/7/2024  2:00 PM Matheus Haas MD [facility]   7/1/2024  7:00 AM [facility]   7/1/2024  8:00 AM Leventhal, Thomas Michael, MD [facility]     He would like to know if there was anything concerning found on his PET Scan from 1/29/24. Will send a message to Dr. Haas and call him next week.    Patient has our direct number for any questions or concerns that may arise.      JAZMINE Allen  Thoracic Surgery

## 2024-02-06 LAB
PATH REPORT.COMMENTS IMP SPEC: NORMAL
PATH REPORT.COMMENTS IMP SPEC: NORMAL
PATH REPORT.FINAL DX SPEC: NORMAL
PATH REPORT.GROSS SPEC: NORMAL
PATH REPORT.INTRAOP OBS SPEC DOC: NORMAL
PATH REPORT.MICROSCOPIC SPEC OTHER STN: NORMAL
PATH REPORT.RELEVANT HX SPEC: NORMAL
PHOTO IMAGE: NORMAL

## 2024-02-16 ENCOUNTER — DOCUMENTATION ONLY (OUTPATIENT)
Dept: TRANSPLANT | Facility: CLINIC | Age: 49
End: 2024-02-16
Payer: COMMERCIAL

## 2024-02-16 NOTE — PROGRESS NOTES
[facility] LIVER TUMOR BOARD NOTE     DATE OF TUMOR BOARD: February 16, 2024      SCAN REVIEWED: Reviewed by Dr. Deion Dias    Discussion:  -Lesion in segment 4a, well treated, no residual disease.  -2/12/14 MRI showed a new 1.6 cm arterially enhancing lesion in segment 8, no washout, no capsule, LR-3.  -Within [place]    Plan:  -Continue with 3 month surveillance imaging.

## 2024-02-19 DIAGNOSIS — K74.60 LIVER CIRRHOSIS SECONDARY TO NONALCOHOLIC STEATOHEPATITIS (NASH) (H): ICD-10-CM

## 2024-02-19 DIAGNOSIS — C7A.8 PRIMARY MALIGNANT NEUROENDOCRINE TUMOR OF LUNG (H): ICD-10-CM

## 2024-02-19 DIAGNOSIS — C22.0 HEPATOCELLULAR CARCINOMA (H): Primary | ICD-10-CM

## 2024-02-19 DIAGNOSIS — K75.81 LIVER CIRRHOSIS SECONDARY TO NONALCOHOLIC STEATOHEPATITIS (NASH) (H): ICD-10-CM

## 2024-03-07 ENCOUNTER — TELEPHONE (OUTPATIENT)
Dept: TRANSPLANT | Facility: CLINIC | Age: 49
End: 2024-03-07
Payer: COMMERCIAL

## 2024-03-07 NOTE — TELEPHONE ENCOUNTER
Transplant Social Work Services Phone Call    Data: West is listed for liver transplant   Intervention:   [facility]  - Miroslava ([phone]). I received a call from Miroslava inquiring about caregiver support as patient is worried about his support. I called Miroslvaa and provided education on what we ask of patients and limited resources in the community. I will reach out to patient re: his plan.   [facility]  - Sulema ([phone]). I received a VM from Sulema indicating that she spoke with Miroslava and indicated that patient would appreciate a call re: this. She is planning to call patient today and will let him know that I will reach out to him as well.   I called West to provide information and discuss his caregiver plan. West indicated that his mother still feels comfortable providing care for him. He was a little worried about wound care if he needs that extensively. I reviewed that woundcare does happen, but somewhat rare post transplant. I provided information that we could look into things such as home care, clinic etc to bridge some further assistance if she is not able to provide this level of care. He does have a limited support network. He inquired about his transplant and if he were to get the exception points, if he should go on hold d/t other friend dynamics at home. I reported that it would be a person decision. I asked him to connect with Dillon re: this, but reported that in general our team would say the sooner the transplant the better. He voiced understanding.    I called Sulema and left a VM with information above.     Assessment: West continues to have an adequate caregiver plan post transplant. His mother is elderly, and may not be able to assist with this such as wound care if this is needed extensively.   Education provided by SW: Caregiver and resources  Plan: West knows how to reach out to me if needed.      Twyla Ko, MSW, [facility]  Liver Transplant   [facility]  [place]  Phone: [phone]

## 2024-03-10 ENCOUNTER — HEALTH MAINTENANCE LETTER (OUTPATIENT)
Age: 49
End: 2024-03-10

## 2024-03-11 ENCOUNTER — ANCILLARY PROCEDURE (OUTPATIENT)
Dept: GENERAL RADIOLOGY | Facility: CLINIC | Age: 49
End: 2024-03-11
Attending: THORACIC SURGERY (CARDIOTHORACIC VASCULAR SURGERY)
Payer: COMMERCIAL

## 2024-03-11 ENCOUNTER — ONCOLOGY VISIT (OUTPATIENT)
Dept: SURGERY | Facility: CLINIC | Age: 49
End: 2024-03-11
Payer: COMMERCIAL

## 2024-03-11 VITALS
DIASTOLIC BLOOD PRESSURE: 71 MMHG | TEMPERATURE: 98 F | WEIGHT: 206.1 LBS | SYSTOLIC BLOOD PRESSURE: 114 MMHG | HEART RATE: 95 BPM | RESPIRATION RATE: 16 BRPM | OXYGEN SATURATION: 100 % | BODY MASS INDEX: 26.46 KG/M2

## 2024-03-11 DIAGNOSIS — Z90.2 STATUS POST LOBECTOMY OF LUNG: ICD-10-CM

## 2024-03-11 DIAGNOSIS — R91.8 PULMONARY NODULES: ICD-10-CM

## 2024-03-11 DIAGNOSIS — R22.2 MASS OF CHEST WALL, RIGHT: Primary | ICD-10-CM

## 2024-03-11 PROCEDURE — 99213 OFFICE O/P EST LOW 20 MIN: CPT | Performed by: CLINICAL NURSE SPECIALIST

## 2024-03-11 PROCEDURE — 71046 X-RAY EXAM CHEST 2 VIEWS: CPT | Mod: GC | Performed by: STUDENT IN AN ORGANIZED HEALTH CARE EDUCATION/TRAINING PROGRAM

## 2024-03-11 RX ORDER — SEMAGLUTIDE 0.68 MG/ML
0.5 INJECTION, SOLUTION SUBCUTANEOUS
Status: ON HOLD | COMMUNITY
Start: 2024-02-26 | End: 2024-07-05

## 2024-03-11 ASSESSMENT — PAIN SCALES - GENERAL: PAINLEVEL: MODERATE PAIN (4)

## 2024-03-11 NOTE — PROGRESS NOTES
THORACIC SURGERY FOLLOW UP VISIT    I saw Mr. Van in follow-up today. The clinical summary follows:     PREOP DIAGNOSIS   Chest wall mass  PROCEDURE   Excision of right chest wall mass    DATE OF PROCEDURE  02/01/2024    HISTOPATHOLOGY   A.  Soft tissue, right chest wall mass, excision:  - Mature adipose tissue, consistent with lipoma     B.  Soft tissue, right chest wall nodule, excision:  - 1 benign lymph node    COMPLICATIONS  None    INTERVAL STUDIES  CXR 03/11/2024: Postoperative changes of right lower lobectomy with mild  associated volume loss, streaky opacity in the right lower zone favor  atelectasis.    Past Medical History:   Diagnosis Date    Benign essential HTN 04/13/2016    Diabetes (H)     Kidney stone     Liver cancer (H) 10/04/2022    Liver cirrhosis secondary to nonalcoholic steatohepatitis (RIOS) (H)     RIOS (nonalcoholic steatohepatitis) 06/02/2023    Neuroendocrine carcinoma of lung (H)     right lung, lobectomy 3/2023    PONV (postoperative nausea and vomiting)       Past Surgical History:   Procedure Laterality Date    BRONCHOSCOPY, WITH BIOPSY, ROBOT ASSISTED N/A 1/17/2023    Procedure: BRONCHOSCOPY, USING OPTICAL TRACKING SYSTEM, ion;  Surgeon: Ani Guillaume MD;  Location: [facility] OR    DAVINCI LOBECTOMY LUNG Right 3/8/2023    Procedure: Robot-assisted right thoracoscopic lower lobectomy, mediastinal lymph node dissection, intercostal nerve cryoablation ;  Surgeon: Matheus Haas MD;  Location: [facility] OR    ENDOBRONCHIAL ULTRASOUND FLEXIBLE N/A 1/17/2023    Procedure: endobronchial  ultrasound and transbronchial biopsies;  Surgeon: Ani Guillaume MD;  Location: [facility]    EXCISE TUMOR CHEST WALL Right 2/1/2024    Procedure: Excision chest wall mass;  Surgeon: Matheus Haas MD;  Location: [facility]    LAPAROSCOPIC ABLATION LIVER TUMOR N/A 12/6/2022    Procedure: Diagnostic Laparoscopy, Hepatic Ultrasound, Laparoscopic ultrasound guided microwave ablation of liver tumor x1;  Surgeon: Ezra  Armando Rosas MD;  Location: [facility]    LAPAROSCOPIC BIOPSY LIVER N/A 12/6/2022    Procedure: Laparoscopic Ultrasound Guided liver biopsy;  Surgeon: Armando Munguia MD;  Location: [facility]    LAPAROSCOPIC CHOLECYSTECTOMY N/A 12/6/2022    Procedure: Laparoscopic cholecystectomy;  Surgeon: Armando Munguia MD;  Location: [facility] OR      Social History     Socioeconomic History    Marital status: Single     Spouse name: Not on file    Number of children: Not on file    Years of education: Not on file    Highest education level: Not on file   Occupational History    Not on file   Tobacco Use    Smoking status: Never     Passive exposure: Past    Smokeless tobacco: Never   Vaping Use    Vaping Use: Never used   Substance and Sexual Activity    Alcohol use: Not Currently     Comment: Last ETOH 1999    Drug use: Never    Sexual activity: Not on file   Other Topics Concern    Not on file   Social History Narrative    Not on file     Social Determinants of Health     Financial Resource Strain: Not on file   Food Insecurity: Not on file   Transportation Needs: Not on file   Physical Activity: Not on file   Stress: Not on file   Social Connections: Not on file   Interpersonal Safety: Not on file   Housing Stability: Not on file      SUBJECTIVE   West is still having pain and swelling around the incision site and also includes his right breast and nipple. He has been taking Tylenol three times a day but this doesn't help. He has some oxycodone at home but has not tried that. He has not used heat or ice to the area. He describes the pain as burning and sharp.    OBJECTIVE  /71 (BP Location: Right arm, Patient Position: Sitting, Cuff Size: Adult Regular)   Pulse 95   Temp 98  F (36.7  C) (Oral)   Resp 16   Wt 93.5 kg (206 lb 1.6 oz)   SpO2 100%   BMI 26.46 kg/m       From a personal perspective, he is here alone today.    IMPRESSION  48 year-old male status post excision of a right chest wall mass. He is here today for post  operative follow up.     We reviewed his pathology which was benign.     PLAN  I spent 25 min on the date of the encounter in chart review, patient visit, review of tests, documentation and/or discussion with other providers about the issues documented above. I reviewed the plan as follows:  Try oxycodone and ice to area. If this does not help, we can consider trying gabapentin. Resume follow up with Dr. Sanchez in Medical Oncology.    All questions were answered and the patient and present family were in agreement with the plan.  I appreciate the opportunity to participate in the care of your patient and will keep you updated.  Sincerely,

## 2024-03-11 NOTE — NURSING NOTE
"Oncology Rooming Note    March 11, 2024 4:21 PM   West Van is a 48 year old male who presents for:    Chief Complaint   Patient presents with    Oncology Clinic Visit     Pulmonary nodules; Status post lobectomy of lung     Initial Vitals: /71 (BP Location: Right arm, Patient Position: Sitting, Cuff Size: Adult Regular)   Pulse 95   Temp 98  F (36.7  C) (Oral)   Resp 16   Wt 93.5 kg (206 lb 1.6 oz)   SpO2 100%   BMI 26.46 kg/m   Estimated body mass index is 26.46 kg/m  as calculated from the following:    Height as of 2/1/24: 1.88 m (6' 2\").    Weight as of this encounter: 93.5 kg (206 lb 1.6 oz). Body surface area is 2.21 meters squared.  Moderate Pain (4) Comment: intermittent   No LMP for male patient.  Allergies reviewed: Yes  Medications reviewed: Yes    Medications: Medication refills not needed today.  Pharmacy name entered into [facility]: [id number] - [address]    Frailty Screening:   Is the patient here for a new oncology consult visit in cancer care? 2. No      Clinical concerns: none       Marisela Mills              "

## 2024-03-11 NOTE — LETTER
3/11/2024         RE: West Van  [address]  [facility] [address]        Dear Colleague,    Thank you for referring your patient, West Van, to the [facility]. Please see a copy of my visit note below.    THORACIC SURGERY FOLLOW UP VISIT    I saw Mr. Van in follow-up today. The clinical summary follows:     PREOP DIAGNOSIS   Chest wall mass  PROCEDURE   Excision of right chest wall mass    DATE OF PROCEDURE  02/01/2024    HISTOPATHOLOGY   A.  Soft tissue, right chest wall mass, excision:  - Mature adipose tissue, consistent with lipoma     B.  Soft tissue, right chest wall nodule, excision:  - 1 benign lymph node    COMPLICATIONS  None    INTERVAL STUDIES  CXR 03/11/2024: Postoperative changes of right lower lobectomy with mild  associated volume loss, streaky opacity in the right lower zone favor  atelectasis.    Past Medical History:   Diagnosis Date    Benign essential HTN 04/13/2016    Diabetes (H)     Kidney stone     Liver cancer (H) 10/04/2022    Liver cirrhosis secondary to nonalcoholic steatohepatitis (RIOS) (H)     RIOS (nonalcoholic steatohepatitis) 06/02/2023    Neuroendocrine carcinoma of lung (H)     right lung, lobectomy 3/2023    PONV (postoperative nausea and vomiting)       Past Surgical History:   Procedure Laterality Date    BRONCHOSCOPY, WITH BIOPSY, ROBOT ASSISTED N/A 1/17/2023    Procedure: BRONCHOSCOPY, USING OPTICAL TRACKING SYSTEM, ion;  Surgeon: Ani Guillaume MD;  [facility]    DAVINCI LOBECTOMY LUNG Right 3/8/2023    Procedure: Robot-assisted right thoracoscopic lower lobectomy, mediastinal lymph node dissection, intercostal nerve cryoablation ;  Surgeon: Matheus Haas MD;  Location:  OR    ENDOBRONCHIAL ULTRASOUND FLEXIBLE N/A 1/17/2023    Procedure: endobronchial  ultrasound and transbronchial biopsies;  Surgeon: Ani Guillaume MD;  [facility]    EXCISE TUMOR CHEST WALL Right 2/1/2024    Procedure: Excision chest wall mass;   Surgeon: Matheus Haas MD;  [facility]    LAPAROSCOPIC ABLATION LIVER TUMOR N/A 12/6/2022    Procedure: Diagnostic Laparoscopy, Hepatic Ultrasound, Laparoscopic ultrasound guided microwave ablation of liver tumor x1;  Surgeon: Armando Munguia MD;  [facility]    LAPAROSCOPIC BIOPSY LIVER N/A 12/6/2022    Procedure: Laparoscopic Ultrasound Guided liver biopsy;  Surgeon: Armando Munguia MD;  [facility]    LAPAROSCOPIC CHOLECYSTECTOMY N/A 12/6/2022    Procedure: Laparoscopic cholecystectomy;  Surgeon: Armando Munguia MD;  [facility]      Social History     Socioeconomic History    Marital status: Single     Spouse name: Not on file    Number of children: Not on file    Years of education: Not on file    Highest education level: Not on file   Occupational History    Not on file   Tobacco Use    Smoking status: Never     Passive exposure: Past    Smokeless tobacco: Never   Vaping Use    Vaping Use: Never used   Substance and Sexual Activity    Alcohol use: Not Currently     Comment: Last ETOH 1999    Drug use: Never    Sexual activity: Not on file   Other Topics Concern    Not on file   Social History Narrative    Not on file     Social Determinants of Health     Financial Resource Strain: Not on file   Food Insecurity: Not on file   Transportation Needs: Not on file   Physical Activity: Not on file   Stress: Not on file   Social Connections: Not on file   Interpersonal Safety: Not on file   Housing Stability: Not on file      SUBJECTIVE   West is still having pain and swelling around the incision site and also includes his right breast and nipple. He has been taking Tylenol three times a day but this doesn't help. He has some oxycodone at home but has not tried that. He has not used heat or ice to the area. He describes the pain as burning and sharp.    OBJECTIVE  /71 (BP Location: Right arm, Patient Position: Sitting, Cuff Size: Adult Regular)   Pulse 95   Temp 98  F (36.7  C)  (Oral)   Resp 16   Wt 93.5 kg (206 lb 1.6 oz)   SpO2 100%   BMI 26.46 kg/m       From a personal perspective, he is here alone today.    IMPRESSION  48 year-old male status post excision of a right chest wall mass. He is here today for post operative follow up.     We reviewed his pathology which was benign.     PLAN  I spent 25 min on the date of the encounter in chart review, patient visit, review of tests, documentation and/or discussion with other providers about the issues documented above. I reviewed the plan as follows:  Try oxycodone and ice to area. If this does not help, we can consider trying gabapentin. Resume follow up with Dr. Sanchez in Medical Oncology.    All questions were answered and the patient and present family were in agreement with the plan.  I appreciate the opportunity to participate in the care of your patient and will keep you updated.  Sincerely,  VALENTINA Pantoja CNS

## 2024-03-13 ENCOUNTER — PATIENT OUTREACH (OUTPATIENT)
Dept: SURGERY | Facility: CLINIC | Age: 49
End: 2024-03-13
Payer: COMMERCIAL

## 2024-03-13 DIAGNOSIS — M79.2 NEUROPATHIC PAIN: Primary | ICD-10-CM

## 2024-03-13 RX ORDER — GABAPENTIN 100 MG/1
100 CAPSULE ORAL 2 TIMES DAILY
Qty: 60 CAPSULE | Refills: 1 | Status: ON HOLD | OUTPATIENT
Start: 2024-03-13 | End: 2024-06-28

## 2024-03-13 NOTE — PROGRESS NOTES
Spoke with West, he said icing helped a little bit along with a hot shower. He will try using a heating pad to see if that helps. He felt that the Oxycodone did not help his pain very much, if at all. He will feel like he turned a corner and then will have increased pain- burning/stabbing sensation in the right breast/nipple area. His PCP recommended to take Tylenol and he has been taking Tylenol which hasn't seemed to help with his pain. A message was sent to VALENTINA Cam CNS to update her. Patient may be able to try Gabapentin depending on his current medical conditions.    Update: called West to let him know that Jackelyn sent the Gabapentin prescription to his local pharmacy. He should keep using heat and ice since it does help the burning sensation/stabbing pain. I will give him a call in 2 weeks to see is the Gabapentin has helped. He appreciated the call and will reach out with any questions or concerns.

## 2024-05-20 ENCOUNTER — HOSPITAL ENCOUNTER (OUTPATIENT)
Dept: MRI IMAGING | Facility: CLINIC | Age: 49
Discharge: HOME | End: 2024-05-20
Attending: NURSE PRACTITIONER
Payer: COMMERCIAL

## 2024-05-20 ENCOUNTER — LAB (OUTPATIENT)
Dept: LAB | Facility: CLINIC | Age: 49
End: 2024-05-20
Payer: COMMERCIAL

## 2024-05-20 ENCOUNTER — HOSPITAL ENCOUNTER (OUTPATIENT)
Dept: CT IMAGING | Facility: CLINIC | Age: 49
Discharge: HOME | End: 2024-05-20
Attending: NURSE PRACTITIONER
Payer: COMMERCIAL

## 2024-05-20 DIAGNOSIS — C22.0 HEPATOCELLULAR CARCINOMA (H): ICD-10-CM

## 2024-05-20 DIAGNOSIS — K75.81 LIVER CIRRHOSIS SECONDARY TO NONALCOHOLIC STEATOHEPATITIS (NASH) (H): ICD-10-CM

## 2024-05-20 DIAGNOSIS — K74.60 LIVER CIRRHOSIS SECONDARY TO NONALCOHOLIC STEATOHEPATITIS (NASH) (H): ICD-10-CM

## 2024-05-20 DIAGNOSIS — C7A.8 PRIMARY MALIGNANT NEUROENDOCRINE TUMOR OF LUNG (H): ICD-10-CM

## 2024-05-20 LAB
AFP SERPL-MCNC: 31.4 NG/ML
ALBUMIN SERPL BCG-MCNC: 4.2 G/DL (ref 3.5–5.2)
ALP SERPL-CCNC: 94 U/L (ref 40–150)
ALT SERPL W P-5'-P-CCNC: 39 U/L (ref 0–70)
ANION GAP SERPL CALCULATED.3IONS-SCNC: 9 MMOL/L (ref 7–15)
AST SERPL W P-5'-P-CCNC: 26 U/L (ref 0–45)
BASOPHILS # BLD AUTO: 0.1 10E3/UL (ref 0–0.2)
BASOPHILS NFR BLD AUTO: 1 %
BILIRUB SERPL-MCNC: 1.1 MG/DL
BUN SERPL-MCNC: 17.2 MG/DL (ref 6–20)
CALCIUM SERPL-MCNC: 9.9 MG/DL (ref 8.6–10)
CHLORIDE SERPL-SCNC: 104 MMOL/L (ref 98–107)
CREAT BLD-MCNC: 0.8 MG/DL (ref 0.7–1.3)
CREAT SERPL-MCNC: 0.79 MG/DL (ref 0.67–1.17)
DEPRECATED HCO3 PLAS-SCNC: 27 MMOL/L (ref 22–29)
EGFRCR SERPLBLD CKD-EPI 2021: >60 ML/MIN/1.73M2
EGFRCR SERPLBLD CKD-EPI 2021: >90 ML/MIN/1.73M2
EOSINOPHIL # BLD AUTO: 0.1 10E3/UL (ref 0–0.7)
EOSINOPHIL NFR BLD AUTO: 2 %
ERYTHROCYTE [DISTWIDTH] IN BLOOD BY AUTOMATED COUNT: 13 % (ref 10–15)
GLUCOSE SERPL-MCNC: 157 MG/DL (ref 70–99)
HCT VFR BLD AUTO: 42.9 % (ref 40–53)
HGB BLD-MCNC: 15 G/DL (ref 13.3–17.7)
IMM GRANULOCYTES # BLD: 0 10E3/UL
IMM GRANULOCYTES NFR BLD: 0 %
LYMPHOCYTES # BLD AUTO: 1.3 10E3/UL (ref 0.8–5.3)
LYMPHOCYTES NFR BLD AUTO: 28 %
MCH RBC QN AUTO: 29.9 PG (ref 26.5–33)
MCHC RBC AUTO-ENTMCNC: 35 G/DL (ref 31.5–36.5)
MCV RBC AUTO: 86 FL (ref 78–100)
MONOCYTES # BLD AUTO: 0.6 10E3/UL (ref 0–1.3)
MONOCYTES NFR BLD AUTO: 12 %
NEUTROPHILS # BLD AUTO: 2.7 10E3/UL (ref 1.6–8.3)
NEUTROPHILS NFR BLD AUTO: 57 %
NRBC # BLD AUTO: 0 10E3/UL
NRBC BLD AUTO-RTO: 0 /100
PLATELET # BLD AUTO: 143 10E3/UL (ref 150–450)
POTASSIUM SERPL-SCNC: 3.9 MMOL/L (ref 3.4–5.3)
PROT SERPL-MCNC: 6.9 G/DL (ref 6.4–8.3)
RBC # BLD AUTO: 5.01 10E6/UL (ref 4.4–5.9)
SODIUM SERPL-SCNC: 140 MMOL/L (ref 135–145)
WBC # BLD AUTO: 4.8 10E3/UL (ref 4–11)

## 2024-05-20 PROCEDURE — 250N000011 HC RX IP 250 OP 636: Performed by: NURSE PRACTITIONER

## 2024-05-20 PROCEDURE — A9585 GADOBUTROL INJECTION: HCPCS | Performed by: NURSE PRACTITIONER

## 2024-05-20 PROCEDURE — 71260 CT THORAX DX C+: CPT

## 2024-05-20 PROCEDURE — 82565 ASSAY OF CREATININE: CPT

## 2024-05-20 PROCEDURE — 255N000002 HC RX 255 OP 636: Performed by: NURSE PRACTITIONER

## 2024-05-20 PROCEDURE — 36415 COLL VENOUS BLD VENIPUNCTURE: CPT

## 2024-05-20 PROCEDURE — 250N000009 HC RX 250: Performed by: NURSE PRACTITIONER

## 2024-05-20 PROCEDURE — 82105 ALPHA-FETOPROTEIN SERUM: CPT

## 2024-05-20 PROCEDURE — 85025 COMPLETE CBC W/AUTO DIFF WBC: CPT

## 2024-05-20 PROCEDURE — 74183 MRI ABD W/O CNTR FLWD CNTR: CPT

## 2024-05-20 RX ORDER — GADOBUTROL 604.72 MG/ML
10 INJECTION INTRAVENOUS ONCE
Status: COMPLETED | OUTPATIENT
Start: 2024-05-20 | End: 2024-05-20

## 2024-05-20 RX ORDER — IOPAMIDOL 755 MG/ML
500 INJECTION, SOLUTION INTRAVASCULAR ONCE
Status: COMPLETED | OUTPATIENT
Start: 2024-05-20 | End: 2024-05-20

## 2024-05-20 RX ADMIN — SODIUM CHLORIDE 60 ML: 9 INJECTION, SOLUTION INTRAVENOUS at 08:16

## 2024-05-20 RX ADMIN — GADOBUTROL 9.5 ML: 604.72 INJECTION INTRAVENOUS at 09:04

## 2024-05-20 RX ADMIN — IOPAMIDOL 81 ML: 755 INJECTION, SOLUTION INTRAVENOUS at 08:21

## 2024-05-22 ENCOUNTER — VIRTUAL VISIT (OUTPATIENT)
Dept: SURGERY | Facility: CLINIC | Age: 49
End: 2024-05-22
Payer: COMMERCIAL

## 2024-05-22 DIAGNOSIS — M79.2 NEUROPATHIC PAIN: Primary | ICD-10-CM

## 2024-05-22 PROCEDURE — 99213 OFFICE O/P EST LOW 20 MIN: CPT | Mod: 95 | Performed by: CLINICAL NURSE SPECIALIST

## 2024-05-22 RX ORDER — PHENOL 1.4 %
600 AEROSOL, SPRAY (ML) MUCOUS MEMBRANE
Status: ON HOLD | COMMUNITY
End: 2024-06-28

## 2024-05-22 RX ORDER — FLUTICASONE PROPIONATE 50 MCG
1 SPRAY, SUSPENSION (ML) NASAL DAILY PRN
COMMUNITY
Start: 2023-06-24

## 2024-05-22 RX ORDER — BISACODYL 5 MG/1
TABLET, COATED ORAL
Status: ON HOLD | COMMUNITY
Start: 2023-06-27 | End: 2024-06-28

## 2024-05-22 RX ORDER — CEPHALEXIN 500 MG/1
CAPSULE ORAL
Status: ON HOLD | COMMUNITY
Start: 2023-12-20 | End: 2024-06-26

## 2024-05-22 RX ORDER — FEXOFENADINE HCL 180 MG/1
TABLET ORAL
Status: ON HOLD | COMMUNITY
End: 2024-06-26

## 2024-05-22 NOTE — PROGRESS NOTES
West is a 48 year old who is being evaluated via a billable video visit.    How would you like to obtain your AVS? MyChart  If the video visit is dropped, the invitation should be resent by: Send to e-mail at: [email or web address]  Will anyone else be joining your video visit? Yes     Send link to mother at [email or web address]    THORACIC SURGERY FOLLOW UP VISIT      I saw Mr. Van in follow-up today. The clinical summary follows:     PREOP DIAGNOSIS   Chest wall mass  PROCEDURE   Excision of right chest wall mass    DATE OF PROCEDURE  02/01/2024    HISTOPATHOLOGY   A.  Soft tissue, right chest wall mass, excision:  - Mature adipose tissue, consistent with lipoma     B.  Soft tissue, right chest wall nodule, excision:  - 1 benign lymph node    COMPLICATIONS  None    INTERVAL STUDIES  CT chest 05/20/2024:  1.  Two small left lung nodules measure 0.4 cm each, unchanged.  2.  No new or enlarging pulmonary nodules.  3.  Cirrhotic configuration of the liver and splenomegaly.  Past Medical History:   Diagnosis Date    Benign essential HTN 04/13/2016    Diabetes (H)     Kidney stone     Liver cancer (H) 10/04/2022    Liver cirrhosis secondary to nonalcoholic steatohepatitis (RIOS) (H)     RIOS (nonalcoholic steatohepatitis) 06/02/2023    Neuroendocrine carcinoma of lung (H)     right lung, lobectomy 3/2023    PONV (postoperative nausea and vomiting)       Past Surgical History:   Procedure Laterality Date    BRONCHOSCOPY, WITH BIOPSY, ROBOT ASSISTED N/A 1/17/2023    Procedure: BRONCHOSCOPY, USING OPTICAL TRACKING SYSTEM, ion;  Surgeon: Ani Guillaume MD;  [facility]    DAVINCI LOBECTOMY LUNG Right 3/8/2023    Procedure: Robot-assisted right thoracoscopic lower lobectomy, mediastinal lymph node dissection, intercostal nerve cryoablation ;  Surgeon: Matheus aHas MD;  Location:  OR    ENDOBRONCHIAL ULTRASOUND FLEXIBLE N/A 1/17/2023    Procedure: endobronchial  ultrasound and transbronchial biopsies;  Surgeon:  Ani Guillaume MD;  [facility]    EXCISE TUMOR CHEST WALL Right 2/1/2024    Procedure: Excision chest wall mass;  Surgeon: Matheus Haas MD;  [facility]    LAPAROSCOPIC ABLATION LIVER TUMOR N/A 12/6/2022    Procedure: Diagnostic Laparoscopy, Hepatic Ultrasound, Laparoscopic ultrasound guided microwave ablation of liver tumor x1;  Surgeon: Armando Munguia MD;  [facility]    LAPAROSCOPIC BIOPSY LIVER N/A 12/6/2022    Procedure: Laparoscopic Ultrasound Guided liver biopsy;  Surgeon: Armando Munguia MD;  [facility]    LAPAROSCOPIC CHOLECYSTECTOMY N/A 12/6/2022    Procedure: Laparoscopic cholecystectomy;  Surgeon: Armando Munguia MD;  [facility]      Social History     Socioeconomic History    Marital status: Single     Spouse name: Not on file    Number of children: Not on file    Years of education: Not on file    Highest education level: Not on file   Occupational History    Not on file   Tobacco Use    Smoking status: Never     Passive exposure: Past    Smokeless tobacco: Never   Vaping Use    Vaping status: Never Used   Substance and Sexual Activity    Alcohol use: Not Currently     Comment: Last ETOH 1999    Drug use: Never    Sexual activity: Not on file   Other Topics Concern    Not on file   Social History Narrative    Not on file     Social Determinants of Health     Financial Resource Strain: Medium Risk (1/8/2023)    [facility]    Overall Financial Resource Strain (CARDIA)     Difficulty of Paying Living Expenses: Somewhat hard   Food Insecurity: No Food Insecurity (1/8/2023)    [facility], [facility]    Hunger Vital Sign     Worried About Running Out of Food in the Last Year: Never true     Ran Out of Food in the Last Year: Never true   Transportation Needs: Unknown (1/8/2023)    [facility], [facility]     PRAPARE - Transportation     Lack of Transportation (Medical): Patient declined     Lack of Transportation (Non-Medical): No   Physical Activity: Sufficiently Active (1/8/2023)    Received from [facility], [facility]    Exercise Vital Sign     Days of Exercise per Week: 5 days     Minutes of Exercise per Session: 40 min   Stress: Patient Declined (1/8/2023)    Received from [facility], [facility]    [place] citizen [place] of Occupational Health - Occupational Stress Questionnaire     Feeling of Stress : Patient declined   Social Connections: Unknown (1/8/2023)    Received from [facility], [facility]    Social Connection and Isolation Panel [NHANES]     Frequency of Communication with Friends and Family: More than three times a week     Frequency of Social Gatherings with Friends and Family: Patient declined     Attends Pentecostalism Services: Patient declined     Active Member of Clubs or Organizations: No     Attends Club or Organization Meetings: Never     Marital Status: Never    Interpersonal Safety: Not At Risk (1/8/2023)    Received from [facility], [facility]    Humiliation, Afraid, Rape, and Kick questionnaire     Fear of Current or Ex-Partner: No     Emotionally Abused: No     Physically Abused: No     Sexually Abused: No   Housing Stability: Low Risk  (1/8/2023)    Received from [facility], [facility]    Housing Stability Vital Sign     Unable to Pay for Housing in the Last Year: No     Number of Places Lived in the Last Year: 1     Unstable Housing in the Last Year: No      SUBJECTIVE   West is still having a lot of nerve pain on his right chest wall. He started the gabapentin in March at the does we initially prescribed. He recently saw his local doctor who had him increase the dose last month. The only issue he has  with the increased dosage is more fatigue. The nerve pain is manageable right now but he still can't face forward in the shower because the water touching his skin is painful.     From a personal perspective, he meets with his transplant team in July and is hoping to be able to go on the active list soon after.    ELLE Dodson is a 48 year-old male status post excision of a right chest wall mass. His visit today is to discuss other options for his neuropathic pain.    I recommended West meet with an Interventional Pain Specialist to discuss other modalities of treating his neuropathy (such as a nerve injection). West is open to this but would like to wait until after his transplant. He does not want anything slowing that process down. He is able to manage the nerve pain right now with the current dose of gabapentin.     PLAN  I spent 15 min on the date of the encounter in chart review, patient visit, review of tests, documentation and/or discussion with other providers about the issues documented above. I reviewed the plan as follows:  Follow up with me as needed after your transplant to discuss possible referral to the Pain Clinic for consideration of a nerve injection to treat the neuropathic pain.  All questions were answered and the patient and present family were in agreement with the plan.  I appreciate the opportunity to participate in the care of your patient and will keep you updated.  Sincerely,

## 2024-05-22 NOTE — LETTER
5/22/2024         RE: West Van  [address]  [facility] [address]        Dear Colleague,    Thank you for referring your patient, West Van, to the [facility]. Please see a copy of my visit note below.    West is a 48 year old who is being evaluated via a billable video visit.    How would you like to obtain your AVS? MyChart  If the video visit is dropped, the invitation should be resent by: Send to e-mail at: [email or web address]  Will anyone else be joining your video visit? Yes     Send link to mother at [email or web address]    THORACIC SURGERY FOLLOW UP VISIT      I saw Mr. Van in follow-up today. The clinical summary follows:     PREOP DIAGNOSIS   Chest wall mass  PROCEDURE   Excision of right chest wall mass    DATE OF PROCEDURE  02/01/2024    HISTOPATHOLOGY   A.  Soft tissue, right chest wall mass, excision:  - Mature adipose tissue, consistent with lipoma     B.  Soft tissue, right chest wall nodule, excision:  - 1 benign lymph node    COMPLICATIONS  None    INTERVAL STUDIES  CT chest 05/20/2024:  1.  Two small left lung nodules measure 0.4 cm each, unchanged.  2.  No new or enlarging pulmonary nodules.  3.  Cirrhotic configuration of the liver and splenomegaly.  Past Medical History:   Diagnosis Date     Benign essential HTN 04/13/2016     Diabetes (H)      Kidney stone      Liver cancer (H) 10/04/2022     Liver cirrhosis secondary to nonalcoholic steatohepatitis (RIOS) (H)      RIOS (nonalcoholic steatohepatitis) 06/02/2023     Neuroendocrine carcinoma of lung (H)     right lung, lobectomy 3/2023     PONV (postoperative nausea and vomiting)       Past Surgical History:   Procedure Laterality Date     BRONCHOSCOPY, WITH BIOPSY, ROBOT ASSISTED N/A 1/17/2023    Procedure: BRONCHOSCOPY, USING OPTICAL TRACKING SYSTEM, ion;  Surgeon: Ani Guillaume MD;  Location:  OR     [facility] LOBECTOMY LUNG Right 3/8/2023    Procedure: Robot-assisted right thoracoscopic lower  lobectomy, mediastinal lymph node dissection, intercostal nerve cryoablation ;  Surgeon: Matheus Haas MD;  Location[facility]     ENDOBRONCHIAL ULTRASOUND FLEXIBLE N/A 1/17/2023    Procedure: endobronchial  ultrasound and transbronchial biopsies;  Surgeon: Ani Guillaume MD;  [facility]     EXCISE TUMOR CHEST WALL Right 2/1/2024    Procedure: Excision chest wall mass;  Surgeon: Matheus Haas MD;  [facility]     LAPAROSCOPIC ABLATION LIVER TUMOR N/A 12/6/2022    Procedure: Diagnostic Laparoscopy, Hepatic Ultrasound, Laparoscopic ultrasound guided microwave ablation of liver tumor x1;  Surgeon: Armando Munguia MD;  [facility]     LAPAROSCOPIC BIOPSY LIVER N/A 12/6/2022    Procedure: Laparoscopic Ultrasound Guided liver biopsy;  Surgeon: Armando Munguia MD;  [facility]     LAPAROSCOPIC CHOLECYSTECTOMY N/A 12/6/2022    Procedure: Laparoscopic cholecystectomy;  Surgeon: Armando Munguia MD;  [facility]      Social History     Socioeconomic History     Marital status: Single     Spouse name: Not on file     Number of children: Not on file     Years of education: Not on file     Highest education level: Not on file   Occupational History     Not on file   Tobacco Use     Smoking status: Never     Passive exposure: Past     Smokeless tobacco: Never   Vaping Use     Vaping status: Never Used   Substance and Sexual Activity     Alcohol use: Not Currently     Comment: Last ETOH 1999     Drug use: Never     Sexual activity: Not on file   Other Topics Concern     Not on file   Social History Narrative     Not on file     Social Determinants of Health     Financial Resource Strain: Medium Risk (1/8/2023)    [facility], [facility]    Overall Financial Resource Strain (CARDIA)      Difficulty of Paying Living Expenses: Somewhat hard   Food Insecurity: No Food Insecurity (1/8/2023)    Received [facility]    Hunger Vital Sign      Worried About Running Out of Food in the Last Year: Never true      Ran Out of Food in the Last Year: Never true   Transportation Needs: Unknown (1/8/2023)    Received from [facility], [facility]    PRAPARE - Transportation      Lack of Transportation (Medical): Patient declined      Lack of Transportation (Non-Medical): No   Physical Activity: Sufficiently Active (1/8/2023)    Received from [facility], [facility]    Exercise Vital Sign      Days of Exercise per Week: 5 days      Minutes of Exercise per Session: 40 min   Stress: Patient Declined (1/8/2023)    Received from [facility], [facility]    [place]er [place] of Occupational Health - Occupational Stress Questionnaire      Feeling of Stress : Patient declined   Social Connections: Unknown (1/8/2023)    Received from [facility], [facility]    Social Connection and Isolation Panel [NHANES]      Frequency of Communication with Friends and Family: More than three times a week      Frequency of Social Gatherings with Friends and Family: Patient declined      Attends Anglican Services: Patient declined      Active Member of Clubs or Organizations: No      Attends Club or Organization Meetings: Never      Marital Status: Never    Interpersonal Safety: Not At Risk (1/8/2023)    Received from [facility], [facility]    Humiliation, Afraid, Rape, and Kick questionnaire      Fear of Current or Ex-Partner: No      Emotionally Abused: No      Physically Abused: No      Sexually Abused: No   Housing Stability: Low Risk  (1/8/2023)    Received from [facility], [facility]    Housing Stability Vital Sign      Unable to Pay for Housing in the Last Year: No      Number of Places Lived in the Last Year: 1       Unstable Housing in the Last Year: No      SUBJECTIVE   West is still having a lot of nerve pain on his right chest wall. He started the gabapentin in March at the does we initially prescribed. He recently saw his local doctor who had him increase the dose last month. The only issue he has with the increased dosage is more fatigue. The nerve pain is manageable right now but he still can't face forward in the shower because the water touching his skin is painful.     From a personal perspective, he meets with his transplant team in July and is hoping to be able to go on the active list soon after.    IMPRESSION   West is a 48 year-old male status post excision of a right chest wall mass. His visit today is to discuss other options for his neuropathic pain.    I recommended West meet with an Interventional Pain Specialist to discuss other modalities of treating his neuropathy (such as a nerve injection). West is open to this but would like to wait until after his transplant. He does not want anything slowing that process down. He is able to manage the nerve pain right now with the current dose of gabapentin.     PLAN  I spent 15 min on the date of the encounter in chart review, patient visit, review of tests, documentation and/or discussion with other providers about the issues documented above. I reviewed the plan as follows:  Follow up with me as needed after your transplant to discuss possible referral to the Pain Clinic for consideration of a nerve injection to treat the neuropathic pain.  All questions were answered and the patient and present family were in agreement with the plan.  I appreciate the opportunity to participate in the care of your patient and will keep you updated.  Sincerely,                  Again, thank you for allowing me to participate in the care of your patient.        Sincerely,        Jackelyn Pratt, VALENTINA CNS

## 2024-05-23 ENCOUNTER — VIRTUAL VISIT (OUTPATIENT)
Dept: ONCOLOGY | Facility: CLINIC | Age: 49
End: 2024-05-23
Attending: INTERNAL MEDICINE
Payer: COMMERCIAL

## 2024-05-23 VITALS — WEIGHT: 200 LBS | BODY MASS INDEX: 25.67 KG/M2 | HEIGHT: 74 IN

## 2024-05-23 DIAGNOSIS — E11.9 TYPE 2 DIABETES MELLITUS WITHOUT COMPLICATION, WITHOUT LONG-TERM CURRENT USE OF INSULIN (H): ICD-10-CM

## 2024-05-23 DIAGNOSIS — C7A.8 PRIMARY MALIGNANT NEUROENDOCRINE TUMOR OF LUNG (H): ICD-10-CM

## 2024-05-23 DIAGNOSIS — C22.0 HEPATOCELLULAR CARCINOMA (H): Primary | ICD-10-CM

## 2024-05-23 DIAGNOSIS — K75.81 LIVER CIRRHOSIS SECONDARY TO NONALCOHOLIC STEATOHEPATITIS (NASH) (H): ICD-10-CM

## 2024-05-23 DIAGNOSIS — K74.60 LIVER CIRRHOSIS SECONDARY TO NONALCOHOLIC STEATOHEPATITIS (NASH) (H): ICD-10-CM

## 2024-05-23 PROBLEM — B02.24: Status: RESOLVED | Noted: 2024-03-29 | Resolved: 2024-05-23

## 2024-05-23 PROBLEM — Z86.59 HISTORY OF RECENT STRESSFUL LIFE EVENT: Status: ACTIVE | Noted: 2021-03-01

## 2024-05-23 PROBLEM — F41.9 ANXIETY: Chronic | Status: ACTIVE | Noted: 2018-10-08

## 2024-05-23 PROBLEM — L82.1 SEBORRHEIC KERATOSES: Status: ACTIVE | Noted: 2023-09-12

## 2024-05-23 PROBLEM — B02.24: Status: ACTIVE | Noted: 2024-03-29

## 2024-05-23 PROBLEM — C34.31 MALIGNANT NEOPLASM OF LOWER LOBE OF RIGHT LUNG (H): Status: RESOLVED | Noted: 2023-01-10 | Resolved: 2024-05-23

## 2024-05-23 PROBLEM — F43.23 ADJUSTMENT DISORDER WITH MIXED ANXIETY AND DEPRESSED MOOD: Status: ACTIVE | Noted: 2023-11-13

## 2024-05-23 PROCEDURE — 99215 OFFICE O/P EST HI 40 MIN: CPT | Mod: 95 | Performed by: INTERNAL MEDICINE

## 2024-05-23 ASSESSMENT — PAIN SCALES - GENERAL: PAINLEVEL: MILD PAIN (2)

## 2024-05-23 NOTE — LETTER
5/23/2024         RE: West Van  [address]  [facility] [address]        Dear Colleague,    Thank you for referring your patient, West Van, to the [facility]. Please see a copy of my visit note below.    Virtual Visit Details    Type of service:  Video Visit   Originating Location (pt. Location): Home  Distant Location (provider location):  Off-site  Platform used for Video Visit: Hai    I am seeing West Van today in follow-up of hepatocellular carcinoma and neuroendocrine tumor of the lung.    He is 48 years old and had an incidental finding of a liver mass while undergoing evaluation for kidney stones.  Biopsy confirmed hepatocellular carcinoma and he had it ablated.  Through this it was identified that he had cirrhosis related to fatty liver disease.  He also had an incidental finding of a lung nodule which has been resected and proved to be a low-grade neuroendocrine tumor.  Since I saw him last he also developed an intercostal mass in his right chest wall and he had that resected and fortunately that turned out to be a lipoma.  He returns today for ongoing surveillance for recurrence.    He reports that his biggest ongoing problem is chest wall pain that seems related to his thoracic surgery.  He was recently started on gabapentin for that and that which has made a significant improvement although it still quite bothersome to him.  He does not have any respiratory symptoms.  He has had difficulty with his appetite related to starting Ozempic and initially had very severe nausea and vomiting and fatigue, but that seems improved since he has had his dose adjusted.  He has not had any bleeding of which he is aware.  He reports his thinking is been clear and he sleeps well at night with only a rare need for a nap.  He is spending his time with household activities and walking his dog.  He lost his job because of missing so much work because of his  illness.    GENERAL: alert and no distress  EYES: Eyes grossly normal to inspection.  No discharge or erythema, or obvious scleral/conjunctival abnormalities.  RESP: No audible wheeze, cough, or visible cyanosis.    SKIN: Visible skin clear. No significant rash, abnormal pigmentation or lesions.  NEURO: Cranial nerves grossly intact.  Mentation and speech appropriate for age.  PSYCH: Appropriate affect, tone, and pace of words    I personally reviewed his liver MRI and chest CT and reviewed the results with him.  He has the ablation defect in his liver which appears stable to improved.  He has changes of portal hypertension and cirrhosis.  He has some tiny lung nodules that are stable and probably not of clinical significance.    His alpha-fetoprotein is stable but mildly elevated at 31.  His electrolytes and renal function are normal.  His bilirubin, albumin and liver enzymes are normal.  His blood counts are very nearly normal with very mild thrombocytopenia.    Assessment/plan:  1.  Hepatocellular carcinoma.  At present he has no evidence of disease I think the mild chronic elevation of his AFP is just a function of his cirrhosis.  He should be to a point of being active on the transplant list sometime this summer.  We will plan on follow-up with repeat imaging in about 3 months for ongoing surveillance for recurrence of development of new HCC.  2.  Compensated cirrhosis.  He has ongoing follow-up with gastroenterology.  3.  Pulmonary carcinoid.  This is very low risk of recurrence at this point.  He will be getting regular chest CTs as part of his surveillance related to his HCC.  4.  Post surgical chest wall pain.  He has partial but probably inadequate control with the gabapentin.  He was not too interested in pursuing much more at this point but might benefit at some point if his symptoms continue from a nerve injection.  5.  Diabetes mellitus.  He is doing reasonably well with control of this at present and  has close ongoing follow-up through his primary care.    Total time by me on the date of service inclusive of the above visit, review of multiple imaging studies and internal and external records, ordering, and documentation was 40 minutes.      Again, thank you for allowing me to participate in the care of your patient.        Sincerely,        Michael Sanchez MD

## 2024-05-23 NOTE — PROGRESS NOTES
Virtual Visit Details    Type of service:  Video Visit   Originating Location (pt. Location): Home  Distant Location (provider location):  Off-site  Platform used for Video Visit: Hai    I am seeing West Van today in follow-up of hepatocellular carcinoma and neuroendocrine tumor of the lung.    He is 48 years old and had an incidental finding of a liver mass while undergoing evaluation for kidney stones.  Biopsy confirmed hepatocellular carcinoma and he had it ablated.  Through this it was identified that he had cirrhosis related to fatty liver disease.  He also had an incidental finding of a lung nodule which has been resected and proved to be a low-grade neuroendocrine tumor.  Since I saw him last he also developed an intercostal mass in his right chest wall and he had that resected and fortunately that turned out to be a lipoma.  He returns today for ongoing surveillance for recurrence.    He reports that his biggest ongoing problem is chest wall pain that seems related to his thoracic surgery.  He was recently started on gabapentin for that and that which has made a significant improvement although it still quite bothersome to him.  He does not have any respiratory symptoms.  He has had difficulty with his appetite related to starting Ozempic and initially had very severe nausea and vomiting and fatigue, but that seems improved since he has had his dose adjusted.  He has not had any bleeding of which he is aware.  He reports his thinking is been clear and he sleeps well at night with only a rare need for a nap.  He is spending his time with household activities and walking his dog.  He lost his job because of missing so much work because of his illness.    GENERAL: alert and no distress  EYES: Eyes grossly normal to inspection.  No discharge or erythema, or obvious scleral/conjunctival abnormalities.  RESP: No audible wheeze, cough, or visible cyanosis.    SKIN: Visible skin clear. No significant rash,  abnormal pigmentation or lesions.  NEURO: Cranial nerves grossly intact.  Mentation and speech appropriate for age.  PSYCH: Appropriate affect, tone, and pace of words    I personally reviewed his liver MRI and chest CT and reviewed the results with him.  He has the ablation defect in his liver which appears stable to improved.  He has changes of portal hypertension and cirrhosis.  He has some tiny lung nodules that are stable and probably not of clinical significance.    His alpha-fetoprotein is stable but mildly elevated at 31.  His electrolytes and renal function are normal.  His bilirubin, albumin and liver enzymes are normal.  His blood counts are very nearly normal with very mild thrombocytopenia.    Assessment/plan:  1.  Hepatocellular carcinoma.  At present he has no evidence of disease I think the mild chronic elevation of his AFP is just a function of his cirrhosis.  He should be to a point of being active on the transplant list sometime this summer.  We will plan on follow-up with repeat imaging in about 3 months for ongoing surveillance for recurrence of development of new HCC.  2.  Compensated cirrhosis.  He has ongoing follow-up with gastroenterology.  3.  Pulmonary carcinoid.  This is very low risk of recurrence at this point.  He will be getting regular chest CTs as part of his surveillance related to his HCC.  4.  Post surgical chest wall pain.  He has partial but probably inadequate control with the gabapentin.  He was not too interested in pursuing much more at this point but might benefit at some point if his symptoms continue from a nerve injection.  5.  Diabetes mellitus.  He is doing reasonably well with control of this at present and has close ongoing follow-up through his primary care.    Total time by me on the date of service inclusive of the above visit, review of multiple imaging studies and internal and external records, ordering, and documentation was 40 minutes.

## 2024-05-23 NOTE — LETTER
5/23/2024         RE: West Van  [address]  [facility] [address]      Virtual Visit Details    Type of service:  Video Visit   Originating Location (pt. Location): Home  Distant Location (provider location):  Off-site  Platform used for Video Visit: Hai    I am seeing West Van today in follow-up of hepatocellular carcinoma and neuroendocrine tumor of the lung.    He is 48 years old and had an incidental finding of a liver mass while undergoing evaluation for kidney stones.  Biopsy confirmed hepatocellular carcinoma and he had it ablated.  Through this it was identified that he had cirrhosis related to fatty liver disease.  He also had an incidental finding of a lung nodule which has been resected and proved to be a low-grade neuroendocrine tumor.  Since I saw him last he also developed an intercostal mass in his right chest wall and he had that resected and fortunately that turned out to be a lipoma.  He returns today for ongoing surveillance for recurrence.    He reports that his biggest ongoing problem is chest wall pain that seems related to his thoracic surgery.  He was recently started on gabapentin for that and that which has made a significant improvement although it still quite bothersome to him.  He does not have any respiratory symptoms.  He has had difficulty with his appetite related to starting Ozempic and initially had very severe nausea and vomiting and fatigue, but that seems improved since he has had his dose adjusted.  He has not had any bleeding of which he is aware.  He reports his thinking is been clear and he sleeps well at night with only a rare need for a nap.  He is spending his time with household activities and walking his dog.  He lost his job because of missing so much work because of his illness.    GENERAL: alert and no distress  EYES: Eyes grossly normal to inspection.  No discharge or erythema, or obvious scleral/conjunctival abnormalities.  RESP: No audible wheeze,  cough, or visible cyanosis.    SKIN: Visible skin clear. No significant rash, abnormal pigmentation or lesions.  NEURO: Cranial nerves grossly intact.  Mentation and speech appropriate for age.  PSYCH: Appropriate affect, tone, and pace of words    I personally reviewed his liver MRI and chest CT and reviewed the results with him.  He has the ablation defect in his liver which appears stable to improved.  He has changes of portal hypertension and cirrhosis.  He has some tiny lung nodules that are stable and probably not of clinical significance.    His alpha-fetoprotein is stable but mildly elevated at 31.  His electrolytes and renal function are normal.  His bilirubin, albumin and liver enzymes are normal.  His blood counts are very nearly normal with very mild thrombocytopenia.    Assessment/plan:  1.  Hepatocellular carcinoma.  At present he has no evidence of disease I think the mild chronic elevation of his AFP is just a function of his cirrhosis.  He should be to a point of being active on the transplant list sometime this summer.  We will plan on follow-up with repeat imaging in about 3 months for ongoing surveillance for recurrence of development of new HCC.  2.  Compensated cirrhosis.  He has ongoing follow-up with gastroenterology.  3.  Pulmonary carcinoid.  This is very low risk of recurrence at this point.  He will be getting regular chest CTs as part of his surveillance related to his HCC.  4.  Post surgical chest wall pain.  He has partial but probably inadequate control with the gabapentin.  He was not too interested in pursuing much more at this point but might benefit at some point if his symptoms continue from a nerve injection.  5.  Diabetes mellitus.  He is doing reasonably well with control of this at present and has close ongoing follow-up through his primary care.    Total time by me on the date of service inclusive of the above visit, review of multiple imaging studies and internal and  external records, ordering, and documentation was 40 minutes.        Michael Sanchez MD

## 2024-06-11 ENCOUNTER — DOCUMENTATION ONLY (OUTPATIENT)
Dept: TRANSPLANT | Facility: CLINIC | Age: 49
End: 2024-06-11
Payer: COMMERCIAL

## 2024-06-11 NOTE — PROGRESS NOTES
[facility] LIVER TUMOR BOARD NOTE  Patient discussed at the multidisciplinary tumor board on 5-. The attendees may consist of representatives from hepatology, oncology, radiation oncology, surgical subspecialty including liver transplant and radiology.     DATE OF TUMOR BOARD: May 24, 2024      SCAN REVIEWED: 5/20 MRI, Reviewed by Dr. Eva Deras    Discussion:   -Lesion 2022: HCC in segment 8, ablated.  -Right Middle Lobe endocrine tumor removed.  -No recurrence in 2023  -5/20/24: similar findings. Post-op treatment zone LR-TR non-viable.  -Several non-enhancing in segment 8 & near dome. No focal suspicious liver imaging.  -Within [place] criteria    Plan:  -Repeat in 3 months

## 2024-06-26 ENCOUNTER — APPOINTMENT (OUTPATIENT)
Dept: GENERAL RADIOLOGY | Facility: CLINIC | Age: 49
End: 2024-06-26
Payer: COMMERCIAL

## 2024-06-26 ENCOUNTER — HOSPITAL ENCOUNTER (INPATIENT)
Facility: CLINIC | Age: 49
LOS: 8 days | Discharge: HOME | End: 2024-07-05
Attending: SURGERY | Admitting: SURGERY
Payer: COMMERCIAL

## 2024-06-26 ENCOUNTER — ANESTHESIA EVENT (OUTPATIENT)
Dept: SURGERY | Facility: CLINIC | Age: 49
End: 2024-06-26
Payer: COMMERCIAL

## 2024-06-26 ENCOUNTER — ORGAN (OUTPATIENT)
Dept: TRANSPLANT | Facility: CLINIC | Age: 49
End: 2024-06-26

## 2024-06-26 DIAGNOSIS — E11.9 TYPE 2 DIABETES MELLITUS WITHOUT COMPLICATION, WITHOUT LONG-TERM CURRENT USE OF INSULIN (H): ICD-10-CM

## 2024-06-26 DIAGNOSIS — T38.0X5A STEROID-INDUCED HYPERGLYCEMIA: ICD-10-CM

## 2024-06-26 DIAGNOSIS — T50.905A DRUG-INDUCED HYPERGLYCEMIA: ICD-10-CM

## 2024-06-26 DIAGNOSIS — R73.9 DRUG-INDUCED HYPERGLYCEMIA: ICD-10-CM

## 2024-06-26 DIAGNOSIS — Z76.82 LIVER TRANSPLANT CANDIDATE: Primary | ICD-10-CM

## 2024-06-26 DIAGNOSIS — R73.9 STEROID-INDUCED HYPERGLYCEMIA: ICD-10-CM

## 2024-06-26 DIAGNOSIS — Z94.4 S/P LIVER TRANSPLANT (H): ICD-10-CM

## 2024-06-26 LAB
ABO/RH(D): NORMAL
ALBUMIN SERPL BCG-MCNC: 4.6 G/DL (ref 3.5–5.2)
ALBUMIN UR-MCNC: NEGATIVE MG/DL
ALP SERPL-CCNC: 104 U/L (ref 40–150)
ALT SERPL W P-5'-P-CCNC: 33 U/L (ref 0–70)
AMYLASE SERPL-CCNC: 38 U/L (ref 28–100)
ANION GAP SERPL CALCULATED.3IONS-SCNC: 12 MMOL/L (ref 7–15)
ANTIBODY SCREEN: NEGATIVE
APPEARANCE UR: CLEAR
APTT PPP: 31 SECONDS (ref 22–38)
AST SERPL W P-5'-P-CCNC: 27 U/L (ref 0–45)
BASOPHILS # BLD AUTO: 0.1 10E3/UL (ref 0–0.2)
BASOPHILS NFR BLD AUTO: 1 %
BILIRUB SERPL-MCNC: 1.7 MG/DL
BILIRUB UR QL STRIP: NEGATIVE
BLD PROD TYP BPU: NORMAL
BLOOD COMPONENT TYPE: NORMAL
BUN SERPL-MCNC: 11.8 MG/DL (ref 6–20)
CALCIUM SERPL-MCNC: 10.3 MG/DL (ref 8.6–10)
CHLORIDE SERPL-SCNC: 101 MMOL/L (ref 98–107)
CODING SYSTEM: NORMAL
COLOR UR AUTO: ABNORMAL
CREAT SERPL-MCNC: 0.86 MG/DL (ref 0.67–1.17)
CROSSMATCH: NORMAL
DEPRECATED HCO3 PLAS-SCNC: 27 MMOL/L (ref 22–29)
EGFRCR SERPLBLD CKD-EPI 2021: >90 ML/MIN/1.73M2
EOSINOPHIL # BLD AUTO: 0.1 10E3/UL (ref 0–0.7)
EOSINOPHIL NFR BLD AUTO: 1 %
ERYTHROCYTE [DISTWIDTH] IN BLOOD BY AUTOMATED COUNT: 12.9 % (ref 10–15)
FIBRINOGEN PPP-MCNC: 366 MG/DL (ref 170–490)
GLUCOSE BLDC GLUCOMTR-MCNC: 126 MG/DL (ref 70–99)
GLUCOSE SERPL-MCNC: 138 MG/DL (ref 70–99)
GLUCOSE UR STRIP-MCNC: NEGATIVE MG/DL
HBV CORE AB SERPL QL IA: NONREACTIVE
HBV SURFACE AB SERPL IA-ACNC: 477 M[IU]/ML
HBV SURFACE AB SERPL IA-ACNC: REACTIVE M[IU]/ML
HBV SURFACE AG SERPL QL IA: NONREACTIVE
HCT VFR BLD AUTO: 46 % (ref 40–53)
HCV AB SERPL QL IA: NONREACTIVE
HGB BLD-MCNC: 15.7 G/DL (ref 13.3–17.7)
HGB UR QL STRIP: NEGATIVE
HIV 1+2 AB+HIV1 P24 AG SERPL QL IA: NONREACTIVE
IMM GRANULOCYTES # BLD: 0 10E3/UL
IMM GRANULOCYTES NFR BLD: 0 %
INR PPP: 1.24 (ref 0.85–1.15)
ISSUE DATE AND TIME: NORMAL
KETONES UR STRIP-MCNC: NEGATIVE MG/DL
LEUKOCYTE ESTERASE UR QL STRIP: NEGATIVE
LYMPHOCYTES # BLD AUTO: 1.6 10E3/UL (ref 0.8–5.3)
LYMPHOCYTES NFR BLD AUTO: 17 %
MAGNESIUM SERPL-MCNC: 2.1 MG/DL (ref 1.7–2.3)
MCH RBC QN AUTO: 29.3 PG (ref 26.5–33)
MCHC RBC AUTO-ENTMCNC: 34.1 G/DL (ref 31.5–36.5)
MCV RBC AUTO: 86 FL (ref 78–100)
MONOCYTES # BLD AUTO: 1.1 10E3/UL (ref 0–1.3)
MONOCYTES NFR BLD AUTO: 11 %
MUCOUS THREADS #/AREA URNS LPF: PRESENT /LPF
NEUTROPHILS # BLD AUTO: 6.5 10E3/UL (ref 1.6–8.3)
NEUTROPHILS NFR BLD AUTO: 70 %
NITRATE UR QL: NEGATIVE
NRBC # BLD AUTO: 0 10E3/UL
NRBC BLD AUTO-RTO: 0 /100
PH UR STRIP: 7.5 [PH] (ref 5–7)
PHOSPHATE SERPL-MCNC: 3.1 MG/DL (ref 2.5–4.5)
PLATELET # BLD AUTO: 146 10E3/UL (ref 150–450)
POTASSIUM SERPL-SCNC: 3.8 MMOL/L (ref 3.4–5.3)
PROT SERPL-MCNC: 7.8 G/DL (ref 6.4–8.3)
RBC # BLD AUTO: 5.36 10E6/UL (ref 4.4–5.9)
RBC URINE: 1 /HPF
SARS-COV-2 RNA RESP QL NAA+PROBE: NEGATIVE
SODIUM SERPL-SCNC: 140 MMOL/L (ref 135–145)
SP GR UR STRIP: 1.01 (ref 1–1.03)
SPECIMEN EXPIRATION DATE: NORMAL
UNIT ABO/RH: NORMAL
UNIT NUMBER: NORMAL
UNIT STATUS: NORMAL
UNIT TYPE ISBT: 6200
UROBILINOGEN UR STRIP-MCNC: NORMAL MG/DL
WBC # BLD AUTO: 9.4 10E3/UL (ref 4–11)
WBC URINE: 1 /HPF

## 2024-06-26 PROCEDURE — 85384 FIBRINOGEN ACTIVITY: CPT

## 2024-06-26 PROCEDURE — 87081 CULTURE SCREEN ONLY: CPT

## 2024-06-26 PROCEDURE — 82247 BILIRUBIN TOTAL: CPT

## 2024-06-26 PROCEDURE — 86923 COMPATIBILITY TEST ELECTRIC: CPT

## 2024-06-26 PROCEDURE — 86644 CMV ANTIBODY: CPT

## 2024-06-26 PROCEDURE — 86704 HEP B CORE ANTIBODY TOTAL: CPT

## 2024-06-26 PROCEDURE — 85610 PROTHROMBIN TIME: CPT

## 2024-06-26 PROCEDURE — 86645 CMV ANTIBODY IGM: CPT

## 2024-06-26 PROCEDURE — 85730 THROMBOPLASTIN TIME PARTIAL: CPT

## 2024-06-26 PROCEDURE — 84100 ASSAY OF PHOSPHORUS: CPT

## 2024-06-26 PROCEDURE — 999N000054 HC STATISTIC EKG NON-CHARGEABLE

## 2024-06-26 PROCEDURE — 71046 X-RAY EXAM CHEST 2 VIEWS: CPT

## 2024-06-26 PROCEDURE — 85025 COMPLETE CBC W/AUTO DIFF WBC: CPT

## 2024-06-26 PROCEDURE — 82962 GLUCOSE BLOOD TEST: CPT

## 2024-06-26 PROCEDURE — 82150 ASSAY OF AMYLASE: CPT

## 2024-06-26 PROCEDURE — 87635 SARS-COV-2 COVID-19 AMP PRB: CPT

## 2024-06-26 PROCEDURE — 36415 COLL VENOUS BLD VENIPUNCTURE: CPT

## 2024-06-26 PROCEDURE — 999N000128 HC STATISTIC PERIPHERAL IV START W/O US GUIDANCE

## 2024-06-26 PROCEDURE — 81001 URINALYSIS AUTO W/SCOPE: CPT

## 2024-06-26 PROCEDURE — 83735 ASSAY OF MAGNESIUM: CPT

## 2024-06-26 PROCEDURE — 86665 EPSTEIN-BARR CAPSID VCA: CPT

## 2024-06-26 PROCEDURE — 86803 HEPATITIS C AB TEST: CPT

## 2024-06-26 PROCEDURE — 87516 HEPATITIS B DNA AMP PROBE: CPT

## 2024-06-26 PROCEDURE — 71046 X-RAY EXAM CHEST 2 VIEWS: CPT | Mod: 26 | Performed by: STUDENT IN AN ORGANIZED HEALTH CARE EDUCATION/TRAINING PROGRAM

## 2024-06-26 PROCEDURE — 87340 HEPATITIS B SURFACE AG IA: CPT

## 2024-06-26 PROCEDURE — 87389 HIV-1 AG W/HIV-1&-2 AB AG IA: CPT

## 2024-06-26 PROCEDURE — 86900 BLOOD TYPING SEROLOGIC ABO: CPT

## 2024-06-26 PROCEDURE — 86706 HEP B SURFACE ANTIBODY: CPT

## 2024-06-26 RX ORDER — SODIUM CHLORIDE, SODIUM LACTATE, POTASSIUM CHLORIDE, CALCIUM CHLORIDE 600; 310; 30; 20 MG/100ML; MG/100ML; MG/100ML; MG/100ML
INJECTION, SOLUTION INTRAVENOUS CONTINUOUS
Status: CANCELLED | OUTPATIENT
Start: 2024-06-26

## 2024-06-26 RX ORDER — ACETAMINOPHEN 325 MG/1
975 TABLET ORAL ONCE
Status: CANCELLED | OUTPATIENT
Start: 2024-06-26 | End: 2024-06-26

## 2024-06-26 RX ORDER — LIDOCAINE 40 MG/G
CREAM TOPICAL
Status: DISCONTINUED | OUTPATIENT
Start: 2024-06-26 | End: 2024-06-27 | Stop reason: HOSPADM

## 2024-06-26 RX ORDER — PIPERACILLIN SODIUM, TAZOBACTAM SODIUM 3; .375 G/15ML; G/15ML
3.38 INJECTION, POWDER, LYOPHILIZED, FOR SOLUTION INTRAVENOUS ONCE
Status: COMPLETED | OUTPATIENT
Start: 2024-06-26 | End: 2024-06-27

## 2024-06-26 RX ORDER — PIPERACILLIN SODIUM, TAZOBACTAM SODIUM 3; .375 G/15ML; G/15ML
3.38 INJECTION, POWDER, LYOPHILIZED, FOR SOLUTION INTRAVENOUS
Status: DISCONTINUED | OUTPATIENT
Start: 2024-06-26 | End: 2024-06-27 | Stop reason: HOSPADM

## 2024-06-26 RX ORDER — FLUCONAZOLE 2 MG/ML
400 INJECTION, SOLUTION INTRAVENOUS ONCE
Status: COMPLETED | OUTPATIENT
Start: 2024-06-27 | End: 2024-06-27

## 2024-06-26 RX ORDER — LIDOCAINE 40 MG/G
CREAM TOPICAL
Status: CANCELLED | OUTPATIENT
Start: 2024-06-26

## 2024-06-26 ASSESSMENT — COLUMBIA-SUICIDE SEVERITY RATING SCALE - C-SSRS
6. HAVE YOU EVER DONE ANYTHING, STARTED TO DO ANYTHING, OR PREPARED TO DO ANYTHING TO END YOUR LIFE?: NO
1. IN THE PAST MONTH, HAVE YOU WISHED YOU WERE DEAD OR WISHED YOU COULD GO TO SLEEP AND NOT WAKE UP?: NO
2. HAVE YOU ACTUALLY HAD ANY THOUGHTS OF KILLING YOURSELF IN THE PAST MONTH?: NO

## 2024-06-26 ASSESSMENT — ACTIVITIES OF DAILY LIVING (ADL)
ADLS_ACUITY_SCORE: 22
ADLS_ACUITY_SCORE: 37

## 2024-06-26 ASSESSMENT — LIFESTYLE VARIABLES: TOBACCO_USE: 0

## 2024-06-26 ASSESSMENT — ENCOUNTER SYMPTOMS: SEIZURES: 0

## 2024-06-26 NOTE — TELEPHONE ENCOUNTER
"TRANSPLANT OR REPORT    Organ: Liver  Laterality (if known): N/A  Organ Location: Local    UN ID: [id number]  Donor OR Time: 6/27 @ 0600  Expected/Actual Cross Clamp Time: 6/27 @ 0700  Expected Organ Arrival Time: 6/27 @ 0900    Surgeon: Dr. Dodson  Time in OR: 6/27 @ 0800  Time in 3C (N/A for AILYN): N/A    Recipient Details  Admission ETA: Already admitted  Unit: 7A  Isolation: None  Latex Allergy: No  : N/A  Diagnosis: ESLD    Liver Transplants  Bypass/Perfusion: Yes  Cellsaver: Yes  Hemodialysis: No  ~ \"RENAL STAFF TEACHING SERVICE MEDICINE\" : Remind AILYN Fellow to discuss with nephrology on call.  ~ CRRT Resource Nurse: N/A  (Telephone Number for CRRT [phone]. When prompted, the caller should say  CRRT Resource 1\")    Kidney/Panc Transplants  XM Status (Need to wait for XM?): No    Liver or KP/PA Recipients - Vessel Banking:  Donor has positive serologies for HIV/HCV/HBV: No  Donor has risk criteria for HIV/HCV/HBV: No      Transplant Coordinator Contact Info: Kerri [phone] until 0700      Vessel Bank Information  Transplant hospitals must not store a donor s extra vessels if the donor has tested positive for any of the following:   - HIV by antibody, antigen, or nucleic acid test (PEDRO)   - Hepatitis B surface antigen (HBsAg)   - Hepatitis B (HBV) by PEDRO   - Hepatitis C (HCV) by antibody or PEDRO     Extra vessels from donors that do not test positive for HIV, HBV, or HCV as above may be stored    "

## 2024-06-26 NOTE — PLAN OF CARE
Goal Outcome Evaluation:       BP (!) 121/100 (BP Location: Left arm)   Pulse 94   Temp 97.3  F (36.3  C) (Oral)   Resp 20   Wt 92.7 kg (204 lb 4.8 oz)   SpO2 95%   BMI 26.23 kg/m      Shift: 3227-8116  Isolation Status: NA  VSS on RA, afebrile  Neuro: Aox4  Behaviors: pleasant & cooperative  BG: NA  Labs: Pending  Respiratory: WNL  Cardiac: WNL  Pain/Nausea: Denies  PRN: NA  Diet: NPO  IV Access: PIV  Infusion(s): NA  Lines/Drains: NA  GI/: voiding adequately, LBM 6/25  Skin: No concerns  Mobility: UAL  Plan: Liver transplant tomorrow AM

## 2024-06-26 NOTE — PLAN OF CARE
Problem: Adult Inpatient Plan of Care  Goal: Plan of Care Review  Description: The Plan of Care Review/Shift note should be completed every shift.  The Outcome Evaluation is a brief statement about your assessment that the patient is improving, declining, or no change.  This information will be displayed automatically on your shift  note.  Outcome: Progressing  Goal: Absence of Hospital-Acquired Illness or Injury  Intervention: Identify and Manage Fall Risk  Recent Flowsheet Documentation  Taken 6/26/2024 1628 by Hailee Noble RN  Safety Promotion/Fall Prevention: activity supervised  Intervention: Prevent Skin Injury  Recent Flowsheet Documentation  Taken 6/26/2024 1628 by Hailee Noble, RN  Body Position: position changed independently  Goal: Readiness for Transition of Care  Intervention: Mutually Develop Transition Plan  Recent Flowsheet Documentation  Taken 6/26/2024 1600 by Hailee Noble, RN  Equipment Currently Used at Home: none   Goal Outcome Evaluation:

## 2024-06-26 NOTE — PLAN OF CARE
Problem: Adult Inpatient Plan of Care  Goal: Absence of Hospital-Acquired Illness or Injury  Intervention: Identify and Manage Fall Risk  Recent Flowsheet Documentation  Taken 6/26/2024 1628 by Hailee Noble, RN  Safety Promotion/Fall Prevention: activity supervised  Intervention: Prevent Skin Injury  Recent Flowsheet Documentation  Taken 6/26/2024 1628 by Hailee Noble, RN  Body Position: position changed independently  Goal: Readiness for Transition of Care  Intervention: Mutually Develop Transition Plan  Recent Flowsheet Documentation  Taken 6/26/2024 1600 by Hailee Noble, RN  Equipment Currently Used at Home: none   Goal Outcome Evaluation:

## 2024-06-26 NOTE — H&P
[facility]    History and Physical  Solid Organ Transplant     Date of Admission:  6/26/24    Assessment & Plan   West Van is a 48 year old male with a past medical history significant for End stage liver disease secondary to non-alcoholic steatohepatitis. This is on a background past medical history of Hepatocellular carcinoma s/p ablation of liver tumor (12/6/2022).     Plan:  -[place] to outpatient for pre-op work-up  -Pre-op labs, including BMP, CBC, coag panel, viral serologies  -EKG, CXR        Delroy Perdomo MD    Code Status   Full Code      Chief Complaint   Liver transplant candidate.     History of Present Illness   West Van is a 48 year old male with a past medical history significant for end stage liver disease secondary to steatohepatitis. He had a right chest wall mass (lipoma) excised on February 1st, 2024; the incision to excise the chest wall mass was carried down to the 6th ICS.  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     Since his last clinic visit, he had no recent hospitalizations or visits to the ED. He had no further surgeries after Feb. 1st, 2024. The only infection he had was a non-healing wound on his 5th digit for which he took PO ABX and is now healed. His last clinic visit for his HCCa was on May 2023 with no signs of recurrence, and is currently on a 3-monthly visit. He never had lung or leg clots. Previous reaction to anesthesia: n/v and delay in urinary passing. No allergies. Never had a blood transfusion.     He explained he has baseline right chest and RUQ pain controlled on Gabapentin.     Patient is very active at baseline and walks 10,000 steps with his dog.          Past Medical History    I have reviewed this patient's medical history and updated it with pertinent information if needed.   Past Medical History:   Diagnosis Date    Benign essential HTN 04/13/2016    Diabetes (H)     Kidney stone     Liver cancer (H) 10/04/2022    Liver cirrhosis secondary to nonalcoholic steatohepatitis (RIOS) (H)     RIOS (nonalcoholic steatohepatitis) 06/02/2023    Neuroendocrine carcinoma of lung (H)     right lung, lobectomy 3/2023    PONV (postoperative nausea and vomiting)        Past Surgical History   I have reviewed this patient's surgical history and updated it with pertinent information if needed.  Past Surgical History:   Procedure Laterality Date    BRONCHOSCOPY, WITH BIOPSY, ROBOT ASSISTED N/A 1/17/2023    Procedure: BRONCHOSCOPY, USING OPTICAL TRACKING SYSTEM, ion;  Surgeon: Ani Guillaume MD;  Location: [facility] OR    DAVINCI LOBECTOMY LUNG Right 3/8/2023    Procedure: Robot-assisted right thoracoscopic lower lobectomy, mediastinal lymph node dissection, intercostal nerve cryoablation ;  Surgeon: Matheus Haas MD;  Location: [facility] OR    ENDOBRONCHIAL ULTRASOUND FLEXIBLE N/A 1/17/2023    Procedure: endobronchial  ultrasound and transbronchial biopsies;  Surgeon: Ani Guillaume MD;  Location: [facility] OR    EXCISE TUMOR CHEST WALL Right 2/1/2024    Procedure: Excision chest wall mass;  Surgeon: Matheus Haas MD;  Location: [facility] OR    LAPAROSCOPIC ABLATION LIVER TUMOR N/A 12/6/2022    Procedure: Diagnostic Laparoscopy, Hepatic Ultrasound, Laparoscopic ultrasound guided microwave ablation of liver tumor x1;  Surgeon: Armando Munguia MD;  Location: [facility] OR    LAPAROSCOPIC BIOPSY LIVER N/A 12/6/2022    Procedure: Laparoscopic Ultrasound Guided liver biopsy;  Surgeon: Armando Munguia MD;  Location: [facility] OR    LAPAROSCOPIC CHOLECYSTECTOMY N/A 12/6/2022    Procedure: Laparoscopic cholecystectomy;  Surgeon: Armando Munguia MD;  Location: [facility] OR       Prior to Admission Medications   Prior  to Admission Medications   Prescriptions Last Dose Informant Patient Reported? Taking?   CALCIUM PO   Yes No   Sig: Take 600 mg by mouth every morning   Milk Thistle-Dand-Fennel-Licor (MILK THISTLE XTRA) CAPS capsule  Self Yes No   Sig: Take 1 capsule by mouth every morning   OZEMPIC, 0.25 OR 0.5 MG/DOSE, 2 MG/3ML pen Past Week  Yes Yes   Sig: Inject 0.5 mg Subcutaneous every 7 days   SM GENTLE LAXATIVE 5 MG EC tablet   Yes No   Sig: TAKE 4 TABLETS (20 MG) BY MOUTH 1 TIME FOR 1 DOSE   ULTICARE MINI 31G X 6 MM insulin pen needle Unknown Self Yes Yes   Sig: Inject 1 each Subcutaneous At Bedtime   atenolol (TENORMIN) 25 MG tablet 6/26/2024 at 0800 Self Yes Yes   Sig: Take 25 mg by mouth 2 times daily   calcium carbonate (OS-CHUCK) 600 MG tablet 6/26/2024  Yes Yes   Sig: Take 600 mg by mouth   fluticasone (FLONASE) 50 MCG/ACT nasal spray More than a month  Yes Yes   gabapentin (NEURONTIN) 100 MG capsule 6/26/2024  No Yes   Sig: Take 1 capsule (100 mg) by mouth 2 times daily   hydrochlorothiazide (HYDRODIURIL) 25 MG tablet 6/26/2024  Yes Yes   Sig: Take 12.5 mg by mouth every morning   lactulose 20 GM/30ML solution Past Week  No Yes   Sig: Take 30 mLs (20 g) by mouth daily   Patient taking differently: Take 20 g by mouth every morning   magnesium 250 MG tablet  Self Yes No   Sig: Take 1 tablet by mouth every morning   metFORMIN (GLUCOPHAGE XR) 500 MG 24 hr tablet 6/26/2024 Self Yes Yes   Sig: Take 500 mg by mouth every morning   oxyCODONE (ROXICODONE) 5 MG tablet More than a month  No Yes   Sig: Take 1-2 tablets (5-10 mg) by mouth every 4 hours as needed for moderate to severe pain   polyethylene glycol (MIRALAX) 17 GM/Dose powder Unknown  Yes Yes   Sig: Take 17 g by mouth as needed   senna-docusate (SENOKOT-S/PERICOLACE) 8.6-50 MG tablet Unknown  No Yes   Sig: Take 1 tablet by mouth 2 times daily Reduce dose or temporarily discontinue if having loose stools.   Patient taking differently: Take 1 tablet by mouth daily as  needed Reduce dose or temporarily discontinue if having loose stools.      Facility-Administered Medications: None     Allergies   No Known Allergies     Social History   I have reviewed this patient's social history and updated it with pertinent information if needed. West Van has never smoked or done recreational drugs. He last consumed alcohol in 1999.     Family History   I have reviewed this patient's family history and updated it with pertinent information if needed.   Family History   Problem Relation Age of Onset    Breast Cancer Mother     Cancer Father         lip    Melanoma Cousin     Cirrhosis No family hx of        Review of Systems   The 10 point Review of Systems is negative other than noted in the HPI or here. .   Review Of Systems  Skin: negative, negative for  Eyes: negative  Ears/Nose/Throat: negative  Respiratory: No shortness of breath, dyspnea on exertion, cough, or hemoptysis  Cardiovascular: negative and negative for  Gastrointestinal: negative and negative for  Genitourinary: negative  Musculoskeletal: negative  Neurologic: negative and negative for  Psychiatric: negative and negative for  Hematologic/Lymphatic/Immunologic: negative  Endocrine: negative and negative for   Physical Exam   Temp: 97.3  F (36.3  C) Temp src: Oral BP: (!) 121/100 Pulse: 94   Resp: 20 SpO2: 95 % O2 Device: None (Room air)    Vital Signs with Ranges  Temp:  [97.3  F (36.3  C)] 97.3  F (36.3  C)  Pulse:  [94] 94  Resp:  [20] 20  BP: (121)/(100) 121/100  SpO2:  [95 %] 95 %  204 lbs 4.8 oz    General: Alert, well-appearing, talkative and in no acute distress.  Neuro: Awake, alert and oriented.   HEENT: Normocephalic, atraumatic. Patent nares.    Neck: No cervical lymphadenopathy. No thyromegaly.   Respiratory: Non-labored breathing. Lung sounds clear to auscultation bilaterally without rhonchi, crackles or wheeze  Cardiovascular: Regular rate and rhythm.   Gastrointestinal: Abdomen soft, non-distended,  slightly tender to palpation in RUQ. No organomegaly or masses appreciated. No inguinal hernias. No ascites.   MSK/Extremities: Moving all four extremities. No limb deformities. No peripheral edema. peripheral pulses intact, extremities well perfused.   Incisions/Skin: As noted above. No rashes appreciated. Laparoscopic scars, and bilateral lower quadrant bruising from insulin injections.       Data   Results for orders placed or performed during the hospital encounter of 06/26/24 (from the past 24 hour(s))   CBC with platelets differential    Narrative    The following orders were created for panel order CBC with platelets differential.  Procedure                               Abnormality         Status                     ---------                               -----------         ------                     CBC with platelets and d...[[id number]]                      In process                   Please view results for these tests on the individual orders.   INR   Result Value Ref Range    INR 1.24 (H) 0.85 - 1.15   Partial thromboplastin time   Result Value Ref Range    aPTT 31 22 - 38 Seconds   Fibrinogen activity   Result Value Ref Range    Fibrinogen Activity 366 170 - 490 mg/dL   ABO/Rh type and screen    Narrative    The following orders were created for panel order ABO/Rh type and screen.  Procedure                               Abnormality         Status                     ---------                               -----------         ------                     Adult Type and Screen[[id number]]                            In process                   Please view results for these tests on the individual orders.       I have reviewed history, examined patient and discussed plan with the fellow/resident/JAVIER.    I concur with the findings in this note.    Time spent on admission activities: 45 minutes.

## 2024-06-27 ENCOUNTER — APPOINTMENT (OUTPATIENT)
Dept: GENERAL RADIOLOGY | Facility: CLINIC | Age: 49
End: 2024-06-27
Attending: SURGERY
Payer: COMMERCIAL

## 2024-06-27 ENCOUNTER — APPOINTMENT (OUTPATIENT)
Dept: ULTRASOUND IMAGING | Facility: CLINIC | Age: 49
End: 2024-06-27
Attending: STUDENT IN AN ORGANIZED HEALTH CARE EDUCATION/TRAINING PROGRAM
Payer: COMMERCIAL

## 2024-06-27 ENCOUNTER — APPOINTMENT (OUTPATIENT)
Dept: CARDIOLOGY | Facility: CLINIC | Age: 49
End: 2024-06-27
Attending: ANESTHESIOLOGY
Payer: COMMERCIAL

## 2024-06-27 ENCOUNTER — APPOINTMENT (OUTPATIENT)
Dept: GENERAL RADIOLOGY | Facility: CLINIC | Age: 49
End: 2024-06-27
Attending: STUDENT IN AN ORGANIZED HEALTH CARE EDUCATION/TRAINING PROGRAM
Payer: COMMERCIAL

## 2024-06-27 ENCOUNTER — ANESTHESIA (OUTPATIENT)
Dept: SURGERY | Facility: CLINIC | Age: 49
End: 2024-06-27
Payer: COMMERCIAL

## 2024-06-27 PROBLEM — C22.0 HCC (HEPATOCELLULAR CARCINOMA) (H): Status: ACTIVE | Noted: 2024-06-27

## 2024-06-27 LAB
ALBUMIN SERPL BCG-MCNC: 4 G/DL (ref 3.5–5.2)
ALLEN'S TEST: ABNORMAL
ALP SERPL-CCNC: 96 U/L (ref 40–150)
ALT SERPL W P-5'-P-CCNC: 212 U/L (ref 0–70)
ANION GAP SERPL CALCULATED.3IONS-SCNC: 21 MMOL/L (ref 7–15)
ANION GAP SERPL CALCULATED.3IONS-SCNC: 25 MMOL/L (ref 7–15)
APTT PPP: 39 SECONDS (ref 22–38)
APTT PPP: 71 SECONDS (ref 22–38)
AST SERPL W P-5'-P-CCNC: 416 U/L (ref 0–45)
BASE EXCESS BLDA CALC-SCNC: -0.3 MMOL/L (ref -3–3)
BASE EXCESS BLDA CALC-SCNC: -0.5 MMOL/L (ref -3–3)
BASE EXCESS BLDA CALC-SCNC: -1.3 MMOL/L (ref -3–3)
BASE EXCESS BLDA CALC-SCNC: -2.4 MMOL/L (ref -3–3)
BASE EXCESS BLDA CALC-SCNC: -3.4 MMOL/L (ref -3–3)
BASE EXCESS BLDA CALC-SCNC: -4.3 MMOL/L (ref -3–3)
BASE EXCESS BLDA CALC-SCNC: -4.7 MMOL/L (ref -3–3)
BASE EXCESS BLDA CALC-SCNC: -4.9 MMOL/L (ref -3–3)
BASE EXCESS BLDA CALC-SCNC: -5.5 MMOL/L (ref -3–3)
BASE EXCESS BLDA CALC-SCNC: -5.7 MMOL/L (ref -3–3)
BASE EXCESS BLDA CALC-SCNC: -6.1 MMOL/L (ref -3–3)
BASE EXCESS BLDA CALC-SCNC: -6.4 MMOL/L (ref -3–3)
BASE EXCESS BLDA CALC-SCNC: -6.7 MMOL/L (ref -3–3)
BASE EXCESS BLDA CALC-SCNC: -6.8 MMOL/L (ref -3–3)
BASE EXCESS BLDA CALC-SCNC: -7 MMOL/L (ref -3–3)
BASOPHILS # BLD AUTO: 0 10E3/UL (ref 0–0.2)
BASOPHILS # BLD AUTO: 0 10E3/UL (ref 0–0.2)
BASOPHILS NFR BLD AUTO: 0 %
BASOPHILS NFR BLD AUTO: 0 %
BILIRUB DIRECT SERPL-MCNC: 1.97 MG/DL (ref 0–0.3)
BILIRUB SERPL-MCNC: 3.9 MG/DL
BLD PROD TYP BPU: NORMAL
BLOOD COMPONENT TYPE: NORMAL
BUN SERPL-MCNC: 12 MG/DL (ref 6–20)
BUN SERPL-MCNC: 12.8 MG/DL (ref 6–20)
CA-I BLD-MCNC: 4.1 MG/DL (ref 4.4–5.2)
CA-I BLD-MCNC: 4.5 MG/DL (ref 4.4–5.2)
CA-I BLD-MCNC: 4.6 MG/DL (ref 4.4–5.2)
CA-I BLD-MCNC: 4.6 MG/DL (ref 4.4–5.2)
CA-I BLD-MCNC: 4.7 MG/DL (ref 4.4–5.2)
CA-I BLD-MCNC: 4.7 MG/DL (ref 4.4–5.2)
CA-I BLD-MCNC: 4.8 MG/DL (ref 4.4–5.2)
CA-I BLD-MCNC: 4.9 MG/DL (ref 4.4–5.2)
CA-I BLD-MCNC: 5 MG/DL (ref 4.4–5.2)
CA-I BLD-MCNC: 5.1 MG/DL (ref 4.4–5.2)
CALCIUM SERPL-MCNC: 9.6 MG/DL (ref 8.6–10)
CALCIUM SERPL-MCNC: 9.8 MG/DL (ref 8.6–10)
CHLORIDE SERPL-SCNC: 103 MMOL/L (ref 98–107)
CHLORIDE SERPL-SCNC: 104 MMOL/L (ref 98–107)
CLOT INIT KAOL IND TO POST HEP NEUT TRTO: 1 {RATIO}
CLOT INIT KAOL IND TO POST HEP NEUT TRTO: 1.1 {RATIO}
CLOT INIT KAOLIN IND BLD US: 147 SEC (ref 113–166)
CLOT INIT KAOLIN IND BLD US: 151 SEC (ref 113–166)
CLOT INIT KAOLIN IND BLD US: 157 SEC (ref 113–166)
CLOT INIT KAOLIN IND BLD US: 163 SEC (ref 113–166)
CLOT INIT KAOLIN IND P HEP NEUT BLD US: 136 SEC (ref 103–153)
CLOT INIT KAOLIN IND P HEP NEUT BLD US: 140 SEC (ref 103–153)
CLOT INIT KAOLIN IND P HEP NEUT BLD US: 148 SEC (ref 103–153)
CLOT INIT KAOLIN IND P HEP NEUT BLD US: 150 SEC (ref 103–153)
CLOT STIFF PLT CONT BLD CALC: 11.1 HPA (ref 11.9–29.8)
CLOT STIFF PLT CONT BLD CALC: 16.9 HPA (ref 11.9–29.8)
CLOT STIFF PLT CONT BLD CALC: 4.7 HPA (ref 11.9–29.8)
CLOT STIFF PLT CONT BLD CALC: 6.1 HPA (ref 11.9–29.8)
CLOT STIFF TF IND P HEP NEUT BLD US: 11.8 HPA (ref 13–33.2)
CLOT STIFF TF IND P HEP NEUT BLD US: 18.6 HPA (ref 13–33.2)
CLOT STIFF TF IND P HEP NEUT BLD US: 4.9 HPA (ref 13–33.2)
CLOT STIFF TF IND P HEP NEUT BLD US: 6.6 HPA (ref 13–33.2)
CLOT STIFF TF IND+IIB-IIIA INH P HEP NEU: 0.2 HPA (ref 1–3.7)
CLOT STIFF TF IND+IIB-IIIA INH P HEP NEU: 0.5 HPA (ref 1–3.7)
CLOT STIFF TF IND+IIB-IIIA INH P HEP NEU: 0.7 HPA (ref 1–3.7)
CLOT STIFF TF IND+IIB-IIIA INH P HEP NEU: 1.7 HPA (ref 1–3.7)
CODING SYSTEM: NORMAL
COHGB MFR BLD: >100 % (ref 96–97)
CREAT SERPL-MCNC: 0.79 MG/DL (ref 0.67–1.17)
CREAT SERPL-MCNC: 0.82 MG/DL (ref 0.67–1.17)
DEPRECATED HCO3 PLAS-SCNC: 19 MMOL/L (ref 22–29)
DEPRECATED HCO3 PLAS-SCNC: 20 MMOL/L (ref 22–29)
EGFRCR SERPLBLD CKD-EPI 2021: >90 ML/MIN/1.73M2
EGFRCR SERPLBLD CKD-EPI 2021: >90 ML/MIN/1.73M2
EOSINOPHIL # BLD AUTO: 0 10E3/UL (ref 0–0.7)
EOSINOPHIL # BLD AUTO: 0 10E3/UL (ref 0–0.7)
EOSINOPHIL NFR BLD AUTO: 0 %
EOSINOPHIL NFR BLD AUTO: 0 %
ERYTHROCYTE [DISTWIDTH] IN BLOOD BY AUTOMATED COUNT: 13.2 % (ref 10–15)
ERYTHROCYTE [DISTWIDTH] IN BLOOD BY AUTOMATED COUNT: 13.3 % (ref 10–15)
FIBRINOGEN PPP-MCNC: 227 MG/DL (ref 170–490)
FIBRINOGEN PPP-MCNC: 239 MG/DL (ref 170–490)
FIBRINOGEN PPP-MCNC: 91 MG/DL (ref 170–490)
GLUCOSE BLD-MCNC: 108 MG/DL (ref 70–99)
GLUCOSE BLD-MCNC: 112 MG/DL (ref 70–99)
GLUCOSE BLD-MCNC: 115 MG/DL (ref 70–99)
GLUCOSE BLD-MCNC: 129 MG/DL (ref 70–99)
GLUCOSE BLD-MCNC: 129 MG/DL (ref 70–99)
GLUCOSE BLD-MCNC: 131 MG/DL (ref 70–99)
GLUCOSE BLD-MCNC: 137 MG/DL (ref 70–99)
GLUCOSE BLD-MCNC: 143 MG/DL (ref 70–99)
GLUCOSE BLD-MCNC: 148 MG/DL (ref 70–99)
GLUCOSE BLD-MCNC: 155 MG/DL (ref 70–99)
GLUCOSE BLD-MCNC: 159 MG/DL (ref 70–99)
GLUCOSE BLD-MCNC: 80 MG/DL (ref 70–99)
GLUCOSE BLD-MCNC: 88 MG/DL (ref 70–99)
GLUCOSE BLD-MCNC: 98 MG/DL (ref 70–99)
GLUCOSE BLDC GLUCOMTR-MCNC: 170 MG/DL (ref 70–99)
GLUCOSE BLDC GLUCOMTR-MCNC: 218 MG/DL (ref 70–99)
GLUCOSE BLDC GLUCOMTR-MCNC: 221 MG/DL (ref 70–99)
GLUCOSE BLDC GLUCOMTR-MCNC: 224 MG/DL (ref 70–99)
GLUCOSE BLDC GLUCOMTR-MCNC: 247 MG/DL (ref 70–99)
GLUCOSE SERPL-MCNC: 183 MG/DL (ref 70–99)
GLUCOSE SERPL-MCNC: 242 MG/DL (ref 70–99)
HCO3 BLD-SCNC: 20 MMOL/L (ref 21–28)
HCO3 BLDA-SCNC: 19 MMOL/L (ref 21–28)
HCO3 BLDA-SCNC: 20 MMOL/L (ref 21–28)
HCO3 BLDA-SCNC: 21 MMOL/L (ref 21–28)
HCO3 BLDA-SCNC: 22 MMOL/L (ref 21–28)
HCO3 BLDA-SCNC: 23 MMOL/L (ref 21–28)
HCO3 BLDA-SCNC: 24 MMOL/L (ref 21–28)
HCO3 BLDA-SCNC: 25 MMOL/L (ref 21–28)
HCT VFR BLD AUTO: 35.4 % (ref 40–53)
HCT VFR BLD AUTO: 36.4 % (ref 40–53)
HGB BLD-MCNC: 10 G/DL (ref 13.3–17.7)
HGB BLD-MCNC: 10.2 G/DL (ref 13.3–17.7)
HGB BLD-MCNC: 11.4 G/DL (ref 13.3–17.7)
HGB BLD-MCNC: 11.9 G/DL (ref 13.3–17.7)
HGB BLD-MCNC: 12 G/DL (ref 13.3–17.7)
HGB BLD-MCNC: 12.1 G/DL (ref 13.3–17.7)
HGB BLD-MCNC: 12.1 G/DL (ref 13.3–17.7)
HGB BLD-MCNC: 12.3 G/DL (ref 13.3–17.7)
HGB BLD-MCNC: 13 G/DL (ref 13.3–17.7)
HGB BLD-MCNC: 13.1 G/DL (ref 13.3–17.7)
HGB BLD-MCNC: 13.4 G/DL (ref 13.3–17.7)
HGB BLD-MCNC: 13.5 G/DL (ref 13.3–17.7)
HGB BLD-MCNC: 13.5 G/DL (ref 13.3–17.7)
HGB BLD-MCNC: 14.4 G/DL (ref 13.3–17.7)
HGB BLD-MCNC: 14.9 G/DL (ref 13.3–17.7)
HGB BLD-MCNC: 14.9 G/DL (ref 13.3–17.7)
IMM GRANULOCYTES # BLD: 0.1 10E3/UL
IMM GRANULOCYTES # BLD: 0.1 10E3/UL
IMM GRANULOCYTES NFR BLD: 0 %
IMM GRANULOCYTES NFR BLD: 1 %
INR PPP: 1.73 (ref 0.85–1.15)
INR PPP: 1.83 (ref 0.85–1.15)
INR PPP: 2.92 (ref 0.85–1.15)
ISSUE DATE AND TIME: NORMAL
LACTATE BLD-SCNC: 1.4 MMOL/L (ref 0.7–2)
LACTATE BLD-SCNC: 1.7 MMOL/L (ref 0.7–2)
LACTATE BLD-SCNC: 10.2 MMOL/L (ref 0.7–2)
LACTATE BLD-SCNC: 10.8 MMOL/L (ref 0.7–2)
LACTATE BLD-SCNC: 10.9 MMOL/L (ref 0.7–2)
LACTATE BLD-SCNC: 11 MMOL/L (ref 0.7–2)
LACTATE BLD-SCNC: 11.2 MMOL/L (ref 0.7–2)
LACTATE BLD-SCNC: 11.7 MMOL/L (ref 0.7–2)
LACTATE BLD-SCNC: 2.2 MMOL/L (ref 0.7–2)
LACTATE BLD-SCNC: 4.1 MMOL/L (ref 0.7–2)
LACTATE BLD-SCNC: 5.8 MMOL/L (ref 0.7–2)
LACTATE BLD-SCNC: 7 MMOL/L (ref 0.7–2)
LACTATE BLD-SCNC: 7.9 MMOL/L (ref 0.7–2)
LACTATE BLD-SCNC: 9.4 MMOL/L (ref 0.7–2)
LACTATE SERPL-SCNC: 11.8 MMOL/L (ref 0.7–2)
LACTATE SERPL-SCNC: 9.9 MMOL/L (ref 0.7–2)
LYMPHOCYTES # BLD AUTO: 0.6 10E3/UL (ref 0.8–5.3)
LYMPHOCYTES # BLD AUTO: 0.6 10E3/UL (ref 0.8–5.3)
LYMPHOCYTES NFR BLD AUTO: 4 %
LYMPHOCYTES NFR BLD AUTO: 4 %
MAGNESIUM SERPL-MCNC: 1.9 MG/DL (ref 1.7–2.3)
MCH RBC QN AUTO: 29.6 PG (ref 26.5–33)
MCH RBC QN AUTO: 29.7 PG (ref 26.5–33)
MCHC RBC AUTO-ENTMCNC: 33.8 G/DL (ref 31.5–36.5)
MCHC RBC AUTO-ENTMCNC: 34.2 G/DL (ref 31.5–36.5)
MCV RBC AUTO: 87 FL (ref 78–100)
MCV RBC AUTO: 88 FL (ref 78–100)
MONOCYTES # BLD AUTO: 1.8 10E3/UL (ref 0–1.3)
MONOCYTES # BLD AUTO: 2.3 10E3/UL (ref 0–1.3)
MONOCYTES NFR BLD AUTO: 13 %
MONOCYTES NFR BLD AUTO: 16 %
MRSA DNA SPEC QL NAA+PROBE: NEGATIVE
NEUTROPHILS # BLD AUTO: 11.2 10E3/UL (ref 1.6–8.3)
NEUTROPHILS # BLD AUTO: 11.3 10E3/UL (ref 1.6–8.3)
NEUTROPHILS NFR BLD AUTO: 79 %
NEUTROPHILS NFR BLD AUTO: 83 %
NRBC # BLD AUTO: 0 10E3/UL
NRBC # BLD AUTO: 0 10E3/UL
NRBC BLD AUTO-RTO: 0 /100
NRBC BLD AUTO-RTO: 0 /100
O2/TOTAL GAS SETTING VFR VENT: 34 %
O2/TOTAL GAS SETTING VFR VENT: 35 %
O2/TOTAL GAS SETTING VFR VENT: 37 %
O2/TOTAL GAS SETTING VFR VENT: 38 %
O2/TOTAL GAS SETTING VFR VENT: 39 %
O2/TOTAL GAS SETTING VFR VENT: 41 %
O2/TOTAL GAS SETTING VFR VENT: 41 %
O2/TOTAL GAS SETTING VFR VENT: 50 %
OXYHGB MFR BLDA: 97 % (ref 92–100)
OXYHGB MFR BLDA: 98 % (ref 92–100)
PCO2 BLD: 40 MM HG (ref 35–45)
PCO2 BLDA: 35 MM HG (ref 35–45)
PCO2 BLDA: 36 MM HG (ref 35–45)
PCO2 BLDA: 37 MM HG (ref 35–45)
PCO2 BLDA: 38 MM HG (ref 35–45)
PCO2 BLDA: 40 MM HG (ref 35–45)
PCO2 BLDA: 42 MM HG (ref 35–45)
PH BLD: 7.31 [PH] (ref 7.35–7.45)
PH BLDA: 7.3 [PH] (ref 7.35–7.45)
PH BLDA: 7.31 [PH] (ref 7.35–7.45)
PH BLDA: 7.31 [PH] (ref 7.35–7.45)
PH BLDA: 7.32 [PH] (ref 7.35–7.45)
PH BLDA: 7.33 [PH] (ref 7.35–7.45)
PH BLDA: 7.33 [PH] (ref 7.35–7.45)
PH BLDA: 7.34 [PH] (ref 7.35–7.45)
PH BLDA: 7.35 [PH] (ref 7.35–7.45)
PH BLDA: 7.35 [PH] (ref 7.35–7.45)
PH BLDA: 7.38 [PH] (ref 7.35–7.45)
PH BLDA: 7.38 [PH] (ref 7.35–7.45)
PH BLDA: 7.39 [PH] (ref 7.35–7.45)
PH BLDA: 7.4 [PH] (ref 7.35–7.45)
PH BLDA: 7.42 [PH] (ref 7.35–7.45)
PHOSPHATE SERPL-MCNC: 4 MG/DL (ref 2.5–4.5)
PLATELET # BLD AUTO: 147 10E3/UL (ref 150–450)
PLATELET # BLD AUTO: 205 10E3/UL (ref 150–450)
PLATELET # BLD AUTO: 215 10E3/UL (ref 150–450)
PO2 BLD: 182 MM HG (ref 80–105)
PO2 BLDA: 132 MM HG (ref 80–105)
PO2 BLDA: 138 MM HG (ref 80–105)
PO2 BLDA: 151 MM HG (ref 80–105)
PO2 BLDA: 161 MM HG (ref 80–105)
PO2 BLDA: 162 MM HG (ref 80–105)
PO2 BLDA: 172 MM HG (ref 80–105)
PO2 BLDA: 172 MM HG (ref 80–105)
PO2 BLDA: 176 MM HG (ref 80–105)
PO2 BLDA: 176 MM HG (ref 80–105)
PO2 BLDA: 179 MM HG (ref 80–105)
PO2 BLDA: 182 MM HG (ref 80–105)
PO2 BLDA: 196 MM HG (ref 80–105)
PO2 BLDA: 200 MM HG (ref 80–105)
PO2 BLDA: 202 MM HG (ref 80–105)
POTASSIUM BLD-SCNC: 3 MMOL/L (ref 3.4–5.3)
POTASSIUM BLD-SCNC: 3.2 MMOL/L (ref 3.4–5.3)
POTASSIUM BLD-SCNC: 3.4 MMOL/L (ref 3.4–5.3)
POTASSIUM BLD-SCNC: 3.7 MMOL/L (ref 3.4–5.3)
POTASSIUM BLD-SCNC: 3.7 MMOL/L (ref 3.4–5.3)
POTASSIUM BLD-SCNC: 3.8 MMOL/L (ref 3.4–5.3)
POTASSIUM BLD-SCNC: 3.9 MMOL/L (ref 3.4–5.3)
POTASSIUM BLD-SCNC: 4 MMOL/L (ref 3.4–5.3)
POTASSIUM BLD-SCNC: 4.1 MMOL/L (ref 3.4–5.3)
POTASSIUM SERPL-SCNC: 3.5 MMOL/L (ref 3.4–5.3)
POTASSIUM SERPL-SCNC: 3.8 MMOL/L (ref 3.4–5.3)
PROT SERPL-MCNC: 5.8 G/DL (ref 6.4–8.3)
RBC # BLD AUTO: 4.08 10E6/UL (ref 4.4–5.9)
RBC # BLD AUTO: 4.15 10E6/UL (ref 4.4–5.9)
SA TARGET DNA: POSITIVE
SAO2 % BLDA: 100 % (ref 96–97)
SAO2 % BLDA: 98 % (ref 92–100)
SAO2 % BLDA: 99 % (ref 96–97)
SAO2 % BLDA: 99 % (ref 96–97)
SODIUM BLD-SCNC: 140 MMOL/L (ref 135–145)
SODIUM BLD-SCNC: 140 MMOL/L (ref 135–145)
SODIUM BLD-SCNC: 141 MMOL/L (ref 135–145)
SODIUM BLD-SCNC: 142 MMOL/L (ref 135–145)
SODIUM BLD-SCNC: 142 MMOL/L (ref 135–145)
SODIUM BLD-SCNC: 144 MMOL/L (ref 135–145)
SODIUM BLD-SCNC: 145 MMOL/L (ref 135–145)
SODIUM BLD-SCNC: 146 MMOL/L (ref 135–145)
SODIUM BLD-SCNC: 147 MMOL/L (ref 135–145)
SODIUM BLD-SCNC: 147 MMOL/L (ref 135–145)
SODIUM SERPL-SCNC: 145 MMOL/L (ref 135–145)
SODIUM SERPL-SCNC: 147 MMOL/L (ref 135–145)
UNIT ABO/RH: NORMAL
UNIT NUMBER: NORMAL
UNIT STATUS: NORMAL
UNIT TYPE ISBT: 600
UNIT TYPE ISBT: 600
UNIT TYPE ISBT: 6200
WBC # BLD AUTO: 13.6 10E3/UL (ref 4–11)
WBC # BLD AUTO: 14.3 10E3/UL (ref 4–11)

## 2024-06-27 PROCEDURE — 250N000011 HC RX IP 250 OP 636: Performed by: SURGERY

## 2024-06-27 PROCEDURE — 370N000017 HC ANESTHESIA TECHNICAL FEE, PER MIN: Performed by: SURGERY

## 2024-06-27 PROCEDURE — 71045 X-RAY EXAM CHEST 1 VIEW: CPT | Mod: 26 | Performed by: RADIOLOGY

## 2024-06-27 PROCEDURE — P9059 PLASMA, FRZ BETWEEN 8-24HOUR: HCPCS

## 2024-06-27 PROCEDURE — 250N000011 HC RX IP 250 OP 636: Performed by: STUDENT IN AN ORGANIZED HEALTH CARE EDUCATION/TRAINING PROGRAM

## 2024-06-27 PROCEDURE — 82330 ASSAY OF CALCIUM: CPT

## 2024-06-27 PROCEDURE — 83036 HEMOGLOBIN GLYCOSYLATED A1C: CPT

## 2024-06-27 PROCEDURE — 999N000157 HC STATISTIC RCP TIME EA 10 MIN

## 2024-06-27 PROCEDURE — 84132 ASSAY OF SERUM POTASSIUM: CPT | Performed by: STUDENT IN AN ORGANIZED HEALTH CARE EDUCATION/TRAINING PROGRAM

## 2024-06-27 PROCEDURE — 93975 VASCULAR STUDY: CPT

## 2024-06-27 PROCEDURE — 87641 MR-STAPH DNA AMP PROBE: CPT | Performed by: STUDENT IN AN ORGANIZED HEALTH CARE EDUCATION/TRAINING PROGRAM

## 2024-06-27 PROCEDURE — 272N000001 HC OR GENERAL SUPPLY STERILE: Performed by: SURGERY

## 2024-06-27 PROCEDURE — 410N000004: Performed by: SURGERY

## 2024-06-27 PROCEDURE — 83605 ASSAY OF LACTIC ACID: CPT | Performed by: STUDENT IN AN ORGANIZED HEALTH CARE EDUCATION/TRAINING PROGRAM

## 2024-06-27 PROCEDURE — 258N000003 HC RX IP 258 OP 636

## 2024-06-27 PROCEDURE — 88342 IMHCHEM/IMCYTCHM 1ST ANTB: CPT | Mod: 26 | Performed by: PATHOLOGY

## 2024-06-27 PROCEDURE — 812N000006 HC ACQUISITION LIVER CADAVER DONOR

## 2024-06-27 PROCEDURE — 85730 THROMBOPLASTIN TIME PARTIAL: CPT | Performed by: SURGERY

## 2024-06-27 PROCEDURE — 82810 BLOOD GASES O2 SAT ONLY: CPT

## 2024-06-27 PROCEDURE — 250N000012 HC RX MED GY IP 250 OP 636 PS 637: Performed by: STUDENT IN AN ORGANIZED HEALTH CARE EDUCATION/TRAINING PROGRAM

## 2024-06-27 PROCEDURE — 999N000063 XR ABDOMEN PORT 1 VIEW

## 2024-06-27 PROCEDURE — 85049 AUTOMATED PLATELET COUNT: CPT | Performed by: SURGERY

## 2024-06-27 PROCEDURE — 360N000078 HC SURGERY LEVEL 5, PER MIN: Performed by: SURGERY

## 2024-06-27 PROCEDURE — 250N000011 HC RX IP 250 OP 636

## 2024-06-27 PROCEDURE — P9045 ALBUMIN (HUMAN), 5%, 250 ML: HCPCS | Performed by: SURGERY

## 2024-06-27 PROCEDURE — 258N000003 HC RX IP 258 OP 636: Performed by: STUDENT IN AN ORGANIZED HEALTH CARE EDUCATION/TRAINING PROGRAM

## 2024-06-27 PROCEDURE — 250N000009 HC RX 250: Performed by: STUDENT IN AN ORGANIZED HEALTH CARE EDUCATION/TRAINING PROGRAM

## 2024-06-27 PROCEDURE — 250N000025 HC SEVOFLURANE, PER MIN: Performed by: SURGERY

## 2024-06-27 PROCEDURE — 85396 CLOTTING ASSAY WHOLE BLOOD: CPT

## 2024-06-27 PROCEDURE — 84100 ASSAY OF PHOSPHORUS: CPT

## 2024-06-27 PROCEDURE — 85049 AUTOMATED PLATELET COUNT: CPT

## 2024-06-27 PROCEDURE — 99223 1ST HOSP IP/OBS HIGH 75: CPT | Mod: GC | Performed by: INTERNAL MEDICINE

## 2024-06-27 PROCEDURE — P9045 ALBUMIN (HUMAN), 5%, 250 ML: HCPCS

## 2024-06-27 PROCEDURE — 250N000013 HC RX MED GY IP 250 OP 250 PS 637: Performed by: STUDENT IN AN ORGANIZED HEALTH CARE EDUCATION/TRAINING PROGRAM

## 2024-06-27 PROCEDURE — 85610 PROTHROMBIN TIME: CPT | Performed by: SURGERY

## 2024-06-27 PROCEDURE — 85730 THROMBOPLASTIN TIME PARTIAL: CPT | Performed by: STUDENT IN AN ORGANIZED HEALTH CARE EDUCATION/TRAINING PROGRAM

## 2024-06-27 PROCEDURE — S5010 5% DEXTROSE AND 0.45% SALINE: HCPCS | Performed by: STUDENT IN AN ORGANIZED HEALTH CARE EDUCATION/TRAINING PROGRAM

## 2024-06-27 PROCEDURE — 250N000013 HC RX MED GY IP 250 OP 250 PS 637

## 2024-06-27 PROCEDURE — 80076 HEPATIC FUNCTION PANEL: CPT | Performed by: STUDENT IN AN ORGANIZED HEALTH CARE EDUCATION/TRAINING PROGRAM

## 2024-06-27 PROCEDURE — P9016 RBC LEUKOCYTES REDUCED: HCPCS

## 2024-06-27 PROCEDURE — 272N000086 HC PACK RI-RAPID INFUSION: Performed by: SURGERY

## 2024-06-27 PROCEDURE — 88309 TISSUE EXAM BY PATHOLOGIST: CPT | Mod: 26 | Performed by: PATHOLOGY

## 2024-06-27 PROCEDURE — 88304 TISSUE EXAM BY PATHOLOGIST: CPT | Mod: 26 | Performed by: PATHOLOGY

## 2024-06-27 PROCEDURE — 88313 SPECIAL STAINS GROUP 2: CPT | Mod: TC | Performed by: SURGERY

## 2024-06-27 PROCEDURE — 93975 VASCULAR STUDY: CPT | Mod: 26 | Performed by: RADIOLOGY

## 2024-06-27 PROCEDURE — 82310 ASSAY OF CALCIUM: CPT

## 2024-06-27 PROCEDURE — 85384 FIBRINOGEN ACTIVITY: CPT

## 2024-06-27 PROCEDURE — 250N000009 HC RX 250

## 2024-06-27 PROCEDURE — 85384 FIBRINOGEN ACTIVITY: CPT | Performed by: STUDENT IN AN ORGANIZED HEALTH CARE EDUCATION/TRAINING PROGRAM

## 2024-06-27 PROCEDURE — 85610 PROTHROMBIN TIME: CPT | Performed by: STUDENT IN AN ORGANIZED HEALTH CARE EDUCATION/TRAINING PROGRAM

## 2024-06-27 PROCEDURE — 272N000085 HC PACK CELL SAVER CSP: Performed by: SURGERY

## 2024-06-27 PROCEDURE — 83735 ASSAY OF MAGNESIUM: CPT

## 2024-06-27 PROCEDURE — 85049 AUTOMATED PLATELET COUNT: CPT | Performed by: STUDENT IN AN ORGANIZED HEALTH CARE EDUCATION/TRAINING PROGRAM

## 2024-06-27 PROCEDURE — 85610 PROTHROMBIN TIME: CPT

## 2024-06-27 PROCEDURE — 82805 BLOOD GASES W/O2 SATURATION: CPT | Performed by: STUDENT IN AN ORGANIZED HEALTH CARE EDUCATION/TRAINING PROGRAM

## 2024-06-27 PROCEDURE — 94002 VENT MGMT INPAT INIT DAY: CPT

## 2024-06-27 PROCEDURE — 88313 SPECIAL STAINS GROUP 2: CPT | Mod: 26 | Performed by: PATHOLOGY

## 2024-06-27 PROCEDURE — 200N000002 HC R&B ICU UMMC

## 2024-06-27 PROCEDURE — 258N000003 HC RX IP 258 OP 636: Performed by: SURGERY

## 2024-06-27 PROCEDURE — 410N000003 HC PER-PERFUSION 1ST 30 MIN: Performed by: SURGERY

## 2024-06-27 PROCEDURE — P9037 PLATE PHERES LEUKOREDU IRRAD: HCPCS

## 2024-06-27 PROCEDURE — 74018 RADEX ABDOMEN 1 VIEW: CPT | Mod: 26 | Performed by: RADIOLOGY

## 2024-06-27 PROCEDURE — 999N000065 XR CHEST PORT 1 VIEW

## 2024-06-27 PROCEDURE — 85384 FIBRINOGEN ACTIVITY: CPT | Performed by: SURGERY

## 2024-06-27 RX ORDER — MYCOPHENOLATE MOFETIL 250 MG/1
750 CAPSULE ORAL
Status: DISCONTINUED | OUTPATIENT
Start: 2024-06-27 | End: 2024-07-05 | Stop reason: HOSPADM

## 2024-06-27 RX ORDER — PROPOFOL 10 MG/ML
5-75 INJECTION, EMULSION INTRAVENOUS CONTINUOUS
Status: DISCONTINUED | OUTPATIENT
Start: 2024-06-27 | End: 2024-06-28

## 2024-06-27 RX ORDER — DEXTROSE MONOHYDRATE 25 G/50ML
25-50 INJECTION, SOLUTION INTRAVENOUS
Status: DISCONTINUED | OUTPATIENT
Start: 2024-06-27 | End: 2024-06-27

## 2024-06-27 RX ORDER — NALOXONE HYDROCHLORIDE 0.4 MG/ML
0.4 INJECTION, SOLUTION INTRAMUSCULAR; INTRAVENOUS; SUBCUTANEOUS
Status: DISCONTINUED | OUTPATIENT
Start: 2024-06-27 | End: 2024-07-05 | Stop reason: HOSPADM

## 2024-06-27 RX ORDER — PROPOFOL 10 MG/ML
INJECTION, EMULSION INTRAVENOUS PRN
Status: DISCONTINUED | OUTPATIENT
Start: 2024-06-27 | End: 2024-06-27

## 2024-06-27 RX ORDER — NALOXONE HYDROCHLORIDE 0.4 MG/ML
0.2 INJECTION, SOLUTION INTRAMUSCULAR; INTRAVENOUS; SUBCUTANEOUS
Status: DISCONTINUED | OUTPATIENT
Start: 2024-06-27 | End: 2024-07-05 | Stop reason: HOSPADM

## 2024-06-27 RX ORDER — NALOXONE HYDROCHLORIDE 0.4 MG/ML
0.1 INJECTION, SOLUTION INTRAMUSCULAR; INTRAVENOUS; SUBCUTANEOUS
Status: DISCONTINUED | OUTPATIENT
Start: 2024-06-27 | End: 2024-06-27

## 2024-06-27 RX ORDER — FENTANYL CITRATE 50 UG/ML
50 INJECTION, SOLUTION INTRAMUSCULAR; INTRAVENOUS EVERY 5 MIN PRN
Status: DISCONTINUED | OUTPATIENT
Start: 2024-06-27 | End: 2024-06-27

## 2024-06-27 RX ORDER — MYCOPHENOLATE MOFETIL 200 MG/ML
750 POWDER, FOR SUSPENSION ORAL
Status: DISCONTINUED | OUTPATIENT
Start: 2024-06-27 | End: 2024-06-29 | Stop reason: ALTCHOICE

## 2024-06-27 RX ORDER — PANTOPRAZOLE SODIUM 40 MG/1
40 TABLET, DELAYED RELEASE ORAL
Status: DISCONTINUED | OUTPATIENT
Start: 2024-06-28 | End: 2024-06-27

## 2024-06-27 RX ORDER — FIBRINOGEN (HUMAN) 700-1300MG
1150 KIT INTRAVENOUS
Status: COMPLETED | OUTPATIENT
Start: 2024-06-27 | End: 2024-06-27

## 2024-06-27 RX ORDER — PREDNISONE 10 MG/1
10 TABLET ORAL ONCE
Status: COMPLETED | OUTPATIENT
Start: 2024-07-02 | End: 2024-07-02

## 2024-06-27 RX ORDER — VASOPRESSIN IN 0.9 % NACL 2 UNIT/2ML
SYRINGE (ML) INTRAVENOUS PRN
Status: DISCONTINUED | OUTPATIENT
Start: 2024-06-27 | End: 2024-06-27

## 2024-06-27 RX ORDER — ONDANSETRON 2 MG/ML
4 INJECTION INTRAMUSCULAR; INTRAVENOUS EVERY 30 MIN PRN
Status: DISCONTINUED | OUTPATIENT
Start: 2024-06-27 | End: 2024-06-27

## 2024-06-27 RX ORDER — SODIUM CHLORIDE, SODIUM LACTATE, POTASSIUM CHLORIDE, CALCIUM CHLORIDE 600; 310; 30; 20 MG/100ML; MG/100ML; MG/100ML; MG/100ML
INJECTION, SOLUTION INTRAVENOUS CONTINUOUS
Status: DISCONTINUED | OUTPATIENT
Start: 2024-06-27 | End: 2024-06-27

## 2024-06-27 RX ORDER — OXYCODONE HYDROCHLORIDE 5 MG/1
5 TABLET ORAL
Status: DISCONTINUED | OUTPATIENT
Start: 2024-06-27 | End: 2024-06-27

## 2024-06-27 RX ORDER — ESMOLOL HYDROCHLORIDE 10 MG/ML
INJECTION INTRAVENOUS PRN
Status: DISCONTINUED | OUTPATIENT
Start: 2024-06-27 | End: 2024-06-27

## 2024-06-27 RX ORDER — SENNOSIDES 8.6 MG
8.6 TABLET ORAL 2 TIMES DAILY
Status: DISCONTINUED | OUTPATIENT
Start: 2024-06-28 | End: 2024-07-05 | Stop reason: HOSPADM

## 2024-06-27 RX ORDER — FENTANYL CITRATE 50 UG/ML
25 INJECTION, SOLUTION INTRAMUSCULAR; INTRAVENOUS EVERY 5 MIN PRN
Status: DISCONTINUED | OUTPATIENT
Start: 2024-06-27 | End: 2024-06-27

## 2024-06-27 RX ORDER — URSODIOL 300 MG/1
300 CAPSULE ORAL 2 TIMES DAILY
Status: DISCONTINUED | OUTPATIENT
Start: 2024-06-27 | End: 2024-07-05 | Stop reason: HOSPADM

## 2024-06-27 RX ORDER — OXYCODONE HYDROCHLORIDE 10 MG/1
10 TABLET ORAL
Status: DISCONTINUED | OUTPATIENT
Start: 2024-06-27 | End: 2024-06-27

## 2024-06-27 RX ORDER — SULFAMETHOXAZOLE AND TRIMETHOPRIM 400; 80 MG/1; MG/1
1 TABLET ORAL DAILY
Status: DISCONTINUED | OUTPATIENT
Start: 2024-06-28 | End: 2024-07-05 | Stop reason: HOSPADM

## 2024-06-27 RX ORDER — LIDOCAINE HYDROCHLORIDE 20 MG/ML
INJECTION, SOLUTION INFILTRATION; PERINEURAL PRN
Status: DISCONTINUED | OUTPATIENT
Start: 2024-06-27 | End: 2024-06-27

## 2024-06-27 RX ORDER — ONDANSETRON 4 MG/1
4 TABLET, ORALLY DISINTEGRATING ORAL EVERY 30 MIN PRN
Status: DISCONTINUED | OUTPATIENT
Start: 2024-06-27 | End: 2024-06-27

## 2024-06-27 RX ORDER — NOREPINEPHRINE BITARTRATE 0.06 MG/ML
.01-.6 INJECTION, SOLUTION INTRAVENOUS CONTINUOUS
Status: CANCELLED | OUTPATIENT
Start: 2024-06-27

## 2024-06-27 RX ORDER — FIBRINOGEN (HUMAN) 700-1300MG
2 KIT INTRAVENOUS ONCE
Status: DISCONTINUED | OUTPATIENT
Start: 2024-06-27 | End: 2024-06-27

## 2024-06-27 RX ORDER — CALCIUM CHLORIDE 100 MG/ML
INJECTION INTRAVENOUS; INTRAVENTRICULAR PRN
Status: DISCONTINUED | OUTPATIENT
Start: 2024-06-27 | End: 2024-06-27

## 2024-06-27 RX ORDER — SODIUM CHLORIDE, SODIUM LACTATE, POTASSIUM CHLORIDE, CALCIUM CHLORIDE 600; 310; 30; 20 MG/100ML; MG/100ML; MG/100ML; MG/100ML
INJECTION, SOLUTION INTRAVENOUS CONTINUOUS
Status: DISCONTINUED | OUTPATIENT
Start: 2024-06-27 | End: 2024-06-29

## 2024-06-27 RX ORDER — TACROLIMUS 1 MG/1
2 CAPSULE ORAL
Status: DISCONTINUED | OUTPATIENT
Start: 2024-06-27 | End: 2024-07-02

## 2024-06-27 RX ORDER — HYDROMORPHONE HCL IN WATER/PF 6 MG/30 ML
0.4 PATIENT CONTROLLED ANALGESIA SYRINGE INTRAVENOUS EVERY 5 MIN PRN
Status: DISCONTINUED | OUTPATIENT
Start: 2024-06-27 | End: 2024-06-27

## 2024-06-27 RX ORDER — VALGANCICLOVIR 450 MG/1
450 TABLET, FILM COATED ORAL EVERY EVENING
Status: DISCONTINUED | OUTPATIENT
Start: 2024-06-27 | End: 2024-07-05 | Stop reason: HOSPADM

## 2024-06-27 RX ORDER — VALGANCICLOVIR HYDROCHLORIDE 50 MG/ML
450 POWDER, FOR SOLUTION ORAL EVERY EVENING
Status: DISCONTINUED | OUTPATIENT
Start: 2024-06-27 | End: 2024-06-29 | Stop reason: ALTCHOICE

## 2024-06-27 RX ORDER — NOREPINEPHRINE BITARTRATE 0.02 MG/ML
INJECTION, SOLUTION INTRAVENOUS CONTINUOUS PRN
Status: DISCONTINUED | OUTPATIENT
Start: 2024-06-27 | End: 2024-06-27

## 2024-06-27 RX ORDER — PIPERACILLIN SODIUM, TAZOBACTAM SODIUM 3; .375 G/15ML; G/15ML
3.38 INJECTION, POWDER, LYOPHILIZED, FOR SOLUTION INTRAVENOUS EVERY 6 HOURS
Status: DISPENSED | OUTPATIENT
Start: 2024-06-27 | End: 2024-06-29

## 2024-06-27 RX ORDER — DEXTROSE MONOHYDRATE 100 MG/ML
INJECTION, SOLUTION INTRAVENOUS CONTINUOUS PRN
Status: DISCONTINUED | OUTPATIENT
Start: 2024-06-27 | End: 2024-07-04

## 2024-06-27 RX ORDER — FENTANYL CITRATE 50 UG/ML
INJECTION, SOLUTION INTRAMUSCULAR; INTRAVENOUS PRN
Status: DISCONTINUED | OUTPATIENT
Start: 2024-06-27 | End: 2024-06-27

## 2024-06-27 RX ORDER — PROPOFOL 10 MG/ML
INJECTION, EMULSION INTRAVENOUS CONTINUOUS PRN
Status: DISCONTINUED | OUTPATIENT
Start: 2024-06-27 | End: 2024-06-27

## 2024-06-27 RX ORDER — DEXTROSE MONOHYDRATE 25 G/50ML
25-50 INJECTION, SOLUTION INTRAVENOUS
Status: DISCONTINUED | OUTPATIENT
Start: 2024-06-27 | End: 2024-07-05 | Stop reason: HOSPADM

## 2024-06-27 RX ORDER — MAGNESIUM SULFATE HEPTAHYDRATE 40 MG/ML
2 INJECTION, SOLUTION INTRAVENOUS ONCE
Status: COMPLETED | OUTPATIENT
Start: 2024-06-28 | End: 2024-06-28

## 2024-06-27 RX ORDER — POTASSIUM CHLORIDE 29.8 MG/ML
20 INJECTION INTRAVENOUS ONCE
Status: COMPLETED | OUTPATIENT
Start: 2024-06-27 | End: 2024-06-27

## 2024-06-27 RX ORDER — POLYETHYLENE GLYCOL 3350 17 G/17G
17 POWDER, FOR SOLUTION ORAL DAILY
Status: DISCONTINUED | OUTPATIENT
Start: 2024-06-28 | End: 2024-07-05 | Stop reason: HOSPADM

## 2024-06-27 RX ORDER — FLUCONAZOLE 2 MG/ML
400 INJECTION, SOLUTION INTRAVENOUS ONCE
Status: COMPLETED | OUTPATIENT
Start: 2024-06-28 | End: 2024-06-28

## 2024-06-27 RX ORDER — NICOTINE POLACRILEX 4 MG
15-30 LOZENGE BUCCAL
Status: DISCONTINUED | OUTPATIENT
Start: 2024-06-27 | End: 2024-06-27

## 2024-06-27 RX ORDER — FUROSEMIDE 10 MG/ML
INJECTION INTRAMUSCULAR; INTRAVENOUS PRN
Status: DISCONTINUED | OUTPATIENT
Start: 2024-06-27 | End: 2024-06-27

## 2024-06-27 RX ORDER — NICOTINE POLACRILEX 4 MG
15-30 LOZENGE BUCCAL
Status: DISCONTINUED | OUTPATIENT
Start: 2024-06-27 | End: 2024-07-05 | Stop reason: HOSPADM

## 2024-06-27 RX ORDER — METHYLPREDNISOLONE SODIUM SUCCINATE 125 MG/2ML
100 INJECTION, POWDER, LYOPHILIZED, FOR SOLUTION INTRAMUSCULAR; INTRAVENOUS ONCE
Status: COMPLETED | OUTPATIENT
Start: 2024-06-29 | End: 2024-06-29

## 2024-06-27 RX ORDER — CHLORHEXIDINE GLUCONATE ORAL RINSE 1.2 MG/ML
15 SOLUTION DENTAL EVERY 12 HOURS
Status: DISCONTINUED | OUTPATIENT
Start: 2024-06-27 | End: 2024-06-29

## 2024-06-27 RX ORDER — SODIUM CHLORIDE, SODIUM LACTATE, POTASSIUM CHLORIDE, CALCIUM CHLORIDE 600; 310; 30; 20 MG/100ML; MG/100ML; MG/100ML; MG/100ML
INJECTION, SOLUTION INTRAVENOUS CONTINUOUS PRN
Status: DISCONTINUED | OUTPATIENT
Start: 2024-06-27 | End: 2024-06-27

## 2024-06-27 RX ORDER — METHYLPREDNISOLONE SODIUM SUCCINATE 125 MG/2ML
50 INJECTION, POWDER, LYOPHILIZED, FOR SOLUTION INTRAMUSCULAR; INTRAVENOUS ONCE
Status: COMPLETED | OUTPATIENT
Start: 2024-06-30 | End: 2024-06-30

## 2024-06-27 RX ORDER — SODIUM CHLORIDE, SODIUM GLUCONATE, SODIUM ACETATE, POTASSIUM CHLORIDE AND MAGNESIUM CHLORIDE 526; 502; 368; 37; 30 MG/100ML; MG/100ML; MG/100ML; MG/100ML; MG/100ML
INJECTION, SOLUTION INTRAVENOUS CONTINUOUS PRN
Status: DISCONTINUED | OUTPATIENT
Start: 2024-06-27 | End: 2024-06-27

## 2024-06-27 RX ORDER — FIBRINOGEN (HUMAN) 700-1300MG
1350 KIT INTRAVENOUS
Status: COMPLETED | OUTPATIENT
Start: 2024-06-27 | End: 2024-06-27

## 2024-06-27 RX ORDER — HYDROMORPHONE HCL IN WATER/PF 6 MG/30 ML
0.2 PATIENT CONTROLLED ANALGESIA SYRINGE INTRAVENOUS EVERY 5 MIN PRN
Status: DISCONTINUED | OUTPATIENT
Start: 2024-06-27 | End: 2024-06-27

## 2024-06-27 RX ORDER — DEXTROSE MONOHYDRATE AND SODIUM CHLORIDE 5; .45 G/100ML; G/100ML
INJECTION, SOLUTION INTRAVENOUS CONTINUOUS
Status: DISCONTINUED | OUTPATIENT
Start: 2024-06-27 | End: 2024-06-27

## 2024-06-27 RX ORDER — DEXAMETHASONE SODIUM PHOSPHATE 4 MG/ML
4 INJECTION, SOLUTION INTRA-ARTICULAR; INTRALESIONAL; INTRAMUSCULAR; INTRAVENOUS; SOFT TISSUE
Status: DISCONTINUED | OUTPATIENT
Start: 2024-06-27 | End: 2024-06-27

## 2024-06-27 RX ADMIN — FIBRINOGEN (HUMAN) 1150 MG: KIT INTRAVENOUS at 17:40

## 2024-06-27 RX ADMIN — FENTANYL CITRATE 50 MCG: 50 INJECTION INTRAMUSCULAR; INTRAVENOUS at 15:55

## 2024-06-27 RX ADMIN — ALBUMIN (HUMAN): 12.5 SOLUTION INTRAVENOUS at 12:21

## 2024-06-27 RX ADMIN — FENTANYL CITRATE 50 MCG: 50 INJECTION INTRAMUSCULAR; INTRAVENOUS at 16:30

## 2024-06-27 RX ADMIN — CALCIUM CHLORIDE INJECTION 0.5 G: 100 INJECTION, SOLUTION INTRAVENOUS at 15:10

## 2024-06-27 RX ADMIN — SODIUM CHLORIDE, POTASSIUM CHLORIDE, SODIUM LACTATE AND CALCIUM CHLORIDE: 600; 310; 30; 20 INJECTION, SOLUTION INTRAVENOUS at 22:56

## 2024-06-27 RX ADMIN — LIDOCAINE HYDROCHLORIDE 100 MG: 20 INJECTION, SOLUTION INFILTRATION; PERINEURAL at 08:57

## 2024-06-27 RX ADMIN — ESMOLOL HYDROCHLORIDE 20 MG: 10 INJECTION, SOLUTION INTRAVENOUS at 16:49

## 2024-06-27 RX ADMIN — ESMOLOL HYDROCHLORIDE 20 MG: 10 INJECTION, SOLUTION INTRAVENOUS at 16:54

## 2024-06-27 RX ADMIN — FENTANYL CITRATE 100 MCG: 50 INJECTION INTRAMUSCULAR; INTRAVENOUS at 17:45

## 2024-06-27 RX ADMIN — DEXTROSE AND SODIUM CHLORIDE: 5; 450 INJECTION, SOLUTION INTRAVENOUS at 18:40

## 2024-06-27 RX ADMIN — INSULIN HUMAN 1 UNITS/HR: 1 INJECTION, SOLUTION INTRAVENOUS at 10:55

## 2024-06-27 RX ADMIN — ESMOLOL HYDROCHLORIDE 20 MG: 10 INJECTION, SOLUTION INTRAVENOUS at 16:52

## 2024-06-27 RX ADMIN — FIBRINOGEN (HUMAN) 1150 MG: KIT INTRAVENOUS at 15:57

## 2024-06-27 RX ADMIN — PIPERACILLIN AND TAZOBACTAM 3.38 G: 3; .375 INJECTION, POWDER, FOR SOLUTION INTRAVENOUS at 11:43

## 2024-06-27 RX ADMIN — FENTANYL CITRATE 50 MCG: 50 INJECTION INTRAMUSCULAR; INTRAVENOUS at 10:14

## 2024-06-27 RX ADMIN — Medication 20 MG: at 09:58

## 2024-06-27 RX ADMIN — CALCIUM CHLORIDE INJECTION 0.5 G: 100 INJECTION, SOLUTION INTRAVENOUS at 15:03

## 2024-06-27 RX ADMIN — FENTANYL CITRATE 50 MCG: 50 INJECTION INTRAMUSCULAR; INTRAVENOUS at 10:01

## 2024-06-27 RX ADMIN — URSODIOL 300 MG: 300 CAPSULE ORAL at 20:20

## 2024-06-27 RX ADMIN — MIDAZOLAM 2 MG: 1 INJECTION INTRAMUSCULAR; INTRAVENOUS at 08:52

## 2024-06-27 RX ADMIN — Medication 30 MG: at 10:39

## 2024-06-27 RX ADMIN — PROPOFOL 150 MG: 10 INJECTION, EMULSION INTRAVENOUS at 08:57

## 2024-06-27 RX ADMIN — CALCIUM CHLORIDE INJECTION 0.5 G: 100 INJECTION, SOLUTION INTRAVENOUS at 11:41

## 2024-06-27 RX ADMIN — SODIUM BICARBONATE 50 MEQ: 84 INJECTION, SOLUTION INTRAVENOUS at 14:31

## 2024-06-27 RX ADMIN — CHLORHEXIDINE GLUCONATE 0.12% ORAL RINSE 15 ML: 1.2 LIQUID ORAL at 19:52

## 2024-06-27 RX ADMIN — Medication 20 MG: at 13:49

## 2024-06-27 RX ADMIN — PIPERACILLIN AND TAZOBACTAM 3.38 G: 3; .375 INJECTION, POWDER, LYOPHILIZED, FOR SOLUTION INTRAVENOUS at 21:44

## 2024-06-27 RX ADMIN — PIPERACILLIN AND TAZOBACTAM 3.38 G: 3; .375 INJECTION, POWDER, FOR SOLUTION INTRAVENOUS at 15:44

## 2024-06-27 RX ADMIN — Medication 100 MG: at 17:28

## 2024-06-27 RX ADMIN — EPINEPHRINE 0.03 MCG/KG/MIN: 1 INJECTION INTRAMUSCULAR; INTRAVENOUS; SUBCUTANEOUS at 14:45

## 2024-06-27 RX ADMIN — Medication 100 MG: at 17:37

## 2024-06-27 RX ADMIN — PROPOFOL 50 MCG/KG/MIN: 10 INJECTION, EMULSION INTRAVENOUS at 17:12

## 2024-06-27 RX ADMIN — FLUCONAZOLE 400 MG: 2 INJECTION, SOLUTION INTRAVENOUS at 09:30

## 2024-06-27 RX ADMIN — FENTANYL CITRATE 50 MCG: 50 INJECTION INTRAMUSCULAR; INTRAVENOUS at 13:49

## 2024-06-27 RX ADMIN — FIBRINOGEN (HUMAN) 1350 MG: KIT INTRAVENOUS at 15:52

## 2024-06-27 RX ADMIN — FENTANYL CITRATE 50 MCG: 50 INJECTION INTRAMUSCULAR; INTRAVENOUS at 11:30

## 2024-06-27 RX ADMIN — POTASSIUM CHLORIDE 20 MEQ: 29.8 INJECTION, SOLUTION INTRAVENOUS at 20:26

## 2024-06-27 RX ADMIN — Medication 25 MCG/HR: at 18:45

## 2024-06-27 RX ADMIN — Medication 0.5 UNITS: at 13:54

## 2024-06-27 RX ADMIN — ESMOLOL HYDROCHLORIDE 20 MG: 10 INJECTION, SOLUTION INTRAVENOUS at 16:57

## 2024-06-27 RX ADMIN — PIPERACILLIN AND TAZOBACTAM 3.38 G: 3; .375 INJECTION, POWDER, FOR SOLUTION INTRAVENOUS at 09:41

## 2024-06-27 RX ADMIN — CALCIUM CHLORIDE INJECTION 1 G: 100 INJECTION, SOLUTION INTRAVENOUS at 12:39

## 2024-06-27 RX ADMIN — SODIUM CHLORIDE 1000 MG: 9 INJECTION, SOLUTION INTRAVENOUS at 16:27

## 2024-06-27 RX ADMIN — VALGANCICLOVIR HYDROCHLORIDE 450 MG: 50 POWDER, FOR SOLUTION ORAL at 21:05

## 2024-06-27 RX ADMIN — ESMOLOL HYDROCHLORIDE 20 MG: 10 INJECTION, SOLUTION INTRAVENOUS at 16:59

## 2024-06-27 RX ADMIN — Medication 100 MG: at 09:00

## 2024-06-27 RX ADMIN — FENTANYL CITRATE 50 MCG: 50 INJECTION INTRAMUSCULAR; INTRAVENOUS at 11:08

## 2024-06-27 RX ADMIN — ALBUMIN (HUMAN): 12.5 SOLUTION INTRAVENOUS at 12:28

## 2024-06-27 RX ADMIN — FENTANYL CITRATE 100 MCG: 50 INJECTION INTRAMUSCULAR; INTRAVENOUS at 08:57

## 2024-06-27 RX ADMIN — HYDROMORPHONE HYDROCHLORIDE 0.5 MG: 1 INJECTION, SOLUTION INTRAMUSCULAR; INTRAVENOUS; SUBCUTANEOUS at 16:53

## 2024-06-27 RX ADMIN — FENTANYL CITRATE 50 MCG: 50 INJECTION INTRAMUSCULAR; INTRAVENOUS at 16:47

## 2024-06-27 RX ADMIN — PROPOFOL 60 MCG/KG/MIN: 10 INJECTION, EMULSION INTRAVENOUS at 18:37

## 2024-06-27 RX ADMIN — PROPOFOL 60 MCG/KG/MIN: 10 INJECTION, EMULSION INTRAVENOUS at 19:33

## 2024-06-27 RX ADMIN — CALCIUM CHLORIDE INJECTION 1 G: 100 INJECTION, SOLUTION INTRAVENOUS at 16:43

## 2024-06-27 RX ADMIN — SODIUM BICARBONATE 50 MEQ: 84 INJECTION, SOLUTION INTRAVENOUS at 14:47

## 2024-06-27 RX ADMIN — Medication 20 MG: at 12:46

## 2024-06-27 RX ADMIN — VASOPRESSIN 0.5 UNITS/HR: 20 INJECTION, SOLUTION INTRAMUSCULAR; SUBCUTANEOUS at 15:00

## 2024-06-27 RX ADMIN — PROPOFOL 55 MCG/KG/MIN: 10 INJECTION, EMULSION INTRAVENOUS at 21:53

## 2024-06-27 RX ADMIN — FENTANYL CITRATE 50 MCG: 50 INJECTION INTRAMUSCULAR; INTRAVENOUS at 09:25

## 2024-06-27 RX ADMIN — Medication 10 MG: at 16:21

## 2024-06-27 RX ADMIN — Medication 10 MG: at 16:46

## 2024-06-27 RX ADMIN — MYCOPHENOLATE MOFETIL 750 MG: 200 POWDER, FOR SUSPENSION ORAL at 21:05

## 2024-06-27 RX ADMIN — FUROSEMIDE 40 MG: 10 INJECTION, SOLUTION INTRAVENOUS at 15:03

## 2024-06-27 RX ADMIN — Medication 0.03 MCG/KG/MIN: at 09:37

## 2024-06-27 RX ADMIN — ALBUMIN (HUMAN): 12.5 SOLUTION INTRAVENOUS at 14:10

## 2024-06-27 RX ADMIN — CALCIUM CHLORIDE INJECTION 0.5 G: 100 INJECTION, SOLUTION INTRAVENOUS at 10:48

## 2024-06-27 RX ADMIN — PIPERACILLIN AND TAZOBACTAM 3.38 G: 3; .375 INJECTION, POWDER, FOR SOLUTION INTRAVENOUS at 13:45

## 2024-06-27 RX ADMIN — FENTANYL CITRATE 50 MCG: 50 INJECTION INTRAMUSCULAR; INTRAVENOUS at 11:44

## 2024-06-27 RX ADMIN — SODIUM BICARBONATE 50 MEQ: 84 INJECTION, SOLUTION INTRAVENOUS at 16:50

## 2024-06-27 RX ADMIN — CALCIUM CHLORIDE INJECTION 0.5 G: 100 INJECTION, SOLUTION INTRAVENOUS at 13:58

## 2024-06-27 RX ADMIN — NOREPINEPHRINE BITARTRATE 6.4 MCG: 1 INJECTION, SOLUTION, CONCENTRATE INTRAVENOUS at 14:58

## 2024-06-27 RX ADMIN — SODIUM BICARBONATE 50 MEQ: 84 INJECTION, SOLUTION INTRAVENOUS at 15:03

## 2024-06-27 RX ADMIN — Medication 30 MG: at 11:26

## 2024-06-27 RX ADMIN — FENTANYL CITRATE 50 MCG: 50 INJECTION INTRAMUSCULAR; INTRAVENOUS at 12:40

## 2024-06-27 RX ADMIN — CALCIUM CHLORIDE INJECTION 0.5 G: 100 INJECTION, SOLUTION INTRAVENOUS at 14:47

## 2024-06-27 RX ADMIN — NOREPINEPHRINE BITARTRATE 6.4 MCG: 1 INJECTION, SOLUTION, CONCENTRATE INTRAVENOUS at 14:55

## 2024-06-27 RX ADMIN — EPINEPHRINE 10 MCG: 1 INJECTION INTRAMUSCULAR; INTRAVENOUS; SUBCUTANEOUS at 14:55

## 2024-06-27 RX ADMIN — FENTANYL CITRATE 50 MCG: 50 INJECTION INTRAMUSCULAR; INTRAVENOUS at 12:15

## 2024-06-27 RX ADMIN — SODIUM CHLORIDE, POTASSIUM CHLORIDE, SODIUM LACTATE AND CALCIUM CHLORIDE: 600; 310; 30; 20 INJECTION, SOLUTION INTRAVENOUS at 09:31

## 2024-06-27 RX ADMIN — TACROLIMUS 2 MG: 5 CAPSULE ORAL at 21:06

## 2024-06-27 RX ADMIN — SODIUM CHLORIDE, SODIUM GLUCONATE, SODIUM ACETATE, POTASSIUM CHLORIDE AND MAGNESIUM CHLORIDE: 526; 502; 368; 37; 30 INJECTION, SOLUTION INTRAVENOUS at 08:45

## 2024-06-27 ASSESSMENT — ACTIVITIES OF DAILY LIVING (ADL)
ADLS_ACUITY_SCORE: 22
ADLS_ACUITY_SCORE: 34
ADLS_ACUITY_SCORE: 22
ADLS_ACUITY_SCORE: 36
ADLS_ACUITY_SCORE: 22

## 2024-06-27 NOTE — ANESTHESIA PROCEDURE NOTES
Central Line/PA Catheter Placement    Pre-Procedure   Staff -        Anesthesiologist:  Chris De La Paz DO       Resident/Fellow: West Galo MD       Performed By: resident       Location: OR       Pre-Anesthestic Checklist: patient identified, IV checked, site marked, risks and benefits discussed, informed consent, monitors and equipment checked, pre-op evaluation and at physician/surgeon's request  Timeout:       Correct Patient: Yes        Correct Procedure: Yes        Correct Site: Yes        Correct Position: Yes        Correct Laterality: Yes   Line Placement:   This line was placed Post Induction starting at 6/27/2024 9:16 AM and ending at 6/27/2024 9:24 AM    Procedure   Procedure: central line       Diagnosis: Medication admnistration       Laterality: right       Insertion Site: internal jugular.       Patient Position: Trendelenburg  Sterile Prep        All elements of maximal sterile barrier technique followed       Patient Prep/Sterile Barriers: draped, hand hygiene, gloves , hat , mask , draped, gown, sterile gel and probe cover       Skin prep: Chloraprep  Insertion/Injection        Technique: ultrasound guided and Seldinger Technique        1. Ultrasound was used to evaluate the access site.       2. Vein evaluated via ultrasound for patency/adequacy.       3. Using real-time ultrasound the needle/catheter was observed entering the artery/vein.       Introducer Type: 9 Fr, 2-lumen MAC        Type: Introducer  Narrative         Secured by: suture       Tegaderm and Biopatch dressing used.       Complications: None apparent,        blood aspirated from all lumens,        All lumens flushed: Yes       Verification method: Ultrasound and Placement to be verified post-op   Comments:  Routine placement on first attempt. No apparent complications.

## 2024-06-27 NOTE — CONSULTS
SURGICAL ICU ADMISSION NOTE  04/22/2024        PRIMARY TEAM: Liver Transplant Surgery  PRIMARY PHYSICIAN: Dr. Dodson  REASON FOR CRITICAL CARE ADMISSION: Post-operative management, hemodynamic monitoring, ventilator management    ADMITTING PHYSICIAN: Dr. 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     Intraoperative events: Tachycardic throughout the case, elevated lactate. Transiently on low dose pressor.     Product totals: 1 pRBC, 2 FFP, 3 platelets, 3 fibryga.     PLAN:     Neurological:  # Acute post-operative pain  - Monitor neurological status. Delirium preventions and precautions.   - Pain: Fentanyl gtt                           - Sedation plan: propofol gtt   - Holding PTA gabapentin  - Discontinue PTA lactulose      Pulmonary:   # Post operative ventilatory support  - VENT: AC 16/550/5/50%.   Continue full vent support. PST when meets criteria.  Ventilatory bundle. HOB elevation   - CXR to confirm ETT placement      Cardiovascular:    # Shock-hemorrhagic, resolved  # Hx HTN  - Monitor hemodynamic status. MAP goal >65. No pressor requirements on arrival to SICU  - Hold PTA Atenolol      Gastroenterology/Nutrition:  # Hx of cirrhosis secondary to steatohepatitis  # S/p DDLT 6/27/24  # Hepatocellular carcinoma s/p liver ablation 2022  - Liver US pendfing   - Post-transplant rejection ppx with tacrolimus and mycophenolate  - Ursodiol per transplant protocol   - Monitor DEE drain output  - Q6h hepatic panel    MELD 3.0: 18 at 6/27/2024  3:50 PM  MELD-Na: 20 at 6/27/2024  3:50 PM  Calculated from:  Serum Creatinine: 0.86 mg/dL (Using min of 1 mg/dL) at  2024  3:46 PM  Serum Sodium: 140 mmol/L (Using max of 137 mmol/L) at 2024  3:46 PM  Total Bilirubin: 1.7 mg/dL at 2024  3:46 PM  Serum Albumin: 4.6 g/dL (Using max of 3.5 g/dL) at 2024  3:46 PM  INR(ratio): 2.92 at 2024  3:50 PM  Age at listin years  Sex: Male at 2024  3:50 PM     # Protein calorie deficit malnutrition   - Diet: NPO  - Likely feeding tube placement tomorrow. Nutrition consulted. Appreciate recommendations.   - OGT in place to LIS.   - OG tube to LIS, 1:1 replacement with 1/2 NS   - PPI   - Bowel regimen: senna, miralax     Renal/Fluids/Electrolytes/Renal:   # Electrolyte management  - Baseline creatinine 0.7-0.8  - D5 0.45 NaCl at 100 ml/h for IV fluid hydration   - Will monitor intake and output, and Q6 BMP  - Urine output is adequate . Will continue to monitor intake and output via du catheter.   - Holding PTA diuretics   - ICU electrolyte replacement protocol    # Lactic Acidosis  - Lactate 11 at the end of the case, possibly related to anhepatic time. Repeat now  - Continue to monitor with q2h lactate  - mIVF as above     Endocrine:  # Stress hyperglycemia   # DM2  - HgbA1C pending  - Blood glucose goal < 180   - Insulin gtt   - Steroid taper per transplant team   - Hold PTA metformin and ozempic       ID:  # Immunosuppression needs  - Monitor CBC q6h  - Surgical prophylaxis includes: piperacillin-tazobactam IV, fluconazole.   - Opportunistic pathogen prophylaxis includes: trimethoprim/sulfamethoxazole, valganciclovir, and topical antifungal coverage to begin once systemic antifungal therapy is complete.     Heme:     # Severe coagulopathy in setting of liver transplant   # Acute blood loss anemia   # Thrombocytopenia   - Monitor CBC, coags (PT/PTT/Fibrinogen), sent stat labs now, then q6h  - Goals per transplant surgery, Hgb > 8, platelets > 50, fibrinogen > 200, INR < 2, transfuse as needed    - Monitoring drain output closely      Musculoskeletal/Skin:  #  Surgical incision   # Weakness and deconditioning of critical illness   - Physical and occupational therapy consult      General Cares/Prophylaxis:    DVT Prophylaxis: Pneumatic Compression Devices  GI Prophylaxis: PPI  Restraints: Restraints for medical healing needed: NO     Lines/ tubes/ drains:  - ETT  - OGT  - RIJ MAC line x 2   - PIV  - Left radial arterial line  - DEE x 2  - Martinez      Disposition:  - SICU     Patient seen, findings and plan discussed with surgical ICU staff, Dr. Perlman.    Lakshmi Lo, DO  PGY-2 General Surgery   - - - - - - - - - - - - - - - - - - - - - - - - - - - - - - - - - - - - - - - - - - - - - -   HISTORY PRESENTING ILLNESS:   West Van is a 48 year old male with history of significant for end stage liver disease secondary to steatohepatitis. Pertinent past medical history hepatocellular carcinoma s/p ablation of liver tumor 2022, neuroendocrine carcinoid tumor s/p RL lobectomy and mediastinal lymph node dissection 3/2023, HTN, DM2, calculus gallbladder s/p cholecystectomy 2022, anxiety and sciatic. Pertinent surgical history chest wall mass (lipoma) excised on 2024; the incision to excise the chest wall mass was carried down to the 6th ICS. He presents today for  donor liver transplant.     REVIEW OF SYSTEMS: 10 point ROS neg other than the symptoms noted above in the HPI.    PAST MEDICAL HISTORY:   Past Medical History:   Diagnosis Date    Benign essential HTN 2016    Diabetes (H)     Kidney stone     Liver cancer (H) 10/04/2022    Liver cirrhosis secondary to nonalcoholic steatohepatitis (RIOS) (H)     RIOS (nonalcoholic steatohepatitis) 2023    Neuroendocrine carcinoma of lung (H)     right lung, lobectomy 3/2023    PONV (postoperative nausea and vomiting)        SURGICAL HISTORY:   Past Surgical History:   Procedure Laterality Date    BRONCHOSCOPY, WITH BIOPSY, ROBOT ASSISTED N/A 2023    Procedure: BRONCHOSCOPY, USING  OPTICAL TRACKING SYSTEM, ion;  Surgeon: Ani Guillaume MD;  Location: [facility] OR    DAVINCI LOBECTOMY LUNG Right 3/8/2023    Procedure: Robot-assisted right thoracoscopic lower lobectomy, mediastinal lymph node dissection, intercostal nerve cryoablation ;  Surgeon: Matheus Haas MD;  Location: [facility] OR    ENDOBRONCHIAL ULTRASOUND FLEXIBLE N/A 1/17/2023    Procedure: endobronchial  ultrasound and transbronchial biopsies;  Surgeon: Ani Guillaume MD;  Location: [facility] OR    EXCISE TUMOR CHEST WALL Right 2/1/2024    Procedure: Excision chest wall mass;  Surgeon: Matheus Haas MD;  Location: [facility] OR    LAPAROSCOPIC ABLATION LIVER TUMOR N/A 12/6/2022    Procedure: Diagnostic Laparoscopy, Hepatic Ultrasound, Laparoscopic ultrasound guided microwave ablation of liver tumor x1;  Surgeon: Armando Munguia MD;  Location: [facility] OR    LAPAROSCOPIC BIOPSY LIVER N/A 12/6/2022    Procedure: Laparoscopic Ultrasound Guided liver biopsy;  Surgeon: Armando Munguia MD;  Location: [facility] OR    LAPAROSCOPIC CHOLECYSTECTOMY N/A 12/6/2022    Procedure: Laparoscopic cholecystectomy;  Surgeon: Armando Munguia MD;  Location: [facility] OR       SOCIAL HISTORY:   Social History     Socioeconomic History    Marital status: Single   Tobacco Use    Smoking status: Never     Passive exposure: Past    Smokeless tobacco: Never   Vaping Use    Vaping status: Never Used   Substance and Sexual Activity    Alcohol use: Not Currently     Comment: Last ETOH 1999    Drug use: Never     Social Determinants of Health     Financial Resource Strain: Medium Risk (1/8/2023)    Received from [facility], [facility]    Overall Financial Resource Strain (CARDIA)     Difficulty of Paying Living Expenses: Somewhat hard   Food Insecurity: No Food Insecurity (1/8/2023)    Received from [facility], [facility]    Hunger Vital Sign     Worried About Running Out of Food in the Last Year: Never true     Ran Out of  Food in the Last Year: Never true   Transportation Needs: Unknown (1/8/2023)    Received from [facility], [facility]    PRAPARE - Transportation     Lack of Transportation (Medical): Patient declined     Lack of Transportation (Non-Medical): No   Physical Activity: Sufficiently Active (1/8/2023)    Received from [facility], [facility]    Exercise Vital Sign     Days of Exercise per Week: 5 days     Minutes of Exercise per Session: 40 min   Stress: Patient Declined (1/8/2023)    Received from [facility], [facility]    [place] [employer] - Occupational Stress Questionnaire     Feeling of Stress : Patient declined   Social Connections: Unknown (1/8/2023)    Received from [facility], [facility]    Social Connection and Isolation Panel [NHANES]     Frequency of Communication with Friends and Family: More than three times a week     Frequency of Social Gatherings with Friends and Family: Patient declined     Attends Confucianism Services: Patient declined     Active Member of Clubs or Organizations: No     Attends Club or Organization Meetings: Never     Marital Status: Never    Interpersonal Safety: Not At Risk (1/8/2023)    Received from [facility], [facility]    Humiliation, Afraid, Rape, and Kick questionnaire     Fear of Current or Ex-Partner: No     Emotionally Abused: No     Physically Abused: No     Sexually Abused: No   Housing Stability: Low Risk  (1/8/2023)    Received from [facility], [facility]    Housing Stability Vital Sign     Unable to Pay for Housing in the Last Year: No     Number of Places Lived in the Last Year: 1     Unstable Housing in the Last Year: No       ALLERGIES:   No Known Allergies    MEDICATIONS:  Current Facility-Administered Medications    Medication Dose Route Frequency Provider Last Rate Last Admin    albumin human 5 % injection    Continuous PRN Radu Dodson MD 4,000 mL/hr at 06/27/24 1422 1,000 mL at 06/27/24 1422    calcium chloride 1 g in sodium chloride 0.9 % 100 mL intermittent infusion  1 g Intravenous Q1H PRN Leticia Marks MD        heparin 5,000 units in 500 mL 0.9% sodium chloride    PRN Radu Dodson MD   150 mL at 06/27/24 1610    lidocaine (LMX4) cream   Topical Q1H PRN Leticia Marks MD        lidocaine 1 % 0.1-1 mL  0.1-1 mL Other Q1H PRN Leticia Marks MD        piperacillin-tazobactam (ZOSYN) 3.375 g vial to attach to  mL bag  3.375 g Intravenous Q2H PRN Leticia Marks MD        sodium chloride (PF) 0.9% PF flush 3 mL  3 mL Intracatheter Q8H Leticia Marks MD   3 mL at 06/26/24 2240    sodium chloride (PF) 0.9% PF flush 3 mL  3 mL Intracatheter q1 min prn Leticia Marks MD         Facility-Administered Medications Ordered in Other Encounters   Medication Dose Route Frequency Provider Last Rate Last Admin    calcium chloride injection   Intravenous PRN West Galo MD   1 g at 06/27/24 1643    EPINEPHRINE BOLUS 100 MCG/10 ML ANESTHESIA                              [id number]   Intravenous Continuous PRN West Galo MD   10 mcg at 06/27/24 1455    EPINEPHrine drip 0.02 mg/mL (5 mg/250 mL)   Intravenous Continuous PRN West Galo MD   Stopped at 06/27/24 1456    esmolol (BREVIBLOC) injection   Intravenous PRN West Galo MD   20 mg at 06/27/24 1659    fentaNYL (PF) (SUBLIMAZE) injection   Intravenous PRN West Galo MD   50 mcg at 06/27/24 1647    furosemide (LASIX) injection   Intravenous PRN West Galo MD   40 mg at 06/27/24 1503    HYDROmorphone (DILAUDID) injection   Intravenous PRN West Galo MD   0.5 mg at 06/27/24 1653    insulin (regular) (NovoLIN-R, HumuLIN-R) BOLUS from syringe or bag ADULT/PEDS   Intravenous PRN West Galo MD   1 Units at 06/27/24 1139    insulin  regular (MYXREDLIN) 1 unit/mL infusion   Intravenous Continuous PRN West Galo MD 2 mL/hr at 06/27/24 1646 2 Units/hr at 06/27/24 1646    lactated ringers infusion   Intravenous Continuous PRN West Galo MD 50 mL/hr at 06/27/24 1545 Rate Change at 06/27/24 1545    lidocaine 2%   Other PRN West Galo MD   100 mg at 06/27/24 0857    midazolam (VERSED) injection   Intravenous PRN West Galo MD   2 mg at 06/27/24 0852    norepinephrine  bolus 6.4 mcg/mL   Intravenous Continuous PRN West Galo MD   6.4 mcg at 06/27/24 1458    norepinephrine (LEVOPHED) 4 mg in  mL infusion PREMIX   Intravenous Continuous PRN West Galo MD   Stopped at 06/27/24 1542    Plasma-Lyte A (electrolyte A) BOLUS   Intravenous Continuous PRN West Galo MD   New Bag at 06/27/24 0845    propofol (DIPRIVAN) infusion   Intravenous Continuous PRN West Galo MD 27.81 mL/hr at 06/27/24 1712 50 mcg/kg/min at 06/27/24 1712    propofol (DIPRIVAN) injection 10 mg/mL vial   Intravenous PRN West Galo MD   150 mg at 06/27/24 0857    rocuronium injection   Intravenous PRN West Galo MD   10 mg at 06/27/24 1646    sodium bicarbonate 8.4 % injection   Intravenous PRN West Galo MD   50 mEq at 06/27/24 1650    vasopressin 1 unit/mL syringe   Intravenous PRN West Galo MD   0.5 Units at 06/27/24 1354    vasopressin drip 1 unit/mL ADULT   Intravenous Continuous PRN West Galo MD   Stopped at 06/27/24 1538       PHYSICAL EXAMINATION:  Temp:  [96.9  F (36.1  C)-98.3  F (36.8  C)] 98.1  F (36.7  C)  Pulse:  [77-91] 89  Resp:  [16-18] 18  BP: (117-124)/(85-88) 117/88  SpO2:  [95 %-100 %] 100 %  General: Intubated and sedated  HEENT:ETT in place, OGT currently clamped, RIJ MAC lines   Neuro:Sedated   Pulm/Resp: Mechanically ventilated  CV: Tachycardic to 100-110s  Abdomen: Soft, minimally distended, incision with overlying dressing with bloody drainage on the right side, DEE x2 with thin, sanguinous output   :  du catheter  in place, urine yellow and clear  MSK/Extremities: no peripheral edema, peripheral pulses intact    LABS: Reviewed.   Arterial Blood Gases   Recent Labs   Lab 06/27/24  1711 06/27/24  1639 06/27/24  1558 06/27/24  1549   PH 7.35 7.31* 7.31* 7.33*   PCO2 38 38 37 37   PO2 132* 161* 138* 151*   HCO3 21 19* 19* 19*     Complete Blood Count   Recent Labs   Lab 06/27/24  1711 06/27/24  1639 06/27/24  1558 06/27/24  1550 06/27/24  1549 06/27/24  0951 06/26/24  1736   WBC  --   --   --   --   --   --  9.4   HGB 10.2* 10.0* 12.0*  --  12.1*   < > 15.7   PLT  --   --   --  147*  --   --  146*    < > = values in this interval not displayed.     Basic Metabolic Panel  Recent Labs   Lab 06/27/24 1711 06/27/24  1639 06/27/24  1558 06/27/24  1549 06/26/24  2157 06/26/24  1546   * 146* 146* 146*   < > 140   POTASSIUM 3.7 3.8 3.7 3.4   < > 3.8   CHLORIDE  --   --   --   --   --  101   CO2  --   --   --   --   --  27   BUN  --   --   --   --   --  11.8   CR  --   --   --   --   --  0.86   * 155* 129* 129*   < > 138*    < > = values in this interval not displayed.     Liver Function Tests  Recent Labs   Lab 06/27/24  1550 06/26/24  1546   AST  --  27   ALT  --  33   ALKPHOS  --  104   BILITOTAL  --  1.7*   ALBUMIN  --  4.6   INR 2.92* 1.24*     Pancreatic Enzymes  Recent Labs   Lab 06/26/24  1546   AMYLASE 38     Coagulation Profile  Recent Labs   Lab 06/27/24  1550 06/26/24  1546   INR 2.92* 1.24*   PTT 71* 31       IMAGING:  Recent Results (from the past 24 hour(s))   DIDI with Report    Narrative    West Galo MD     6/27/2024  5:07 PM  Perioperative DIDI Procedure Note    Staff -        Anesthesiologist:  Chris De La Paz DO       Resident/Fellow: West Galo MD       Performed By: resident  Preanesthesia Checklist:  Patient identified, IV assessed, risks and   benefits discussed, monitors and equipment assessed, procedure being   performed at surgeon's request and anesthesia consent obtained.    DIDI Probe  Insertion    Probe Status PRE Insertion: NO obvious damage  Probe type:  Adult 2D  Bite block used:   Soft  Insertion Technique: Jaw Lift  Insertion complications: None obvious  Billing Report:A DIDI report is NOT being generated.  Probe Status POST Removal: NO obvious damage

## 2024-06-27 NOTE — ANESTHESIA PROCEDURE NOTES
Airway       Patient location during procedure: OR       Procedure Start/Stop Times: 6/27/2024 9:01 AM  Staff -        Anesthesiologist:  Chris De La Paz DO       Resident/Fellow: West Galo MD       Performed By: resident  Consent for Airway        Urgency: elective  Indications and Patient Condition       Indications for airway management: dong-procedural       Induction type:intravenous       Mask difficulty assessment: 1 - vent by mask    Final Airway Details       Final airway type: endotracheal airway       Successful airway: ETT - single  Endotracheal Airway Details        ETT size (mm): 7.5       Cuffed: yes       Successful intubation technique: direct laryngoscopy       DL Blade Type: MAC 4       Grade View of Cords: 1       Adjucts: stylet       Position: Right       Measured from: lips       Secured at (cm): 23       Bite block used: None    Post intubation assessment        Placement verified by: capnometry, equal breath sounds and chest rise        Number of attempts at approach: 1       Number of other approaches attempted: 0       Secured with: tape       Ease of procedure: easy       Dentition: Intact and Unchanged    Medication(s) Administered   Medication Administration Time: 6/27/2024 9:01 AM

## 2024-06-27 NOTE — ANESTHESIA PROCEDURE NOTES
Perioperative DIDI Procedure Note    Staff -        Anesthesiologist:  Chris De La Paz DO       Resident/Fellow: West Galo MD       Performed By: resident  Preanesthesia Checklist:  Patient identified, IV assessed, risks and benefits discussed, monitors and equipment assessed, procedure being performed at surgeon's request and anesthesia consent obtained.    DIDI Probe Insertion    Probe Status PRE Insertion: NO obvious damage  Probe type:  Adult 2D  Bite block used:   Soft  Insertion Technique: Jaw Lift  Insertion complications: None obvious  Billing Report:A DIDI report is NOT being generated.  Probe Status POST Removal: NO obvious damage

## 2024-06-27 NOTE — ANESTHESIA PREPROCEDURE EVALUATION
Anesthesia Pre-Procedure Evaluation    Patient: West Van   MRN: [id number] : 1975        Procedure : Procedure(s):  Transplant liver recipient  donor          Past Medical History:   Diagnosis Date    Benign essential HTN 2016    Diabetes (H)     Kidney stone     Liver cancer (H) 10/04/2022    Liver cirrhosis secondary to nonalcoholic steatohepatitis (RIOS) (H)     RIOS (nonalcoholic steatohepatitis) 2023    Neuroendocrine carcinoma of lung (H)     right lung, lobectomy 3/2023    PONV (postoperative nausea and vomiting)       Past Surgical History:   Procedure Laterality Date    BRONCHOSCOPY, WITH BIOPSY, ROBOT ASSISTED N/A 2023    Procedure: BRONCHOSCOPY, USING OPTICAL TRACKING SYSTEM, ion;  Surgeon: Ani Guillaume MD;  Location: [facility] OR    DAVINCI LOBECTOMY LUNG Right 3/8/2023    Procedure: Robot-assisted right thoracoscopic lower lobectomy, mediastinal lymph node dissection, intercostal nerve cryoablation ;  Surgeon: Matheus Haas MD;  Location: [facility] OR    ENDOBRONCHIAL ULTRASOUND FLEXIBLE N/A 2023    Procedure: endobronchial  ultrasound and transbronchial biopsies;  Surgeon: Ani Guillaume MD;  Location: [facility] OR    EXCISE TUMOR CHEST WALL Right 2024    Procedure: Excision chest wall mass;  Surgeon: Matheus Haas MD;  Location: [facility] OR    LAPAROSCOPIC ABLATION LIVER TUMOR N/A 2022    Procedure: Diagnostic Laparoscopy, Hepatic Ultrasound, Laparoscopic ultrasound guided microwave ablation of liver tumor x1;  Surgeon: Armando Munguia MD;  Location: [facility]    LAPAROSCOPIC BIOPSY LIVER N/A 2022    Procedure: Laparoscopic Ultrasound Guided liver biopsy;  Surgeon: Armando Munguia MD;  Location: [facility]    LAPAROSCOPIC CHOLECYSTECTOMY N/A 2022    Procedure: Laparoscopic cholecystectomy;  Surgeon: Armando Munguia MD;  Location: [facility]      No Known Allergies   Social History     Tobacco Use    Smoking status: Never     Passive exposure: Past    Smokeless  tobacco: Never   Substance Use Topics    Alcohol use: Not Currently     Comment: Last ETOH 1999      Wt Readings from Last 1 Encounters:   06/26/24 92.7 kg (204 lb 4.8 oz)        Anesthesia Evaluation   Pt has had prior anesthetic. Type: General and MAC.    History of anesthetic complications  -  and PONV.  2x episode of urinary retention after liver ablation 12/22 and RL lobectomy 3/23.    ROS/MED HX  ENT/Pulmonary: Comment: Primary malignant neuroendocrine tumor sp S/p RL lobectomy and mediastinal LN dissection 3/23    (+)     RADHA risk factors,  hypertension,  daytime somnolence,                            (-) tobacco use   Neurologic:     (+)    no peripheral neuropathy                         (-) no seizures and no CVA   Cardiovascular: Comment: CT Angiogram 6/22/23  1.  Normal coronary artery anatomy without detectable coronary artery   disease   2. There is mild dilation of the proximal ascending aorta at the   level of the sinus of valsalva 4.2 cm x 4.0 cm x 3.8 cm (measured cusp   to cusp).   3.  Total Agatston score 0 placing the patient in the lowest   percentile when compared to an age- and gender-matched control group.   4.  Please review the separate Radiology report for incidental   noncardiac findings. ...         (+)  hypertension- -   -  - -                                 Previous cardiac testing   Echo: Date: 6/16/23 Results:  ECHO:  Global and regional left ventricular function is normal with an EF of 55-60%.  Global right ventricular function is normal.  Pulmonary artery systolic pressure is normal. The right ventricular systolic  pressure is approximated at 31 mmHg  No significant valvular abnormalities present.  Estimated mean right atrial pressure is normal.  No pericardial effusion is present.    Stress Test:  Date: Results:    ECG Reviewed:  Date: 6/26/24 Results:  Sinus rhythm with occasional Premature ventricular complexes   Otherwise normal ECG   When compared with ECG of 03-JUL-2023  14:31,   No significant change was found     Cath:  Date: Results:      METS/Exercise Tolerance: >4 METS    Hematologic:  - neg hematologic  ROS  (-) history of blood clots, anemia and history of blood transfusion   Musculoskeletal: Comment: Sciatica  Chronic r chest wall pain sp lipoma resection 2/24  Chronic RUQ pain      GI/Hepatic: Comment: end stage liver disease secondary to steatohepatitis  Hepatocellular carcinoma s/p ablation of liver tumor (12/6/2022)  Calculus of gallbladder s/p cholecystectomy 12/6/2022    Meld 3.0 score of 10 with most recent labs    (+)             liver disease,       Renal/Genitourinary:       Endo:     (+)  type II DM, Last HgA1c: 7.0, date: 6/23, Not using insulin,              (-) obesity   Psychiatric/Substance Use:     (+) psychiatric history anxiety       Infectious Disease:       Malignancy: Comment: Neuroendocrine tumor  (+) Malignancy, History of GI and Lung.  Lung CA Remission status post Surgery.  GI CA  Remission status post Surgery.      Other:      (+)  , H/O Chronic Pain,         Physical Exam    Airway        Mallampati: II   TM distance: > 3 FB   Neck ROM: full   Mouth opening: > 3 cm    Respiratory Devices and Support         Dental       (+) Completely normal teeth      Cardiovascular          Rhythm and rate: regular and normal     Pulmonary           breath sounds clear to auscultation         OUTSIDE LABS:  CBC:   Lab Results   Component Value Date    WBC 9.4 06/26/2024    WBC 4.8 05/20/2024    HGB 15.7 06/26/2024    HGB 15.0 05/20/2024    HCT 46.0 06/26/2024    HCT 42.9 05/20/2024     (L) 06/26/2024     (L) 05/20/2024     BMP:   Lab Results   Component Value Date     06/26/2024     05/20/2024    POTASSIUM 3.8 06/26/2024    POTASSIUM 3.9 05/20/2024    CHLORIDE 101 06/26/2024    CHLORIDE 104 05/20/2024    CO2 27 06/26/2024    CO2 27 05/20/2024    BUN 11.8 06/26/2024    BUN 17.2 05/20/2024    CR 0.86 06/26/2024    CR 0.8 05/20/2024    GLC  "138 (H) 06/26/2024     (H) 05/20/2024     COAGS:   Lab Results   Component Value Date    PTT 31 06/26/2024    INR 1.24 (H) 06/26/2024    FIBR 366 06/26/2024     POC: No results found for: \"BGM\", \"HCG\", \"HCGS\"  HEPATIC:   Lab Results   Component Value Date    ALBUMIN 4.6 06/26/2024    PROTTOTAL 7.8 06/26/2024    ALT 33 06/26/2024    AST 27 06/26/2024    ALKPHOS 104 06/26/2024    BILITOTAL 1.7 (H) 06/26/2024     OTHER:   Lab Results   Component Value Date    A1C 7.0 (H) 06/20/2023    CHUCK 10.3 (H) 06/26/2024    PHOS 3.1 06/26/2024    MAG 2.1 06/26/2024    AMYLASE 38 06/26/2024    TSH 2.37 06/20/2023       Anesthesia Plan    ASA Status:  4    NPO Status:  NPO Appropriate    Anesthesia Type: General.     - Airway: ETT   Induction: Intravenous.   Maintenance: Balanced.   Techniques and Equipment:     - Lines/Monitors: 2nd IV, Arterial Line, Central Line, PAC, CVP, BIS, DIDI            DIDI Absolute Contra-indication: NONE            DIDI Relative Contra-indication: Coagulopathy     - Blood: Blood in Room, PRBC, Cell Saver, FFP, PLT, Cryo, T&C     - Drips/Meds: Vasopressin, Epinephrine, Norepi     Consents    Anesthesia Plan(s) and associated risks, benefits, and realistic alternatives discussed. Questions answered and patient/representative(s) expressed understanding.     - Discussed: Risks, Benefits and Alternatives for the PROCEDURE were discussed     - Discussed with:  Patient      - Extended Intubation/Ventilatory Support Discussed: Yes.      - Patient is DNR/DNI Status: No     Use of blood products discussed: Yes.     - Discussed with: Patient.     - Consented: consented to blood products     Postoperative Care    Pain management: IV analgesics.   PONV prophylaxis: Ondansetron (or other 5HT-3), Dexamethasone or Solumedrol     Comments:             Chris De La Paz, DO    I have reviewed the pertinent notes and labs in the chart from the past 30 days and (re)examined the patient.  Any updates or changes from those " notes are reflected in this note.      # Hypercalcemia: Highest Ca = 10.3 mg/dL in last 2 days, will monitor as appropriate       # Coagulation Defect: INR = 1.24 (Ref range: 0.85 - 1.15) and/or PTT = 31 Seconds (Ref range: 22 - 38 Seconds), will monitor for bleeding

## 2024-06-27 NOTE — ANESTHESIA CARE TRANSFER NOTE
Patient: West Van    Procedure: Procedure(s):  Transplant liver recipient  donor       Diagnosis: End stage liver disease (H) [K72.10]  Diagnosis Additional Information: No value filed.    Anesthesia Type:   General     Note:    Oropharynx: endotracheal tube in place  Level of Consciousness: iatrogenic sedation  Patient oxygen source: ETT.    Independent Airway: airway patency not satisfactory and stable  Dentition: dentition unchanged  Vital Signs Stable: post-procedure vital signs reviewed and stable  Report to RN Given: handoff report given  Patient transferred to: ICU    ICU Handoff: Call for PAUSE to initiate/utilize ICU HANDOFF, Identified Patient, Identified Responsible Provider, Reviewed the Pertinent Medical History, Discussed Surgical Course, Reviewed Intra-OP Anesthesia Management and Issues during Anesthesia, Set Expectations for Post Procedure Period and Allowed Opportunity for Questions and Acknowledgement of Understanding      Vitals:  Vitals Value Taken Time   BP     Temp     Pulse 110 24 1813   Resp     SpO2 100 % 24 1813   Vitals shown include unfiled device data.    Electronically Signed By: West Galo MD  2024  6:13 PM

## 2024-06-27 NOTE — ANESTHESIA PROCEDURE NOTES
Arterial Line Procedure Note    Pre-Procedure   Staff -        Anesthesiologist:  Chris De La Paz DO       Resident/Fellow: West Galo MD       Performed By: resident       Location: OR       Pre-Anesthestic Checklist: patient identified, IV checked, risks and benefits discussed, informed consent, monitors and equipment checked, pre-op evaluation and at physician/surgeon's request  Timeout:       Correct Patient: Yes        Correct Procedure: Yes        Correct Site: Yes        Correct Position: Yes   Line Placement:   This line was placed Post Induction  Procedure   Procedure: arterial line       Diagnosis: Hemodynamic monitoring       Laterality: left       Insertion Site: radial.  Sterile Prep        Standard elements of sterile barrier followed       Skin prep: Chloraprep  Insertion/Injection        Technique: ultrasound guided and Seldinger Technique        1. Ultrasound was used to evaluate the access site.       2. Artery evaluated via ultrasound for patency/adequacy.       3. Using real-time ultrasound the needle/catheter was observed entering the artery/vein.       Catheter Type/Size: 20 G, 12 cm  Narrative         Secured by: suture       Tegaderm and Biopatch dressing used.       Complications: None apparent,        Arterial waveform: Yes        IBP within 10% of NIBP: Yes   Comments:  Routine placement on first attempt. No apparent complications.

## 2024-06-27 NOTE — ANESTHESIA PROCEDURE NOTES
Central Line/PA Catheter Placement    Pre-Procedure   Staff -        Anesthesiologist:  Chris De La Paz DO       Resident/Fellow: West Galo MD       Performed By: resident       Location: OR       Pre-Anesthestic Checklist: patient identified, IV checked, site marked, risks and benefits discussed, informed consent, monitors and equipment checked, pre-op evaluation and at physician/surgeon's request  Timeout:       Correct Patient: Yes        Correct Procedure: Yes        Correct Site: Yes        Correct Position: Yes        Correct Laterality: Yes   Line Placement:   This line was placed Post Induction starting at 6/27/2024 9:09 AM and ending at 6/27/2024 9:15 AM    Procedure   Procedure: central line       Diagnosis: Medication administration       Laterality: right       Insertion Site: internal jugular.       Patient Position: Trendelenburg  Sterile Prep        All elements of maximal sterile barrier technique followed       Patient Prep/Sterile Barriers: draped, hand hygiene, gloves , hat , mask , draped, gown, sterile gel and probe cover       Skin prep: Chloraprep  Insertion/Injection        Technique: ultrasound guided and Seldinger Technique        1. Ultrasound was used to evaluate the access site.       2. Vein evaluated via ultrasound for patency/adequacy.       3. Using real-time ultrasound the needle/catheter was observed entering the artery/vein.       Introducer Type: 9 Fr, 2-lumen MAC        Type: Introducer  Narrative         Secured by: suture       Tegaderm and Biopatch dressing used.       Complications: None apparent,        blood aspirated from all lumens,        All lumens flushed: Yes       Verification method: Ultrasound and Placement to be verified post-op   Comments:  Routine placement on fist attempt. No apparent complications.

## 2024-06-27 NOTE — PLAN OF CARE
Goal Outcome Evaluation:      Plan of Care Reviewed With: patient    Overall Patient Progress: no changeOverall Patient Progress: no change    /85 (BP Location: Right arm)   Pulse 77   Temp 96.9  F (36.1  C) (Oral)   Resp 16   Wt 92.7 kg (204 lb 4.8 oz)   SpO2 95%   BMI 26.23 kg/m      Shift: 9406-4847  Isolation Status: N/A  VS: stable on RA, afebrile  Neuro: A&O x4  Behaviors: receptive to cares, anxious  B  Labs/Imaging: Tbili 1.7,Calcium 10.3, Platelets 146  Respiratory: WDL  Cardiac: WDL  Pain/Nausea: Denied overnight  PRN: N/A  Diet: NPO  IV Access: R PIV  Infusion(s): N/A  Lines/Drains: N/A  GI/: No BM on shift, LBM . Voiding adequately, hat in toilet to measure  Skin: RUQ incision healed, scattered bruising, L forearm bruising (see skin note)  Mobility: [facility]  Events/Education: patient prepped for surgery, showers done x2  Plan: Tentative OR time of 0800 on 24. Notify MD of any significant changes, continue plan of care.     Hand off to be given to oncoming RN.

## 2024-06-27 NOTE — DISCHARGE INSTRUCTIONS
Nutrition Services - Post-Transplant Diet Guidelines   Follow recommendations on the Guide to Your Diet after Transplant handout (summary below).    You have increased energy and protein needs for six to eight weeks after transplant. Your goal is to consume at least 140 grams of protein per day during this time frame.   Eat a heart-healthy diet (low sodium, low saturated and trans-fat) once you are outside of the 6-8 week post-transplant window, and once your appetite improves to normal  In some cases (but not in all cases), adjust potassium intake   Take calcium and vitamin D supplement if your doctor or team orders  Take measures to prevent food poisoning: store and prepare foods to the proper temperature, practice good handwashing, heat all deli meat (to 165 degrees Fahrenheit), avoid raw fish and meats (including uncooked eggs, non-pasteurized or raw milk, and mold-ripened, non-pasteurized, and raw cheeses [including Brie, Camembert, Roquefort, Stilton, Gorgonzola and Chuck or other soft, unpasteurized cheeses such as Mexican queso fresco]), and throw out leftovers older than two days.   Do not consume grapefruit or pomegranate-containing products. These can interact with your medications.   Avoid the following post txp d/t risk for rejection, unknown effects on the organs, and/or potential interactions with immunosuppressants:       - Herbal, Chinese, holistic, chiropractic, natural, alternative medicines and supplements       - Detoxes and cleanses       - Weight loss pills       - Protein powders or other products with extracts or herbs (ie green tea extract)  If you have any nutrition-related questions or concerns after discharge, please contact our outpatient transplant dietitian, Miroslava Redding at [phone]        Inpatient Diabetes Service Recommendations for Discharge:   Blood Glucose Checks: Three times daily before meals, and at bedtime.      Medications:   1)  Hold your Metformin and Ozempic    2) Long Acting Insulin :Glargine 25 units once daily at 9:00 am                 Decrease by 3 units daily if fasting BG <100  3) Short Acting Meal Insulin:  Novolog 6 units three times daily with large meals plus correction scale:   4) Short Acting Correction Insulin: Novolog 1:50>140 before meals and 1:50 >200 at bedtime.     Correction Scale - MEDIUM INSULIN RESISTANCE DOSING     Do Not give Correction Insulin if fasting BG less than 140 mg/dL.   For  - 189 give 1 unit.   For  - 239 give 2 units.   For  - 289 give 3 units.   For  - 339 give 4 units.   For  - 389 give 5 units.   For  - 439 give 6 units   For BG greater than or equal to 440 give 7 units.      To be given with prandial (meal) insulin, and based on pre-meal/FASTING blood glucose. Administering insulin within 5 minutes of the start of the meal is ideal. Administer insulin no more than 30 minutes after the start of the meal, unless directed otherwise by provider.            Bedtime Scale. Take to correct high blood sugar around 10pm before going to bed.   Do Not give Bedtime Correction Insulin if fasting BG less than 200 mg/dL.   For  - 249 give 1 unit.   For  - 299 give 2 units.   For  - 349 give 3 units.   For  - 399 give 4 units.   For BG greater than or equal to 400 give 5 units.   Notify provider if glucose greater than or equal to 350 mg/dL after administration of correction dose.      How to treat a low blood sugar:  You may be experiencing hypoglycemia (low blood sugar) if:  BG less than 65 mg/dL or when feeling symptoms of hypoglycemia such as  Sweating  Shakiness/Tremors  Increased appetite  Nausea  Dizziness or light-headedness  Sleepiness  Weakness  Rapid heart rate  Headache  Tingling around mouth and tongue    If you are having a low blood sugar, do the following steps:  -Eat 15 grams simple Carbs (1/2 cup orange juice, 4 glucose tablets, 1/2 cup regular soda)  -Wait 15  minutes  -Test with your blood glucose meter again  -If your blood sugar is above 70, then continue normally  -If still less than 70, repeat above process until over 70  -Call clinic for recurrent low blood glucose (more than 2 a week or 2 a day)     Diabetes Outpatient Follow-up: Follow up with Cr Martinez APRN-CNP  your PCP,  in 1-2 weeks after discharge for your diabetes. We have also placed a referral for you to follow up with Endocrinology while you are staying in the [place], in 1-2 weeks after discharge. An appointment request was sent to the [facility] coordinator to schedule your outpatient diabetes appointment. Please call the clinic at [phone] if you do not hear from them after discharge, or if you have non-urgent questions regarding your blood sugars or insulin. Follow up sooner if blood glucose runs consistently greater than 180 mg/dL or any episodes less than 70 mg/dL.      Glucose Goals:  . Your target A1c value is less than 7%.      Thank you for letting the Diabetes Management Team be involved in your care!

## 2024-06-27 NOTE — CONSULTS
Transplant Admission Psychosocial Assessment    Patient Name: West Van  : 1975  Age: 48 year old  MRN: [id number]  Date of Initial Social Work Evaluation: 2023    Patient is admitted for a liver transplant and in the OR. I spoke with Virginia via phone to update psychosocial assessment and provide education about SW role while inpatient, and to begin discussion of expectations/requirements, caregiver needs and follow up needs post-transplant.     Presenting Information   Living Situation: West lives alone in a single family home in [place], [place].   If not local, plans for short term stay:  Virginia reports that she spoke with West re: local stay yesterday and they are planning to stay at the [facility]. Both Virginia and West have stayed there before. I reviewed that they do not need a referral. Virginia confirmed that she has their number and will call them to discuss lodging.     Previous Functional Status: No functional concerns related to transplantation.   Cultural/Language/Spiritual Considerations: None identified.     Support System  Primary Support Person Parents, Virginia will be primary.   Other support:  Brother and Aunt  Plan for support in immediate post-transplant period: Virginia     Health Care Directive  Decision Maker: Pt when able   Alternate Decision Maker: Per health care directive - Virginia (mother), Prabhjot (Brother) and Darryl (Darryl).   Health Care Directive: Copy in Chart    Mental Health/Coping:   History of Mental Health: West has a history of some anxiety, but has felt like it is well managed. No history of psychotherapy, psychiatric hospitalizations or past/current suicidal thoughts.   History of Chemical Health: No chemical dependency concerns noted.   Current status: Unable to assess - patient in OR  Coping: Unable to assess - patient in OR  Services Needed/Recommended: None at this time - will review liver transplant support group.     Financial   Income: He does  not have income at this time.   Impact of transplant on income: None at this time - he will be unable to work for a period of time.   Insurance and medication coverage: [employer] [place]  Financial concerns: None at this time.  Resources needed: None at this time.     Education provided by DONOVAN: Social Work role inpatient setting.    Assessment and recommendations and plan:    West was admitted for liver transplant. I spoke with his mother/health care agent via phone as patient is in OR. No changes to his psychosocial areas. He continues to have stable living, adequate health insurance, and no mental or chemical health concerns. Virginia confirmed that she would be able to provide care for him locally post transplantation. They are planning to stay at the [facility] and she will call over there today. No additional referral needed as they have stayed there before. Virginia did not have any additional questions and has my direct phone number if needs arise.     Transplant  will continue to follow for psychosocial needs and discharge to TCU/ARU if needed.       MIN Heller, [facility]  Liver Transplant   [facility]  Phone: [phone]     Addendum 1400: I received a call from Jannet at [facility] confirming his transplant. I provided information and Jannet will schedule them to stay there starting Sunday (6/30/2024).

## 2024-06-27 NOTE — PROGRESS NOTES
Admitted/transferred from: home  Time of arrival on unit ~1600  2 RN full  skin assessment completed by Joan GARCIA and Nando BROOKS  Skin assessment finding: issues found R PIV, RUQ scar from tumor removal per pt, R groin bruise/scar from skin tag removal per pt, bilateral abdomen discoloration from frequent insulin pokes, L AC bruising from blood draw, other generalized bruising    Interventions/actions: other encourage activity OOB, educated about rotating insulin sites      Will continue to monitor.

## 2024-06-27 NOTE — PROGRESS NOTES
/88 (BP Location: Right arm)   Pulse 89   Temp 98.1  F (36.7  C) (Oral)   Resp 18   Wt 92.7 kg (204 lb 4.8 oz)   SpO2 100%   BMI 26.23 kg/m      Shift: 8746-1737  Isolation Status: NA  VSS on RA, afebrile  Neuro: Aox4  Behaviors: pleasant, cooperative anxious.  BG: NA  Labs: stable  Respiratory: WNL  Cardiac: WNL  Pain/Nausea: Denies  PRN: NA  Diet: NPO  IV Access: PIV  Infusion(s): NA  Lines/Drains: NA  GI/: voiding adequatley, LBM 6/25  Skin: healed incision adominal RUQ, bruising throughout, including L forearm.  Mobility: UAL  Plan: Liver transplant this morning.

## 2024-06-27 NOTE — PROGRESS NOTES
"CLINICAL NUTRITION SERVICES - ASSESSMENT NOTE     Nutrition Prescription    RECOMMENDATIONS FOR MDs/PROVIDERS TO ORDER:  If unable to extubate and advance diet within next 48 hours, recommend initiate nutrition support (recommend EN pending GI status and hemodynamic stability).   --> If/when nutrition support becomes POC, please consult RD for TF and FT placement:   \"Registered Dietitian to Order TF per Medical Nutrition Therapy Guidelines\"                                ---  and ---  \"NUTR Post Pyloric Bedside Feeding Tube Placement ADULT\"    Malnutrition Status:    Unable to determine due to pt in OR    Recommendations already ordered by Registered Dietitian (RD):  None at this time    Future/Additional Recommendations:  EN recs via post-pyloric FT once/if placed:  TwoCal HN @ 55 mL/hr (1320 mL/day) + 1 pkt ProSource TF20 TID (3 pkts total) to provide 2880 kcals (31 kcal/kg/day), 171 g PRO (1.8 g/kg/day), 924 mL H2O, 289 g CHO and 7 g Fiber daily.   - Initiate @ 15 mL/hr and advance by 10 mL q8hr as tolerated to goal rate.   - Do not advance unless K+ >/= 3, Mg++ >/=1.5, and phos >/=1.9  - 30 mL q4hr fluid flushes for tube patency. Additional fluids and/or adjustments per MD.   - Multivitamin/mineral (15 mL/day via FT) to help ensure micronutrient needs being met with suspected hypermetabolic demands and potential interruptions to TF infusions.    Post-transplant diet education as able/appropriate    ONS/kcal cts once diet advanced     REASON FOR ASSESSMENT  West J Kevinprasannasebastien is a/an 48 year old male assessed by the dietitian for Provider Order - Liver Transplant Pre Op     NUTRITION HISTORY  Pt known to outpatient SOT RD - most recently 1/8/2024, a visit to discuss adequate dietary protein intake. RD encouraged 1 protein supplement daily and reviewed quick protein options for meals at that time.  DIET RECALL (per 1/8/2024 note)  Breakfast Oatmeal, egg, sausage biscuit    Lunch Tries to get some sort of protein  " "  Dinner Pork chops & baked potato; chicken & baked potato; some beef/fish as well    Snacks Cashews, almonds, yogurt, applesauce    Beverages Water, seldom soda (1-2/month), orange juice (4 oz/day), 1-2 cups coffee   Alcohol Not asked    Dining out If out- stops at [employer] for fish meal       No known allergies.    Pt in OR for liver transplant - unable to obtain updated nutrition hx at this time.      CURRENT NUTRITION ORDERS  Diet: NPO    LABS  Labs reviewed    MEDICATIONS  Medications reviewed    ANTHROPOMETRICS  Height:   Ht Readings from Last 1 Encounters:   05/23/24 1.88 m (6' 2\")   Most Recent Weight: 92.7 kg (204 lb 4.8 oz)    IBW: 86.4 kg (107% IBW)  BMI: 26.23 kg/m2; Overweight BMI 25-29.9  Weight History: Wt fairly stable over recent months  Wt Readings from Last 20 Encounters:   06/26/24 92.7 kg (204 lb 4.8 oz)   05/23/24 90.7 kg (200 lb)   03/11/24 93.5 kg (206 lb 1.6 oz)   02/01/24 95.1 kg (209 lb 10.5 oz)   01/29/24 95.7 kg (211 lb)   01/16/24 97.1 kg (214 lb)   12/14/23 93 kg (205 lb)   11/21/23 91.7 kg (202 lb 3.2 oz)   09/06/23 93 kg (205 lb)   08/17/23 95.3 kg (210 lb)   08/07/23 93.9 kg (207 lb)   07/03/23 97.7 kg (215 lb 6.4 oz)   07/03/23 97.1 kg (214 lb)   06/20/23 97.2 kg (214 lb 4.8 oz)   04/04/23 102.2 kg (225 lb 4.8 oz)   03/23/23 98.9 kg (218 lb)   03/13/23 99.8 kg (220 lb)   03/10/23 99.3 kg (218 lb 14.7 oz)   03/07/23 101.3 kg (223 lb 6.4 oz)   03/03/23 101.8 kg (224 lb 6.4 oz)   Dosing Weight: 93 kg (actual, based on admit wt of 92.7 kg on 6/26)    ASSESSED NUTRITION NEEDS  Estimated Energy Needs: 4132-6207 kcals/day (30 - 35 kcals/kg )  Justification: Increased needs post SOT  Estimated Protein Needs: 140-186 grams protein/day (1.5 - 2 grams of pro/kg)  Justification: Increased needs post SOT  Estimated Fluid Needs: Per provider pending fluid status    PHYSICAL FINDINGS  See malnutrition section below.  -ETT  -OGT (not yet imaged)    MALNUTRITION  % Intake: Unable to assess  % Weight " Loss: Weight loss does not meet criteria  Subcutaneous Fat Loss: Unable to assess - pt in OR  Muscle Loss: Unable to assess  Fluid Accumulation/Edema: None noted  Malnutrition Diagnosis: Unable to determine due to pt in OR    NUTRITION DIAGNOSIS  Inadequate protein-energy intake related to NPO status in setting of intubation as evidenced by pt currently meeting 0% minimum assessed needs (not accounting for kcal from lipid/dextrose-containing medications).    INTERVENTIONS  Implementation  -Nutrition Education: Not appropriate at this time due to patient condition (in OR)   -Enteral Nutrition - Recs     Goals  Diet adv v nutrition support within 2-3 days.     Monitoring/Evaluation  Progress toward goals will be monitored and evaluated per protocol.  Jenny Blanco RD, LD  Available on Vocera - can search by name or unit Dietitian  **Clinical Nutrition is no longer available via pager

## 2024-06-28 ENCOUNTER — APPOINTMENT (OUTPATIENT)
Dept: OCCUPATIONAL THERAPY | Facility: CLINIC | Age: 49
End: 2024-06-28
Payer: COMMERCIAL

## 2024-06-28 ENCOUNTER — DOCUMENTATION ONLY (OUTPATIENT)
Dept: TRANSPLANT | Facility: CLINIC | Age: 49
End: 2024-06-28
Payer: COMMERCIAL

## 2024-06-28 ENCOUNTER — APPOINTMENT (OUTPATIENT)
Dept: PHYSICAL THERAPY | Facility: CLINIC | Age: 49
End: 2024-06-28
Payer: COMMERCIAL

## 2024-06-28 LAB
ALBUMIN SERPL BCG-MCNC: 2.7 G/DL (ref 3.5–5.2)
ALBUMIN SERPL BCG-MCNC: 3.1 G/DL (ref 3.5–5.2)
ALBUMIN SERPL BCG-MCNC: 3.2 G/DL (ref 3.5–5.2)
ALBUMIN SERPL BCG-MCNC: 3.4 G/DL (ref 3.5–5.2)
ALP SERPL-CCNC: 59 U/L (ref 40–150)
ALP SERPL-CCNC: 67 U/L (ref 40–150)
ALP SERPL-CCNC: 70 U/L (ref 40–150)
ALP SERPL-CCNC: 73 U/L (ref 40–150)
ALT SERPL W P-5'-P-CCNC: 113 U/L (ref 0–70)
ALT SERPL W P-5'-P-CCNC: 122 U/L (ref 0–70)
ALT SERPL W P-5'-P-CCNC: 146 U/L (ref 0–70)
ALT SERPL W P-5'-P-CCNC: 168 U/L (ref 0–70)
AMYLASE SERPL-CCNC: 601 U/L (ref 28–100)
ANION GAP SERPL CALCULATED.3IONS-SCNC: 10 MMOL/L (ref 7–15)
ANION GAP SERPL CALCULATED.3IONS-SCNC: 8 MMOL/L (ref 7–15)
AST SERPL W P-5'-P-CCNC: 131 U/L (ref 0–45)
AST SERPL W P-5'-P-CCNC: 152 U/L (ref 0–45)
AST SERPL W P-5'-P-CCNC: 220 U/L (ref 0–45)
AST SERPL W P-5'-P-CCNC: 296 U/L (ref 0–45)
ATRIAL RATE - MUSE: 88 BPM
BACTERIA SPEC CULT: NORMAL
BASOPHILS # BLD AUTO: 0 10E3/UL (ref 0–0.2)
BASOPHILS NFR BLD AUTO: 0 %
BILIRUB DIRECT SERPL-MCNC: 0.78 MG/DL (ref 0–0.3)
BILIRUB DIRECT SERPL-MCNC: 1.01 MG/DL (ref 0–0.3)
BILIRUB DIRECT SERPL-MCNC: 1.24 MG/DL (ref 0–0.3)
BILIRUB DIRECT SERPL-MCNC: ABNORMAL MG/DL
BILIRUB SERPL-MCNC: 1.5 MG/DL
BILIRUB SERPL-MCNC: 1.5 MG/DL
BILIRUB SERPL-MCNC: 1.8 MG/DL
BILIRUB SERPL-MCNC: 2.3 MG/DL
BUN SERPL-MCNC: 13.2 MG/DL (ref 6–20)
BUN SERPL-MCNC: 17.6 MG/DL (ref 6–20)
CA-I BLD-MCNC: 5 MG/DL (ref 4.4–5.2)
CALCIUM SERPL-MCNC: 8.3 MG/DL (ref 8.6–10)
CALCIUM SERPL-MCNC: 9.1 MG/DL (ref 8.6–10)
CHLORIDE SERPL-SCNC: 109 MMOL/L (ref 98–107)
CHLORIDE SERPL-SCNC: 110 MMOL/L (ref 98–107)
CMV IGG SERPL IA-ACNC: <0.2 U/ML
CMV IGG SERPL IA-ACNC: NORMAL
CMV IGM SERPL IA-ACNC: <8 AU/ML
CMV IGM SERPL IA-ACNC: NEGATIVE
CREAT SERPL-MCNC: 0.8 MG/DL (ref 0.67–1.17)
CREAT SERPL-MCNC: 0.83 MG/DL (ref 0.67–1.17)
DEPRECATED HCO3 PLAS-SCNC: 27 MMOL/L (ref 22–29)
DEPRECATED HCO3 PLAS-SCNC: 27 MMOL/L (ref 22–29)
DIASTOLIC BLOOD PRESSURE - MUSE: NORMAL MMHG
EBV VCA IGG SER IA-ACNC: <10 U/ML
EBV VCA IGG SER IA-ACNC: NORMAL
EBV VCA IGM SER IA-ACNC: <10 U/ML
EBV VCA IGM SER IA-ACNC: NORMAL
EGFRCR SERPLBLD CKD-EPI 2021: >90 ML/MIN/1.73M2
EGFRCR SERPLBLD CKD-EPI 2021: >90 ML/MIN/1.73M2
EOSINOPHIL # BLD AUTO: 0 10E3/UL (ref 0–0.7)
EOSINOPHIL NFR BLD AUTO: 0 %
ERYTHROCYTE [DISTWIDTH] IN BLOOD BY AUTOMATED COUNT: 13.2 % (ref 10–15)
ERYTHROCYTE [DISTWIDTH] IN BLOOD BY AUTOMATED COUNT: 13.6 % (ref 10–15)
FIBRINOGEN PPP-MCNC: 185 MG/DL (ref 170–490)
FIBRINOGEN PPP-MCNC: 254 MG/DL (ref 170–490)
FIBRINOGEN PPP-MCNC: 265 MG/DL (ref 170–490)
FIBRINOGEN PPP-MCNC: 291 MG/DL (ref 170–490)
GLUCOSE BLDC GLUCOMTR-MCNC: 100 MG/DL (ref 70–99)
GLUCOSE BLDC GLUCOMTR-MCNC: 112 MG/DL (ref 70–99)
GLUCOSE BLDC GLUCOMTR-MCNC: 117 MG/DL (ref 70–99)
GLUCOSE BLDC GLUCOMTR-MCNC: 122 MG/DL (ref 70–99)
GLUCOSE BLDC GLUCOMTR-MCNC: 128 MG/DL (ref 70–99)
GLUCOSE BLDC GLUCOMTR-MCNC: 130 MG/DL (ref 70–99)
GLUCOSE BLDC GLUCOMTR-MCNC: 133 MG/DL (ref 70–99)
GLUCOSE BLDC GLUCOMTR-MCNC: 141 MG/DL (ref 70–99)
GLUCOSE BLDC GLUCOMTR-MCNC: 144 MG/DL (ref 70–99)
GLUCOSE BLDC GLUCOMTR-MCNC: 145 MG/DL (ref 70–99)
GLUCOSE BLDC GLUCOMTR-MCNC: 152 MG/DL (ref 70–99)
GLUCOSE BLDC GLUCOMTR-MCNC: 152 MG/DL (ref 70–99)
GLUCOSE BLDC GLUCOMTR-MCNC: 155 MG/DL (ref 70–99)
GLUCOSE BLDC GLUCOMTR-MCNC: 157 MG/DL (ref 70–99)
GLUCOSE BLDC GLUCOMTR-MCNC: 180 MG/DL (ref 70–99)
GLUCOSE BLDC GLUCOMTR-MCNC: 185 MG/DL (ref 70–99)
GLUCOSE BLDC GLUCOMTR-MCNC: 238 MG/DL (ref 70–99)
GLUCOSE SERPL-MCNC: 150 MG/DL (ref 70–99)
GLUCOSE SERPL-MCNC: 192 MG/DL (ref 70–99)
HBA1C MFR BLD: 6.1 %
HBV DNA SERPL QL NAA+PROBE: NORMAL
HCT VFR BLD AUTO: 29.8 % (ref 40–53)
HCT VFR BLD AUTO: 31.5 % (ref 40–53)
HCV RNA SERPL QL NAA+PROBE: NORMAL
HGB BLD-MCNC: 10.3 G/DL (ref 13.3–17.7)
HGB BLD-MCNC: 11.2 G/DL (ref 13.3–17.7)
HIV1+2 RNA SERPL QL NAA+PROBE: NORMAL
IMM GRANULOCYTES # BLD: 0.1 10E3/UL
IMM GRANULOCYTES NFR BLD: 0 %
INR PPP: 1.7 (ref 0.85–1.15)
INTERPRETATION ECG - MUSE: NORMAL
LACTATE SERPL-SCNC: 1.4 MMOL/L (ref 0.7–2)
LACTATE SERPL-SCNC: 1.5 MMOL/L (ref 0.7–2)
LACTATE SERPL-SCNC: 1.9 MMOL/L (ref 0.7–2)
LACTATE SERPL-SCNC: 2.4 MMOL/L (ref 0.7–2)
LACTATE SERPL-SCNC: 3.8 MMOL/L (ref 0.7–2)
LIPASE SERPL-CCNC: 1669 U/L (ref 13–60)
LYMPHOCYTES # BLD AUTO: 0.5 10E3/UL (ref 0.8–5.3)
LYMPHOCYTES NFR BLD AUTO: 4 %
MAGNESIUM SERPL-MCNC: 2 MG/DL (ref 1.7–2.3)
MAGNESIUM SERPL-MCNC: 2.3 MG/DL (ref 1.7–2.3)
MCH RBC QN AUTO: 29.7 PG (ref 26.5–33)
MCH RBC QN AUTO: 29.8 PG (ref 26.5–33)
MCHC RBC AUTO-ENTMCNC: 34.6 G/DL (ref 31.5–36.5)
MCHC RBC AUTO-ENTMCNC: 35.6 G/DL (ref 31.5–36.5)
MCV RBC AUTO: 84 FL (ref 78–100)
MCV RBC AUTO: 86 FL (ref 78–100)
MONOCYTES # BLD AUTO: 1.2 10E3/UL (ref 0–1.3)
MONOCYTES NFR BLD AUTO: 11 %
NEUTROPHILS # BLD AUTO: 9.9 10E3/UL (ref 1.6–8.3)
NEUTROPHILS NFR BLD AUTO: 85 %
NRBC # BLD AUTO: 0 10E3/UL
NRBC BLD AUTO-RTO: 0 /100
P AXIS - MUSE: 4 DEGREES
PHOSPHATE SERPL-MCNC: 0.9 MG/DL (ref 2.5–4.5)
PHOSPHATE SERPL-MCNC: 2.7 MG/DL (ref 2.5–4.5)
PHOSPHATE SERPL-MCNC: 3 MG/DL (ref 2.5–4.5)
PLATELET # BLD AUTO: 137 10E3/UL (ref 150–450)
PLATELET # BLD AUTO: 156 10E3/UL (ref 150–450)
POTASSIUM SERPL-SCNC: 3 MMOL/L (ref 3.4–5.3)
POTASSIUM SERPL-SCNC: 3.7 MMOL/L (ref 3.4–5.3)
POTASSIUM SERPL-SCNC: 4 MMOL/L (ref 3.4–5.3)
PR INTERVAL - MUSE: 158 MS
PROT SERPL-MCNC: 4.4 G/DL (ref 6.4–8.3)
PROT SERPL-MCNC: 4.9 G/DL (ref 6.4–8.3)
PROT SERPL-MCNC: 5 G/DL (ref 6.4–8.3)
PROT SERPL-MCNC: 5.3 G/DL (ref 6.4–8.3)
QRS DURATION - MUSE: 94 MS
QT - MUSE: 368 MS
QTC - MUSE: 445 MS
R AXIS - MUSE: -24 DEGREES
RBC # BLD AUTO: 3.46 10E6/UL (ref 4.4–5.9)
RBC # BLD AUTO: 3.77 10E6/UL (ref 4.4–5.9)
SODIUM SERPL-SCNC: 145 MMOL/L (ref 135–145)
SODIUM SERPL-SCNC: 146 MMOL/L (ref 135–145)
SYSTOLIC BLOOD PRESSURE - MUSE: NORMAL MMHG
T AXIS - MUSE: 46 DEGREES
VENTRICULAR RATE- MUSE: 88 BPM
WBC # BLD AUTO: 11.7 10E3/UL (ref 4–11)
WBC # BLD AUTO: 16.1 10E3/UL (ref 4–11)

## 2024-06-28 PROCEDURE — 83735 ASSAY OF MAGNESIUM: CPT | Performed by: SURGERY

## 2024-06-28 PROCEDURE — 250N000009 HC RX 250: Performed by: SURGERY

## 2024-06-28 PROCEDURE — 258N000003 HC RX IP 258 OP 636

## 2024-06-28 PROCEDURE — 250N000013 HC RX MED GY IP 250 OP 250 PS 637: Performed by: SURGERY

## 2024-06-28 PROCEDURE — 82247 BILIRUBIN TOTAL: CPT

## 2024-06-28 PROCEDURE — 999N000157 HC STATISTIC RCP TIME EA 10 MIN

## 2024-06-28 PROCEDURE — 84100 ASSAY OF PHOSPHORUS: CPT | Performed by: STUDENT IN AN ORGANIZED HEALTH CARE EDUCATION/TRAINING PROGRAM

## 2024-06-28 PROCEDURE — 97530 THERAPEUTIC ACTIVITIES: CPT | Mod: GP

## 2024-06-28 PROCEDURE — 250N000012 HC RX MED GY IP 250 OP 636 PS 637: Performed by: STUDENT IN AN ORGANIZED HEALTH CARE EDUCATION/TRAINING PROGRAM

## 2024-06-28 PROCEDURE — 250N000013 HC RX MED GY IP 250 OP 250 PS 637: Performed by: STUDENT IN AN ORGANIZED HEALTH CARE EDUCATION/TRAINING PROGRAM

## 2024-06-28 PROCEDURE — 97535 SELF CARE MNGMENT TRAINING: CPT | Mod: GO

## 2024-06-28 PROCEDURE — 258N000002 HC RX IP 258 OP 250: Performed by: STUDENT IN AN ORGANIZED HEALTH CARE EDUCATION/TRAINING PROGRAM

## 2024-06-28 PROCEDURE — 258N000003 HC RX IP 258 OP 636: Performed by: SURGERY

## 2024-06-28 PROCEDURE — 82248 BILIRUBIN DIRECT: CPT

## 2024-06-28 PROCEDURE — 85384 FIBRINOGEN ACTIVITY: CPT

## 2024-06-28 PROCEDURE — 85025 COMPLETE CBC W/AUTO DIFF WBC: CPT

## 2024-06-28 PROCEDURE — 250N000013 HC RX MED GY IP 250 OP 250 PS 637

## 2024-06-28 PROCEDURE — 85014 HEMATOCRIT: CPT | Performed by: STUDENT IN AN ORGANIZED HEALTH CARE EDUCATION/TRAINING PROGRAM

## 2024-06-28 PROCEDURE — 200N000002 HC R&B ICU UMMC

## 2024-06-28 PROCEDURE — 250N000011 HC RX IP 250 OP 636: Performed by: STUDENT IN AN ORGANIZED HEALTH CARE EDUCATION/TRAINING PROGRAM

## 2024-06-28 PROCEDURE — 80048 BASIC METABOLIC PNL TOTAL CA: CPT

## 2024-06-28 PROCEDURE — 250N000009 HC RX 250: Performed by: STUDENT IN AN ORGANIZED HEALTH CARE EDUCATION/TRAINING PROGRAM

## 2024-06-28 PROCEDURE — 250N000009 HC RX 250

## 2024-06-28 PROCEDURE — 0FY00Z0 TRANSPLANTATION OF LIVER, ALLOGENEIC, OPEN APPROACH: ICD-10-PCS | Performed by: SURGERY

## 2024-06-28 PROCEDURE — 250N000011 HC RX IP 250 OP 636

## 2024-06-28 PROCEDURE — 84132 ASSAY OF SERUM POTASSIUM: CPT | Performed by: STUDENT IN AN ORGANIZED HEALTH CARE EDUCATION/TRAINING PROGRAM

## 2024-06-28 PROCEDURE — 83690 ASSAY OF LIPASE: CPT | Performed by: STUDENT IN AN ORGANIZED HEALTH CARE EDUCATION/TRAINING PROGRAM

## 2024-06-28 PROCEDURE — 94003 VENT MGMT INPAT SUBQ DAY: CPT

## 2024-06-28 PROCEDURE — 83605 ASSAY OF LACTIC ACID: CPT | Performed by: STUDENT IN AN ORGANIZED HEALTH CARE EDUCATION/TRAINING PROGRAM

## 2024-06-28 PROCEDURE — 97162 PT EVAL MOD COMPLEX 30 MIN: CPT | Mod: GP

## 2024-06-28 PROCEDURE — 85610 PROTHROMBIN TIME: CPT

## 2024-06-28 PROCEDURE — 97165 OT EVAL LOW COMPLEX 30 MIN: CPT | Mod: GO

## 2024-06-28 PROCEDURE — 80053 COMPREHEN METABOLIC PANEL: CPT | Performed by: SURGERY

## 2024-06-28 PROCEDURE — 99232 SBSQ HOSP IP/OBS MODERATE 35: CPT | Mod: GC | Performed by: SURGERY

## 2024-06-28 PROCEDURE — 83735 ASSAY OF MAGNESIUM: CPT | Performed by: STUDENT IN AN ORGANIZED HEALTH CARE EDUCATION/TRAINING PROGRAM

## 2024-06-28 PROCEDURE — 82150 ASSAY OF AMYLASE: CPT | Performed by: STUDENT IN AN ORGANIZED HEALTH CARE EDUCATION/TRAINING PROGRAM

## 2024-06-28 PROCEDURE — 82330 ASSAY OF CALCIUM: CPT | Performed by: STUDENT IN AN ORGANIZED HEALTH CARE EDUCATION/TRAINING PROGRAM

## 2024-06-28 PROCEDURE — 250N000011 HC RX IP 250 OP 636: Performed by: SURGERY

## 2024-06-28 PROCEDURE — 84450 TRANSFERASE (AST) (SGOT): CPT

## 2024-06-28 PROCEDURE — 84100 ASSAY OF PHOSPHORUS: CPT

## 2024-06-28 PROCEDURE — 97530 THERAPEUTIC ACTIVITIES: CPT | Mod: GO

## 2024-06-28 PROCEDURE — 84100 ASSAY OF PHOSPHORUS: CPT | Performed by: SURGERY

## 2024-06-28 PROCEDURE — 258N000003 HC RX IP 258 OP 636: Performed by: STUDENT IN AN ORGANIZED HEALTH CARE EDUCATION/TRAINING PROGRAM

## 2024-06-28 RX ORDER — GABAPENTIN 600 MG/1
600 TABLET ORAL 3 TIMES DAILY
Status: ON HOLD | COMMUNITY
End: 2024-07-05

## 2024-06-28 RX ORDER — ASPIRIN 81 MG/1
81 TABLET, CHEWABLE ORAL DAILY
Status: DISCONTINUED | OUTPATIENT
Start: 2024-06-28 | End: 2024-06-28

## 2024-06-28 RX ORDER — OXYCODONE HYDROCHLORIDE 5 MG/1
5 TABLET ORAL EVERY 4 HOURS PRN
Status: DISCONTINUED | OUTPATIENT
Start: 2024-06-28 | End: 2024-06-28

## 2024-06-28 RX ORDER — HYDROCHLOROTHIAZIDE 25 MG/1
12.5 TABLET ORAL DAILY
Status: ON HOLD | COMMUNITY
End: 2024-07-05

## 2024-06-28 RX ORDER — ASPIRIN 81 MG/1
81 TABLET, CHEWABLE ORAL DAILY
Status: DISCONTINUED | OUTPATIENT
Start: 2024-06-28 | End: 2024-07-05 | Stop reason: HOSPADM

## 2024-06-28 RX ORDER — POTASSIUM PHOS IN 0.9 % NACL 15MMOL/250
15 PLASTIC BAG, INJECTION (ML) INTRAVENOUS
Status: COMPLETED | OUTPATIENT
Start: 2024-06-28 | End: 2024-06-28

## 2024-06-28 RX ORDER — OXYCODONE HYDROCHLORIDE 5 MG/1
5 TABLET ORAL
Status: DISCONTINUED | OUTPATIENT
Start: 2024-06-28 | End: 2024-07-01

## 2024-06-28 RX ORDER — MAGNESIUM SULFATE HEPTAHYDRATE 40 MG/ML
2 INJECTION, SOLUTION INTRAVENOUS ONCE
Status: COMPLETED | OUTPATIENT
Start: 2024-06-28 | End: 2024-06-28

## 2024-06-28 RX ORDER — LABETALOL HYDROCHLORIDE 5 MG/ML
10 INJECTION, SOLUTION INTRAVENOUS EVERY 6 HOURS PRN
Status: DISCONTINUED | OUTPATIENT
Start: 2024-06-28 | End: 2024-07-03

## 2024-06-28 RX ORDER — HYDROMORPHONE HCL IN WATER/PF 6 MG/30 ML
.2-.4 PATIENT CONTROLLED ANALGESIA SYRINGE INTRAVENOUS
Status: DISCONTINUED | OUTPATIENT
Start: 2024-06-28 | End: 2024-06-30

## 2024-06-28 RX ORDER — POTASSIUM CHLORIDE 29.8 MG/ML
20 INJECTION INTRAVENOUS
Status: COMPLETED | OUTPATIENT
Start: 2024-06-28 | End: 2024-06-28

## 2024-06-28 RX ORDER — POTASSIUM CHLORIDE 29.8 MG/ML
20 INJECTION INTRAVENOUS ONCE
Status: COMPLETED | OUTPATIENT
Start: 2024-06-28 | End: 2024-06-28

## 2024-06-28 RX ADMIN — POTASSIUM CHLORIDE 20 MEQ: 29.8 INJECTION, SOLUTION INTRAVENOUS at 05:31

## 2024-06-28 RX ADMIN — VALGANCICLOVIR HYDROCHLORIDE 450 MG: 450 TABLET ORAL at 19:30

## 2024-06-28 RX ADMIN — URSODIOL 300 MG: 300 CAPSULE ORAL at 19:30

## 2024-06-28 RX ADMIN — SODIUM CHLORIDE: 4.5 INJECTION, SOLUTION INTRAVENOUS at 05:41

## 2024-06-28 RX ADMIN — POTASSIUM CHLORIDE 20 MEQ: 29.8 INJECTION, SOLUTION INTRAVENOUS at 19:30

## 2024-06-28 RX ADMIN — URSODIOL 300 MG: 300 CAPSULE ORAL at 08:22

## 2024-06-28 RX ADMIN — PROPOFOL 45 MCG/KG/MIN: 10 INJECTION, EMULSION INTRAVENOUS at 00:57

## 2024-06-28 RX ADMIN — PIPERACILLIN AND TAZOBACTAM 3.38 G: 3; .375 INJECTION, POWDER, LYOPHILIZED, FOR SOLUTION INTRAVENOUS at 04:12

## 2024-06-28 RX ADMIN — SULFAMETHOXAZOLE AND TRIMETHOPRIM 1 TABLET: 400; 80 TABLET ORAL at 08:22

## 2024-06-28 RX ADMIN — POTASSIUM CHLORIDE 20 MEQ: 29.8 INJECTION, SOLUTION INTRAVENOUS at 08:20

## 2024-06-28 RX ADMIN — OXYCODONE HYDROCHLORIDE 5 MG: 5 TABLET ORAL at 18:29

## 2024-06-28 RX ADMIN — MAGNESIUM SULFATE HEPTAHYDRATE 2 G: 2 INJECTION, SOLUTION INTRAVENOUS at 21:28

## 2024-06-28 RX ADMIN — SENNOSIDES 8.6 MG: 8.6 TABLET, FILM COATED ORAL at 08:22

## 2024-06-28 RX ADMIN — SODIUM PHOSPHATE, MONOBASIC, MONOHYDRATE AND SODIUM PHOSPHATE, DIBASIC, ANHYDROUS 15 MMOL: 142; 276 INJECTION, SOLUTION INTRAVENOUS at 10:43

## 2024-06-28 RX ADMIN — MYCOPHENOLATE MOFETIL 750 MG: 200 POWDER, FOR SUSPENSION ORAL at 08:22

## 2024-06-28 RX ADMIN — PIPERACILLIN AND TAZOBACTAM 3.38 G: 3; .375 INJECTION, POWDER, LYOPHILIZED, FOR SOLUTION INTRAVENOUS at 16:14

## 2024-06-28 RX ADMIN — MYCOPHENOLATE MOFETIL 750 MG: 250 CAPSULE ORAL at 17:49

## 2024-06-28 RX ADMIN — OXYCODONE HYDROCHLORIDE 5 MG: 5 TABLET ORAL at 23:45

## 2024-06-28 RX ADMIN — PROPOFOL 45 MCG/KG/MIN: 10 INJECTION, EMULSION INTRAVENOUS at 08:51

## 2024-06-28 RX ADMIN — SENNOSIDES 8.6 MG: 8.6 TABLET, FILM COATED ORAL at 19:30

## 2024-06-28 RX ADMIN — POLYETHYLENE GLYCOL 3350 17 G: 17 POWDER, FOR SOLUTION ORAL at 08:22

## 2024-06-28 RX ADMIN — SODIUM PHOSPHATE, MONOBASIC, MONOHYDRATE AND SODIUM PHOSPHATE, DIBASIC, ANHYDROUS 15 MMOL: 142; 276 INJECTION, SOLUTION INTRAVENOUS at 12:57

## 2024-06-28 RX ADMIN — LABETALOL HYDROCHLORIDE 10 MG: 5 INJECTION, SOLUTION INTRAVENOUS at 23:39

## 2024-06-28 RX ADMIN — TACROLIMUS 2 MG: 1 CAPSULE ORAL at 17:49

## 2024-06-28 RX ADMIN — CHLORHEXIDINE GLUCONATE 0.12% ORAL RINSE 15 ML: 1.2 LIQUID ORAL at 08:47

## 2024-06-28 RX ADMIN — MAGNESIUM SULFATE HEPTAHYDRATE 2 G: 2 INJECTION, SOLUTION INTRAVENOUS at 00:20

## 2024-06-28 RX ADMIN — SODIUM CHLORIDE, POTASSIUM CHLORIDE, SODIUM LACTATE AND CALCIUM CHLORIDE: 600; 310; 30; 20 INJECTION, SOLUTION INTRAVENOUS at 18:51

## 2024-06-28 RX ADMIN — PIPERACILLIN AND TAZOBACTAM 3.38 G: 3; .375 INJECTION, POWDER, LYOPHILIZED, FOR SOLUTION INTRAVENOUS at 10:05

## 2024-06-28 RX ADMIN — POTASSIUM CHLORIDE 20 MEQ: 29.8 INJECTION, SOLUTION INTRAVENOUS at 06:32

## 2024-06-28 RX ADMIN — Medication 25 MCG: at 10:08

## 2024-06-28 RX ADMIN — PROPOFOL 35 MCG/KG/MIN: 10 INJECTION, EMULSION INTRAVENOUS at 05:10

## 2024-06-28 RX ADMIN — INSULIN HUMAN 2 UNITS/HR: 1 INJECTION, SOLUTION INTRAVENOUS at 04:16

## 2024-06-28 RX ADMIN — TACROLIMUS 2 MG: 5 CAPSULE ORAL at 08:22

## 2024-06-28 RX ADMIN — ASPIRIN 81 MG CHEWABLE TABLET 81 MG: 81 TABLET CHEWABLE at 10:13

## 2024-06-28 RX ADMIN — OXYCODONE HYDROCHLORIDE 5 MG: 5 TABLET ORAL at 12:05

## 2024-06-28 RX ADMIN — SODIUM CHLORIDE 200 MG: 9 INJECTION, SOLUTION INTRAVENOUS at 09:23

## 2024-06-28 RX ADMIN — FLUCONAZOLE 400 MG: 400 INJECTION, SOLUTION INTRAVENOUS at 09:25

## 2024-06-28 ASSESSMENT — ACTIVITIES OF DAILY LIVING (ADL)
ADLS_ACUITY_SCORE: 34
ADLS_ACUITY_SCORE: 38
PREVIOUS_RESPONSIBILITIES: MEAL PREP;HOUSEKEEPING;LAUNDRY;SHOPPING;YARDWORK;MEDICATION MANAGEMENT;FINANCES;DRIVING
ADLS_ACUITY_SCORE: 34
ADLS_ACUITY_SCORE: 38
ADLS_ACUITY_SCORE: 34

## 2024-06-28 NOTE — OP NOTE
Transplant Center  Operative Note   Procedure date:  06/27/24    Preoperative diagnosis: 1. 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 HCC    Postoperative diagnosis:  Same,     Procedure:  1. Donation after Brain Death liver transplant   2. End-to-end Choledochocholedochostomy over an 8 pediatric feeding tube   3. Lysis of adhesions taking an additional 45 minutes of operating time   4. Venovenous bypass       Surgeon:  Surgeons and Role:     * Radu Dodson MD - Primary     * Leticia Marks MD - Resident - Assisting     * Chelsea Dill DO - Resident - Assisting     * Driss Khalil MD - Fellow - Assisting     * Marquise Rendon MD - Fellow - Assisting    Fellow/assistant:  Marquise Rendon    Anesthesia:  General    Specimen:  Native liver.  Donor gallbladder.    Drains:  x2    Urine output:  yes    Estimated blood loss:  500ml    Fluids administered:   See anesthesia record.     Intraoperative Events: none    Complications: None.    Findings: adhesions from prior surgery for HCC.       None.    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 A suitable donor offer has become available. After discussing the risks and benefits of proceeding, the patient agreed to proceed with surgery and provided informed consent.    Final ABO/Crossmatch verification: After the donor organ arrived to the operating room and prior to anastomosis, I participated in the transplant pre-verification upon organ receipt timeout by visually verifying the donor ID, organ and laterality, donor blood type, recipient unique identifier, recipient blood type, and that the donor and recipient are blood type compatible.     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    Fellow/Assistant:  Salvador Khalil    Anesthesia:  None    Graft biopsy:  No    Macroscopic steatosis:  N/a    Back table reconstruction:  no    Intimal flap repair:  no    # of hepatic arteries:  2    # of portal veins:  1    Accessory arteries:  Yes, left    # of hepatic veins:  3    # of bile ducts:  1     Findings:  Liver laceration: no  Overall quality of liver: good    Back Table Procedure: The liver allograft was received and inspected and the aforementioned findings were noted. It was flushed with UW. The donor liver was placed in fresh ice-cold preservation solution. The inferior vena cava was identified. Two stay sutures were placed on the supra-hepatic portion of the cava. Two stay sutures were placed on the infra-hepatic portion of the cava. The fibro-fatty tissue and adrenal gland was cleared of inferior vena cava. The phrenic vein was ligated. The adrenal vein was ligated. The IVC was tested for leaks by using a bulb syringe. All the leaks identified were suture ligated. The portal vein was identified. All the fibro-fatty area or tissue around the portal vein was removed and the portal vein was dissected up to its bifurcation. An 8-Comoran cannula was placed in  the portal vein and fixed with a stitch. The portal vein was tested for leaks. All the leaks identified were suture ligated. The cannula was left in place to be used for flushing the liver at the time of implantation. The hepatic artery anatomy was identified. The celiac axis  was traced all the way from the aortic patch to the level of the gastro-duodenal artery. Dissection was stopped at the level of the gastro-duodenal artery. All the leaks in the hepatic artery tributaries were suture ligated. The bile duct was inspected and flushed. No reconstruction was required. The liver was placed back in ice-cold preservation solution until ready for transplantation. Faculty was present for the critical portions of the procedure.    Operative Procedure:   Arterial anastomosis start:  6/27/2024  2:05 PM    Recipient arterial unclamp:  6/27/2024  3:43 PM    Extra vessels used:   no    Extra vessels banked:  no    Previous upper abd surgery?  Yes    Previous cholecystectomy?  Yes    Portal vein:  Thrombus? No   Patent? Yes-        On portal bypass?  Yes-      Arterial flow:  Sufficient? Yes-        Bile duct anastomosis:  To bowel? No     To duct? Yes-         [place] was brought to the operating room, placed in a supine position, and a time out was performed. Sequential compression devices were placed on both lower extremities and general endotracheal anesthesia was induced.  The patient was given IV antibiotics, and  Cellcept and Solumedrol. A Martinez catheter was placed. A central line was placed by Anesthesia service. The abdomen was then shaved, prepped, and draped in the usual sterile fashion.  The backtable preparation occurred prior to implantation.    We entered the peritoneal cavity and made a bilateral subcostal incision. Upon opening the abdomen, we visualized liver. The falciform ligament was divided. The left triangular ligament was divided, following by division of the gastrohepatic omentum. The right  and left hepatic arteries were identified and divided, followed by identification and division of the bile duct. The abdomen was examined. Lysis of adhesions took  an additional 45  minutes of operating time. Coagulopathy and bleeding was encountered due to ESLD.    We cleared off all the fibrofatty areolar tissue to expose the portal vein. The portal vein was ligated at the hepatic hilum and we went on portal bypass. The right hepatic lobe was mobilized.     Next, we mobilized the right lobe of the liver, dividing small branches off the IVC. We also mobilized the caudate lobe. The middle and left hepatic veins were divided using the white load LETICIA followed by division of the right hepatic vein. At this point, the remainder of the attachments were divided and the liver was passed off the operative field.     We then brought the donor liver into the field. We constructed the side-to-side anastomoses between the IVC of the recipient and the donor using 4-0 Prolene. Next, we removed the portal venous cannula. We freshened the edges of both donor and recipient portal veins and performed end-to-end anastomosis using continuous 6-0 Prolene with a growth factor.     We then released the clamps and flushed the liver with 1L of cold albumin followed by reclamping both juan jose and IVC. The inferior portion of the donor IVC was ligated. Liver was reperfused and we started arterial anastomoses. We performed arterial anastomosis between donor common hepatic artery using Carrel patch and the bifurcation of the left and right recipient hepatic arteries.     The donor cholecystectomy was performed. Next, we freshened the edges of the donor bile duct and performed a duct-to-duct anastomosis using 6-0 PDS over pediatric feeding tube. Complete hemostasis was achieved and 2 Chaka drains were placed in 1 under the right diaphragm and the other nearby biliary anastomosis.    The fascia was closed in 2 layers using 0 looped PDS followed by  closure of the skin with the stapler. All needle, sponge, and instrument counts were accurate. The patient tolerated the procedure well without apparent complications and was transferred to the ICU in good condition.     I was present during the key portions of the procedure, and I was immediately available for the entire procedure. There was no qualified resident available to assist with the entire procedure. The fellow noted above Dr Rendon participated as the first assistant in all parts of the dictated procedure except closing and Dr Marks was the primary in the closure with assistance from Dr foster as needed. Dr Khalil was the primary in the benchwork

## 2024-06-28 NOTE — PROGRESS NOTES
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 Dodson on 6/27/24.     Graft function: POD#1. Transaminases downtrending, Tbil 2.3. Post op US with patent vasculature. LA normalized.   - Continue ursodiol 300mg BID and ASA 81mg daily  - DEE drain x2  Immunosuppression management:   Induction: Steroid taper per protocol.  Maintenance:    -  mg BID    - Tacrolimus 2 mg BID. Goal level 8-12.    - Prednisone 5 mg daily x 3 months following taper.  Neurology:   Acute pain: Controlled w IV infusions while intubated per SICU. Will transition to orals afterward. Holding PTA gabapentin 100 BID.   Hematology:   Acute blood loss anemia: hgb 11.2, monitor. No transfusion needs post op  Thrombocytopenia: resolved. Plt 156  Cardiorespiratory:   HTN: Holding atenolol and HCTZ  GI/Nutrition: Once extubated ADAT   Moderate malnutrition in the context of chronic illness: Nutrition consulted, will monitor PO intake after extubation.   Bowel regimen: senna, miralax  Endocrine:   Steroid induced hyperglycemia: On insulin gtt   T2DM: Hgb A1c 6.1%. Holding PTA metformin, ozempic  Hx of neuroendocrine tumor s/p lobectomy: incentive spirometer when awake  Fluid/Electrolytes: LR @ 100, wean with increased oral intake  Hypokalemia: K 3.0, will replace per ICU electrolyte protocol   Hypophosphatemia: Phos 0.9, will replace per ICU electrolyte protocol   : Martinez in place  Infectious disease: no acute concerns  Prophylaxis: DVT (mechanical), GI (PPI), viral (Valcyte), PJP (Bactrim), fungal (fluconazole, nystatin after)   Disposition: SICU, possibly transfer to floor tmrw if stable after extubation    JAVIER/Fellow/Resident Provider: Leticia Marks MD    Faculty: Radu Dodson M.D.    Clinically Significant Risk Factors Present on Admission        # Hypokalemia: Lowest K = 3  mmol/L in last 2 days, will replace as needed  # Hypernatremia: Highest Na = 147 mmol/L in last 2 days, will monitor as appropriate  # Hypocalcemia: Lowest iCa = 4.1 mg/dL in last 2 days, will monitor and replace as appropriate  # Hypercalcemia: Highest Ca = 10.3 mg/dL in last 2 days, will monitor as appropriate   # Anion Gap Metabolic Acidosis: Highest Anion Gap = 25 mmol/L in last 2 days, will monitor and treat as appropriate  # Hypoalbuminemia: Lowest albumin = 3.4 g/dL at 6/28/2024  4:08 AM, will monitor as appropriate  # Coagulation Defect: INR = 1.70 (Ref range: 0.85 - 1.15) and/or PTT = 39 Seconds (Ref range: 22 - 38 Seconds), will monitor for bleeding    # Hypertension: Noted on problem list          #Precipitous drop in Hgb/Hct: Lowest Hgb this hospitalization: 10 g/dL. Will continue to monitor and treat/transfuse as appropriate.                  __________________________________________________________________  Transplant History: Admitted 6/26/2024 for liver transplant.   The patient has a history of liver failure due to HCC and steatohepatitis.    6/27/2024 (Liver), Postoperative day: 1     Interval History:   Overnight events: Returned from OR late last night. Sedated this morning    ROS:   A 10-point review of systems was negative except as noted above.    Curent Meds:  Current Facility-Administered Medications   Medication Dose Route Frequency Provider Last Rate Last Admin    chlorhexidine (PERIDEX) 0.12 % solution 15 mL  15 mL Mouth/Throat Q12H Lakshmi Lo MD   15 mL at 06/28/24 0847    fluconazole (DIFLUCAN) intermittent infusion 400 mg  400 mg Intravenous Once Marquise Rendon MD        methylPREDNISolone sodium succinate (solu-MEDROL) 200 mg in sodium chloride 0.9 % 58.2 mL intermittent infusion  200 mg Intravenous Once Marquise Rendon MD        Followed by    [START ON 6/29/2024] methylPREDNISolone sodium succinate (solu-MEDROL) injection 100 mg  100 mg Intravenous Once Marquise Rendon  MD        Followed by    [START ON 6/30/2024] methylPREDNISolone sodium succinate (solu-MEDROL) injection 50 mg  50 mg Intravenous Once Marquise Rendon MD        Followed by    [START ON 7/1/2024] predniSONE (DELTASONE) tablet 25 mg  25 mg Oral Once Marquise Rendon MD        Followed by    [START ON 7/2/2024] predniSONE (DELTASONE) tablet 10 mg  10 mg Oral Once Marquise Rendon MD        mycophenolate (GENERIC EQUIVALENT) capsule 750 mg  750 mg Oral BID IS Marquise Rendon MD        Or    mycophenolate (CELLCEPT BRAND) suspension 750 mg  750 mg Oral or NG Tube BID IS Marquise Rendon MD   750 mg at 06/28/24 0822    pantoprazole (PROTONIX) 2 mg/mL suspension 40 mg  40 mg Per Feeding Tube QAM AC Lakshmi Lo MD   40 mg at 06/28/24 0822    Or    pantoprazole (PROTONIX) IV push injection 40 mg  40 mg Intravenous QAM AC Lakshmi Lo MD        piperacillin-tazobactam (ZOSYN) 3.375 g vial to attach to  mL bag  3.375 g Intravenous Q6H Marquise Rendon MD   3.375 g at 06/28/24 0412    polyethylene glycol (MIRALAX) Packet 17 g  17 g Oral or Feeding Tube Daily Lakshmi Lo MD   17 g at 06/28/24 0822    potassium chloride 20 mEq in 50 mL intermittent infusion  20 mEq Intravenous Q1H Radu Dodson MD 50 mL/hr at 06/28/24 0820 20 mEq at 06/28/24 0820    Potassium Phosphate 15 mmol in NS intermittent infusion 15 mmol  15 mmol Intravenous Q2H Jayden Rios,         sennosides (SENOKOT) tablet 8.6 mg  8.6 mg Oral or Feeding Tube BID Lakshmi Lo MD   8.6 mg at 06/28/24 0822    sodium chloride (PF) 0.9% PF flush 3 mL  3 mL Intravenous Q8H Marquise Rendon MD        sulfamethoxazole-trimethoprim (BACTRIM) 400-80 MG per tablet 1 tablet  1 tablet Oral Daily Lakshmi Lo MD   1 tablet at 06/28/24 0822    tacrolimus (GENERIC EQUIVALENT) capsule 2 mg  2 mg Oral BID IS Marquise Rendon MD        Or    tacrolimus (GENERIC) suspension 2 mg  2 mg Oral or NG Tube BID IS Marquise Rendon MD   2  mg at 06/28/24 0822    ursodiol (ACTIGALL) capsule 300 mg  300 mg Oral BID Marquise Rendon MD        Or    ursodiol (ACTIGALL) suspension 300 mg  300 mg Oral or NG Tube BID Marquise Rendon MD   300 mg at 06/28/24 0822    valGANciclovir (VALCYTE) tablet 450 mg  450 mg Oral QPM Marquise Rendon MD        Or    valGANciclovir (VALCYTE) solution 450 mg  450 mg Oral or NG Tube QPM Marquise Rendon MD   450 mg at 06/27/24 2105       Physical Exam:     Admit Weight: 92.7 kg (204 lb 4.8 oz)    Current Vitals:   /88 (BP Location: Right arm)   Pulse 99   Temp 97.9  F (36.6  C) (Axillary)   Resp 16   Wt 91.7 kg (202 lb 3.2 oz)   SpO2 100%   BMI 25.96 kg/m      Vital sign ranges:    Temp:  [97.5  F (36.4  C)-99.1  F (37.3  C)] 97.9  F (36.6  C)  Pulse:  [] 99  Resp:  [16-18] 16  MAP:  [67 mmHg-109 mmHg] 76 mmHg  Arterial Line BP: ()/(55-89) 102/64  FiO2 (%):  [40 %-50 %] 40 %  SpO2:  [100 %] 100 %  Patient Vitals for the past 24 hrs:   Temp Temp src Pulse Resp SpO2 Weight   06/28/24 0900 -- -- 99 -- 100 % --   06/28/24 0800 97.9  F (36.6  C) Axillary 99 -- 100 % --   06/28/24 0700 -- -- 101 -- 100 % --   06/28/24 0600 -- -- 103 -- 100 % --   06/28/24 0530 -- -- 100 -- 100 % --   06/28/24 0500 -- -- 99 -- 100 % --   06/28/24 0430 -- -- 103 -- 100 % --   06/28/24 0400 99.1  F (37.3  C) Axillary 105 16 100 % --   06/28/24 0330 -- -- 106 -- -- --   06/28/24 0300 -- -- 105 16 100 % --   06/28/24 0230 -- -- 105 -- -- --   06/28/24 0205 -- -- 107 -- 100 % --   06/28/24 0200 -- -- 107 16 100 % 91.7 kg (202 lb 3.2 oz)   06/28/24 0130 -- -- 108 -- 100 % --   06/28/24 0100 -- -- 108 16 100 % --   06/28/24 0030 -- -- 112 -- 100 % --   06/28/24 0000 98.9  F (37.2  C) Axillary 109 16 100 % --   06/27/24 2330 -- -- 111 -- 100 % --   06/27/24 2300 -- -- 110 -- 100 % --   06/27/24 2230 -- -- 111 -- 100 % --   06/27/24 2200 -- -- 111 16 100 % --   06/27/24 2130 -- -- 114 -- -- --   06/27/24 2100 -- -- 113 18 100 % --    06/27/24 2030 -- -- 117 -- 100 % --   06/27/24 2000 98.4  F (36.9  C) Axillary 116 16 100 % --   06/27/24 1930 -- -- 114 -- 100 % --   06/27/24 1900 -- -- 113 -- 100 % --   06/27/24 1845 -- -- 112 -- 100 % --   06/27/24 1830 -- -- 111 -- 100 % --   06/27/24 1815 -- -- 117 -- 100 % --   06/27/24 1800 97.5  F (36.4  C) Oral 108 -- 100 % --     General: sedated  Neuro: sedated  Respiratory: mechanically ventillated  Cardiovascular: Regular rate and rhythm.  Gastrointestinal: Abdomen soft, non-distended, Jpx2 with serosanguinous   MSK/Extremities: No limb deformities. Minimal edema  Incisions/Skin: As noted above. No rashes or lesions appreciated.      Frailty Scores  More data may exist         1/1/2024 12/10/2023 12/7/2023 6/20/2023 6/13/2023   Frailty Scores   Final Score Not Frail Not Frail Not Frail Not Frail Not Frail Not Frail   Final Score Number 1 1 1 2 2 2      Details          Multiple values from one day are sorted in reverse-chronological order               Data:   CMP  Recent Labs   Lab 06/28/24  0844 06/28/24  0650 06/28/24  0539 06/28/24  0537 06/28/24  0411 06/28/24  0408 06/27/24  2103 06/27/24  1945 06/27/24  1807 06/27/24  1804 06/27/24  1711 06/26/24  2157 06/26/24  1546   NA  --   --   --   --   --  146*  --  145 147*  --  147*   < > 140   POTASSIUM  --   --   --   --   --  3.0*  --  3.5 3.8  --  3.7   < > 3.8   CHLORIDE  --   --   --   --   --  109*  --  104 103  --   --   --  101   CO2  --   --   --   --   --  27  --  20* 19*  --   --   --  27   * 133*   < >  --    < > 150*   < > 218*  242* 183*   < > 159*   < > 138*   BUN  --   --   --   --   --  13.2  --  12.8 12.0  --   --   --  11.8   CR  --   --   --   --   --  0.83  --  0.79 0.82  --   --   --  0.86   GFRESTIMATED  --   --   --   --   --  >90  --  >90 >90  --   --   --  >90   CHUCK  --   --   --   --   --  9.1  --  9.6 9.8  --   --   --  10.3*   ICAW  --   --   --   --   --  5.0  --   --   --   --  5.0   < >  --    MAG  --   --    --   --   --  2.3  --  1.9  --   --   --   --  2.1   PHOS  --   --   --  0.9*  --   --   --  4.0  --   --   --   --  3.1   AMYLASE  --   --   --   --   --  601*  --   --   --   --   --   --  38   LIPASE  --   --   --   --   --  1,669*  --   --   --   --   --   --   --    ALBUMIN  --   --   --   --   --  3.4*  --   --  4.0  --   --   --  4.6   BILITOTAL  --   --   --   --   --  2.3*  --   --  3.9*  --   --   --  1.7*   ALKPHOS  --   --   --   --   --  73  --   --  96  --   --   --  104   AST  --   --   --   --   --  296*  --   --  416*  --   --   --  27   ALT  --   --   --   --   --  168*  --   --  212*  --   --   --  33    < > = values in this interval not displayed.     CBC  Recent Labs   Lab 06/28/24 0408 06/27/24 1945 06/27/24 1807   HGB 11.2* 12.3* 12.1*   WBC 11.7* 13.6* 14.3*    205 215   A1C  --   --  6.1*     COAGS  Recent Labs   Lab 06/28/24 0408 06/27/24 1945 06/27/24 1807 06/27/24  1550   INR 1.70* 1.73* 1.83* 2.92*   PTT  --   --  39* 71*      Urinalysis  Recent Labs   Lab Test 06/26/24  1748 06/20/23  0815   COLOR Light Yellow Yellow   APPEARANCE Clear Slightly Cloudy*   URINEGLC Negative Negative   URINEBILI Negative Negative   URINEKETONE Negative Negative   SG 1.015 1.021   UBLD Negative Negative   URINEPH 7.5* 6.5   PROTEIN Negative 10*   NITRITE Negative Negative   LEUKEST Negative Negative   RBCU 1 <1   WBCU 1 1     Virology:  Hepatitis C Antibody   Date Value Ref Range Status   06/26/2024 Nonreactive Nonreactive Final     Comment:     A nonreactive screening test result does not exclude the possibility of exposure to or infection with HCV. Nonreactive screening test results in individuals with prior exposure to HCV may be due to antibody levels below the limit of detection of this assay or lack of reactivity to the HCV antigens used in this assay. Patients with recent HCV infections (<3 months from time of exposure) may have false-negative HCV antibody results due to the time needed  for seroconversion (average of 8 to 9 weeks).

## 2024-06-28 NOTE — PROGRESS NOTES
"CLINICAL NUTRITION SERVICES - BRIEF NOTE     Reason for RD note: Provider order for TF assess and order (auto consult s/p liver transplant)    New Findings/Chart Review:  POD 1 DDLT  Pt intubated with plans to extubate per rounds  Per transplant team, no plans for FT    MALNUTRITION  % Intake: Unable to assess  % Weight Loss: Weight loss does not meet criteria  Subcutaneous Fat Loss: Facial region:  mild, Upper arm:  mild, Lower arm:  mild, and Thoracic/intercostal:  mild to moderate  Muscle Loss: Temporal:  mild, Facial & jaw region:  mild, Thoracic region (clavicle, acromium bone, deltoid, trapezius, pectoral):  moderate, Upper arm (bicep, tricep):  mild, Dorsal hand:  mild, and Upper leg (quadricep, hamstring):  moderate  Fluid Accumulation/Edema: Does not meet criteria  Malnutrition Diagnosis: Moderate malnutrition in the context of acute illness (minimally, pending PO intake pre op)    Interventions:  Collaboration with providers - SICU team    Future/Additional Recommendations:  Follow for po diet post extubation with ONS and calorie counts  Post transplant diet education as able    Nutrition will continue to follow per protocol.    Courtney Lau, RD, LD, CNSC  Available on [employer] - can search by name or 4E Clinical Dietitian  **Clinical Nutrition is no longer available via pager  Weekend/Holiday RD - \"Weekend Clinical Dietitian\" on [employer]    "

## 2024-06-28 NOTE — PROGRESS NOTES
Shift summary 0700 to 1930 today  Neuro: Alert and oriented x4. , good pain control on oxycodone 5 mg q 4 hr. Up to chair with assist of two. Tolerated well.   CV: Increasingly tachycardic through afternoon, now .Adequate BP. Peripheral pulses palpable. IV lines patent.   Pulm: Extubated to 2 litres NC at 1045 this morning. Weaned to room air. Lungs clear/diminished.   GI: tolerates clear liquid diet, no BM this shift.   : Urine outputs via du 30cc/hr much of the day despite large quantity of IV fluids, now more like 45cc/hr. Du catheter remains overnight to monitor progress.   Skin: Abdominal incision drainage minimal. Drainage via DEE bulb dark bloody, 350cc combined since 0700 today.   Parents in to visit. Continue plan of care.

## 2024-06-28 NOTE — PROGRESS NOTES
"   06/28/24 1508   Appointment Info   Signing Clinician's Name / Credentials (PT) Jaelyn Hernandez, PT, DPT   Rehab Comments (PT) abd. precs, tangential, co-tx with OT       Present no   Living Environment   People in Home alone   Current Living Arrangements house   Home Accessibility stairs to enter home;stairs within home   Number of Stairs, Main Entrance 3   Stair Railings, Main Entrance railing on left side (ascending)   Number of Stairs, Within Home, Primary ten   Stair Railings, Within Home, Primary railing on left side (ascending)   Transportation Anticipated car, drives self   Living Environment Comments Pt reports he lives in a one-level home + basement alone. No support nearby. 3 ZHENG with L handrail. Must negotiate 1 flight of stairs (L handrail) down to basement for laundry. (+) driving.   Self-Care   Usual Activity Tolerance good   Current Activity Tolerance moderate   Regular Exercise No   Equipment Currently Used at Home none   Fall history within last six months no   Activity/Exercise/Self-Care Comment IND with ADL's and mobility. Enjoy walking dog 3-4x/day and watching wresting. No falls.   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 Dodson on 6/27/24.\"   Existing Precautions/Restrictions abdominal;fall   Weight-Bearing Status - LUE partial weight-bearing (% in comments)  (no lifting >10lbs)   Weight-Bearing Status - RUE partial weight-bearing (% in comments)  (no lifting >10lbs)   Weight-Bearing Status - LLE full weight-bearing   Weight-Bearing Status - RLE full weight-bearing   General Observations Activity: Adult " ICU Early Mobility Protocol   Cognition   Orientation Status (Cognition) oriented x 4   Pain Assessment   Patient Currently in Pain Yes, see Vital Sign flowsheet   Integumentary/Edema   Integumentary/Edema no deficits were identifed   Posture    Posture Forward head position;Protracted shoulders   Range of Motion (ROM)   Range of Motion ROM is WFL   Strength (Manual Muscle Testing)   Strength (Manual Muscle Testing) Deficits observed during functional mobility   Strength Comments grossly 3/5 in BLE; limited by increased pain   Bed Mobility   Comment, (Bed Mobility) supine > sit with min/mod A x2   Transfers   Comment, (Transfers) Sit > stand with min A x2 and HHA   Gait/Stairs (Locomotion)   Comment, (Gait/Stairs) gait impaired   Balance   Balance other (describe)   Balance Comments fair(+) sitting balance; fair standing balance   Sensory Examination   Sensory Perception patient reports no sensory changes   Coordination   Coordination no deficits were identified   Muscle Tone   Muscle Tone no deficits were identified   Clinical Impression   Criteria for Skilled Therapeutic Intervention Yes, treatment indicated   PT Diagnosis (PT) impaired functional mobility   Influenced by the following impairments decreased balance, strength, and endurance; increased pain   Functional limitations due to impairments difficulty with functional mobility   Clinical Presentation (PT Evaluation Complexity) evolving   Clinical Presentation Rationale per clinical judgment   Clinical Decision Making (Complexity) moderate complexity   Planned Therapy Interventions (PT) balance training;bed mobility training;gait training;home exercise program;motor coordination training;neuromuscular re-education;patient/family education;postural re-education;ROM (range of motion);stair training;strengthening;stretching;transfer training;progressive activity/exercise;risk factor education;home program guidelines   Risk & Benefits of therapy have been  explained evaluation/treatment results reviewed;risks/benefits reviewed;care plan/treatment goals reviewed;current/potential barriers reviewed;participants voiced agreement with care plan;participants included;patient   PT Total Evaluation Time   PT Eval, Moderate Complexity Minutes (68800) 10   Plan of Care Review   Plan of Care Reviewed With patient   Physical Therapy Goals   PT Frequency 6x/week   PT Predicted Duration/Target Date for Goal Attainment 07/12/24   PT: Bed Mobility Independent;Supine to/from sit;Rolling;Bridging;Within precautions  (with HOB flat)   PT: Transfers Independent;Sit to/from stand;Bed to/from chair;Within precautions   PT: Gait Independent;Within precautions;Greater than 200 feet   PT: Stairs Modified independent;Greater than 10 stairs;Rail on left   PT Discharge Planning   PT Plan review precs, flat bed mobility as able, STS, pre-gait > gait as able   PT Discharge Recommendation (DC Rec) Acute [facility]-Motivated patient will benefit from intensive, interdisciplinary therapy.  Anticipate will be able to tolerate 3 hours of therapy per day   PT Rationale for DC Rec Pt is below baseline for functional mobility. Limited by increased pain which is impacting activity tolerance. Pt must be fully IND to return home 2/2 no support available. Recommend ARU to improve safety and IND prior to returning home. Anticipated to tolerate and benefit from 3 hours of therapy. Pt is willing and motivated to participate.   PT Brief overview of current status Ax2 for bed mobility and transfers bed <> recliner throughout day; log roll technique for bed mobility   PT Equipment Needed at Discharge   (tbd)

## 2024-06-28 NOTE — PHARMACY-ADMISSION MEDICATION HISTORY
Pharmacist Admission Medication History    Admission medication history is complete. The information provided in this note is only as accurate as the sources available at the time of the update.    Information Source(s): Patient and CareEverywhere/SureScripts via in-person    Pertinent Information: N/A    Changes made to PTA medication list:  Added: vitamin D  Deleted: milk thistle, oxycodone, senna-docusate, bisacodyl, polyethylene glycol - per patient, not taking  Changed: gabapentin from 100mg BID to 600mg TID    Allergies reviewed with patient and updates made in EHR: yes    Medication History Completed By: Ashley Keller [facility] 6/28/2024 3:36 PM    PTA Med List   Medication Sig Last Dose    atenolol (TENORMIN) 25 MG tablet Take 25 mg by mouth 2 times daily Past Week at 0800    CALCIUM PO Take 600 mg by mouth every morning Past Week    fluticasone (FLONASE) 50 MCG/ACT nasal spray Spray 1 spray into both nostrils daily as needed for allergies or rhinitis More than a month    gabapentin (NEURONTIN) 600 MG tablet Take 600 mg by mouth 3 times daily Past Week    hydrochlorothiazide (HYDRODIURIL) 25 MG tablet Take 12.5 mg by mouth daily Past Week    lactulose 20 GM/30ML solution Take 30 mLs (20 g) by mouth daily (Patient taking differently: Take 20 g by mouth daily as needed) Past Week    Magnesium 400 MG TABS Take 1-2 tablets by mouth every morning Past Week    metFORMIN (GLUCOPHAGE XR) 500 MG 24 hr tablet Take 500 mg by mouth every morning Past Week    OZEMPIC, 0.25 OR 0.5 MG/DOSE, 2 MG/3ML pen Inject 0.5 mg Subcutaneous every 7 days Takes on Mondays 6/23/2024    Vitamin D3 (CHOLECALCIFEROL) 125 MCG (5000 UT) tablet Take 1 tablet by mouth daily Past Week

## 2024-06-28 NOTE — PROGRESS NOTES
06/28/24 1315   Appointment Info   Signing Clinician's Name / Credentials (OT) Azeb Jimenez OTR/L   Rehab Comments (OT) abdominal prec, handout in room   Living Environment   People in Home alone   Current Living Arrangements house   Home Accessibility stairs to enter home;stairs within home   Number of Stairs, Main Entrance 3   Stair Railings, Main Entrance railing on left side (ascending)   Number of Stairs, Within Home, Primary ten   Stair Railings, Within Home, Primary railing on left side (ascending)   Living Environment Comments Pt lives IND at home alone in single story house with 30# dauchshund. Pt has tub shower. Likely has stadard height toilet. Laundry in basement. IND with all mobility and ADLs.   Self-Care   Usual Activity Tolerance good   Current Activity Tolerance moderate   Regular Exercise No   Equipment Currently Used at Home none   Fall history within last six months no   Activity/Exercise/Self-Care Comment Currently unemployed, reports ambulating dog 3-4x/day. Reports I with ADL/IADLs. Enjoys watching wrestling.   Instrumental Activities of Daily Living (IADL)   Previous Responsibilities meal prep;housekeeping;laundry;shopping;yardwork;medication management;finances;driving   IADL Comments limited assist for IADLs, parents are elderly and live in [place].   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 Dodson on 6/27/24.   Existing Precautions/Restrictions abdominal   Left Upper Extremity (Weight-bearing Status)   (<10#)   Right Upper Extremity (Weight-bearing Status)   (<10#)   Cognitive Status Examination   Orientation Status person;place   Cognitive Status Comments  "Pt believes it is Thursday, knows it is June, knows he is in the hospital for a liver transplant. Pt tangential and verbose in speech, requiring frequent redirection. Cues to keep eyes open. Will monitor cognition.   Visual Perception   Impact of Vision Impairment on Function (Vision) wears glasses, not present   Sensory   Sensory Comments denies n/t but then states \"you could hit me with a metal chair and I wouldn't know it\"   Pain Assessment   Patient Currently in Pain Yes, see Vital Sign flowsheet   Range of Motion Comprehensive   Comment, General Range of Motion B UE appears WNL   Strength Comprehensive (MMT)   Comment, General Manual Muscle Testing (MMT) Assessment B UE strength appears functional, defer formal MMT assessment due to precautions   Coordination   Coordination Comments B UE appears WFL, feels generally weak, able to open self care items with additional time   Bed Mobility   Comment (Bed Mobility) Min-mod A x 2   Transfers   Transfer Comments min A x 2 STS from EOB, CGA x 2 once standing to pivot to chair   Balance   Balance Comments Cued for upright posture and extended knees upon standing   Bathing Assessment/Intervention   Comment, (Bathing) Sponge bath max A per clinical reasoning   Lower Body Dressing Assessment/Training   Comment, (Lower Body Dressing) Max A adjust socks   Grooming Assessment/Training   Comment, (Grooming) Brush teeth/wash face set up/SBA   Toileting   Comment, (Toileting) A x 2 to use BSC per clinical judgment   Clinical Impression   Criteria for Skilled Therapeutic Interventions Met (OT) Yes, treatment indicated   OT Diagnosis Decreased ADL I   OT Problem List-Impairments impacting ADL problems related to;activity tolerance impaired;balance;cognition;mobility;strength;pain;post-surgical precautions   Assessment of Occupational Performance 5 or more Performance Deficits   Identified Performance Deficits dressing, bathing, toileting, mobility, transfers, home management "   Planned Therapy Interventions (OT) IADL retraining;ADL retraining;bed mobility training;cognition;strengthening;transfer training;home program guidelines;progressive activity/exercise;risk factor education   Clinical Decision Making Complexity (OT) problem focused assessment/low complexity   Risk & Benefits of therapy have been explained evaluation/treatment results reviewed;care plan/treatment goals reviewed   Clinical Impression Comments Pt to benefit from skilled OT to address above deficits. See daily flowsheet for details regarding tx provided.   OT Total Evaluation Time   OT Eval, Low Complexity Minutes (11235) 8   OT Goals   Therapy Frequency (OT) 6 times/week   OT Predicted Duration/Target Date for Goal Attainment 07/18/24   OT Goals Hygiene/Grooming;Lower Body Dressing;Lower Body Bathing;Toilet Transfer/Toileting;Cognition   OT: Hygiene/Grooming modified independent   OT: Lower Body Dressing Modified independent   OT: Lower Body Bathing Modified independent   OT: Toilet Transfer/Toileting Modified independent   OT: Cognitive Patient/caregiver will verbalize understanding of cognitive assessment results/recommendations as needed for safe discharge planning   OT Discharge Planning   OT Plan OT: monitor cog, up to chair, STS   OT Discharge Recommendation (DC Rec) [facility] patient will benefit from intensive, interdisciplinary therapy.  Anticipate will be able to tolerate 3 hours of therapy per day   OT Rationale for DC Rec Pt lives alone and reports he won't have help for tasks when he returns home. Currently, is significantly below functional baseline demonstrating cognitive deficits as well. Previously was totally IND. Would be a good ARU candidate.   OT Brief overview of current status Up A x 2   Total Session Time   Total Session Time (sum of timed and untimed services) 8

## 2024-06-28 NOTE — PROGRESS NOTES
[facility], Procedure Note          Extubation:       [place]  MRN# [id number]   June 28, 2024, 11:00 AM         Patient extubated at: June 28, 2024, 11:00 AM   Supplemental Oxygen: Via nasal cannula at 4 liters per minute   Cough: The cough is strong   Secretion Mode: Able to clear   Secretion Amount: Small amount, thin and white / yellow in color   Respiratory Exam:: Breath sounds: equal and clear     Location: all lobes   Skin Exam:: Patient color: natural   Patient Status: Currently appears comfortable   Arterial Blood Gasses: pH Arterial (no units)   Date Value   06/27/2024 7.31 (L)     pH Arterial POCT (no units)   Date Value   06/27/2024 7.35     pO2 Arterial (mm Hg)   Date Value   06/27/2024 182 (H)     pO2 Arterial POCT (mm Hg)   Date Value   06/27/2024 132 (H)     pCO2 Arterial (mm Hg)   Date Value   06/27/2024 40     pCO2 Arterial POCT (mm Hg)   Date Value   06/27/2024 38     Bicarbonate Arterial (mmol/L)   Date Value   06/27/2024 20 (L)     Bicarbonate Arterial POCT (mmol/L)   Date Value   06/27/2024 21            Recorded by Eve Angela RT

## 2024-06-28 NOTE — PLAN OF CARE
Neuro: RAY, pupils reactive, soft unrestrained mitts in place, afebrile  CV: CVP 6-8, sinus tach, BP stable in goal  Resp: vc-ac 40%/550/12/5, minimal/no secretions  : du ~100-150q2  GI: OG--> LIS 100mL out, replaced w/ 1/2NS  Skin: clamshell incision oozy--> reinforced, 2 DEE sanguinous output per flowsheet. R thigh/L arm bruising  Labs: Mag/K/Phos replaced per protocols, hgb stable 11.2, LA normalized  Drips: prop @35, fent@50, LR@100, insulin alg 4 @4    See MAR and flowsheets for additional data    Goal Outcome Evaluation:      Plan of Care Reviewed With: patient, parent    Overall Patient Progress: improvingOverall Patient Progress: improving         Problem: Adult Inpatient Plan of Care  Goal: Plan of Care Review  Description: The Plan of Care Review/Shift note should be completed every shift.  The Outcome Evaluation is a brief statement about your assessment that the patient is improving, declining, or no change.  This information will be displayed automatically on your shift  note.  Outcome: Progressing  Flowsheets (Taken 6/28/2024 0638)  Plan of Care Reviewed With:   patient   parent  Overall Patient Progress: improving     Problem: Adult Inpatient Plan of Care  Goal: Absence of Hospital-Acquired Illness or Injury  Intervention: Prevent Skin Injury  Recent Flowsheet Documentation  Taken 6/28/2024 0600 by Yeni Borrero RN  Body Position:   turned   heels elevated   upper extremity elevated  Taken 6/28/2024 0400 by Yeni Borrero RN  Body Position:   turned   heels elevated   upper extremity elevated  Skin Protection:   incontinence pads utilized   mittens applied to hands   silicone foam dressing in place   skin to device areas padded  Device Skin Pressure Protection:   tubing/devices free from skin contact   skin-to-device areas padded   positioning supports utilized   mittens applied to hands   absorbent pad utilized/changed  Taken 6/28/2024 0200 by Yeni Borrero RN  Body Position:    turned   heels elevated   upper extremity elevated  Taken 6/28/2024 0000 by Yeni Borrero RN  Body Position:   turned   heels elevated   upper extremity elevated  Skin Protection:   incontinence pads utilized   mittens applied to hands   silicone foam dressing in place   skin to device areas padded  Device Skin Pressure Protection:   tubing/devices free from skin contact   skin-to-device areas padded   positioning supports utilized   mittens applied to hands   absorbent pad utilized/changed  Taken 6/27/2024 2200 by Yeni Borrero RN  Body Position:   turned   heels elevated   upper extremity elevated  Taken 6/27/2024 2000 by Yeni Borrero RN  Body Position:   turned   heels elevated   upper extremity elevated  Skin Protection:   incontinence pads utilized   mittens applied to hands   silicone foam dressing in place   skin to device areas padded  Device Skin Pressure Protection:   tubing/devices free from skin contact   skin-to-device areas padded   positioning supports utilized   mittens applied to hands   absorbent pad utilized/changed     Problem: Adult Inpatient Plan of Care  Goal: Absence of Hospital-Acquired Illness or Injury  Intervention: Prevent Infection  Recent Flowsheet Documentation  Taken 6/28/2024 0400 by Yeni Borrero RN  Infection Prevention:   cohorting utilized   hand hygiene promoted   rest/sleep promoted   single patient room provided   environmental surveillance performed  Taken 6/28/2024 0000 by Yeni Borrero RN  Infection Prevention:   cohorting utilized   hand hygiene promoted   rest/sleep promoted   single patient room provided   environmental surveillance performed  Taken 6/27/2024 2000 by Yeni Borrero RN  Infection Prevention:   cohorting utilized   hand hygiene promoted   rest/sleep promoted   single patient room provided   environmental surveillance performed     Problem: Adult Inpatient Plan of Care  Goal: Optimal Comfort and Wellbeing  Intervention: Monitor Pain  and Promote Comfort  Recent Flowsheet Documentation  Taken 6/28/2024 0400 by Yeni Borrero, RN  Pain Management Interventions:   care clustered   pillow support provided   rest   repositioned   around-the-clock dosing utilized  Taken 6/28/2024 0000 by Yeni Borrero, RN  Pain Management Interventions:   care clustered   pillow support provided   rest   repositioned   around-the-clock dosing utilized  Taken 6/27/2024 2000 by Yeni Borrero, RN  Pain Management Interventions:   care clustered   pillow support provided   rest   repositioned

## 2024-06-28 NOTE — PROGRESS NOTES
SURGICAL ICU PROGRESS NOTE  06/28/2024      PRIMARY TEAM: Liver Transplant Surgery   PRIMARY PHYSICIAN: Dr. Dodson    REASON FOR CRITICAL CARE ADMISSION: Post-operative management, hemodynamic monitoring, ventilator management    ADMITTING PHYSICIAN: Dr. 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     Intraoperative events: Tachycardic throughout the case, elevated lactate. Transiently on low dose pressor.     Product totals: 1 pRBC, 2 FFP, 3 platelets, 3 fibryga.      Overnight Events:  -No acute events overnight   -Resting comfortably     Changes Today:  - Extubate   - Discontinue q6hr lactate   - Replace MAC with central line   - Per transplant able to start po intake      Neurological:  # Acute post-operative pain  - Monitor neurological status. Delirium preventions and precautions.   - Pain: Discontinue fentanyl gtt                           - Sedation plan: Discontinue propofol gtt   - Holding PTA gabapentin  - Discontinue PTA lactulose      Pulmonary:   # Post operative ventilatory support  - VENT: AC 16/550/5/50%.   Continue full vent support. PST when meets criteria.  Ventilatory bundle. HOB elevation   - CXR to confirm ETT placement   - Extubated       Cardiovascular:    # Shock-hemorrhagic, resolved  # Hx HTN  - Monitor hemodynamic status. MAP goal >65. No pressor requirements on arrival to SICU  - Hold PTA Atenolol      Gastroenterology/Nutrition:  # Hx of cirrhosis secondary to steatohepatitis  # S/p DDLT 6/27/24  # Hepatocellular carcinoma s/p liver ablation 2022  - Liver US revealed normal appearance of transplant liver and normal doppler   - Post-transplant rejection ppx with tacrolimus and  mycophenolate  - Ursodiol per transplant protocol   - , , Tbili 2.3, INR 1.7  - Monitor DEE drain output  - Q6h hepatic panel        MELD 3.0: 18 at 2024  3:50 PM  MELD-Na: 20 at 2024  3:50 PM  Calculated from:  Serum Creatinine: 0.86 mg/dL (Using min of 1 mg/dL) at 2024  3:46 PM  Serum Sodium: 140 mmol/L (Using max of 137 mmol/L) at 2024  3:46 PM  Total Bilirubin: 1.7 mg/dL at 2024  3:46 PM  Serum Albumin: 4.6 g/dL (Using max of 3.5 g/dL) at 2024  3:46 PM  INR(ratio): 2.92 at 2024  3:50 PM  Age at listin years  Sex: Male at 2024  3:50 PM     # Protein calorie deficit malnutrition   - Diet: NPO  - Likely feeding tube placement tomorrow. Nutrition consulted. Appreciate recommendations.   - OG tube to LIS, 1:1 replacement with 1/2 NS   - PPI   - Bowel regimen: senna, miralax  - Per transplant able to start po intake      Renal/Fluids/Electrolytes/Renal:   # Electrolyte management  - Baseline creatinine 0.7-0.8, Cr 0.83 today  - K 3, Phos 0.9  -Phos and potassium replaced this morning   - D5 0.45 NaCl at 100 ml/h for IV fluid hydration change to  mL  - Will monitor intake and output, and Q6 BMP  - Urine output is adequate. Will continue to monitor intake and output via du catheter.   - Holding PTA diuretics   - ICU electrolyte replacement protocol  - Consider removing du post extubation      # Lactic Acidosis  - Lactate 11 at the end of the case, possibly related to anhepatic time. AM Lactate 1.5   - mIVF as above  - Discontinue q6hr lactate      Endocrine:  # Stress hyperglycemia   # DM2  - HgbA1C 6.1, AM Glucose 150  - Blood glucose goal < 180   - Insulin gtt   - Steroid taper per transplant team   - Hold PTA metformin and ozempic       ID:  # Immunosuppression needs  - WBC 11.7  - Monitor CBC q6h  - Surgical prophylaxis includes: piperacillin-tazobactam IV, fluconazole.   - Opportunistic pathogen prophylaxis includes:  trimethoprim/sulfamethoxazole, valganciclovir, and topical antifungal coverage to begin once systemic antifungal therapy is complete.     Heme:     # Severe coagulopathy in setting of liver transplant   # Acute blood loss anemia   # Thrombocytopenia   - Monitor CBC, coags (PT/PTT/Fibrinogen), sent stat labs now, then q6h  - AM Hb 11.2, Plts 156,  INR 1.7, Fibrinogen 254  - Goals per transplant surgery, Hgb > 8, platelets > 50, fibrinogen > 200, INR < 2, transfuse as needed    - Monitoring drain output closely      Musculoskeletal/Skin:  # Surgical incision   # Weakness and deconditioning of critical illness   - Physical and occupational therapy consult      General Cares/Prophylaxis:    DVT Prophylaxis: Pneumatic Compression Devices  GI Prophylaxis: PPI  Restraints: Restraints for medical healing needed: Yes     Lines/ tubes/ drains:  - RIJ MAC line x 2   - PIV x3  - Left radial arterial line  - DEE x 2  - Martinez      Disposition:  - SICU    Patient seen and discussed with staff.    Jayden Rios DO, MS   General Surgery, PGY 1      Clinically Significant Risk Factors Present on Admission        # Hypokalemia: Lowest K = 3 mmol/L in last 2 days, will replace as needed  # Hypernatremia: Highest Na = 147 mmol/L in last 2 days, will monitor as appropriate  # Hypocalcemia: Lowest iCa = 4.1 mg/dL in last 2 days, will monitor and replace as appropriate  # Hypercalcemia: Highest Ca = 10.3 mg/dL in last 2 days, will monitor as appropriate   # Anion Gap Metabolic Acidosis: Highest Anion Gap = 25 mmol/L in last 2 days, will monitor and treat as appropriate  # Hypoalbuminemia: Lowest albumin = 3.4 g/dL at 6/28/2024  4:08 AM, will monitor as appropriate  # Coagulation Defect: INR = 1.70 (Ref range: 0.85 - 1.15) and/or PTT = 39 Seconds (Ref range: 22 - 38 Seconds), will monitor for bleeding    # Hypertension: Noted on problem list          #Precipitous drop in Hgb/Hct: Lowest Hgb this hospitalization: 10 g/dL. Will continue to  monitor and treat/transfuse as appropriate.                    ====================================      OBJECTIVE:     Temp:  [97.5  F (36.4  C)-99.1  F (37.3  C)] 99.1  F (37.3  C)  Pulse:  [] 103  Resp:  [16-18] 16  MAP:  [71 mmHg-109 mmHg] 78 mmHg  Arterial Line BP: ()/(59-89) 112/64  FiO2 (%):  [40 %-50 %] 40 %  SpO2:  [100 %] 100 %  Vent Mode: CMV/AC  (Continuous Mandatory Ventilation/ Assist Control)  FiO2 (%): 40 %  Resp Rate (Set): 16 breaths/min  Tidal Volume (Set, mL): 550 mL  PEEP (cm H2O): 5 cmH2O  Resp: 16      I/O last 3 completed shifts:  In: 7006.8 [I.V.:4315.8; NG/GT:40]  Out: 4818 [Urine:4365; Drains:453]    General/Neuro: intubated and sedated    CV: RRR  Pulm: Mechanically ventilated  Abd: soft; NT, ND, incisions C/D/I, DEE drain x2  Extremities: no edema  Skin: warm and well-perfused.     LABS:   Arterial Blood Gases   Recent Labs   Lab 06/27/24  1810 06/27/24  1711 06/27/24  1639 06/27/24  1558   PH 7.31* 7.35 7.31* 7.31*   PCO2 40 38 38 37   PO2 182* 132* 161* 138*   HCO3 20* 21 19* 19*     Complete Blood Count   Recent Labs   Lab 06/28/24  0408 06/27/24  1945 06/27/24  1807 06/27/24  1711 06/27/24  1558 06/27/24  1550 06/27/24  0951 06/26/24  1736   WBC 11.7* 13.6* 14.3*  --   --   --   --  9.4   HGB 11.2* 12.3* 12.1* 10.2*   < >  --    < > 15.7    205 215  --   --  147*  --  146*    < > = values in this interval not displayed.     Basic Metabolic Panel  Recent Labs   Lab 06/28/24  0539 06/28/24  0505 06/28/24  0411 06/28/24  0408 06/27/24  2103 06/27/24  1945 06/27/24  1807 06/27/24  1804 06/27/24  1711 06/26/24  2157 06/26/24  1546   NA  --   --   --  146*  --  145 147*  --  147*   < > 140   POTASSIUM  --   --   --  3.0*  --  3.5 3.8  --  3.7   < > 3.8   CHLORIDE  --   --   --  109*  --  104 103  --   --   --  101   CO2  --   --   --  27  --  20* 19*  --   --   --  27   BUN  --   --   --  13.2  --  12.8 12.0  --   --   --  11.8   CR  --   --   --  0.83  --  0.79 0.82  --    --   --  0.86   * 130* 100* 150*   < > 218*  242* 183*   < > 159*   < > 138*    < > = values in this interval not displayed.     Liver Function Tests  Recent Labs   Lab 06/28/24 0408 06/27/24 1945 06/27/24  1807 06/27/24  1550 06/26/24  1546   *  --  416*  --  27   *  --  212*  --  33   ALKPHOS 73  --  96  --  104   BILITOTAL 2.3*  --  3.9*  --  1.7*   ALBUMIN 3.4*  --  4.0  --  4.6   INR 1.70* 1.73* 1.83* 2.92* 1.24*     Pancreatic Enzymes  Recent Labs   Lab 06/28/24 0408 06/26/24  1546   LIPASE 1,669*  --    AMYLASE 601* 38     Coagulation Profile  Recent Labs   Lab 06/28/24 0408 06/27/24 1945 06/27/24 1807 06/27/24  1550 06/26/24  1546   INR 1.70* 1.73* 1.83* 2.92* 1.24*   PTT  --   --  39* 71* 31         IMAGING:   Recent Results (from the past 24 hour(s))   DIDI with Report    Narrative    West Galo MD     6/27/2024  5:07 PM  Perioperative DIDI Procedure Note    Staff -        Anesthesiologist:  Chris De La Paz DO       Resident/Fellow: West Galo MD       Performed By: resident  Preanesthesia Checklist:  Patient identified, IV assessed, risks and   benefits discussed, monitors and equipment assessed, procedure being   performed at surgeon's request and anesthesia consent obtained.    DIDI Probe Insertion    Probe Status PRE Insertion: NO obvious damage  Probe type:  Adult 2D  Bite block used:   Soft  Insertion Technique: Jaw Lift  Insertion complications: None obvious  Billing Report:A DIDI report is NOT being generated.  Probe Status POST Removal: NO obvious damage         XR Abdomen Port 1 View    Narrative    Exam: XR Abdomen Port 1 View, 6/27/2024 5:05 PM    Indication: Incomplete count on vascular clamp instruments    Comparison: None    Findings:   Intraoperative supine view of the abdomen. Partially visualized  enteric tube at the upper border of the image. There are surgical  clips over the right upper quadrant and upper midline abdomen. Tubing  courses over the right  hemiabdomen. No radiopaque surgical instruments  are visualized. No abnormally dilated, air-filled loops of bowel.      Impression    Impression: Intraoperative radiograph with no radiopaque surgical  instruments in the field of view.  Expected surgical clips in the  upper abdomen.     Findings were communicated to ARABELLA Mir team via telephone on 5:06 PM  6/27/2024    I have personally reviewed the examination and initial interpretation  and I agree with the findings.    SHLOMO MOY MD         SYSTEM ID:  [id number]   XR Chest Port 1 View    Narrative    Exam: XR CHEST PORT 1 VIEW, 6/27/2024 6:29 PM    Comparison: 6/26/2024    History: cvc    Findings:  Portable AP view of the chest. Endotracheal tube within the thoracic  trachea. Two right IJ central venous catheters terminating in the area  of the innominate vein or high SVC. An enteric tube courses below the  diaphragm and inferior to the field of view. Stable cardiac  silhouette. No pneumothorax or pleural effusion. Similar streaky  opacity in the right lateral lung, likely scarring/atelectasis.      Impression    Impression: Endotracheal tube within the thoracic trachea. Two right  IJ central venous catheters terminate in the area of the high SVC or  the innominate vein.    I have personally reviewed the examination and initial interpretation  and I agree with the findings.    SHLOMO MOY MD         SYSTEM ID:  [id number]   US Liver Transplant    Narrative    EXAMINATION: US LIVER TRANSPLANT, 6/27/2024 7:34 PM     COMPARISON: None    HISTORY: Post op liver transplant    TECHNIQUE:  Gray-scale, color Doppler and spectral flow analysis.    FINDINGS:   There is no ascites.    Liver:   The liver demonstrates normal homogeneous echotexture. No  evidence of a focal hepatic mass.     Bile Ducts: Both the intra- and extrahepatic biliary system are of  normal caliber.  The common bile duct is not well seen.    Gallbladder: The gallbladder is surgically  absent.    Kidneys:   Right kidney:  The right kidney demonstrates normal echotexture with  no evidence of a shadowing stone, focal mass or hydronephrosis.   12.2  cm in long axis dimension.  Left kidney:  The left kidney demonstrates normal echotexture with no  evidence of a shadowing stone, focal mass or hydronephrosis.   13.5 cm  in long axis dimension.    Pancreas: Visualized portions of the pancreas are normal in  appearance.    Spleen:  The spleen is homogeneous, measuring 13.1 cm.    Visualized portions of the aorta are unremarkable.    LIVER DOPPLER:  Splenic vein:  Patent continuous normal antegrade direction flow  towards the liver, 50 cm/sec.  Extrahepatic portal vein:  Patent continuous antegrade flow, 68  cm/sec.  Portal vein at anastomosis: Patent continuous antegrade flow, 78  cm/sec.  Intrahepatic portal vein:  Patent continuous antegrade flow, 63  cm/sec.  Right portal vein flow is antegrade, measuring 85 cm/sec.  Left portal vein flow is antegrade, measuring 43 cm/sec.    Inferior vena cava: patent with flow toward the heart throughout..  IVC above anastomosis:  35 cm/sec.  IVC at anastomosis:  52 cm/sec.  Intrahepatic IVC:  21 cm/sec.  Extrahepatic IVC:  18 cm/sec.    Right, mid, left hepatic veins: Patent with flow towards the inferior  vena cava.    Extrahepatic hepatic artery: Low resistance waveform with flow towards  the liver. 45 cm/sec with resistive index 0.45.  Right hepatic artery: 43 cm/sec with resistive index 0.41.  Left hepatic artery: 75 cm/sec with resistive index 0.37.      Impression    Impression:   1.  Normal grayscale appearance of the transplant liver.  2.  Low resistive indices in the hepatic arteries, recommend attention  on close follow-up.    I have personally reviewed the examination and initial interpretation  and I agree with the findings.    SHLOMO MOY MD         SYSTEM ID:  [id number]

## 2024-06-28 NOTE — PHARMACY-TRANSPLANT NOTE
Adult Liver Transplant Post Operative Note    48 year old male s/p  donor liver transplant on 24 for RIOS and hepatocellular carcinoma.    Planned immunosuppression regimen to include:   INDUCTION with: methylprednisolone/prednisone taper through POD #6.  MAINTENANCE to include mycophenolate and tacrolimus with initial goal trough levels of 8-12 (closer to 10) mcg/L for 0-3 months post-transplant.  Patient may continue prednisone at 5 mg PO daily until tacrolimus level is therapeutic.     Surgical prophylaxis includes: piperacillin-tazobactam IV for 48 hours and fluconazole IV for 24 hours.      Opportunistic pathogen prophylaxis includes: trimethoprim/sulfamethoxazole, valganciclovir, and nystatin (topical antifungal coverage to begin once systemic antifungal therapy is complete).    Patient is not enrolled in medication study.    Pharmacy will monitor for medication interactions and immunosuppression levels in conjunction with the team. Medication therapy needs for discharge planning will continue to be addressed throughout the current admission via multidisciplinary rounds and order review.  Pharmacy will make recommendations as appropriate.    Ashley Keller, LeslyD, BCCCP

## 2024-06-28 NOTE — PROGRESS NOTES
Admitted/transferred from: OR  Reason for admission/transfer: DDLR  2 RN skin assessment: completed by Azul BARROW and Jarad WOLFE  Result of skin assessment and interventions/actions: Abd clamshell incision, R DEE x2, L arm bruise  Height, weight, drug calc weight: Not Done - need weight  Patient belongings (see Flowsheet)  MDRO education added to care planYes  ?

## 2024-06-29 LAB
ALBUMIN SERPL BCG-MCNC: 3.1 G/DL (ref 3.5–5.2)
ALP SERPL-CCNC: 63 U/L (ref 40–150)
ALT SERPL W P-5'-P-CCNC: 111 U/L (ref 0–70)
ANION GAP SERPL CALCULATED.3IONS-SCNC: 6 MMOL/L (ref 7–15)
AST SERPL W P-5'-P-CCNC: 101 U/L (ref 0–45)
BASOPHILS # BLD AUTO: 0 10E3/UL (ref 0–0.2)
BASOPHILS NFR BLD AUTO: 0 %
BILIRUB DIRECT SERPL-MCNC: 0.85 MG/DL (ref 0–0.3)
BILIRUB SERPL-MCNC: 1.5 MG/DL
BUN SERPL-MCNC: 16.2 MG/DL (ref 6–20)
CA-I BLD-MCNC: 4.6 MG/DL (ref 4.4–5.2)
CALCIUM SERPL-MCNC: 8.1 MG/DL (ref 8.6–10)
CHLORIDE SERPL-SCNC: 108 MMOL/L (ref 98–107)
CREAT SERPL-MCNC: 0.78 MG/DL (ref 0.67–1.17)
DEPRECATED HCO3 PLAS-SCNC: 29 MMOL/L (ref 22–29)
EGFRCR SERPLBLD CKD-EPI 2021: >90 ML/MIN/1.73M2
EOSINOPHIL # BLD AUTO: 0 10E3/UL (ref 0–0.7)
EOSINOPHIL NFR BLD AUTO: 0 %
ERYTHROCYTE [DISTWIDTH] IN BLOOD BY AUTOMATED COUNT: 13.7 % (ref 10–15)
FIBRINOGEN PPP-MCNC: 284 MG/DL (ref 170–490)
GLUCOSE BLDC GLUCOMTR-MCNC: 125 MG/DL (ref 70–99)
GLUCOSE BLDC GLUCOMTR-MCNC: 128 MG/DL (ref 70–99)
GLUCOSE BLDC GLUCOMTR-MCNC: 129 MG/DL (ref 70–99)
GLUCOSE BLDC GLUCOMTR-MCNC: 132 MG/DL (ref 70–99)
GLUCOSE BLDC GLUCOMTR-MCNC: 142 MG/DL (ref 70–99)
GLUCOSE BLDC GLUCOMTR-MCNC: 143 MG/DL (ref 70–99)
GLUCOSE BLDC GLUCOMTR-MCNC: 145 MG/DL (ref 70–99)
GLUCOSE BLDC GLUCOMTR-MCNC: 145 MG/DL (ref 70–99)
GLUCOSE BLDC GLUCOMTR-MCNC: 148 MG/DL (ref 70–99)
GLUCOSE BLDC GLUCOMTR-MCNC: 150 MG/DL (ref 70–99)
GLUCOSE BLDC GLUCOMTR-MCNC: 153 MG/DL (ref 70–99)
GLUCOSE BLDC GLUCOMTR-MCNC: 159 MG/DL (ref 70–99)
GLUCOSE BLDC GLUCOMTR-MCNC: 163 MG/DL (ref 70–99)
GLUCOSE BLDC GLUCOMTR-MCNC: 166 MG/DL (ref 70–99)
GLUCOSE BLDC GLUCOMTR-MCNC: 167 MG/DL (ref 70–99)
GLUCOSE BLDC GLUCOMTR-MCNC: 170 MG/DL (ref 70–99)
GLUCOSE BLDC GLUCOMTR-MCNC: 173 MG/DL (ref 70–99)
GLUCOSE BLDC GLUCOMTR-MCNC: 174 MG/DL (ref 70–99)
GLUCOSE SERPL-MCNC: 163 MG/DL (ref 70–99)
HCT VFR BLD AUTO: 27.3 % (ref 40–53)
HGB BLD-MCNC: 9.5 G/DL (ref 13.3–17.7)
IMM GRANULOCYTES # BLD: 0.1 10E3/UL
IMM GRANULOCYTES NFR BLD: 0 %
INR PPP: 1.52 (ref 0.85–1.15)
LYMPHOCYTES # BLD AUTO: 0.6 10E3/UL (ref 0.8–5.3)
LYMPHOCYTES NFR BLD AUTO: 4 %
MAGNESIUM SERPL-MCNC: 2.4 MG/DL (ref 1.7–2.3)
MCH RBC QN AUTO: 29.6 PG (ref 26.5–33)
MCHC RBC AUTO-ENTMCNC: 34.8 G/DL (ref 31.5–36.5)
MCV RBC AUTO: 85 FL (ref 78–100)
MONOCYTES # BLD AUTO: 1.5 10E3/UL (ref 0–1.3)
MONOCYTES NFR BLD AUTO: 11 %
NEUTROPHILS # BLD AUTO: 11.9 10E3/UL (ref 1.6–8.3)
NEUTROPHILS NFR BLD AUTO: 85 %
NRBC # BLD AUTO: 0 10E3/UL
NRBC BLD AUTO-RTO: 0 /100
PHOSPHATE SERPL-MCNC: 2.9 MG/DL (ref 2.5–4.5)
PLATELET # BLD AUTO: 125 10E3/UL (ref 150–450)
POTASSIUM SERPL-SCNC: 3.8 MMOL/L (ref 3.4–5.3)
PROT SERPL-MCNC: 4.7 G/DL (ref 6.4–8.3)
RBC # BLD AUTO: 3.21 10E6/UL (ref 4.4–5.9)
SODIUM SERPL-SCNC: 143 MMOL/L (ref 135–145)
WBC # BLD AUTO: 14.1 10E3/UL (ref 4–11)

## 2024-06-29 PROCEDURE — 250N000013 HC RX MED GY IP 250 OP 250 PS 637

## 2024-06-29 PROCEDURE — 84100 ASSAY OF PHOSPHORUS: CPT | Performed by: SURGERY

## 2024-06-29 PROCEDURE — 258N000003 HC RX IP 258 OP 636

## 2024-06-29 PROCEDURE — 250N000013 HC RX MED GY IP 250 OP 250 PS 637: Performed by: STUDENT IN AN ORGANIZED HEALTH CARE EDUCATION/TRAINING PROGRAM

## 2024-06-29 PROCEDURE — 83735 ASSAY OF MAGNESIUM: CPT | Performed by: SURGERY

## 2024-06-29 PROCEDURE — 99024 POSTOP FOLLOW-UP VISIT: CPT | Mod: GC | Performed by: TRANSPLANT SURGERY

## 2024-06-29 PROCEDURE — 250N000012 HC RX MED GY IP 250 OP 636 PS 637: Performed by: STUDENT IN AN ORGANIZED HEALTH CARE EDUCATION/TRAINING PROGRAM

## 2024-06-29 PROCEDURE — 250N000011 HC RX IP 250 OP 636: Performed by: STUDENT IN AN ORGANIZED HEALTH CARE EDUCATION/TRAINING PROGRAM

## 2024-06-29 PROCEDURE — 250N000011 HC RX IP 250 OP 636

## 2024-06-29 PROCEDURE — 120N000011 HC R&B TRANSPLANT UMMC

## 2024-06-29 PROCEDURE — 250N000013 HC RX MED GY IP 250 OP 250 PS 637: Performed by: SURGERY

## 2024-06-29 PROCEDURE — 80053 COMPREHEN METABOLIC PANEL: CPT

## 2024-06-29 PROCEDURE — 85610 PROTHROMBIN TIME: CPT | Performed by: STUDENT IN AN ORGANIZED HEALTH CARE EDUCATION/TRAINING PROGRAM

## 2024-06-29 PROCEDURE — 85025 COMPLETE CBC W/AUTO DIFF WBC: CPT | Performed by: STUDENT IN AN ORGANIZED HEALTH CARE EDUCATION/TRAINING PROGRAM

## 2024-06-29 PROCEDURE — 82330 ASSAY OF CALCIUM: CPT | Performed by: STUDENT IN AN ORGANIZED HEALTH CARE EDUCATION/TRAINING PROGRAM

## 2024-06-29 PROCEDURE — 85384 FIBRINOGEN ACTIVITY: CPT

## 2024-06-29 PROCEDURE — 250N000009 HC RX 250

## 2024-06-29 PROCEDURE — 250N000011 HC RX IP 250 OP 636: Performed by: SURGERY

## 2024-06-29 RX ORDER — TAMSULOSIN HYDROCHLORIDE 0.4 MG/1
0.4 CAPSULE ORAL DAILY
Status: DISCONTINUED | OUTPATIENT
Start: 2024-06-29 | End: 2024-07-03

## 2024-06-29 RX ORDER — METOPROLOL TARTRATE 25 MG/1
25 TABLET, FILM COATED ORAL 2 TIMES DAILY
Status: DISCONTINUED | OUTPATIENT
Start: 2024-06-29 | End: 2024-06-29

## 2024-06-29 RX ORDER — GABAPENTIN 600 MG/1
600 TABLET ORAL 3 TIMES DAILY
Status: DISCONTINUED | OUTPATIENT
Start: 2024-06-29 | End: 2024-07-05 | Stop reason: HOSPADM

## 2024-06-29 RX ORDER — NYSTATIN 100000/ML
500000 SUSPENSION, ORAL (FINAL DOSE FORM) ORAL 4 TIMES DAILY
Status: DISCONTINUED | OUTPATIENT
Start: 2024-06-29 | End: 2024-07-05 | Stop reason: HOSPADM

## 2024-06-29 RX ORDER — ATENOLOL 25 MG/1
25 TABLET ORAL 2 TIMES DAILY
Status: DISCONTINUED | OUTPATIENT
Start: 2024-06-29 | End: 2024-06-30

## 2024-06-29 RX ORDER — POTASSIUM CHLORIDE 29.8 MG/ML
20 INJECTION INTRAVENOUS ONCE
Status: COMPLETED | OUTPATIENT
Start: 2024-06-29 | End: 2024-06-29

## 2024-06-29 RX ORDER — PANTOPRAZOLE SODIUM 40 MG/1
40 TABLET, DELAYED RELEASE ORAL
Status: DISCONTINUED | OUTPATIENT
Start: 2024-06-29 | End: 2024-06-30

## 2024-06-29 RX ADMIN — OXYCODONE HYDROCHLORIDE 5 MG: 5 TABLET ORAL at 08:34

## 2024-06-29 RX ADMIN — MYCOPHENOLATE MOFETIL 750 MG: 250 CAPSULE ORAL at 18:04

## 2024-06-29 RX ADMIN — TAMSULOSIN HYDROCHLORIDE 0.4 MG: 0.4 CAPSULE ORAL at 13:59

## 2024-06-29 RX ADMIN — ATENOLOL 25 MG: 25 TABLET ORAL at 19:15

## 2024-06-29 RX ADMIN — URSODIOL 300 MG: 300 CAPSULE ORAL at 08:34

## 2024-06-29 RX ADMIN — PIPERACILLIN AND TAZOBACTAM 3.38 G: 3; .375 INJECTION, POWDER, LYOPHILIZED, FOR SOLUTION INTRAVENOUS at 00:28

## 2024-06-29 RX ADMIN — NYSTATIN 500000 UNITS: 100000 SUSPENSION ORAL at 12:38

## 2024-06-29 RX ADMIN — NYSTATIN 500000 UNITS: 100000 SUSPENSION ORAL at 15:37

## 2024-06-29 RX ADMIN — OXYCODONE HYDROCHLORIDE 5 MG: 5 TABLET ORAL at 19:30

## 2024-06-29 RX ADMIN — METHYLPREDNISOLONE SODIUM SUCCINATE 100 MG: 125 INJECTION, POWDER, FOR SOLUTION INTRAMUSCULAR; INTRAVENOUS at 08:34

## 2024-06-29 RX ADMIN — URSODIOL 300 MG: 300 CAPSULE ORAL at 19:16

## 2024-06-29 RX ADMIN — POLYETHYLENE GLYCOL 3350 17 G: 17 POWDER, FOR SOLUTION ORAL at 08:34

## 2024-06-29 RX ADMIN — SENNOSIDES 8.6 MG: 8.6 TABLET, FILM COATED ORAL at 08:34

## 2024-06-29 RX ADMIN — GABAPENTIN 600 MG: 600 TABLET, FILM COATED ORAL at 19:16

## 2024-06-29 RX ADMIN — INSULIN HUMAN 6 UNITS/HR: 1 INJECTION, SOLUTION INTRAVENOUS at 18:08

## 2024-06-29 RX ADMIN — PIPERACILLIN AND TAZOBACTAM 3.38 G: 3; .375 INJECTION, POWDER, LYOPHILIZED, FOR SOLUTION INTRAVENOUS at 13:59

## 2024-06-29 RX ADMIN — TACROLIMUS 2 MG: 1 CAPSULE ORAL at 18:04

## 2024-06-29 RX ADMIN — PANTOPRAZOLE SODIUM 40 MG: 40 TABLET, DELAYED RELEASE ORAL at 10:34

## 2024-06-29 RX ADMIN — POTASSIUM CHLORIDE 20 MEQ: 29.8 INJECTION, SOLUTION INTRAVENOUS at 05:53

## 2024-06-29 RX ADMIN — SULFAMETHOXAZOLE AND TRIMETHOPRIM 1 TABLET: 400; 80 TABLET ORAL at 08:34

## 2024-06-29 RX ADMIN — HYDROMORPHONE HYDROCHLORIDE 0.2 MG: 0.2 INJECTION, SOLUTION INTRAMUSCULAR; INTRAVENOUS; SUBCUTANEOUS at 13:37

## 2024-06-29 RX ADMIN — PIPERACILLIN AND TAZOBACTAM 3.38 G: 3; .375 INJECTION, POWDER, LYOPHILIZED, FOR SOLUTION INTRAVENOUS at 08:34

## 2024-06-29 RX ADMIN — GABAPENTIN 600 MG: 600 TABLET, FILM COATED ORAL at 10:34

## 2024-06-29 RX ADMIN — HYDROMORPHONE HYDROCHLORIDE 0.2 MG: 0.2 INJECTION, SOLUTION INTRAMUSCULAR; INTRAVENOUS; SUBCUTANEOUS at 21:10

## 2024-06-29 RX ADMIN — ATENOLOL 25 MG: 25 TABLET ORAL at 10:34

## 2024-06-29 RX ADMIN — ASPIRIN 81 MG CHEWABLE TABLET 81 MG: 81 TABLET CHEWABLE at 08:35

## 2024-06-29 RX ADMIN — OXYCODONE HYDROCHLORIDE 5 MG: 5 TABLET ORAL at 12:38

## 2024-06-29 RX ADMIN — GABAPENTIN 600 MG: 600 TABLET, FILM COATED ORAL at 13:59

## 2024-06-29 RX ADMIN — INSULIN HUMAN 4 UNITS/HR: 1 INJECTION, SOLUTION INTRAVENOUS at 00:20

## 2024-06-29 RX ADMIN — VALGANCICLOVIR HYDROCHLORIDE 450 MG: 450 TABLET ORAL at 19:16

## 2024-06-29 RX ADMIN — NYSTATIN 500000 UNITS: 100000 SUSPENSION ORAL at 19:16

## 2024-06-29 RX ADMIN — SODIUM CHLORIDE, POTASSIUM CHLORIDE, SODIUM LACTATE AND CALCIUM CHLORIDE: 600; 310; 30; 20 INJECTION, SOLUTION INTRAVENOUS at 04:58

## 2024-06-29 RX ADMIN — SENNOSIDES 8.6 MG: 8.6 TABLET, FILM COATED ORAL at 19:16

## 2024-06-29 RX ADMIN — TACROLIMUS 2 MG: 1 CAPSULE ORAL at 08:34

## 2024-06-29 RX ADMIN — MYCOPHENOLATE MOFETIL 750 MG: 250 CAPSULE ORAL at 08:34

## 2024-06-29 ASSESSMENT — ACTIVITIES OF DAILY LIVING (ADL)
ADLS_ACUITY_SCORE: 34
ADLS_ACUITY_SCORE: 32
ADLS_ACUITY_SCORE: 32
ADLS_ACUITY_SCORE: 34
ADLS_ACUITY_SCORE: 33
ADLS_ACUITY_SCORE: 32
ADLS_ACUITY_SCORE: 34

## 2024-06-29 NOTE — PROGRESS NOTES
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 Dodson on 6/27/24.     Graft function: POD#1. Transaminases downtrending, Tbil 1.5. Post op US with patent vasculature. LA normalized.   - Continue ursodiol 300mg BID and ASA 81mg daily  - DEE drain x2  Immunosuppression management:   Induction: Steroid taper per protocol.  Maintenance:    -  mg BID    - Tacrolimus 2 mg BID. Goal level 8-12.    - Prednisone 5 mg daily x 3 months following taper.  Neurology:   Acute pain: prn oxycodone, prn diluadid. Resume PTA gabapentin   Hematology:   Acute blood loss anemia: hgb  9.5, monitor. No transfusion needs post op  Thrombocytopenia: resolved. Plt 125  Cardiorespiratory:   HTN: Holding hydrochlorothiazide. Resume atenolol  GI/Nutrition: ADAT   Moderate malnutrition in the context of chronic illness: Nutrition consulted, will monitor PO intake after extubation.   Bowel regimen: senna, miralax  Endocrine:   Steroid induced hyperglycemia: On insulin gtt   T2DM: Hgb A1c 6.1%. Holding PTA metformin, ozempic  Hx of neuroendocrine tumor s/p lobectomy: incentive spirometer when awake  Fluid/Electrolytes: stop LR. Flomax (requested by patient due to problems with retention in past)  Hypokalemia:  resolved after repletion with ICU electrolyte protocol   Hypophosphatemia: resolved after repletion with ICU electrolyte protocol   : remove du  Infectious disease: no acute concerns  Prophylaxis: DVT (mechanical), GI (PPI), viral (Valcyte), PJP (Bactrim), fungal (fluconazole, nystatin after)   Disposition: Tx to 7A    JAVIER/Fellow/Resident Provider: Leticia Marks MD    Faculty: Radu Dodson M.D.    Clinically Significant Risk Factors        # Hypokalemia: Lowest K = 3 mmol/L in last 2 days, will replace as needed  # Hypernatremia: Highest Na = 147 mmol/L in last 2  days, will monitor as appropriate  # Hypocalcemia: Lowest iCa = 4.1 mg/dL in last 2 days, will monitor and replace as appropriate  # Hypercalcemia: corrected calcium is >10.1, will monitor as appropriate   # Anion Gap Metabolic Acidosis: Highest Anion Gap = 25 mmol/L in last 2 days, will monitor and treat as appropriate  # Hypoalbuminemia: Lowest albumin = 2.7 g/dL at 6/28/2024  3:23 PM, will monitor as appropriate    # Coagulation Defect: INR = 1.52 (Ref range: 0.85 - 1.15) and/or PTT = 39 Seconds (Ref range: 22 - 38 Seconds), will monitor for bleeding  # Thrombocytopenia: Lowest platelets = 125 in last 2 days, will monitor for bleeding   # Hypertension: Noted on problem list             #Precipitous drop in Hgb/Hct: Lowest Hgb this hospitalization: 9.5 g/dL. Will continue to monitor and treat/transfuse as appropriate.                  __________________________________________________________________  Transplant History: Admitted 6/26/2024 for liver transplant.   The patient has a history of liver failure due to HCC and steatohepatitis.    6/27/2024 (Liver), Postoperative day: 2     Interval History:   Overnight events: Returned from OR late last night. Sedated this morning    ROS:   A 10-point review of systems was negative except as noted above.    Curent Meds:  Current Facility-Administered Medications   Medication Dose Route Frequency Provider Last Rate Last Admin    aspirin (ASA) chewable tablet 81 mg  81 mg Oral or Feeding Tube Daily Radu Dodson MD   81 mg at 06/29/24 0835    chlorhexidine (PERIDEX) 0.12 % solution 15 mL  15 mL Mouth/Throat Q12H Lakshmi Lo MD   15 mL at 06/28/24 0847    [START ON 6/30/2024] methylPREDNISolone sodium succinate (solu-MEDROL) injection 50 mg  50 mg Intravenous Once Marquise Rendon MD        Followed by    [START ON 7/1/2024] predniSONE (DELTASONE) tablet 25 mg  25 mg Oral Once Marquise Rendon MD        Followed by    [START ON 7/2/2024] predniSONE (DELTASONE)  tablet 10 mg  10 mg Oral Once Marquise Rendon MD        mycophenolate (GENERIC EQUIVALENT) capsule 750 mg  750 mg Oral BID IS Marquise Rendon MD   750 mg at 06/29/24 0834    Or    mycophenolate (CELLCEPT BRAND) suspension 750 mg  750 mg Oral or NG Tube BID IS Marquise Rendon MD   750 mg at 06/28/24 0822    pantoprazole (PROTONIX) 2 mg/mL suspension 40 mg  40 mg Per Feeding Tube QAM AC Lakshmi Lo MD   40 mg at 06/28/24 0822    piperacillin-tazobactam (ZOSYN) 3.375 g vial to attach to  mL bag  3.375 g Intravenous Q6H Marquise Rendon  mL/hr at 06/28/24 1614 3.375 g at 06/29/24 0834    polyethylene glycol (MIRALAX) Packet 17 g  17 g Oral or Feeding Tube Daily Lakshmi Lo MD   17 g at 06/29/24 0834    sennosides (SENOKOT) tablet 8.6 mg  8.6 mg Oral or Feeding Tube BID Lakshmi Lo MD   8.6 mg at 06/29/24 0834    sodium chloride (PF) 0.9% PF flush 3 mL  3 mL Intravenous Q8H Marquise Rendon MD   3 mL at 06/28/24 1758    sulfamethoxazole-trimethoprim (BACTRIM) 400-80 MG per tablet 1 tablet  1 tablet Oral Daily Lakshmi Lo MD   1 tablet at 06/29/24 0834    tacrolimus (GENERIC EQUIVALENT) capsule 2 mg  2 mg Oral BID IS Marquise Rendon MD   2 mg at 06/29/24 0834    Or    tacrolimus (GENERIC) suspension 2 mg  2 mg Oral or NG Tube BID IS Marquise Rendon MD   2 mg at 06/28/24 0822    ursodiol (ACTIGALL) capsule 300 mg  300 mg Oral BID Marquise Rendon MD   300 mg at 06/29/24 0834    Or    ursodiol (ACTIGALL) suspension 300 mg  300 mg Oral or NG Tube BID Marquise Rendon MD   300 mg at 06/28/24 0822    valGANciclovir (VALCYTE) tablet 450 mg  450 mg Oral QPM Marquise Rendon MD   450 mg at 06/28/24 1930    Or    valGANciclovir (VALCYTE) solution 450 mg  450 mg Oral or NG Tube QPM Marquise Rendon MD   450 mg at 06/27/24 2104       Physical Exam:     Admit Weight: 92.7 kg (204 lb 4.8 oz)    Current Vitals:   BP (!) 158/94 (BP Location: Left arm)   Pulse 112   Temp 97.9  F (36.6   C) (Oral)   Resp 20   Wt 91.7 kg (202 lb 3.2 oz)   SpO2 99%   BMI 25.96 kg/m      Vital sign ranges:    Temp:  [97.9  F (36.6  C)-98.3  F (36.8  C)] 97.9  F (36.6  C)  Pulse:  [105-125] 112  Resp:  [16-20] 20  BP: (146-165)/() 158/94  Cuff Mean (mmHg):  [118] 118  MAP:  [76 mmHg-145 mmHg] 130 mmHg  Arterial Line BP: (109-157)/() 143/123  SpO2:  [95 %-100 %] 99 %  Patient Vitals for the past 24 hrs:   BP Temp Temp src Pulse Resp SpO2   06/29/24 0800 (!) 158/94 97.9  F (36.6  C) Oral 112 20 99 %   06/29/24 0700 (!) 153/94 -- -- 106 -- --   06/29/24 0630 (!) 146/99 -- -- -- -- --   06/29/24 0600 (!) 163/95 -- -- 107 -- 97 %   06/29/24 0500 (!) 154/102 -- -- 114 -- 97 %   06/29/24 0400 (!) 146/95 -- -- 108 -- 96 %   06/29/24 0300 (!) 147/98 -- -- 112 -- 95 %   06/29/24 0200 (!) 148/99 -- -- 108 -- 97 %   06/29/24 0100 (!) 153/97 -- -- 105 -- 96 %   06/29/24 0000 (!) 147/95 98.3  F (36.8  C) Oral 112 18 97 %   06/28/24 2300 (!) 164/103 -- -- 114 -- 97 %   06/28/24 2200 (!) 161/96 -- -- 116 -- 96 %   06/28/24 2100 (!) 159/99 -- -- 120 -- 97 %   06/28/24 2000 (!) 165/98 98.2  F (36.8  C) Oral (!) 125 16 96 %   06/28/24 1900 (!) 164/100 -- -- (!) 122 -- 97 %   06/28/24 1800 -- -- -- (!) 121 -- 96 %   06/28/24 1700 -- -- -- 117 -- 96 %   06/28/24 1600 -- 98.3  F (36.8  C) Oral (!) 122 16 98 %   06/28/24 1500 -- -- -- 120 -- 97 %   06/28/24 1400 -- -- -- 120 -- 96 %   06/28/24 1300 -- -- -- 113 -- 99 %   06/28/24 1200 -- 98  F (36.7  C) Oral 110 16 100 %   06/28/24 1116 -- -- -- -- -- 100 %   06/28/24 1100 -- -- -- 112 -- 100 %   06/28/24 1000 -- -- -- 107 -- 100 %     General: sedated  Neuro: sedated  Respiratory: mechanically ventillated  Cardiovascular: Regular rate and rhythm.  Gastrointestinal: Abdomen soft, non-distended, Jpx2 with serosanguinous   MSK/Extremities: No limb deformities. Minimal edema  Incisions/Skin: As noted above. No rashes or lesions appreciated.      Frailty Scores  More data may  exist         1/1/2024 12/10/2023 12/7/2023 6/20/2023 6/13/2023   Frailty Scores   Final Score Not Frail Not Frail Not Frail Not Frail Not Frail Not Frail   Final Score Number 1 1 1 2 2 2      Details          Multiple values from one day are sorted in reverse-chronological order               Data:   CMP  Recent Labs   Lab 06/29/24  0828 06/29/24  0557 06/29/24  0306 06/29/24  0302 06/28/24  2140 06/28/24  2135 06/28/24  1757 06/28/24  1751 06/28/24  0411 06/28/24  0408 06/26/24 2157 06/26/24  1546   NA  --   --   --  143  --   --   --  145  --  146*   < > 140   POTASSIUM  --   --   --  3.8  --   --   --  3.7   < > 3.0*   < > 3.8   CHLORIDE  --   --   --  108*  --   --   --  110*  --  109*   < > 101   CO2  --   --   --  29  --   --   --  27  --  27   < > 27   * 142*   < > 163*   < >  --    < > 192*   < > 150*   < > 138*   BUN  --   --   --  16.2  --   --   --  17.6  --  13.2   < > 11.8   CR  --   --   --  0.78  --   --   --  0.80  --  0.83   < > 0.86   GFRESTIMATED  --   --   --  >90  --   --   --  >90  --  >90   < > >90   CHUCK  --   --   --  8.1*  --   --   --  8.3*  --  9.1   < > 10.3*   ICAW  --   --   --  4.6  --   --   --   --   --  5.0   < >  --    MAG  --   --   --  2.4*  --   --   --  2.0  --  2.3   < > 2.1   PHOS  --   --   --  2.9  --   --   --  3.0   < >  --    < > 3.1   AMYLASE  --   --   --   --   --   --   --   --   --  601*  --  38   LIPASE  --   --   --   --   --   --   --   --   --  1,669*  --   --    ALBUMIN  --   --   --  3.1*  --  3.1*  --   --    < > 3.4*   < > 4.6   BILITOTAL  --   --   --  1.5*  --  1.5*  --   --    < > 2.3*   < > 1.7*   ALKPHOS  --   --   --  63  --  67  --   --    < > 73   < > 104   AST  --   --   --  101*  --  131*  --   --    < > 296*   < > 27   ALT  --   --   --  111*  --  122*  --   --    < > 168*   < > 33    < > = values in this interval not displayed.     CBC  Recent Labs   Lab 06/29/24  0302 06/28/24  1751 06/27/24  1945 06/27/24  1807   HGB 9.5* 10.3*   < >  12.1*   WBC 14.1* 16.1*   < > 14.3*   * 137*   < > 215   A1C  --   --   --  6.1*    < > = values in this interval not displayed.     COAGS  Recent Labs   Lab 06/29/24  0302 06/28/24  0408 06/27/24  1945 06/27/24  1807 06/27/24  1550   INR 1.52* 1.70*   < > 1.83* 2.92*   PTT  --   --   --  39* 71*    < > = values in this interval not displayed.      Urinalysis  Recent Labs   Lab Test 06/26/24  1748 06/20/23  0815   COLOR Light Yellow Yellow   APPEARANCE Clear Slightly Cloudy*   URINEGLC Negative Negative   URINEBILI Negative Negative   URINEKETONE Negative Negative   SG 1.015 1.021   UBLD Negative Negative   URINEPH 7.5* 6.5   PROTEIN Negative 10*   NITRITE Negative Negative   LEUKEST Negative Negative   RBCU 1 <1   WBCU 1 1     Virology:  Hepatitis C Antibody   Date Value Ref Range Status   06/26/2024 Nonreactive Nonreactive Final     Comment:     A nonreactive screening test result does not exclude the possibility of exposure to or infection with HCV. Nonreactive screening test results in individuals with prior exposure to HCV may be due to antibody levels below the limit of detection of this assay or lack of reactivity to the HCV antigens used in this assay. Patients with recent HCV infections (<3 months from time of exposure) may have false-negative HCV antibody results due to the time needed for seroconversion (average of 8 to 9 weeks).

## 2024-06-29 NOTE — PLAN OF CARE
Vitals: 146/90, afebrile, room air.   Blood glucose: insulin drip, alg 4. BG 160s.   Pain/nausea: denies.   Diet: regular.   Lines: PIV R x 2 infusing insulin and tko ns.   : du  ml. Yellow.   GI:no bm, passing gas.   Drains: DEE R1 OP 30  ml. serosang DEE R OP 2 30 ml serosang  Skin: Admitted/transferred from:   Time of arrival on unit 7503  2 RN full  skin assessment completed by Esther BARROW & michael BARROW  Skin assessment finding: incision staples. Left side leaky. Abd on. DEE R x 2 dressing leaky bloody, changed. Bruises arms  Interventions/actions:  no other issues noted.   Mobility: a x 1-2 x walker.   Education : needs med/lab, needs spt, needs MTP.

## 2024-06-29 NOTE — PROGRESS NOTES
Transferred to: Unit 7A  at (1600)  Belongings: sent with patient, phone, clothes,   Martinez removed? No: keep in place per team   Central line removed? Yes  Chart and medications sent with patient Yes  Family notified: Yes   ?

## 2024-06-29 NOTE — PROGRESS NOTES
SURGICAL ICU PROGRESS NOTE  06/29/2024      PRIMARY TEAM: Liver Transplant Surgery   PRIMARY PHYSICIAN: Dr. Dodson     REASON FOR CRITICAL CARE ADMISSION: Post-operative management, hemodynamic monitoring, ventilator management    ADMITTING PHYSICIAN: Dr. 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     Intraoperative events: Tachycardic throughout the case, elevated lactate. Transiently on low dose pressor.     Product totals: 1 pRBC, 2 FFP, 3 platelets, 3 fibryga.      Overnight Events:  -No acute events overnight     Changes Today:  - Start PTA atenolol   - Remove MAC cath and arterial line   - Regular diet per primary team   - Start Flomax, maintain du   - Discontinue mIVF   - Transfer to floor     Neurological:  # Acute post-operative pain  - Monitor neurological status. Delirium preventions and precautions.   - Pain: Oxycodone   - Holding PTA gabapentin  - Discontinue PTA lactulose      Pulmonary:   # Post operative ventilatory support  - Extubated 06/28  - PRN NC O2     Cardiovascular:    # Shock-hemorrhagic, resolved  # Hx HTN  - Monitor hemodynamic status. MAP goal >65. No pressor requirements on arrival to SICU  - Hypertensive pressures to 160s  - PRN labetalol   - Previously holding PTA Atenolol   - Start PTA Atenolol per primary team      Gastroenterology/Nutrition:  # Hx of cirrhosis secondary to steatohepatitis  # S/p DDLT 6/27/24  # Hepatocellular carcinoma s/p liver ablation 2022  - Liver US revealed normal appearance of transplant liver and normal doppler   - Post-transplant rejection ppx with tacrolimus and mycophenolate  - Ursodiol per transplant protocol   - 06/29-, , Tbili  1.5, INR 1.52  - Monitor DEE drain output  - Q6h hepatic panel         MELD 3.0: 18 at 2024  3:50 PM  MELD-Na: 20 at 2024  3:50 PM  Calculated from:  Serum Creatinine: 0.86 mg/dL (Using min of 1 mg/dL) at 2024  3:46 PM  Serum Sodium: 140 mmol/L (Using max of 137 mmol/L) at 2024  3:46 PM  Total Bilirubin: 1.7 mg/dL at 2024  3:46 PM  Serum Albumin: 4.6 g/dL (Using max of 3.5 g/dL) at 2024  3:46 PM  INR(ratio): 2.92 at 2024  3:50 PM  Age at listin years  Sex: Male at 2024  3:50 PM     # Protein calorie deficit malnutrition   - Diet: CLD, advance as tolerated   - Nutrition consulted. Appreciate recommendations.   - PPI   - Start regular diet per primary team   - Bowel regimen: senna, miralax      Renal/Fluids/Electrolytes/Renal:   # Electrolyte management  - Baseline creatinine 0.7-0.8,  Cr 0.78   - K 3.8, Phos 3.1, Mg 2.4  - Replete K   -  mL/hr  - Will monitor intake and output  - Urine output is adequate. Will continue to monitor intake and output via du  - Holding PTA diuretics   - ICU electrolyte replacement protocol  - Start Flomax given patient's reported previous hx of urinary retention postop   - Discontinue mIVF      # Lactic Acidosis  - Resolved     Endocrine:  # Stress hyperglycemia   # DM2  - HgbA1C 6.1, AM Glucose 163  - Blood glucose goal < 180   - Insulin gtt,    - Steroid taper per transplant team   - Hold PTA metformin and ozempic       ID:  # Immunosuppression needs  - WBC 11.7  - Monitor CBC q6h  - Surgical prophylaxis includes: piperacillin-tazobactam IV, nystatin.   - Opportunistic pathogen prophylaxis includes: trimethoprim/sulfamethoxazole, valganciclovir, and nystatin      Heme:     # Severe coagulopathy in setting of liver transplant   # Acute blood loss anemia   # Thrombocytopenia   - Monitor CBC, coags (PT/PTT/Fibrinogen), daily CBC  - AM Hb 9.5, Plts 125,  INR 1.52, Fibrinogen 284  - Goals per transplant surgery, Hgb > 8, platelets >  50, fibrinogen > 200, INR < 2, transfuse as needed    - Monitoring drain output closely      Musculoskeletal/Skin:  # Surgical incision   # Weakness and deconditioning of critical illness   - Physical and occupational therapy consult   - Encouraged ambulation      General Cares/Prophylaxis:    DVT Prophylaxis: Pneumatic Compression Devices  GI Prophylaxis: PPI  Restraints: Restraints for medical healing needed: No      Lines/ tubes/ drains:  - RIJ MAC line x 2   - PIV x3  - Left radial arterial line  - DEE x 2     Disposition:  - SICU     Patient seen and discussed with staff.     Jayden Rios DO, MS   General Surgery, PGY 1    Clinically Significant Risk Factors        # Hypokalemia: Lowest K = 3 mmol/L in last 2 days, will replace as needed  # Hypernatremia: Highest Na = 147 mmol/L in last 2 days, will monitor as appropriate  # Hypocalcemia: Lowest iCa = 4.1 mg/dL in last 2 days, will monitor and replace as appropriate  # Hypercalcemia: corrected calcium is >10.1, will monitor as appropriate   # Anion Gap Metabolic Acidosis: Highest Anion Gap = 25 mmol/L in last 2 days, will monitor and treat as appropriate  # Hypoalbuminemia: Lowest albumin = 2.7 g/dL at 6/28/2024  3:23 PM, will monitor as appropriate  # Coagulation Defect: INR = 1.52 (Ref range: 0.85 - 1.15) and/or PTT = 39 Seconds (Ref range: 22 - 38 Seconds), will monitor for bleeding  # Thrombocytopenia: Lowest platelets = 125 in last 2 days, will monitor for bleeding   # Hypertension: Noted on problem list           #Precipitous drop in Hgb/Hct: Lowest Hgb this hospitalization: 9.5 g/dL. Will continue to monitor and treat/transfuse as appropriate.                     ====================================      OBJECTIVE:     Temp:  [97.9  F (36.6  C)-98.3  F (36.8  C)] 98.3  F (36.8  C)  Pulse:  [] 107  Resp:  [16-18] 18  BP: (146-165)/() 163/95  Cuff Mean (mmHg):  [118] 118  MAP:  [67 mmHg-145 mmHg] 136 mmHg  Arterial Line BP: ()/()  149/129  FiO2 (%):  [40 %] 40 %  SpO2:  [95 %-100 %] 97 %  Vent Mode: CMV/AC  (Continuous Mandatory Ventilation/ Assist Control)  FiO2 (%): 40 %  Resp Rate (Set): 16 breaths/min  Tidal Volume (Set, mL): 550 mL  PEEP (cm H2O): 5 cmH2O  Resp: 18      I/O last 3 completed shifts:  In: 5676.55 [P.O.:1200; I.V.:4371.55; NG/GT:105]  Out: 2213 [Urine:1375; Emesis/NG output:100; Drains:738]    General/Neuro: No acute distress, alert  CV: Tachycardic and hypertensive   Pulm: Clear   Abd: soft; NT, ND, stapled incision, DEE drain x2   Extremities: no edema, SCDs   Skin: warm and well-perfused.     LABS:   Arterial Blood Gases   Recent Labs   Lab 06/27/24  1810 06/27/24  1711 06/27/24  1639 06/27/24  1558   PH 7.31* 7.35 7.31* 7.31*   PCO2 40 38 38 37   PO2 182* 132* 161* 138*   HCO3 20* 21 19* 19*     Complete Blood Count   Recent Labs   Lab 06/29/24  0302 06/28/24  1751 06/28/24  0408 06/27/24 1945   WBC 14.1* 16.1* 11.7* 13.6*   HGB 9.5* 10.3* 11.2* 12.3*   * 137* 156 205     Basic Metabolic Panel  Recent Labs   Lab 06/29/24  0557 06/29/24  0502 06/29/24  0402 06/29/24  0306 06/29/24  0302 06/28/24  1757 06/28/24  1751 06/28/24  0940 06/28/24  0934 06/28/24  0411 06/28/24  0408 06/27/24  2103 06/27/24 1945   NA  --   --   --   --  143  --  145  --   --   --  146*  --  145   POTASSIUM  --   --   --   --  3.8  --  3.7  --  4.0  --  3.0*  --  3.5   CHLORIDE  --   --   --   --  108*  --  110*  --   --   --  109*  --  104   CO2  --   --   --   --  29  --  27  --   --   --  27  --  20*   BUN  --   --   --   --  16.2  --  17.6  --   --   --  13.2  --  12.8   CR  --   --   --   --  0.78  --  0.80  --   --   --  0.83  --  0.79   * 148* 145* 150* 163*   < > 192*   < >  --    < > 150*   < > 218*  242*    < > = values in this interval not displayed.     Liver Function Tests  Recent Labs   Lab 06/29/24  0302 06/28/24  2135 06/28/24  1523 06/28/24  0934 06/28/24  0408 06/27/24  1945 06/27/24  1807   * 131* 152*  220* 296*  --  416*   * 122* 113* 146* 168*  --  212*   ALKPHOS 63 67 59 70 73  --  96   BILITOTAL 1.5* 1.5* 1.5* 1.8* 2.3*  --  3.9*   ALBUMIN 3.1* 3.1* 2.7* 3.2* 3.4*  --  4.0   INR 1.52*  --   --   --  1.70* 1.73* 1.83*     Pancreatic Enzymes  Recent Labs   Lab 06/28/24  0408 06/26/24  1546   LIPASE 1,669*  --    AMYLASE 601* 38     Coagulation Profile  Recent Labs   Lab 06/29/24  0302 06/28/24  0408 06/27/24  1945 06/27/24  1807 06/27/24  1550 06/26/24  1546   INR 1.52* 1.70* 1.73* 1.83* 2.92* 1.24*   PTT  --   --   --  39* 71* 31         IMAGING:   No results found for this or any previous visit (from the past 24 hour(s)).

## 2024-06-29 NOTE — PLAN OF CARE
Major Shift Events:  A/Ox4, RAY, pain controlled adequately w/ oxy. ST, SBP<160, PRN labetalol given x1, afebrile. RA. Tolerating clear liq diet, voiding adequately via du, no BM. X2 DEE.  Plan: Continue plan of Care  For vital signs and complete assessments, please see documentation flowsheets.

## 2024-06-30 ENCOUNTER — APPOINTMENT (OUTPATIENT)
Dept: OCCUPATIONAL THERAPY | Facility: CLINIC | Age: 49
End: 2024-06-30
Attending: SURGERY
Payer: COMMERCIAL

## 2024-06-30 ENCOUNTER — APPOINTMENT (OUTPATIENT)
Dept: PHYSICAL THERAPY | Facility: CLINIC | Age: 49
End: 2024-06-30
Attending: SURGERY
Payer: COMMERCIAL

## 2024-06-30 LAB
ALBUMIN SERPL BCG-MCNC: 3.1 G/DL (ref 3.5–5.2)
ALP SERPL-CCNC: 78 U/L (ref 40–150)
ALT SERPL W P-5'-P-CCNC: 97 U/L (ref 0–70)
ANION GAP SERPL CALCULATED.3IONS-SCNC: 5 MMOL/L (ref 7–15)
AST SERPL W P-5'-P-CCNC: 66 U/L (ref 0–45)
BASOPHILS # BLD AUTO: 0 10E3/UL (ref 0–0.2)
BASOPHILS NFR BLD AUTO: 0 %
BILIRUB DIRECT SERPL-MCNC: 0.49 MG/DL (ref 0–0.3)
BILIRUB SERPL-MCNC: 1.1 MG/DL
BUN SERPL-MCNC: 21.8 MG/DL (ref 6–20)
CA-I BLD-MCNC: 4.7 MG/DL (ref 4.4–5.2)
CALCIUM SERPL-MCNC: 8.5 MG/DL (ref 8.6–10)
CHLORIDE SERPL-SCNC: 109 MMOL/L (ref 98–107)
CREAT SERPL-MCNC: 0.71 MG/DL (ref 0.67–1.17)
DEPRECATED HCO3 PLAS-SCNC: 28 MMOL/L (ref 22–29)
EGFRCR SERPLBLD CKD-EPI 2021: >90 ML/MIN/1.73M2
EOSINOPHIL # BLD AUTO: 0 10E3/UL (ref 0–0.7)
EOSINOPHIL NFR BLD AUTO: 0 %
ERYTHROCYTE [DISTWIDTH] IN BLOOD BY AUTOMATED COUNT: 13.5 % (ref 10–15)
GLUCOSE BLDC GLUCOMTR-MCNC: 118 MG/DL (ref 70–99)
GLUCOSE BLDC GLUCOMTR-MCNC: 121 MG/DL (ref 70–99)
GLUCOSE BLDC GLUCOMTR-MCNC: 129 MG/DL (ref 70–99)
GLUCOSE BLDC GLUCOMTR-MCNC: 131 MG/DL (ref 70–99)
GLUCOSE BLDC GLUCOMTR-MCNC: 131 MG/DL (ref 70–99)
GLUCOSE BLDC GLUCOMTR-MCNC: 139 MG/DL (ref 70–99)
GLUCOSE BLDC GLUCOMTR-MCNC: 142 MG/DL (ref 70–99)
GLUCOSE BLDC GLUCOMTR-MCNC: 146 MG/DL (ref 70–99)
GLUCOSE BLDC GLUCOMTR-MCNC: 147 MG/DL (ref 70–99)
GLUCOSE BLDC GLUCOMTR-MCNC: 148 MG/DL (ref 70–99)
GLUCOSE BLDC GLUCOMTR-MCNC: 151 MG/DL (ref 70–99)
GLUCOSE BLDC GLUCOMTR-MCNC: 153 MG/DL (ref 70–99)
GLUCOSE BLDC GLUCOMTR-MCNC: 162 MG/DL (ref 70–99)
GLUCOSE BLDC GLUCOMTR-MCNC: 166 MG/DL (ref 70–99)
GLUCOSE SERPL-MCNC: 121 MG/DL (ref 70–99)
HCT VFR BLD AUTO: 28 % (ref 40–53)
HGB BLD-MCNC: 9.7 G/DL (ref 13.3–17.7)
IMM GRANULOCYTES # BLD: 0.2 10E3/UL
IMM GRANULOCYTES NFR BLD: 1 %
INR PPP: 1.34 (ref 0.85–1.15)
LYMPHOCYTES # BLD AUTO: 1.3 10E3/UL (ref 0.8–5.3)
LYMPHOCYTES NFR BLD AUTO: 7 %
MAGNESIUM SERPL-MCNC: 2.2 MG/DL (ref 1.7–2.3)
MCH RBC QN AUTO: 29.7 PG (ref 26.5–33)
MCHC RBC AUTO-ENTMCNC: 34.6 G/DL (ref 31.5–36.5)
MCV RBC AUTO: 86 FL (ref 78–100)
MONOCYTES # BLD AUTO: 2.2 10E3/UL (ref 0–1.3)
MONOCYTES NFR BLD AUTO: 13 %
NEUTROPHILS # BLD AUTO: 13.5 10E3/UL (ref 1.6–8.3)
NEUTROPHILS NFR BLD AUTO: 79 %
NRBC # BLD AUTO: 0 10E3/UL
NRBC BLD AUTO-RTO: 0 /100
PHOSPHATE SERPL-MCNC: 2.8 MG/DL (ref 2.5–4.5)
PLATELET # BLD AUTO: 150 10E3/UL (ref 150–450)
POTASSIUM SERPL-SCNC: 4.4 MMOL/L (ref 3.4–5.3)
PROT SERPL-MCNC: 5 G/DL (ref 6.4–8.3)
RBC # BLD AUTO: 3.27 10E6/UL (ref 4.4–5.9)
SODIUM SERPL-SCNC: 142 MMOL/L (ref 135–145)
TACROLIMUS BLD-MCNC: 9.4 UG/L (ref 5–15)
TME LAST DOSE: NORMAL H
TME LAST DOSE: NORMAL H
WBC # BLD AUTO: 17.1 10E3/UL (ref 4–11)

## 2024-06-30 PROCEDURE — 97530 THERAPEUTIC ACTIVITIES: CPT | Mod: GP

## 2024-06-30 PROCEDURE — 250N000009 HC RX 250

## 2024-06-30 PROCEDURE — 97535 SELF CARE MNGMENT TRAINING: CPT | Mod: GO | Performed by: OCCUPATIONAL THERAPIST

## 2024-06-30 PROCEDURE — 80053 COMPREHEN METABOLIC PANEL: CPT

## 2024-06-30 PROCEDURE — 36415 COLL VENOUS BLD VENIPUNCTURE: CPT | Performed by: STUDENT IN AN ORGANIZED HEALTH CARE EDUCATION/TRAINING PROGRAM

## 2024-06-30 PROCEDURE — 83735 ASSAY OF MAGNESIUM: CPT | Performed by: STUDENT IN AN ORGANIZED HEALTH CARE EDUCATION/TRAINING PROGRAM

## 2024-06-30 PROCEDURE — 250N000012 HC RX MED GY IP 250 OP 636 PS 637: Performed by: NURSE PRACTITIONER

## 2024-06-30 PROCEDURE — 84100 ASSAY OF PHOSPHORUS: CPT | Performed by: STUDENT IN AN ORGANIZED HEALTH CARE EDUCATION/TRAINING PROGRAM

## 2024-06-30 PROCEDURE — 85610 PROTHROMBIN TIME: CPT | Performed by: STUDENT IN AN ORGANIZED HEALTH CARE EDUCATION/TRAINING PROGRAM

## 2024-06-30 PROCEDURE — 99024 POSTOP FOLLOW-UP VISIT: CPT | Mod: FS | Performed by: NURSE PRACTITIONER

## 2024-06-30 PROCEDURE — 250N000013 HC RX MED GY IP 250 OP 250 PS 637: Performed by: SURGERY

## 2024-06-30 PROCEDURE — 250N000011 HC RX IP 250 OP 636: Performed by: STUDENT IN AN ORGANIZED HEALTH CARE EDUCATION/TRAINING PROGRAM

## 2024-06-30 PROCEDURE — 250N000013 HC RX MED GY IP 250 OP 250 PS 637

## 2024-06-30 PROCEDURE — 97530 THERAPEUTIC ACTIVITIES: CPT | Mod: GO | Performed by: OCCUPATIONAL THERAPIST

## 2024-06-30 PROCEDURE — 120N000011 HC R&B TRANSPLANT UMMC

## 2024-06-30 PROCEDURE — 250N000012 HC RX MED GY IP 250 OP 636 PS 637: Performed by: STUDENT IN AN ORGANIZED HEALTH CARE EDUCATION/TRAINING PROGRAM

## 2024-06-30 PROCEDURE — 97116 GAIT TRAINING THERAPY: CPT | Mod: GP

## 2024-06-30 PROCEDURE — 82330 ASSAY OF CALCIUM: CPT | Performed by: STUDENT IN AN ORGANIZED HEALTH CARE EDUCATION/TRAINING PROGRAM

## 2024-06-30 PROCEDURE — 250N000013 HC RX MED GY IP 250 OP 250 PS 637: Performed by: STUDENT IN AN ORGANIZED HEALTH CARE EDUCATION/TRAINING PROGRAM

## 2024-06-30 PROCEDURE — 97110 THERAPEUTIC EXERCISES: CPT | Mod: GP

## 2024-06-30 PROCEDURE — 85025 COMPLETE CBC W/AUTO DIFF WBC: CPT | Performed by: STUDENT IN AN ORGANIZED HEALTH CARE EDUCATION/TRAINING PROGRAM

## 2024-06-30 PROCEDURE — 80197 ASSAY OF TACROLIMUS: CPT | Performed by: SURGERY

## 2024-06-30 RX ORDER — PANTOPRAZOLE SODIUM 40 MG/1
40 TABLET, DELAYED RELEASE ORAL DAILY
Status: DISCONTINUED | OUTPATIENT
Start: 2024-07-01 | End: 2024-07-05 | Stop reason: HOSPADM

## 2024-06-30 RX ADMIN — SENNOSIDES 8.6 MG: 8.6 TABLET, FILM COATED ORAL at 19:31

## 2024-06-30 RX ADMIN — OXYCODONE HYDROCHLORIDE 5 MG: 5 TABLET ORAL at 06:45

## 2024-06-30 RX ADMIN — SENNOSIDES 8.6 MG: 8.6 TABLET, FILM COATED ORAL at 07:55

## 2024-06-30 RX ADMIN — GABAPENTIN 600 MG: 600 TABLET, FILM COATED ORAL at 07:55

## 2024-06-30 RX ADMIN — NYSTATIN 500000 UNITS: 100000 SUSPENSION ORAL at 07:55

## 2024-06-30 RX ADMIN — NYSTATIN 500000 UNITS: 100000 SUSPENSION ORAL at 19:31

## 2024-06-30 RX ADMIN — MYCOPHENOLATE MOFETIL 750 MG: 250 CAPSULE ORAL at 18:09

## 2024-06-30 RX ADMIN — GABAPENTIN 600 MG: 600 TABLET, FILM COATED ORAL at 13:56

## 2024-06-30 RX ADMIN — ATENOLOL 25 MG: 25 TABLET ORAL at 07:55

## 2024-06-30 RX ADMIN — METHYLPREDNISOLONE SODIUM SUCCINATE 50 MG: 125 INJECTION, POWDER, FOR SOLUTION INTRAMUSCULAR; INTRAVENOUS at 07:56

## 2024-06-30 RX ADMIN — INSULIN ASPART 1 UNITS: 100 INJECTION, SOLUTION INTRAVENOUS; SUBCUTANEOUS at 12:47

## 2024-06-30 RX ADMIN — INSULIN ASPART 1 UNITS: 100 INJECTION, SOLUTION INTRAVENOUS; SUBCUTANEOUS at 18:42

## 2024-06-30 RX ADMIN — OXYCODONE HYDROCHLORIDE 5 MG: 5 TABLET ORAL at 01:24

## 2024-06-30 RX ADMIN — PANTOPRAZOLE SODIUM 40 MG: 40 TABLET, DELAYED RELEASE ORAL at 07:55

## 2024-06-30 RX ADMIN — ASPIRIN 81 MG CHEWABLE TABLET 81 MG: 81 TABLET CHEWABLE at 07:55

## 2024-06-30 RX ADMIN — OXYCODONE HYDROCHLORIDE 5 MG: 5 TABLET ORAL at 12:11

## 2024-06-30 RX ADMIN — URSODIOL 300 MG: 300 CAPSULE ORAL at 07:55

## 2024-06-30 RX ADMIN — NYSTATIN 500000 UNITS: 100000 SUSPENSION ORAL at 18:09

## 2024-06-30 RX ADMIN — TACROLIMUS 2 MG: 1 CAPSULE ORAL at 18:09

## 2024-06-30 RX ADMIN — SULFAMETHOXAZOLE AND TRIMETHOPRIM 1 TABLET: 400; 80 TABLET ORAL at 07:55

## 2024-06-30 RX ADMIN — MYCOPHENOLATE MOFETIL 750 MG: 250 CAPSULE ORAL at 07:55

## 2024-06-30 RX ADMIN — INSULIN ASPART 1 UNITS: 100 INJECTION, SOLUTION INTRAVENOUS; SUBCUTANEOUS at 09:54

## 2024-06-30 RX ADMIN — TACROLIMUS 2 MG: 1 CAPSULE ORAL at 07:55

## 2024-06-30 RX ADMIN — VALGANCICLOVIR HYDROCHLORIDE 450 MG: 450 TABLET ORAL at 19:31

## 2024-06-30 RX ADMIN — GABAPENTIN 600 MG: 600 TABLET, FILM COATED ORAL at 19:31

## 2024-06-30 RX ADMIN — INSULIN HUMAN 4 UNITS/HR: 1 INJECTION, SOLUTION INTRAVENOUS at 13:56

## 2024-06-30 RX ADMIN — OXYCODONE HYDROCHLORIDE 5 MG: 5 TABLET ORAL at 19:31

## 2024-06-30 RX ADMIN — TAMSULOSIN HYDROCHLORIDE 0.4 MG: 0.4 CAPSULE ORAL at 13:56

## 2024-06-30 RX ADMIN — URSODIOL 300 MG: 300 CAPSULE ORAL at 19:31

## 2024-06-30 RX ADMIN — NYSTATIN 500000 UNITS: 100000 SUSPENSION ORAL at 12:11

## 2024-06-30 RX ADMIN — POLYETHYLENE GLYCOL 3350 17 G: 17 POWDER, FOR SOLUTION ORAL at 07:55

## 2024-06-30 ASSESSMENT — ACTIVITIES OF DAILY LIVING (ADL)
ADLS_ACUITY_SCORE: 32
ADLS_ACUITY_SCORE: 34
ADLS_ACUITY_SCORE: 32
ADLS_ACUITY_SCORE: 34
ADLS_ACUITY_SCORE: 32
ADLS_ACUITY_SCORE: 34
ADLS_ACUITY_SCORE: 32
ADLS_ACUITY_SCORE: 34
ADLS_ACUITY_SCORE: 32
ADLS_ACUITY_SCORE: 34
ADLS_ACUITY_SCORE: 32
ADLS_ACUITY_SCORE: 34
ADLS_ACUITY_SCORE: 32
ADLS_ACUITY_SCORE: 32
ADLS_ACUITY_SCORE: 34
ADLS_ACUITY_SCORE: 32
ADLS_ACUITY_SCORE: 34
ADLS_ACUITY_SCORE: 34
ADLS_ACUITY_SCORE: 32

## 2024-06-30 NOTE — PROGRESS NOTES
Transplant Surgery  Inpatient Daily Progress Note  2024    Assessment & Plan: 48 year old male with PMHx of hepatocellcular carcinoma s/p liver ablation 2022 and steatohepatitis, neuroendocrine tumor s/p right lobectomy, and DM2, s/p  donor liver transplant w/ bilary stent  with Dr. Dodson on 24.     Graft function: POD #3. Transaminases downtrending, Tbil 1.5->1.1. Post op US with patent vasculature.   - Continue ursodiol 300mg BID and ASA 81mg daily  - DEE drain x2    Immunosuppression management:   Induction: Steroid taper per protocol.  Maintenance:    -  mg BID    - Tacrolimus goal level 8-12.    - Prednisone 5 mg daily x 3 months following taper.    Neurology:   Acute pain: prn oxycodone and PTA gabapentin     Hematology:   Acute blood loss anemia: Hgb  9.7, monitor. No transfusion needs post op    Cardiorespiratory:   HTN: Stop atenolol due to high likelihood of post-transplant CKD. Start amlodipine if needed.  Hx of neuroendocrine tumor s/p right lobectomy: Continue IS.    GI/Nutrition:   Moderate malnutrition in the context of chronic illness: Nutrition consulted, on regular diet.  Bowel regimen: senna, miralax    Endocrine:   DM2, Steroid induced hyperglycemia: A1c 6.1%. Holding PTA metformin, ozempic. On insulin gtt. Add carb coverage today and advised pt to avoid juice (several glasses on breakfast tray).    Fluid/Electrolytes: No acute issues.     :   History of post-op urinary retention: Flomax started post-op. Check bladder scan after du removal.     Infectious disease: No acute concerns    Prophylaxis: DVT (mechanical), GI (PPI), viral (Valcyte), PJP (Bactrim), fungal (fluconazole, nystatin after)     Disposition: 7A    JAVIER/Fellow/Resident Provider: Layne Bonilla NP     Faculty: Dilan Delacruz MD     __________________________________________________________________    Interval History:   Overnight events: Moderate surgical pain. Up and active in room.     ROS:   A  10-point review of systems was negative except as noted above.    Curent Meds:  Current Facility-Administered Medications   Medication Dose Route Frequency Provider Last Rate Last Admin    aspirin (ASA) chewable tablet 81 mg  81 mg Oral or Feeding Tube Daily Radu Dodson MD   81 mg at 06/30/24 0755    gabapentin (NEURONTIN) tablet 600 mg  600 mg Oral TID Leticia Marks MD   600 mg at 06/30/24 0755    insulin aspart (NovoLOG) injection (RAPID ACTING)   Subcutaneous TID w/meals Layne Bonilla, APRN CNP   1 Units at 06/30/24 1247    mycophenolate (GENERIC EQUIVALENT) capsule 750 mg  750 mg Oral BID IS Marquise Rendon MD   750 mg at 06/30/24 0755    nystatin (MYCOSTATIN) suspension 500,000 Units  500,000 Units Swish & Swallow 4x Daily Leticia Marks MD   500,000 Units at 06/30/24 1211    [START ON 7/1/2024] pantoprazole (PROTONIX) EC tablet 40 mg  40 mg Oral Daily Radu Dodson MD        polyethylene glycol (MIRALAX) Packet 17 g  17 g Oral or Feeding Tube Daily Lakshmi Lo MD   17 g at 06/30/24 0755    [START ON 7/1/2024] predniSONE (DELTASONE) tablet 25 mg  25 mg Oral Once Marquise Rendon MD        Followed by    [START ON 7/2/2024] predniSONE (DELTASONE) tablet 10 mg  10 mg Oral Once Marquise Rendon MD        sennosides (SENOKOT) tablet 8.6 mg  8.6 mg Oral or Feeding Tube BID Lakshmi Lo MD   8.6 mg at 06/30/24 0755    sodium chloride (PF) 0.9% PF flush 3 mL  3 mL Intravenous Q8H Marquise Rendon MD   3 mL at 06/29/24 1238    sulfamethoxazole-trimethoprim (BACTRIM) 400-80 MG per tablet 1 tablet  1 tablet Oral Daily Lakshmi Lo MD   1 tablet at 06/30/24 0755    tacrolimus (GENERIC EQUIVALENT) capsule 2 mg  2 mg Oral BID IS Marquise Rendon MD   2 mg at 06/30/24 0755    tamsulosin (FLOMAX) capsule 0.4 mg  0.4 mg Oral Daily Karl Dominique MD   0.4 mg at 06/29/24 8465    ursodiol (ACTIGALL) capsule 300 mg  300 mg Oral BID Marquise Rendon MD   300 mg at 06/30/24 6548     valGANciclovir (VALCYTE) tablet 450 mg  450 mg Oral QPM Marquise Rendon MD   450 mg at 06/29/24 1916       Physical Exam:     Admit Weight: 92.7 kg (204 lb 4.8 oz)    Current Vitals:   BP (!) 148/89   Pulse 98   Temp 98.1  F (36.7  C)   Resp 16   Wt 97.7 kg (215 lb 6.4 oz)   SpO2 96%   BMI 27.66 kg/m      Vital sign ranges:    Temp:  [98.1  F (36.7  C)-98.6  F (37  C)] 98.1  F (36.7  C)  Pulse:  [] 98  Resp:  [13-18] 16  BP: (140-150)/(89-98) 148/89  SpO2:  [96 %-99 %] 96 %  Patient Vitals for the past 24 hrs:   BP Temp Temp src Pulse Resp SpO2 Weight   06/30/24 0913 (!) 148/89 98.1  F (36.7  C) -- 98 16 96 % 97.7 kg (215 lb 6.4 oz)   06/30/24 0534 (!) 140/94 98.3  F (36.8  C) Oral 95 16 96 % --   06/30/24 0207 (!) 150/95 98.1  F (36.7  C) Oral 90 18 99 % --   06/29/24 2131 (!) 141/93 98.3  F (36.8  C) Oral 102 16 98 % --   06/29/24 1803 (!) 143/90 98.4  F (36.9  C) Oral 114 -- 98 % --   06/29/24 1700 -- -- -- 107 -- 96 % --   06/29/24 1614 -- -- -- 113 -- 96 % --   06/29/24 1601 (!) 146/90 98.6  F (37  C) Oral 114 16 97 % --   06/29/24 1500 -- -- -- 120 13 97 % --   06/29/24 1400 (!) 148/98 -- -- (!) 126 14 97 % --   06/29/24 1300 -- -- -- 115 -- 96 % --     General: NAD  Neuro: A&Ox4  Respiratory: Unlabored, RA  Cardiovascular: Perfused  Gastrointestinal: Abdomen soft, non-distended, Jpx2 with serosanguinous, incision CDI  MSK/Extremities: No limb deformities. Minimal edema  Incisions/Skin: Incision CDI      Frailty Scores  More data may exist         1/1/2024 12/10/2023 12/7/2023 6/20/2023 6/13/2023   Frailty Scores   Final Score Not Frail Not Frail Not Frail Not Frail Not Frail Not Frail   Final Score Number 1 1 1 2 2 2      Details          Multiple values from one day are sorted in reverse-chronological order               Data:   CMP  Recent Labs   Lab 06/30/24  1112 06/30/24  0902 06/30/24  0733 06/30/24  0542 06/29/24  0306 06/29/24  0302 06/28/24  0411 06/28/24  0408 06/26/24  2157  06/26/24  1546   NA  --   --   --  142  --  143   < > 146*   < > 140   POTASSIUM  --   --   --  4.4  --  3.8   < > 3.0*   < > 3.8   CHLORIDE  --   --   --  109*  --  108*   < > 109*   < > 101   CO2  --   --   --  28  --  29   < > 27   < > 27   * 153*   < > 121*   < > 163*   < > 150*   < > 138*   BUN  --   --   --  21.8*  --  16.2   < > 13.2   < > 11.8   CR  --   --   --  0.71  --  0.78   < > 0.83   < > 0.86   GFRESTIMATED  --   --   --  >90  --  >90   < > >90   < > >90   CHUCK  --   --   --  8.5*  --  8.1*   < > 9.1   < > 10.3*   ICAW  --   --   --  4.7  --  4.6  --  5.0   < >  --    MAG  --   --   --  2.2  --  2.4*   < > 2.3   < > 2.1   PHOS  --   --   --  2.8  --  2.9   < >  --    < > 3.1   AMYLASE  --   --   --   --   --   --   --  601*  --  38   LIPASE  --   --   --   --   --   --   --  1,669*  --   --    ALBUMIN  --   --   --  3.1*  --  3.1*   < > 3.4*   < > 4.6   BILITOTAL  --   --   --  1.1  --  1.5*   < > 2.3*   < > 1.7*   ALKPHOS  --   --   --  78  --  63   < > 73   < > 104   AST  --   --   --  66*  --  101*   < > 296*   < > 27   ALT  --   --   --  97*  --  111*   < > 168*   < > 33    < > = values in this interval not displayed.     CBC  Recent Labs   Lab 06/30/24  0542 06/29/24  0302 06/27/24  1945 06/27/24  1807   HGB 9.7* 9.5*   < > 12.1*   WBC 17.1* 14.1*   < > 14.3*    125*   < > 215   A1C  --   --   --  6.1*    < > = values in this interval not displayed.     COAGS  Recent Labs   Lab 06/30/24  0542 06/29/24  0302 06/27/24  1945 06/27/24  1807 06/27/24  1550   INR 1.34* 1.52*   < > 1.83* 2.92*   PTT  --   --   --  39* 71*    < > = values in this interval not displayed.      Urinalysis  Recent Labs   Lab Test 06/26/24  1748 06/20/23  0815   COLOR Light Yellow Yellow   APPEARANCE Clear Slightly Cloudy*   URINEGLC Negative Negative   URINEBILI Negative Negative   URINEKETONE Negative Negative   SG 1.015 1.021   UBLD Negative Negative   URINEPH 7.5* 6.5   PROTEIN Negative 10*   NITRITE Negative  Negative   LEUKEST Negative Negative   RBCU 1 <1   WBCU 1 1     Virology:  Hepatitis C Antibody   Date Value Ref Range Status   06/26/2024 Nonreactive Nonreactive Final     Comment:     A nonreactive screening test result does not exclude the possibility of exposure to or infection with HCV. Nonreactive screening test results in individuals with prior exposure to HCV may be due to antibody levels below the limit of detection of this assay or lack of reactivity to the HCV antigens used in this assay. Patients with recent HCV infections (<3 months from time of exposure) may have false-negative HCV antibody results due to the time needed for seroconversion (average of 8 to 9 weeks).

## 2024-06-30 NOTE — PLAN OF CARE
BP (!) 140/94 (BP Location: Left arm)   Pulse 95   Temp 98.3  F (36.8  C) (Oral)   Resp 16   Wt 91.7 kg (202 lb 3.2 oz)   SpO2 96%   BMI 25.96 kg/m      SHIFT: 4480-6564  ISOLATION: NA  VITALS: AVSS on RA  BG: Q1H. Insulin gtt on algorithm IV  Recent Labs   Lab 06/30/24  0542 06/30/24  0530 06/30/24  0327 06/30/24  0202 06/30/24  0126 06/30/24  0031   * 121* 148* 129* 118* 131*   NEURO: A&O x4.  Diet: Room Service  Regular Diet Adult    PRN: No reported nausea. Pain managed with oxycodone and hydromorphone.  RESPIRATORY: WDL  CARDIAC: WDL  : Martinez cath w/ 825 mLs  GI: LBM: PTOR  TUBES: R PIV x2, R DEE x2, Martinez cath to be discontinued  MIVF/GTT/ABX: NS TKO w/ Insulin gtt   ASSIST: Ao2 w/ GB walker  SAFETY: Bed alarm on  SKIN: Abdominal incision stapled and FADIA. Partially dressed for scant drainage.  LABS:   Recent Labs   Lab 06/30/24  0542 06/29/24  0302 06/28/24  1751   POTASSIUM 4.4 3.8 3.7   PHOS 2.8 2.9 3.0   MAG 2.2 2.4* 2.0   HGB 9.7* 9.5* 10.3*   CR 0.71 0.78 0.80   PLAN: Leticia Jorge MD:    prn oxycodone, prn diluadid. Resume PTA gabapentin   Holding hydrochlorothiazide. Resume atenolol

## 2024-06-30 NOTE — PLAN OF CARE
Vitals: BP (!) 144/86 (BP Location: Right arm)   Pulse 101   Temp 98.4  F (36.9  C) (Oral)   Resp 18   Wt 97.7 kg (215 lb 6.4 oz)   SpO2 98%   BMI 27.66 kg/m    Room air.   Blood glucose: insulin drip q2 hrs: alg 4, -166. Carb covered.   Pain/nausea: oxy x 1 for incisional pain.   Diet: regular. Fair appetite.   Lines: PIV x 2 infusing insulin with TKO ns.   : du removed this morning. PVR x 2 376 ml and 109 ml and PVR third one is 7 ml.   GI: no bm, good bowel sounds.   Drains: leaky JPs. DEE 1  ml serosang. DEE 2 OP 50 ml Serosang. Dressing changed.   Skin: incision clamshell staples, R side leaky, abd on.   Mobility: up x 1 x walker.   Education : med.lab book updated.   Labs : RN managed mg and phos all wnl. Recheck am.

## 2024-07-01 ENCOUNTER — APPOINTMENT (OUTPATIENT)
Dept: PHYSICAL THERAPY | Facility: CLINIC | Age: 49
End: 2024-07-01
Attending: SURGERY
Payer: COMMERCIAL

## 2024-07-01 ENCOUNTER — TELEPHONE (OUTPATIENT)
Dept: PHARMACY | Facility: CLINIC | Age: 49
End: 2024-07-01

## 2024-07-01 DIAGNOSIS — Z94.4 LIVER REPLACED BY TRANSPLANT (H): Primary | ICD-10-CM

## 2024-07-01 DIAGNOSIS — Z13.220 LIPID SCREENING: ICD-10-CM

## 2024-07-01 DIAGNOSIS — C22.0 HCC (HEPATOCELLULAR CARCINOMA) (H): ICD-10-CM

## 2024-07-01 LAB
ALBUMIN SERPL BCG-MCNC: 2.9 G/DL (ref 3.5–5.2)
ALP SERPL-CCNC: 65 U/L (ref 40–150)
ALT SERPL W P-5'-P-CCNC: 66 U/L (ref 0–70)
ANION GAP SERPL CALCULATED.3IONS-SCNC: 6 MMOL/L (ref 7–15)
AST SERPL W P-5'-P-CCNC: 32 U/L (ref 0–45)
BILIRUB SERPL-MCNC: 1 MG/DL
BUN SERPL-MCNC: 20.6 MG/DL (ref 6–20)
CALCIUM SERPL-MCNC: 8.2 MG/DL (ref 8.6–10)
CHLORIDE SERPL-SCNC: 110 MMOL/L (ref 98–107)
CREAT SERPL-MCNC: 0.77 MG/DL (ref 0.67–1.17)
DEPRECATED HCO3 PLAS-SCNC: 26 MMOL/L (ref 22–29)
EGFRCR SERPLBLD CKD-EPI 2021: >90 ML/MIN/1.73M2
ERYTHROCYTE [DISTWIDTH] IN BLOOD BY AUTOMATED COUNT: 13.2 % (ref 10–15)
GLUCOSE BLDC GLUCOMTR-MCNC: 105 MG/DL (ref 70–99)
GLUCOSE BLDC GLUCOMTR-MCNC: 106 MG/DL (ref 70–99)
GLUCOSE BLDC GLUCOMTR-MCNC: 115 MG/DL (ref 70–99)
GLUCOSE BLDC GLUCOMTR-MCNC: 116 MG/DL (ref 70–99)
GLUCOSE BLDC GLUCOMTR-MCNC: 117 MG/DL (ref 70–99)
GLUCOSE BLDC GLUCOMTR-MCNC: 117 MG/DL (ref 70–99)
GLUCOSE BLDC GLUCOMTR-MCNC: 118 MG/DL (ref 70–99)
GLUCOSE BLDC GLUCOMTR-MCNC: 131 MG/DL (ref 70–99)
GLUCOSE BLDC GLUCOMTR-MCNC: 136 MG/DL (ref 70–99)
GLUCOSE BLDC GLUCOMTR-MCNC: 140 MG/DL (ref 70–99)
GLUCOSE BLDC GLUCOMTR-MCNC: 143 MG/DL (ref 70–99)
GLUCOSE BLDC GLUCOMTR-MCNC: 149 MG/DL (ref 70–99)
GLUCOSE BLDC GLUCOMTR-MCNC: 150 MG/DL (ref 70–99)
GLUCOSE BLDC GLUCOMTR-MCNC: 155 MG/DL (ref 70–99)
GLUCOSE BLDC GLUCOMTR-MCNC: 155 MG/DL (ref 70–99)
GLUCOSE BLDC GLUCOMTR-MCNC: 157 MG/DL (ref 70–99)
GLUCOSE BLDC GLUCOMTR-MCNC: 170 MG/DL (ref 70–99)
GLUCOSE BLDC GLUCOMTR-MCNC: 176 MG/DL (ref 70–99)
GLUCOSE BLDC GLUCOMTR-MCNC: 181 MG/DL (ref 70–99)
GLUCOSE BLDC GLUCOMTR-MCNC: 186 MG/DL (ref 70–99)
GLUCOSE BLDC GLUCOMTR-MCNC: 97 MG/DL (ref 70–99)
GLUCOSE SERPL-MCNC: 111 MG/DL (ref 70–99)
HCT VFR BLD AUTO: 27.1 % (ref 40–53)
HGB BLD-MCNC: 8.9 G/DL (ref 13.3–17.7)
MAGNESIUM SERPL-MCNC: 1.7 MG/DL (ref 1.7–2.3)
MCH RBC QN AUTO: 29 PG (ref 26.5–33)
MCHC RBC AUTO-ENTMCNC: 32.8 G/DL (ref 31.5–36.5)
MCV RBC AUTO: 88 FL (ref 78–100)
PHOSPHATE SERPL-MCNC: 1.6 MG/DL (ref 2.5–4.5)
PLATELET # BLD AUTO: 129 10E3/UL (ref 150–450)
POTASSIUM SERPL-SCNC: 3.9 MMOL/L (ref 3.4–5.3)
PROT SERPL-MCNC: 4.6 G/DL (ref 6.4–8.3)
RBC # BLD AUTO: 3.07 10E6/UL (ref 4.4–5.9)
SODIUM SERPL-SCNC: 142 MMOL/L (ref 135–145)
WBC # BLD AUTO: 9.7 10E3/UL (ref 4–11)

## 2024-07-01 PROCEDURE — 250N000013 HC RX MED GY IP 250 OP 250 PS 637: Performed by: STUDENT IN AN ORGANIZED HEALTH CARE EDUCATION/TRAINING PROGRAM

## 2024-07-01 PROCEDURE — 84100 ASSAY OF PHOSPHORUS: CPT | Performed by: SURGERY

## 2024-07-01 PROCEDURE — 85027 COMPLETE CBC AUTOMATED: CPT | Performed by: NURSE PRACTITIONER

## 2024-07-01 PROCEDURE — 36415 COLL VENOUS BLD VENIPUNCTURE: CPT | Performed by: NURSE PRACTITIONER

## 2024-07-01 PROCEDURE — 250N000013 HC RX MED GY IP 250 OP 250 PS 637: Performed by: SURGERY

## 2024-07-01 PROCEDURE — 80053 COMPREHEN METABOLIC PANEL: CPT | Performed by: NURSE PRACTITIONER

## 2024-07-01 PROCEDURE — 250N000009 HC RX 250

## 2024-07-01 PROCEDURE — 250N000013 HC RX MED GY IP 250 OP 250 PS 637

## 2024-07-01 PROCEDURE — 83735 ASSAY OF MAGNESIUM: CPT | Performed by: SURGERY

## 2024-07-01 PROCEDURE — 97110 THERAPEUTIC EXERCISES: CPT | Mod: GP | Performed by: REHABILITATION PRACTITIONER

## 2024-07-01 PROCEDURE — 250N000013 HC RX MED GY IP 250 OP 250 PS 637: Performed by: NURSE PRACTITIONER

## 2024-07-01 PROCEDURE — 120N000011 HC R&B TRANSPLANT UMMC

## 2024-07-01 PROCEDURE — 250N000012 HC RX MED GY IP 250 OP 636 PS 637: Performed by: STUDENT IN AN ORGANIZED HEALTH CARE EDUCATION/TRAINING PROGRAM

## 2024-07-01 PROCEDURE — 99207 PR SC NO CHARGE VISIT/PATIENT NOT SEEN: CPT | Performed by: NURSE PRACTITIONER

## 2024-07-01 PROCEDURE — 97530 THERAPEUTIC ACTIVITIES: CPT | Mod: GP | Performed by: REHABILITATION PRACTITIONER

## 2024-07-01 RX ORDER — BISACODYL 10 MG
10 SUPPOSITORY, RECTAL RECTAL ONCE
Status: COMPLETED | OUTPATIENT
Start: 2024-07-01 | End: 2024-07-01

## 2024-07-01 RX ORDER — METHOCARBAMOL 500 MG/1
500 TABLET, FILM COATED ORAL 4 TIMES DAILY PRN
Status: DISCONTINUED | OUTPATIENT
Start: 2024-07-01 | End: 2024-07-05 | Stop reason: HOSPADM

## 2024-07-01 RX ORDER — OXYCODONE HYDROCHLORIDE 5 MG/1
5 TABLET ORAL EVERY 4 HOURS PRN
Status: DISCONTINUED | OUTPATIENT
Start: 2024-07-01 | End: 2024-07-02

## 2024-07-01 RX ORDER — ACETAMINOPHEN 325 MG/1
650 TABLET ORAL EVERY 6 HOURS PRN
Status: DISCONTINUED | OUTPATIENT
Start: 2024-07-01 | End: 2024-07-05 | Stop reason: HOSPADM

## 2024-07-01 RX ORDER — PREDNISONE 5 MG/1
5 TABLET ORAL DAILY
Status: DISCONTINUED | OUTPATIENT
Start: 2024-07-03 | End: 2024-07-05 | Stop reason: HOSPADM

## 2024-07-01 RX ORDER — MAGNESIUM OXIDE 400 MG/1
400 TABLET ORAL DAILY
Status: DISCONTINUED | OUTPATIENT
Start: 2024-07-01 | End: 2024-07-03

## 2024-07-01 RX ADMIN — PREDNISONE 25 MG: 20 TABLET ORAL at 07:46

## 2024-07-01 RX ADMIN — SODIUM PHOSPHATE, DIBASIC, ANHYDROUS, POTASSIUM PHOSPHATE, MONOBASIC, AND SODIUM PHOSPHATE, MONOBASIC, MONOHYDRATE 500 MG: 852; 155; 130 TABLET, COATED ORAL at 19:53

## 2024-07-01 RX ADMIN — INSULIN ASPART 1 UNITS: 100 INJECTION, SOLUTION INTRAVENOUS; SUBCUTANEOUS at 09:26

## 2024-07-01 RX ADMIN — TACROLIMUS 2 MG: 1 CAPSULE ORAL at 07:48

## 2024-07-01 RX ADMIN — ACETAMINOPHEN 650 MG: 325 TABLET, FILM COATED ORAL at 19:51

## 2024-07-01 RX ADMIN — TAMSULOSIN HYDROCHLORIDE 0.4 MG: 0.4 CAPSULE ORAL at 14:04

## 2024-07-01 RX ADMIN — BISACODYL 10 MG: 10 SUPPOSITORY RECTAL at 09:23

## 2024-07-01 RX ADMIN — NYSTATIN 500000 UNITS: 100000 SUSPENSION ORAL at 07:45

## 2024-07-01 RX ADMIN — NYSTATIN 500000 UNITS: 100000 SUSPENSION ORAL at 16:17

## 2024-07-01 RX ADMIN — TACROLIMUS 2 MG: 1 CAPSULE ORAL at 17:57

## 2024-07-01 RX ADMIN — ASPIRIN 81 MG CHEWABLE TABLET 81 MG: 81 TABLET CHEWABLE at 07:51

## 2024-07-01 RX ADMIN — VALGANCICLOVIR HYDROCHLORIDE 450 MG: 450 TABLET ORAL at 19:53

## 2024-07-01 RX ADMIN — MAGNESIUM OXIDE TAB 400 MG (241.3 MG ELEMENTAL MG) 400 MG: 400 (241.3 MG) TAB at 15:01

## 2024-07-01 RX ADMIN — NYSTATIN 500000 UNITS: 100000 SUSPENSION ORAL at 12:11

## 2024-07-01 RX ADMIN — GABAPENTIN 600 MG: 600 TABLET, FILM COATED ORAL at 14:04

## 2024-07-01 RX ADMIN — GABAPENTIN 600 MG: 600 TABLET, FILM COATED ORAL at 07:46

## 2024-07-01 RX ADMIN — INSULIN HUMAN 8 UNITS/HR: 1 INJECTION, SOLUTION INTRAVENOUS at 13:55

## 2024-07-01 RX ADMIN — URSODIOL 300 MG: 300 CAPSULE ORAL at 19:53

## 2024-07-01 RX ADMIN — MYCOPHENOLATE MOFETIL 750 MG: 250 CAPSULE ORAL at 07:49

## 2024-07-01 RX ADMIN — INSULIN ASPART 7 UNITS: 100 INJECTION, SOLUTION INTRAVENOUS; SUBCUTANEOUS at 18:03

## 2024-07-01 RX ADMIN — INSULIN ASPART 5 UNITS: 100 INJECTION, SOLUTION INTRAVENOUS; SUBCUTANEOUS at 13:19

## 2024-07-01 RX ADMIN — OXYCODONE HYDROCHLORIDE 5 MG: 5 TABLET ORAL at 12:11

## 2024-07-01 RX ADMIN — MYCOPHENOLATE MOFETIL 750 MG: 250 CAPSULE ORAL at 17:57

## 2024-07-01 RX ADMIN — GABAPENTIN 600 MG: 600 TABLET, FILM COATED ORAL at 19:53

## 2024-07-01 RX ADMIN — URSODIOL 300 MG: 300 CAPSULE ORAL at 07:47

## 2024-07-01 RX ADMIN — SODIUM PHOSPHATE, DIBASIC, ANHYDROUS, POTASSIUM PHOSPHATE, MONOBASIC, AND SODIUM PHOSPHATE, MONOBASIC, MONOHYDRATE 500 MG: 852; 155; 130 TABLET, COATED ORAL at 09:25

## 2024-07-01 RX ADMIN — SENNOSIDES 8.6 MG: 8.6 TABLET, FILM COATED ORAL at 07:48

## 2024-07-01 RX ADMIN — PANTOPRAZOLE SODIUM 40 MG: 40 TABLET, DELAYED RELEASE ORAL at 07:48

## 2024-07-01 RX ADMIN — NYSTATIN 500000 UNITS: 100000 SUSPENSION ORAL at 19:54

## 2024-07-01 RX ADMIN — POLYETHYLENE GLYCOL 3350 17 G: 17 POWDER, FOR SOLUTION ORAL at 07:48

## 2024-07-01 RX ADMIN — SENNOSIDES 8.6 MG: 8.6 TABLET, FILM COATED ORAL at 19:54

## 2024-07-01 RX ADMIN — SULFAMETHOXAZOLE AND TRIMETHOPRIM 1 TABLET: 400; 80 TABLET ORAL at 07:48

## 2024-07-01 RX ADMIN — OXYCODONE HYDROCHLORIDE 5 MG: 5 TABLET ORAL at 06:04

## 2024-07-01 ASSESSMENT — ACTIVITIES OF DAILY LIVING (ADL)
ADLS_ACUITY_SCORE: 32
ADLS_ACUITY_SCORE: 31
ADLS_ACUITY_SCORE: 32
ADLS_ACUITY_SCORE: 31
ADLS_ACUITY_SCORE: 32
ADLS_ACUITY_SCORE: 32
ADLS_ACUITY_SCORE: 31
ADLS_ACUITY_SCORE: 32
ADLS_ACUITY_SCORE: 32
ADLS_ACUITY_SCORE: 31
ADLS_ACUITY_SCORE: 32
ADLS_ACUITY_SCORE: 32
ADLS_ACUITY_SCORE: 31
ADLS_ACUITY_SCORE: 32
ADLS_ACUITY_SCORE: 32
ADLS_ACUITY_SCORE: 31
ADLS_ACUITY_SCORE: 32

## 2024-07-01 NOTE — ANESTHESIA POSTPROCEDURE EVALUATION
Patient: West Van    Procedure: Procedure(s):  Transplant liver recipient  donor with bile duct stent placement       Anesthesia Type:  General    Note:  Disposition: ICU            ICU Sign Out: Anesthesiologist/ICU physician sign out WAS performed   Postop Pain Control: Uneventful            Sign Out: Well controlled pain   PONV: No   Neuro/Psych: Uneventful            Sign Out: Acceptable/Baseline neuro status   Airway/Respiratory: Uneventful            Sign Out: Acceptable/Baseline resp. status   CV/Hemodynamics: Uneventful            Sign Out: Acceptable CV status; No obvious hypovolemia; No obvious fluid overload   Other NRE: NONE   DID A NON-ROUTINE EVENT OCCUR? No    Event details/Postop Comments:  Pateint kept intubated, stable upon arrival. No issue and report given           Last vitals:  Vitals:    24 0153 24 0544 24 1013   BP: 128/87 (!) 140/91 136/87   Pulse: 86 90 99   Resp: 16 16 16   Temp: 36.7  C (98  F) 36.8  C (98.3  F) 36.5  C (97.7  F)   SpO2:  97% 99%       Electronically Signed By: Chris De La Paz DO  2024  10:26 AM

## 2024-07-01 NOTE — PROGRESS NOTES
Transplant Social Work Services Progress Note    Date of Initial Social Work Evaluation: 2023  Collaborated with: pt and his parents at bedside     Data: West underwent a  donor liver transplant on 2024. Current recommendation is ARU.  Intervention: DONOVAN met with pt and his parents bedside to review discharge recommendation to ARU and possible discharge date of 7/3. West was agreeable to [facility]. He expressed concerns about leaving the hospital before he was ready, SW explained ARU and that OT/PT would continue while he was there to gain strength. West's parents are his caregivers and are currently staying at the [facility], that is where West will discharge to after ARU. Explained that his discharge plan may change depending on his recovery, pt understood.  Assessment: current recommendation is ARU.  Education provided by DONOVNA: acute rehab   Plan:    Discharge Plans in Progress: [facility]    Barriers to d/c plan: waiting to be medically ready, est discharge date 7/3    Follow up Plan: Kevin Scott is available Tuesday and Wednesday for SW needs    Tomasa COPELAND, SW  [facility]  Specialty Float   Phone: [phone]

## 2024-07-01 NOTE — PLAN OF CARE
Goal Outcome Evaluation:       BP (!) 140/91 (BP Location: Right arm)   Pulse 90   Temp 98.3  F (36.8  C) (Oral)   Resp 16   Wt 97.7 kg (215 lb 6.4 oz)   SpO2 97%   BMI 27.66 kg/m      Shift: 9868-2679    VS: VSS  Neuro: Aox4  Cardio: WDL  Respiratory: WDL on RA  GI: no BM since surgery  : Voiding adequately  Skin: clamshell incision leaky abd on, DEE x2  Diet: Regular  BG: Insulin gtt, Alg 4, BG ()  LDA: DEE x2, PIV  Infusions: insulin gtt  Mobility: Ax1 with walker  Pain/Nausea: moderate abd pain  PRN medications: oxy x1

## 2024-07-01 NOTE — PROGRESS NOTES
Transplant Surgery  Inpatient Daily Progress Note  2024    Assessment & Plan: 48 year old male with PMHx of hepatocellcular carcinoma s/p liver ablation 2022 and steatohepatitis, neuroendocrine tumor s/p right lobectomy, and DM2, s/p  donor liver transplant w/ bilary stent  with Dr. Dodson on 24.     Graft function: POD #4. LFTs downtrending. Post op US with patent vasculature.    - Continue ursodiol 300 mg BID and ASA 81mg daily   - DEE drain x2, remove one today    Immunosuppression management:   Induction: Steroid taper per protocol.  Maintenance:    -  mg BID    - Tacrolimus 2 mg BID. Goal level 8-12.    - Prednisone 5 mg daily x 3 months following taper.    Neurology:   Acute pain: Controlled with PRN oxycodone (weaning) and PTA gabapentin.    - Add PRN Tylenol, Robaxin.     Hematology:   Acute blood loss anemia: Hgb 8-9, trending down. Monitor.     Cardiorespiratory:   HTN: Stop atenolol due to high likelihood of post-transplant CKD. Start amlodipine if needed.  Hx Neuroendocrine tumor s/p right lobectomy: Continue IS.    GI/Nutrition:   Moderate malnutrition in the context of chronic illness: Nutrition consulted, on regular diet. Add supplements.   Bowel regimen: senna, miralax. Suppository today.     Endocrine:   DM2, Steroid induced hyperglycemia: A1c 6.1%. Holding PTA metformin, ozempic. On insulin gtt + carb coverage.    - Endocrine consulted.     Fluid/Electrolytes:   Hypomagnesemia: Start Mag-Ox 400 mg daily.  Hypophosphatemia: Start Phos 500 mg BID.     :   History of post-op urinary retention: Flomax started. PVR 7 mL.     Infectious disease: No acute concerns    Prophylaxis: DVT (mechanical), GI (PPI), viral (Valcyte), PJP (Bactrim), fungal (fluconazole, nystatin after)     Disposition: 7A    JAVIER/Fellow/Resident Provider: Alona Yi NP [id number]     Faculty: Delta Palmer MD  __________________________________________________________________    Interval History:    Overnight events: No acute events overnight. Received suppository this morning and now had BM.     ROS:   A 10-point review of systems was negative except as noted above.    Curent Meds:  Current Facility-Administered Medications   Medication Dose Route Frequency Provider Last Rate Last Admin    aspirin (ASA) chewable tablet 81 mg  81 mg Oral or Feeding Tube Daily Radu Dodson MD   81 mg at 07/01/24 0751    gabapentin (NEURONTIN) tablet 600 mg  600 mg Oral TID Leticia Marks MD   600 mg at 07/01/24 1404    insulin aspart (NovoLOG) injection (RAPID ACTING)   Subcutaneous TID w/meals Layne Bonilla APRN CNP   5 Units at 07/01/24 1319    mycophenolate (GENERIC EQUIVALENT) capsule 750 mg  750 mg Oral BID IS Marquise Rendon MD   750 mg at 07/01/24 0749    nystatin (MYCOSTATIN) suspension 500,000 Units  500,000 Units Swish & Swallow 4x Daily Leticia Marks MD   500,000 Units at 07/01/24 1211    pantoprazole (PROTONIX) EC tablet 40 mg  40 mg Oral Daily Radu Dodson MD   40 mg at 07/01/24 0748    phosphorus tablet 250 mg (PHOSPHA 250 NEUTRAL) per tablet 500 mg  500 mg Oral BID Alona Yi NP   500 mg at 07/01/24 0925    polyethylene glycol (MIRALAX) Packet 17 g  17 g Oral or Feeding Tube Daily Lakshmi Lo MD   17 g at 07/01/24 0748    [START ON 7/2/2024] predniSONE (DELTASONE) tablet 10 mg  10 mg Oral Once Marquise Rendon MD        sennosides (SENOKOT) tablet 8.6 mg  8.6 mg Oral or Feeding Tube BID Lakshmi Lo MD   8.6 mg at 07/01/24 0748    sodium chloride (PF) 0.9% PF flush 3 mL  3 mL Intravenous Q8H Marquise Rendon MD   3 mL at 06/29/24 1238    sulfamethoxazole-trimethoprim (BACTRIM) 400-80 MG per tablet 1 tablet  1 tablet Oral Daily Lakshmi Lo MD   1 tablet at 07/01/24 0748    tacrolimus (GENERIC EQUIVALENT) capsule 2 mg  2 mg Oral BID IS Marquise Rendon MD   2 mg at 07/01/24 0748    tamsulosin (FLOMAX) capsule 0.4 mg  0.4 mg Oral Daily Catina  MD Karl   0.4 mg at 07/01/24 1404    ursodiol (ACTIGALL) capsule 300 mg  300 mg Oral BID Marquise Rendon MD   300 mg at 07/01/24 0747    valGANciclovir (VALCYTE) tablet 450 mg  450 mg Oral QPM Marquise Rendon MD   450 mg at 06/30/24 1931       Physical Exam:     Admit Weight: 92.7 kg (204 lb 4.8 oz)    Current Vitals:   /87 (BP Location: Left arm)   Pulse 99   Temp 97.7  F (36.5  C) (Oral)   Resp 16   Wt 97.7 kg (215 lb 6.4 oz)   SpO2 99%   BMI 27.66 kg/m      Vital sign ranges:    Temp:  [97.7  F (36.5  C)-98.4  F (36.9  C)] 97.7  F (36.5  C)  Pulse:  [] 99  Resp:  [16-18] 16  BP: (128-144)/(86-91) 136/87  SpO2:  [93 %-99 %] 99 %  Patient Vitals for the past 24 hrs:   BP Temp Temp src Pulse Resp SpO2   07/01/24 1013 136/87 97.7  F (36.5  C) Oral 99 16 99 %   07/01/24 0544 (!) 140/91 98.3  F (36.8  C) Oral 90 16 97 %   07/01/24 0153 128/87 98  F (36.7  C) Oral 86 16 --   06/30/24 2153 134/88 98.2  F (36.8  C) Oral -- 16 93 %   06/30/24 1700 (!) 144/86 98.4  F (36.9  C) Oral 101 18 98 %     General: NAD  Neuro: A&Ox4  Respiratory: Unlabored, RA  Cardiovascular: Perfused  Gastrointestinal: Abdomen soft, non-distended, Jpx2 with serosanguinous, incision CDI  MSK/Extremities: No limb deformities. Minimal edema  Incisions/Skin: Incision CDI      Frailty Scores  More data may exist         1/1/2024 12/10/2023 12/7/2023 6/20/2023 6/13/2023   Frailty Scores   Final Score Not Frail Not Frail Not Frail Not Frail Not Frail Not Frail   Final Score Number 1 1 1 2 2 2      Details          Multiple values from one day are sorted in reverse-chronological order               Data:   CMP  Recent Labs   Lab 07/01/24  1404 07/01/24  1305 07/01/24  0559 07/01/24  0553 06/30/24  0733 06/30/24  0542 06/29/24  0306 06/29/24  0302 06/28/24  0411 06/28/24  0408 06/26/24  2157 06/26/24  1546   NA  --   --   --  142  --  142  --  143   < > 146*   < > 140   POTASSIUM  --   --   --  3.9  --  4.4  --  3.8   < > 3.0*   <  > 3.8   CHLORIDE  --   --   --  110*  --  109*  --  108*   < > 109*   < > 101   CO2  --   --   --  26  --  28  --  29   < > 27   < > 27   * 176*   < > 111*   < > 121*   < > 163*   < > 150*   < > 138*   BUN  --   --   --  20.6*  --  21.8*  --  16.2   < > 13.2   < > 11.8   CR  --   --   --  0.77  --  0.71  --  0.78   < > 0.83   < > 0.86   GFRESTIMATED  --   --   --  >90  --  >90  --  >90   < > >90   < > >90   CHUCK  --   --   --  8.2*  --  8.5*  --  8.1*   < > 9.1   < > 10.3*   ICAW  --   --   --   --   --  4.7  --  4.6  --  5.0   < >  --    MAG  --   --   --  1.7  --  2.2  --  2.4*   < > 2.3   < > 2.1   PHOS  --   --   --  1.6*  --  2.8  --  2.9   < >  --    < > 3.1   AMYLASE  --   --   --   --   --   --   --   --   --  601*  --  38   LIPASE  --   --   --   --   --   --   --   --   --  1,669*  --   --    ALBUMIN  --   --   --  2.9*  --  3.1*  --  3.1*   < > 3.4*   < > 4.6   BILITOTAL  --   --   --  1.0  --  1.1  --  1.5*   < > 2.3*   < > 1.7*   ALKPHOS  --   --   --  65  --  78  --  63   < > 73   < > 104   AST  --   --   --  32  --  66*  --  101*   < > 296*   < > 27   ALT  --   --   --  66  --  97*  --  111*   < > 168*   < > 33    < > = values in this interval not displayed.     CBC  Recent Labs   Lab 07/01/24  0553 06/30/24  0542 06/27/24 1945 06/27/24 1807   HGB 8.9* 9.7*   < > 12.1*   WBC 9.7 17.1*   < > 14.3*   * 150   < > 215   A1C  --   --   --  6.1*    < > = values in this interval not displayed.     COAGS  Recent Labs   Lab 06/30/24  0542 06/29/24  0302 06/27/24 1945 06/27/24 1807 06/27/24  1550   INR 1.34* 1.52*   < > 1.83* 2.92*   PTT  --   --   --  39* 71*    < > = values in this interval not displayed.      Urinalysis  Recent Labs   Lab Test 06/26/24  1748 06/20/23  0815   COLOR Light Yellow Yellow   APPEARANCE Clear Slightly Cloudy*   URINEGLC Negative Negative   URINEBILI Negative Negative   URINEKETONE Negative Negative   SG 1.015 1.021   UBLD Negative Negative   URINEPH 7.5* 6.5    PROTEIN Negative 10*   NITRITE Negative Negative   LEUKEST Negative Negative   RBCU 1 <1   WBCU 1 1     Virology:  Hepatitis C Antibody   Date Value Ref Range Status   06/26/2024 Nonreactive Nonreactive Final     Comment:     A nonreactive screening test result does not exclude the possibility of exposure to or infection with HCV. Nonreactive screening test results in individuals with prior exposure to HCV may be due to antibody levels below the limit of detection of this assay or lack of reactivity to the HCV antigens used in this assay. Patients with recent HCV infections (<3 months from time of exposure) may have false-negative HCV antibody results due to the time needed for seroconversion (average of 8 to 9 weeks).     Attestation:    The patient has been seen with team and evaluated by me.   Vital signs, labs, medications and orders were reviewed.   When obtained, diagnostic images were reviewed by me and interpreted as above.    The care plan was discussed with the multidisciplinary team and I agree with the findings and plan in this note, with any differences recorded in blue.    Immunosuppressive medication management was reviewed and adjusted as reflected in the note and orders.       Delta Palmer MD  Professor of Surgery

## 2024-07-01 NOTE — PLAN OF CARE
Goal Outcome Evaluation:       A/Ox4. C/o incisional pain medicated with Oxy with good result. Incision stapled and open to air. Granulating. Discharge planning ongoing, s/b medical team one of the 2 DEE drains (#2) was removed. #1 DEE e serosanguinous drainage. Abd soft, had 2 loose BM after suppository, feeding well, voiding freely, continued on insulin drip/ Algorithm. S/b PT ambulated in the hallway. Assisted few times to BR, uses walker.   /87 (BP Location: Left arm)   Pulse 116   Temp 97.6  F (36.4  C) (Oral)   Resp 16   Wt 97.7 kg (215 lb 6.4 oz)   SpO2 99%   BMI 27.66 kg/m

## 2024-07-01 NOTE — PROGRESS NOTES
Brief Diabetes Note:    HPI:  48 year old male with PMHx of hepatocellcular carcinoma s/p liver ablation 2022 and steatohepatitis, neuroendocrine tumor s/p right lobectomy, and DM2, s/p  donor liver transplant w/ bilary stent  with Dr. Dodson on 24.     Diabetes service has reviewed salient aspects of care, BG trend and insulin plan today.     Team aware full consult to happen .    BG stable on IV insulin drip and no further changes to regimen anticipated/planned for today.   Will adjust Novolog for meal coverage.       CBC RESULTS:   Recent Labs   Lab Test 24  0553   WBC 9.7   RBC 3.07*   HGB 8.9*   HCT 27.1*   MCV 88   MCH 29.0   MCHC 32.8   RDW 13.2   *     Recent Labs   Lab Test 24  1505 24  1404 24  0559 24  0553 24  0733 24  0542   NA  --   --   --  142  --  142   POTASSIUM  --   --   --  3.9  --  4.4   CHLORIDE  --   --   --  110*  --  109*   CO2  --   --   --  26  --  28   ANIONGAP  --   --   --  6*  --  5*   * 181*   < > 111*   < > 121*   BUN  --   --   --  20.6*  --  21.8*   CR  --   --   --  0.77  --  0.71   CHUCK  --   --   --  8.2*  --  8.5*    < > = values in this interval not displayed.     Lab Results   Component Value Date    A1C 6.1 2024    A1C 7.0 2023     /87 (BP Location: Left arm)   Pulse 116   Temp 97.6  F (36.4  C) (Oral)   Resp 16   Wt 97.7 kg (215 lb 6.4 oz)   SpO2 99%   BMI 27.66 kg/m        Assessment:     -  Type 2 Diabetes Mellitis.  A1c 6.1% in context of complex illness and anemia.    -  Steroid hyperglycemia; acute     -  S/p  donor liver transplant w/ bilary stent  with Dr. Dodson on 24.   -  Anemia       Recommendations/plan:      -  Continue on IV insulin, non-DKA protocol    -  Adjust Novolog 1:10 g cho TID AC / snacks    -  BG monitoring q1-2 hours per IV insulin protocol    -  Hypoglycemia protocol     -  Recommend cho consistent diet with cho counting  protocol    -  Test claim to follow consult on 7/2    -  Full consult on 7/2         VALENTINA Nails CNP, BC-ADM  Inpatient Diabetes Management Service  Available on Vocera     To contact Endocrine Diabetes service:   From 7AM-5PM: page inpatient diabetes provider that is following the patient that day (see filed or incomplete progress notes/consult notes).  If uncertain of provider assignment: page job code [phone].  For questions or updates from 5PM-7AM: page the diabetes job code for on call fellow: [phone]    Please notify inpatient diabetes service if changes are planned to steroids, nutrition, or if procedures are planned requiring prolonged NPO status.Diabetes Management Team job code: [phone]

## 2024-07-01 NOTE — TELEPHONE ENCOUNTER
A pharmacist spent 45 minutes providing medication teaching with [place] and [place] (mom) and Judd (dad) for discharge with a focus on new medications/dose changes.  The discharge medication list was reviewed with the patient/family and the following points were discussed, as applicable: Name, description, purpose, dose/strength, duration of medications, common side effects, food/medications to avoid, action to be taken if dose is missed, and how to obtain refills.  The patient will be responsible for managing medications. Additionally, the following transplant related education was covered: Purpose of medication card, Medication videos, Timing of medications and day of lab draw considerations , and Discharge process for receiving meds   Patient will  transplant supplies including 7 day pill organizer, thermometer, and BP monitor at the discharge pharmacy along with medications.  Patient chooses to receive medications from [facility] pharmacy.   Clinical Pharmacy Consult:                                                      Transplant Specific:   Date of Transplant: 06/27/2024  Type of Transplant: liver  First Transplant: yes  History of rejection: no    Immunosuppression Regimen   TAC 2 mg qAM & 2 mg qPM, Prednisone 5 mg qAM and  mg qAM & 750 mg qPM  Patient specific goal: 8-12  Most recent level: 9.4 , date 06/30/2024  Immunosuppressant Levels:  Therapeutic  Pt adherent to lab draws: yes  Scr:   Lab Results   Component Value Date    CR 0.77 07/01/2024     Side effects: no side effects    Prophylactic Medications  PJP Prophylactic: Bactrim SS daily  Scheduled Discontinue Date: 6 months Anticipated date 12/27/2024    Antifungal: Nystatin  Scheduled Discontinue Date: Upon discharge Anticipated date TBD    CMV Prophylactic: Max dose in Liver transplant is Valcyte 450mg once daily   Scheduled Discontinue Date: 3-6 months, TBD Anticipated date TBD    Acid Reducer: Protonix  (pantoprazole)  Scheduled Reviewed Date: TBD    Vascular Prophylactic: Aspirin 81 mg PO daily  Scheduled Discontinue Date: TBD        Reminders:  Bring to first clinic appt: med box, med card, bp monitor, all medications being taken, and lab book.  2.   MTM pharmacist visit on first clinic appt and if ok, again in 3 to 4 months during follow up appt.  3.   Avoid Grapefruit and Grapefruit juice.   4.   Avoid herbal supplements. If wish to take other medications or supplements, call your coordinator.   5.   Keep lab appts.   6.   Can use apps on phone like t3n [employer]in to help manage medication lists and reminders.   7.   Make sure you are protecting your skin by wearing long sleeves and applying sunscreen to exposed skin, for any significant time in the sun.     Transplant Coordinator is Clair Frausto [facility]

## 2024-07-02 ENCOUNTER — APPOINTMENT (OUTPATIENT)
Dept: PHYSICAL THERAPY | Facility: CLINIC | Age: 49
End: 2024-07-02
Attending: SURGERY
Payer: COMMERCIAL

## 2024-07-02 LAB
ALBUMIN SERPL BCG-MCNC: 3 G/DL (ref 3.5–5.2)
ALP SERPL-CCNC: 66 U/L (ref 40–150)
ALT SERPL W P-5'-P-CCNC: 56 U/L (ref 0–70)
ANION GAP SERPL CALCULATED.3IONS-SCNC: 8 MMOL/L (ref 7–15)
AST SERPL W P-5'-P-CCNC: 23 U/L (ref 0–45)
BILIRUB SERPL-MCNC: 1.2 MG/DL
BUN SERPL-MCNC: 15 MG/DL (ref 6–20)
CALCIUM SERPL-MCNC: 8.4 MG/DL (ref 8.6–10)
CHLORIDE SERPL-SCNC: 109 MMOL/L (ref 98–107)
CREAT SERPL-MCNC: 0.7 MG/DL (ref 0.67–1.17)
DEPRECATED HCO3 PLAS-SCNC: 24 MMOL/L (ref 22–29)
EGFRCR SERPLBLD CKD-EPI 2021: >90 ML/MIN/1.73M2
ERYTHROCYTE [DISTWIDTH] IN BLOOD BY AUTOMATED COUNT: 13 % (ref 10–15)
GLUCOSE BLDC GLUCOMTR-MCNC: 110 MG/DL (ref 70–99)
GLUCOSE BLDC GLUCOMTR-MCNC: 110 MG/DL (ref 70–99)
GLUCOSE BLDC GLUCOMTR-MCNC: 114 MG/DL (ref 70–99)
GLUCOSE BLDC GLUCOMTR-MCNC: 123 MG/DL (ref 70–99)
GLUCOSE BLDC GLUCOMTR-MCNC: 129 MG/DL (ref 70–99)
GLUCOSE BLDC GLUCOMTR-MCNC: 141 MG/DL (ref 70–99)
GLUCOSE BLDC GLUCOMTR-MCNC: 155 MG/DL (ref 70–99)
GLUCOSE BLDC GLUCOMTR-MCNC: 160 MG/DL (ref 70–99)
GLUCOSE BLDC GLUCOMTR-MCNC: 163 MG/DL (ref 70–99)
GLUCOSE BLDC GLUCOMTR-MCNC: 216 MG/DL (ref 70–99)
GLUCOSE BLDC GLUCOMTR-MCNC: 231 MG/DL (ref 70–99)
GLUCOSE BLDC GLUCOMTR-MCNC: 235 MG/DL (ref 70–99)
GLUCOSE BLDC GLUCOMTR-MCNC: 263 MG/DL (ref 70–99)
GLUCOSE BLDC GLUCOMTR-MCNC: 93 MG/DL (ref 70–99)
GLUCOSE SERPL-MCNC: 120 MG/DL (ref 70–99)
HCT VFR BLD AUTO: 27 % (ref 40–53)
HGB BLD-MCNC: 9.5 G/DL (ref 13.3–17.7)
MAGNESIUM SERPL-MCNC: 1.5 MG/DL (ref 1.7–2.3)
MCH RBC QN AUTO: 29.9 PG (ref 26.5–33)
MCHC RBC AUTO-ENTMCNC: 35.2 G/DL (ref 31.5–36.5)
MCV RBC AUTO: 85 FL (ref 78–100)
PHOSPHATE SERPL-MCNC: 3 MG/DL (ref 2.5–4.5)
PLATELET # BLD AUTO: 124 10E3/UL (ref 150–450)
POTASSIUM SERPL-SCNC: 3.6 MMOL/L (ref 3.4–5.3)
PROT SERPL-MCNC: 5 G/DL (ref 6.4–8.3)
RBC # BLD AUTO: 3.18 10E6/UL (ref 4.4–5.9)
SODIUM SERPL-SCNC: 141 MMOL/L (ref 135–145)
TACROLIMUS BLD-MCNC: 5.1 UG/L (ref 5–15)
TME LAST DOSE: NORMAL H
TME LAST DOSE: NORMAL H
WBC # BLD AUTO: 7.7 10E3/UL (ref 4–11)

## 2024-07-02 PROCEDURE — 97530 THERAPEUTIC ACTIVITIES: CPT | Mod: GP | Performed by: REHABILITATION PRACTITIONER

## 2024-07-02 PROCEDURE — 80197 ASSAY OF TACROLIMUS: CPT | Performed by: NURSE PRACTITIONER

## 2024-07-02 PROCEDURE — 93005 ELECTROCARDIOGRAM TRACING: CPT

## 2024-07-02 PROCEDURE — 250N000013 HC RX MED GY IP 250 OP 250 PS 637: Performed by: STUDENT IN AN ORGANIZED HEALTH CARE EDUCATION/TRAINING PROGRAM

## 2024-07-02 PROCEDURE — 85027 COMPLETE CBC AUTOMATED: CPT | Performed by: NURSE PRACTITIONER

## 2024-07-02 PROCEDURE — 250N000013 HC RX MED GY IP 250 OP 250 PS 637: Performed by: SURGERY

## 2024-07-02 PROCEDURE — 250N000013 HC RX MED GY IP 250 OP 250 PS 637: Performed by: NURSE PRACTITIONER

## 2024-07-02 PROCEDURE — 99255 IP/OBS CONSLTJ NEW/EST HI 80: CPT | Performed by: NURSE PRACTITIONER

## 2024-07-02 PROCEDURE — 93010 ELECTROCARDIOGRAM REPORT: CPT | Performed by: INTERNAL MEDICINE

## 2024-07-02 PROCEDURE — 36415 COLL VENOUS BLD VENIPUNCTURE: CPT | Performed by: NURSE PRACTITIONER

## 2024-07-02 PROCEDURE — 120N000011 HC R&B TRANSPLANT UMMC

## 2024-07-02 PROCEDURE — 250N000012 HC RX MED GY IP 250 OP 636 PS 637: Performed by: NURSE PRACTITIONER

## 2024-07-02 PROCEDURE — 250N000011 HC RX IP 250 OP 636: Performed by: NURSE PRACTITIONER

## 2024-07-02 PROCEDURE — 999N000128 HC STATISTIC PERIPHERAL IV START W/O US GUIDANCE

## 2024-07-02 PROCEDURE — 83735 ASSAY OF MAGNESIUM: CPT | Performed by: STUDENT IN AN ORGANIZED HEALTH CARE EDUCATION/TRAINING PROGRAM

## 2024-07-02 PROCEDURE — 80053 COMPREHEN METABOLIC PANEL: CPT | Performed by: NURSE PRACTITIONER

## 2024-07-02 PROCEDURE — 250N000013 HC RX MED GY IP 250 OP 250 PS 637

## 2024-07-02 PROCEDURE — 84100 ASSAY OF PHOSPHORUS: CPT | Performed by: STUDENT IN AN ORGANIZED HEALTH CARE EDUCATION/TRAINING PROGRAM

## 2024-07-02 PROCEDURE — 250N000012 HC RX MED GY IP 250 OP 636 PS 637: Performed by: STUDENT IN AN ORGANIZED HEALTH CARE EDUCATION/TRAINING PROGRAM

## 2024-07-02 RX ORDER — OXYCODONE HYDROCHLORIDE 5 MG/1
5 TABLET ORAL EVERY 6 HOURS PRN
Status: DISCONTINUED | OUTPATIENT
Start: 2024-07-02 | End: 2024-07-03

## 2024-07-02 RX ORDER — CARVEDILOL 3.12 MG/1
3.12 TABLET ORAL 2 TIMES DAILY WITH MEALS
Status: DISCONTINUED | OUTPATIENT
Start: 2024-07-02 | End: 2024-07-02

## 2024-07-02 RX ORDER — SIMETHICONE 80 MG
80 TABLET,CHEWABLE ORAL EVERY 6 HOURS PRN
Status: DISCONTINUED | OUTPATIENT
Start: 2024-07-02 | End: 2024-07-03

## 2024-07-02 RX ORDER — CARVEDILOL 3.12 MG/1
3.12 TABLET ORAL 2 TIMES DAILY WITH MEALS
Status: DISCONTINUED | OUTPATIENT
Start: 2024-07-02 | End: 2024-07-05 | Stop reason: HOSPADM

## 2024-07-02 RX ORDER — HYDROXYZINE HYDROCHLORIDE 50 MG/1
50 TABLET, FILM COATED ORAL EVERY 6 HOURS PRN
Status: DISCONTINUED | OUTPATIENT
Start: 2024-07-02 | End: 2024-07-03

## 2024-07-02 RX ORDER — MAGNESIUM SULFATE HEPTAHYDRATE 40 MG/ML
2 INJECTION, SOLUTION INTRAVENOUS ONCE
Status: COMPLETED | OUTPATIENT
Start: 2024-07-02 | End: 2024-07-02

## 2024-07-02 RX ORDER — HYDROXYZINE HYDROCHLORIDE 25 MG/1
25 TABLET, FILM COATED ORAL EVERY 6 HOURS PRN
Status: DISCONTINUED | OUTPATIENT
Start: 2024-07-02 | End: 2024-07-03

## 2024-07-02 RX ORDER — TACROLIMUS 1 MG/1
4 CAPSULE ORAL
Status: DISCONTINUED | OUTPATIENT
Start: 2024-07-02 | End: 2024-07-05 | Stop reason: HOSPADM

## 2024-07-02 RX ADMIN — ACETAMINOPHEN 650 MG: 325 TABLET, FILM COATED ORAL at 14:15

## 2024-07-02 RX ADMIN — URSODIOL 300 MG: 300 CAPSULE ORAL at 19:38

## 2024-07-02 RX ADMIN — POLYETHYLENE GLYCOL 3350 17 G: 17 POWDER, FOR SOLUTION ORAL at 08:48

## 2024-07-02 RX ADMIN — SULFAMETHOXAZOLE AND TRIMETHOPRIM 1 TABLET: 400; 80 TABLET ORAL at 08:47

## 2024-07-02 RX ADMIN — NYSTATIN 500000 UNITS: 100000 SUSPENSION ORAL at 14:11

## 2024-07-02 RX ADMIN — GABAPENTIN 600 MG: 600 TABLET, FILM COATED ORAL at 19:38

## 2024-07-02 RX ADMIN — PANTOPRAZOLE SODIUM 40 MG: 40 TABLET, DELAYED RELEASE ORAL at 08:48

## 2024-07-02 RX ADMIN — INSULIN HUMAN 24 UNITS: 100 INJECTION, SUSPENSION SUBCUTANEOUS at 12:39

## 2024-07-02 RX ADMIN — VALGANCICLOVIR HYDROCHLORIDE 450 MG: 450 TABLET ORAL at 19:38

## 2024-07-02 RX ADMIN — MYCOPHENOLATE MOFETIL 750 MG: 250 CAPSULE ORAL at 08:47

## 2024-07-02 RX ADMIN — SENNOSIDES 8.6 MG: 8.6 TABLET, FILM COATED ORAL at 08:47

## 2024-07-02 RX ADMIN — NYSTATIN 500000 UNITS: 100000 SUSPENSION ORAL at 19:38

## 2024-07-02 RX ADMIN — SENNOSIDES 8.6 MG: 8.6 TABLET, FILM COATED ORAL at 19:38

## 2024-07-02 RX ADMIN — CARVEDILOL 3.12 MG: 3.12 TABLET, FILM COATED ORAL at 18:41

## 2024-07-02 RX ADMIN — TACROLIMUS 2 MG: 1 CAPSULE ORAL at 08:47

## 2024-07-02 RX ADMIN — URSODIOL 300 MG: 300 CAPSULE ORAL at 08:48

## 2024-07-02 RX ADMIN — INSULIN ASPART 7 UNITS: 100 INJECTION, SOLUTION INTRAVENOUS; SUBCUTANEOUS at 08:45

## 2024-07-02 RX ADMIN — GABAPENTIN 600 MG: 600 TABLET, FILM COATED ORAL at 14:11

## 2024-07-02 RX ADMIN — METHOCARBAMOL 500 MG: 500 TABLET ORAL at 08:55

## 2024-07-02 RX ADMIN — ASPIRIN 81 MG CHEWABLE TABLET 81 MG: 81 TABLET CHEWABLE at 08:48

## 2024-07-02 RX ADMIN — MAGNESIUM SULFATE HEPTAHYDRATE 2 G: 2 INJECTION, SOLUTION INTRAVENOUS at 14:11

## 2024-07-02 RX ADMIN — TAMSULOSIN HYDROCHLORIDE 0.4 MG: 0.4 CAPSULE ORAL at 14:11

## 2024-07-02 RX ADMIN — GABAPENTIN 600 MG: 600 TABLET, FILM COATED ORAL at 08:47

## 2024-07-02 RX ADMIN — METHOCARBAMOL 500 MG: 500 TABLET ORAL at 19:38

## 2024-07-02 RX ADMIN — MYCOPHENOLATE MOFETIL 750 MG: 250 CAPSULE ORAL at 18:41

## 2024-07-02 RX ADMIN — OXYCODONE HYDROCHLORIDE 5 MG: 5 TABLET ORAL at 16:11

## 2024-07-02 RX ADMIN — TACROLIMUS 4 MG: 1 CAPSULE ORAL at 18:41

## 2024-07-02 RX ADMIN — MAGNESIUM OXIDE TAB 400 MG (241.3 MG ELEMENTAL MG) 400 MG: 400 (241.3 MG) TAB at 08:47

## 2024-07-02 RX ADMIN — NYSTATIN 500000 UNITS: 100000 SUSPENSION ORAL at 08:47

## 2024-07-02 RX ADMIN — PREDNISONE 10 MG: 10 TABLET ORAL at 08:47

## 2024-07-02 ASSESSMENT — ACTIVITIES OF DAILY LIVING (ADL)
ADLS_ACUITY_SCORE: 31

## 2024-07-02 NOTE — CONSULTS
Discharge Pharmacy Test Claim    Patient has commercial coverage through [facility]. Requested test claims and expected copays listed below.    Test Claim Copay   dexcom G7 0.00   freestyle joan 3 0.00   insulin aspart flexpens not covered   insulin glargine-yfgn pens 0.00   lantus solostar pens not covered   novolog flexpens 0.00     Clair Murdock  [facility] Pharm[facility] Liaison, [place]  Ph: [phone]  Fax: [phone]  Available on Cell>Pointera (learn more here)

## 2024-07-02 NOTE — CONSULTS
Inpatient Diabetes Management Service : New Consult Note     Patient: West Van   YOB: 1975    MRN: [id number]     Date of Consult : 2024   Date of Admission: 2024     Reason for Consult: Hx DM2 now s/p DDLT with steroid induced hyperglycemia; on ozempic and metformin PTA    Consult Requestor: Alona Yi NP            History of Present Illness:     HPI:  48 year old male with PMHx of hepatocellcular carcinoma s/p liver ablation 2022 and steatohepatitis, neuroendocrine tumor s/p right lobectomy, and DM2, s/p  donor liver transplant w/ bilary stent  with Dr. Dodson on 24.     IDS consulted to assist with glycemic management.    History obtained via the patient, chart review and discussion with primary team.     Additional Historian:  none    Patient is not known to the Inpatient Diabetes Service from past admission(s)     The patient has known Type 2 DM which is typically managed outpatient utilizing Metformin 500 mg BID and Ozempic 0.5 mg qweek on .     BG are monitoring using glucometer with ranges reported to be between 120 and 150 mg/dL with no hypoglycemia and rare to minimal  hyperglycemia.    Leading up to this admission, the patient reports taking the above regimen and tolerated well.  Does not tolerate metformin or Ozempic at higher doses,     Since admission, he has been on IV insulin with large tapering doses.  Will go from Prednisone 25 mg yesterday to 10 mg this AM.     Up and walking with therapy this AM, voices few complaints.  Feeling weeker that he expected but overall feels his pain is controlled and getting better.     Reviewed the plan in detail and verbalized understanding.     1:41 PM review of BG trends - IV insulin stopped 2/2 losing the IV, will go ahead and attempt to transition.  Likely to still need a PM dose of NPH, will trend.     ELOS: 2-5 days    Current inpatient regimen:  IV insulin + Novolog     BG at time of  consult:  relatively stable on between 2-8 unit(s) on IV insulin drip.    Other Active/Contributory Medical Problems impacting glycemic control: see HPI     Planned Procedures/Surgeries which could require NPO: none    DM specific Labs on Admission if contributory, otherwise see labs below:    Presenting Glucose: 126 mg/dL  Hgb: 15.7   A1c: 6.1%   Lactic Acid: 11.8     GAD65 antibody, islet antibody, insulin antibody, and c-peptide not available on epic search     Inpatient Glucose Trends:                             Diabetes History:   Diabetes Type and Duration: T2DM diagnosed 2018 6.5%  Started on Metformin 1000 mg BID - this was too much resulting in GI distress and was reduced which he tolerates.     PTA Medication Regimen:   Metformin 500 mg BID  Ozempic 0.5 mg weekly on Mondays    Historical Diabetes Medications:   No other meds and never on insulin in the past     Glucose monitoring device and frequency: glucometer   Outpatient Diabetes Provider: PCP  Formal Diabetes Education/Educator: yes in the past   ------------------------  Complications:    retinopathy  no  last dilated eye exam: >1 year ago  Peripheral neuropathy:  no   Nephropathy:  no  Microalbuminuria: no  Known autonomic neuropathy: no, specifically gastroparesis:  no, gastric-emptying test: no  Dizziness/fainting when standing: no  Neurogenic bladder: no      Last A1c: 6.1   Hemoglobin at time of last A1c: 15.7  RBC transfusion in past 3 months: yes       History of DKA: no    Hypoglycemia PTA: minimal/rare  - Frequency: n/a  - Severity: n/a  - Awareness: + intact    Safety Kit:   - Glucagon: none   - Ketone Strips: no  Medic Alert if Type 1: no    Diet/Lifestyle: typically eats 2 meals per day breakfast/lunch and will sometimes have dinner but joslyn usually have a protein supplement instead.  Does not like to cook     Exercise: up and walking as much as tolerated, PTA was walking with dog 8,000 to 10,000 steps per day   Recent reported weight  change:  no  Ability to [place] Prescribed Regimen:  TBD, has been taking BG and oral hypoglycemics at home PTA    Food/Housing Insecurity: no          Medications Impacting Glycemia:    Steroids: yes  Now on Pred 10 mg this AM from 25 mg  Will be on 5 mg 7/3    D5W containing solutions/medications: no  Other medications/factors impacting glucose:  no         Diet/Nutrition:    Orders Placed This Encounter      Regular Diet Adult   Supplements:  Ensure Enlive between meals at 10 am and 2 pm   TF: no  TPN: no          Review of Systems:    CC: denies focal complaint other than feeling deconditioned   Constitutional: no fever and chills. no recent weight gain or loss.    HEENT: no headache, vision changes, hearing changes, sinus congestion, and swollen glands.   Cardiac: no chest pain, palpitations, or racing heart.    Respiratory: no dyspnea on exertion and at rest. no wheezing, cough with no active sputum production.    GI: intermittent abdominal pain, tenderness and bloating. no nausea and vomiting. no changes in stool pattern, constipation and diarrhea.    : no difficulty urinating, dysuria, and incontinence.    MSK/Integumentary. no swelling/edema. Intermittent weakness. no new rashes, wounds, and bruising.    Endocrine: no polyuria, polydipsia           Past Medical History:       Past Medical History:   Diagnosis Date    Benign essential HTN 04/13/2016    Diabetes (H)     Kidney stone     Liver cancer (H) 10/04/2022    Liver cirrhosis secondary to nonalcoholic steatohepatitis (RIOS) (H)     RIOS (nonalcoholic steatohepatitis) 06/02/2023    Neuroendocrine carcinoma of lung (H)     right lung, lobectomy 3/2023    PONV (postoperative nausea and vomiting)              Past Surgical History:      Past Surgical History:   Procedure Laterality Date    BRONCHOSCOPY, WITH BIOPSY, ROBOT ASSISTED N/A 1/17/2023    Procedure: BRONCHOSCOPY, USING OPTICAL TRACKING SYSTEM, ion;  Surgeon: Ani Guillaume MD;  Location:   OR    DAVINCI LOBECTOMY LUNG Right 3/8/2023    Procedure: Robot-assisted right thoracoscopic lower lobectomy, mediastinal lymph node dissection, intercostal nerve cryoablation ;  Surgeon: Matheus Haas MD;  Location: [facility] OR    ENDOBRONCHIAL ULTRASOUND FLEXIBLE N/A 2023    Procedure: endobronchial  ultrasound and transbronchial biopsies;  Surgeon: Ani Guillaume MD;  Location: [facility] OR    EXCISE TUMOR CHEST WALL Right 2024    Procedure: Excision chest wall mass;  Surgeon: Matheus Haas MD;  Location: [facility] OR    LAPAROSCOPIC ABLATION LIVER TUMOR N/A 2022    Procedure: Diagnostic Laparoscopy, Hepatic Ultrasound, Laparoscopic ultrasound guided microwave ablation of liver tumor x1;  Surgeon: Armando Munguia MD;  Location: [facility] OR    LAPAROSCOPIC BIOPSY LIVER N/A 2022    Procedure: Laparoscopic Ultrasound Guided liver biopsy;  Surgeon: Armando Munguia MD;  Location: [facility] OR    LAPAROSCOPIC CHOLECYSTECTOMY N/A 2022    Procedure: Laparoscopic cholecystectomy;  Surgeon: Armando Munguia MD;  Location: [facility] OR    TRANSPLANT LIVER RECIPIENT  DONOR N/A 2024    Procedure: Transplant liver recipient  donor with bile duct stent placement;  Surgeon: Radu Dodson MD;  Location: [facility] OR             Social History:      Social History     Tobacco Use    Smoking status: Never     Passive exposure: Past    Smokeless tobacco: Never   Substance Use Topics    Alcohol use: Not Currently     Comment: Last ETOH       History   Sexual Activity    Sexual activity: Not on file      Tobacco: as above   Etoh: as above/not currently     Other Substance: not currently    Marital Status: single    Lives in [place], [place]           Family History:    Reviewed.    Family History of Diabetes: confirms no DM     Family History   Problem Relation Age of Onset    Breast Cancer Mother     Cancer Father         lip    Melanoma Cousin     Cirrhosis No family hx of              Physical Exam:    BP (!) 142/94  (BP Location: Right arm)   Pulse 96   Temp 98.5  F (36.9  C) (Oral)   Resp 16   Wt 97 kg (213 lb 12.8 oz)   SpO2 98%   BMI 27.45 kg/m     General:  stable appearing, in no acute distress.  Was up walking in halls with assistance   HEENT:  NC/AT. MMM, sclera not injected, hearing intact  Lungs:  unremarkable, no new cough, no SOB  ABD:  rounded  Skin:  warm and dry, no obvious lesions - scattered bruises   Feet:   cool, + CMS intact     MSK:   moving all extremities  Lymp:   + LE edema  Mental Status:  Alert and oriented x3  Psych:   Cooperative, good eye contact, full/appropriate affect         Laboratory Data:      Lab Results   Component Value Date    A1C 6.1 (H) 2024    A1C 7.0 (H) 2023     Recent Labs   Lab Test 24  0553   WBC 9.7   RBC 3.07*   HGB 8.9*   HCT 27.1*   MCV 88   MCH 29.0   MCHC 32.8   RDW 13.2   *     Recent Labs   Lab Test 24  0611 24  0516 24  0559 24  0553 24  0733 24  0542   NA  --   --   --  142  --  142   POTASSIUM  --   --   --  3.9  --  4.4   CHLORIDE  --   --   --  110*  --  109*   CO2  --   --   --  26  --  28   ANIONGAP  --   --   --  6*  --  5*   * 110*   < > 111*   < > 121*   BUN  --   --   --  20.6*  --  21.8*   CR  --   --   --  0.77  --  0.71   CHUCK  --   --   --  8.2*  --  8.5*    < > = values in this interval not displayed.     Recent Labs   Lab Test 24  0553   PROTTOTAL 4.6*   ALBUMIN 2.9*   BILITOTAL 1.0   ALKPHOS 65   AST 32   ALT 66            Assessment and Recommendations:       Assessment:     Type 2 Diabetes Mellitus, without known complications, fair control  (A1c 6.1 %) in presence of complex illness (no anemia at time of A1c).  Acute hyperglycemia 2/2 steroids/stress  S/p  donor liver transplant w/ bilary stent  with Dr. Dodson on 24.  Anemia  BMI:  28  Anemia      Plan/Recommendations:  Please notify Inpatient Diabetes Service if changes are planned to steroids, nutrition,  TPN/TF and anticipated procedures requiring prolonged NPO status.     - Continue on IV insulin drip, will work toward transition off this AM - drip discontinued at 1345 2/2 losing IV access.     - NPH 24 unit(s) at 0800, reduce rate on IV insulin drip to 0.5/Alg 1.  - Novolog Meal Coverage: 1 units per 6 g CHO, TID AC and PRN with snacks/supplements   - Novolog Correction Scale: high resistance (ISF: 25)  TID AC >140 mg/dL and >200 at HS / 0200   - BG monitoring: TID AC, HS, 0200 starting at 1700   - Holding PTA: Metformin/Ozempic     - Hypoglycemia protocol    - Carb counting protocol recommended  - Please do not hesitate to contact the team with any questions/concerns. First call after hours is to primary service.       Discussion:  Will continue on steroid taper from Pred 25 mg to 10 mg.     Yesterday on the drip required on average between 2-8 unit(s) per hour on average with the Pred 25 mg impact. With ICR of 1:15 g cho.     Today will work toward transition off the drip and plan for 24 unit(s) NPH in the AM at the same time as the pred to offset the steroid hyperglycemia.    Creat 0.70/GFR>90    Tentative Discharge Planning - to be finalized at/near day of discharge:    Medications: TBD  likely lantus/novolog  +/- NPH, recommend resumption of PTA meds, outpatient in the future.    Test Claims: completed see CELE Murdock's notes   Education: Needs to be assessed closer to discharge.    Outpatient Follow-up:  recommend [facility] vs PCP       Thank you for this consult. IDS will continue to follow.      Genesis Worthington, VALENTINA CNP, BC-ADM   Inpatient Diabetes Service  Available on Rentlord - when on duty.     To contact Inpatient Diabetes Service:  7 AM - 5 PM: Page the IDS JAVIER following the patient that day (see filed or incomplete progress notes/consult notes under Endocrinology)    OR if uncertain of provider assignment: page job code [phone]    5 PM - 7 AM: First call after hours is to primary service.     For urgent after-hours questions, page job code for on call fellow: [phone]     I spent a total of 100 minutes on the date of the encounter doing prep/post-work, chart review, history and exam, documentation and further activities per the note including lab review, multidisciplinary communication, counseling the patient and/or coordinating care regarding acute hyper/hypoglycemic management, as well as discharge management and planning/communication.  See note for details.      Please notify Inpatient Diabetes Service if changes are planned to steroids, nutrition, TPN/TF and anticipated procedures requiring prolonged NPO status.

## 2024-07-02 NOTE — INTERIM SUMMARY
[facility] Pre-Admission Screen    Referral Source:  [facility] [address] E[address] B[facility] 7209-01  Admit date to referring facility: 2024    Physical Medicine and Rehab Consult Completed: No    Rehab Diagnosis:    16 Debility (non-cardiac, non-pulmonary) in the setting of  donor liver transplant on 2024    Justification for Acute Inpatient Rehabilitation  West Van is a 48 year old male with PMHx of hepatocellcular carcinoma s/p liver ablation 2022 and steatohepatitis, neuroendocrine tumor s/p right lobectomy, and DM2, who is now s/p  donor liver transplant w/ bilary stent with Dr. Dodson on 24. His hospital course was significant for pain, blood loss anemia, need for BP management, malnutrition in the context of chronic illness, and diabetes management needs. He requires ongoing immunosuppression management and close monitoring at [facility] for decompensation and rejection. The patient is debilitated as a result of their recent surgery and requiring PT/OT. He is medically ready to transfer to [facility] for rehabilitation.     At baseline, the patient is independent with all mobility and ADLs. He lives alone in a home with 3 ZHENG and a basement. He is active in the community and walks his dog 3-4 times per day. He currently requires A x 1 for all mobility.     The patient requires intensive therapies not available in a lesser level of care including PT/OT 90 min per day at least 6 days per week to address the following acute impairments: impaired activity tolerance, impaired balance, impaired ROM, impaired strength, new post-surgical abdominal precautions, and pain to promote return of function and safe transition back to the community. The patient also requires complex medical management in the setting of recent transplant to decrease risk for readmit to the hospital. The patient also requires rehabilitative nursing for ongoing post-transplant education,  medication education, blood sugar and nutritional management.     Current Active Medical Management Needs/Risks for Clinical Complications  The patient requires the high level of rehabilitation physician supervision that accompanies the provision of intensive rehabilitation therapy.  The patient needs the services of the rehabilitation physician to assess the patient medically and functionally and to modify the course of treatment as needed to maximize the patient's capacity to benefit from the rehabilitation process.  The patient requires physician oversight at least 3x/wk to medically manage and assess:   Transplant: in the setting of  donor liver transplant 2024, assess for signs of rejection, manage-- currently on ursodiol 300 mg BID, ASA 81 mg daily.  Immunosuppression: in the setting of recent transplant at risk for rejection, assess and manage-- currently on steroid taper plan,  mg BID, Tacrolimus 4 mg BID (goal level 8-12)  Endocrine: in the setting of baseline DM II, steroid induced hyperglycemia, blood glucose ranging between 151-249 in past 24 hours. Endocrine following at hospital, assess and manage-- currently on scheduled and sliding scale insulin. Optimize blood glucose control to assist w/ wound healing and decrease risk for infection.   Cardiac: in the setting of HTN, assess and manage to ensure safety within parameters with intensive therapies-- currently on Coreg  Pain: in the setting of post op pain, at risk for exacerbation, assess and manage to ensure optimal participation with all therapies-- currently on oxycodone, Neurontin, Tylenol, Robaxin  Heme: in the setting of acute post op blood loss anemia, assess and manage, last Hgb 9.9 on 24. Anemia places the pt at risk for poor endurance, fatigue, dyspnea on exertion.   FEN: in the setting of electrolyte imbalance, assess and manage-- currently on Magnesium replacement  Nutrition: in the setting of moderate malnutrition  in the context of acute illness, assess and manage-- currently being followed by dietician, requiring dietary supplements  PPX: Bactrim, valganciclovir.  He is at risk for falls w/ injury in setting of weakness, impaired balance, and gait instability.     Past Medical/Surgical History  Surgery in the past 100 days: Yes  Additional relevant past medical history: hepatocellcular carcinoma s/p liver ablation 12/2022 and steatohepatitis, neuroendocrine tumor s/p right lobectomy, and DM2    Level of Functioning Prior to Admission:  LIVING ENVIRONMENT  People in Home: alone  Current Living Arrangements: house  Home Accessibility: stairs to enter home, stairs within home  Number of Stairs, Main Entrance: 3  Stair Railings, Main Entrance: railing on left side (ascending)  Number of Stairs, Within Home, Primary: ten  Stair Railings, Within Home, Primary: railing on left side (ascending)  Transportation Anticipated: car, drives self  Living Environment Comments: Pt reports he lives in a one-level home + basement alone. No support nearby. 3 ZHENG with L handrail. Must negotiate 1 flight of stairs (L handrail) down to basement for laundry. (+) driving.    SELF-CARE  Usual Activity Tolerance: good  Regular Exercise: No  Equipment Currently Used at Home: none  Activity/Exercise/Self-Care Comment: IND with ADL's and mobility. Enjoy walking dog 3-4x/day and watching wresting. No falls.  Additional Comments: Family is currently staying at [facility] and plan will be for West to discharge to [facility] from [facility].  Both West and his wife, [place], have stayed there before.      Level of Function: GG Scale (Section GG Functional Ability and Goals; CMS's SALCIDO Version 3.0 Manual effective 10.1.2019):  PT Current Function Goals for Rehab   Bed Rolling 3 Partial/moderate assistance 6 Independent   Supine to Sit 4 Supervision or touching assitance CGA 6 Independent   Sit to Stand 3 Partial/moderate assistance 6 Independent   Transfer 3  Partial/moderate assistance 6 Independent   Ambulation 3 Partial/moderate assistance 6 Independent   Stairs Not completed 6 Independent     OT Current Function Goals for Rehab   Feeding 3 Partial/moderate assistance 6 Independent   Grooming 3 Partial/moderate assistance 6 Independent   Bathing 2 Substantial/maximal assistance 6 Independent   Upper Body Dressing 3 Partial/moderate assistance 6 Independent   Lower Body Dressing 3 Partial/moderate assistance 6 Independent   Toileting 3 Partial/moderate assistance 6 Independent   Toilet Transfer 3 Partial/moderate assistance 6 Independent   Tub/Shower Transfer 88 Not attempted due to safety 6 Independent   Cognition Not Impaired Not applicable     SLP Current Function Goals for Rehab   Swallow Not Impaired Not applicable   Communication Not Impaired Not applicable     Current Diet:  Regular Diet    Summary Statement:  Pt remains below baseline for functional mobility, limited by pain, gross deconditioning, and generalized weakness limiting overall activity tolerance with 3/5 LE strength. Pt needing 8 min to rest between bouts of 250 ft ambulation.  Pt currently unable to ambulate a functional distance or navigate stairs and demonstrating significantly reduced activity tolerance. Pt must be fully IND to return home 2/2 no support available. Anticipated to tolerate and benefit from 3 hours of therapy. Pt is willing and motivated to participate.     Expected Therapies and Services Required During Inpatient Rehab Admission  Intensity of Therapy: Patient requires intensive therapies not available in a lesser level of care. Patient is motivated, making gains, and can tolerate 3 hours of therapy a day.  Physical Therapy: 90 minutes per day, 6 days a week for 6 days  Occupational Therapy: 90 minutes per day, 6 days a week for 6 days  Speech and Language Therapy: No SLP needs at this time  Rehabilitation Nursing Needs: Patient requires 24 hour Rehab Nursing to manage vitals,  medication education, positioning, carryover of new rehab techniques, care coordination, blood sugar management, diabetes education, pain management, post-surgical incision care to promote healing and prevent infection, provide safe environment for patient at falls risk, and monitor nutritional intake.    Precautions/restrictions/special needs:  Precautions: fall precautions and abdominal precautions  Restrictions: Abdominal  Special Needs: Transplant    Expected Level of Improvement: Anticipate with intensive therapies, close medical management, and rehabilitative nursing care the patient will improve strength, balance, tolerance to activity, safety to ensure Mod I with basic mobility and ADLs to allow safe return home with home cares.   Expected Length of time to achieve: 6 days    Anticipated Discharge Needs:  Anticipated Discharge Destination: [facility]  Anticipated Discharge Support: Family member  24/7 support available :  yes  Identified caregiver(s):  Family members  Anticipated Discharge Needs: Home with outpatient therapy (Family is currently staying at [facility] and plan will be for West to discharge to [facility] from [facility].  Both West and his wife, [place], have stayed there before.)    Identified challenges/barriers: none    Liaison signature/date/time:    Physician statement of review and agreement:  I have reviewed and am in agreement of the need for IRF stay to address above functional and medical needs. In addition to above statements address, Patient requires intensive active and ongoing therapeutic intervention and multiple therapies; Patient requires medical supervision; Expected to actively participate in the intensive rehab program; Sufficiently stable to actively participate; Expectation for measurable improvement in functional capacity or adaption to impairments.    MD signature/date/time:

## 2024-07-02 NOTE — CONSULTS
Discharge Pharmacy Test Claim    Novolin N flexpens are covered with $0 copay through patient's commercial [employer] [place] plan.    Test Claim Copay   novolin N flexpens 0.00     Clair Murdock  [facility] Liaison, [place]  Ph: [phone]  Fax: [phone]  Available on [employer] (learn more here)

## 2024-07-02 NOTE — PROGRESS NOTES
Transplant Surgery  Inpatient Daily Progress Note  2024    Assessment & Plan: 48 year old male with PMHx of hepatocellcular carcinoma s/p liver ablation 2022 and steatohepatitis, neuroendocrine tumor s/p right lobectomy, and DM2, s/p  donor liver transplant w/ bilary stent  with Dr. Dodson on 24.     Graft function: POD#5. LFTs downtrending. Post op US with patent vasculature.    - Continue ursodiol 300 mg BID and ASA 81mg daily   - DEE drain x2, remove one today    Immunosuppression management:   Induction: Steroid taper per protocol.  Maintenance:    -  mg BID    - Tacrolimus 4 mg BID. Goal level 8-12.    - Prednisone 5 mg daily x 3 months following taper.    Neurology:   Acute pain: Controlled with PRN oxycodone (weaning), Tylenol, Robaxin, and PTA gabapentin.    Hematology:   Acute blood loss anemia: Hgb 8-9, stable. Monitor.     Cardiorespiratory:   HTN: Stop atenolol due to high likelihood of post-transplant CKD.    - Start carvedilol 3.125 mg BID.   Hx Neuroendocrine tumor s/p right lobectomy: Continue IS.    GI/Nutrition:   Moderate malnutrition in the context of chronic illness: Nutrition consulted. Continue regular diet with supplements.     Endocrine:   DM2, Steroid induced hyperglycemia: A1c 6.1%. Holding PTA metformin, ozempic.    - Endocrine consulted. On insulin gtt + carb coverage.     Fluid/Electrolytes:   Hypomagnesemia: Continue Mag-Ox 400 mg daily, give additional 2 grams IV.     :   History of post-op urinary retention: Flomax started. PVR 7 mL.     Infectious disease: No acute concerns.    Prophylaxis: DVT (mechanical), GI (PPI), viral (Valcyte), PJP (Bactrim), fungal (Nystatin)     Disposition: 7A, discharge to ARU once off insulin gtt.     JAVIER/Fellow/Resident Provider: Alona Yi, NP [id number]     Faculty: Delta Palmer MD  __________________________________________________________________    Interval History:   Overnight events: No acute events overnight.  Pain is controlled. Tolerating diet. Working hard with therapy.     ROS:   A 10-point review of systems was negative except as noted above.    Curent Meds:  Current Facility-Administered Medications   Medication Dose Route Frequency Provider Last Rate Last Admin    aspirin (ASA) chewable tablet 81 mg  81 mg Oral or Feeding Tube Daily Radu Dodson MD   81 mg at 07/02/24 0848    gabapentin (NEURONTIN) tablet 600 mg  600 mg Oral TID Leticia Marks MD   600 mg at 07/02/24 1411    insulin aspart (NovoLOG) injection (RAPID ACTING)  1-10 Units Subcutaneous TID AC Genesis Worthington APRN CNP   5 Units at 07/02/24 1408    insulin aspart (NovoLOG) injection (RAPID ACTING)  1-7 Units Subcutaneous 2 times per day Genesis Worthington APRN CNP        insulin aspart (NovoLOG) injection (RAPID ACTING)   Subcutaneous TID w/meals Genesis Worthington APRN CNP   6 Units at 07/02/24 1407    insulin NPH injection 24 Units  24 Units Subcutaneous Q24H Genesis Worthington APRN CNP   24 Units at 07/02/24 1239    magnesium oxide (MAG-OX) tablet 400 mg  400 mg Oral Daily Alona Yi NP   400 mg at 07/02/24 0847    mycophenolate (GENERIC EQUIVALENT) capsule 750 mg  750 mg Oral BID IS Marqusie Rendon MD   750 mg at 07/02/24 0847    nystatin (MYCOSTATIN) suspension 500,000 Units  500,000 Units Swish & Swallow 4x Daily Leticia Marks MD   500,000 Units at 07/02/24 1411    pantoprazole (PROTONIX) EC tablet 40 mg  40 mg Oral Daily Radu Dodson MD   40 mg at 07/02/24 0848    polyethylene glycol (MIRALAX) Packet 17 g  17 g Oral or Feeding Tube Daily Lakshmi Lo MD   17 g at 07/02/24 0848    [START ON 7/3/2024] predniSONE (DELTASONE) tablet 5 mg  5 mg Oral Daily Alona Yi, NP        sennosides (SENOKOT) tablet 8.6 mg  8.6 mg Oral or Feeding Tube BID Lakshmi Lo MD   8.6 mg at 07/02/24 0847    sodium chloride (PF) 0.9% PF flush 3 mL  3 mL Intravenous Q8H Marquise Rendon MD   3 mL at  07/02/24 1411    sulfamethoxazole-trimethoprim (BACTRIM) 400-80 MG per tablet 1 tablet  1 tablet Oral Daily Lakshmi Lo MD   1 tablet at 07/02/24 0847    tacrolimus (GENERIC EQUIVALENT) capsule 4 mg  4 mg Oral BID IS Alona Yi, NP        tamsulosin (FLOMAX) capsule 0.4 mg  0.4 mg Oral Daily Karl Dominique MD   0.4 mg at 07/02/24 1411    ursodiol (ACTIGALL) capsule 300 mg  300 mg Oral BID Marquise Rendon MD   300 mg at 07/02/24 0848    valGANciclovir (VALCYTE) tablet 450 mg  450 mg Oral QPM Marquise Rendon MD   450 mg at 07/01/24 1953       Physical Exam:     Admit Weight: 92.7 kg (204 lb 4.8 oz)    Current Vitals:   BP (!) 140/92   Pulse (!) 122   Temp 98.1  F (36.7  C)   Resp 16   Wt 97 kg (213 lb 12.8 oz)   SpO2 98%   BMI 27.45 kg/m      Vital sign ranges:    Temp:  [97.8  F (36.6  C)-98.7  F (37.1  C)] 98.1  F (36.7  C)  Pulse:  [] 122  Resp:  [16-18] 16  BP: (118-142)/(85-94) 140/92  SpO2:  [97 %-99 %] 98 %  Patient Vitals for the past 24 hrs:   BP Temp Temp src Pulse Resp SpO2 Weight   07/02/24 1406 (!) 140/92 98.1  F (36.7  C) -- (!) 122 16 98 % --   07/02/24 0608 (!) 142/94 98.5  F (36.9  C) Oral 96 16 98 % --   07/02/24 0110 134/85 97.8  F (36.6  C) Oral 91 16 99 % 97 kg (213 lb 12.8 oz)   07/01/24 2210 124/86 98  F (36.7  C) Oral 100 16 97 % --   07/01/24 1822 (!) 118/92 98.7  F (37.1  C) Oral 101 18 97 % --     General: NAD  Neuro: A&Ox4  Respiratory: Unlabored, RA  Cardiovascular: Perfused  Gastrointestinal: Abdomen soft, non-distended, Jpx1 with serosanguinous, incision CDI  MSK/Extremities: No limb deformities. Minimal edema  Incisions/Skin: Incision CDI      Frailty Scores  More data may exist         1/1/2024 12/10/2023 12/7/2023 6/20/2023 6/13/2023   Frailty Scores   Final Score Not Frail Not Frail Not Frail Not Frail Not Frail Not Frail   Final Score Number 1 1 1 2 2 2      Details          Multiple values from one day are sorted in reverse-chronological order                Data:   [facility]  Recent Labs   Lab 07/02/24  1318 07/02/24  1235 07/02/24  0706 07/02/24  0640 07/01/24  0559 07/01/24  0553 06/30/24  0733 06/30/24  0542 06/29/24  0306 06/29/24  0302 06/28/24  0411 06/28/24  0408 06/26/24  2157 06/26/24  1546   NA  --   --   --  141  --  142  --  142  --  143   < > 146*   < > 140   POTASSIUM  --   --   --  3.6  --  3.9  --  4.4  --  3.8   < > 3.0*   < > 3.8   CHLORIDE  --   --   --  109*  --  110*  --  109*  --  108*   < > 109*   < > 101   CO2  --   --   --  24  --  26  --  28  --  29   < > 27   < > 27   * 231*   < > 120*   < > 111*   < > 121*   < > 163*   < > 150*   < > 138*   BUN  --   --   --  15.0  --  20.6*  --  21.8*  --  16.2   < > 13.2   < > 11.8   CR  --   --   --  0.70  --  0.77  --  0.71  --  0.78   < > 0.83   < > 0.86   GFRESTIMATED  --   --   --  >90  --  >90  --  >90  --  >90   < > >90   < > >90   CUHCK  --   --   --  8.4*  --  8.2*  --  8.5*  --  8.1*   < > 9.1   < > 10.3*   ICAW  --   --   --   --   --   --   --  4.7  --  4.6  --  5.0   < >  --    MAG  --   --   --  1.5*  --  1.7  --  2.2  --  2.4*   < > 2.3   < > 2.1   PHOS  --   --   --  3.0  --  1.6*  --  2.8  --  2.9   < >  --    < > 3.1   AMYLASE  --   --   --   --   --   --   --   --   --   --   --  601*  --  38   LIPASE  --   --   --   --   --   --   --   --   --   --   --  1,669*  --   --    ALBUMIN  --   --   --  3.0*  --  2.9*  --  3.1*  --  3.1*   < > 3.4*   < > 4.6   BILITOTAL  --   --   --  1.2  --  1.0  --  1.1  --  1.5*   < > 2.3*   < > 1.7*   ALKPHOS  --   --   --  66  --  65  --  78  --  63   < > 73   < > 104   AST  --   --   --  23  --  32  --  66*  --  101*   < > 296*   < > 27   ALT  --   --   --  56  --  66  --  97*  --  111*   < > 168*   < > 33    < > = values in this interval not displayed.     CBC  Recent Labs   Lab 07/02/24  0640 07/01/24  0553 06/27/24  1945 06/27/24  1807   HGB 9.5* 8.9*   < > 12.1*   WBC 7.7 9.7   < > 14.3*   * 129*   < > 215   A1C  --   --   --   6.1*    < > = values in this interval not displayed.     COAGS  Recent Labs   Lab 06/30/24  0542 06/29/24  0302 06/27/24  1945 06/27/24  1807 06/27/24  1550   INR 1.34* 1.52*   < > 1.83* 2.92*   PTT  --   --   --  39* 71*    < > = values in this interval not displayed.      Urinalysis  Recent Labs   Lab Test 06/26/24  1748 06/20/23  0815   COLOR Light Yellow Yellow   APPEARANCE Clear Slightly Cloudy*   URINEGLC Negative Negative   URINEBILI Negative Negative   URINEKETONE Negative Negative   SG 1.015 1.021   UBLD Negative Negative   URINEPH 7.5* 6.5   PROTEIN Negative 10*   NITRITE Negative Negative   LEUKEST Negative Negative   RBCU 1 <1   WBCU 1 1     Virology:  Hepatitis C Antibody   Date Value Ref Range Status   06/26/2024 Nonreactive Nonreactive Final     Comment:     A nonreactive screening test result does not exclude the possibility of exposure to or infection with HCV. Nonreactive screening test results in individuals with prior exposure to HCV may be due to antibody levels below the limit of detection of this assay or lack of reactivity to the HCV antigens used in this assay. Patients with recent HCV infections (<3 months from time of exposure) may have false-negative HCV antibody results due to the time needed for seroconversion (average of 8 to 9 weeks).     Attestation:    The patient has been seen with team and evaluated by me.   Vital signs, labs, medications and orders were reviewed.   When obtained, diagnostic images were reviewed by me and interpreted as above.    The care plan was discussed with the multidisciplinary team and I agree with the findings and plan in this note, with any differences recorded in blue.    Immunosuppressive medication management was reviewed and adjusted as reflected in the note and orders.       Delta Palmer MD  Professor of Surgery

## 2024-07-02 NOTE — PLAN OF CARE
BP (!) 142/94 (BP Location: Right arm)   Pulse 96   Temp 98.5  F (36.9  C) (Oral)   Resp 16   Wt 97 kg (213 lb 12.8 oz)   SpO2 98%   BMI 27.45 kg/m      Shift: 0926-3536  VS: HTN into 140s on RA, afebrile  Neuro: AOx4  BG: insulin gtt (Alg 4) BG ; carb coverage  Labs: am labs  Respiratory: CONTI, clear lung sounds  Pain/Nausea/PRN: denies nausea, tylenol x1 for abdominal pain, wants to avoid oxy  Diet: regular  LDA: PIV infusing TKO for insulin, Jpx1 with serosang output (DEE #2 removed yesterday-leaky site)  GI/: voids, LBM yesterday  Skin: clamshell incision FADIA  Mobility: SBA with IV pole  Plan: continue POC    Handoff given to following RN.

## 2024-07-03 ENCOUNTER — APPOINTMENT (OUTPATIENT)
Dept: OCCUPATIONAL THERAPY | Facility: CLINIC | Age: 49
End: 2024-07-03
Attending: SURGERY
Payer: COMMERCIAL

## 2024-07-03 ENCOUNTER — APPOINTMENT (OUTPATIENT)
Dept: PHYSICAL THERAPY | Facility: CLINIC | Age: 49
End: 2024-07-03
Attending: SURGERY
Payer: COMMERCIAL

## 2024-07-03 PROBLEM — R73.9 STEROID-INDUCED HYPERGLYCEMIA: Status: ACTIVE | Noted: 2024-07-03

## 2024-07-03 PROBLEM — E83.42 HYPOMAGNESEMIA: Status: ACTIVE | Noted: 2024-07-03

## 2024-07-03 PROBLEM — T38.0X5A STEROID-INDUCED HYPERGLYCEMIA: Status: ACTIVE | Noted: 2024-07-03

## 2024-07-03 PROBLEM — D84.9 IMMUNOSUPPRESSED STATUS (H): Status: ACTIVE | Noted: 2024-07-03

## 2024-07-03 PROBLEM — G89.18 ACUTE POST-OPERATIVE PAIN: Status: ACTIVE | Noted: 2024-07-03

## 2024-07-03 PROBLEM — Z94.4 S/P LIVER TRANSPLANT (H): Status: ACTIVE | Noted: 2024-07-03

## 2024-07-03 PROBLEM — D62 ANEMIA DUE TO BLOOD LOSS, ACUTE: Status: ACTIVE | Noted: 2024-07-03

## 2024-07-03 PROBLEM — E44.0 MODERATE MALNUTRITION (H): Status: ACTIVE | Noted: 2024-07-03

## 2024-07-03 LAB
ALBUMIN SERPL BCG-MCNC: 3 G/DL (ref 3.5–5.2)
ALP SERPL-CCNC: 70 U/L (ref 40–150)
ALT SERPL W P-5'-P-CCNC: 52 U/L (ref 0–70)
ANION GAP SERPL CALCULATED.3IONS-SCNC: 7 MMOL/L (ref 7–15)
AST SERPL W P-5'-P-CCNC: 24 U/L (ref 0–45)
ATRIAL RATE - MUSE: 120 BPM
BILIRUB SERPL-MCNC: 1.4 MG/DL
BUN SERPL-MCNC: 11.5 MG/DL (ref 6–20)
CALCIUM SERPL-MCNC: 8.5 MG/DL (ref 8.6–10)
CHLORIDE SERPL-SCNC: 107 MMOL/L (ref 98–107)
CREAT SERPL-MCNC: 0.69 MG/DL (ref 0.67–1.17)
DEPRECATED HCO3 PLAS-SCNC: 25 MMOL/L (ref 22–29)
DIASTOLIC BLOOD PRESSURE - MUSE: NORMAL MMHG
EGFRCR SERPLBLD CKD-EPI 2021: >90 ML/MIN/1.73M2
ERYTHROCYTE [DISTWIDTH] IN BLOOD BY AUTOMATED COUNT: 13.2 % (ref 10–15)
GLUCOSE BLDC GLUCOMTR-MCNC: 116 MG/DL (ref 70–99)
GLUCOSE BLDC GLUCOMTR-MCNC: 150 MG/DL (ref 70–99)
GLUCOSE BLDC GLUCOMTR-MCNC: 156 MG/DL (ref 70–99)
GLUCOSE BLDC GLUCOMTR-MCNC: 175 MG/DL (ref 70–99)
GLUCOSE BLDC GLUCOMTR-MCNC: 220 MG/DL (ref 70–99)
GLUCOSE SERPL-MCNC: 144 MG/DL (ref 70–99)
HCT VFR BLD AUTO: 26.7 % (ref 40–53)
HGB BLD-MCNC: 9.3 G/DL (ref 13.3–17.7)
INTERPRETATION ECG - MUSE: NORMAL
MAGNESIUM SERPL-MCNC: 1.6 MG/DL (ref 1.7–2.3)
MCH RBC QN AUTO: 29.3 PG (ref 26.5–33)
MCHC RBC AUTO-ENTMCNC: 34.8 G/DL (ref 31.5–36.5)
MCV RBC AUTO: 84 FL (ref 78–100)
P AXIS - MUSE: 34 DEGREES
PHOSPHATE SERPL-MCNC: 3.9 MG/DL (ref 2.5–4.5)
PLATELET # BLD AUTO: 144 10E3/UL (ref 150–450)
POTASSIUM SERPL-SCNC: 4.1 MMOL/L (ref 3.4–5.3)
PR INTERVAL - MUSE: 140 MS
PROT SERPL-MCNC: 4.8 G/DL (ref 6.4–8.3)
QRS DURATION - MUSE: 80 MS
QT - MUSE: 328 MS
QTC - MUSE: 463 MS
R AXIS - MUSE: -19 DEGREES
RBC # BLD AUTO: 3.17 10E6/UL (ref 4.4–5.9)
SODIUM SERPL-SCNC: 139 MMOL/L (ref 135–145)
SYSTOLIC BLOOD PRESSURE - MUSE: NORMAL MMHG
T AXIS - MUSE: 39 DEGREES
TACROLIMUS BLD-MCNC: 6.6 UG/L (ref 5–15)
TME LAST DOSE: NORMAL H
TME LAST DOSE: NORMAL H
VENTRICULAR RATE- MUSE: 120 BPM
WBC # BLD AUTO: 8.9 10E3/UL (ref 4–11)

## 2024-07-03 PROCEDURE — 97530 THERAPEUTIC ACTIVITIES: CPT | Mod: GP | Performed by: REHABILITATION PRACTITIONER

## 2024-07-03 PROCEDURE — 80053 COMPREHEN METABOLIC PANEL: CPT | Performed by: NURSE PRACTITIONER

## 2024-07-03 PROCEDURE — 99233 SBSQ HOSP IP/OBS HIGH 50: CPT | Mod: 24 | Performed by: NURSE PRACTITIONER

## 2024-07-03 PROCEDURE — 84100 ASSAY OF PHOSPHORUS: CPT | Performed by: STUDENT IN AN ORGANIZED HEALTH CARE EDUCATION/TRAINING PROGRAM

## 2024-07-03 PROCEDURE — 83735 ASSAY OF MAGNESIUM: CPT | Performed by: STUDENT IN AN ORGANIZED HEALTH CARE EDUCATION/TRAINING PROGRAM

## 2024-07-03 PROCEDURE — 250N000013 HC RX MED GY IP 250 OP 250 PS 637: Performed by: SURGERY

## 2024-07-03 PROCEDURE — 250N000012 HC RX MED GY IP 250 OP 636 PS 637: Performed by: STUDENT IN AN ORGANIZED HEALTH CARE EDUCATION/TRAINING PROGRAM

## 2024-07-03 PROCEDURE — 97530 THERAPEUTIC ACTIVITIES: CPT | Mod: GO | Performed by: OCCUPATIONAL THERAPIST

## 2024-07-03 PROCEDURE — 120N000011 HC R&B TRANSPLANT UMMC

## 2024-07-03 PROCEDURE — 80197 ASSAY OF TACROLIMUS: CPT | Performed by: NURSE PRACTITIONER

## 2024-07-03 PROCEDURE — 250N000013 HC RX MED GY IP 250 OP 250 PS 637

## 2024-07-03 PROCEDURE — 85027 COMPLETE CBC AUTOMATED: CPT | Performed by: NURSE PRACTITIONER

## 2024-07-03 PROCEDURE — 250N000013 HC RX MED GY IP 250 OP 250 PS 637: Performed by: NURSE PRACTITIONER

## 2024-07-03 PROCEDURE — 250N000013 HC RX MED GY IP 250 OP 250 PS 637: Performed by: STUDENT IN AN ORGANIZED HEALTH CARE EDUCATION/TRAINING PROGRAM

## 2024-07-03 PROCEDURE — 250N000012 HC RX MED GY IP 250 OP 636 PS 637: Performed by: NURSE PRACTITIONER

## 2024-07-03 PROCEDURE — 36415 COLL VENOUS BLD VENIPUNCTURE: CPT | Performed by: NURSE PRACTITIONER

## 2024-07-03 PROCEDURE — 97535 SELF CARE MNGMENT TRAINING: CPT | Mod: GO | Performed by: OCCUPATIONAL THERAPIST

## 2024-07-03 RX ORDER — MAGNESIUM OXIDE 400 MG/1
400 TABLET ORAL 2 TIMES DAILY
Status: DISCONTINUED | OUTPATIENT
Start: 2024-07-03 | End: 2024-07-05 | Stop reason: HOSPADM

## 2024-07-03 RX ORDER — OXYCODONE HYDROCHLORIDE 5 MG/1
5 TABLET ORAL EVERY 8 HOURS PRN
Status: DISCONTINUED | OUTPATIENT
Start: 2024-07-03 | End: 2024-07-05 | Stop reason: HOSPADM

## 2024-07-03 RX ADMIN — POLYETHYLENE GLYCOL 3350 17 G: 17 POWDER, FOR SOLUTION ORAL at 07:53

## 2024-07-03 RX ADMIN — VALGANCICLOVIR HYDROCHLORIDE 450 MG: 450 TABLET ORAL at 19:44

## 2024-07-03 RX ADMIN — SENNOSIDES 8.6 MG: 8.6 TABLET, FILM COATED ORAL at 07:54

## 2024-07-03 RX ADMIN — NYSTATIN 500000 UNITS: 100000 SUSPENSION ORAL at 19:44

## 2024-07-03 RX ADMIN — MYCOPHENOLATE MOFETIL 750 MG: 250 CAPSULE ORAL at 18:16

## 2024-07-03 RX ADMIN — NYSTATIN 500000 UNITS: 100000 SUSPENSION ORAL at 16:32

## 2024-07-03 RX ADMIN — GABAPENTIN 600 MG: 600 TABLET, FILM COATED ORAL at 14:15

## 2024-07-03 RX ADMIN — ASPIRIN 81 MG CHEWABLE TABLET 81 MG: 81 TABLET CHEWABLE at 07:56

## 2024-07-03 RX ADMIN — CARVEDILOL 3.12 MG: 3.12 TABLET, FILM COATED ORAL at 07:56

## 2024-07-03 RX ADMIN — TACROLIMUS 4 MG: 1 CAPSULE ORAL at 18:16

## 2024-07-03 RX ADMIN — METHOCARBAMOL 500 MG: 500 TABLET ORAL at 04:17

## 2024-07-03 RX ADMIN — GABAPENTIN 600 MG: 600 TABLET, FILM COATED ORAL at 19:44

## 2024-07-03 RX ADMIN — NYSTATIN 500000 UNITS: 100000 SUSPENSION ORAL at 07:54

## 2024-07-03 RX ADMIN — PANTOPRAZOLE SODIUM 40 MG: 40 TABLET, DELAYED RELEASE ORAL at 07:55

## 2024-07-03 RX ADMIN — URSODIOL 300 MG: 300 CAPSULE ORAL at 19:44

## 2024-07-03 RX ADMIN — TACROLIMUS 4 MG: 1 CAPSULE ORAL at 07:53

## 2024-07-03 RX ADMIN — GABAPENTIN 600 MG: 600 TABLET, FILM COATED ORAL at 07:55

## 2024-07-03 RX ADMIN — METHOCARBAMOL 500 MG: 500 TABLET ORAL at 14:34

## 2024-07-03 RX ADMIN — NYSTATIN 500000 UNITS: 100000 SUSPENSION ORAL at 12:11

## 2024-07-03 RX ADMIN — MYCOPHENOLATE MOFETIL 750 MG: 250 CAPSULE ORAL at 07:54

## 2024-07-03 RX ADMIN — MAGNESIUM OXIDE TAB 400 MG (241.3 MG ELEMENTAL MG) 400 MG: 400 (241.3 MG) TAB at 19:44

## 2024-07-03 RX ADMIN — ACETAMINOPHEN 650 MG: 325 TABLET, FILM COATED ORAL at 14:33

## 2024-07-03 RX ADMIN — CARVEDILOL 3.12 MG: 3.12 TABLET, FILM COATED ORAL at 18:16

## 2024-07-03 RX ADMIN — SULFAMETHOXAZOLE AND TRIMETHOPRIM 1 TABLET: 400; 80 TABLET ORAL at 07:55

## 2024-07-03 RX ADMIN — SENNOSIDES 8.6 MG: 8.6 TABLET, FILM COATED ORAL at 19:44

## 2024-07-03 RX ADMIN — MAGNESIUM OXIDE TAB 400 MG (241.3 MG ELEMENTAL MG) 400 MG: 400 (241.3 MG) TAB at 12:11

## 2024-07-03 RX ADMIN — MAGNESIUM OXIDE TAB 400 MG (241.3 MG ELEMENTAL MG) 400 MG: 400 (241.3 MG) TAB at 07:55

## 2024-07-03 RX ADMIN — ACETAMINOPHEN 650 MG: 325 TABLET, FILM COATED ORAL at 04:17

## 2024-07-03 RX ADMIN — PREDNISONE 5 MG: 5 TABLET ORAL at 07:56

## 2024-07-03 RX ADMIN — URSODIOL 300 MG: 300 CAPSULE ORAL at 07:56

## 2024-07-03 ASSESSMENT — ACTIVITIES OF DAILY LIVING (ADL)
ADLS_ACUITY_SCORE: 32
ADLS_ACUITY_SCORE: 31
ADLS_ACUITY_SCORE: 32
ADLS_ACUITY_SCORE: 32
ADLS_ACUITY_SCORE: 31
ADLS_ACUITY_SCORE: 32
ADLS_ACUITY_SCORE: 31
ADLS_ACUITY_SCORE: 32
ADLS_ACUITY_SCORE: 32
ADLS_ACUITY_SCORE: 31
ADLS_ACUITY_SCORE: 31
ADLS_ACUITY_SCORE: 32
ADLS_ACUITY_SCORE: 31
ADLS_ACUITY_SCORE: 32
ADLS_ACUITY_SCORE: 31
ADLS_ACUITY_SCORE: 32
ADLS_ACUITY_SCORE: 31

## 2024-07-03 NOTE — PROGRESS NOTES
CLINICAL NUTRITION SERVICES - DISCHARGE NOTE    Patient s discharge needs assessed and discharge planning has been conducted with the multidisciplinary transplant care team including physicians, pharmacy, social work and transplant coordinator.    Follow up/Monitoring:  Once discharged, place outpatient nutrition consult via the transplant team if nutrition concerns arise.    Aylin Varma MS/RD/LD/CNSC  Available on [employer]   M-F (7am-3:30pm) - 7A/7B Clinical Dietitian  Weekend/Holiday Dietitian (7am-3:30pm)    ** Clinical Dietitians no longer available on pager

## 2024-07-03 NOTE — PLAN OF CARE
Goal Outcome Evaluation:      /84 (BP Location: Right arm)   Pulse 115   Temp 98.3  F (36.8  C) (Oral)   Resp 16   Wt 97 kg (213 lb 12.8 oz)   SpO2 98%   BMI 27.45 kg/m      Shift: 5162-6502  Isolation Status: NA  VSS on RA, afebrile  Neuro: Aox4  Behaviors: pleasant & cooperative  B  Labs: stable  Respiratory: WNL  Cardiac: EKG, tachycardiac  Pain/Nausea: Tylenol/ Oxycodone managing  Diet: Regular  IV Access: PIV  Infusion(s): ABX  Lines/Drains:  out, drsg changed  GI/: voiding, LBM 7  Skin: No new concerns  Mobility: SBA  Plan: POC, update team PRN

## 2024-07-03 NOTE — PLAN OF CARE
/84 (BP Location: Right arm)   Pulse 101   Temp 98.3  F (36.8  C) (Oral)   Resp 16   Wt 97 kg (213 lb 12.8 oz)   SpO2 96%   BMI 27.45 kg/m      1900 - 0730    AVSS on RA. SBA w/ walker. Regular diet. BG ACHS/2am (141, 116), on carb counts. Voids w/out difficulty. LBM 7/1.    R leaky DEE, dressing changed, serosang output. L PIV SL. Clamshell incision stapled FADIA, lots of bruising around incision and over abdomen.     Pain managed w/ tylenol x 1, robaxin x2.

## 2024-07-03 NOTE — PROGRESS NOTES
Transplant Surgery  Inpatient Daily Progress Note  2024    Assessment & Plan: 48 year old male with PMHx of hepatocellcular carcinoma s/p liver ablation 2022 and steatohepatitis, neuroendocrine tumor s/p right lobectomy, and DM2, s/p  donor liver transplant w/ bilary stent  with Dr. Dodson on 24.     Graft function: POD#5. LFTs downtrending. Post op US with patent vasculature.    - Continue ursodiol 300 mg BID and ASA 81mg daily   - Jremtrista DEE drain   - rehab when available    Immunosuppression management:   Induction: Steroid taper per protocol.  Maintenance:    -  mg BID    - Tacrolimus 4 mg BID. Goal level 8-12.    - Prednisone 5 mg daily x 3 months following taper.    Neurology:   Acute pain: Controlled with PRN oxycodone (weaning), Tylenol, Robaxin, and PTA gabapentin.    Hematology:   Acute blood loss anemia: Hgb 8-9, stable. Monitor.     Cardiorespiratory:   HTN: Stop atenolol due to high likelihood of post-transplant CKD.    - Start carvedilol 3.125 mg BID.   Hx Neuroendocrine tumor s/p right lobectomy: Continue IS.    GI/Nutrition:   Moderate malnutrition in the context of chronic illness: Nutrition consulted. Continue regular diet with supplements.     Endocrine:   DM2, Steroid induced hyperglycemia: A1c 6.1%. Holding PTA metformin, ozempic.    - Endocrine consulted. On insulin gtt + carb coverage.     Fluid/Electrolytes:   Hypomagnesemia: Continue Mag-Ox 400 mg daily, give additional 2 grams IV.     :   History of post-op urinary retention: Flomax started. PVR 7 mL.     Infectious disease: No acute concerns.    Prophylaxis: DVT (mechanical), GI (PPI), viral (Valcyte), PJP (Bactrim), fungal (Nystatin)     Disposition: 7A, discharge to ARU once off insulin gtt.     JAVIER/Fellow/Resident Provider: Alona Yi, NP [id number]     Faculty: Delta Palmer MD  __________________________________________________________________    Interval History:   rufino ERICKSNO:   A 10-point review  of systems was negative except as noted above.    Curent Meds:  Current Facility-Administered Medications   Medication Dose Route Frequency Provider Last Rate Last Admin    aspirin (ASA) chewable tablet 81 mg  81 mg Oral or Feeding Tube Daily Radu Dodson MD   81 mg at 07/03/24 0756    carvedilol (COREG) tablet 3.125 mg  3.125 mg Oral BID w/meals Alona Yi NP   3.125 mg at 07/03/24 0756    gabapentin (NEURONTIN) tablet 600 mg  600 mg Oral TID Leticia Marks MD   600 mg at 07/03/24 0755    insulin aspart (NovoLOG) injection (RAPID ACTING)  1-10 Units Subcutaneous TID AC Genesis Worthington APRN CNP   2 Units at 07/03/24 0750    insulin aspart (NovoLOG) injection (RAPID ACTING)  1-7 Units Subcutaneous 2 times per day Genesis Worthington APRN CNP        insulin aspart (NovoLOG) injection (RAPID ACTING)   Subcutaneous TID w/meals Genesis Worthington APRN CNP   2 Units at 07/02/24 1835    insulin NPH injection 10 Units  10 Units Subcutaneous Q24H Genesis Worthington APRN CNP        insulin NPH injection 18 Units  18 Units Subcutaneous Q24H Genesis Worthington APRN CNP   18 Units at 07/03/24 0753    magnesium oxide (MAG-OX) tablet 400 mg  400 mg Oral BID Alona Yi NP        mycophenolate (GENERIC EQUIVALENT) capsule 750 mg  750 mg Oral BID IS Marquise Rendon MD   750 mg at 07/03/24 0754    nystatin (MYCOSTATIN) suspension 500,000 Units  500,000 Units Swish & Swallow 4x Daily Leticia Marks MD   500,000 Units at 07/03/24 0754    pantoprazole (PROTONIX) EC tablet 40 mg  40 mg Oral Daily Radu Dodson MD   40 mg at 07/03/24 0755    polyethylene glycol (MIRALAX) Packet 17 g  17 g Oral or Feeding Tube Daily Lakshmi Lo MD   17 g at 07/03/24 0753    predniSONE (DELTASONE) tablet 5 mg  5 mg Oral Daily Alona Yi NP   5 mg at 07/03/24 0756    sennosides (SENOKOT) tablet 8.6 mg  8.6 mg Oral or Feeding Tube BID Lakshmi Lo MD   8.6 mg at 07/03/24 3989     sodium chloride (PF) 0.9% PF flush 3 mL  3 mL Intravenous Q8H Marquise Rendon MD   3 mL at 07/03/24 0418    sulfamethoxazole-trimethoprim (BACTRIM) 400-80 MG per tablet 1 tablet  1 tablet Oral Daily Lakshmi Lo MD   1 tablet at 07/03/24 0755    tacrolimus (GENERIC EQUIVALENT) capsule 4 mg  4 mg Oral BID IS Alona Yi, NP   4 mg at 07/03/24 0753    tamsulosin (FLOMAX) capsule 0.4 mg  0.4 mg Oral Daily Karl Dominique MD   0.4 mg at 07/02/24 1411    ursodiol (ACTIGALL) capsule 300 mg  300 mg Oral BID Marquise Rendon MD   300 mg at 07/03/24 0756    valGANciclovir (VALCYTE) tablet 450 mg  450 mg Oral QPM Marquise Rendon MD   450 mg at 07/02/24 1938       Physical Exam:     Admit Weight: 92.7 kg (204 lb 4.8 oz)    Current Vitals:   /82 (BP Location: Right arm)   Pulse 100   Temp 98.1  F (36.7  C) (Oral)   Resp 16   Wt 95 kg (209 lb 6.4 oz)   SpO2 98%   BMI 26.89 kg/m      Vital sign ranges:    Temp:  [97.9  F (36.6  C)-98.3  F (36.8  C)] 98.1  F (36.7  C)  Pulse:  [100-122] 100  Resp:  [16] 16  BP: (119-140)/(82-92) 126/82  SpO2:  [96 %-98 %] 98 %  Patient Vitals for the past 24 hrs:   BP Temp Temp src Pulse Resp SpO2 Weight   07/03/24 0611 126/82 98.1  F (36.7  C) Oral 100 16 98 % 95 kg (209 lb 6.4 oz)   07/03/24 0155 119/84 98.3  F (36.8  C) Oral 101 16 96 % --   07/02/24 2249 124/85 97.9  F (36.6  C) Oral 110 16 98 % --   07/02/24 1801 132/84 98.3  F (36.8  C) Oral 115 16 98 % --   07/02/24 1406 (!) 140/92 98.1  F (36.7  C) -- (!) 122 16 98 % --     General: NAD  Neuro: A&Ox4  Respiratory: Unlabored, RA  Cardiovascular: Perfused  Gastrointestinal: Abdomen soft, non-distended, Jpx1 with serosanguinous, incision CDI  MSK/Extremities: No limb deformities. Minimal edema  Incisions/Skin: Incision CDI      Frailty Scores  More data may exist         1/1/2024 12/10/2023 12/7/2023 6/20/2023 6/13/2023   Frailty Scores   Final Score Not Frail Not Frail Not Frail Not Frail Not Frail Not Frail    Final Score Number 1 1 1 2 2 2      Details          Multiple values from one day are sorted in reverse-chronological order               Data:   CMP  Recent Labs   Lab 07/03/24  0749 07/03/24  0627 07/02/24  0706 07/02/24  0640 06/30/24  0733 06/30/24  0542 06/29/24  0306 06/29/24  0302 06/28/24  0411 06/28/24  0408 06/26/24  2157 06/26/24  1546   NA  --  139  --  141   < > 142  --  143   < > 146*   < > 140   POTASSIUM  --  4.1  --  3.6   < > 4.4  --  3.8   < > 3.0*   < > 3.8   CHLORIDE  --  107  --  109*   < > 109*  --  108*   < > 109*   < > 101   CO2  --  25  --  24   < > 28  --  29   < > 27   < > 27   * 144*   < > 120*   < > 121*   < > 163*   < > 150*   < > 138*   BUN  --  11.5  --  15.0   < > 21.8*  --  16.2   < > 13.2   < > 11.8   CR  --  0.69  --  0.70   < > 0.71  --  0.78   < > 0.83   < > 0.86   GFRESTIMATED  --  >90  --  >90   < > >90  --  >90   < > >90   < > >90   CHUCK  --  8.5*  --  8.4*   < > 8.5*  --  8.1*   < > 9.1   < > 10.3*   ICAW  --   --   --   --   --  4.7  --  4.6  --  5.0   < >  --    MAG  --  1.6*  --  1.5*   < > 2.2  --  2.4*   < > 2.3   < > 2.1   PHOS  --  3.9  --  3.0   < > 2.8  --  2.9   < >  --    < > 3.1   AMYLASE  --   --   --   --   --   --   --   --   --  601*  --  38   LIPASE  --   --   --   --   --   --   --   --   --  1,669*  --   --    ALBUMIN  --  3.0*  --  3.0*   < > 3.1*  --  3.1*   < > 3.4*   < > 4.6   BILITOTAL  --  1.4*  --  1.2   < > 1.1  --  1.5*   < > 2.3*   < > 1.7*   ALKPHOS  --  70  --  66   < > 78  --  63   < > 73   < > 104   AST  --  24  --  23   < > 66*  --  101*   < > 296*   < > 27   ALT  --  52  --  56   < > 97*  --  111*   < > 168*   < > 33    < > = values in this interval not displayed.     CBC  Recent Labs   Lab 07/03/24  0627 07/02/24  0640 06/27/24  1945 06/27/24  1807   HGB 9.3* 9.5*   < > 12.1*   WBC 8.9 7.7   < > 14.3*   * 124*   < > 215   A1C  --   --   --  6.1*    < > = values in this interval not displayed.     COAGS  Recent Labs   Lab  06/30/24  0542 06/29/24  0302 06/27/24  1945 06/27/24  1807 06/27/24  1550   INR 1.34* 1.52*   < > 1.83* 2.92*   PTT  --   --   --  39* 71*    < > = values in this interval not displayed.      Urinalysis  Recent Labs   Lab Test 06/26/24  1748 06/20/23  0815   COLOR Light Yellow Yellow   APPEARANCE Clear Slightly Cloudy*   URINEGLC Negative Negative   URINEBILI Negative Negative   URINEKETONE Negative Negative   SG 1.015 1.021   UBLD Negative Negative   URINEPH 7.5* 6.5   PROTEIN Negative 10*   NITRITE Negative Negative   LEUKEST Negative Negative   RBCU 1 <1   WBCU 1 1     Virology:  Hepatitis C Antibody   Date Value Ref Range Status   06/26/2024 Nonreactive Nonreactive Final     Comment:     A nonreactive screening test result does not exclude the possibility of exposure to or infection with HCV. Nonreactive screening test results in individuals with prior exposure to HCV may be due to antibody levels below the limit of detection of this assay or lack of reactivity to the HCV antigens used in this assay. Patients with recent HCV infections (<3 months from time of exposure) may have false-negative HCV antibody results due to the time needed for seroconversion (average of 8 to 9 weeks).

## 2024-07-03 NOTE — DISCHARGE SUMMARY
[facility]    Discharge Summary  Transplant Surgery    Date of Admission:  2024  Date of Discharge:  2024  Discharging Provider: Gin Burt PA-C / Delta Palmer MD    Discharge Diagnoses   Active Problems:    Benign essential HTN    Type 2 diabetes mellitus without complication, without long-term current use of insulin (H)    Liver transplant candidate    HCC (hepatocellular carcinoma) (H)    S/P liver transplant (H)    Acute post-operative pain    Immunosuppressed status (H24)    Anemia due to blood loss, acute    Moderate malnutrition (H24)    Steroid-induced hyperglycemia    Hypomagnesemia      Procedure/Surgery Information   Procedure date:  24    Preoperative diagnosis: 1. 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 HCC    Postoperative diagnosis:  Same,     Procedure:  1. Donation after Brain Death liver transplant   2. End-to-end Choledochocholedochostomy over an 8 pediatric feeding tube   3. Lysis of adhesions taking an additional 45 minutes of operating time   4. Venovenous bypass       Surgeon:  Surgeons and Role:     * Radu Dodson MD - Primary     * Leticia Marks MD - Resident - Assisting     * Chelsea Dill DO - Resident - Assisting     * Driss Khalil MD - Fellow - Assisting     * Marquise Rendon MD - Fellow - Assisting    Fellow/assistant:  Marquise Rendon    Anesthesia:  General    Specimen:  Native liver.  Donor gallbladder.    Drains:  x2     Non-operative procedures None performed     History of Present Illness   West Van is a 48 year with PMHx of hepatocellcular carcinoma s/p liver ablation 2022 and steatohepatitis, neuroendocrine tumor s/p right lobectomy, and DM2, s/p  donor liver transplant w/ bilary stent  with Dr. Dodson on 24.     Hospital Course   West Van was admitted on 2024.  The following problems were addressed during his hospitalization:    s/p DBD DDLT +stent  6/27/24: POD#8. LFTs downtrending. Post op US with patent vasculature.    - Continue ursodiol 300 mg BID and ASA 81 mg daily.     Immunosuppressed due to medications:    Induction: Steroid taper per protocol complete.  Maintenance:    -  mg BID    - Tacrolimus 4 mg BID. Goal level 8-12.    - Prednisone 5 mg daily x 3 months.   Infection prophylaxis: viral (Valcyte x 6 months), PJP (Bactrim x 6 months)    Transplant coordinator: Clair Meza [phone].  Biliary stent:  Yes  Donor status:  DBD  CMV D + / R -  EBV D + / R -  Anticoagulation plan: ASA 81 mg daily x 6 months    Neurology:   Acute post-op pain: Controlled with PRN oxycodone (weaning), Tylenol, Robaxin, and PTA gabapentin.     Hematology:   Acute blood loss anemia: Hgb 8-9, stable. Monitor.      Cardiorespiratory:   HTN: Stop atenolol due to high likelihood of post-transplant CKD.    - Start carvedilol 3.125 mg BID      GI/Nutrition:   Moderate malnutrition in the context of chronic illness: Nutrition consulted. Continue regular diet with supplements.      Endocrine:   DM2, Steroid induced hyperglycemia: A1c 6.1%. Holding PTA metformin, ozempic. High requirements on insulin gtt with steroids. Endocrine consulted. Plan on discharge:    - Lantus 25 units daily (AM), fixed meal coverage TID (6 units per meal), and medium insulin resistance correction scale ACHS.     Fluid/Electrolytes:   Hypomagnesemia: Continue Mag-Ox 400 mg BID.         Discharge Disposition   Discharged to home   Condition at discharge: Stable    Pending Results   These results will be followed up by Transplant team  Unresulted Labs Ordered in the Past 30 Days of this Admission       Date and Time Order Name Status Description    6/27/2024  3:26 PM Surgical Pathology Exam In process           Final pathology results: Pending at time of discharge  Primary Care Physician   Reddy Hood MD    /81 (BP Location: Right arm)   Pulse 100   Temp 98.3  F (36.8  C) (Oral)    Resp 16   Wt 92 kg (202 lb 12.8 oz)   SpO2 100%   BMI 26.04 kg/m    General: NAD  Neuro: A&Ox4  Respiratory: Unlabored, RA  Cardiovascular: Perfused  Gastrointestinal: Abdomen soft, non-distended. Incision intact, dry  MSK/Extremities: No limb deformities. Minimal edema  Incisions/Skin: Incision CDI    Consultations This Hospital Stay   SOCIAL WORK IP CONSULT  NUTRITION SERVICES ADULT IP CONSULT  PHYSICAL THERAPY ADULT IP CONSULT  OCCUPATIONAL THERAPY ADULT IP CONSULT  NURSING TO CONSULT FOR VASCULAR ACCESS CARE IP CONSULT  CARE MANAGEMENT / SOCIAL WORK IP CONSULT  NUTRITION SERVICES ADULT IP CONSULT  OCCUPATIONAL THERAPY ADULT IP CONSULT  PHYSICAL THERAPY ADULT IP CONSULT  PHARMACY IP CONSULT  SOT MEDICATION HISTORY IP PHARMACY CONSULT  ENDOCRINE DIABETES ADULT IP CONSULT  PHARMACY LIAISON FOR MEDICATION COVERAGE CONSULT  NURSING TO CONSULT FOR VASCULAR ACCESS CARE IP CONSULT  PHARMACY LIAISON FOR MEDICATION COVERAGE CONSULT  CNS DIABETES IP CONSULT    Time Spent on this Encounter   I have spent greater than 30 minutes on this discharge.    Discharge Orders   Discharge Medications   Current Discharge Medication List        START taking these medications    Details   acetaminophen (TYLENOL) 325 MG tablet Take 2 tablets (650 mg) by mouth every 6 hours as needed for mild pain    Associated Diagnoses: S/P liver transplant (H)      Alcohol Swabs PADS Use to swab the area of the injection or nay as directed Per insurance coverage  Qty: 200 each, Refills: 1    Associated Diagnoses: Drug-induced hyperglycemia      aspirin (ASA) 81 MG chewable tablet 1 tablet (81 mg) by Oral or Feeding Tube route daily  Qty: 30 tablet, Refills: 5    Associated Diagnoses: S/P liver transplant (H)      blood glucose (NO BRAND SPECIFIED) lancets standard To use to test glucose level in the blood Use to test blood sugar 4  times daily as directed. To accompany glucose monitor brands per insurance coverage.  Qty: 200 each, Refills: 1     Associated Diagnoses: Drug-induced hyperglycemia      blood glucose (NO BRAND SPECIFIED) test strip To use to test glucose level in the blood Use to test blood sugar 4 times daily as directed. To accompany glucose monitor brands per insurance coverage.  Qty: 100 strip, Refills: 3    Associated Diagnoses: Drug-induced hyperglycemia      blood glucose monitoring (NO BRAND SPECIFIED) meter device kit Use as directed Per insurance coverage  Qty: 1 kit, Refills: 0    Associated Diagnoses: Drug-induced hyperglycemia      carvedilol (COREG) 3.125 MG tablet Take 1 tablet (3.125 mg) by mouth 2 times daily (with meals)  Qty: 60 tablet, Refills: 3    Associated Diagnoses: S/P liver transplant (H)      !! insulin aspart (NOVOLOG PEN) 100 UNIT/ML pen Inject 1-7 Units Subcutaneous at bedtime HIGH INSULIN RESISTANCE DOSING  Do Not give Bedtime Correction Insulin if BG less than 200. For  - 224 give 1 units. For  - 249 give 2 units. For  - 274 give 3 units. For  - 299 give 4 units. For  - 324 give 5 units. For  - 349 give 6 units. For BG greater than or equal to 350 give 7 units. Notify provider if glucose greater than or equal to 350 mg/dL after administration of correction dose.  Qty: 15 mL, Refills: 1    Associated Diagnoses: Steroid-induced hyperglycemia      !! insulin aspart (NOVOLOG PEN) 100 UNIT/ML pen Inject 6 Units Subcutaneous 3 times daily (with meals)  Qty: 3 mL, Refills: 1    Associated Diagnoses: Steroid-induced hyperglycemia      !! insulin aspart (NOVOLOG PEN) 100 UNIT/ML pen Inject 1-10 Units Subcutaneous 3 times daily (before meals) Correction Scale - MEDIUM INSULIN RESISTANCE DOSING   Do Not give Correction Insulin if BG < 140. For  - 189 give 1 unit. For  - 239 give 2 units. For  - 289 give 3 units. For  - 339 give 4 units. For BG = or > 340 give 5 units. Check blood glucose before meals/bolus feedings (or q4h if NPO) and administer based on blood  glucose. Notify MD if glucose > or = 350 mg/dL after administration of correction dose.  Qty: 15 mL, Refills: 1    Associated Diagnoses: Steroid-induced hyperglycemia      insulin glargine (LANTUS PEN) 100 UNIT/ML pen Inject 25 Units Subcutaneous daily  Qty: 15 mL, Refills: 1    Comments: If Lantus is not covered by insurance, may substitute Basaglar or Semglee or other insulin glargine product per insurance preference at same dose and frequency.  Associated Diagnoses: Steroid-induced hyperglycemia      !! insulin pen needle (32G X 4 MM) 32G X 4 MM miscellaneous Use as directed by provider Per insurance coverage  Qty: 200 each, Refills: 1    Associated Diagnoses: Drug-induced hyperglycemia      magnesium oxide (MAG-OX) 400 MG tablet Take 1 tablet (400 mg) by mouth 2 times daily  Qty: 60 tablet, Refills: 3    Associated Diagnoses: S/P liver transplant (H)      melatonin 5 MG tablet Take 1 tablet (5 mg) by mouth nightly as needed for sleep    Associated Diagnoses: S/P liver transplant (H)      methocarbamol (ROBAXIN) 500 MG tablet Take 1 tablet (500 mg) by mouth 4 times daily as needed for muscle spasms  Qty: 15 tablet, Refills: 0    Associated Diagnoses: S/P liver transplant (H)      mycophenolate (GENERIC EQUIVALENT) 250 MG capsule Take 3 capsules (750 mg) by mouth 2 times daily  Qty: 180 capsule, Refills: 5    Associated Diagnoses: Liver transplant candidate      oxyCODONE (ROXICODONE) 5 MG tablet 0.5-1 tablets (2.5-5 mg) by Oral or Feeding Tube route every 6 hours as needed for severe pain  Qty: 8 tablet, Refills: 0    Associated Diagnoses: S/P liver transplant (H)      pantoprazole (PROTONIX) 40 MG EC tablet Take 1 tablet (40 mg) by mouth daily  Qty: 30 tablet, Refills: 2    Associated Diagnoses: S/P liver transplant (H)      polyethylene glycol (MIRALAX) 17 GM/Dose powder Take 17 g by mouth daily    Associated Diagnoses: S/P liver transplant (H)      predniSONE (DELTASONE) 5 MG tablet Take 1 tablet (5 mg) by  mouth daily  Qty: 30 tablet, Refills: 2    Associated Diagnoses: S/P liver transplant (H)      sennosides (SENOKOT) 8.6 MG tablet 1 tablet by Oral or Feeding Tube route 2 times daily    Associated Diagnoses: S/P liver transplant (H)      Sharps Container MISC Use as directed to dispose of needles, lancets and other sharps  Qty: 1 each, Refills: 1    Associated Diagnoses: Drug-induced hyperglycemia      sulfamethoxazole-trimethoprim (BACTRIM) 400-80 MG tablet Take 1 tablet by mouth daily  Qty: 30 tablet, Refills: 5    Associated Diagnoses: S/P liver transplant (H)      !! tacrolimus (GENERIC EQUIVALENT) 0.5 MG capsule Tacrolimus 0.5 mg capsules BID to allow for dose adjustments.  Qty: 60 capsule, Refills: 11    Associated Diagnoses: S/P liver transplant (H)      !! tacrolimus (GENERIC EQUIVALENT) 1 MG capsule Take 4 capsules (4 mg) by mouth 2 times daily  Qty: 240 capsule, Refills: 11    Associated Diagnoses: Liver transplant candidate      ursodiol (ACTIGALL) 300 MG capsule Take 1 capsule (300 mg) by mouth 2 times daily  Qty: 60 capsule, Refills: 3    Associated Diagnoses: Liver transplant candidate      valGANciclovir (VALCYTE) 450 MG tablet Take 1 tablet (450 mg) by mouth daily  Qty: 30 tablet, Refills: 5    Associated Diagnoses: Liver transplant candidate       !! - Potential duplicate medications found. Please discuss with provider.        CONTINUE these medications which have NOT CHANGED    Details   fluticasone (FLONASE) 50 MCG/ACT nasal spray Spray 1 spray into both nostrils daily as needed for allergies or rhinitis      !! ULTICARE MINI 31G X 6 MM insulin pen needle Inject 1 each Subcutaneous At Bedtime       !! - Potential duplicate medications found. Please discuss with provider.        STOP taking these medications       atenolol (TENORMIN) 25 MG tablet Comments:   Reason for Stopping:         CALCIUM PO Comments:   Reason for Stopping:         gabapentin (NEURONTIN) 600 MG tablet Comments:   Reason for  Stopping:         hydrochlorothiazide (HYDRODIURIL) 25 MG tablet Comments:   Reason for Stopping:         lactulose 20 GM/30ML solution Comments:   Reason for Stopping:         Magnesium 400 MG TABS Comments:   Reason for Stopping:         metFORMIN (GLUCOPHAGE XR) 500 MG 24 hr tablet Comments:   Reason for Stopping:         OZEMPIC, 0.25 OR 0.5 MG/DOSE, 2 MG/3ML pen Comments:   Reason for Stopping:         Vitamin D3 (CHOLECALCIFEROL) 125 MCG (5000 UT) tablet Comments:   Reason for Stopping:                  Home Care Referral      Adult Endocrinology  Referral      Reason for your hospital stay     donor liver transplant with biliary stent placement. Diabetes type 2 and steroid hyperglycemia, insulin started this admission.     Activity    Your activity upon discharge: Walk at least four times a day, lift no greater than 10 pounds for 6-8 weeks from the time of surgery.  No driving while taking narcotics or 3 weeks after surgery.     Adult [facility] Follow-up and recommended labs and tests    [facility] FOLLOW UP:   1. Follow up in Transplant Clinic weekly for the next 5 weeks with Dr. Radu Leonard (or any available liver transplant surgeon).   2. Follow up with Transplant Hepatology in 3 months.   3. Abdominal x-ray 3 months post-transplant to evaluate for biliary stent.  4. HCC. Follow up with Oncology in 3 months.   5. Follow up with [facility] endocrine team for diabetes management in 1-2 weeks.  6. Follow up with primary care provider in 4-8 weeks  Call your Transplant Coordinator ([phone]) with questions about Transplant Center appointment scheduling.     LABS:   CBC, BMP, magnesium, phosphorus, hepatic panel, tacrolimus level every Monday and Thursday by home health care nurse if arranged, or at an outpatient lab.     When to contact your care team    WHEN TO CONTACT YOUR  COORDINATOR:     Transplant Coordinator: Clair Meza, phone: [phone]     Notify your  coordinator if you have pain over your liver, increased redness or drainage from your incision, fever greater than 100F, or yellowing of skin or eyes.     Notify your coordinator immediately if you are ever unable to take your immunosuppressive medications for any reason.     If you have URGENT concerns after office hours, please call the hospital switchboard at [phone] and ask to have the organ transplant nurse on-call paged. If you have a life-threatening emergency, go to the nearest emergency room.     Wound care and dressings    Instructions to care for your wound at home: If you have staples in place, they will be removed in 3 weeks after operation. Wash incision daily with soap and water. Do not soak or scrub.     Monitor and record    blood glucose 4 times a day, before meals and at bedtime  blood pressure daily  weight daily if possible     Diet    Follow this diet upon discharge: Diet recommendations post-transplant: Heart healthy dietary habits long term (low saturated/trans fat, low sodium). High protein diet x 8 weeks. Practice food safety precautions.         Data   Most Recent 3 CBC's:  Recent Labs   Lab Test 07/05/24 0557 07/04/24  0558 07/03/24  0627   WBC 7.8 9.3 8.9   HGB 9.9* 10.5* 9.3*   MCV 88 86 84    254 144*      Most Recent 3 BMP's:  Recent Labs   Lab Test 07/05/24  1225 07/05/24  0916 07/05/24  0557 07/04/24  0747 07/04/24  0558 07/03/24  0749 07/03/24  0627   NA  --   --  137  --  138  --  139   POTASSIUM  --   --  4.6  --  4.5  --  4.1   CHLORIDE  --   --  105  --  104  --  107   CO2  --   --  25  --  26  --  25   BUN  --   --  11.7  --  11.7  --  11.5   CR  --   --  0.81  --  0.81  --  0.69   ANIONGAP  --   --  7  --  8  --  7   CHUCK  --   --  8.8  --  9.3  --  8.5*   * 165* 165*   < > 159*   < > 144*    < > = values in this interval not displayed.     Most Recent 2 LFT's:  Recent Labs   Lab Test 07/05/24  0557 07/04/24  0558   AST 41 35   ALT 70 65   ALKPHOS 103 91    BILITOTAL 1.7* 1.7*     Most Recent INR's and Anticoagulation Dosing History:  Anticoagulation Dose History  More data exists         Latest Ref Rng & Units 12/14/2023 2/29/2024 6/26/2024 6/27/2024 6/28/2024 6/29/2024 6/30/2024   Recent Dosing and Labs   INR 0.85 - 1.15 1.25  1.2  1.24  1.73  1.83  2.92  1.70  1.52  1.34       Details          Multiple values from one day are sorted in reverse-chronological order             Most Recent 3 Troponin's:No lab results found.  Most Recent Cholesterol Panel:  Recent Labs   Lab Test 06/20/23  0813   CHOL 140   LDL 63   HDL 57   TRIG 102     Most Recent 6 Bacteria Isolates From Any Culture (See EPIC Reports for Culture Details):No lab results found.  Most Recent TSH, T4 and A1c Labs:  Recent Labs   Lab Test 06/27/24  1807 06/20/23  0813   TSH  --  2.37   A1C 6.1* 7.0*     Attestation:    The patient has been seen with team and evaluated by me. <30 min planning discharge  Vital signs, labs, medications and orders were reviewed.   When obtained, diagnostic images were reviewed by me and interpreted as above.    The care plan was discussed with the multidisciplinary team and I agree with the findings and plan in this note, with any differences recorded in blue.    Immunosuppressive medication management was reviewed and adjusted as reflected in the note and orders.       Delta Palmer MD  Professor of Surgery

## 2024-07-03 NOTE — PROGRESS NOTES
CLINICAL NUTRITION SERVICES - REASSESSMENT NOTE     Nutrition Prescription    Malnutrition Status:    Moderate malnutrition in the context of acute illness    Future/Additional Recommendations:  -- monitor oral intake of meals and supplements      EVALUATION OF THE PROGRESS TOWARD GOALS   Diet: Regular with ensure enlive BID  Intake: West reports he is drinking the ensure enlive and trying to eat his meals when they arrive from foodservice. He reports his appetite is improving and he was hungry this morning.        NEW FINDINGS   Labs (7/3): Mg++ 1.6 mg/dL (L), T.Bili 1.4 mg/dL (H)    Meds:   Insulin (novolog - rapid acting)  Insulin NPH  Mag Ox  Miralax  Senokot    GI: LBM (7/1)    Weight: weight remains elevated since dosing weight.     MALNUTRITION  % Intake: < 75% for > 7 days (moderate)  % Weight Loss: Weight loss does not meet criteria  Subcutaneous Fat Loss: Facial region:  mild, Upper arm:  mild, Lower arm:  mild, and Thoracic/intercostal:  mild to moderate   Muscle Loss: Temporal:  mild, Facial & jaw region:  mild, Thoracic region (clavicle, acromium bone, deltoid, trapezius, pectoral):  moderate, Upper arm (bicep, tricep):  mild, Dorsal hand:  mild, and Upper leg (quadricep, hamstring):  moderate   Fluid Accumulation/Edema: None noted  Malnutrition Diagnosis: Moderate malnutrition in the context of acute illness    Previous Goals   Diet adv v nutrition support within 2-3 days   Evaluation: Met    Previous Nutrition Diagnosis  Inadequate protein-energy intake   Evaluation: Improving    CURRENT NUTRITION DIAGNOSIS  Inadequate oral intake related to decreased appetite, s/p SOT surgery as evidenced by patient's self report, variable oral intake per nursing documentation       INTERVENTIONS  Implementation  Nutrition Education:  1. Provided instruction on post-transplant diet with discussion regarding protein sources and high protein needs in acute post-tx phase.  Reviewed recommendations to follow low fat/low  sodium diet long term and discussed heart healthy diet tips.  Discussed monitoring of K+/Phos lab values with possible need for adjustment of these in the diet as necessary. Reviewed need for food safety precautions to prevent food borne illness.    2. Provided & reviewed handout: Post-transplant diet guidelines. Patient receptive to information provided. Expected diet compliance is good.     Goals  Patient to consume % of nutritionally adequate meal trays TID, or the equivalent with supplements/snacks.    Monitoring/Evaluation  Progress toward goals will be monitored and evaluated per protocol.    Aylin Varma MS/RD/LD/CNSC  Available on Done In :60 Seconds   M-F (7am-3:30pm) - 7A/7B Clinical Dietitian  Weekend/Holiday Dietitian (7am-3:30pm)    ** Clinical Dietitians no longer available on pager

## 2024-07-03 NOTE — PLAN OF CARE
Problem: Adult Inpatient Plan of Care  Goal: Optimal Comfort and Wellbeing  Outcome: Progressing  Intervention: Provide Person-Centered Care  Recent Flowsheet Documentation  Taken 7/2/2024 1900 by Hailee Noble, RN  Trust Relationship/Rapport: care explained  Taken 7/2/2024 1300 by Hailee Noble, RN  Trust Relationship/Rapport: care explained   Goal Outcome Evaluation:

## 2024-07-03 NOTE — PLAN OF CARE
BP (!) 119/93 (BP Location: Right arm)   Pulse 80   Temp 98  F (36.7  C) (Oral)   Resp 16   Wt 95 kg (209 lb 6.4 oz)   SpO2 98%   BMI 26.89 kg/m      Shift: 7027-1665  Isolation Status: None  VS: HTN on RA, afebrile  Neuro: Aox4  Behaviors: Calm and cooperative  BG: ACHS & 2am, carb coverage, 156, 220 & 175  Labs: Cr: 0.69, K+: 4.1, Ma.6, Phos: 3.9, WBC: 8.9, Hb.3  Respiratory: WDL  Cardiac: WDL  Pain/Nausea: Denies nausea, pain managed with scheduled meds & PRNs  PRN: Robaxin x1, Tylenol x1  Diet: Regular  IV Access: L PIV SL  Infusion(s): None  Lines/Drains: MANUEL PRICE pulled 7/3/2024 - leaking at site, team aware  GI/: Voiding spontaneously without difficulty, last BM 7/3/2024  Skin: Clamshell incision, stapled & MANUEL LOAIZA JP site leaking dressing replaced x2  Mobility: SBA/Independent in room  Plan: Continue POC

## 2024-07-03 NOTE — PROGRESS NOTES
Transplant Social Work Services Progress Note      Date of Initial Social Work Evaluation: 2023   Collaborated with: Liver Transplant team    Data: West underwent a  donor liver transplant on 2024.   Intervention: I consulted with [facility] rehab admissions (West Thompson).  I met with patient's parents and provided an update on West's discharge plan.  Assessment: West is medically stable for discharge, awaiting placement at our acute rehab.  Education provided by :  coverage for Friday/weekend  Plan:    Discharge Plans in Progress: [facility] Acute Rehab    Barriers to d/c plan: bed availability at [facility] and insurance authorization    Follow up Plan: GREGORY Gutierrez will follow up on Friday.       MIN Zhou, [facility] Center[facility]  Liver Transplant   Phone [phone]

## 2024-07-03 NOTE — PROGRESS NOTES
Inpatient Diabetes Management Service: Daily Progress Note     HPI:  48 year old male with PMHx of hepatocellcular carcinoma s/p liver ablation 2022 and steatohepatitis, neuroendocrine tumor s/p right lobectomy, and DM2, s/p  donor liver transplant w/ bilary stent  with Dr. Dodson on 24.      IDS consulted to assist with glycemic management.          Assessment/Plan:      Assessment:      Type 2 Diabetes Mellitus, without known complications, fair control  (A1c 6.1 %) in presence of complex illness (no anemia at time of A1c).  Acute hyperglycemia 2/2 steroids/stress  S/p  donor liver transplant w/ bilary stent  with Dr. Dodson on 24.  Anemia  BMI:  28  Anemia       Plan/Recommendations:  Please notify Inpatient Diabetes Service if changes are planned to steroids, nutrition, TPN/TF and anticipated procedures requiring prolonged NPO status.       - Reduce to NPH 18 unit(s) at 0800, planning for 20 unit(s) tomorrow.   - NPH 10 unit(s) at 1800   - Novolog Meal Coverage: 1 units per 6 g CHO, TID AC and PRN with snacks/supplements - then discontinue    - Novolog Correction Scale: high resistance (ISF: 25)  TID AC >140 mg/dL and >200 at HS / 0200 - then reduce to   - BG monitoring: TID AC, HS, 0200  - Holding PTA: Metformin/Ozempic     - Hypoglycemia protocol    - Carb counting protocol recommended  - IDS to follow at ARU.  Please do not hesitate to contact the team with any questions/concerns. First call after hours is to primary service.       Discussion:     Patient is doing well, eating well and getting up and ambulating with therapies.     He has not done insulin in the past so will need education.     Will trend today and for ease, would probably do better on once daily Lantus now that the large tapers are done with the steroids. Appears he will maintain on Pred 5 mg - confirmed this will be x 3 months.     Team in AM can determine what the once daily  Lantus dosing will ultimately be but likely close to 15-20 unit(s).     He will be ready for ARU 7/4.         Tentative Discharge Planning - to be finalized at/near day of discharge:    Medications: TBD  likely lantus/novolog  +/- NPH, recommend resumption of PTA meds, outpatient in the future.    Test Claims: completed see CELE Murdock's notes   Education: Needs to be assessed closer to discharge.    Outpatient Follow-up:  recommend [facility] Endocrinology vs PCP        Please allow for 24 hours when asking for final discharge recommendations.  Requests made less than this cannot be guaranteed to be done on an urgent/emergent basis, especially last minute for lengthy/complex steroid tapers.          Interval History/Review of Systems :   The last 24 hours progress and nursing notes reviewed.    - no focal complaints - feels weakness is improving   - spoke with DM educator who will meet with later today if needed but if going to ARU then can wait.     He is feeling overwhelmed at time with the amount of people who come in to see him so spaced out is better.      Inpatient Glucose Control:           Planned Procedures/Surgeries: none         Medications Impacting Glycemia:   Steroids: yes  Pred 5 mg qAM starting 7/3 x 3 months     D5W containing solutions/medications: no  Other medications/factors impacting glucose:  no         Nutrition:   Orders Placed This Encounter      Regular Diet Adult    Supplements:  Ensure Enlive between meals at 10 am and 2 pm   TF: no  TPN: no          Diabetes History: see full consult note for complete diabetes history   Diabetes Type and Duration: T2DM diagnosed 2018 with A1c of 6.5%  Started on Metformin 1000 mg BID - this was too much resulting in GI distress and was reduced which he tolerates.      PTA Medication Regimen:   Metformin 500 mg BID  Ozempic 0.5 mg weekly on Mondays     Historical Diabetes Medications:   No other meds and never on insulin in the past      Glucose monitoring  device and frequency: glucometer   Outpatient Diabetes Provider: PCP  Formal Diabetes Education/Educator: yes in the past         Physical Exam:   /82 (BP Location: Right arm)   Pulse 100   Temp 98.1  F (36.7  C) (Oral)   Resp 16   Wt 97 kg (213 lb 12.8 oz)   SpO2 98%   BMI 27.45 kg/m    General:  stable appearing, in no acute distress.  Has been up walking in halls - pale.   HEENT:  NC/AT. MMM, sclera not injected, hearing intact  Lungs:  unremarkable, no new cough, no SOB  ABD:  rounded  Skin:  warm and dry, no obvious lesions  Feet:   +  CMS intact  MSK:   moving all extremities  Lymp:   so sig LE edema  Mental Status:  Alert and oriented x3  Psych:   Cooperative, good eye contact, full/appropriate affect         Data:     Lab Results   Component Value Date    A1C 6.1 06/27/2024    A1C 7.0 06/20/2023       ROUTINE IP LABS (Last four results)  BMP  Recent Labs   Lab 07/03/24  0153 07/02/24  2252 07/02/24  1833 07/02/24  1805 07/02/24  0706 07/02/24  0640 07/01/24  0559 07/01/24  0553 06/30/24  0733 06/30/24  0542 06/29/24  0306 06/29/24  0302   NA  --   --   --   --   --  141  --  142  --  142  --  143   POTASSIUM  --   --   --   --   --  3.6  --  3.9  --  4.4  --  3.8   CHLORIDE  --   --   --   --   --  109*  --  110*  --  109*  --  108*   CHUCK  --   --   --   --   --  8.4*  --  8.2*  --  8.5*  --  8.1*   CO2  --   --   --   --   --  24  --  26  --  28  --  29   BUN  --   --   --   --   --  15.0  --  20.6*  --  21.8*  --  16.2   CR  --   --   --   --   --  0.70  --  0.77  --  0.71  --  0.78   * 141* 235* 216*   < > 120*   < > 111*   < > 121*   < > 163*    < > = values in this interval not displayed.     CBC  Recent Labs   Lab 07/02/24  0640 07/01/24  0553 06/30/24  0542 06/29/24  0302   WBC 7.7 9.7 17.1* 14.1*   RBC 3.18* 3.07* 3.27* 3.21*   HGB 9.5* 8.9* 9.7* 9.5*   HCT 27.0* 27.1* 28.0* 27.3*   MCV 85 88 86 85   MCH 29.9 29.0 29.7 29.6   MCHC 35.2 32.8 34.6 34.8   RDW 13.0 13.2 13.5 13.7   PLT  124* 129* 150 125*     INR  Recent Labs   Lab 06/30/24  0542 06/29/24  0302 06/28/24  0408 06/27/24  1945   INR 1.34* 1.52* 1.70* 1.73*       Plan discussed with patient, bedside RN, and primary team and educator.    VALENTINA Nails CNP, BC-ADM   Inpatient Diabetes Service  Available on [employer] - when on duty.     To contact Inpatient Diabetes Service:     7 AM - 5 PM: Page the IDS JAVIER following the patient that day (see filed or incomplete progress notes/consult notes under Endocrinology)    OR if uncertain of provider assignment: page job code [phone]    5 PM - 7 AM: First call after hours is to primary service.    For urgent after-hours questions, page job code for on call fellow: [phone]     I spent a total of 50 minutes on the date of the encounter doing prep/post-work, chart review, history and exam, documentation and further activities per the note including lab review, multidisciplinary communication, counseling the patient and/or coordinating care regarding acute hyper/hypoglycemic management, as well as discharge management and planning/communication.  See note for details.      Please notify inpatient diabetes service if changes are planned to steroids, nutrition, or if procedures are planned requiring prolonged NPO status.Diabetes Management Team job code: [phone]

## 2024-07-04 LAB
ALBUMIN SERPL BCG-MCNC: 3.3 G/DL (ref 3.5–5.2)
ALP SERPL-CCNC: 91 U/L (ref 40–150)
ALT SERPL W P-5'-P-CCNC: 65 U/L (ref 0–70)
ANION GAP SERPL CALCULATED.3IONS-SCNC: 8 MMOL/L (ref 7–15)
AST SERPL W P-5'-P-CCNC: 35 U/L (ref 0–45)
BASOPHILS # BLD MANUAL: 0 10E3/UL (ref 0–0.2)
BASOPHILS NFR BLD MANUAL: 0 %
BILIRUB SERPL-MCNC: 1.7 MG/DL
BUN SERPL-MCNC: 11.7 MG/DL (ref 6–20)
CALCIUM SERPL-MCNC: 9.3 MG/DL (ref 8.6–10)
CHLORIDE SERPL-SCNC: 104 MMOL/L (ref 98–107)
CREAT SERPL-MCNC: 0.81 MG/DL (ref 0.67–1.17)
DEPRECATED HCO3 PLAS-SCNC: 26 MMOL/L (ref 22–29)
EGFRCR SERPLBLD CKD-EPI 2021: >90 ML/MIN/1.73M2
EOSINOPHIL # BLD MANUAL: 0.2 10E3/UL (ref 0–0.7)
EOSINOPHIL NFR BLD MANUAL: 2 %
ERYTHROCYTE [DISTWIDTH] IN BLOOD BY AUTOMATED COUNT: 14.8 % (ref 10–15)
GLUCOSE BLDC GLUCOMTR-MCNC: 128 MG/DL (ref 70–99)
GLUCOSE BLDC GLUCOMTR-MCNC: 166 MG/DL (ref 70–99)
GLUCOSE BLDC GLUCOMTR-MCNC: 182 MG/DL (ref 70–99)
GLUCOSE BLDC GLUCOMTR-MCNC: 187 MG/DL (ref 70–99)
GLUCOSE BLDC GLUCOMTR-MCNC: 249 MG/DL (ref 70–99)
GLUCOSE SERPL-MCNC: 159 MG/DL (ref 70–99)
HCT VFR BLD AUTO: 30.2 % (ref 40–53)
HGB BLD-MCNC: 10.5 G/DL (ref 13.3–17.7)
LYMPHOCYTES # BLD MANUAL: 1.3 10E3/UL (ref 0.8–5.3)
LYMPHOCYTES NFR BLD MANUAL: 14 %
MAGNESIUM SERPL-MCNC: 1.6 MG/DL (ref 1.7–2.3)
MCH RBC QN AUTO: 29.8 PG (ref 26.5–33)
MCHC RBC AUTO-ENTMCNC: 34.8 G/DL (ref 31.5–36.5)
MCV RBC AUTO: 86 FL (ref 78–100)
MONOCYTES # BLD MANUAL: 0.8 10E3/UL (ref 0–1.3)
MONOCYTES NFR BLD MANUAL: 9 %
NEUTROPHILS # BLD MANUAL: 7 10E3/UL (ref 1.6–8.3)
NEUTROPHILS NFR BLD MANUAL: 75 %
PHOSPHATE SERPL-MCNC: 4.5 MG/DL (ref 2.5–4.5)
PLAT MORPH BLD: ABNORMAL
PLATELET # BLD AUTO: 254 10E3/UL (ref 150–450)
POLYCHROMASIA BLD QL SMEAR: SLIGHT
POTASSIUM SERPL-SCNC: 4.5 MMOL/L (ref 3.4–5.3)
PROT SERPL-MCNC: 5.4 G/DL (ref 6.4–8.3)
RBC # BLD AUTO: 3.52 10E6/UL (ref 4.4–5.9)
RBC MORPH BLD: ABNORMAL
SODIUM SERPL-SCNC: 138 MMOL/L (ref 135–145)
TACROLIMUS BLD-MCNC: 8.5 UG/L (ref 5–15)
TME LAST DOSE: NORMAL H
TME LAST DOSE: NORMAL H
WBC # BLD AUTO: 9.3 10E3/UL (ref 4–11)

## 2024-07-04 PROCEDURE — 84100 ASSAY OF PHOSPHORUS: CPT | Performed by: STUDENT IN AN ORGANIZED HEALTH CARE EDUCATION/TRAINING PROGRAM

## 2024-07-04 PROCEDURE — 250N000013 HC RX MED GY IP 250 OP 250 PS 637

## 2024-07-04 PROCEDURE — 250N000013 HC RX MED GY IP 250 OP 250 PS 637: Performed by: NURSE PRACTITIONER

## 2024-07-04 PROCEDURE — 85007 BL SMEAR W/DIFF WBC COUNT: CPT | Performed by: NURSE PRACTITIONER

## 2024-07-04 PROCEDURE — 120N000011 HC R&B TRANSPLANT UMMC

## 2024-07-04 PROCEDURE — 99232 SBSQ HOSP IP/OBS MODERATE 35: CPT | Mod: 24

## 2024-07-04 PROCEDURE — 250N000013 HC RX MED GY IP 250 OP 250 PS 637: Performed by: SURGERY

## 2024-07-04 PROCEDURE — 83735 ASSAY OF MAGNESIUM: CPT | Performed by: STUDENT IN AN ORGANIZED HEALTH CARE EDUCATION/TRAINING PROGRAM

## 2024-07-04 PROCEDURE — 36415 COLL VENOUS BLD VENIPUNCTURE: CPT | Performed by: NURSE PRACTITIONER

## 2024-07-04 PROCEDURE — 85027 COMPLETE CBC AUTOMATED: CPT | Performed by: NURSE PRACTITIONER

## 2024-07-04 PROCEDURE — 80053 COMPREHEN METABOLIC PANEL: CPT | Performed by: NURSE PRACTITIONER

## 2024-07-04 PROCEDURE — 250N000012 HC RX MED GY IP 250 OP 636 PS 637: Performed by: NURSE PRACTITIONER

## 2024-07-04 PROCEDURE — 80197 ASSAY OF TACROLIMUS: CPT | Performed by: NURSE PRACTITIONER

## 2024-07-04 PROCEDURE — 250N000012 HC RX MED GY IP 250 OP 636 PS 637: Performed by: STUDENT IN AN ORGANIZED HEALTH CARE EDUCATION/TRAINING PROGRAM

## 2024-07-04 PROCEDURE — 250N000013 HC RX MED GY IP 250 OP 250 PS 637: Performed by: STUDENT IN AN ORGANIZED HEALTH CARE EDUCATION/TRAINING PROGRAM

## 2024-07-04 RX ADMIN — GABAPENTIN 600 MG: 600 TABLET, FILM COATED ORAL at 13:31

## 2024-07-04 RX ADMIN — SENNOSIDES 8.6 MG: 8.6 TABLET, FILM COATED ORAL at 07:40

## 2024-07-04 RX ADMIN — NYSTATIN 500000 UNITS: 100000 SUSPENSION ORAL at 11:09

## 2024-07-04 RX ADMIN — URSODIOL 300 MG: 300 CAPSULE ORAL at 07:38

## 2024-07-04 RX ADMIN — ASPIRIN 81 MG CHEWABLE TABLET 81 MG: 81 TABLET CHEWABLE at 07:38

## 2024-07-04 RX ADMIN — METHOCARBAMOL 500 MG: 500 TABLET ORAL at 11:09

## 2024-07-04 RX ADMIN — NYSTATIN 500000 UNITS: 100000 SUSPENSION ORAL at 17:49

## 2024-07-04 RX ADMIN — MAGNESIUM OXIDE TAB 400 MG (241.3 MG ELEMENTAL MG) 400 MG: 400 (241.3 MG) TAB at 11:09

## 2024-07-04 RX ADMIN — TACROLIMUS 4 MG: 1 CAPSULE ORAL at 07:38

## 2024-07-04 RX ADMIN — Medication 5 MG: at 21:49

## 2024-07-04 RX ADMIN — NYSTATIN 500000 UNITS: 100000 SUSPENSION ORAL at 07:38

## 2024-07-04 RX ADMIN — URSODIOL 300 MG: 300 CAPSULE ORAL at 20:27

## 2024-07-04 RX ADMIN — CARVEDILOL 3.12 MG: 3.12 TABLET, FILM COATED ORAL at 17:50

## 2024-07-04 RX ADMIN — SULFAMETHOXAZOLE AND TRIMETHOPRIM 1 TABLET: 400; 80 TABLET ORAL at 07:38

## 2024-07-04 RX ADMIN — VALGANCICLOVIR HYDROCHLORIDE 450 MG: 450 TABLET ORAL at 20:27

## 2024-07-04 RX ADMIN — GABAPENTIN 600 MG: 600 TABLET, FILM COATED ORAL at 20:27

## 2024-07-04 RX ADMIN — GABAPENTIN 600 MG: 600 TABLET, FILM COATED ORAL at 07:38

## 2024-07-04 RX ADMIN — POLYETHYLENE GLYCOL 3350 17 G: 17 POWDER, FOR SOLUTION ORAL at 07:38

## 2024-07-04 RX ADMIN — MYCOPHENOLATE MOFETIL 750 MG: 250 CAPSULE ORAL at 07:38

## 2024-07-04 RX ADMIN — TACROLIMUS 4 MG: 1 CAPSULE ORAL at 17:50

## 2024-07-04 RX ADMIN — MAGNESIUM OXIDE TAB 400 MG (241.3 MG ELEMENTAL MG) 400 MG: 400 (241.3 MG) TAB at 20:27

## 2024-07-04 RX ADMIN — ACETAMINOPHEN 650 MG: 325 TABLET, FILM COATED ORAL at 11:09

## 2024-07-04 RX ADMIN — MYCOPHENOLATE MOFETIL 750 MG: 250 CAPSULE ORAL at 17:50

## 2024-07-04 RX ADMIN — PREDNISONE 5 MG: 5 TABLET ORAL at 07:38

## 2024-07-04 RX ADMIN — NYSTATIN 500000 UNITS: 100000 SUSPENSION ORAL at 20:27

## 2024-07-04 RX ADMIN — CARVEDILOL 3.12 MG: 3.12 TABLET, FILM COATED ORAL at 07:38

## 2024-07-04 RX ADMIN — PANTOPRAZOLE SODIUM 40 MG: 40 TABLET, DELAYED RELEASE ORAL at 07:38

## 2024-07-04 ASSESSMENT — ACTIVITIES OF DAILY LIVING (ADL)
ADLS_ACUITY_SCORE: 32
ADLS_ACUITY_SCORE: 31
ADLS_ACUITY_SCORE: 32
ADLS_ACUITY_SCORE: 31
ADLS_ACUITY_SCORE: 32
ADLS_ACUITY_SCORE: 32
ADLS_ACUITY_SCORE: 31
ADLS_ACUITY_SCORE: 31
ADLS_ACUITY_SCORE: 32
ADLS_ACUITY_SCORE: 31
ADLS_ACUITY_SCORE: 32
ADLS_ACUITY_SCORE: 31
ADLS_ACUITY_SCORE: 32

## 2024-07-04 NOTE — PLAN OF CARE
/83 (BP Location: Right arm)   Pulse 80   Temp 98.1  F (36.7  C) (Oral)   Resp 16   Wt 93.4 kg (205 lb 14.4 oz)   SpO2 100%   BMI 26.44 kg/m      Shift: [id number]  Isolation Status: none  VS: Stable on RA, afebrile  Neuro: Aox4  Behaviors: Calm and cooperative  BG: ACHS & 2am, carb coverage, 182, 249, 187  Labs: Cr: 0.81, K+: 4.5, Ma.6, Phos: 4.5, WBC: 9.3, Hbg: 10.5  Respiratory: WDL  Cardiac: WDL  Pain/Nausea: Denies nausea, pain managed with PRNs and scheduled meds  PRN: Robaxin x1, Tylenol x1  Diet: Regular  IV Access: L PIV SL  Infusion(s): None  Lines/Drains: None  GI/: Voiding spontaneously without concerns, last BM 2024  Skin: Clamshell incision, stapled & FADIA R DEE site leaking   Mobility: Independent  Events/Education: Spoke with Pt about watching transplant videos  Plan: Continue POC

## 2024-07-04 NOTE — PROGRESS NOTES
Transplant Surgery  Inpatient Daily Progress Note  2024    Assessment & Plan: 48 year old male with PMHx of hepatocellcular carcinoma s/p liver ablation 2022 and steatohepatitis, neuroendocrine tumor s/p right lobectomy, and DM2, s/p  donor liver transplant w/ bilary stent  with Dr. Dodson on 24.     Graft function: POD #7. Transaminases downtrending, Tbili up 1.4->1.7. Post op US with patent vasculature.    - Continue ursodiol 300 mg BID and ASA 81mg daily   - If Tbili remains high next week, consider biopsy or imaging.    Immunosuppression management:   Induction: Steroid taper per protocol.  Maintenance:    -  mg BID    - Tacrolimus goal level 8-12.    - Prednisone 5 mg daily x 3 months following taper.    Neurology:   Acute pain: Controlled with PRN oxycodone (weaning), Tylenol, Robaxin, and PTA gabapentin.    Hematology:   Acute blood loss anemia: Hgb ~10, stable. Monitor.     Cardiorespiratory:   HTN: Stopped atenolol due to high risk of post-transplant CKD. Continue Coreg.  Hx Neuroendocrine tumor s/p right lobectomy: Continue IS.    GI/Nutrition:   Moderate malnutrition in the context of chronic illness: Nutrition consulted. Continue regular diet with supplements.     Endocrine:   DM2, Steroid induced hyperglycemia: A1c 6.1%. Holding PTA metformin, ozempic.   Endocrine consulted. On NPH and sliding scale insulin.    Fluid/Electrolytes:   Hypomagnesemia: Continue Mag-Ox 400 mg BID.     :   History of post-op urinary retention: Flomax started. PVR 7 mL.     Infectious disease: No acute concerns.    Prophylaxis: DVT (mechanical), GI (PPI), viral (Valcyte), PJP (Bactrim), fungal (Nystatin)     Disposition: 7A, discharge to ARU when bed available    JAVIER/Fellow/Resident Provider: Layne Bonilla NP [id number]    Faculty: Delta Palmer MD  __________________________________________________________________    Interval History:   Pain controlled, no complaints    ROS:   A 10-point review of  systems was negative except as noted above.    Curent Meds:  Current Facility-Administered Medications   Medication Dose Route Frequency Provider Last Rate Last Admin    aspirin (ASA) chewable tablet 81 mg  81 mg Oral or Feeding Tube Daily Radu Dodson MD   81 mg at 07/04/24 0738    carvedilol (COREG) tablet 3.125 mg  3.125 mg Oral BID w/meals Alona Yi NP   3.125 mg at 07/04/24 0738    gabapentin (NEURONTIN) tablet 600 mg  600 mg Oral TID Leticia Marks MD   600 mg at 07/04/24 0738    insulin aspart (NovoLOG) injection (RAPID ACTING)  1-10 Units Subcutaneous TID AC Genesis Worthington APRN CNP   2 Units at 07/04/24 0748    insulin aspart (NovoLOG) injection (RAPID ACTING)  1-7 Units Subcutaneous 2 times per day Genesis Worthington APRN CNP        insulin aspart (NovoLOG) injection (RAPID ACTING)   Subcutaneous TID w/meals Genesis Worthington APRN CNP   2 Units at 07/04/24 0832    insulin NPH injection 10 Units  10 Units Subcutaneous Q24H Genesis Worthington APRN CNP   10 Units at 07/03/24 1903    insulin NPH injection 20 Units  20 Units Subcutaneous Q24H Genesis Worthington APRN CNP   20 Units at 07/04/24 0740    magnesium oxide (MAG-OX) tablet 400 mg  400 mg Oral BID Alona Yi NP   400 mg at 07/03/24 1944    mycophenolate (GENERIC EQUIVALENT) capsule 750 mg  750 mg Oral BID IS Marquise Rendon MD   750 mg at 07/04/24 0738    nystatin (MYCOSTATIN) suspension 500,000 Units  500,000 Units Swish & Swallow 4x Daily Leticia Marks MD   500,000 Units at 07/04/24 0738    pantoprazole (PROTONIX) EC tablet 40 mg  40 mg Oral Daily Radu Dodson MD   40 mg at 07/04/24 0738    polyethylene glycol (MIRALAX) Packet 17 g  17 g Oral or Feeding Tube Daily Lakshmi Lo MD   17 g at 07/04/24 0738    predniSONE (DELTASONE) tablet 5 mg  5 mg Oral Daily Alona Yi NP   5 mg at 07/04/24 0738    sennosides (SENOKOT) tablet 8.6 mg  8.6 mg Oral or Feeding Tube BID Teresita,  MD Lakshmi   8.6 mg at 07/04/24 0740    sodium chloride (PF) 0.9% PF flush 3 mL  3 mL Intravenous Q8H Marquise Rendon MD   3 mL at 07/04/24 0558    sulfamethoxazole-trimethoprim (BACTRIM) 400-80 MG per tablet 1 tablet  1 tablet Oral Daily Lakshmi Lo MD   1 tablet at 07/04/24 0738    tacrolimus (GENERIC EQUIVALENT) capsule 4 mg  4 mg Oral BID IS Alona Yi NP   4 mg at 07/04/24 0738    ursodiol (ACTIGALL) capsule 300 mg  300 mg Oral BID Marquise Rendon MD   300 mg at 07/04/24 0738    valGANciclovir (VALCYTE) tablet 450 mg  450 mg Oral QPM Marquise Rendon MD   450 mg at 07/03/24 1944       Physical Exam:     Admit Weight: 92.7 kg (204 lb 4.8 oz)    Current Vitals:   BP (!) 130/93 (BP Location: Left arm)   Pulse 95   Temp 98.6  F (37  C) (Oral)   Resp 16   Wt 93.4 kg (205 lb 14.4 oz)   SpO2 98%   BMI 26.44 kg/m      Vital sign ranges:    Temp:  [97.7  F (36.5  C)-98.6  F (37  C)] 98.6  F (37  C)  Pulse:  [] 95  Resp:  [16-18] 16  BP: (116-133)/(75-93) 130/93  SpO2:  [98 %-100 %] 98 %  Patient Vitals for the past 24 hrs:   BP Temp Temp src Pulse Resp SpO2 Weight   07/04/24 0601 (!) 130/93 98.6  F (37  C) Oral 95 16 98 % 93.4 kg (205 lb 14.4 oz)   07/04/24 0206 119/75 97.7  F (36.5  C) Oral 95 16 99 % --   07/03/24 2148 133/87 97.7  F (36.5  C) Oral 100 16 100 % --   07/03/24 1812 116/81 97.9  F (36.6  C) Oral 113 18 99 % --     General: NAD  Neuro: A&Ox4  Respiratory: Unlabored, RA  Cardiovascular: Perfused  Gastrointestinal: Abdomen soft, non-distended, Jpx1 with serosanguinous, incision CDI  MSK/Extremities: No limb deformities. Minimal edema  Incisions/Skin: Incision CDI      Frailty Scores  More data may exist         1/1/2024 12/10/2023 12/7/2023 6/20/2023 6/13/2023   Frailty Scores   Final Score Not Frail Not Frail Not Frail Not Frail Not Frail Not Frail   Final Score Number 1 1 1 2 2 2      Details          Multiple values from one day are sorted in reverse-chronological order                Data:   [facility]  Recent Labs   Lab 07/04/24  0747 07/04/24  0558 07/03/24  0749 07/03/24  0627 06/30/24  0733 06/30/24  0542 06/29/24  0306 06/29/24  0302 06/28/24  0411 06/28/24  0408   NA  --  138  --  139   < > 142  --  143   < > 146*   POTASSIUM  --  4.5  --  4.1   < > 4.4  --  3.8   < > 3.0*   CHLORIDE  --  104  --  107   < > 109*  --  108*   < > 109*   CO2  --  26  --  25   < > 28  --  29   < > 27   * 159*   < > 144*   < > 121*   < > 163*   < > 150*   BUN  --  11.7  --  11.5   < > 21.8*  --  16.2   < > 13.2   CR  --  0.81  --  0.69   < > 0.71  --  0.78   < > 0.83   GFRESTIMATED  --  >90  --  >90   < > >90  --  >90   < > >90   CHUCK  --  9.3  --  8.5*   < > 8.5*  --  8.1*   < > 9.1   ICAW  --   --   --   --   --  4.7  --  4.6  --  5.0   MAG  --  1.6*  --  1.6*   < > 2.2  --  2.4*   < > 2.3   PHOS  --  4.5  --  3.9   < > 2.8  --  2.9   < >  --    AMYLASE  --   --   --   --   --   --   --   --   --  601*   LIPASE  --   --   --   --   --   --   --   --   --  1,669*   ALBUMIN  --  3.3*  --  3.0*   < > 3.1*  --  3.1*   < > 3.4*   BILITOTAL  --  1.7*  --  1.4*   < > 1.1  --  1.5*   < > 2.3*   ALKPHOS  --  91  --  70   < > 78  --  63   < > 73   AST  --  35  --  24   < > 66*  --  101*   < > 296*   ALT  --  65  --  52   < > 97*  --  111*   < > 168*    < > = values in this interval not displayed.     CBC  Recent Labs   Lab 07/04/24  0558 07/03/24  0627 06/27/24  1945 06/27/24  1807   HGB 10.5* 9.3*   < > 12.1*   WBC 9.3 8.9   < > 14.3*    144*   < > 215   A1C  --   --   --  6.1*    < > = values in this interval not displayed.     COAGS  Recent Labs   Lab 06/30/24  0542 06/29/24  0302 06/27/24  1945 06/27/24  1807 06/27/24  1550   INR 1.34* 1.52*   < > 1.83* 2.92*   PTT  --   --   --  39* 71*    < > = values in this interval not displayed.      Urinalysis  Recent Labs   Lab Test 06/26/24  1748 06/20/23  0815   COLOR Light Yellow Yellow   APPEARANCE Clear Slightly Cloudy*   URINEGLC Negative Negative    URINEBILI Negative Negative   URINEKETONE Negative Negative   SG 1.015 1.021   UBLD Negative Negative   URINEPH 7.5* 6.5   PROTEIN Negative 10*   NITRITE Negative Negative   LEUKEST Negative Negative   RBCU 1 <1   WBCU 1 1     Virology:  Hepatitis C Antibody   Date Value Ref Range Status   06/26/2024 Nonreactive Nonreactive Final     Comment:     A nonreactive screening test result does not exclude the possibility of exposure to or infection with HCV. Nonreactive screening test results in individuals with prior exposure to HCV may be due to antibody levels below the limit of detection of this assay or lack of reactivity to the HCV antigens used in this assay. Patients with recent HCV infections (<3 months from time of exposure) may have false-negative HCV antibody results due to the time needed for seroconversion (average of 8 to 9 weeks).     Attestation:    The patient has been seen with team and evaluated by me.   Vital signs, labs, medications and orders were reviewed.   When obtained, diagnostic images were reviewed by me and interpreted as above.    The care plan was discussed with the multidisciplinary team and I agree with the findings and plan in this note, with any differences recorded in blue.    Immunosuppressive medication management was reviewed and adjusted as reflected in the note and orders.       Delta Palmer MD  Professor of Surgery

## 2024-07-04 NOTE — PROGRESS NOTES
Inpatient Diabetes Management Service: Daily Progress Note     HPI: 48 year old male with PMHx of hepatocellcular carcinoma s/p liver ablation 2022 and steatohepatitis, neuroendocrine tumor s/p right lobectomy, and DM2, s/p  donor liver transplant w/ bilary stent with Dr. Dodson on 24.      IDS consulted to assist with glycemic management.         Assessment/Plan:     Assessment:   Type 2 Diabetes Mellitus, without known complications, fair control  (A1c 6.1 %) in presence of complex illness (no anemia at time of A1c).  Acute hyperglycemia 2/2 steroids/stress  S/p  donor liver transplant w/ bilary stent  with Dr. Dodson on 24.  Anemia  BMI:  28  Anemia     Plan/Recommendations:   - Increase AM NPH to 20 units at 0800  - Continue PM NPH at 10 units at 1800  - Increase Novolog Meal Coverage: 1 unit per 6 to 1 unit per 5 grams CHO, TID AC and PRN with snacks/supplements  - Novolog Correction Scale: high resistance (ISF: 25) TID AC / HS / 0200  - BG Monitoring: TID AC / HS / 0200  - Hypoglycemia protocol  - Carb counting protocol     Discussion:   BGs above target during the day. Improved overnight with fasting BG of 182. WIll increase CHO coverage today given daytime hyperglycemia. Attempt to transition to Lantus tomorrow to simplify dosing.      Discharge Planning: (tentative)  Medications: TBD  likely lantus/novolog  +/- NPH, recommend resumption of PTA meds, outpatient in the future.    Test Claims: completed see CELE Murodck's notes   Education: Needs to be assessed closer to discharge.    Outpatient Follow-up:  recommend [facility] Endocrinology vs PCP     Please notify Inpatient Diabetes Service if changes are planned to steroids, nutrition, TPN/TF and anticipated procedures requiring prolonged NPO status.         Interval History/Review of Systems :   The last 24 hours progress and nursing notes reviewed.  Fasting . Overall trending above target during  the day and lower overnight, possibly related to steroids.   Patient states he is feeling well, trying to get up and move more, working with therapies. Concerned about the number of injections he will do, especially given insulin coverage needed for snacks. Will work toward simplifying plan for him. Encouraged him to drink his ensure along with meals to minimize insulin injections for now. Discussed mechanism of action, purpose and duration of various insulins.     Planned Procedures/Surgeries: none    Inpatient Glucose Control:       Recent Labs   Lab 07/04/24  1113 07/04/24  0747 07/04/24  0558 07/04/24  0206 07/03/24  2152 07/03/24  1744   * 182* 159* 128* 150* 175*             Medications Impacting Glycemia:   Steroids:    Prednisone 5 mg q AM for 3 months post liver transplant    D5W-containing solutions/medications: none   Other medications impacting glucose: none          Nutrition:   Orders Placed This Encounter      Regular Diet Adult    Supplements: Ensure Enlive between meals at 10 am and 2 pm    TF: none   TPN: none         Diabetes History: see full consult note for complete diabetes history   Diabetes Type and Duration: T2DM diagnosed 2018 with A1c of 6.5%  Started on Metformin 1000 mg BID - this was too much resulting in GI distress and was reduced which he tolerates.      PTA Medication Regimen:   Metformin 500 mg BID  Ozempic 0.5 mg weekly on Mondays     Historical Diabetes Medications:   No other meds and never on insulin in the past      Glucose monitoring device and frequency: glucometer   Outpatient Diabetes Provider: PCP  Formal Diabetes Education/Educator: yes in the past         Physical Exam:   BP (!) 130/93 (BP Location: Left arm)   Pulse 95   Temp 98.6  F (37  C) (Oral)   Resp 16   Wt 93.4 kg (205 lb 14.4 oz)   SpO2 98%   BMI 26.44 kg/m    General Appearance:  No acute distress, resting comfortably  Alert and interactive   HEENT: Normocephalic, atraumatic. Anicteric,  conjunctiva clear. Mucous membranes moist, without exudates or ulcers.    Heart: Regular rate and rhythm.    Lungs:  Breathing comfortably on RA. No cough, shortness of breath, wheezing.   Abdomen: Round, nondistended. Soft, nontender.  Symmetrical with no scars bruises, or visible masses.    Extremities:  No significant lower extremity edema. Fluid movement of extremities.  CMS intact.    Skin:  Warm and dry. No rashes, lesions or bruising.   Neuro:  Oriented x3, able to speak clearly.    Psych:  Calm, appropriate mood and affect.           Data:     Lab Results   Component Value Date    A1C 6.1 (H) 06/27/2024    A1C 7.0 (H) 06/20/2023       ROUTINE IP LABS (Last four results)  BMP  Recent Labs   Lab 07/04/24  1113 07/04/24  0747 07/04/24  0558 07/04/24  0206 07/03/24  0749 07/03/24  0627 07/02/24  0706 07/02/24  0640 07/01/24  0559 07/01/24  0553   NA  --   --  138  --   --  139  --  141  --  142   POTASSIUM  --   --  4.5  --   --  4.1  --  3.6  --  3.9   CHLORIDE  --   --  104  --   --  107  --  109*  --  110*   CHUCK  --   --  9.3  --   --  8.5*  --  8.4*  --  8.2*   CO2  --   --  26  --   --  25  --  24  --  26   BUN  --   --  11.7  --   --  11.5  --  15.0  --  20.6*   CR  --   --  0.81  --   --  0.69  --  0.70  --  0.77   * 182* 159* 128*   < > 144*   < > 120*   < > 111*    < > = values in this interval not displayed.     CBC  Recent Labs   Lab 07/04/24  0558 07/03/24  0627 07/02/24  0640 07/01/24  0553   WBC 9.3 8.9 7.7 9.7   RBC 3.52* 3.17* 3.18* 3.07*   HGB 10.5* 9.3* 9.5* 8.9*   HCT 30.2* 26.7* 27.0* 27.1*   MCV 86 84 85 88   MCH 29.8 29.3 29.9 29.0   MCHC 34.8 34.8 35.2 32.8   RDW 14.8 13.2 13.0 13.2    144* 124* 129*     INR  Recent Labs   Lab 06/30/24  0542 06/29/24  0302 06/28/24  0408 06/27/24  1945   INR 1.34* 1.52* 1.70* 1.73*       Inpatient Diabetes Service will continue to follow, please don't hesitate to contact the team with any questions or concerns.     Ariella Tran, APRN, CNP    Inpatient Diabetes Management Service   Pager: [phone]   Available on [employer]      Plan discussed with patient, bedside RN, and primary team via this note.    To contact Inpatient Diabetes Service:     7 AM - 5 PM: Page the IDS JAVIER following the patient that day (see filed or incomplete progress notes/consult notes under Endocrinology)    OR if uncertain of provider assignment: page job code [phone]    5 PM - 7 AM: First call after hours is to primary service.    For urgent after-hours questions, page job code for on call fellow: [phone]     I spent a total of 45 minutes on the date of the encounter doing prep/post-work, chart review, history and exam, documentation and further activities per the note including lab review, multidisciplinary communication, counseling the patient and/or coordinating care regarding acute hyper/hypoglycemic management, as well as discharge management and planning/communication.

## 2024-07-04 NOTE — PLAN OF CARE
Goal Outcome Evaluation:       BP (!) 130/93 (BP Location: Left arm)   Pulse 95   Temp 98.6  F (37  C) (Oral)   Resp 16   Wt 93.4 kg (205 lb 14.4 oz)   SpO2 98%   BMI 26.44 kg/m      Shift: 9183-5270    VS: VSS  Neuro: Aox4  Cardio: WDL  Respiratory: WDL on RA  GI: BM x2  : Voiding adequately  Skin: abd incision stapeld FADIA, Old DEE sites x2  Diet: Regular  BG: ACHS (150, 128)  LDA:  L PIV  Infusions: none  Mobility: SBA  Pain/Nausea: minimal abd pain  PRN medications: none given

## 2024-07-05 ENCOUNTER — APPOINTMENT (OUTPATIENT)
Dept: PHYSICAL THERAPY | Facility: CLINIC | Age: 49
End: 2024-07-05
Attending: SURGERY
Payer: COMMERCIAL

## 2024-07-05 ENCOUNTER — APPOINTMENT (OUTPATIENT)
Dept: OCCUPATIONAL THERAPY | Facility: CLINIC | Age: 49
End: 2024-07-05
Attending: SURGERY
Payer: COMMERCIAL

## 2024-07-05 ENCOUNTER — HOSPITAL ENCOUNTER (INPATIENT)
Facility: CLINIC | Age: 49
End: 2024-07-05
Payer: COMMERCIAL

## 2024-07-05 ENCOUNTER — TELEPHONE (OUTPATIENT)
Dept: TRANSPLANT | Facility: CLINIC | Age: 49
End: 2024-07-05
Payer: COMMERCIAL

## 2024-07-05 VITALS
TEMPERATURE: 98.3 F | WEIGHT: 202.8 LBS | BODY MASS INDEX: 26.04 KG/M2 | DIASTOLIC BLOOD PRESSURE: 81 MMHG | HEART RATE: 100 BPM | OXYGEN SATURATION: 100 % | SYSTOLIC BLOOD PRESSURE: 130 MMHG | RESPIRATION RATE: 16 BRPM

## 2024-07-05 DIAGNOSIS — Z94.4 LIVER REPLACED BY TRANSPLANT (H): Primary | ICD-10-CM

## 2024-07-05 LAB
ALBUMIN SERPL BCG-MCNC: 3.2 G/DL (ref 3.5–5.2)
ALP SERPL-CCNC: 103 U/L (ref 40–150)
ALT SERPL W P-5'-P-CCNC: 70 U/L (ref 0–70)
ANION GAP SERPL CALCULATED.3IONS-SCNC: 7 MMOL/L (ref 7–15)
AST SERPL W P-5'-P-CCNC: 41 U/L (ref 0–45)
BILIRUB SERPL-MCNC: 1.7 MG/DL
BUN SERPL-MCNC: 11.7 MG/DL (ref 6–20)
CALCIUM SERPL-MCNC: 8.8 MG/DL (ref 8.6–10)
CHLORIDE SERPL-SCNC: 105 MMOL/L (ref 98–107)
CREAT SERPL-MCNC: 0.81 MG/DL (ref 0.67–1.17)
DEPRECATED HCO3 PLAS-SCNC: 25 MMOL/L (ref 22–29)
EGFRCR SERPLBLD CKD-EPI 2021: >90 ML/MIN/1.73M2
ERYTHROCYTE [DISTWIDTH] IN BLOOD BY AUTOMATED COUNT: 15.1 % (ref 10–15)
GLUCOSE BLDC GLUCOMTR-MCNC: 151 MG/DL (ref 70–99)
GLUCOSE BLDC GLUCOMTR-MCNC: 165 MG/DL (ref 70–99)
GLUCOSE BLDC GLUCOMTR-MCNC: 230 MG/DL (ref 70–99)
GLUCOSE BLDC GLUCOMTR-MCNC: 285 MG/DL (ref 70–99)
GLUCOSE SERPL-MCNC: 165 MG/DL (ref 70–99)
HCT VFR BLD AUTO: 29.3 % (ref 40–53)
HGB BLD-MCNC: 9.9 G/DL (ref 13.3–17.7)
MAGNESIUM SERPL-MCNC: 1.6 MG/DL (ref 1.7–2.3)
MCH RBC QN AUTO: 29.6 PG (ref 26.5–33)
MCHC RBC AUTO-ENTMCNC: 33.8 G/DL (ref 31.5–36.5)
MCV RBC AUTO: 88 FL (ref 78–100)
PHOSPHATE SERPL-MCNC: 3.4 MG/DL (ref 2.5–4.5)
PLATELET # BLD AUTO: 199 10E3/UL (ref 150–450)
POTASSIUM SERPL-SCNC: 4.6 MMOL/L (ref 3.4–5.3)
PROT SERPL-MCNC: 5.3 G/DL (ref 6.4–8.3)
RBC # BLD AUTO: 3.34 10E6/UL (ref 4.4–5.9)
SODIUM SERPL-SCNC: 137 MMOL/L (ref 135–145)
TACROLIMUS BLD-MCNC: 9 UG/L (ref 5–15)
TME LAST DOSE: NORMAL H
TME LAST DOSE: NORMAL H
WBC # BLD AUTO: 7.8 10E3/UL (ref 4–11)

## 2024-07-05 PROCEDURE — 250N000013 HC RX MED GY IP 250 OP 250 PS 637

## 2024-07-05 PROCEDURE — 83735 ASSAY OF MAGNESIUM: CPT | Performed by: STUDENT IN AN ORGANIZED HEALTH CARE EDUCATION/TRAINING PROGRAM

## 2024-07-05 PROCEDURE — 97535 SELF CARE MNGMENT TRAINING: CPT | Mod: GO | Performed by: OCCUPATIONAL THERAPIST

## 2024-07-05 PROCEDURE — 99232 SBSQ HOSP IP/OBS MODERATE 35: CPT | Mod: 24

## 2024-07-05 PROCEDURE — 85027 COMPLETE CBC AUTOMATED: CPT | Performed by: NURSE PRACTITIONER

## 2024-07-05 PROCEDURE — 250N000013 HC RX MED GY IP 250 OP 250 PS 637: Performed by: SURGERY

## 2024-07-05 PROCEDURE — 97530 THERAPEUTIC ACTIVITIES: CPT | Mod: GP

## 2024-07-05 PROCEDURE — 250N000012 HC RX MED GY IP 250 OP 636 PS 637: Performed by: STUDENT IN AN ORGANIZED HEALTH CARE EDUCATION/TRAINING PROGRAM

## 2024-07-05 PROCEDURE — 80053 COMPREHEN METABOLIC PANEL: CPT | Performed by: NURSE PRACTITIONER

## 2024-07-05 PROCEDURE — 80197 ASSAY OF TACROLIMUS: CPT | Performed by: NURSE PRACTITIONER

## 2024-07-05 PROCEDURE — 84100 ASSAY OF PHOSPHORUS: CPT | Performed by: STUDENT IN AN ORGANIZED HEALTH CARE EDUCATION/TRAINING PROGRAM

## 2024-07-05 PROCEDURE — 250N000013 HC RX MED GY IP 250 OP 250 PS 637: Performed by: NURSE PRACTITIONER

## 2024-07-05 PROCEDURE — 250N000011 HC RX IP 250 OP 636: Performed by: PHYSICIAN ASSISTANT

## 2024-07-05 PROCEDURE — 250N000012 HC RX MED GY IP 250 OP 636 PS 637: Performed by: NURSE PRACTITIONER

## 2024-07-05 PROCEDURE — 250N000012 HC RX MED GY IP 250 OP 636 PS 637

## 2024-07-05 PROCEDURE — 250N000013 HC RX MED GY IP 250 OP 250 PS 637: Performed by: STUDENT IN AN ORGANIZED HEALTH CARE EDUCATION/TRAINING PROGRAM

## 2024-07-05 PROCEDURE — 36415 COLL VENOUS BLD VENIPUNCTURE: CPT | Performed by: NURSE PRACTITIONER

## 2024-07-05 RX ORDER — ASPIRIN 81 MG/1
81 TABLET, CHEWABLE ORAL DAILY
Qty: 30 TABLET | Refills: 5 | Status: SHIPPED | OUTPATIENT
Start: 2024-07-06 | End: 2024-07-30

## 2024-07-05 RX ORDER — BLOOD PRESSURE TEST KIT
KIT MISCELLANEOUS
Qty: 200 EACH | Refills: 1 | Status: SHIPPED | OUTPATIENT
Start: 2024-07-05

## 2024-07-05 RX ORDER — SENNOSIDES 8.6 MG
1 TABLET ORAL 2 TIMES DAILY
COMMUNITY
Start: 2024-07-05 | End: 2024-08-14

## 2024-07-05 RX ORDER — PANTOPRAZOLE SODIUM 40 MG/1
40 TABLET, DELAYED RELEASE ORAL DAILY
Qty: 30 TABLET | Refills: 2 | Status: SHIPPED | OUTPATIENT
Start: 2024-07-06 | End: 2024-07-30

## 2024-07-05 RX ORDER — SULFAMETHOXAZOLE AND TRIMETHOPRIM 400; 80 MG/1; MG/1
1 TABLET ORAL DAILY
Qty: 30 TABLET | Refills: 5 | Status: ACTIVE | OUTPATIENT
Start: 2024-07-06 | End: 2024-07-30

## 2024-07-05 RX ORDER — MYCOPHENOLATE MOFETIL 250 MG/1
750 CAPSULE ORAL 2 TIMES DAILY
Qty: 180 CAPSULE | Refills: 5 | Status: ACTIVE | OUTPATIENT
Start: 2024-07-05 | End: 2024-07-30

## 2024-07-05 RX ORDER — METHOCARBAMOL 500 MG/1
500 TABLET, FILM COATED ORAL 4 TIMES DAILY PRN
Qty: 15 TABLET | Refills: 0 | Status: SHIPPED | OUTPATIENT
Start: 2024-07-05 | End: 2024-09-30

## 2024-07-05 RX ORDER — ACETAMINOPHEN 325 MG/1
650 TABLET ORAL EVERY 6 HOURS PRN
COMMUNITY
Start: 2024-07-05

## 2024-07-05 RX ORDER — OXYCODONE HYDROCHLORIDE 5 MG/1
2.5-5 TABLET ORAL EVERY 6 HOURS PRN
Qty: 8 TABLET | Refills: 0 | Status: SHIPPED | OUTPATIENT
Start: 2024-07-05 | End: 2024-09-30

## 2024-07-05 RX ORDER — TACROLIMUS 0.5 MG/1
CAPSULE ORAL
Qty: 60 CAPSULE | Refills: 11 | Status: ACTIVE | OUTPATIENT
Start: 2024-07-05

## 2024-07-05 RX ORDER — VALGANCICLOVIR 450 MG/1
450 TABLET, FILM COATED ORAL EVERY EVENING
Qty: 60 TABLET | Refills: 5 | Status: SHIPPED | OUTPATIENT
Start: 2024-07-05 | End: 2024-07-05

## 2024-07-05 RX ORDER — TACROLIMUS 1 MG/1
4 CAPSULE ORAL 2 TIMES DAILY
Qty: 240 CAPSULE | Refills: 11 | Status: ACTIVE | OUTPATIENT
Start: 2024-07-05 | End: 2024-07-18

## 2024-07-05 RX ORDER — VALGANCICLOVIR 450 MG/1
450 TABLET, FILM COATED ORAL DAILY
Qty: 30 TABLET | Refills: 5 | Status: ACTIVE | OUTPATIENT
Start: 2024-07-05 | End: 2024-07-30

## 2024-07-05 RX ORDER — MAGNESIUM SULFATE HEPTAHYDRATE 40 MG/ML
2 INJECTION, SOLUTION INTRAVENOUS ONCE
Status: COMPLETED | OUTPATIENT
Start: 2024-07-05 | End: 2024-07-05

## 2024-07-05 RX ORDER — CONTAINER,EMPTY
EACH MISCELLANEOUS
Qty: 1 EACH | Refills: 1 | Status: SHIPPED | OUTPATIENT
Start: 2024-07-05

## 2024-07-05 RX ORDER — CARVEDILOL 3.12 MG/1
3.12 TABLET ORAL 2 TIMES DAILY WITH MEALS
Qty: 60 TABLET | Refills: 3 | Status: SHIPPED | OUTPATIENT
Start: 2024-07-05

## 2024-07-05 RX ORDER — POLYETHYLENE GLYCOL 3350 17 G/17G
17 POWDER, FOR SOLUTION ORAL DAILY
COMMUNITY
Start: 2024-07-05 | End: 2024-08-14

## 2024-07-05 RX ORDER — MAGNESIUM OXIDE 400 MG/1
400 TABLET ORAL 2 TIMES DAILY
Qty: 60 TABLET | Refills: 3 | Status: SHIPPED | OUTPATIENT
Start: 2024-07-05 | End: 2024-07-10

## 2024-07-05 RX ORDER — URSODIOL 300 MG/1
300 CAPSULE ORAL 2 TIMES DAILY
Qty: 60 CAPSULE | Refills: 3 | Status: SHIPPED | OUTPATIENT
Start: 2024-07-05 | End: 2024-07-30

## 2024-07-05 RX ORDER — PREDNISONE 5 MG/1
5 TABLET ORAL DAILY
Qty: 30 TABLET | Refills: 2 | Status: SHIPPED | OUTPATIENT
Start: 2024-07-06 | End: 2024-07-30

## 2024-07-05 RX ADMIN — INSULIN GLARGINE 25 UNITS: 100 INJECTION, SOLUTION SUBCUTANEOUS at 11:24

## 2024-07-05 RX ADMIN — GABAPENTIN 600 MG: 600 TABLET, FILM COATED ORAL at 09:10

## 2024-07-05 RX ADMIN — PREDNISONE 5 MG: 5 TABLET ORAL at 09:10

## 2024-07-05 RX ADMIN — SULFAMETHOXAZOLE AND TRIMETHOPRIM 1 TABLET: 400; 80 TABLET ORAL at 09:10

## 2024-07-05 RX ADMIN — GABAPENTIN 600 MG: 600 TABLET, FILM COATED ORAL at 13:27

## 2024-07-05 RX ADMIN — MAGNESIUM SULFATE HEPTAHYDRATE 2 G: 2 INJECTION, SOLUTION INTRAVENOUS at 09:11

## 2024-07-05 RX ADMIN — URSODIOL 300 MG: 300 CAPSULE ORAL at 09:10

## 2024-07-05 RX ADMIN — PANTOPRAZOLE SODIUM 40 MG: 40 TABLET, DELAYED RELEASE ORAL at 09:10

## 2024-07-05 RX ADMIN — CARVEDILOL 3.12 MG: 3.12 TABLET, FILM COATED ORAL at 18:19

## 2024-07-05 RX ADMIN — TACROLIMUS 4 MG: 1 CAPSULE ORAL at 09:10

## 2024-07-05 RX ADMIN — SENNOSIDES 8.6 MG: 8.6 TABLET, FILM COATED ORAL at 09:10

## 2024-07-05 RX ADMIN — MAGNESIUM OXIDE TAB 400 MG (241.3 MG ELEMENTAL MG) 400 MG: 400 (241.3 MG) TAB at 12:45

## 2024-07-05 RX ADMIN — NYSTATIN 500000 UNITS: 100000 SUSPENSION ORAL at 09:30

## 2024-07-05 RX ADMIN — MYCOPHENOLATE MOFETIL 750 MG: 250 CAPSULE ORAL at 18:19

## 2024-07-05 RX ADMIN — POLYETHYLENE GLYCOL 3350 17 G: 17 POWDER, FOR SOLUTION ORAL at 09:11

## 2024-07-05 RX ADMIN — MYCOPHENOLATE MOFETIL 750 MG: 250 CAPSULE ORAL at 09:10

## 2024-07-05 RX ADMIN — NYSTATIN 500000 UNITS: 100000 SUSPENSION ORAL at 12:45

## 2024-07-05 RX ADMIN — NYSTATIN 500000 UNITS: 100000 SUSPENSION ORAL at 15:45

## 2024-07-05 RX ADMIN — ASPIRIN 81 MG CHEWABLE TABLET 81 MG: 81 TABLET CHEWABLE at 09:10

## 2024-07-05 RX ADMIN — CARVEDILOL 3.12 MG: 3.12 TABLET, FILM COATED ORAL at 09:10

## 2024-07-05 RX ADMIN — TACROLIMUS 4 MG: 1 CAPSULE ORAL at 18:19

## 2024-07-05 ASSESSMENT — ACTIVITIES OF DAILY LIVING (ADL)
ADLS_ACUITY_SCORE: 31
ADLS_ACUITY_SCORE: 23
ADLS_ACUITY_SCORE: 31
ADLS_ACUITY_SCORE: 23
ADLS_ACUITY_SCORE: 31
ADLS_ACUITY_SCORE: 23
ADLS_ACUITY_SCORE: 25
ADLS_ACUITY_SCORE: 23
ADLS_ACUITY_SCORE: 31
ADLS_ACUITY_SCORE: 23
ADLS_ACUITY_SCORE: 23

## 2024-07-05 NOTE — PROGRESS NOTES
Care Management Discharge Note    Discharge Date: 07/05/2024       Discharge Disposition: Home Care ([facility])    Discharge Services: Home Care, Transportation Services    Discharge DME: None    Discharge Transportation: family or friend will provide (Shuttle)    Private pay costs discussed: Not applicable    Does the patient's insurance plan have a 3 day qualifying hospital stay waiver?  No    PAS Confirmation Code:    Patient/family educated on Medicare website which has current facility and service quality ratings: no    Education Provided on the Discharge Plan:    Persons Notified of Discharge Plans: patient  Patient/Family in Agreement with the Plan: yes    Handoff Referral Completed: Yes    Additional Information:  Pt s/p liver transplant.  PT/OT recommending home.  Per discussion with provider plan for home RN, PT, OT.  Met with pt.  Met with pt.   Introduced RNCC role.  Reviewed anticipated plan for discharge, transplant labs M/TH,  and home care RN services.  Pt confirmed that he will be staying locally at:    [address] [address]  Ph: [phone]  [phone]    Pt Mom is currently staying at [facility] and will be pt primary caregiver.  Pt will need diabetic education and transplant pharmacy education today prior to discharge. Pt notes no concerns with plan for discharge today.  Pt would like home care services.   Joshua Diabetic Educator confirmed she will be meeting with pt but waiting on final endocrine plan.      Initiated referral to [facility].    1530: Notified by Amada [facility] unable to secure home care for patient.  Patient will need to come into clinic for transplant labs.  Met with pt and his Mom. Pt notes no questions.  Per chart review lab appointment and transplant surgeon appointment confirmed for Monday 7/8.       Ani Liz, RN BSN, PHN, ACM-RN  July 5, 2024  Nurse Coordinator    Phone: [phone]    Social Work and Care  Management Department     SEARCHABLE in [facility] - search CARE COORDINATOR     [place] ([id number]) Saturday & Sunday; ([id number]) [facility] Recognized Holidays     Units: 5A Onc Vocera & 5C Vocera     Units: 6B Vocera & 6C Vocera      Units: 7A SOT RNCC Vocera, 7B Med Surg Vocera, & 7C Med Surg Vocera      Units: 6A Vocera & 4A CVICU Vocera, 4C MICU Vocera, and 4E SICU Vocera      Units: 5 Ortho Vocera & 5 Med Surg Vocera      Units: 6 Med Surg Vocera & 8 Med Surg Vocera      7/5/2024

## 2024-07-05 NOTE — CONSULTS
"Diabetes CNS/Educator Consult    West Van is a 48 year old male with PMHx of hepatocellcular carcinoma s/p liver ablation 2022 and steatohepatitis, neuroendocrine tumor s/p right lobectomy, and DM2, s/p  donor liver transplant w/ bilary stent with Dr. Dodson on 24. Discharging today to [facility].    Diabetes Educator consulted for new insulin needs at discharge. West has a history of T2DM (A1c 6.1% on 24) managing PTA with metformin 500 mg BID and ozempic 0.5 mg weekly on . Was monitoring BGs with glucose meter, but does not have meter and supplies with him in the [facility].    Inpatient Diabetes Service is following for glycemic management. Please see their notes for insulin dosing and discharge recommendations.    Met with West at bedside for education. He will be managing diabetes cares at home independently. Mom and Dad are caregivers, but will not be doing diabetes management for West. Mom and Dad stated that this process has been complex and appeared somewhat unsure of their ability to help West.     Diet: Room Service  Regular Diet Adult  Snacks/Supplements Adult: Ensure Enlive; Between Meals  Diet       Barriers to diabetes management: emotional distress (feeling overwhelmed and appeared anxious)    Diabetes Education Provided:  Discussed reason for requiring blood glucose monitoring and insulin with T2DM and steroid/stress induced hyperglycemia. Provided \"Understanding Diabetes\" handout.   Reviewed insulin glargine and aspart action, dose, onset, peak, and duration. Discussed proper insulin pen storage, preparation, and injection technique. Discussed pen needle disposal in sharps container. Discussed site selection and rotation. West successfully demonstrated administration with demo pen and injection pad. Provided \"4 mm pen needle - tips for good injection practice\" handout.   Discussed frequency and dosing of insulin administration (see plan below)  Frequency of " testing discussed. West has used glucose meter before and is familiar, but will need new supplies while staying at [facility].   Discussed signs and symptoms of hypoglycemia. Reviewed treating blood glucose below 70 mg/dL with 15 grams of carbohydrates. Discussed examples of 15 grams of CHO. Reviewed treating blood glucose less than or equal to 50 mg/dL with 30 grams of CHO. Discussed rechecking BG 15 minutes after treatment and retreating if necessary.   Reviewed calling provider for consistently elevated BGs and/or any lows.   Discussed following up with [facility] and PCP in 1-2 weeks after discharge.     Diabetes Plan Reviewed with Patient:  Blood Glucose Checks:   Three times daily before meals, and at bedtime. Goal:  mg/dL.    Diabetes Medications:   Do not restart your Metformin and Ozempic until instructed to by your provider.  Long-acting insulin: glargine-yfgn (Semglee) 25 units once daily at 9:00 AM. Take at the same time each day.   Decrease by 3 units daily if your AM fasting BG is less than 100 mg/dL.   Rapid-acting insulin: aspart (Novolog) 6 units three times daily with large meals plus correction scale  Rapid-acting insulin: aspart (Novolog) correction before meals and at bedtime. See charts below for dosing.  Pre-Meal Correction Scale    Do Not give Correction Insulin if fasting BG less than 140 mg/dL.   For  - 189 give 1 unit.   For  - 239 give 2 units.   For  - 289 give 3 units.   For  - 339 give 4 units.   For  - 389 give 5 units.   For  - 439 give 6 units   For BG greater than or equal to 440 give 7 units.      To be given with mealtime insulin and based on pre-meal/FASTING blood glucose. Administering insulin within 5 minutes of the start of the meal is ideal. Administer insulin no more than 30 minutes after the start of the meal, unless directed otherwise by provider.            Bedtime Correction Scale. Take around 10pm before going to  bed.   Do Not give Bedtime Correction Insulin if fasting BG less than 200 mg/dL.   For  - 249 give 1 unit.   For  - 299 give 2 units.   For  - 349 give 3 units.   For  - 399 give 4 units.   For BG greater than or equal to 400 give 5 units.   Notify provider if glucose greater than or equal to 350 mg/dL after administration of correction dose.     Hypoglycemia (Low Blood Glucose) Management:  If blood glucose is 51 to 69:   Eat or drink 15 grams of carbohydrate. Examples:   1/2 cup (4 ounces) apple juice or regular soda pop, or   1 cup (8 ounces) milk, or   15 skittles, or   3 to 4 glucose tablets.   Re-check your blood glucose in 15 minutes.   Repeat these steps every 15 minutes until your blood glucose is above 80.       If blood glucose is 50 or less:   Eat or drink 30 grams of carbohydrate. Examples:   1 cup (8 ounces) apple juice or regular soda pop, or   2 cups (16 ounces) milk, or   1 banana, or   6 to 8 glucose tablets.   Re-check your blood glucose in 15 minutes.   Repeat these steps every 15 minutes until your blood glucose is above 80.     Diabetes Outpatient follow up: Follow up with Cr Martinez APRN-CNP your Primary Care Provider, after discharge for your diabetes. We have also placed a referral for you to follow up with Endocrinology while you are staying in the [place], in 1-2 weeks after discharge. An appointment request was sent to the [facility] coordinator to schedule your outpatient diabetes appointment. Please call the clinic at [phone] if you do not hear from them after discharge, or if you have non-urgent questions regarding your blood sugars or insulin. Follow up sooner if blood glucose runs consistently greater than 180 mg/dL or any episodes less than 70 mg/dL.      Glucose Goals:  . Your target A1c value is less than 7%.      Thank you for letting the Diabetes Management Team be involved in your care!    Supplies Needed at  "Discharge: please use the DIAB non-branded discharge supply order set ([id number])   Glucose meter  Test strips  Lancets  Sharps Container  Alcohol Wipes  B-D 4 mm presley pen needles  Novolog Flexpens  Glargine-yfgn pens      Diabetes education completed. West appeared overwhelmed and stated that he will \"try\" to do plan as best as he can, but his liver is is main priority. Discussed that liver is primary, but diabetes and other conditions impact each other. West understands and states that he wants to make sure his diabetes is controlled as well. Parents acknowledged that they do not fully understand everything going on with the many complexities, but will do what they can to help support West. All questions answered. Discussed with bedside RNs, RNCC, Transplant PA-C, and IDS CNP. Bedside RNs will review insulin injections again before discharge and Transplant placing a MTM consult for follow-up on insulin regimen.    VALENTINA Romero, [facility]  Diabetes Educator  Pager: [phone]  Office: [phone]  Securely message with Mary   "

## 2024-07-05 NOTE — PLAN OF CARE
Goal Outcome Evaluation:       A/Ox4. Seen by medical team, Endocrinologist , OT and SW, pt is for discharge,DEE drain sites still leaking serosanguinous  discharge, dressing changed. Diabetic education given by the diabetic educator. Full discharge education completed, all discharge instructions documents given to patients in the presence of his parents. Pt. Belongings given. Pt. And family instructed to pass by pharmacy to  discharge medications. Pt. Stated he fully understand instructions given. PIV removed and escorted by transportation on a Wheelchair,   /81 (BP Location: Right arm)   Pulse 100   Temp 98.3  F (36.8  C) (Oral)   Resp 16   Wt 92 kg (202 lb 12.8 oz)   SpO2 100%   BMI 26.04 kg/m

## 2024-07-05 NOTE — PLAN OF CARE
Physical Therapy Discharge Summary    Reason for therapy discharge:    Discharged to home with home therapy.    Progress towards therapy goal(s). See goals on Care Plan in [facility] electronic health record for goal details.  Goals partially met.  Barriers to achieving goals:   discharge from facility.    Therapy recommendation(s):    Continued therapy is recommended.  Rationale/Recommendations:  recommend continued  PT services to progress pt's IND with mobility along with overall endurance and strength for improved function.

## 2024-07-05 NOTE — PLAN OF CARE
Goal Outcome Evaluation:       BP (!) 135/90 (BP Location: Right arm)   Pulse 98   Temp 98.3  F (36.8  C) (Oral)   Resp 16   Wt 92 kg (202 lb 12.8 oz)   SpO2 100%   BMI 26.04 kg/m      Shift: 6287-7395    VS: VSS  Neuro: Aox4  Cardio: WDL  Respiratory: WDL  GI: LBM 7/4  : Voiding adequately  Skin: clamshell incision stapled FADIA, old DEE sites leaking  Diet: Regular  BG: ACHS  LDA: PIV SL  Infusions: none  Mobility: Independent  Pain/Nausea: minimal pain, denies nausea  PRN medications: melatonin x1

## 2024-07-05 NOTE — TELEPHONE ENCOUNTER
Post Liver Transplant Coordinator Discharge Teaching    Introduced myself and explained the role of the post liver transplant coordinator and support team.    Upon discharge, West will be going to the [facility] [place] [facility].    West informed of need for appointment with PCP within 1-2 weeks of discharge.  West is aware that they will have weekly clinic visits with their surgeon or surgical JAVIER x4 and then visit frequency is dictated by surgeon for first 3 months post transplant.   West informed that they will see their pre-transplant hepatologist around 3 months after transplant and will have regular follow up with hepatologist for life.   West is aware that they will need lab testing on a regular basis for life as labs are the best way to monitor how the liver is functioning. Initial lab frequency is twice weekly, with frequency decreasing over time.   West has a biliary stent. He was informed of need for xray around 3 months to be sure it has passed, if not seen in stool. If still present at 3 months, EGD will be needed to remove.    Briefly discussed the following topics:  Immunosuppression- the importance of taking regularly as directed and timing of medication before lab draws  Pertinent labs and their interpretation  Rejection signs/symptoms and treatment  Importance of long term follow up with transplant providers and PCP  No grapefruit or grapefruit juice.   Need annual dermatology appt for a transplant skin check. Increased risk for skin cancer due to immunosuppressants  If anyone other than transplant team prescribes new medications, contact coordinator to review before taking  How to contact coordinator/transplant office if: becomes ill, fevers, forgets to take medications, or other emergencies  Provided contact number for coordinator/LPN/transplant office, , and after hours/weekend/holiday coordinator    West has lab book and understands responsibility of getting and recording results.     West  denies further questions.     Pharmacy to be used: [facility]  Lab to be used: [facility]  Home Care: none    Organ specific education completed and discharge planning has been conducted with multidisciplinary transplant care team including physicians, pharmacy, nutrition, and social work.     7/5: Briefly discussed plan for the weekend: Rest, get up and walk, eat, and take meds. Discussed taking tacrolimus around 8 pm on Sunday for 815 am labs on Monday and to not take tacrolimus on Monday morning until after labs are drawn. Will do the rest of discharge teaching on Monday with [facility].     7/8: Reviewed above discharge teaching with West. He understood all things. All appointments and lab appts are now scheduled for next 4 weeks. Advised West to take his magnesium supplement at lunchtime, away from other meds, to help absorption. He will make that change to his med card.

## 2024-07-05 NOTE — PHARMACY-TRANSPLANT NOTE
Solid Organ Transplant Recipient Prior to Discharge Note    48 year old male s/p Liver transplant on 6/27/24.    Immunosuppression at discharge: Mycophenolate 750mg BID, tacrolimus 4mg BID, prednisone 5mg daily    Opportunistic infection ppx: Nystatin, Bactrim, Valcye    Pharmacy has monitored for medication interactions and immunosuppression levels in conjunction with the multidisciplinary team. In anticipation for discharge, medication therapy needs have been addressed daily throughout the current admission via multidisciplinary rounds and/or discussions, order verification, daily clinical pharmacy review, and communication with prescribers.  Raisa Nazario, PharmD, BCPS

## 2024-07-05 NOTE — PLAN OF CARE
Occupational Therapy Discharge Summary    Reason for therapy discharge:    All goals and outcomes met, no further needs identified.    Progress towards therapy goal(s). [facility] for goal details.  Goals met    Therapy recommendation(s):    No further therapy is recommended.    Goal Outcome Evaluation:

## 2024-07-05 NOTE — PROGRESS NOTES
Inpatient Diabetes Management Service: Daily Progress Note     HPI: 48 year old male with PMHx of hepatocellcular carcinoma s/p liver ablation 2022 and steatohepatitis, neuroendocrine tumor s/p right lobectomy, and DM2, s/p  donor liver transplant w/ bilary stent with Dr. Dodson on 24.      IDS consulted to assist with glycemic management.         Assessment/Plan:     Assessment:   Type 2 Diabetes Mellitus, without known complications, fair control  (A1c 6.1 %) in presence of complex illness (no anemia at time of A1c).  Acute hyperglycemia 2/2 steroids/stress  S/p  donor liver transplant w/ bilary stent  with Dr. Dodson on 24.  Anemia  BMI:  28  Anemia     Plan/Recommendations:   - Start Lantus 25 units q 24 hrs at 0800  - Discontinue AM NPH 20 units at 0800  - Discontinue PM NPH 10 units at 1800  - Continue Novolog Meal Coverage: 1 unit per 5 grams CHO, TID AC and PRN with snacks/supplements  - Novolog Correction Scale: high resistance (ISF: 25) TID AC / HS / 0200  - BG Monitoring: TID AC / HS / 0200  - Hypoglycemia protocol  - Carb counting protocol     Discussion:   Informed that patient will be discharged today. Transitioned him to once daily Lantus from BID NPH to simplify plan outpatient. Starting with a 20% reduction of his basal dose of NPH (0.8 x 30 units = 24). Discharge on fixed meal coverage. Encourage him to drink ensure with his meals, thus recommending TID Novolog and no snack coverage for now.  Normally follows with PCP,  Cr Martinez APRN-CNP, in [facility]. Outpatient referral to Endocrinology placed as patient will be staying locally at [place], [place] to be seen in 1-2 weeks. New to insulin and now post-liver transplant. Outpatient team to determine when appropriate to restart PTA non-insulin agents.     Inpatient Diabetes Service Recommendations for Discharge:    Primary Team to order Medications and Supplies:   Medications and  "supplies are to be ordered by primary service on discharge.   - insulin glargine-yfgn pens   - Novolog Flexpens  - \"BD\" (32G x 4mm) insulin pen needles  - glucometer, lancets, and test strips (if brand of meter not known, can be ordered \"no brand specified\" and note to pharmacy: \"can substitute per insurance coverage\")  - sharps container  - alcohol swabs  *please use the DIAB non-branded discharge supply order set ([id number])* If there are problems or issues with ordering for discharge, you may contact the pharmacist directly for assistance    Blood Glucose Checks: Three times daily before meals, and at bedtime.     Medications:   1)  Hold your Metformin and Ozempic   2) Long Acting Insulin :Glargine 25 units once daily at 9:00 am    Decrease by 3 units daily if fasting BG <100  3) Short Acting Meal Insulin:  Novolog 6 units three times daily with large meals plus correction scale:   4) Short Acting Correction Insulin: Novolog 1:50>140 before meals and 1:50 >200 at bedtime.    Correction Scale - MEDIUM INSULIN RESISTANCE DOSING     Do Not give Correction Insulin if fasting BG less than 140 mg/dL.   For  - 189 give 1 unit.   For  - 239 give 2 units.   For  - 289 give 3 units.   For  - 339 give 4 units.   For  - 389 give 5 units.   For  - 439 give 6 units   For BG greater than or equal to 440 give 7 units.     To be given with prandial (meal) insulin, and based on pre-meal/FASTING blood glucose. Administering insulin within 5 minutes of the start of the meal is ideal. Administer insulin no more than 30 minutes after the start of the meal, unless directed otherwise by provider.          Bedtime Scale. Take to correct high blood sugar around 10pm before going to bed.   Do Not give Bedtime Correction Insulin if fasting BG less than 200 mg/dL.   For  - 249 give 1 unit.   For  - 299 give 2 units.   For  - 349 give 3 units.   For  - 399 give 4 units.   For BG " greater than or equal to 400 give 5 units.   Notify provider if glucose greater than or equal to 350 mg/dL after administration of correction dose.     How to treat a low blood sugar:  You may be experiencing hypoglycemia (low blood sugar) if:  BG less than 65 mg/dL or when feeling symptoms of hypoglycemia such as  Sweating  Shakiness/Tremors  Increased appetite  Nausea  Dizziness or light-headedness  Sleepiness  Weakness  Rapid heart rate  Headache  Tingling around mouth and tongue    If you are having a low blood sugar, do the following steps:  -Eat 15 grams simple Carbs (1/2 cup orange juice, 4 glucose tablets, 1/2 cup regular soda)  -Wait 15 minutes  -Test with your blood glucose meter again  -If your blood sugar is above 70, then continue normally  -If still less than 70, repeat above process until over 70  -Call clinic for recurrent low blood glucose (more than 2 a week or 2 a day)    Diabetes Outpatient Follow-up: Follow up with Cr Martinez APRN-CNP  your PCP,  in 1-2 weeks after discharge for your diabetes. We have also placed a referral for you to follow up with Endocrinology while you are staying in the [place], in 1-2 weeks after discharge. An appointment request was sent to the [facility] coordinator to schedule your outpatient diabetes appointment. Please call the clinic at [phone] if you do not hear from them after discharge, or if you have non-urgent questions regarding your blood sugars or insulin. Follow up sooner if blood glucose runs consistently greater than 180 mg/dL or any episodes less than 70 mg/dL.     Glucose Goals:  . Your target A1c value is less than 7%.     Thank you for letting the Diabetes Management Team be involved in your care!     Instructions to patient were posted in AVS and discussed         Interval History/Review of Systems :   The last 24 hours progress and nursing notes reviewed.  Feeling well today, planning to discharge. Says he  feels like he was hit by a truck last week, and that it has been a whirlwind to process but he is getting through.   Reviewed a simplified insulin plan for discharge. Planning to see educators this afternoon. Reviewed preliminary insulin plan, once daily Lantus and TID meal dosing. Patient asking questions about snacks and in particular a protein bar he likes. Looked up CHO content in this bar and calculated he would need approximately 2-3 units to cover a bar. We discussed how he could add this, but that a priority would be to become comfortable with his transplant medications and healing first, whereas diabetes plan can be adjusted outpatient. Further discussed how insulin needs may go down with resumption of activity. Finally, informed of making a diabetes outpatient referral for him. Patient is grateful he will have someone to follow up with regarding diabetes as he is overwhelmed with all of the new information.     Planned Procedures/Surgeries: none    Inpatient Glucose Control:       Recent Labs   Lab 07/05/24  0557 07/05/24  0149 07/04/24  2144 07/04/24  1741 07/04/24  1113 07/04/24  0747   * 151* 166* 187* 249* 182*             Medications Impacting Glycemia:   Steroids:    Prednisone 5 mg q AM for 3 months post liver transplant    D5W-containing solutions/medications: none   Other medications impacting glucose: none          Nutrition:   Orders Placed This Encounter      Regular Diet Adult    Supplements: Ensure Enlive between meals at 10 am and 2 pm    TF: none   TPN: none         Diabetes History: see full consult note for complete diabetes history   Diabetes Type and Duration: T2DM diagnosed 2018 with A1c of 6.5%  Started on Metformin 1000 mg BID - this was too much resulting in GI distress and was reduced which he tolerates.      PTA Medication Regimen:   Metformin 500 mg BID  Ozempic 0.5 mg weekly on Mondays     Historical Diabetes Medications:   No other meds and never on insulin in the past       Glucose monitoring device and frequency: glucometer   Outpatient Diabetes Provider: PCP  Formal Diabetes Education/Educator: yes in the past         Physical Exam:   BP (!) 135/90 (BP Location: Right arm)   Pulse 98   Temp 98.3  F (36.8  C) (Oral)   Resp 16   Wt 92 kg (202 lb 12.8 oz)   SpO2 100%   BMI 26.04 kg/m      General Appearance:  No acute distress, resting comfortably  Alert and interactive   HEENT: Normocephalic, atraumatic. Anicteric, conjunctiva clear. Mucous membranes moist, without exudates or ulcers.    Heart: Regular rate and rhythm.    Lungs:  Breathing comfortably on RA. No cough, shortness of breath, wheezing.   Abdomen: Round, nondistended. Soft, nontender.  Symmetrical with no scars bruises, or visible masses.    Extremities:  No significant lower extremity edema. Fluid movement of extremities.  CMS intact.    Skin:  Warm and dry. No rashes, lesions or bruising.   Neuro:  Oriented x3, able to speak clearly.    Psych:  Calm, appropriate mood and affect.           Data:     Lab Results   Component Value Date    A1C 6.1 (H) 06/27/2024    A1C 7.0 (H) 06/20/2023       ROUTINE IP LABS (Last four results)  BMP  Recent Labs   Lab 07/05/24  0557 07/05/24  0149 07/04/24  2144 07/04/24  1741 07/04/24  0747 07/04/24  0558 07/03/24  0749 07/03/24  0627 07/02/24  0706 07/02/24  0640     --   --   --   --  138  --  139  --  141   POTASSIUM 4.6  --   --   --   --  4.5  --  4.1  --  3.6   CHLORIDE 105  --   --   --   --  104  --  107  --  109*   CHUCK 8.8  --   --   --   --  9.3  --  8.5*  --  8.4*   CO2 25  --   --   --   --  26  --  25  --  24   BUN 11.7  --   --   --   --  11.7  --  11.5  --  15.0   CR 0.81  --   --   --   --  0.81  --  0.69  --  0.70   * 151* 166* 187*   < > 159*   < > 144*   < > 120*    < > = values in this interval not displayed.     CBC  Recent Labs   Lab 07/05/24  0557 07/04/24  0558 07/03/24  0627 07/02/24  0640   WBC 7.8 9.3 8.9 7.7   RBC 3.34* 3.52* 3.17* 3.18*    HGB 9.9* 10.5* 9.3* 9.5*   HCT 29.3* 30.2* 26.7* 27.0*   MCV 88 86 84 85   MCH 29.6 29.8 29.3 29.9   MCHC 33.8 34.8 34.8 35.2   RDW 15.1* 14.8 13.2 13.0    254 144* 124*     INR  Recent Labs   Lab 06/30/24  0542 06/29/24  0302   INR 1.34* 1.52*       Inpatient Diabetes Service will continue to follow, please don't hesitate to contact the team with any questions or concerns.     VALENTINA Arriola, CNP   Inpatient Diabetes Management Service   Pager: [phone]   Available on Vocera      Plan discussed with patient, bedside RN, and primary team via this note.    To contact Inpatient Diabetes Service:     7 AM - 5 PM: Page the [employer] following the patient that day (see filed or incomplete progress notes/consult notes under Endocrinology)    OR if uncertain of provider assignment: page job code [phone]    5 PM - 7 AM: First call after hours is to primary service.    For urgent after-hours questions, page job code for on call fellow: [phone]     I spent a total of 45 minutes on the date of the encounter doing prep/post-work, chart review, history and exam, documentation and further activities per the note including lab review, multidisciplinary communication, counseling the patient and/or coordinating care regarding acute hyper/hypoglycemic management, as well as discharge management and planning/communication.

## 2024-07-06 ENCOUNTER — TELEPHONE (OUTPATIENT)
Dept: PHARMACY | Facility: CLINIC | Age: 49
End: 2024-07-06
Payer: COMMERCIAL

## 2024-07-06 NOTE — TELEPHONE ENCOUNTER
[place] is a post-liver transplant patient who discharged on 7/5/24. Patient chooses to receive medications from [facility]. The patient will be responsible for managing medications.    SOT*LUCAS (WEST) IS A (LIVER) TRANSPLANT PATIENT  (DATE OF TRANSPLANT: (DATE: 6/27/2024) )      HEALTH BENEFIT: ([employer] [place])  ID#[id number] [facility]# [id number] (EFFECTIVE (DATE: 8/1/2023) )      PHARMACY BENEFIT: ([facility] [place] [facility])  PROCESSING INFO: ID# [id number] [facility]# [id number] PCN# [facility] BIN# [id number] (EFFECTIVE (DATE: 8/1/2023) ).   DEDUCTIBLE ($500) & MAX OUT-OF-POCKET ($3000)  COPAY STRUCTURE:  $ (0) FOR GENERIC  $ (0) FOR BRAND  PATIENT HAS A $0 COPAY STRUCTURE DUE TO MEETING THEIR DUDUCTIBLE AND OUT OF POCKET     TEST CLAIM SPECIALTY #28  MYCOPHENOLATE 250MG (#240/30DS)=$0  PROGRAF 1MG (#120/30DS)=$0  TACROLIMUS 1MG (#120/30DS)=$0  CYCLOSPORINE 100MG (#60/30DS)=$0  VALGANCICLOVIR 450MG (#60/30DS)=$0  VALACYCLOVIR 1GM (#90/30DS)=$0    TEST CLAIM DISCHARGE #27 (18 YEARS AND OLDER):  MYCOPHENOLATE 250MG (#240/30DS)=$0  PROGRAF 1MG (#120/30DS)=$0  TACROLIMUS 1MG (#120/30DS)=$0  CYCLOSPORINE 100MG (#60/30DS)=$0  VALGANCICLOVIR 450MG (#60/30DS)=$0  VALACYCLOVIR 1GM (#90/30DS)=$0    **CAN PATIENT FILL AT [facility] FOR MEDICATIONS LISTED? YES    Thank You,  Sunshine Rodriguez, PharmD  [facility]  [phone]

## 2024-07-08 ENCOUNTER — OFFICE VISIT (OUTPATIENT)
Dept: TRANSPLANT | Facility: CLINIC | Age: 49
End: 2024-07-08
Attending: SURGERY
Payer: COMMERCIAL

## 2024-07-08 ENCOUNTER — LAB (OUTPATIENT)
Dept: LAB | Facility: CLINIC | Age: 49
End: 2024-07-08
Attending: SURGERY
Payer: COMMERCIAL

## 2024-07-08 VITALS
WEIGHT: 197.1 LBS | DIASTOLIC BLOOD PRESSURE: 79 MMHG | BODY MASS INDEX: 25.31 KG/M2 | OXYGEN SATURATION: 98 % | SYSTOLIC BLOOD PRESSURE: 121 MMHG | HEART RATE: 121 BPM

## 2024-07-08 DIAGNOSIS — Z94.4 LIVER REPLACED BY TRANSPLANT (H): ICD-10-CM

## 2024-07-08 LAB
ALBUMIN SERPL BCG-MCNC: 3.7 G/DL (ref 3.5–5.2)
ALP SERPL-CCNC: 108 U/L (ref 40–150)
ALT SERPL W P-5'-P-CCNC: 67 U/L (ref 0–70)
ANION GAP SERPL CALCULATED.3IONS-SCNC: 11 MMOL/L (ref 7–15)
AST SERPL W P-5'-P-CCNC: 36 U/L (ref 0–45)
BILIRUB DIRECT SERPL-MCNC: 0.59 MG/DL (ref 0–0.3)
BILIRUB SERPL-MCNC: 1.4 MG/DL
BUN SERPL-MCNC: 9.8 MG/DL (ref 6–20)
CALCIUM SERPL-MCNC: 9.2 MG/DL (ref 8.6–10)
CHLORIDE SERPL-SCNC: 105 MMOL/L (ref 98–107)
CREAT SERPL-MCNC: 0.77 MG/DL (ref 0.67–1.17)
DEPRECATED HCO3 PLAS-SCNC: 24 MMOL/L (ref 22–29)
EGFRCR SERPLBLD CKD-EPI 2021: >90 ML/MIN/1.73M2
ERYTHROCYTE [DISTWIDTH] IN BLOOD BY AUTOMATED COUNT: 16.2 % (ref 10–15)
GLUCOSE SERPL-MCNC: 163 MG/DL (ref 70–99)
HCT VFR BLD AUTO: 34.8 % (ref 40–53)
HGB BLD-MCNC: 11.4 G/DL (ref 13.3–17.7)
MAGNESIUM SERPL-MCNC: 1.4 MG/DL (ref 1.7–2.3)
MCH RBC QN AUTO: 28.6 PG (ref 26.5–33)
MCHC RBC AUTO-ENTMCNC: 32.8 G/DL (ref 31.5–36.5)
MCV RBC AUTO: 87 FL (ref 78–100)
PHOSPHATE SERPL-MCNC: 2.8 MG/DL (ref 2.5–4.5)
PLATELET # BLD AUTO: 262 10E3/UL (ref 150–450)
POTASSIUM SERPL-SCNC: 4.3 MMOL/L (ref 3.4–5.3)
PROT SERPL-MCNC: 6.1 G/DL (ref 6.4–8.3)
RBC # BLD AUTO: 3.98 10E6/UL (ref 4.4–5.9)
SODIUM SERPL-SCNC: 140 MMOL/L (ref 135–145)
TACROLIMUS BLD-MCNC: 10.9 UG/L (ref 5–15)
TME LAST DOSE: NORMAL H
TME LAST DOSE: NORMAL H
WBC # BLD AUTO: 10.8 10E3/UL (ref 4–11)

## 2024-07-08 PROCEDURE — 80053 COMPREHEN METABOLIC PANEL: CPT | Performed by: PATHOLOGY

## 2024-07-08 PROCEDURE — 80197 ASSAY OF TACROLIMUS: CPT | Performed by: SURGERY

## 2024-07-08 PROCEDURE — 84100 ASSAY OF PHOSPHORUS: CPT | Performed by: PATHOLOGY

## 2024-07-08 PROCEDURE — 250N000009 HC RX 250: Performed by: STUDENT IN AN ORGANIZED HEALTH CARE EDUCATION/TRAINING PROGRAM

## 2024-07-08 PROCEDURE — 99213 OFFICE O/P EST LOW 20 MIN: CPT | Mod: 24 | Performed by: SURGERY

## 2024-07-08 PROCEDURE — 36415 COLL VENOUS BLD VENIPUNCTURE: CPT | Performed by: PATHOLOGY

## 2024-07-08 PROCEDURE — 99213 OFFICE O/P EST LOW 20 MIN: CPT | Performed by: SURGERY

## 2024-07-08 PROCEDURE — 85027 COMPLETE CBC AUTOMATED: CPT | Performed by: PATHOLOGY

## 2024-07-08 PROCEDURE — 82248 BILIRUBIN DIRECT: CPT | Performed by: PATHOLOGY

## 2024-07-08 PROCEDURE — 83735 ASSAY OF MAGNESIUM: CPT | Performed by: PATHOLOGY

## 2024-07-08 PROCEDURE — 99000 SPECIMEN HANDLING OFFICE-LAB: CPT | Performed by: PATHOLOGY

## 2024-07-08 RX ORDER — LIDOCAINE HYDROCHLORIDE 10 MG/ML
INJECTION, SOLUTION EPIDURAL; INFILTRATION; INTRACAUDAL; PERINEURAL
Status: COMPLETED
Start: 2024-07-08 | End: 2024-07-08

## 2024-07-08 RX ADMIN — LIDOCAINE HYDROCHLORIDE 5 ML: 10 INJECTION, SOLUTION EPIDURAL; INFILTRATION; INTRACAUDAL; PERINEURAL at 15:40

## 2024-07-08 ASSESSMENT — PAIN SCALES - GENERAL: PAINLEVEL: MILD PAIN (2)

## 2024-07-08 NOTE — LETTER
7/8/2024      [place]  [address]  [facility] [address]      Dear Colleague,    Thank you for referring your patient, [place], to the [facility]. Please see a copy of my visit note below.    Transplant Surgery -OUTPATIENT IMMUNOSUPPRESSION PROGRESS NOTE    Date of Visit: 07/08/2024    Transplants:  6/27/2024 (Liver); Postoperative day:  11  ASSESMENT AND PLAN:  1.Graft Function:good  2.Immunosuppression Management: tac, mmf, pred  3.Hypertension:no  4.Renal Function:good  5.Lab frequency:twice per week  6.Other:none    Ton Rendon MD    I have reviewed history, examined patient and discussed plan with the fellow/resident/JAVIER.  I concur with the findings in this note.    Immunosuppressive regimen, management and long term risks discussed in detail. Changes, when applicable made as per orders.    Total time: 20 min  Including pre-encounter, face to face and post-encounter time spent on this patient's visit.                 Date: July 8, 2024    Transplant:  [x]                             Liver [x]                              Kidney []                             Pancreas []                              Other:             Chief Complaint:Post-op Visit (Liver txp )    History of Present Illness:  Patient Active Problem List   Diagnosis     Anxiety     Benign essential HTN     Calculus of gallbladder without cholecystitis without obstruction     Dermoid cyst of scalp     Eczema     Elevated LFTs     History of recent stressful life event     Hypertension     Lesion of liver     Malignant neoplasm of liver (H)     Obesity (BMI 30-39.9)     Psychophysiological insomnia     Sciatica of right side     SI (sacroiliac) joint dysfunction     Type 2 diabetes mellitus without complication, without long-term current use of insulin (H)     Primary malignant neuroendocrine tumor of lung (H)     Liver cirrhosis secondary to nonalcoholic steatohepatitis (RIOS) (H)     Kidney stone     Chronic  cough     Environmental and seasonal allergies     Mass of chest wall, right     Adjustment disorder with mixed anxiety and depressed mood     Seborrheic keratoses     Liver transplant candidate     HCC (hepatocellular carcinoma) (H)     S/P liver transplant (H)     Acute post-operative pain     Immunosuppressed status (H24)     Anemia due to blood loss, acute     Moderate malnutrition (H24)     Steroid-induced hyperglycemia     Hypomagnesemia     SOCIAL /FAMILY HISTORY: [x]                  No recent change    Past Medical History:   Diagnosis Date     Benign essential HTN 04/13/2016     Diabetes (H)      Kidney stone      Liver cancer (H) 10/04/2022     Liver cirrhosis secondary to nonalcoholic steatohepatitis (RIOS) (H)      RIOS (nonalcoholic steatohepatitis) 06/02/2023     Neuroendocrine carcinoma of lung (H)     right lung, lobectomy 3/2023     PONV (postoperative nausea and vomiting)      Past Surgical History:   Procedure Laterality Date     BRONCHOSCOPY, WITH BIOPSY, ROBOT ASSISTED N/A 1/17/2023    Procedure: BRONCHOSCOPY, USING OPTICAL TRACKING SYSTEM, ion;  Surgeon: Ani Guillaume MD;  Location: [facility] OR     DAVINCI LOBECTOMY LUNG Right 3/8/2023    Procedure: Robot-assisted right thoracoscopic lower lobectomy, mediastinal lymph node dissection, intercostal nerve cryoablation ;  Surgeon: Matheus Haas MD;  Location: [facility] OR     ENDOBRONCHIAL ULTRASOUND FLEXIBLE N/A 1/17/2023    Procedure: endobronchial  ultrasound and transbronchial biopsies;  Surgeon: Ani Guillaume MD;  Location: [facility] OR     EXCISE TUMOR CHEST WALL Right 2/1/2024    Procedure: Excision chest wall mass;  Surgeon: Matheus Haas MD;  Location: [facility]     LAPAROSCOPIC ABLATION LIVER TUMOR N/A 12/6/2022    Procedure: Diagnostic Laparoscopy, Hepatic Ultrasound, Laparoscopic ultrasound guided microwave ablation of liver tumor x1;  Surgeon: Armando Munguia MD;  Location: [facility] OR     LAPAROSCOPIC BIOPSY LIVER N/A 12/6/2022    Procedure:  Laparoscopic Ultrasound Guided liver biopsy;  Surgeon: Armando Munguia MD;  Location: [facility]     LAPAROSCOPIC CHOLECYSTECTOMY N/A 2022    Procedure: Laparoscopic cholecystectomy;  Surgeon: Armando Munguia MD;  Location: [facility] OR     TRANSPLANT LIVER RECIPIENT  DONOR N/A 2024    Procedure: Transplant liver recipient  donor with bile duct stent placement;  Surgeon: Radu Dodson MD;  Location: [facility] OR     Social History     Socioeconomic History     Marital status: Single     Spouse name: Not on file     Number of children: Not on file     Years of education: Not on file     Highest education level: Not on file   Occupational History     Not on file   Tobacco Use     Smoking status: Never     Passive exposure: Past     Smokeless tobacco: Never   Vaping Use     Vaping status: Never Used   Substance and Sexual Activity     Alcohol use: Not Currently     Comment: Last ETOH 1999     Drug use: Never     Sexual activity: Not on file   Other Topics Concern     Not on file   Social History Narrative     Not on file     Social Determinants of Health     Financial Resource Strain: Medium Risk (2023)    [facility]    Overall Financial Resource Strain (CARDIA)      Difficulty of Paying Living Expenses: Somewhat hard   Food Insecurity: No Food Insecurity (2023)    [facility]    Hunger Vital Sign      Worried About Running Out of Food in the Last Year: Never true      Ran Out of Food in the Last Year: Never true   Transportation Needs: Unknown (2023)    [facility]    PRAPARE - Transportation      Lack of Transportation (Medical): Patient declined      Lack of Transportation (Non-Medical): No   Physical Activity: Sufficiently Active (2023)    Received [facility]    Exercise Vital Sign      Days of Exercise per Week: 5 days      Minutes of Exercise per Session: 40 min   Stress: Patient Declined (1/8/2023)    [facility]    [employer] - Occupational Stress Questionnaire      Feeling of Stress : Patient declined   Social Connections: Unknown (1/8/2023)    [facility]    Social Connection and Isolation Panel [NHANES]      Frequency of Communication with Friends and Family: More than three times a week      Frequency of Social Gatherings with Friends and Family: Patient declined      Attends Judaism Services: Patient declined      Active Member of Clubs or Organizations: No      Attends Club or Organization Meetings: Never      Marital Status: Never    Interpersonal Safety: Not At Risk (1/8/2023)    [facility]    Humiliation, Afraid, Rape, and Kick questionnaire      Fear of Current or Ex-Partner: No      Emotionally Abused: No      Physically Abused: No      Sexually Abused: No   Housing Stability: Low Risk  (1/8/2023)    [facility]    Housing Stability Vital Sign      Unable to Pay for Housing in the Last Year: No      Number of Places Lived in the Last Year: 1      Unstable Housing in the Last Year: No     Prescription Medications as of 7/8/2024         Rx Number Disp Refills Start End Last Dispensed Date Next Fill Date Owning Pharmacy    acetaminophen (TYLENOL) 325 MG tablet  -- -- 7/5/2024 --       Sig: Take 2 tablets (650 mg) by mouth every 6 hours as needed for mild pain    Class: OTC    Route: Oral    Alcohol Swabs PADS  200 each 1 7/5/2024 --   [facility] [place], [place] - [address]    Sig: Use to swab the area of the injection  or nay as directed Per insurance coverage    Class: E-Prescribe    aspirin (ASA) 81 MG chewable tablet  30 tablet 5 2024 --   [address]    Si tablet (81 mg) by Oral or Feeding Tube route daily    Class: E-Prescribe    Route: Oral or Feeding Tube    blood glucose (NO BRAND SPECIFIED) lancets standard  200 each 1 2024 --   [address]    Sig: To use to test glucose level in the blood Use to test blood sugar 4  times daily as directed. To accompany glucose monitor brands per insurance coverage.    Class: E-Prescribe    blood glucose (NO BRAND SPECIFIED) test strip  100 strip 3 2024 --   [address]    Sig: To use to test glucose level in the blood Use to test blood sugar 4 times daily as directed. To accompany glucose monitor brands per insurance coverage.    Class: E-Prescribe    blood glucose monitoring (NO BRAND SPECIFIED) meter device kit  1 kit 0 2024 --   [address]    Sig: Use as directed Per insurance coverage    Class: E-Prescribe    carvedilol (COREG) 3.125 MG tablet  60 tablet 3 2024 --   [address]    Sig: Take 1 tablet (3.125 mg) by mouth 2 times daily (with meals)    Class: E-Prescribe    Route: Oral    fluticasone (FLONASE) 50 MCG/ACT nasal spray  -- -- 2023 --       Sig: Spray 1 spray into both nostrils daily as needed for allergies or rhinitis    Class: Historical    Route: Both Nostrils    insulin aspart (NOVOLOG PEN) 100 UNIT/ML pen  15 mL 1 2024 --   [address]    Sig: Inject 1-7 Units Subcutaneous at bedtime HIGH INSULIN RESISTANCE DOSING  Do Not give Bedtime Correction Insulin if BG less than 200. For  -  224 give 1 units. For  - 249 give 2 units. For  - 274 give 3 units. For  - 299 give 4 units. For  - 324 give 5 units. For  - 349 give 6 units. For BG greater than or equal to 350 give 7 units. Notify provider if glucose greater than or equal to 350 mg/dL after administration of correction dose.    Class: E-Prescribe    Route: Subcutaneous    insulin aspart (NOVOLOG PEN) 100 UNIT/ML pen  3 mL 1 7/5/2024 --   [address]    Sig: Inject 6 Units Subcutaneous 3 times daily (with meals)    Class: E-Prescribe    Route: Subcutaneous    insulin aspart (NOVOLOG PEN) 100 UNIT/ML pen  15 mL 1 7/5/2024 --   [address]    Sig: Inject 1-10 Units Subcutaneous 3 times daily (before meals) Correction Scale - MEDIUM INSULIN RESISTANCE DOSING   Do Not give Correction Insulin if BG < 140. For  - 189 give 1 unit. For  - 239 give 2 units. For  - 289 give 3 units. For  - 339 give 4 units. For BG = or > 340 give 5 units. Check blood glucose before meals/bolus feedings (or q4h if NPO) and administer based on blood glucose. Notify MD if glucose > or = 350 mg/dL after administration of correction dose.    Class: E-Prescribe    Route: Subcutaneous    insulin glargine (LANTUS PEN) 100 UNIT/ML pen  15 mL 1 7/6/2024 --   [address]    Sig: Inject 25 Units Subcutaneous daily    Class: E-Prescribe    Notes to Pharmacy: If Lantus is not covered by insurance, may substitute Basaglar or Semglee or other insulin glargine product per insurance preference at same dose and frequency.    Route: Subcutaneous    insulin pen needle (32G X 4 MM) 32G X 4 MM miscellaneous  200 each 1 7/5/2024 --   [address]    Sig: Use as directed by provider Per insurance coverage    Class:  E-Prescribe    magnesium oxide (MAG-OX) 400 MG tablet  60 tablet 3 2024 --   [address]    Sig: Take 1 tablet (400 mg) by mouth 2 times daily    Class: E-Prescribe    Route: Oral    melatonin 5 MG tablet  -- -- 2024 --       Sig: Take 1 tablet (5 mg) by mouth nightly as needed for sleep    Class: OTC    Route: Oral    methocarbamol (ROBAXIN) 500 MG tablet  15 tablet 0 2024 --   [address]    Sig: Take 1 tablet (500 mg) by mouth 4 times daily as needed for muscle spasms    Class: E-Prescribe    Route: Oral    mycophenolate (GENERIC EQUIVALENT) 250 MG capsule  180 capsule 5 2024 --   [address]    Sig: Take 3 capsules (750 mg) by mouth 2 times daily    Class: E-Prescribe    Route: Oral    oxyCODONE (ROXICODONE) 5 MG tablet  8 tablet 0 2024 --   [address]    Si.5-1 tablets (2.5-5 mg) by Oral or Feeding Tube route every 6 hours as needed for severe pain    Class: E-Prescribe    Earliest Fill Date: 2024    Route: Oral or Feeding Tube    pantoprazole (PROTONIX) 40 MG EC tablet  30 tablet 2 2024 --   [address]    Sig: Take 1 tablet (40 mg) by mouth daily    Class: E-Prescribe    Route: Oral    polyethylene glycol (MIRALAX) 17 GM/Dose powder  -- -- 2024 --       Sig: Take 17 g by mouth daily    Class: OTC    Route: Oral    predniSONE (DELTASONE) 5 MG tablet  30 tablet 2 2024 --   [address]    Sig: Take 1 tablet (5 mg) by mouth daily    Class: E-Prescribe    Route: Oral    sennosides (SENOKOT) 8.6 MG tablet  -- -- 2024 --       Si tablet by Oral or Feeding Tube route 2 times daily    Class: OTC    Route: Oral or Feeding  Tube    Sharps Container MISC  1 each 1 7/5/2024 --   [address]    Sig: Use as directed to dispose of needles, lancets and other sharps    Class: E-Prescribe    sulfamethoxazole-trimethoprim (BACTRIM) 400-80 MG tablet  30 tablet 5 7/6/2024 --   [address]    Sig: Take 1 tablet by mouth daily    Class: E-Prescribe    Route: Oral    tacrolimus (GENERIC EQUIVALENT) 0.5 MG capsule  60 capsule 11 7/5/2024 --   [address]    Sig: Tacrolimus 0.5 mg capsules BID to allow for dose adjustments.    Class: E-Prescribe    tacrolimus (GENERIC EQUIVALENT) 1 MG capsule  240 capsule 11 7/5/2024 --   [address]    Sig: Take 4 capsules (4 mg) by mouth 2 times daily    Class: E-Prescribe    Route: Oral    ULTICARE MINI 31G X 6 MM insulin pen needle  -- -- 8/15/2022 --       Sig: Inject 1 each Subcutaneous At Bedtime    Class: Historical    Route: Subcutaneous    ursodiol (ACTIGALL) 300 MG capsule  60 capsule 3 7/5/2024 --   [address]    Sig: Take 1 capsule (300 mg) by mouth 2 times daily    Class: E-Prescribe    Route: Oral    valGANciclovir (VALCYTE) 450 MG tablet  30 tablet 5 7/5/2024 --   [address]    Sig: Take 1 tablet (450 mg) by mouth daily    Class: E-Prescribe    Route: Oral          Clinic-Administered Medications as of 7/8/2024         Dose Frequency Start End    lidocaine 1 % injection 5 mL 5 mL ONCE 7/8/2024 --    Route: Subcutaneous          Patient has no known allergies.   REVIEW OF SYSTEMS (check box if normal)  [x]               GENERAL  [x]                 PULMONARY [x]                GENITOURINARY  [x]                CNS                 [x]                  CARDIAC  [x]                 ENDOCRINE  [x]                EARS,NOSE,THROAT [x]                 GASTROINTESTINAL [x]                 NEUROLOGIC    [x]                MUSCLOSKELTAL  [x]                  HEMATOLOGY      PHYSICAL EXAM (check box if normal)/79   Pulse (!) 121   Wt 89.4 kg (197 lb 1.6 oz)   SpO2 98%   BMI 25.31 kg/m          [x]            GENERAL:    [x]       EYES:  ICTERIC   []        YES  []                    NO  [x]           EXTREMITIES: Clubbing []       Y     [x]           N    [x]           EARS, NOSE, THROAT: Membranes Moist    YES   [x]                   NO []                  [x]           LUNGS:  CLEAR    YES       [x]                  NO    []                                [x]           SKIN: Jaundice           YES       []                  NO    [x]                   Rash: YES       []                  NO    [x]                                     [x]             HEART: Regular Rate          YES       [x]                  NO    []                   Incision Clean:  YES       [x]                  NO    []                                [x]                    ABDOMEN: Organomegaly YES       []                  NO    [x]                       [x]                    NEUROLOGICAL:  Nonfocal  YES       [x]                  NO    []                       [x]                    Hernia YES       []                  NO    [x]                   PSYCHIATRIC:  Appropriate  YES       [x]                  NO    []                       OTHER:                                                                                                 Incision CDI, staples removed.       Again, thank you for allowing me to participate in the care of your patient.        Sincerely,        Radu Dodson MD

## 2024-07-08 NOTE — PROGRESS NOTES
Transplant Surgery -OUTPATIENT IMMUNOSUPPRESSION PROGRESS NOTE    Date of Visit: 07/08/2024    Transplants:  6/27/2024 (Liver); Postoperative day:  11  ASSESMENT AND PLAN:  1.Graft Function:good  2.Immunosuppression Management: tac, mmf, pred  3.Hypertension:no  4.Renal Function:good  5.Lab frequency:twice per week  6.Other:none    Ton Rendon MD    I have reviewed history, examined patient and discussed plan with the fellow/resident/JAVIER.  I concur with the findings in this note.    Immunosuppressive regimen, management and long term risks discussed in detail. Changes, when applicable made as per orders.    Total time: 20 min  Including pre-encounter, face to face and post-encounter time spent on this patient's visit.                 Date: July 8, 2024    Transplant:  [x]                             Liver [x]                              Kidney []                             Pancreas []                              Other:             Chief Complaint:Post-op Visit (Liver txp )    History of Present Illness:  Patient Active Problem List   Diagnosis    Anxiety    Benign essential HTN    Calculus of gallbladder without cholecystitis without obstruction    Dermoid cyst of scalp    Eczema    Elevated LFTs    History of recent stressful life event    Hypertension    Lesion of liver    Malignant neoplasm of liver (H)    Obesity (BMI 30-39.9)    Psychophysiological insomnia    Sciatica of right side    SI (sacroiliac) joint dysfunction    Type 2 diabetes mellitus without complication, without long-term current use of insulin (H)    Primary malignant neuroendocrine tumor of lung (H)    Liver cirrhosis secondary to nonalcoholic steatohepatitis (RIOS) (H)    Kidney stone    Chronic cough    Environmental and seasonal allergies    Mass of chest wall, right    Adjustment disorder with mixed anxiety and depressed mood    Seborrheic keratoses    Liver transplant candidate    HCC (hepatocellular carcinoma) (H)    S/P liver  transplant (H)    Acute post-operative pain    Immunosuppressed status (H24)    Anemia due to blood loss, acute    Moderate malnutrition (H24)    Steroid-induced hyperglycemia    Hypomagnesemia     SOCIAL /FAMILY HISTORY: [x]                  No recent change    Past Medical History:   Diagnosis Date    Benign essential HTN 2016    Diabetes (H)     Kidney stone     Liver cancer (H) 10/04/2022    Liver cirrhosis secondary to nonalcoholic steatohepatitis (RIOS) (H)     RIOS (nonalcoholic steatohepatitis) 2023    Neuroendocrine carcinoma of lung (H)     right lung, lobectomy 3/2023    PONV (postoperative nausea and vomiting)      Past Surgical History:   Procedure Laterality Date    BRONCHOSCOPY, WITH BIOPSY, ROBOT ASSISTED N/A 2023    Procedure: BRONCHOSCOPY, USING OPTICAL TRACKING SYSTEM, ion;  Surgeon: Ani Guillaume MD;  Location: [facility] OR    DAVINCI LOBECTOMY LUNG Right 3/8/2023    Procedure: Robot-assisted right thoracoscopic lower lobectomy, mediastinal lymph node dissection, intercostal nerve cryoablation ;  Surgeon: Matheus Haas MD;  Location: [facility] OR    ENDOBRONCHIAL ULTRASOUND FLEXIBLE N/A 2023    Procedure: endobronchial  ultrasound and transbronchial biopsies;  Surgeon: Ani Guillaume MD;  Location: [facility] OR    EXCISE TUMOR CHEST WALL Right 2024    Procedure: Excision chest wall mass;  Surgeon: Matheus Haas MD;  Location: [facility]    LAPAROSCOPIC ABLATION LIVER TUMOR N/A 2022    Procedure: Diagnostic Laparoscopy, Hepatic Ultrasound, Laparoscopic ultrasound guided microwave ablation of liver tumor x1;  Surgeon: Armando Munguia MD;  Location: [facility]    LAPAROSCOPIC BIOPSY LIVER N/A 2022    Procedure: Laparoscopic Ultrasound Guided liver biopsy;  Surgeon: Armando Munguia MD;  Location: [facility]    LAPAROSCOPIC CHOLECYSTECTOMY N/A 2022    Procedure: Laparoscopic cholecystectomy;  Surgeon: Armando Munguia MD;  Location: [facility]    TRANSPLANT LIVER RECIPIENT   DONOR N/A 2024    Procedure: Transplant liver recipient  donor with bile duct stent placement;  Surgeon: Radu Dodson MD;  Location:  OR     Social History     Socioeconomic History    Marital status: Single     Spouse name: Not on file    Number of children: Not on file    Years of education: Not on file    Highest education level: Not on file   Occupational History    Not on file   Tobacco Use    Smoking status: Never     Passive exposure: Past    Smokeless tobacco: Never   Vaping Use    Vaping status: Never Used   Substance and Sexual Activity    Alcohol use: Not Currently     Comment: Last ETOH 1999    Drug use: Never    Sexual activity: Not on file   Other Topics Concern    Not on file   Social History Narrative    Not on file     Social Determinants of Health     Financial Resource Strain: Medium Risk (2023)    [facility]    Overall Financial Resource Strain (CARDIA)     Difficulty of Paying Living Expenses: Somewhat hard   Food Insecurity: No Food Insecurity (2023)    [facility]    Hunger Vital Sign     Worried About Running Out of Food in the Last Year: Never true     Ran Out of Food in the Last Year: Never true   Transportation Needs: Unknown (2023)    [facility]    PRAPARE - Transportation     Lack of Transportation (Medical): Patient declined     Lack of Transportation (Non-Medical): No   Physical Activity: Sufficiently Active (2023)    [facility]    Exercise Vital Sign     Days of Exercise per Week: 5 days     Minutes of Exercise per Session: 40 min   Stress: Patient Declined (2023)    Received from [facility], [facility] -  Occupational Stress Questionnaire     Feeling of Stress : Patient declined   Social Connections: Unknown (2023)    [facility]    Social Connection and Isolation Panel [NHANES]     Frequency of Communication with Friends and Family: More than three times a week     Frequency of Social Gatherings with Friends and Family: Patient declined     Attends Hoahaoism Services: Patient declined     Active Member of Clubs or Organizations: No     Attends Club or Organization Meetings: Never     Marital Status: Never    Interpersonal Safety: Not At Risk (2023)    [facility]    Humiliation, Afraid, Rape, and Kick questionnaire     Fear of Current or Ex-Partner: No     Emotionally Abused: No     Physically Abused: No     Sexually Abused: No   Housing Stability: Low Risk  (2023)    [facility]    Housing Stability Vital Sign     Unable to Pay for Housing in the Last Year: No     Number of Places Lived in the Last Year: 1     Unstable Housing in the Last Year: No     Prescription Medications as of 2024         Rx Number Disp Refills Start End Last Dispensed Date Next Fill Date Owning Pharmacy    acetaminophen (TYLENOL) 325 MG tablet  -- -- 2024 --       Sig: Take 2 tablets (650 mg) by mouth every 6 hours as needed for mild pain    Class: OTC    Route: Oral    Alcohol Swabs PADS  200 each 1 2024 --   [address]    Sig: Use to swab the area of the injection or nay as directed Per insurance coverage    Class: E-Prescribe    aspirin (ASA) 81 MG chewable tablet  30 tablet 5 2024 --   [place], [place] - [address] [place] [address] St     Si tablet (81 mg) by Oral or Feeding Tube route daily    Class: E-Prescribe    Route: Oral or  Feeding Tube    blood glucose (NO BRAND SPECIFIED) lancets standard  200 each 1 7/5/2024 --   [address]    Sig: To use to test glucose level in the blood Use to test blood sugar 4  times daily as directed. To accompany glucose monitor brands per insurance coverage.    Class: E-Prescribe    blood glucose (NO BRAND SPECIFIED) test strip  100 strip 3 7/5/2024 --   [address]    Sig: To use to test glucose level in the blood Use to test blood sugar 4 times daily as directed. To accompany glucose monitor brands per insurance coverage.    Class: E-Prescribe    blood glucose monitoring (NO BRAND SPECIFIED) meter device kit  1 kit 0 7/5/2024 --   [address]    Sig: Use as directed Per insurance coverage    Class: E-Prescribe    carvedilol (COREG) 3.125 MG tablet  60 tablet 3 7/5/2024 --   [address]    Sig: Take 1 tablet (3.125 mg) by mouth 2 times daily (with meals)    Class: E-Prescribe    Route: Oral    fluticasone (FLONASE) 50 MCG/ACT nasal spray  -- -- 6/24/2023 --       Sig: Spray 1 spray into both nostrils daily as needed for allergies or rhinitis    Class: Historical    Route: Both Nostrils    insulin aspart (NOVOLOG PEN) 100 UNIT/ML pen  15 mL 1 7/5/2024 --   [address]    Sig: Inject 1-7 Units Subcutaneous at bedtime HIGH INSULIN RESISTANCE DOSING  Do Not give Bedtime Correction Insulin if BG less than 200. For  - 224 give 1 units. For  - 249 give 2 units. For  - 274 give 3 units. For  - 299 give 4 units. For  - 324 give 5 units. For  - 349 give 6 units. For BG greater than or equal to 350 give 7 units. Notify provider if glucose greater than or equal to 350 mg/dL after  administration of correction dose.    Class: E-Prescribe    Route: Subcutaneous    insulin aspart (NOVOLOG PEN) 100 UNIT/ML pen  3 mL 1 7/5/2024 --   [address]    Sig: Inject 6 Units Subcutaneous 3 times daily (with meals)    Class: E-Prescribe    Route: Subcutaneous    insulin aspart (NOVOLOG PEN) 100 UNIT/ML pen  15 mL 1 7/5/2024 --   [address]    Sig: Inject 1-10 Units Subcutaneous 3 times daily (before meals) Correction Scale - MEDIUM INSULIN RESISTANCE DOSING   Do Not give Correction Insulin if BG < 140. For  - 189 give 1 unit. For  - 239 give 2 units. For  - 289 give 3 units. For  - 339 give 4 units. For BG = or > 340 give 5 units. Check blood glucose before meals/bolus feedings (or q4h if NPO) and administer based on blood glucose. Notify MD if glucose > or = 350 mg/dL after administration of correction dose.    Class: E-Prescribe    Route: Subcutaneous    insulin glargine (LANTUS PEN) 100 UNIT/ML pen  15 mL 1 7/6/2024 --   [address]    Sig: Inject 25 Units Subcutaneous daily    Class: E-Prescribe    Notes to Pharmacy: If Lantus is not covered by insurance, may substitute Basaglar or Semglee or other insulin glargine product per insurance preference at same dose and frequency.    Route: Subcutaneous    insulin pen needle (32G X 4 MM) 32G X 4 MM miscellaneous  200 each 1 7/5/2024 --   [address]    Sig: Use as directed by provider Per insurance coverage    Class: E-Prescribe    magnesium oxide (MAG-OX) 400 MG tablet  60 tablet 3 7/5/2024 --   [address]    Sig: Take 1 tablet (400 mg) by mouth 2 times daily    Class: E-Prescribe    Route: Oral    melatonin 5 MG tablet  -- -- 7/5/2024 --        Sig: Take 1 tablet (5 mg) by mouth nightly as needed for sleep    Class: OTC    Route: Oral    methocarbamol (ROBAXIN) 500 MG tablet  15 tablet 0 2024 --   [address]    Sig: Take 1 tablet (500 mg) by mouth 4 times daily as needed for muscle spasms    Class: E-Prescribe    Route: Oral    mycophenolate (GENERIC EQUIVALENT) 250 MG capsule  180 capsule 5 2024 --   [address]    Sig: Take 3 capsules (750 mg) by mouth 2 times daily    Class: E-Prescribe    Route: Oral    oxyCODONE (ROXICODONE) 5 MG tablet  8 tablet 0 2024 --   [address]    Si.5-1 tablets (2.5-5 mg) by Oral or Feeding Tube route every 6 hours as needed for severe pain    Class: E-Prescribe    Earliest Fill Date: 2024    Route: Oral or Feeding Tube    pantoprazole (PROTONIX) 40 MG EC tablet  30 tablet 2 2024 --   [address]    Sig: Take 1 tablet (40 mg) by mouth daily    Class: E-Prescribe    Route: Oral    polyethylene glycol (MIRALAX) 17 GM/Dose powder  -- -- 2024 --       Sig: Take 17 g by mouth daily    Class: OTC    Route: Oral    predniSONE (DELTASONE) 5 MG tablet  30 tablet 2 2024 --   [address]    Sig: Take 1 tablet (5 mg) by mouth daily    Class: E-Prescribe    Route: Oral    sennosides (SENOKOT) 8.6 MG tablet  -- -- 2024 --       Si tablet by Oral or Feeding Tube route 2 times daily    Class: OTC    Route: Oral or Feeding Tube    Sharps Container MISC  1 each 1 2024 --   [address]    Sig: Use as directed to dispose of needles, lancets and other sharps    Class: E-Prescribe    sulfamethoxazole-trimethoprim (BACTRIM) 400-80 MG tablet  30 tablet 5  7/6/2024 --   [address]    Sig: Take 1 tablet by mouth daily    Class: E-Prescribe    Route: Oral    tacrolimus (GENERIC EQUIVALENT) 0.5 MG capsule  60 capsule 11 7/5/2024 --   [address]    Sig: Tacrolimus 0.5 mg capsules BID to allow for dose adjustments.    Class: E-Prescribe    tacrolimus (GENERIC EQUIVALENT) 1 MG capsule  240 capsule 11 7/5/2024 --   [address]    Sig: Take 4 capsules (4 mg) by mouth 2 times daily    Class: E-Prescribe    Route: Oral    ULTICARE MINI 31G X 6 MM insulin pen needle  -- -- 8/15/2022 --       Sig: Inject 1 each Subcutaneous At Bedtime    Class: Historical    Route: Subcutaneous    ursodiol (ACTIGALL) 300 MG capsule  60 capsule 3 7/5/2024 --   [address]    Sig: Take 1 capsule (300 mg) by mouth 2 times daily    Class: E-Prescribe    Route: Oral    valGANciclovir (VALCYTE) 450 MG tablet  30 tablet 5 7/5/2024 --   [address]    Sig: Take 1 tablet (450 mg) by mouth daily    Class: E-Prescribe    Route: Oral          Clinic-Administered Medications as of 7/8/2024         Dose Frequency Start End    lidocaine 1 % injection 5 mL 5 mL ONCE 7/8/2024 --    Route: Subcutaneous          Patient has no known allergies.   REVIEW OF SYSTEMS (check box if normal)  [x]               GENERAL  [x]                 PULMONARY [x]                GENITOURINARY  [x]                CNS                 [x]                 CARDIAC  [x]                 ENDOCRINE  [x]                EARS,NOSE,THROAT [x]                 GASTROINTESTINAL [x]                 NEUROLOGIC    [x]                MUSCLOSKELTAL  [x]                  HEMATOLOGY      PHYSICAL EXAM (check box if normal)/79   Pulse (!) 121   Wt 89.4 kg  (197 lb 1.6 oz)   SpO2 98%   BMI 25.31 kg/m          [x]            GENERAL:    [x]       EYES:  ICTERIC   []        YES  []                    NO  [x]           EXTREMITIES: Clubbing []       Y     [x]           N    [x]           EARS, NOSE, THROAT: Membranes Moist    YES   [x]                   NO []                  [x]           LUNGS:  CLEAR    YES       [x]                  NO    []                                [x]           SKIN: Jaundice           YES       []                  NO    [x]                   Rash: YES       []                  NO    [x]                                     [x]             HEART: Regular Rate          YES       [x]                  NO    []                   Incision Clean:  YES       [x]                  NO    []                                [x]                    ABDOMEN: Organomegaly YES       []                  NO    [x]                       [x]                    NEUROLOGICAL:  Nonfocal  YES       [x]                  NO    []                       [x]                    Hernia YES       []                  NO    [x]                   PSYCHIATRIC:  Appropriate  YES       [x]                  NO    []                       OTHER:                                                                                                 Incision CDI, staples removed.

## 2024-07-08 NOTE — NURSING NOTE
Staples Removal    /79   Pulse (!) 121   Wt 89.4 kg (197 lb 1.6 oz)   SpO2 98%   BMI 25.31 kg/m      Removed patients staples. Surgical incision site was Clean, dry, and intact.    Educated patient on continued care around incision site.     UZMA ZACARIAS, RN on 7/8/2024 at 3:41 PM

## 2024-07-10 ENCOUNTER — TELEPHONE (OUTPATIENT)
Dept: TRANSPLANT | Facility: CLINIC | Age: 49
End: 2024-07-10

## 2024-07-10 ENCOUNTER — LAB (OUTPATIENT)
Dept: LAB | Facility: CLINIC | Age: 49
End: 2024-07-10
Payer: COMMERCIAL

## 2024-07-10 ENCOUNTER — VIRTUAL VISIT (OUTPATIENT)
Dept: PHARMACY | Facility: CLINIC | Age: 49
End: 2024-07-10
Payer: COMMERCIAL

## 2024-07-10 DIAGNOSIS — R19.5 LOOSE STOOLS: ICD-10-CM

## 2024-07-10 DIAGNOSIS — E83.42 HYPOMAGNESEMIA: Primary | ICD-10-CM

## 2024-07-10 DIAGNOSIS — E11.9 TYPE 2 DIABETES MELLITUS WITHOUT COMPLICATION, WITHOUT LONG-TERM CURRENT USE OF INSULIN (H): ICD-10-CM

## 2024-07-10 DIAGNOSIS — Z94.4 LIVER REPLACED BY TRANSPLANT (H): ICD-10-CM

## 2024-07-10 DIAGNOSIS — R52 PAIN: ICD-10-CM

## 2024-07-10 DIAGNOSIS — I10 ESSENTIAL HYPERTENSION: ICD-10-CM

## 2024-07-10 DIAGNOSIS — Z94.4 S/P LIVER TRANSPLANT (H): ICD-10-CM

## 2024-07-10 DIAGNOSIS — Z78.9 TAKES DIETARY SUPPLEMENTS: ICD-10-CM

## 2024-07-10 DIAGNOSIS — Z94.4 S/P LIVER TRANSPLANT (H): Primary | ICD-10-CM

## 2024-07-10 LAB
ALBUMIN SERPL BCG-MCNC: 3.7 G/DL (ref 3.5–5.2)
ALP SERPL-CCNC: 96 U/L (ref 40–150)
ALT SERPL W P-5'-P-CCNC: 46 U/L (ref 0–70)
ANION GAP SERPL CALCULATED.3IONS-SCNC: 9 MMOL/L (ref 7–15)
AST SERPL W P-5'-P-CCNC: 22 U/L (ref 0–45)
BILIRUB DIRECT SERPL-MCNC: 0.48 MG/DL (ref 0–0.3)
BILIRUB SERPL-MCNC: 1.3 MG/DL
BUN SERPL-MCNC: 15.5 MG/DL (ref 6–20)
CALCIUM SERPL-MCNC: 9.3 MG/DL (ref 8.6–10)
CHLORIDE SERPL-SCNC: 105 MMOL/L (ref 98–107)
CREAT SERPL-MCNC: 0.77 MG/DL (ref 0.67–1.17)
DEPRECATED HCO3 PLAS-SCNC: 24 MMOL/L (ref 22–29)
EGFRCR SERPLBLD CKD-EPI 2021: >90 ML/MIN/1.73M2
ERYTHROCYTE [DISTWIDTH] IN BLOOD BY AUTOMATED COUNT: 17.1 % (ref 10–15)
GLUCOSE SERPL-MCNC: 251 MG/DL (ref 70–99)
HCT VFR BLD AUTO: 35.8 % (ref 40–53)
HGB BLD-MCNC: 11.8 G/DL (ref 13.3–17.7)
MAGNESIUM SERPL-MCNC: 1.2 MG/DL (ref 1.7–2.3)
MCH RBC QN AUTO: 28.9 PG (ref 26.5–33)
MCHC RBC AUTO-ENTMCNC: 33 G/DL (ref 31.5–36.5)
MCV RBC AUTO: 88 FL (ref 78–100)
PHOSPHATE SERPL-MCNC: 2.8 MG/DL (ref 2.5–4.5)
PLATELET # BLD AUTO: 275 10E3/UL (ref 150–450)
POTASSIUM SERPL-SCNC: 4.3 MMOL/L (ref 3.4–5.3)
PROT SERPL-MCNC: 6.1 G/DL (ref 6.4–8.3)
RBC # BLD AUTO: 4.09 10E6/UL (ref 4.4–5.9)
SODIUM SERPL-SCNC: 138 MMOL/L (ref 135–145)
TACROLIMUS BLD-MCNC: 11.3 UG/L (ref 5–15)
TME LAST DOSE: NORMAL H
TME LAST DOSE: NORMAL H
WBC # BLD AUTO: 9.6 10E3/UL (ref 4–11)

## 2024-07-10 PROCEDURE — 99207 PR NO CHARGE LOS: CPT | Mod: 93 | Performed by: PHARMACIST

## 2024-07-10 PROCEDURE — 80197 ASSAY OF TACROLIMUS: CPT | Performed by: SURGERY

## 2024-07-10 PROCEDURE — 82248 BILIRUBIN DIRECT: CPT | Performed by: PATHOLOGY

## 2024-07-10 PROCEDURE — 83735 ASSAY OF MAGNESIUM: CPT | Performed by: PATHOLOGY

## 2024-07-10 PROCEDURE — 84100 ASSAY OF PHOSPHORUS: CPT | Performed by: PATHOLOGY

## 2024-07-10 PROCEDURE — 85027 COMPLETE CBC AUTOMATED: CPT | Performed by: PATHOLOGY

## 2024-07-10 PROCEDURE — 99000 SPECIMEN HANDLING OFFICE-LAB: CPT | Performed by: PATHOLOGY

## 2024-07-10 PROCEDURE — 80053 COMPREHEN METABOLIC PANEL: CPT | Performed by: PATHOLOGY

## 2024-07-10 PROCEDURE — 36415 COLL VENOUS BLD VENIPUNCTURE: CPT | Performed by: PATHOLOGY

## 2024-07-10 RX ORDER — EPINEPHRINE 1 MG/ML
0.3 INJECTION, SOLUTION, CONCENTRATE INTRAVENOUS EVERY 5 MIN PRN
Status: CANCELLED | OUTPATIENT
Start: 2024-07-10

## 2024-07-10 RX ORDER — MAGNESIUM SULFATE HEPTAHYDRATE 40 MG/ML
4 INJECTION, SOLUTION INTRAVENOUS ONCE
Status: CANCELLED
Start: 2024-07-10 | End: 2024-07-10

## 2024-07-10 RX ORDER — ALBUTEROL SULFATE 90 UG/1
1-2 AEROSOL, METERED RESPIRATORY (INHALATION)
Status: CANCELLED
Start: 2024-07-10

## 2024-07-10 RX ORDER — METHYLPREDNISOLONE SODIUM SUCCINATE 125 MG/2ML
125 INJECTION, POWDER, LYOPHILIZED, FOR SOLUTION INTRAMUSCULAR; INTRAVENOUS
Status: CANCELLED
Start: 2024-07-10

## 2024-07-10 RX ORDER — MEPERIDINE HYDROCHLORIDE 25 MG/ML
25 INJECTION INTRAMUSCULAR; INTRAVENOUS; SUBCUTANEOUS EVERY 30 MIN PRN
Status: CANCELLED | OUTPATIENT
Start: 2024-07-10

## 2024-07-10 RX ORDER — DIPHENHYDRAMINE HYDROCHLORIDE 50 MG/ML
50 INJECTION INTRAMUSCULAR; INTRAVENOUS
Status: CANCELLED
Start: 2024-07-10

## 2024-07-10 RX ORDER — MAGNESIUM OXIDE 400 MG/1
800 TABLET ORAL 2 TIMES DAILY
Qty: 120 TABLET | Refills: 3 | Status: SHIPPED | OUTPATIENT
Start: 2024-07-10 | End: 2024-07-30

## 2024-07-10 RX ORDER — ALBUTEROL SULFATE 0.83 MG/ML
2.5 SOLUTION RESPIRATORY (INHALATION)
Status: CANCELLED | OUTPATIENT
Start: 2024-07-10

## 2024-07-10 NOTE — TELEPHONE ENCOUNTER
Magnesium 1.2 today. Message to Dr. Dodson and Dr. Rendon to get guidance on IV magnesium dose. Dr. Rendon would like 4 gm magnesium sulfate IV x1. Therapy plan placed.     [employer] message sent to West.

## 2024-07-10 NOTE — PATIENT INSTRUCTIONS
"Recommendations from today's MTM visit:                                                      Increase Magnesium 800mg (2 tabs) twice daily. Take at 12pm and 6pm.  Can back off on Senna and use only as needed if you don't have a bowel movement for a day.   Take morning and evening meds after food to help with nausea. Can move dose time for Sulfamethoxazole, Valcyte, Aspirin to whenever is convenient.   Can stop Bedtime Novolog.   Increase Lantus to 28 units every morning.   Increase Novolog to 8 units plus sliding scale before meals.   [facility] mail order will call you three weeks post discharge, but if your dose changes, call the number on the back of the pill box to talk to them earlier, [phone]. If your doctor sends over a new prescription to the mail order pharmacy you will have to call them to set up the order. They have an Estefany called \"[facility]\" as well.     Follow-up: 7/29 at 2:30 PM     It was great speaking with you today.  I value your experience and would be very thankful for your time in providing feedback in our clinic survey. In the next few days, you may receive an email or text message from [facility] with a link to a survey related to your  clinical pharmacist.\"     To schedule another MTM appointment, please call the clinic directly or you may call the MTM scheduling line at [phone] or toll-free at [phone].     My Clinical Pharmacist's contact information:                                                      Please feel free to contact me with any questions or concerns you have.      Zafar Arroyo, PharmD  MTM Pharmacist    Phone: [phone]     "

## 2024-07-10 NOTE — PROGRESS NOTES
"Medication Therapy Management (MTM) Encounter    ASSESSMENT:                            Medication Adherence/Access: No issues identified    Liver Transplant:    Stable.    Supplements:   Magnesium levels dropped substantially, will increase magnesium dose.     Diabetes   Fasting and premeal sugars not at goal  or  more tightly. Will titrate both insulins. Bedtime Novolog likely unsafe for patient, he has been giving quite varying doses (2-9 units). To simplify his schedule we will have him discontinue this.      Hypertension   BP at goal <140/90 in clinic    Loose stools:   Can back off on Senna as his stools are on the looser side.     Pain:   Stable.     PLAN:                            Increase Magnesium 800mg (2 tabs) twice daily. Take at 12pm and 6pm.  Can back off on Senna and use only as needed if you don't have a bowel movement for a day.   Take morning and evening meds after food to help with nausea. Can move dose time for Sulfamethoxazole, Valcyte, Aspirin to whenever is convenient.   Can stop Bedtime Novolog.   Increase Lantus to 28 units every morning.   Increase Novolog to 8 units plus sliding scale before meals.   [facility] mail order will call you three weeks post discharge, but if your dose changes, call the number on the back of the pill box to talk to them earlier, [phone]. If your doctor sends over a new prescription to the mail order pharmacy you will have to call them to set up the order. They have an Estefany called \"[facility]\" as well.     Follow-up: 7/29 at 2:30 PM    SUBJECTIVE/OBJECTIVE:                          West Van is a 48 year old male seen for a transitions of care visit. He was discharged from [facility] on 7/5 for liver transplant.      Reason for visit: initial post txp.    Allergies/ADRs: Reviewed in chart  Past Medical History: Reviewed in chart  Tobacco: He reports that he has never smoked. He has been exposed to tobacco smoke. He has never used smokeless " tobacco.  Alcohol: not currently using  THC/CBD: none    Medication Adherence/Access: Patient takes medications directly from bottles and uses pill box(es).  Patient takes medications 4 time(s) per day. 8am, 8pm  Per patient, misses medication 0 times per week.   The patient fills medications at [facility]: YES.    Liver Transplant:    Tacrolimus 4 mg twice daily  Mycophenolate Mofetil 750 mg twice daily  Prednisone 5 mg every morning.   Pt reports loose stools mild, feels unsettled after taking meds a little headache/ nauseous.   Transplant date: 24  Vascular Prophylaxis: Aspirin 81mg daily. Denies gastrointestinal bleed sx.   Other meds: Ursodiol 300mg twice daily.   CMV prophylaxis: CrCl >40 mL/minute: Valcyte 450 mg once daily Donor (+), Recipient (-), treat 6 months post tx   Valcyte birth defects discussed: Yes   PJP prophylaxis: Bactrim SS daily for 6 months post txp  PPI use: Pantoprazole 40mg daily. Denies GERD sx.   Tx Coordinator: Dean Figueroa MD: Dr. Leventhal, Dr. Dodson, Using Med Card: Yes  Immunizations: annual flu shot ; [id number]:  ; Prevnar 20: unknown; TDaP:  ; Shingrix: x2, HBV: immunity per last titers, COVID: Primary x 3    Estimated Creatinine Clearance: 148.4 mL/min (based on SCr of 0.77 mg/dL).    Supplements:   Mag Oxide 400mg twice daily ( 2 hours from MMF) .  Lab Results   Component Value Date    MAG 1.2 (L) 07/10/2024     Diabetes   Lantus 25 units daily.  Novolog 6 units 3 times daily sliding scale with meals.   Patient is not experiencing side effects.  Current diabetes symptoms:  denies sx  Blood sugar monitorin time(s) daily; Ranges: (patient reported)    Date FBG/ 2hours post Lunch/2hours post Dinner /2hours post bedtime   7/10 /251      7/9 195 230 250 296 gave 9 units novolog    189 250 270 267 2 units     240 265 260    193 211 237 255      Hypertension   Carvedilol 3.125mg twice daily  Patient reports no current medication side  effects  Patient self monitors blood pressure.  Home BP monitoring 120/90s .       BP Readings from Last 3 Encounters:   07/08/24 121/79   07/05/24 130/81   03/11/24 114/71     Loose stools:   Senna 1 tablet daily.   Having 1 non formed stool daily, not watery.     Pain:   Acetaminophen 650mg prn- rarely  Methocarbamol 500mg prn.  Not using Oxycodone.  Pain can be good to noticeable. Laying in bed he can get stiff. Well controlled overall.     Today's Vitals: There were no vitals taken for this visit.  ----------------  Post Discharge Medication Reconciliation Status: discharge medications reconciled and changed, per note/orders.    I spent 33 minutes with this patient today. All changes were made via collaborative practice agreement with Dr. Dodson. A copy of the visit note was provided to the patient's provider(s).    A summary of these recommendations was sent via [employer].    Zafar Arroyo, PharmD  [facility] Pharmacist    Phone: [phone]     Telemedicine Visit Details  Type of service:  Telephone visit  Start Time: 11:18 AM  End Time: 11:51 AM     Medication Therapy Recommendations  Loose stools    Current Medication: sennosides (SENOKOT) 8.6 MG tablet   Rationale: No medical indication at this time - Unnecessary medication therapy - Indication   Recommendation: Discontinue Medication   Status: Accepted - no CPA Needed         Takes dietary supplements    Current Medication: magnesium oxide (MAG-OX) 400 MG tablet   Rationale: Dose too low - Dosage too low - Effectiveness   Recommendation: Increase Dose   Status: Accepted per CPA         Type 2 diabetes mellitus without complication, without long-term current use of insulin (H)    Current Medication: insulin glargine (LANTUS PEN) 100 UNIT/ML pen   Rationale: Dose too low - Dosage too low - Effectiveness   Recommendation: Increase Dose   Status: Accepted per CPA

## 2024-07-12 ENCOUNTER — INFUSION THERAPY VISIT (OUTPATIENT)
Dept: INFUSION THERAPY | Facility: CLINIC | Age: 49
End: 2024-07-12
Attending: SURGERY
Payer: COMMERCIAL

## 2024-07-12 VITALS
HEART RATE: 64 BPM | DIASTOLIC BLOOD PRESSURE: 96 MMHG | SYSTOLIC BLOOD PRESSURE: 151 MMHG | RESPIRATION RATE: 18 BRPM | OXYGEN SATURATION: 100 % | TEMPERATURE: 98.1 F

## 2024-07-12 DIAGNOSIS — E83.42 HYPOMAGNESEMIA: Primary | ICD-10-CM

## 2024-07-12 DIAGNOSIS — Z94.4 S/P LIVER TRANSPLANT (H): ICD-10-CM

## 2024-07-12 PROCEDURE — 96365 THER/PROPH/DIAG IV INF INIT: CPT

## 2024-07-12 PROCEDURE — 96366 THER/PROPH/DIAG IV INF ADDON: CPT

## 2024-07-12 PROCEDURE — 250N000011 HC RX IP 250 OP 636: Performed by: STUDENT IN AN ORGANIZED HEALTH CARE EDUCATION/TRAINING PROGRAM

## 2024-07-12 RX ORDER — EPINEPHRINE 1 MG/ML
0.3 INJECTION, SOLUTION INTRAMUSCULAR; SUBCUTANEOUS EVERY 5 MIN PRN
OUTPATIENT
Start: 2024-07-12

## 2024-07-12 RX ORDER — MEPERIDINE HYDROCHLORIDE 25 MG/ML
25 INJECTION INTRAMUSCULAR; INTRAVENOUS; SUBCUTANEOUS EVERY 30 MIN PRN
OUTPATIENT
Start: 2024-07-12

## 2024-07-12 RX ORDER — DIPHENHYDRAMINE HYDROCHLORIDE 50 MG/ML
50 INJECTION INTRAMUSCULAR; INTRAVENOUS
Start: 2024-07-12

## 2024-07-12 RX ORDER — ALBUTEROL SULFATE 90 UG/1
1-2 AEROSOL, METERED RESPIRATORY (INHALATION)
Start: 2024-07-12

## 2024-07-12 RX ORDER — ALBUTEROL SULFATE 0.83 MG/ML
2.5 SOLUTION RESPIRATORY (INHALATION)
OUTPATIENT
Start: 2024-07-12

## 2024-07-12 RX ORDER — METHYLPREDNISOLONE SODIUM SUCCINATE 125 MG/2ML
125 INJECTION, POWDER, LYOPHILIZED, FOR SOLUTION INTRAMUSCULAR; INTRAVENOUS
Start: 2024-07-12

## 2024-07-12 RX ORDER — MAGNESIUM SULFATE HEPTAHYDRATE 40 MG/ML
4 INJECTION, SOLUTION INTRAVENOUS ONCE
Status: COMPLETED | OUTPATIENT
Start: 2024-07-12 | End: 2024-07-12

## 2024-07-12 RX ORDER — MAGNESIUM SULFATE HEPTAHYDRATE 40 MG/ML
4 INJECTION, SOLUTION INTRAVENOUS ONCE
Status: CANCELLED
Start: 2024-07-12 | End: 2024-07-12

## 2024-07-12 RX ADMIN — MAGNESIUM SULFATE HEPTAHYDRATE 4 G: 40 INJECTION, SOLUTION INTRAVENOUS at 13:30

## 2024-07-12 NOTE — PATIENT INSTRUCTIONS
Dear West Van    Thank you for choosing [facility] ([facility]) for your infusion.  The following information is a summary of our appointment as well as important reminders.      If you are a transplant patient and require transplant education, please click on this link: [email or web address].    We look forward in seeing you on your next appointment here at [facility] ([facility]).  Please don t hesitate to call us at [phone] to reschedule any of your appointments or to speak with one of the [facility] registered nurses.  It was a pleasure taking care of you today.    Sincerely,    [facility]  [facility]  [address] [place] [address]  [address]  Phone:  [phone]

## 2024-07-12 NOTE — PROGRESS NOTES
Infusion Nursing Note:  West WONG Kevinprasannasebastien presents today for Magnesium replacement.    Patient seen by provider today: No   present during visit today: Not Applicable.    Note:   4g magnesium infused over 2 hours.    Intravenous Access:  Peripheral IV placed.    Treatment Conditions:   07/10/24 08:18   Magnesium 1.2 (L)     Post Infusion Assessment:  Patient tolerated infusion without incident.  Blood return noted pre and post infusion.  Site patent and intact, free from redness, edema or discomfort.  No evidence of extravasations.  Access discontinued per protocol.     Discharge Plan:   Patient and/or family verbalized understanding of discharge instructions and all questions answered.  AVS to patient via [employer].    Patient discharged in stable condition accompanied by: self.  Departure Mode: Ambulatory.    Administrations This Visit       magnesium sulfate 4 g in 100 mL sterile water intermittent infusion       Admin Date  07/12/2024 Action  $New Bag Dose  4 g Rate  50 mL/hr Route  Intravenous Documented By  Jacinta Fitzpatrick RN                  BP (!) 151/96   Pulse 64   Temp 98.1  F (36.7  C) (Oral)   Resp 18   SpO2 100%     Jacinta Fitzpatrick RN

## 2024-07-15 ENCOUNTER — LAB (OUTPATIENT)
Dept: LAB | Facility: CLINIC | Age: 49
End: 2024-07-15
Attending: SURGERY
Payer: COMMERCIAL

## 2024-07-15 ENCOUNTER — OFFICE VISIT (OUTPATIENT)
Dept: TRANSPLANT | Facility: CLINIC | Age: 49
End: 2024-07-15
Attending: SURGERY
Payer: COMMERCIAL

## 2024-07-15 VITALS
HEART RATE: 106 BPM | DIASTOLIC BLOOD PRESSURE: 84 MMHG | SYSTOLIC BLOOD PRESSURE: 124 MMHG | WEIGHT: 196.3 LBS | BODY MASS INDEX: 25.2 KG/M2 | OXYGEN SATURATION: 99 %

## 2024-07-15 DIAGNOSIS — Z94.4 S/P LIVER TRANSPLANT (H): Primary | ICD-10-CM

## 2024-07-15 DIAGNOSIS — Z94.4 LIVER REPLACED BY TRANSPLANT (H): ICD-10-CM

## 2024-07-15 DIAGNOSIS — Z94.4 S/P LIVER TRANSPLANT (H): ICD-10-CM

## 2024-07-15 LAB
ALBUMIN SERPL BCG-MCNC: 4 G/DL (ref 3.5–5.2)
ALP SERPL-CCNC: 88 U/L (ref 40–150)
ALT SERPL W P-5'-P-CCNC: 27 U/L (ref 0–70)
ANION GAP SERPL CALCULATED.3IONS-SCNC: 8 MMOL/L (ref 7–15)
AST SERPL W P-5'-P-CCNC: 18 U/L (ref 0–45)
BASOPHILS # BLD AUTO: ABNORMAL 10*3/UL
BASOPHILS # BLD MANUAL: 0.1 10E3/UL (ref 0–0.2)
BASOPHILS NFR BLD AUTO: ABNORMAL %
BASOPHILS NFR BLD MANUAL: 5 %
BILIRUB DIRECT SERPL-MCNC: 0.39 MG/DL (ref 0–0.3)
BILIRUB SERPL-MCNC: 1 MG/DL
BUN SERPL-MCNC: 11.6 MG/DL (ref 6–20)
CALCIUM SERPL-MCNC: 9.7 MG/DL (ref 8.6–10)
CHLORIDE SERPL-SCNC: 107 MMOL/L (ref 98–107)
CREAT SERPL-MCNC: 0.75 MG/DL (ref 0.67–1.17)
DEPRECATED HCO3 PLAS-SCNC: 26 MMOL/L (ref 22–29)
EGFRCR SERPLBLD CKD-EPI 2021: >90 ML/MIN/1.73M2
EOSINOPHIL # BLD AUTO: ABNORMAL 10*3/UL
EOSINOPHIL # BLD MANUAL: 0.1 10E3/UL (ref 0–0.7)
EOSINOPHIL NFR BLD AUTO: ABNORMAL %
EOSINOPHIL NFR BLD MANUAL: 3 %
ERYTHROCYTE [DISTWIDTH] IN BLOOD BY AUTOMATED COUNT: 15.7 % (ref 10–15)
GLUCOSE SERPL-MCNC: 165 MG/DL (ref 70–99)
HCT VFR BLD AUTO: 38.2 % (ref 40–53)
HGB BLD-MCNC: 12.4 G/DL (ref 13.3–17.7)
IMM GRANULOCYTES # BLD: ABNORMAL 10*3/UL
IMM GRANULOCYTES NFR BLD: ABNORMAL %
LYMPHOCYTES # BLD AUTO: ABNORMAL 10*3/UL
LYMPHOCYTES # BLD MANUAL: 0.5 10E3/UL (ref 0.8–5.3)
LYMPHOCYTES NFR BLD AUTO: ABNORMAL %
LYMPHOCYTES NFR BLD MANUAL: 21 %
MAGNESIUM SERPL-MCNC: 1.5 MG/DL (ref 1.7–2.3)
MCH RBC QN AUTO: 28.6 PG (ref 26.5–33)
MCHC RBC AUTO-ENTMCNC: 32.5 G/DL (ref 31.5–36.5)
MCV RBC AUTO: 88 FL (ref 78–100)
MONOCYTES # BLD AUTO: ABNORMAL 10*3/UL
MONOCYTES # BLD MANUAL: 0.3 10E3/UL (ref 0–1.3)
MONOCYTES NFR BLD AUTO: ABNORMAL %
MONOCYTES NFR BLD MANUAL: 15 %
MYELOCYTES # BLD MANUAL: 0 10E3/UL
MYELOCYTES NFR BLD MANUAL: 1 %
NEUTROPHILS # BLD AUTO: ABNORMAL 10*3/UL
NEUTROPHILS # BLD MANUAL: 1.3 10E3/UL (ref 1.6–8.3)
NEUTROPHILS NFR BLD AUTO: ABNORMAL %
NEUTROPHILS NFR BLD MANUAL: 55 %
NRBC # BLD AUTO: 0 10E3/UL
NRBC # BLD AUTO: 0 10E3/UL
NRBC BLD AUTO-RTO: 0 /100
NRBC BLD MANUAL-RTO: 1 %
PHOSPHATE SERPL-MCNC: 3.2 MG/DL (ref 2.5–4.5)
PLAT MORPH BLD: ABNORMAL
PLATELET # BLD AUTO: 194 10E3/UL (ref 150–450)
POTASSIUM SERPL-SCNC: 4.4 MMOL/L (ref 3.4–5.3)
PROT SERPL-MCNC: 6.4 G/DL (ref 6.4–8.3)
RBC # BLD AUTO: 4.34 10E6/UL (ref 4.4–5.9)
RBC MORPH BLD: ABNORMAL
SODIUM SERPL-SCNC: 141 MMOL/L (ref 135–145)
TACROLIMUS BLD-MCNC: 9.8 UG/L (ref 5–15)
TME LAST DOSE: NORMAL H
TME LAST DOSE: NORMAL H
WBC # BLD AUTO: 2.3 10E3/UL (ref 4–11)
WBC # BLD AUTO: 2.3 10E3/UL (ref 4–11)

## 2024-07-15 PROCEDURE — 82248 BILIRUBIN DIRECT: CPT | Performed by: PATHOLOGY

## 2024-07-15 PROCEDURE — 80197 ASSAY OF TACROLIMUS: CPT | Performed by: SURGERY

## 2024-07-15 PROCEDURE — 80053 COMPREHEN METABOLIC PANEL: CPT | Performed by: PATHOLOGY

## 2024-07-15 PROCEDURE — 99211 OFF/OP EST MAY X REQ PHY/QHP: CPT | Performed by: SURGERY

## 2024-07-15 PROCEDURE — 99000 SPECIMEN HANDLING OFFICE-LAB: CPT | Performed by: PATHOLOGY

## 2024-07-15 PROCEDURE — 84100 ASSAY OF PHOSPHORUS: CPT | Performed by: PATHOLOGY

## 2024-07-15 PROCEDURE — 85007 BL SMEAR W/DIFF WBC COUNT: CPT | Performed by: PATHOLOGY

## 2024-07-15 PROCEDURE — 83735 ASSAY OF MAGNESIUM: CPT | Performed by: PATHOLOGY

## 2024-07-15 PROCEDURE — 99213 OFFICE O/P EST LOW 20 MIN: CPT | Mod: 24 | Performed by: SURGERY

## 2024-07-15 PROCEDURE — 85027 COMPLETE CBC AUTOMATED: CPT | Performed by: PATHOLOGY

## 2024-07-15 PROCEDURE — 36415 COLL VENOUS BLD VENIPUNCTURE: CPT | Performed by: PATHOLOGY

## 2024-07-15 RX ORDER — IBUPROFEN 400 MG/1
400 TABLET, FILM COATED ORAL EVERY 6 HOURS PRN
Qty: 16 TABLET | Refills: 0 | Status: SHIPPED | OUTPATIENT
Start: 2024-07-15 | End: 2024-08-14

## 2024-07-15 NOTE — PROGRESS NOTES
Transplant Surgery Clinic Note    RE: West Van.  : 1975.  LOPEZ: 7/15/2024.    Reason for visit: Post-op Liver Transplant 2024.    HPI: Patient doing well apart from having new onset scrotal and testicular pain for 7 days and urinary urgency with no history of enlarged prostate. He also explains he has a hard time emptying his bladder, but no retention. Pre-transplant he had nocturia 2x/night, now nocturia 1x/night. He denies nausea, hematuria, mucus, discharge or retention. Of note, he has a pmhx of epididymitis with his last one over 3 years ago. He had no recent trauma, no fever, no chills.   Rates the pain as 6/10 and 2/10 with the use of tylenol.       Medical history:  Past Medical History:   Diagnosis Date    Benign essential HTN 2016    Diabetes (H)     Kidney stone     Liver cancer (H) 10/04/2022    Liver cirrhosis secondary to nonalcoholic steatohepatitis (RIOS) (H)     RIOS (nonalcoholic steatohepatitis) 2023    Neuroendocrine carcinoma of lung (H)     right lung, lobectomy 3/2023    PONV (postoperative nausea and vomiting)        Surgical history:  Past Surgical History:   Procedure Laterality Date    BRONCHOSCOPY, WITH BIOPSY, ROBOT ASSISTED N/A 2023    Procedure: BRONCHOSCOPY, USING OPTICAL TRACKING SYSTEM, ion;  Surgeon: Ani Guillaume MD;  Location: [facility]    DAVINCI LOBECTOMY LUNG Right 3/8/2023    Procedure: Robot-assisted right thoracoscopic lower lobectomy, mediastinal lymph node dissection, intercostal nerve cryoablation ;  Surgeon: Matheus Haas MD;  Location: [facility] OR    ENDOBRONCHIAL ULTRASOUND FLEXIBLE N/A 2023    Procedure: endobronchial  ultrasound and transbronchial biopsies;  Surgeon: Ani Guillaume MD;  Location: [facility]    EXCISE TUMOR CHEST WALL Right 2024    Procedure: Excision chest wall mass;  Surgeon: Matheus Haas MD;  [facility]: [facility]    LAPAROSCOPIC ABLATION LIVER TUMOR N/A 2022    Procedure: Diagnostic Laparoscopy, Hepatic  Ultrasound, Laparoscopic ultrasound guided microwave ablation of liver tumor x1;  Surgeon: Armando Munguia MD;  [facility]: [facility]    LAPAROSCOPIC BIOPSY LIVER N/A 2022    Procedure: Laparoscopic Ultrasound Guided liver biopsy;  Surgeon: Armando Munguia MD;  [facility]    LAPAROSCOPIC CHOLECYSTECTOMY N/A 2022    Procedure: Laparoscopic cholecystectomy;  Surgeon: Armando Munguia MD;  [facility]    TRANSPLANT LIVER RECIPIENT  DONOR N/A 2024    Procedure: Transplant liver recipient  donor with bile duct stent placement;  Surgeon: Radu Dodson MD;  Location: [facility] OR       Family history:  Family History   Problem Relation Age of Onset    Breast Cancer Mother     Cancer Father         lip    Melanoma Cousin     Cirrhosis No family hx of          Medications:  Current Outpatient Medications   Medication Sig Dispense Refill    ibuprofen (ADVIL/MOTRIN) 400 MG tablet Take 1 tablet (400 mg) by mouth every 6 hours as needed for moderate pain 16 tablet 0    acetaminophen (TYLENOL) 325 MG tablet Take 2 tablets (650 mg) by mouth every 6 hours as needed for mild pain      Alcohol Swabs PADS Use to swab the area of the injection or nay as directed Per insurance coverage 200 each 1    aspirin (ASA) 81 MG chewable tablet 1 tablet (81 mg) by Oral or Feeding Tube route daily 30 tablet 5    blood glucose (NO BRAND SPECIFIED) lancets standard To use to test glucose level in the blood Use to test blood sugar 4  times daily as directed. To accompany glucose monitor brands per insurance coverage. 200 each 1    blood glucose (NO BRAND SPECIFIED) test strip To use to test glucose level in the blood Use to test blood sugar 4 times daily as directed. To accompany glucose monitor brands per insurance coverage. 100 strip 3    blood glucose monitoring (NO BRAND SPECIFIED) meter device kit Use as directed Per insurance coverage 1 kit 0    carvedilol (COREG) 3.125 MG tablet Take 1 tablet (3.125 mg)  by mouth 2 times daily (with meals) 60 tablet 3    fluticasone (FLONASE) 50 MCG/ACT nasal spray Spray 1 spray into both nostrils daily as needed for allergies or rhinitis      insulin aspart (NOVOLOG PEN) 100 UNIT/ML pen Inject 1-7 Units Subcutaneous at bedtime HIGH INSULIN RESISTANCE DOSING  Do Not give Bedtime Correction Insulin if BG less than 200. For  - 224 give 1 units. For  - 249 give 2 units. For  - 274 give 3 units. For  - 299 give 4 units. For  - 324 give 5 units. For  - 349 give 6 units. For BG greater than or equal to 350 give 7 units. Notify provider if glucose greater than or equal to 350 mg/dL after administration of correction dose. 15 mL 1    insulin aspart (NOVOLOG PEN) 100 UNIT/ML pen Inject 6 Units Subcutaneous 3 times daily (with meals) 3 mL 1    insulin aspart (NOVOLOG PEN) 100 UNIT/ML pen Inject 1-10 Units Subcutaneous 3 times daily (before meals) Correction Scale - MEDIUM INSULIN RESISTANCE DOSING   Do Not give Correction Insulin if BG < 140. For  - 189 give 1 unit. For  - 239 give 2 units. For  - 289 give 3 units. For  - 339 give 4 units. For BG = or > 340 give 5 units. Check blood glucose before meals/bolus feedings (or q4h if NPO) and administer based on blood glucose. Notify MD if glucose > or = 350 mg/dL after administration of correction dose. 15 mL 1    insulin glargine (LANTUS PEN) 100 UNIT/ML pen Inject 25 Units Subcutaneous daily 15 mL 1    insulin pen needle (32G X 4 MM) 32G X 4 MM miscellaneous Use as directed by provider Per insurance coverage 200 each 1    magnesium oxide (MAG-OX) 400 MG tablet Take 2 tablets (800 mg) by mouth 2 times daily 120 tablet 3    melatonin 5 MG tablet Take 1 tablet (5 mg) by mouth nightly as needed for sleep      methocarbamol (ROBAXIN) 500 MG tablet Take 1 tablet (500 mg) by mouth 4 times daily as needed for muscle spasms 15 tablet 0    mycophenolate (GENERIC EQUIVALENT) 250 MG capsule Take 3  capsules (750 mg) by mouth 2 times daily 180 capsule 5    oxyCODONE (ROXICODONE) 5 MG tablet 0.5-1 tablets (2.5-5 mg) by Oral or Feeding Tube route every 6 hours as needed for severe pain 8 tablet 0    pantoprazole (PROTONIX) 40 MG EC tablet Take 1 tablet (40 mg) by mouth daily 30 tablet 2    polyethylene glycol (MIRALAX) 17 GM/Dose powder Take 17 g by mouth daily (Patient not taking: Reported on 7/10/2024)      predniSONE (DELTASONE) 5 MG tablet Take 1 tablet (5 mg) by mouth daily 30 tablet 2    sennosides (SENOKOT) 8.6 MG tablet 1 tablet by Oral or Feeding Tube route 2 times daily (Patient taking differently: Take 1 tablet by mouth or Feeding Tube daily)      Sharps Container MISC Use as directed to dispose of needles, lancets and other sharps 1 each 1    sulfamethoxazole-trimethoprim (BACTRIM) 400-80 MG tablet Take 1 tablet by mouth daily 30 tablet 5    tacrolimus (GENERIC EQUIVALENT) 0.5 MG capsule Tacrolimus 0.5 mg capsules BID to allow for dose adjustments. (Patient not taking: Reported on 7/10/2024) 60 capsule 11    tacrolimus (GENERIC EQUIVALENT) 1 MG capsule Take 4 capsules (4 mg) by mouth 2 times daily 240 capsule 11    ursodiol (ACTIGALL) 300 MG capsule Take 1 capsule (300 mg) by mouth 2 times daily 60 capsule 3    valGANciclovir (VALCYTE) 450 MG tablet Take 1 tablet (450 mg) by mouth daily 30 tablet 5       Allergies:  No Known Allergies    Social history:   Social History     Tobacco Use    Smoking status: Never     Passive exposure: Past    Smokeless tobacco: Never   Substance Use Topics    Alcohol use: Not Currently     Comment: Last ETOH 1999     Marital status: single.    Physical Examination:  Exam was chaperoned by a nurse, his scrotum is erythematous and tender to palpation. He was examined by myself and the fellow with a nurse as a chaperone and patient consent.   General: Alert, awake and oriented. No acute distress.  Abdomen: Soft, NT, Liver transplant incision clean and dry, well healed.  Drain incisions healed and closed.       Laboratory values reviewed:  Recent Labs   Lab Test 07/15/24  0758 07/01/24  0553 06/30/24  0542   WBC 2.3*  2.3*   < > 17.1*   HGB 12.4*   < > 9.7*      < > 150   CR 0.75   < > 0.71   ALBUMIN 4.0   < > 3.1*   BILITOTAL 1.0   < > 1.1   ALKPHOS 88   < > 78   ALT 27   < > 97*   AST 18   < > 66*   INR  --   --  1.34*    < > = values in this interval not displayed.       PLAN  - Scrotal & Testicular US   - Urology consult over the phone: recommended Urine culture and anti-inflammatory Ibuprofen   - Ibuprofen 400 mg Q6hr PRN   - Urine culture   ** Explained to patient if scrotal pain worsened and had other systemic symptoms like nausea to contact transplant coordinator or go to the ED     Delroy Perdomo MD    I have reviewed history, examined patient and discussed plan with the fellow/resident/JAVIER.  I concur with the findings in this note.    Immunosuppressive regimen, management and long term risks discussed in detail. Changes, when applicable made as per orders.    Total time: 20 min  Including pre-encounter, face to face and post-encounter time spent on this patient's visit.

## 2024-07-15 NOTE — LETTER
7/15/2024      West Van  [address]  [facility] [address]      Dear Colleague,    Thank you for referring your patient, West Van, to the [facility]PL[facility]. Please see a copy of my visit note below.    Transplant Surgery Clinic Note    RE: West Van.  : 1975.  LOPEZ: 7/15/2024.    Reason for visit: Post-op Liver Transplant 2024.    HPI: Patient doing well apart from having new onset scrotal and testicular pain for 7 days and urinary urgency with no history of enlarged prostate. He also explains he has a hard time emptying his bladder, but no retention. Pre-transplant he had nocturia 2x/night, now nocturia 1x/night. He denies nausea, hematuria, mucus, discharge or retention. Of note, he has a pmhx of epididymitis with his last one over 3 years ago. He had no recent trauma, no fever, no chills.   Rates the pain as 6/10 and 2/10 with the use of tylenol.       Medical history:  Past Medical History:   Diagnosis Date     Benign essential HTN 2016     Diabetes (H)      Kidney stone      Liver cancer (H) 10/04/2022     Liver cirrhosis secondary to nonalcoholic steatohepatitis (RIOS) (H)      RIOS (nonalcoholic steatohepatitis) 2023     Neuroendocrine carcinoma of lung (H)     right lung, lobectomy 3/2023     PONV (postoperative nausea and vomiting)        Surgical history:  Past Surgical History:   Procedure Laterality Date     BRONCHOSCOPY, WITH BIOPSY, ROBOT ASSISTED N/A 2023    Procedure: BRONCHOSCOPY, USING OPTICAL TRACKING SYSTEM, ion;  Surgeon: Ani Guillaume MD;  Location: [facility]     DAVINCI LOBECTOMY LUNG Right 3/8/2023    Procedure: Robot-assisted right thoracoscopic lower lobectomy, mediastinal lymph node dissection, intercostal nerve cryoablation ;  Surgeon: Matheus Haas MD;  Location:  OR     ENDOBRONCHIAL ULTRASOUND FLEXIBLE N/A 2023    Procedure: endobronchial  ultrasound and transbronchial biopsies;  Surgeon: Ani Guillaume MD;   Location: [facility] OR     EXCISE TUMOR CHEST WALL Right 2024    Procedure: Excision chest wall mass;  Surgeon: Matheus Haas MD;  [facility]: [facility]     LAPAROSCOPIC ABLATION LIVER TUMOR N/A 2022    Procedure: Diagnostic Laparoscopy, Hepatic Ultrasound, Laparoscopic ultrasound guided microwave ablation of liver tumor x1;  Surgeon: Armando Munguia MD;  Location: [facility]     LAPAROSCOPIC BIOPSY LIVER N/A 2022    Procedure: Laparoscopic Ultrasound Guided liver biopsy;  Surgeon: Armando Munguia MD;  [facility]: [facility]     LAPAROSCOPIC CHOLECYSTECTOMY N/A 2022    Procedure: Laparoscopic cholecystectomy;  Surgeon: Armando Munguia MD;  [facility]: [facility]     TRANSPLANT LIVER RECIPIENT  DONOR N/A 2024    Procedure: Transplant liver recipient  donor with bile duct stent placement;  Surgeon: Radu Dodson MD;  Location: [facility]       Family history:  Family History   Problem Relation Age of Onset     Breast Cancer Mother      Cancer Father         lip     Melanoma Cousin      Cirrhosis No family hx of          Medications:  Current Outpatient Medications   Medication Sig Dispense Refill     ibuprofen (ADVIL/MOTRIN) 400 MG tablet Take 1 tablet (400 mg) by mouth every 6 hours as needed for moderate pain 16 tablet 0     acetaminophen (TYLENOL) 325 MG tablet Take 2 tablets (650 mg) by mouth every 6 hours as needed for mild pain       Alcohol Swabs PADS Use to swab the area of the injection or nay as directed Per insurance coverage 200 each 1     aspirin (ASA) 81 MG chewable tablet 1 tablet (81 mg) by Oral or Feeding Tube route daily 30 tablet 5     blood glucose (NO BRAND SPECIFIED) lancets standard To use to test glucose level in the blood Use to test blood sugar 4  times daily as directed. To accompany glucose monitor brands per insurance coverage. 200 each 1     blood glucose (NO BRAND SPECIFIED) test strip To use to test glucose level in the blood Use to test blood sugar 4 times daily as  directed. To accompany glucose monitor brands per insurance coverage. 100 strip 3     blood glucose monitoring (NO BRAND SPECIFIED) meter device kit Use as directed Per insurance coverage 1 kit 0     carvedilol (COREG) 3.125 MG tablet Take 1 tablet (3.125 mg) by mouth 2 times daily (with meals) 60 tablet 3     fluticasone (FLONASE) 50 MCG/ACT nasal spray Spray 1 spray into both nostrils daily as needed for allergies or rhinitis       insulin aspart (NOVOLOG PEN) 100 UNIT/ML pen Inject 1-7 Units Subcutaneous at bedtime HIGH INSULIN RESISTANCE DOSING  Do Not give Bedtime Correction Insulin if BG less than 200. For  - 224 give 1 units. For  - 249 give 2 units. For  - 274 give 3 units. For  - 299 give 4 units. For  - 324 give 5 units. For  - 349 give 6 units. For BG greater than or equal to 350 give 7 units. Notify provider if glucose greater than or equal to 350 mg/dL after administration of correction dose. 15 mL 1     insulin aspart (NOVOLOG PEN) 100 UNIT/ML pen Inject 6 Units Subcutaneous 3 times daily (with meals) 3 mL 1     insulin aspart (NOVOLOG PEN) 100 UNIT/ML pen Inject 1-10 Units Subcutaneous 3 times daily (before meals) Correction Scale - MEDIUM INSULIN RESISTANCE DOSING   Do Not give Correction Insulin if BG < 140. For  - 189 give 1 unit. For  - 239 give 2 units. For  - 289 give 3 units. For  - 339 give 4 units. For BG = or > 340 give 5 units. Check blood glucose before meals/bolus feedings (or q4h if NPO) and administer based on blood glucose. Notify MD if glucose > or = 350 mg/dL after administration of correction dose. 15 mL 1     insulin glargine (LANTUS PEN) 100 UNIT/ML pen Inject 25 Units Subcutaneous daily 15 mL 1     insulin pen needle (32G X 4 MM) 32G X 4 MM miscellaneous Use as directed by provider Per insurance coverage 200 each 1     magnesium oxide (MAG-OX) 400 MG tablet Take 2 tablets (800 mg) by mouth 2 times daily 120 tablet 3      melatonin 5 MG tablet Take 1 tablet (5 mg) by mouth nightly as needed for sleep       methocarbamol (ROBAXIN) 500 MG tablet Take 1 tablet (500 mg) by mouth 4 times daily as needed for muscle spasms 15 tablet 0     mycophenolate (GENERIC EQUIVALENT) 250 MG capsule Take 3 capsules (750 mg) by mouth 2 times daily 180 capsule 5     oxyCODONE (ROXICODONE) 5 MG tablet 0.5-1 tablets (2.5-5 mg) by Oral or Feeding Tube route every 6 hours as needed for severe pain 8 tablet 0     pantoprazole (PROTONIX) 40 MG EC tablet Take 1 tablet (40 mg) by mouth daily 30 tablet 2     polyethylene glycol (MIRALAX) 17 GM/Dose powder Take 17 g by mouth daily (Patient not taking: Reported on 7/10/2024)       predniSONE (DELTASONE) 5 MG tablet Take 1 tablet (5 mg) by mouth daily 30 tablet 2     sennosides (SENOKOT) 8.6 MG tablet 1 tablet by Oral or Feeding Tube route 2 times daily (Patient taking differently: Take 1 tablet by mouth or Feeding Tube daily)       Sharps Container MISC Use as directed to dispose of needles, lancets and other sharps 1 each 1     sulfamethoxazole-trimethoprim (BACTRIM) 400-80 MG tablet Take 1 tablet by mouth daily 30 tablet 5     tacrolimus (GENERIC EQUIVALENT) 0.5 MG capsule Tacrolimus 0.5 mg capsules BID to allow for dose adjustments. (Patient not taking: Reported on 7/10/2024) 60 capsule 11     tacrolimus (GENERIC EQUIVALENT) 1 MG capsule Take 4 capsules (4 mg) by mouth 2 times daily 240 capsule 11     ursodiol (ACTIGALL) 300 MG capsule Take 1 capsule (300 mg) by mouth 2 times daily 60 capsule 3     valGANciclovir (VALCYTE) 450 MG tablet Take 1 tablet (450 mg) by mouth daily 30 tablet 5       Allergies:  No Known Allergies    Social history:   Social History     Tobacco Use     Smoking status: Never     Passive exposure: Past     Smokeless tobacco: Never   Substance Use Topics     Alcohol use: Not Currently     Comment: Last ETOH 1999     Marital status: single.    Physical Examination:  Exam was chaperoned by a  nurse, his scrotum is erythematous and tender to palpation. He was examined by myself and the fellow with a nurse as a chaperone and patient consent.   General: Alert, awake and oriented. No acute distress.  Abdomen: Soft, NT, Liver transplant incision clean and dry, well healed. Drain incisions healed and closed.       Laboratory values reviewed:  Recent Labs   Lab Test 07/15/24  0758 07/01/24  0553 06/30/24  0542   WBC 2.3*  2.3*   < > 17.1*   HGB 12.4*   < > 9.7*      < > 150   CR 0.75   < > 0.71   ALBUMIN 4.0   < > 3.1*   BILITOTAL 1.0   < > 1.1   ALKPHOS 88   < > 78   ALT 27   < > 97*   AST 18   < > 66*   INR  --   --  1.34*    < > = values in this interval not displayed.       PLAN  - Scrotal & Testicular US   - Urology consult over the phone: recommended Urine culture and anti-inflammatory Ibuprofen   - Ibuprofen 400 mg Q6hr PRN   - Urine culture   ** Explained to patient if scrotal pain worsened and had other systemic symptoms like nausea to contact transplant coordinator or go to the ED     Delroy Perdomo MD    I have reviewed history, examined patient and discussed plan with the fellow/resident/AJVIER.  I concur with the findings in this note.    Immunosuppressive regimen, management and long term risks discussed in detail. Changes, when applicable made as per orders.    Total time: 20 min  Including pre-encounter, face to face and post-encounter time spent on this patient's visit.                       Again, thank you for allowing me to participate in the care of your patient.        Sincerely,        Radu Dodson MD

## 2024-07-16 ENCOUNTER — LAB (OUTPATIENT)
Dept: LAB | Facility: CLINIC | Age: 49
End: 2024-07-16
Payer: COMMERCIAL

## 2024-07-16 ENCOUNTER — ANCILLARY PROCEDURE (OUTPATIENT)
Dept: ULTRASOUND IMAGING | Facility: CLINIC | Age: 49
End: 2024-07-16
Payer: COMMERCIAL

## 2024-07-16 DIAGNOSIS — Z94.4 LIVER REPLACED BY TRANSPLANT (H): ICD-10-CM

## 2024-07-16 LAB
ALBUMIN UR-MCNC: NEGATIVE MG/DL
APPEARANCE UR: CLEAR
BILIRUB UR QL STRIP: NEGATIVE
COLOR UR AUTO: YELLOW
GLUCOSE UR STRIP-MCNC: 70 MG/DL
HGB UR QL STRIP: NEGATIVE
KETONES UR STRIP-MCNC: NEGATIVE MG/DL
LEUKOCYTE ESTERASE UR QL STRIP: NEGATIVE
NITRATE UR QL: NEGATIVE
PATH REPORT.COMMENTS IMP SPEC: NORMAL
PATH REPORT.FINAL DX SPEC: NORMAL
PATH REPORT.GROSS SPEC: NORMAL
PATH REPORT.MICROSCOPIC SPEC OTHER STN: NORMAL
PATH REPORT.RELEVANT HX SPEC: NORMAL
PH UR STRIP: 6 [PH] (ref 5–7)
PHOTO IMAGE: NORMAL
SP GR UR STRIP: 1.02 (ref 1–1.03)
UROBILINOGEN UR STRIP-MCNC: NORMAL MG/DL

## 2024-07-16 PROCEDURE — 87086 URINE CULTURE/COLONY COUNT: CPT

## 2024-07-16 PROCEDURE — 76870 US EXAM SCROTUM: CPT | Mod: GC | Performed by: STUDENT IN AN ORGANIZED HEALTH CARE EDUCATION/TRAINING PROGRAM

## 2024-07-16 PROCEDURE — 99207 US TESTICULAR AND SCROTUM WITH DOPPLER LIMITED: CPT | Mod: GC | Performed by: STUDENT IN AN ORGANIZED HEALTH CARE EDUCATION/TRAINING PROGRAM

## 2024-07-16 PROCEDURE — 99000 SPECIMEN HANDLING OFFICE-LAB: CPT | Performed by: PATHOLOGY

## 2024-07-16 PROCEDURE — 81003 URINALYSIS AUTO W/O SCOPE: CPT | Performed by: PATHOLOGY

## 2024-07-17 LAB — BACTERIA UR CULT: NORMAL

## 2024-07-18 ENCOUNTER — VIRTUAL VISIT (OUTPATIENT)
Dept: TRANSPLANT | Facility: CLINIC | Age: 49
End: 2024-07-18
Attending: SURGERY
Payer: COMMERCIAL

## 2024-07-18 ENCOUNTER — TELEPHONE (OUTPATIENT)
Dept: TRANSPLANT | Facility: CLINIC | Age: 49
End: 2024-07-18

## 2024-07-18 ENCOUNTER — LAB (OUTPATIENT)
Dept: LAB | Facility: CLINIC | Age: 49
End: 2024-07-18
Payer: COMMERCIAL

## 2024-07-18 DIAGNOSIS — Z94.4 LIVER REPLACED BY TRANSPLANT (H): Primary | ICD-10-CM

## 2024-07-18 DIAGNOSIS — Z94.4 LIVER REPLACED BY TRANSPLANT (H): ICD-10-CM

## 2024-07-18 DIAGNOSIS — N50.811 PAIN IN BOTH TESTICLES: ICD-10-CM

## 2024-07-18 DIAGNOSIS — Z94.4 S/P LIVER TRANSPLANT (H): ICD-10-CM

## 2024-07-18 DIAGNOSIS — Z76.82 LIVER TRANSPLANT CANDIDATE: ICD-10-CM

## 2024-07-18 DIAGNOSIS — N50.812 PAIN IN BOTH TESTICLES: ICD-10-CM

## 2024-07-18 LAB
ALBUMIN SERPL BCG-MCNC: 3.9 G/DL (ref 3.5–5.2)
ALP SERPL-CCNC: 87 U/L (ref 40–150)
ALT SERPL W P-5'-P-CCNC: 19 U/L (ref 0–70)
ANION GAP SERPL CALCULATED.3IONS-SCNC: 8 MMOL/L (ref 7–15)
AST SERPL W P-5'-P-CCNC: 14 U/L (ref 0–45)
BASOPHILS # BLD AUTO: 0 10E3/UL (ref 0–0.2)
BASOPHILS NFR BLD AUTO: 2 %
BILIRUB DIRECT SERPL-MCNC: 0.28 MG/DL (ref 0–0.3)
BILIRUB SERPL-MCNC: 0.7 MG/DL
BUN SERPL-MCNC: 14.5 MG/DL (ref 6–20)
CALCIUM SERPL-MCNC: 9.6 MG/DL (ref 8.8–10.4)
CHLORIDE SERPL-SCNC: 106 MMOL/L (ref 98–107)
CREAT SERPL-MCNC: 0.74 MG/DL (ref 0.67–1.17)
EGFRCR SERPLBLD CKD-EPI 2021: >90 ML/MIN/1.73M2
EOSINOPHIL # BLD AUTO: 0.1 10E3/UL (ref 0–0.7)
EOSINOPHIL NFR BLD AUTO: 4 %
ERYTHROCYTE [DISTWIDTH] IN BLOOD BY AUTOMATED COUNT: 15.3 % (ref 10–15)
GLUCOSE SERPL-MCNC: 167 MG/DL (ref 70–99)
HCO3 SERPL-SCNC: 26 MMOL/L (ref 22–29)
HCT VFR BLD AUTO: 38.2 % (ref 40–53)
HGB BLD-MCNC: 12.7 G/DL (ref 13.3–17.7)
IMM GRANULOCYTES # BLD: 0 10E3/UL
IMM GRANULOCYTES NFR BLD: 1 %
LYMPHOCYTES # BLD AUTO: 0.7 10E3/UL (ref 0.8–5.3)
LYMPHOCYTES NFR BLD AUTO: 30 %
MAGNESIUM SERPL-MCNC: 1.5 MG/DL (ref 1.7–2.3)
MCH RBC QN AUTO: 28.7 PG (ref 26.5–33)
MCHC RBC AUTO-ENTMCNC: 33.2 G/DL (ref 31.5–36.5)
MCV RBC AUTO: 86 FL (ref 78–100)
MONOCYTES # BLD AUTO: 0.3 10E3/UL (ref 0–1.3)
MONOCYTES NFR BLD AUTO: 14 %
NEUTROPHILS # BLD AUTO: 1.2 10E3/UL (ref 1.6–8.3)
NEUTROPHILS NFR BLD AUTO: 49 %
NRBC # BLD AUTO: 0 10E3/UL
NRBC BLD AUTO-RTO: 0 /100
PHOSPHATE SERPL-MCNC: 3.4 MG/DL (ref 2.5–4.5)
PLATELET # BLD AUTO: 148 10E3/UL (ref 150–450)
POTASSIUM SERPL-SCNC: 4.7 MMOL/L (ref 3.4–5.3)
PROT SERPL-MCNC: 6.3 G/DL (ref 6.4–8.3)
RBC # BLD AUTO: 4.42 10E6/UL (ref 4.4–5.9)
SODIUM SERPL-SCNC: 140 MMOL/L (ref 135–145)
TACROLIMUS BLD-MCNC: 14.5 UG/L (ref 5–15)
TME LAST DOSE: NORMAL H
TME LAST DOSE: NORMAL H
WBC # BLD AUTO: 2.4 10E3/UL (ref 4–11)
WBC # BLD AUTO: 2.4 10E3/UL (ref 4–11)

## 2024-07-18 PROCEDURE — 99000 SPECIMEN HANDLING OFFICE-LAB: CPT | Performed by: PATHOLOGY

## 2024-07-18 PROCEDURE — 80053 COMPREHEN METABOLIC PANEL: CPT | Performed by: PATHOLOGY

## 2024-07-18 PROCEDURE — 82248 BILIRUBIN DIRECT: CPT | Performed by: PATHOLOGY

## 2024-07-18 PROCEDURE — 99207 PR NO BILLABLE SERVICE THIS VISIT: CPT | Mod: 93

## 2024-07-18 PROCEDURE — 36415 COLL VENOUS BLD VENIPUNCTURE: CPT | Performed by: PATHOLOGY

## 2024-07-18 PROCEDURE — 80197 ASSAY OF TACROLIMUS: CPT | Performed by: SURGERY

## 2024-07-18 PROCEDURE — 84100 ASSAY OF PHOSPHORUS: CPT | Performed by: PATHOLOGY

## 2024-07-18 PROCEDURE — 83735 ASSAY OF MAGNESIUM: CPT | Performed by: PATHOLOGY

## 2024-07-18 PROCEDURE — 85025 COMPLETE CBC W/AUTO DIFF WBC: CPT | Performed by: PATHOLOGY

## 2024-07-18 RX ORDER — TACROLIMUS 1 MG/1
3 CAPSULE ORAL 2 TIMES DAILY
Qty: 180 CAPSULE | Refills: 11 | Status: SHIPPED | OUTPATIENT
Start: 2024-07-18 | End: 2024-07-30

## 2024-07-18 NOTE — TELEPHONE ENCOUNTER
ISSUE:   Tacrolimus IR level 14.5 on 7/18, goal 8-12, dose 4 mg BID.    PLAN:   Call Patient and confirm this was an accurate 12-hour trough.   Verify Tacrolimus IR dose 4 mg BID.   Confirm no new medications or or missed doses.   Confirm no new illness / infection / diarrhea.   If accurate trough and accurate dose, decrease Tacrolimus IR dose to 3 mg BID     Is this more than a 50% increase or decrease in current IS dose: No    Repeat labs in 4 days.      OUTCOME:   Spoke with Patient, they confirm accurate trough level and current dose 4 mg BID.   Patient confirmed dose change to 3 mg BID.  Patient agreed to repeat labs in 4 days.   Orders sent to preferred pharmacy for dose change and lab for repeat labs.   Patient voiced understanding of plan.

## 2024-07-18 NOTE — PROGRESS NOTES
"West had a testicular ultrasound on Tuesday 7/16 for pain and redness. Results sent to Dr. Dodson with question of whether West should see urology. Dr. Dodson's response:    \"Have them see him if he s not getting better with anti inflammatory Rx\"    West stated that pain has gotten slightly better since starting ibuprofen 2 times per day. Urology consult ordered just in case it is needed.  "

## 2024-07-18 NOTE — LETTER
2024      West Van  [address]  [facility] [address]      Dear Colleague,    Thank you for referring your patient, West Van, to the [facility]. Please see a copy of my visit note below.    Transplant Social Work Services Progress Note      Collaborated with: Liver Transplant Team    Data: West is s/p  donor Liver Transplant on 2024.  He was discharged from the hospital on 24.  Intervention/Education: I called West and we reviewed the followin. Writing to the Donor Family process. [email or web address]        2. Liver Transplant Support Group and social events.        3. Immunosuppression copays-no current concerns.  I reminded patient to contact me if he has future concerns and we can evaluate for financial assistance programs, grants, etc.        4. Adjustment to illness-appropriate to situation. West has a history of anxiety. West is processing all that he has been through the past few years.  He also had an uncle pass away on .   I offered a referral to health psychology (Dr. Obdulio Patel) and West has accepted this referral.        5. Importance of maintaining abstinence from all non-prescribed drugs and alcohol.        6. COBRA coverage ends 2025.  I provided education about [facility] and [facility].        7. Patient Relations contact for patient to share his hospital experience. [email or web address]  Assessment: West is staying locally at the [facility] with his mother [place].  His mother has not been feeling well, and she has a doctor appointment in [place], [place] tomorrow.  They plan to stay in [place] until , though I did advise West to return to [place] as it is recommended he stay within 50 miles for approximately thirty days post discharge from the hospital.  Plan/recommendations: Transplant social work will remain available  for the psychosocial needs of this patient.  Message routed to Clair Meza RN transplant coordinator.          MIN Zhou, [facility]  Liver Transplant   Phone [phone]        Again, thank you for allowing me to participate in the care of your patient.        Sincerely,        MIN Arguelles

## 2024-07-18 NOTE — PROGRESS NOTES
Transplant Social Work Services Progress Note      Collaborated with: Liver Transplant Team    Data: West is s/p  donor Liver Transplant on 2024.  He was discharged from the hospital on 24.  Intervention/Education: I called West and we reviewed the followin. Writing to the Donor Family process. [email or web address]        2. Liver Transplant Support Group and social events.        3. Immunosuppression copays-no current concerns.  I reminded patient to contact me if he has future concerns and we can evaluate for financial assistance programs, grants, etc.        4. Adjustment to illness-appropriate to situation. West has a history of anxiety. West is processing all that he has been through the past few years.  He also had an uncle pass away on .   I offered a referral to health psychology (Dr. Obdulio Patel) and West has accepted this referral.        5. Importance of maintaining abstinence from all non-prescribed drugs and alcohol.        6. COBRA coverage ends 2025.  I provided education about [employer] and [employer].        7. Patient Relations contact for patient to share his hospital experience. [email or web address]  Assessment: West is staying locally at the [place] [facility] with his mother Virginia.  His mother has not been feeling well, and she has a doctor appointment in [place], [place] tomorrow.  They plan to stay in [place] until , though I did advise West to return to [place] as it is recommended he stay within 50 miles for approximately thirty days post discharge from the hospital.  Plan/recommendations: Transplant social work will remain available for the psychosocial needs of this patient.  Message routed to Clair Meza RN transplant coordinator.          MIN Zhou, [facility]  Liver Transplant   Phone [phone]

## 2024-07-19 ENCOUNTER — TELEPHONE (OUTPATIENT)
Dept: PSYCHOLOGY | Facility: CLINIC | Age: 49
End: 2024-07-19
Payer: COMMERCIAL

## 2024-07-19 DIAGNOSIS — Z03.89 NO DIAGNOSIS ON AXIS I: Primary | ICD-10-CM

## 2024-07-19 NOTE — TELEPHONE ENCOUNTER
Called pt to schedule health psychology diagnostic evaluation. Pt appointment scheduled for 8/15 @10:00am    Obdulio Patel, PhD  Clinical Health Psychology Fellow  Phone: [phone]

## 2024-07-22 ENCOUNTER — LAB (OUTPATIENT)
Dept: LAB | Facility: CLINIC | Age: 49
End: 2024-07-22
Attending: SURGERY
Payer: COMMERCIAL

## 2024-07-22 ENCOUNTER — OFFICE VISIT (OUTPATIENT)
Dept: TRANSPLANT | Facility: CLINIC | Age: 49
End: 2024-07-22
Attending: SURGERY
Payer: COMMERCIAL

## 2024-07-22 VITALS
WEIGHT: 198 LBS | OXYGEN SATURATION: 99 % | DIASTOLIC BLOOD PRESSURE: 94 MMHG | BODY MASS INDEX: 25.42 KG/M2 | SYSTOLIC BLOOD PRESSURE: 146 MMHG | HEART RATE: 109 BPM

## 2024-07-22 DIAGNOSIS — Z94.4 LIVER REPLACED BY TRANSPLANT (H): ICD-10-CM

## 2024-07-22 DIAGNOSIS — Z94.4 S/P LIVER TRANSPLANT (H): ICD-10-CM

## 2024-07-22 LAB
ALBUMIN SERPL BCG-MCNC: 4 G/DL (ref 3.5–5.2)
ALP SERPL-CCNC: 90 U/L (ref 40–150)
ALT SERPL W P-5'-P-CCNC: 18 U/L (ref 0–70)
ANION GAP SERPL CALCULATED.3IONS-SCNC: 10 MMOL/L (ref 7–15)
AST SERPL W P-5'-P-CCNC: 15 U/L (ref 0–45)
BASOPHILS # BLD AUTO: 0 10E3/UL (ref 0–0.2)
BASOPHILS NFR BLD AUTO: 1 %
BILIRUB DIRECT SERPL-MCNC: 0.3 MG/DL (ref 0–0.3)
BILIRUB SERPL-MCNC: 0.9 MG/DL
BUN SERPL-MCNC: 15.7 MG/DL (ref 6–20)
CALCIUM SERPL-MCNC: 9.7 MG/DL (ref 8.8–10.4)
CHLORIDE SERPL-SCNC: 106 MMOL/L (ref 98–107)
CREAT SERPL-MCNC: 0.76 MG/DL (ref 0.67–1.17)
EGFRCR SERPLBLD CKD-EPI 2021: >90 ML/MIN/1.73M2
EOSINOPHIL # BLD AUTO: 0.2 10E3/UL (ref 0–0.7)
EOSINOPHIL NFR BLD AUTO: 3 %
ERYTHROCYTE [DISTWIDTH] IN BLOOD BY AUTOMATED COUNT: 15.1 % (ref 10–15)
GLUCOSE SERPL-MCNC: 165 MG/DL (ref 70–99)
HCO3 SERPL-SCNC: 26 MMOL/L (ref 22–29)
HCT VFR BLD AUTO: 40.6 % (ref 40–53)
HGB BLD-MCNC: 13.5 G/DL (ref 13.3–17.7)
IMM GRANULOCYTES # BLD: 0.1 10E3/UL
IMM GRANULOCYTES NFR BLD: 1 %
LYMPHOCYTES # BLD AUTO: 1.2 10E3/UL (ref 0.8–5.3)
LYMPHOCYTES NFR BLD AUTO: 26 %
MAGNESIUM SERPL-MCNC: 1.5 MG/DL (ref 1.7–2.3)
MCH RBC QN AUTO: 28.6 PG (ref 26.5–33)
MCHC RBC AUTO-ENTMCNC: 33.3 G/DL (ref 31.5–36.5)
MCV RBC AUTO: 86 FL (ref 78–100)
MONOCYTES # BLD AUTO: 0.4 10E3/UL (ref 0–1.3)
MONOCYTES NFR BLD AUTO: 9 %
NEUTROPHILS # BLD AUTO: 2.8 10E3/UL (ref 1.6–8.3)
NEUTROPHILS NFR BLD AUTO: 60 %
NRBC # BLD AUTO: 0 10E3/UL
NRBC BLD AUTO-RTO: 0 /100
PHOSPHATE SERPL-MCNC: 3.2 MG/DL (ref 2.5–4.5)
PLATELET # BLD AUTO: 171 10E3/UL (ref 150–450)
POTASSIUM SERPL-SCNC: 4.2 MMOL/L (ref 3.4–5.3)
PROT SERPL-MCNC: 6.4 G/DL (ref 6.4–8.3)
RBC # BLD AUTO: 4.72 10E6/UL (ref 4.4–5.9)
SODIUM SERPL-SCNC: 142 MMOL/L (ref 135–145)
TACROLIMUS BLD-MCNC: 8.9 UG/L (ref 5–15)
TME LAST DOSE: NORMAL H
TME LAST DOSE: NORMAL H
WBC # BLD AUTO: 4.7 10E3/UL (ref 4–11)
WBC # BLD AUTO: 4.7 10E3/UL (ref 4–11)

## 2024-07-22 PROCEDURE — 99213 OFFICE O/P EST LOW 20 MIN: CPT | Mod: 24 | Performed by: SURGERY

## 2024-07-22 PROCEDURE — 36415 COLL VENOUS BLD VENIPUNCTURE: CPT | Performed by: PATHOLOGY

## 2024-07-22 PROCEDURE — 83735 ASSAY OF MAGNESIUM: CPT | Performed by: PATHOLOGY

## 2024-07-22 PROCEDURE — 99000 SPECIMEN HANDLING OFFICE-LAB: CPT | Performed by: PATHOLOGY

## 2024-07-22 PROCEDURE — 80197 ASSAY OF TACROLIMUS: CPT | Performed by: SURGERY

## 2024-07-22 PROCEDURE — 84100 ASSAY OF PHOSPHORUS: CPT | Performed by: PATHOLOGY

## 2024-07-22 PROCEDURE — 82248 BILIRUBIN DIRECT: CPT | Performed by: PATHOLOGY

## 2024-07-22 PROCEDURE — 99213 OFFICE O/P EST LOW 20 MIN: CPT | Performed by: SURGERY

## 2024-07-22 PROCEDURE — 80053 COMPREHEN METABOLIC PANEL: CPT | Performed by: PATHOLOGY

## 2024-07-22 PROCEDURE — 85025 COMPLETE CBC W/AUTO DIFF WBC: CPT | Performed by: PATHOLOGY

## 2024-07-22 ASSESSMENT — PAIN SCALES - GENERAL: PAINLEVEL: MILD PAIN (2)

## 2024-07-22 NOTE — PROGRESS NOTES
Transplant Surgery -OUTPATIENT IMMUNOSUPPRESSION PROGRESS NOTE    Date of Visit: 07/22/2024    Transplants:  6/27/2024 (Liver); Postoperative day:  25  ASSESMENT AND PLAN:Doing well, has epididymitis, urology follow up soon, continue NSAIDs  1.Graft Function:good  2.Immunosuppression Management: recently decreased tac dose, follow up today's level and adjust accordingly  3.Hypertension: mild  4.Renal Function:good  5.Lab frequency:per protocol  6.Other:epididymitis, urology follow up.    ISRA Rendon MD    I have reviewed history, examined patient and discussed plan with the fellow/resident/JAVIER.  I concur with the findings in this note.    Immunosuppressive regimen, management and long term risks discussed in detail. Changes, when applicable made as per orders.    Total time: 20 min  Including pre-encounter, face to face and post-encounter time spent on this patient's visit.                 Date: July 22, 2024    Transplant:  [x]                             Liver [x]                              Kidney []                             Pancreas []                              Other:             Chief Complaint:Post-op Visit (Liver txp)    History of Present Illness:  Patient Active Problem List   Diagnosis    Anxiety    Benign essential HTN    Calculus of gallbladder without cholecystitis without obstruction    Dermoid cyst of scalp    Eczema    Elevated LFTs    History of recent stressful life event    Hypertension    Lesion of liver    Malignant neoplasm of liver (H)    Obesity (BMI 30-39.9)    Psychophysiological insomnia    Sciatica of right side    SI (sacroiliac) joint dysfunction    Type 2 diabetes mellitus without complication, without long-term current use of insulin (H)    Primary malignant neuroendocrine tumor of lung (H)    Liver cirrhosis secondary to nonalcoholic steatohepatitis (RIOS) (H)    Kidney stone    Chronic cough    Environmental and seasonal allergies    Mass of chest wall, right    Adjustment  disorder with mixed anxiety and depressed mood    Seborrheic keratoses    Liver transplant candidate    HCC (hepatocellular carcinoma) (H)    S/P liver transplant (H)    Acute post-operative pain    Immunosuppressed status (H24)    Anemia due to blood loss, acute    Moderate malnutrition (H24)    Steroid-induced hyperglycemia    Hypomagnesemia     SOCIAL /FAMILY HISTORY: [x]                  No recent change    Past Medical History:   Diagnosis Date    Benign essential HTN 04/13/2016    Diabetes (H)     Kidney stone     Liver cancer (H) 10/04/2022    Liver cirrhosis secondary to nonalcoholic steatohepatitis (RIOS) (H)     RIOS (nonalcoholic steatohepatitis) 06/02/2023    Neuroendocrine carcinoma of lung (H)     right lung, lobectomy 3/2023    PONV (postoperative nausea and vomiting)      Past Surgical History:   Procedure Laterality Date    BRONCHOSCOPY, WITH BIOPSY, ROBOT ASSISTED N/A 1/17/2023    Procedure: BRONCHOSCOPY, USING OPTICAL TRACKING SYSTEM, ion;  Surgeon: Ani Guillaume MD;  Location: [facility]    DAVINCI LOBECTOMY LUNG Right 3/8/2023    Procedure: Robot-assisted right thoracoscopic lower lobectomy, mediastinal lymph node dissection, intercostal nerve cryoablation ;  Surgeon: Matheus Haas MD;  Location: [facility] OR    ENDOBRONCHIAL ULTRASOUND FLEXIBLE N/A 1/17/2023    Procedure: endobronchial  ultrasound and transbronchial biopsies;  Surgeon: Ani Guillaume MD;  Location[facility]    EXCISE TUMOR CHEST WALL Right 2/1/2024    Procedure: Excision chest wall mass;  Surgeon: Matheus Haas MD;  [facility]    LAPAROSCOPIC ABLATION LIVER TUMOR N/A 12/6/2022    Procedure: Diagnostic Laparoscopy, Hepatic Ultrasound, Laparoscopic ultrasound guided microwave ablation of liver tumor x1;  Surgeon: Armando Munguia MD;  Location[facility]    LAPAROSCOPIC BIOPSY LIVER N/A 12/6/2022    Procedure: Laparoscopic Ultrasound Guided liver biopsy;  Surgeon: Armando Munguia MD;  [facility]    LAPAROSCOPIC CHOLECYSTECTOMY  N/A 2022    Procedure: Laparoscopic cholecystectomy;  Surgeon: Armando Munguia MD;  [facility]    TRANSPLANT LIVER RECIPIENT  DONOR N/A 2024    Procedure: Transplant liver recipient  donor with bile duct stent placement;  Surgeon: Radu Dodson MD;  [facility]     Social History     Socioeconomic History    Marital status: Single     Spouse name: Not on file    Number of children: Not on file    Years of education: Not on file    Highest education level: Not on file   Occupational History    Not on file   Tobacco Use    Smoking status: Never     Passive exposure: Past    Smokeless tobacco: Never   Vaping Use    Vaping status: Never Used   Substance and Sexual Activity    Alcohol use: Not Currently     Comment: Last ETOH 1999    Drug use: Never    Sexual activity: Not on file   Other Topics Concern    Not on file   Social History Narrative    Not on file     Social Determinants of Health     Financial Resource Strain: Medium Risk (2023)    Received from [facility], [facility]    Overall Financial Resource Strain (CARDIA)     Difficulty of Paying Living Expenses: Somewhat hard   Food Insecurity: No Food Insecurity (2023)    Received from [facility], [facility]    Hunger Vital Sign     Worried About Running Out of Food in the Last Year: Never true     Ran Out of Food in the Last Year: Never true   Transportation Needs: Unknown (2023)    Received from [facility], [facility]    PRAPARE - Transportation     Lack of Transportation (Medical): Patient declined     Lack of Transportation (Non-Medical): No   Physical Activity: Sufficiently Active (2023)    Received from [facility], [facility]    Exercise Vital Sign     Days of Exercise per Week: 5 days     Minutes of Exercise per Session: 40 min   Stress: Patient  Declined (1/8/2023)    Received from [facility], [facility]    [employer] [place] [employer] - Occupational Stress Questionnaire     Feeling of Stress : Patient declined   Social Connections: Unknown (1/8/2023)    Received from [facility], [facility]    Social Connection and Isolation Panel [NHANES]     Frequency of Communication with Friends and Family: More than three times a week     Frequency of Social Gatherings with Friends and Family: Patient declined     Attends Latter-day Services: Patient declined     Active Member of Clubs or Organizations: No     Attends Club or Organization Meetings: Never     Marital Status: Never    Interpersonal Safety: Not At Risk (1/8/2023)    Received from [facility], [facility]    Humiliation, Afraid, Rape, and Kick questionnaire     Fear of Current or Ex-Partner: No     Emotionally Abused: No     Physically Abused: No     Sexually Abused: No   Housing Stability: Low Risk  (1/8/2023)    Received from [facility], [facility]    Housing Stability Vital Sign     Unable to Pay for Housing in the Last Year: No     Number of Places Lived in the Last Year: 1     Unstable Housing in the Last Year: No     Prescription Medications as of 7/22/2024         Rx Number Disp Refills Start End Last Dispensed Date Next Fill Date Owning Pharmacy    acetaminophen (TYLENOL) 325 MG tablet  -- -- 7/5/2024 --       Sig: Take 2 tablets (650 mg) by mouth every 6 hours as needed for mild pain    Class: OTC    Route: Oral    Alcohol Swabs PADS  200 each 1 7/5/2024 --   [facility] - [place], [place] - [address]    Sig: Use to swab the area of the injection or nay as directed Per insurance coverage    Class: E-Prescribe    aspirin (ASA) 81 MG chewable tablet  30 tablet 5 7/6/2024 --   [facility]  [address]    Si tablet (81 mg) by Oral or Feeding Tube route daily    Class: E-Prescribe    Route: Oral or Feeding Tube    blood glucose (NO BRAND SPECIFIED) lancets standard  200 each 1 2024 --   [address]    Sig: To use to test glucose level in the blood Use to test blood sugar 4  times daily as directed. To accompany glucose monitor brands per insurance coverage.    Class: E-Prescribe    blood glucose (NO BRAND SPECIFIED) test strip  100 strip 3 2024 --   [address]    Sig: To use to test glucose level in the blood Use to test blood sugar 4 times daily as directed. To accompany glucose monitor brands per insurance coverage.    Class: E-Prescribe    blood glucose monitoring (NO BRAND SPECIFIED) meter device kit  1 kit 0 2024 --   [address]    Sig: Use as directed Per insurance coverage    Class: E-Prescribe    carvedilol (COREG) 3.125 MG tablet  60 tablet 3 2024 --   [address]    Sig: Take 1 tablet (3.125 mg) by mouth 2 times daily (with meals)    Class: E-Prescribe    Route: Oral    fluticasone (FLONASE) 50 MCG/ACT nasal spray  -- -- 2023 --       Sig: Spray 1 spray into both nostrils daily as needed for allergies or rhinitis    Class: Historical    Route: Both Nostrils    ibuprofen (ADVIL/MOTRIN) 400 MG tablet  16 tablet 0 7/15/2024 --   [place], [place] -  [facility] [id number]    Sig: Take 1 tablet (400 mg) by mouth every 6 hours as needed for moderate pain    Class: E-Prescribe    Route: Oral    insulin aspart (NOVOLOG PEN) 100 UNIT/ML pen  15 mL 1 2024 --   [address]    Sig: Inject 1-7 Units Subcutaneous  at bedtime HIGH INSULIN RESISTANCE DOSING  Do Not give Bedtime Correction Insulin if BG less than 200. For  - 224 give 1 units. For  - 249 give 2 units. For  - 274 give 3 units. For  - 299 give 4 units. For  - 324 give 5 units. For  - 349 give 6 units. For BG greater than or equal to 350 give 7 units. Notify provider if glucose greater than or equal to 350 mg/dL after administration of correction dose.    Class: E-Prescribe    Route: Subcutaneous    insulin aspart (NOVOLOG PEN) 100 UNIT/ML pen  3 mL 1 7/5/2024 --   [address]    Sig: Inject 6 Units Subcutaneous 3 times daily (with meals)    Class: E-Prescribe    Route: Subcutaneous    insulin aspart (NOVOLOG PEN) 100 UNIT/ML pen  15 mL 1 7/5/2024 --   [address]    Sig: Inject 1-10 Units Subcutaneous 3 times daily (before meals) Correction Scale - MEDIUM INSULIN RESISTANCE DOSING   Do Not give Correction Insulin if BG < 140. For  - 189 give 1 unit. For  - 239 give 2 units. For  - 289 give 3 units. For  - 339 give 4 units. For BG = or > 340 give 5 units. Check blood glucose before meals/bolus feedings (or q4h if NPO) and administer based on blood glucose. Notify MD if glucose > or = 350 mg/dL after administration of correction dose.    Class: E-Prescribe    Route: Subcutaneous    insulin glargine (LANTUS PEN) 100 UNIT/ML pen  15 mL 1 7/6/2024 --   [address]    Sig: Inject 25 Units Subcutaneous daily    Class: E-Prescribe    Notes to Pharmacy: If Lantus is not covered by insurance, may substitute Basaglar or Semglee or other insulin glargine product per insurance preference at same dose and frequency.    Route: Subcutaneous    insulin pen needle (32G X 4 MM) 32G X 4 MM miscellaneous  200 each 1 7/5/2024 --   [facility]  [address]    Sig: Use as directed by provider Per insurance coverage    Class: E-Prescribe    magnesium oxide (MAG-OX) 400 MG tablet  120 tablet 3 7/10/2024 --   [place] [address] [facility] [phone]    Sig: Take 2 tablets (800 mg) by mouth 2 times daily    Class: E-Prescribe    Route: Oral    Prior authorization: Denied    melatonin 5 MG tablet  -- -- 2024 --       Sig: Take 1 tablet (5 mg) by mouth nightly as needed for sleep    Class: OTC    Route: Oral    methocarbamol (ROBAXIN) 500 MG tablet  15 tablet 0 2024 --   [address]    Sig: Take 1 tablet (500 mg) by mouth 4 times daily as needed for muscle spasms    Class: E-Prescribe    Route: Oral    mycophenolate (GENERIC EQUIVALENT) 250 MG capsule  180 capsule 5 2024 --   [address]    Sig: Take 3 capsules (750 mg) by mouth 2 times daily    Class: E-Prescribe    Route: Oral    oxyCODONE (ROXICODONE) 5 MG tablet  8 tablet 0 2024 --   [address]    Si.5-1 tablets (2.5-5 mg) by Oral or Feeding Tube route every 6 hours as needed for severe pain    Class: E-Prescribe    Earliest Fill Date: 2024    Route: Oral or Feeding Tube    pantoprazole (PROTONIX) 40 MG EC tablet  30 tablet 2 2024 --   [address]    Sig: Take 1 tablet (40 mg) by mouth daily    Class: E-Prescribe    Route: Oral    predniSONE (DELTASONE) 5 MG tablet  30 tablet 2 2024 --   [address]    Sig: Take 1 tablet (5 mg) by mouth daily    Class: E-Prescribe    Route: Oral    sennosides (SENOKOT) 8.6 MG tablet  -- -- 2024 --       Si tablet by Oral or Feeding Tube route 2 times daily    Class: OTC     Route: Oral or Feeding Tube    Sharps Container MISC  1 each 1 7/5/2024 --   [address]    Sig: Use as directed to dispose of needles, lancets and other sharps    Class: E-Prescribe    sulfamethoxazole-trimethoprim (BACTRIM) 400-80 MG tablet  30 tablet 5 7/6/2024 --   [address]    Sig: Take 1 tablet by mouth daily    Class: E-Prescribe    Route: Oral    tacrolimus (GENERIC EQUIVALENT) 0.5 MG capsule  60 capsule 11 7/5/2024 --   [address]    Sig: Tacrolimus 0.5 mg capsules BID to allow for dose adjustments.    Class: E-Prescribe    tacrolimus (GENERIC EQUIVALENT) 1 MG capsule  180 capsule 11 7/18/2024 --   [facility] [address] [place] [address] [id number]    Sig: Take 3 capsules (3 mg) by mouth 2 times daily    Class: E-Prescribe    Notes to Pharmacy: Profile Rx: patient will contact pharmacy when needed    Route: Oral    ursodiol (ACTIGALL) 300 MG capsule  60 capsule 3 7/5/2024 --   [address]    Sig: Take 1 capsule (300 mg) by mouth 2 times daily    Class: E-Prescribe    Route: Oral    valGANciclovir (VALCYTE) 450 MG tablet  30 tablet 5 7/5/2024 --   [address]    Sig: Take 1 tablet (450 mg) by mouth daily    Class: E-Prescribe    Route: Oral    polyethylene glycol (MIRALAX) 17 GM/Dose powder  -- -- 7/5/2024 --       Sig: Take 17 g by mouth daily    Class: OTC    Route: Oral          Patient has no known allergies.   REVIEW OF SYSTEMS (check box if normal)  [x]               GENERAL  [x]                 PULMONARY [x]                GENITOURINARY  [x]                CNS                 [x]                 CARDIAC  [x]                 ENDOCRINE  [x]                EARS,NOSE,THROAT [x]                  GASTROINTESTINAL [x]                 NEUROLOGIC    [x]                MUSCLOSKELTAL  [x]                  HEMATOLOGY      PHYSICAL EXAM (check box if normal)BP (!) 146/94   Pulse 109   Wt 89.8 kg (198 lb)   SpO2 99%   BMI 25.42 kg/m          [x]            GENERAL:    [x]       EYES:  ICTERIC   []        YES  []                    NO  [x]           EXTREMITIES: Clubbing []       Y     [x]           N    [x]           EARS, NOSE, THROAT: Membranes Moist    YES   [x]                   NO []                  [x]           LUNGS:  CLEAR    YES       [x]                  NO    []                                [x]           SKIN: Jaundice           YES       []                  NO    [x]                   Rash: YES       []                  NO    [x]                                     [x]             HEART: Regular Rate          YES       [x]                  NO    []                   Incision Clean:  YES       [x]                  NO    []                                [x]                    ABDOMEN: Organomegaly YES       []                  NO    [x]                       [x]                    NEUROLOGICAL:  Nonfocal  YES       [x]                  NO    []                       [x]                    Hernia YES       []                  NO    [x]                   PSYCHIATRIC:  Appropriate  YES       [x]                  NO    []                       OTHER:                                                                                                   Incision healing well.   Sutures removed from drain sites.     Scrotum is erythematous, known epididymitis, urology follow up in a couple weeks.

## 2024-07-22 NOTE — LETTER
7/22/2024      West Van  [address]  [facility] [address]      Dear Colleague,    Thank you for referring your patient, West Van, to the [facility]ANSPLANT [facility]. Please see a copy of my visit note below.    Transplant Surgery -OUTPATIENT IMMUNOSUPPRESSION PROGRESS NOTE    Date of Visit: 07/22/2024    Transplants:  6/27/2024 (Liver); Postoperative day:  25  ASSESMENT AND PLAN:Doing well, has epididymitis, urology follow up soon, continue NSAIDs  1.Graft Function:good  2.Immunosuppression Management: recently decreased tac dose, follow up today's level and adjust accordingly  3.Hypertension: mild  4.Renal Function:good  5.Lab frequency:per protocol  6.Other:epididymitis, urology follow up.    ISRA Rendon MD    I have reviewed history, examined patient and discussed plan with the fellow/resident/JAVIER.  I concur with the findings in this note.    Immunosuppressive regimen, management and long term risks discussed in detail. Changes, when applicable made as per orders.    Total time: 20 min  Including pre-encounter, face to face and post-encounter time spent on this patient's visit.                 Date: July 22, 2024    Transplant:  [x]                             Liver [x]                              Kidney []                             Pancreas []                              Other:             Chief Complaint:Post-op Visit (Liver txp)    History of Present Illness:  Patient Active Problem List   Diagnosis     Anxiety     Benign essential HTN     Calculus of gallbladder without cholecystitis without obstruction     Dermoid cyst of scalp     Eczema     Elevated LFTs     History of recent stressful life event     Hypertension     Lesion of liver     Malignant neoplasm of liver (H)     Obesity (BMI 30-39.9)     Psychophysiological insomnia     Sciatica of right side     SI (sacroiliac) joint dysfunction     Type 2 diabetes mellitus without complication, without long-term current use of insulin (H)      Primary malignant neuroendocrine tumor of lung (H)     Liver cirrhosis secondary to nonalcoholic steatohepatitis (RIOS) (H)     Kidney stone     Chronic cough     Environmental and seasonal allergies     Mass of chest wall, right     Adjustment disorder with mixed anxiety and depressed mood     Seborrheic keratoses     Liver transplant candidate     HCC (hepatocellular carcinoma) (H)     S/P liver transplant (H)     Acute post-operative pain     Immunosuppressed status (H24)     Anemia due to blood loss, acute     Moderate malnutrition (H24)     Steroid-induced hyperglycemia     Hypomagnesemia     SOCIAL /FAMILY HISTORY: [x]                  No recent change    Past Medical History:   Diagnosis Date     Benign essential HTN 04/13/2016     Diabetes (H)      Kidney stone      Liver cancer (H) 10/04/2022     Liver cirrhosis secondary to nonalcoholic steatohepatitis (RIOS) (H)      RIOS (nonalcoholic steatohepatitis) 06/02/2023     Neuroendocrine carcinoma of lung (H)     right lung, lobectomy 3/2023     PONV (postoperative nausea and vomiting)      Past Surgical History:   Procedure Laterality Date     BRONCHOSCOPY, WITH BIOPSY, ROBOT ASSISTED N/A 1/17/2023    Procedure: BRONCHOSCOPY, USING OPTICAL TRACKING SYSTEM, ion;  Surgeon: Ani Guillaume MD;  Location: [facility] OR     DAVINCI LOBECTOMY LUNG Right 3/8/2023    Procedure: Robot-assisted right thoracoscopic lower lobectomy, mediastinal lymph node dissection, intercostal nerve cryoablation ;  Surgeon: Matheus Haas MD;  Location: [facility] OR     ENDOBRONCHIAL ULTRASOUND FLEXIBLE N/A 1/17/2023    Procedure: endobronchial  ultrasound and transbronchial biopsies;  Surgeon: Ani Guillaume MD;  Location: [facility] OR     EXCISE TUMOR CHEST WALL Right 2/1/2024    Procedure: Excision chest wall mass;  Surgeon: Matheus Haas MD;  Location: [facility]     LAPAROSCOPIC ABLATION LIVER TUMOR N/A 12/6/2022    Procedure: Diagnostic Laparoscopy, Hepatic Ultrasound, Laparoscopic ultrasound guided  microwave ablation of liver tumor x1;  Surgeon: Armando Munguia MD;  Location: [facility] OR     LAPAROSCOPIC BIOPSY LIVER N/A 2022    Procedure: Laparoscopic Ultrasound Guided liver biopsy;  Surgeon: Armando Munguia MD;  Location: [facility]     LAPAROSCOPIC CHOLECYSTECTOMY N/A 2022    Procedure: Laparoscopic cholecystectomy;  Surgeon: Armando Munguia MD;  Location: [facility]     TRANSPLANT LIVER RECIPIENT  DONOR N/A 2024    Procedure: Transplant liver recipient  donor with bile duct stent placement;  Surgeon: Radu Dodson MD;  Location: [facility] OR     Social History     Socioeconomic History     Marital status: Single     Spouse name: Not on file     Number of children: Not on file     Years of education: Not on file     Highest education level: Not on file   Occupational History     Not on file   Tobacco Use     Smoking status: Never     Passive exposure: Past     Smokeless tobacco: Never   Vaping Use     Vaping status: Never Used   Substance and Sexual Activity     Alcohol use: Not Currently     Comment: Last ETOH 1999     Drug use: Never     Sexual activity: Not on file   Other Topics Concern     Not on file   Social History Narrative     Not on file     Social Determinants of Health     Financial Resource Strain: Medium Risk (2023)    [facility]    Overall Financial Resource Strain (CARDIA)      Difficulty of Paying Living Expenses: Somewhat hard   Food Insecurity: No Food Insecurity (2023)    [facility]    Hunger Vital Sign      Worried About Running Out of Food in the Last Year: Never true      Ran Out of Food in the Last Year: Never true   Transportation Needs: Unknown (2023)    [facility]    PRAPARE - Transportation      Lack of Transportation (Medical): Patient declined      Lack of  Transportation (Non-Medical): No   Physical Activity: Sufficiently Active (1/8/2023)    [facility]    Exercise Vital Sign      Days of Exercise per Week: 5 days      Minutes of Exercise per Session: 40 min   Stress: Patient Declined (1/8/2023)    [facility]    [employer] - Occupational Stress Questionnaire      Feeling of Stress : Patient declined   Social Connections: Unknown (1/8/2023)    [facility]    Social Connection and Isolation Panel [NHANES]      Frequency of Communication with Friends and Family: More than three times a week      Frequency of Social Gatherings with Friends and Family: Patient declined      Attends Sabianism Services: Patient declined      Active Member of Clubs or Organizations: No      Attends Club or Organization Meetings: Never      Marital Status: Never    Interpersonal Safety: Not At Risk (1/8/2023)    [facility]    Humiliation, Afraid, Rape, and Kick questionnaire      Fear of Current or Ex-Partner: No      Emotionally Abused: No      Physically Abused: No      Sexually Abused: No   Housing Stability: Low Risk  (1/8/2023)    [facility]    Housing Stability Vital Sign      Unable to Pay for Housing in the Last Year: No      Number of Places Lived in the Last Year: 1      Unstable Housing in the Last Year: No     Prescription Medications as of 7/22/2024         Rx Number Disp Refills Start End Last Dispensed Date Next Fill Date Owning Pharmacy    acetaminophen (TYLENOL) 325 MG tablet  -- -- 7/5/2024 --       Sig: Take 2 tablets (650 mg) by mouth every 6 hours as needed for mild pain    Class: OTC    Route: Oral    Alcohol Swabs PADS   200 each 1 2024 --   [address]    Sig: Use to swab the area of the injection or nay as directed Per insurance coverage    Class: E-Prescribe    aspirin (ASA) 81 MG chewable tablet  30 tablet 5 2024 --   [address]    Si tablet (81 mg) by Oral or Feeding Tube route daily    Class: E-Prescribe    Route: Oral or Feeding Tube    blood glucose (NO BRAND SPECIFIED) lancets standard  200 each 1 2024 --   [address]    Sig: To use to test glucose level in the blood Use to test blood sugar 4  times daily as directed. To accompany glucose monitor brands per insurance coverage.    Class: E-Prescribe    blood glucose (NO BRAND SPECIFIED) test strip  100 strip 3 2024 --   [address]    Sig: To use to test glucose level in the blood Use to test blood sugar 4 times daily as directed. To accompany glucose monitor brands per insurance coverage.    Class: E-Prescribe    blood glucose monitoring (NO BRAND SPECIFIED) meter device kit  1 kit 0 2024 --   [address]    Sig: Use as directed Per insurance coverage    Class: E-Prescribe    carvedilol (COREG) 3.125 MG tablet  60 tablet 3 2024 --   [address]    Sig: Take 1 tablet (3.125 mg) by mouth 2 times daily (with meals)    Class: E-Prescribe    Route: Oral    fluticasone (FLONASE) 50 MCG/ACT nasal spray  -- -- 2023 --       Sig: Spray 1 spray into both nostrils daily as needed for allergies or rhinitis    Class: Historical    Route: Both Nostrils    ibuprofen (ADVIL/MOTRIN) 400 MG tablet  16 tablet 0 7/15/2024 --   [place], [place] - [address] [facility] [phone]     Sig: Take 1 tablet (400 mg) by mouth every 6 hours as needed for moderate pain    Class: E-Prescribe    Route: Oral    insulin aspart (NOVOLOG PEN) 100 UNIT/ML pen  15 mL 1 7/5/2024 --   [address]    Sig: Inject 1-7 Units Subcutaneous at bedtime HIGH INSULIN RESISTANCE DOSING  Do Not give Bedtime Correction Insulin if BG less than 200. For  - 224 give 1 units. For  - 249 give 2 units. For  - 274 give 3 units. For  - 299 give 4 units. For  - 324 give 5 units. For  - 349 give 6 units. For BG greater than or equal to 350 give 7 units. Notify provider if glucose greater than or equal to 350 mg/dL after administration of correction dose.    Class: E-Prescribe    Route: Subcutaneous    insulin aspart (NOVOLOG PEN) 100 UNIT/ML pen  3 mL 1 7/5/2024 --   [address]    Sig: Inject 6 Units Subcutaneous 3 times daily (with meals)    Class: E-Prescribe    Route: Subcutaneous    insulin aspart (NOVOLOG PEN) 100 UNIT/ML pen  15 mL 1 7/5/2024 --   [address]    Sig: Inject 1-10 Units Subcutaneous 3 times daily (before meals) Correction Scale - MEDIUM INSULIN RESISTANCE DOSING   Do Not give Correction Insulin if BG < 140. For  - 189 give 1 unit. For  - 239 give 2 units. For  - 289 give 3 units. For  - 339 give 4 units. For BG = or > 340 give 5 units. Check blood glucose before meals/bolus feedings (or q4h if NPO) and administer based on blood glucose. Notify MD if glucose > or = 350 mg/dL after administration of correction dose.    Class: E-Prescribe    Route: Subcutaneous    insulin glargine (LANTUS PEN) 100 UNIT/ML pen  15 mL 1 7/6/2024 --   [address]    Sig: Inject 25 Units Subcutaneous daily    Class: E-Prescribe    Notes to  Pharmacy: If Lantus is not covered by insurance, may substitute Basaglar or Semglee or other insulin glargine product per insurance preference at same dose and frequency.    Route: Subcutaneous    insulin pen needle (32G X 4 MM) 32G X 4 MM miscellaneous  200 each 1 2024 --   [address]    Sig: Use as directed by provider Per insurance coverage    Class: E-Prescribe    magnesium oxide (MAG-OX) 400 MG tablet  120 tablet 3 7/10/2024 --   [facility] [address] [facility] [phone]    Sig: Take 2 tablets (800 mg) by mouth 2 times daily    Class: E-Prescribe    Route: Oral    Prior authorization: Denied    melatonin 5 MG tablet  -- -- 2024 --       Sig: Take 1 tablet (5 mg) by mouth nightly as needed for sleep    Class: OTC    Route: Oral    methocarbamol (ROBAXIN) 500 MG tablet  15 tablet 0 2024 --   [address]    Sig: Take 1 tablet (500 mg) by mouth 4 times daily as needed for muscle spasms    Class: E-Prescribe    Route: Oral    mycophenolate (GENERIC EQUIVALENT) 250 MG capsule  180 capsule 5 2024 --   [address]    Sig: Take 3 capsules (750 mg) by mouth 2 times daily    Class: E-Prescribe    Route: Oral    oxyCODONE (ROXICODONE) 5 MG tablet  8 tablet 0 2024 --   [address]    Si.5-1 tablets (2.5-5 mg) by Oral or Feeding Tube route every 6 hours as needed for severe pain    Class: E-Prescribe    Earliest Fill Date: 2024    Route: Oral or Feeding Tube    pantoprazole (PROTONIX) 40 MG EC tablet  30 tablet 2 2024 --   [address]    Sig: Take 1 tablet (40 mg) by mouth daily    Class: E-Prescribe    Route: Oral    predniSONE (DELTASONE) 5 MG tablet  30 tablet  2 2024 --   [address]    Sig: Take 1 tablet (5 mg) by mouth daily    Class: E-Prescribe    Route: Oral    sennosides (SENOKOT) 8.6 MG tablet  -- -- 2024 --       Si tablet by Oral or Feeding Tube route 2 times daily    Class: OTC    Route: Oral or Feeding Tube    Sharps Container MISC  1 each 1 2024 --   [address]    Sig: Use as directed to dispose of needles, lancets and other sharps    Class: E-Prescribe    sulfamethoxazole-trimethoprim (BACTRIM) 400-80 MG tablet  30 tablet 5 2024 --   [address]    Sig: Take 1 tablet by mouth daily    Class: E-Prescribe    Route: Oral    tacrolimus (GENERIC EQUIVALENT) 0.5 MG capsule  60 capsule 11 2024 --   [address]    Sig: Tacrolimus 0.5 mg capsules BID to allow for dose adjustments.    Class: E-Prescribe    tacrolimus (GENERIC EQUIVALENT) 1 MG capsule  180 capsule 11 2024 --   [facility] [address] [place] [address] [phone]    Sig: Take 3 capsules (3 mg) by mouth 2 times daily    Class: E-Prescribe    Notes to Pharmacy: Profile Rx: patient will contact pharmacy when needed    Route: Oral    ursodiol (ACTIGALL) 300 MG capsule  60 capsule 3 2024 --   [address]    Sig: Take 1 capsule (300 mg) by mouth 2 times daily    Class: E-Prescribe    Route: Oral    valGANciclovir (VALCYTE) 450 MG tablet  30 tablet 5 2024 --   [address]    Sig: Take 1 tablet (450 mg) by mouth daily    Class: E-Prescribe    Route: Oral    polyethylene glycol (MIRALAX) 17 GM/Dose powder  -- -- 2024 --       Sig: Take 17 g by mouth daily    Class: OTC    Route: Oral           Patient has no known allergies.   REVIEW OF SYSTEMS (check box if normal)  [x]               GENERAL  [x]                 PULMONARY [x]                GENITOURINARY  [x]                CNS                 [x]                 CARDIAC  [x]                 ENDOCRINE  [x]                EARS,NOSE,THROAT [x]                 GASTROINTESTINAL [x]                 NEUROLOGIC    [x]                MUSCLOSKELTAL  [x]                  HEMATOLOGY      PHYSICAL EXAM (check box if normal)BP (!) 146/94   Pulse 109   Wt 89.8 kg (198 lb)   SpO2 99%   BMI 25.42 kg/m          [x]            GENERAL:    [x]       EYES:  ICTERIC   []        YES  []                    NO  [x]           EXTREMITIES: Clubbing []       Y     [x]           N    [x]           EARS, NOSE, THROAT: Membranes Moist    YES   [x]                   NO []                  [x]           LUNGS:  CLEAR    YES       [x]                  NO    []                                [x]           SKIN: Jaundice           YES       []                  NO    [x]                   Rash: YES       []                  NO    [x]                                     [x]             HEART: Regular Rate          YES       [x]                  NO    []                   Incision Clean:  YES       [x]                  NO    []                                [x]                    ABDOMEN: Organomegaly YES       []                  NO    [x]                       [x]                    NEUROLOGICAL:  Nonfocal  YES       [x]                  NO    []                       [x]                    Hernia YES       []                  NO    [x]                   PSYCHIATRIC:  Appropriate  YES       [x]                  NO    []                       OTHER:                                                                                                   Incision healing well.   Sutures removed from drain sites.     Scrotum is erythematous, known epididymitis, urology follow up in a couple  weeks.       Again, thank you for allowing me to participate in the care of your patient.        Sincerely,        Radu Dodson MD

## 2024-07-25 ENCOUNTER — LAB (OUTPATIENT)
Dept: LAB | Facility: CLINIC | Age: 49
End: 2024-07-25
Payer: COMMERCIAL

## 2024-07-25 DIAGNOSIS — Z94.4 S/P LIVER TRANSPLANT (H): ICD-10-CM

## 2024-07-25 DIAGNOSIS — Z94.4 LIVER REPLACED BY TRANSPLANT (H): ICD-10-CM

## 2024-07-25 LAB
ALBUMIN SERPL BCG-MCNC: 4.1 G/DL (ref 3.5–5.2)
ALP SERPL-CCNC: 91 U/L (ref 40–150)
ALT SERPL W P-5'-P-CCNC: 23 U/L (ref 0–70)
ANION GAP SERPL CALCULATED.3IONS-SCNC: 9 MMOL/L (ref 7–15)
AST SERPL W P-5'-P-CCNC: 17 U/L (ref 0–45)
BASOPHILS # BLD AUTO: 0.1 10E3/UL (ref 0–0.2)
BASOPHILS NFR BLD AUTO: 2 %
BILIRUB DIRECT SERPL-MCNC: 0.25 MG/DL (ref 0–0.3)
BILIRUB SERPL-MCNC: 0.8 MG/DL
BUN SERPL-MCNC: 16.8 MG/DL (ref 6–20)
CALCIUM SERPL-MCNC: 10.1 MG/DL (ref 8.8–10.4)
CHLORIDE SERPL-SCNC: 105 MMOL/L (ref 98–107)
CREAT SERPL-MCNC: 0.81 MG/DL (ref 0.67–1.17)
EGFRCR SERPLBLD CKD-EPI 2021: >90 ML/MIN/1.73M2
EOSINOPHIL # BLD AUTO: 0.1 10E3/UL (ref 0–0.7)
EOSINOPHIL NFR BLD AUTO: 3 %
ERYTHROCYTE [DISTWIDTH] IN BLOOD BY AUTOMATED COUNT: 14.8 % (ref 10–15)
GLUCOSE SERPL-MCNC: 120 MG/DL (ref 70–99)
HCO3 SERPL-SCNC: 28 MMOL/L (ref 22–29)
HCT VFR BLD AUTO: 42.4 % (ref 40–53)
HGB BLD-MCNC: 14.1 G/DL (ref 13.3–17.7)
IMM GRANULOCYTES # BLD: 0.1 10E3/UL
IMM GRANULOCYTES NFR BLD: 1 %
LYMPHOCYTES # BLD AUTO: 1.2 10E3/UL (ref 0.8–5.3)
LYMPHOCYTES NFR BLD AUTO: 25 %
MAGNESIUM SERPL-MCNC: 1.6 MG/DL (ref 1.7–2.3)
MCH RBC QN AUTO: 28.6 PG (ref 26.5–33)
MCHC RBC AUTO-ENTMCNC: 33.3 G/DL (ref 31.5–36.5)
MCV RBC AUTO: 86 FL (ref 78–100)
MONOCYTES # BLD AUTO: 0.4 10E3/UL (ref 0–1.3)
MONOCYTES NFR BLD AUTO: 9 %
NEUTROPHILS # BLD AUTO: 2.9 10E3/UL (ref 1.6–8.3)
NEUTROPHILS NFR BLD AUTO: 60 %
NRBC # BLD AUTO: 0 10E3/UL
NRBC BLD AUTO-RTO: 0 /100
PHOSPHATE SERPL-MCNC: 3.8 MG/DL (ref 2.5–4.5)
PLATELET # BLD AUTO: 170 10E3/UL (ref 150–450)
POTASSIUM SERPL-SCNC: 4 MMOL/L (ref 3.4–5.3)
PROT SERPL-MCNC: 6.6 G/DL (ref 6.4–8.3)
RBC # BLD AUTO: 4.93 10E6/UL (ref 4.4–5.9)
SODIUM SERPL-SCNC: 142 MMOL/L (ref 135–145)
TACROLIMUS BLD-MCNC: 10.5 UG/L (ref 5–15)
TME LAST DOSE: NORMAL H
TME LAST DOSE: NORMAL H
WBC # BLD AUTO: 4.8 10E3/UL (ref 4–11)
WBC # BLD AUTO: 4.8 10E3/UL (ref 4–11)

## 2024-07-25 PROCEDURE — 85025 COMPLETE CBC W/AUTO DIFF WBC: CPT | Performed by: PATHOLOGY

## 2024-07-25 PROCEDURE — 83735 ASSAY OF MAGNESIUM: CPT | Performed by: PATHOLOGY

## 2024-07-25 PROCEDURE — 82248 BILIRUBIN DIRECT: CPT | Performed by: PATHOLOGY

## 2024-07-25 PROCEDURE — 36415 COLL VENOUS BLD VENIPUNCTURE: CPT | Performed by: PATHOLOGY

## 2024-07-25 PROCEDURE — 99000 SPECIMEN HANDLING OFFICE-LAB: CPT | Performed by: PATHOLOGY

## 2024-07-25 PROCEDURE — 80053 COMPREHEN METABOLIC PANEL: CPT | Performed by: PATHOLOGY

## 2024-07-25 PROCEDURE — 84100 ASSAY OF PHOSPHORUS: CPT | Performed by: PATHOLOGY

## 2024-07-25 PROCEDURE — 80197 ASSAY OF TACROLIMUS: CPT | Performed by: SURGERY

## 2024-07-29 ENCOUNTER — LAB (OUTPATIENT)
Dept: LAB | Facility: CLINIC | Age: 49
End: 2024-07-29
Payer: COMMERCIAL

## 2024-07-29 ENCOUNTER — VIRTUAL VISIT (OUTPATIENT)
Dept: PHARMACY | Facility: CLINIC | Age: 49
End: 2024-07-29

## 2024-07-29 DIAGNOSIS — E11.9 TYPE 2 DIABETES MELLITUS WITHOUT COMPLICATION, WITHOUT LONG-TERM CURRENT USE OF INSULIN (H): Primary | ICD-10-CM

## 2024-07-29 DIAGNOSIS — Z94.4 S/P LIVER TRANSPLANT (H): ICD-10-CM

## 2024-07-29 DIAGNOSIS — Z78.9 TAKES DIETARY SUPPLEMENTS: ICD-10-CM

## 2024-07-29 DIAGNOSIS — Z94.4 LIVER REPLACED BY TRANSPLANT (H): ICD-10-CM

## 2024-07-29 LAB
ALBUMIN SERPL BCG-MCNC: 4 G/DL (ref 3.5–5.2)
ALP SERPL-CCNC: 87 U/L (ref 40–150)
ALT SERPL W P-5'-P-CCNC: 24 U/L (ref 0–70)
ANION GAP SERPL CALCULATED.3IONS-SCNC: 9 MMOL/L (ref 7–15)
AST SERPL W P-5'-P-CCNC: 17 U/L (ref 0–45)
BASOPHILS # BLD AUTO: 0.1 10E3/UL (ref 0–0.2)
BASOPHILS NFR BLD AUTO: 2 %
BILIRUB DIRECT SERPL-MCNC: 0.34 MG/DL (ref 0–0.3)
BILIRUB SERPL-MCNC: 1.1 MG/DL
BUN SERPL-MCNC: 19.5 MG/DL (ref 6–20)
CALCIUM SERPL-MCNC: 9.9 MG/DL (ref 8.8–10.4)
CHLORIDE SERPL-SCNC: 104 MMOL/L (ref 98–107)
CREAT SERPL-MCNC: 0.85 MG/DL (ref 0.67–1.17)
EGFRCR SERPLBLD CKD-EPI 2021: >90 ML/MIN/1.73M2
EOSINOPHIL # BLD AUTO: 0.1 10E3/UL (ref 0–0.7)
EOSINOPHIL NFR BLD AUTO: 2 %
ERYTHROCYTE [DISTWIDTH] IN BLOOD BY AUTOMATED COUNT: 14.4 % (ref 10–15)
GLUCOSE SERPL-MCNC: 269 MG/DL (ref 70–99)
HCO3 SERPL-SCNC: 26 MMOL/L (ref 22–29)
HCT VFR BLD AUTO: 43.1 % (ref 40–53)
HGB BLD-MCNC: 14 G/DL (ref 13.3–17.7)
IMM GRANULOCYTES # BLD: 0.1 10E3/UL
IMM GRANULOCYTES NFR BLD: 1 %
LYMPHOCYTES # BLD AUTO: 0.9 10E3/UL (ref 0.8–5.3)
LYMPHOCYTES NFR BLD AUTO: 16 %
MAGNESIUM SERPL-MCNC: 1.7 MG/DL (ref 1.7–2.3)
MCH RBC QN AUTO: 28.2 PG (ref 26.5–33)
MCHC RBC AUTO-ENTMCNC: 32.5 G/DL (ref 31.5–36.5)
MCV RBC AUTO: 87 FL (ref 78–100)
MONOCYTES # BLD AUTO: 0.6 10E3/UL (ref 0–1.3)
MONOCYTES NFR BLD AUTO: 11 %
NEUTROPHILS # BLD AUTO: 3.6 10E3/UL (ref 1.6–8.3)
NEUTROPHILS NFR BLD AUTO: 68 %
NRBC # BLD AUTO: 0 10E3/UL
NRBC BLD AUTO-RTO: 0 /100
PHOSPHATE SERPL-MCNC: 3.6 MG/DL (ref 2.5–4.5)
PLATELET # BLD AUTO: 158 10E3/UL (ref 150–450)
POTASSIUM SERPL-SCNC: 4.6 MMOL/L (ref 3.4–5.3)
PROT SERPL-MCNC: 6.4 G/DL (ref 6.4–8.3)
RBC # BLD AUTO: 4.96 10E6/UL (ref 4.4–5.9)
SODIUM SERPL-SCNC: 139 MMOL/L (ref 135–145)
TACROLIMUS BLD-MCNC: 7.6 UG/L (ref 5–15)
TME LAST DOSE: NORMAL H
TME LAST DOSE: NORMAL H
WBC # BLD AUTO: 5.3 10E3/UL (ref 4–11)
WBC # BLD AUTO: 5.3 10E3/UL (ref 4–11)

## 2024-07-29 PROCEDURE — 80053 COMPREHEN METABOLIC PANEL: CPT | Performed by: PATHOLOGY

## 2024-07-29 PROCEDURE — 99207 PR NO CHARGE LOS: CPT | Mod: 93 | Performed by: PHARMACIST

## 2024-07-29 PROCEDURE — 82248 BILIRUBIN DIRECT: CPT | Performed by: PATHOLOGY

## 2024-07-29 PROCEDURE — 84100 ASSAY OF PHOSPHORUS: CPT | Performed by: PATHOLOGY

## 2024-07-29 PROCEDURE — 85025 COMPLETE CBC W/AUTO DIFF WBC: CPT | Performed by: PATHOLOGY

## 2024-07-29 PROCEDURE — 36415 COLL VENOUS BLD VENIPUNCTURE: CPT | Performed by: PATHOLOGY

## 2024-07-29 PROCEDURE — 80197 ASSAY OF TACROLIMUS: CPT | Performed by: SURGERY

## 2024-07-29 PROCEDURE — 99000 SPECIMEN HANDLING OFFICE-LAB: CPT | Performed by: PATHOLOGY

## 2024-07-29 PROCEDURE — 87516 HEPATITIS B DNA AMP PROBE: CPT | Performed by: SURGERY

## 2024-07-29 PROCEDURE — 83735 ASSAY OF MAGNESIUM: CPT | Performed by: PATHOLOGY

## 2024-07-29 NOTE — PROGRESS NOTES
Disease State Management Encounter:                          West Van is a 48 year old male called for a follow-up visit.      Reason for visit: diabetes follow-up.    Allergies/ADRs: Reviewed in chart  Past Medical History: Reviewed in chart  Tobacco: He reports that he has never smoked. He has been exposed to tobacco smoke. He has never used smokeless tobacco.  Alcohol: not currently using     Medication Adherence/Access: no issues reported    Diabetes   Lantus 28 units daily.  Novolog 6 units 3 times daily sliding scale with meals. Has been skipping morning dose as sugars haven't been over 140.  Patient is not experiencing side effects.  Current diabetes symptoms:  denies sx  Blood sugar monitorin time(s) daily; Ranges: (patient reported)    Date FBG/ 2hours post Lunch/2hours post Dinner /2hours post    125 253      128      150 234 249    116 226 225    119 339 240    120 276      Date FBG/ 2hours post Lunch/2hours post Dinner /2hours post bedtime   7/10 /251       195 230 250 296 gave 9 units novolog    189 250 270 267 2 units     240 265 260    193 211 237 255      Supplements:   Mag Oxide 800mg twice daily ( 2 hours from MMF) .  Lab Results   Component Value Date    MAG 1.7 2024     Today's Vitals: There were no vitals taken for this visit.    Assessment/Plan:    Start giving Novolog base 6 units if sugar is between , then add sliding scale if it is over 140. Give before breakfast.   : 6 units  140-189: 7 units  190-239: 8 units  240-289: 9 units  290-339: 10 units  Over 340: 11 units  If lunchtime and dinnertime sugars do not drop below 150 after 3 days, you can increase Novolog to a base of 8 units before meals plus sliding scale. That is, start your sliding scale dose at 8 units instead of 6 units.     Follow-up:  at 3 PM    I spent 15 minutes with this patient today. All changes were made via collaborative practice agreement with  Dr. Dodson. A copy of the visit note was provided to the patient's provider(s).    A summary of these recommendations was sent via [employer].    Zafar Arroyo, PharmD  [facility] Pharmacist    Phone: [phone]      Medication Therapy Recommendations  Type 2 diabetes mellitus without complication, without long-term current use of insulin (H)    Current Medication: insulin aspart (NOVOLOG PEN) 100 UNIT/ML pen   Rationale: Does not understand instructions - Adherence - Adherence   Recommendation: Provide Education   Status: Patient Agreed - Adherence/Education

## 2024-07-29 NOTE — PATIENT INSTRUCTIONS
"Recommendations from today's MTM visit:                                                      Start giving Novolog base 6 units if sugar is between , then add sliding scale if it is over 140. Give before breakfast.   : 6 units  140-189: 7 units  190-239: 8 units  240-289: 9 units  290-339: 10 units  Over 340: 11 units  If lunchtime and dinnertime sugars do not drop below 150 after 3 days, you can increase Novolog to a base of 8 units before meals plus sliding scale. That is, start your sliding scale dose at 8 units instead of 6 units.     Follow-up: 8/28 at 3 PM     It was great speaking with you today.  I value your experience and would be very thankful for your time in providing feedback in our clinic survey. In the next few days, you may receive an email or text message from [employer] with a link to a survey related to your  clinical pharmacist.\"     To schedule another MTM appointment, please call the clinic directly or you may call the MTM scheduling line at [phone] or toll-free at [phone].     My Clinical Pharmacist's contact information:                                                      Please feel free to contact me with any questions or concerns you have.      Zafar Arroyo, PharmD  [facility] Pharmacist    Phone: [phone]     "

## 2024-07-30 ENCOUNTER — TELEPHONE (OUTPATIENT)
Dept: TRANSPLANT | Facility: CLINIC | Age: 49
End: 2024-07-30
Payer: COMMERCIAL

## 2024-07-30 DIAGNOSIS — Z94.4 S/P LIVER TRANSPLANT (H): Primary | ICD-10-CM

## 2024-07-30 DIAGNOSIS — T38.0X5A STEROID-INDUCED HYPERGLYCEMIA: ICD-10-CM

## 2024-07-30 DIAGNOSIS — Z94.4 LIVER REPLACED BY TRANSPLANT (H): ICD-10-CM

## 2024-07-30 DIAGNOSIS — R73.9 STEROID-INDUCED HYPERGLYCEMIA: ICD-10-CM

## 2024-07-30 DIAGNOSIS — R73.9 DRUG-INDUCED HYPERGLYCEMIA: ICD-10-CM

## 2024-07-30 DIAGNOSIS — Z76.82 LIVER TRANSPLANT CANDIDATE: ICD-10-CM

## 2024-07-30 DIAGNOSIS — T50.905A DRUG-INDUCED HYPERGLYCEMIA: ICD-10-CM

## 2024-07-30 RX ORDER — TACROLIMUS 1 MG/1
4 CAPSULE ORAL EVERY 12 HOURS
Qty: 720 CAPSULE | Refills: 1 | Status: SHIPPED | OUTPATIENT
Start: 2024-07-30 | End: 2024-08-14

## 2024-07-30 RX ORDER — VALGANCICLOVIR 450 MG/1
450 TABLET, FILM COATED ORAL DAILY
Qty: 90 TABLET | Refills: 1 | Status: SHIPPED | OUTPATIENT
Start: 2024-07-30

## 2024-07-30 RX ORDER — BLOOD PRESSURE TEST KIT
KIT MISCELLANEOUS
Qty: 200 EACH | Refills: 1 | Status: CANCELLED | OUTPATIENT
Start: 2024-07-30

## 2024-07-30 RX ORDER — ASPIRIN 81 MG/1
81 TABLET, CHEWABLE ORAL DAILY
Qty: 90 TABLET | Refills: 1 | Status: SHIPPED | OUTPATIENT
Start: 2024-07-30

## 2024-07-30 RX ORDER — URSODIOL 300 MG/1
300 CAPSULE ORAL 2 TIMES DAILY
Qty: 60 CAPSULE | Refills: 3 | Status: SHIPPED | OUTPATIENT
Start: 2024-07-30

## 2024-07-30 RX ORDER — PREDNISONE 5 MG/1
5 TABLET ORAL DAILY
Qty: 90 TABLET | Refills: 1 | Status: SHIPPED | OUTPATIENT
Start: 2024-07-30

## 2024-07-30 RX ORDER — MAGNESIUM OXIDE 400 MG/1
800 TABLET ORAL 2 TIMES DAILY
Qty: 360 TABLET | Refills: 1 | Status: SHIPPED | OUTPATIENT
Start: 2024-07-30

## 2024-07-30 RX ORDER — PANTOPRAZOLE SODIUM 40 MG/1
40 TABLET, DELAYED RELEASE ORAL DAILY
Qty: 90 TABLET | Refills: 1 | Status: SHIPPED | OUTPATIENT
Start: 2024-07-30

## 2024-07-30 RX ORDER — SULFAMETHOXAZOLE AND TRIMETHOPRIM 400; 80 MG/1; MG/1
1 TABLET ORAL DAILY
Qty: 90 TABLET | Refills: 1 | Status: SHIPPED | OUTPATIENT
Start: 2024-07-30

## 2024-07-30 RX ORDER — CARVEDILOL 3.12 MG/1
3.12 TABLET ORAL 2 TIMES DAILY WITH MEALS
Qty: 180 TABLET | Refills: 1 | Status: CANCELLED | OUTPATIENT
Start: 2024-07-30

## 2024-07-30 RX ORDER — MYCOPHENOLATE MOFETIL 250 MG/1
750 CAPSULE ORAL 2 TIMES DAILY
Qty: 540 CAPSULE | Refills: 1 | Status: SHIPPED | OUTPATIENT
Start: 2024-07-30 | End: 2024-09-30

## 2024-07-30 NOTE — TELEPHONE ENCOUNTER
ISSUE:   Tacrolimus IR level 7.6 on 07/29/24, goal 8-12, dose 3 mg BID.    PLAN:   Call Patient and confirm this was an accurate 12-hour trough.   Verify Tacrolimus IR dose 3 mg BID.   Confirm no new medications or illness.   Confirm no missed doses.     If accurate trough and accurate dose, increase Tacrolimus IR dose to 4 mg BID    Repeat labs on Thursday.     Niyah Hammer RN     OUTCOME:   Spoke with Patient, they confirm accurate trough level and current dose 3 mg BID.   Patient confirmed dose change to 4 mg BID.  Patient agreed to repeat labs in 3 days.   Orders sent to preferred pharmacy for dose change and lab for repeat labs.   Patient voiced understanding of plan.     Brittni Freitas LPN

## 2024-07-30 NOTE — LETTER
OUTPATIENT LABORATORY TEST ORDER   [facility]  Fax#[phone]    Patient Name: West Van   YOB: 1975     [facility] MR# [if applicable]: [id number]   Date & Time: July 1, 2024  4:04 PM  Expiration Date: 1 year after date issued       Diagnosis: Liver Transplant (ICD-10 Z94.4)   Aftercare following organ transplant (ICD-10 Z48.288)   Long term use of medications (ICD-10 Z79.899)   Contact with and (suspected) exposure to other viral communicable   diseases (Z20.828)      We ask your assistance in obtaining the following laboratory tests, which are part of our routine surveillance program for Solid Organ Transplant patients.     Please fax each result to [phone], same day as resulted/available    Critical lab results page [phone]      Labs 2x/week (Monday/Thursday) x 4 weeks  CBC with Platelets   Basic Metabolic Panel   Phosphorous  Magnesium  Hepatic panel   Tacrolimus drug level - 12-hour trough, please document time of last dose           Month 3 post-transplant   Labs weekly (Monday or Tuesday) x 4 weeks  CBC with Platelets   Basic Metabolic Panel   Phosphorous  Magnesium  Hepatic panel   Tacrolimus drug level - 12-hour trough, please document time of last dose   Alpha Fetoprotein (AFP) if HCC High Risk Recurrence    Months 4 post-transplant   Labs every other week x 2   CBC with Platelets   Basic Metabolic Panel   Phosphorous  Magnesium  Hepatic panel   Tacrolimus drug level - 12-hour trough, please document time of last dose       Months 5-12 post-transplant   Labs monthly  CBC with Platelets   Basic Metabolic Panel   Phosphorous  Magnesium  Hepatic panel   Tacrolimus drug level - 12-hour trough, please document time of last dose   6 Alpha Fetoprotein (AFP) if HCC   9 Alpha Fetoprotein (AFP) if HCC High Risk Recurrence  12 Alpha Fetoprotein (AFP) if HCC      12-months post-transplant  Fasting Lipid Panel  Urine protein/creatinine ratio  UA with reflex to  micro  Hepatitis B DNA PCR on all patients  Alpha Fetoprotein (AFP) IF Hepatocellular Carcinoma (HCC) present at time of transplant  Magnesium    If you have any questions, please call [facility]- [phone] or [phone], Fax- [phone].    .  [facility] and Trans[facility]  Surgical Director, Kidney & Pancreas Transplant Programs  Medical Director, Solid Organ Transplant Unit

## 2024-07-30 NOTE — TELEPHONE ENCOUNTER
Post Discharge Liver Transplant Phone Call (within 1-3 business days post discharge)      Reviewed with the patient the Transplant Center contact information:  Best phone contact is [phone] Monday-Friday from 8am-5pm.   *You may have to leave a message and get a call back.    Good alternative communication method is via [employer] message.    Coordinators are available 8am-5pm M-F, otherwise there will be an on-call RN available 24/7  *If calling after hours, please call the same number, [phone] and ask the  to page the on-call Transplant Coordinator    When to contact the Transplant Center:  Fever > 100.5F  Dizziness, lightheadedness  Severe pain  If you are unable to take your immunosuppression medications.  Unable to obtain refill on immunosuppressive medications    Medications:  Reviewed medications with patient.   - confirmed pt has supply of meds  - MAR is up to date   - Verify preferred pharmacy in [facility]  - Reminded patient to always contact the pharmacy first for refills    Confirmed the patient has an up to date medication card at home and is knowledgeable in updating their med card (or has someone to assist in this).   - Reinforced patient always bring med card to appointment      Labs:  Labs are expected to be drawn on Monday and Thursday until you are told differently by your transplant team.   - confirmed patient's preferred lab and updated EPIC   - Take a copy of your lab letter with to each lab draw   - Lab order sent directly to patient via [employer] or mail.    - Confirmed patient has a copy of lab letter  - Review how to review lab results on [employer] or obtaining and recording lab values in handbook    Vitals:  Encouraged the patient to record the following:  BP - daily unless light headed  Temperature - daily  Weight - daily    Follow Up:  Role of the Transplant Coordinator vs LPN was reviewed. Contact information provided for both.   Reviewed current scheduled follow up appointments  with surgeon.    Reminded the patient to follow up with PCP within 1 -2 weeks for general follow-up and management of diabetes, pain, and blood pressure  STENT REMOVAL - reminded patient that if stent was placed at time of transplant (this is indicated on the check list) this will need to come out 2-3 mos post transplant.  Asked patient to notify transplant coordinator if sees stent in stool.  Has the patient been contacted with initial visit from HOME CARE? No  If not, Transplant Coordinator to be notified to follow up with Home Care  Informed pt to schedule an appt with PCP to follow up diabetic med and bp meds. August 13th is when pt is seeing PCP.   Pt will get blood work Hernandez, walker.     Brittni Freitas LPN

## 2024-07-31 LAB
HBV DNA SERPL QL NAA+PROBE: NORMAL
HCV RNA SERPL QL NAA+PROBE: NORMAL
HIV1+2 RNA SERPL QL NAA+PROBE: NORMAL

## 2024-08-01 ENCOUNTER — TELEPHONE (OUTPATIENT)
Dept: TRANSPLANT | Facility: CLINIC | Age: 49
End: 2024-08-01

## 2024-08-01 ENCOUNTER — LAB (OUTPATIENT)
Dept: LAB | Facility: CLINIC | Age: 49
End: 2024-08-01
Attending: SURGERY
Payer: COMMERCIAL

## 2024-08-01 ENCOUNTER — OFFICE VISIT (OUTPATIENT)
Dept: TRANSPLANT | Facility: CLINIC | Age: 49
End: 2024-08-01
Attending: SURGERY
Payer: COMMERCIAL

## 2024-08-01 VITALS
BODY MASS INDEX: 25.51 KG/M2 | HEART RATE: 78 BPM | DIASTOLIC BLOOD PRESSURE: 83 MMHG | SYSTOLIC BLOOD PRESSURE: 145 MMHG | OXYGEN SATURATION: 99 % | WEIGHT: 198.7 LBS

## 2024-08-01 DIAGNOSIS — Z94.4 LIVER REPLACED BY TRANSPLANT (H): ICD-10-CM

## 2024-08-01 DIAGNOSIS — Z94.4 S/P LIVER TRANSPLANT (H): ICD-10-CM

## 2024-08-01 LAB
ALBUMIN SERPL BCG-MCNC: 4.3 G/DL (ref 3.5–5.2)
ALP SERPL-CCNC: 88 U/L (ref 40–150)
ALT SERPL W P-5'-P-CCNC: 24 U/L (ref 0–70)
ANION GAP SERPL CALCULATED.3IONS-SCNC: 10 MMOL/L (ref 7–15)
AST SERPL W P-5'-P-CCNC: 19 U/L (ref 0–45)
BASOPHILS # BLD AUTO: 0.1 10E3/UL (ref 0–0.2)
BASOPHILS NFR BLD AUTO: 1 %
BILIRUB DIRECT SERPL-MCNC: 0.27 MG/DL (ref 0–0.3)
BILIRUB SERPL-MCNC: 0.9 MG/DL
BUN SERPL-MCNC: 17.6 MG/DL (ref 6–20)
CALCIUM SERPL-MCNC: 10 MG/DL (ref 8.8–10.4)
CHLORIDE SERPL-SCNC: 105 MMOL/L (ref 98–107)
CREAT SERPL-MCNC: 0.76 MG/DL (ref 0.67–1.17)
EGFRCR SERPLBLD CKD-EPI 2021: >90 ML/MIN/1.73M2
EOSINOPHIL # BLD AUTO: 0.1 10E3/UL (ref 0–0.7)
EOSINOPHIL NFR BLD AUTO: 2 %
ERYTHROCYTE [DISTWIDTH] IN BLOOD BY AUTOMATED COUNT: 14.1 % (ref 10–15)
GLUCOSE SERPL-MCNC: 119 MG/DL (ref 70–99)
HCO3 SERPL-SCNC: 26 MMOL/L (ref 22–29)
HCT VFR BLD AUTO: 43.9 % (ref 40–53)
HGB BLD-MCNC: 14.5 G/DL (ref 13.3–17.7)
IMM GRANULOCYTES # BLD: 0 10E3/UL
IMM GRANULOCYTES NFR BLD: 1 %
LYMPHOCYTES # BLD AUTO: 1.1 10E3/UL (ref 0.8–5.3)
LYMPHOCYTES NFR BLD AUTO: 20 %
MAGNESIUM SERPL-MCNC: 1.6 MG/DL (ref 1.7–2.3)
MCH RBC QN AUTO: 28.5 PG (ref 26.5–33)
MCHC RBC AUTO-ENTMCNC: 33 G/DL (ref 31.5–36.5)
MCV RBC AUTO: 86 FL (ref 78–100)
MONOCYTES # BLD AUTO: 0.5 10E3/UL (ref 0–1.3)
MONOCYTES NFR BLD AUTO: 9 %
NEUTROPHILS # BLD AUTO: 3.7 10E3/UL (ref 1.6–8.3)
NEUTROPHILS NFR BLD AUTO: 67 %
NRBC # BLD AUTO: 0 10E3/UL
NRBC BLD AUTO-RTO: 0 /100
PHOSPHATE SERPL-MCNC: 3.3 MG/DL (ref 2.5–4.5)
PLATELET # BLD AUTO: 155 10E3/UL (ref 150–450)
POTASSIUM SERPL-SCNC: 3.8 MMOL/L (ref 3.4–5.3)
PROT SERPL-MCNC: 6.7 G/DL (ref 6.4–8.3)
RBC # BLD AUTO: 5.08 10E6/UL (ref 4.4–5.9)
SODIUM SERPL-SCNC: 141 MMOL/L (ref 135–145)
TACROLIMUS BLD-MCNC: 10.1 UG/L (ref 5–15)
TME LAST DOSE: NORMAL H
TME LAST DOSE: NORMAL H
WBC # BLD AUTO: 5.4 10E3/UL (ref 4–11)
WBC # BLD AUTO: 5.4 10E3/UL (ref 4–11)

## 2024-08-01 PROCEDURE — 36415 COLL VENOUS BLD VENIPUNCTURE: CPT | Performed by: PATHOLOGY

## 2024-08-01 PROCEDURE — 85025 COMPLETE CBC W/AUTO DIFF WBC: CPT | Performed by: PATHOLOGY

## 2024-08-01 PROCEDURE — 99213 OFFICE O/P EST LOW 20 MIN: CPT | Mod: 24 | Performed by: NURSE PRACTITIONER

## 2024-08-01 PROCEDURE — 99211 OFF/OP EST MAY X REQ PHY/QHP: CPT | Performed by: NURSE PRACTITIONER

## 2024-08-01 PROCEDURE — 80053 COMPREHEN METABOLIC PANEL: CPT | Performed by: PATHOLOGY

## 2024-08-01 PROCEDURE — 84100 ASSAY OF PHOSPHORUS: CPT | Performed by: PATHOLOGY

## 2024-08-01 PROCEDURE — 82248 BILIRUBIN DIRECT: CPT | Performed by: PATHOLOGY

## 2024-08-01 PROCEDURE — 80197 ASSAY OF TACROLIMUS: CPT | Performed by: SURGERY

## 2024-08-01 PROCEDURE — 99000 SPECIMEN HANDLING OFFICE-LAB: CPT | Performed by: PATHOLOGY

## 2024-08-01 PROCEDURE — 83735 ASSAY OF MAGNESIUM: CPT | Performed by: PATHOLOGY

## 2024-08-01 PROCEDURE — 87516 HEPATITIS B DNA AMP PROBE: CPT | Performed by: SURGERY

## 2024-08-01 NOTE — LETTER
2024      West Van  [address]  [facility] [address]      Dear Colleague,    Thank you for referring your patient, West Van, to the [facility]. Please see a copy of my visit note below.    Transplant Surgery Progress Note    Transplants:  2024 (Liver); Postoperative day:  35  S: West Van is a 48 year with PMHx of hepatocellcular carcinoma s/p liver ablation 2022 and steatohepatitis, neuroendocrine tumor s/p right lobectomy, and DM2, s/p  donor liver transplant w/ bilary stent  with Dr. Dodson on 24.     No fevers or chills.    Eating well    States his mom is hospitalized with likely a cancer reoccurrence.  He states he is overwhelmed.      No nausea or vomiting.      States he does not feel ready to drive yet.       Transplant History:    Transplant Type:  Liver Tx  Donor Type:     Transplant Date:  2024 (Liver)  Baseline Cr: 0.7-0.9   DeNovo DSA: No    Acute Rejection Hx:  No    Present Maintenance Immunosuppression:  Tacrolimus and Mycophenolate mofetil    CMV IgG Ab Discordance:  Yes  EBV IgG Ab Discordance:  Yes    BK Viremia:  No  EBV Viremia:  No    Transplant Coordinator: Clair Meza     Transplant Office Phone Number: [phone]     Immunosuppressant Medications       Immunosuppressive Agents Disp Start End     mycophenolate (GENERIC EQUIVALENT) 250 MG capsule 540 capsule 2024 --    Sig - Route: Take 3 capsules (750 mg) by mouth 2 times daily - Oral    Class: E-Prescribe    Notes to Pharmacy: TXP DT 2024 (Liver) TXP Dischg DT 2024 DX Liver replaced by transplant Z94.4 [facility] ([place], [place])     tacrolimus (GENERIC EQUIVALENT) 0.5 MG capsule 60 capsule 2024 --    Sig: Tacrolimus 0.5 mg capsules BID to allow for dose adjustments.    Class: E-Prescribe     tacrolimus (GENERIC EQUIVALENT) 1 MG capsule 720 capsule 2024 --    Sig - Route: Take 4 capsules (4  mg) by mouth every 12 hours - Oral    Class: E-Prescribe    Notes to Pharmacy: TXP DT 6/27/2024 (Liver) TXP Dischg DT 7/5/2024 DX Liver replaced by transplant Z94.4 [facility] ([place], [place])            Possible Immunosuppression-related side effects:   []             headache  []             vivid dreams  []             irritability  []             cognitive difficuties  []             fine tremor  []             nausea  []             diarrhea  []             neuropathy      []             edema  []             renal calcineurin toxicity  []             hyperkalemia  []             post-transplant diabetes  []             decreased appetite  []             increased appetite  []             other:  []             none    Prescription Medications as of 8/1/2024         Rx Number Disp Refills Start End Last Dispensed Date Next Fill Date Owning Pharmacy    acetaminophen (TYLENOL) 325 MG tablet  -- -- 7/5/2024 --       Sig: Take 2 tablets (650 mg) by mouth every 6 hours as needed for mild pain    Class: OTC    Route: Oral    Alcohol Swabs PADS  200 each 1 7/5/2024 --   [place], [place] - [address] [place] [address]    Sig: Use to swab the area of the injection or nay as directed Per insurance coverage    Class: E-Prescribe    aspirin (ASA) 81 MG chewable tablet  90 tablet 1 7/30/2024 --   [place], [place] - [address] [place] [address] [phone]    Sig: Take 1 tablet (81 mg) by mouth or Feeding Tube daily    Class: E-Prescribe    Route: Oral or Feeding Tube    blood glucose (NO BRAND SPECIFIED) lancets standard  200 each 1 7/5/2024 --   [place], [place] - [address] [place] [address]    Sig: To use to test glucose level in the blood Use to test blood sugar 4  times daily as directed. To accompany glucose monitor brands per insurance coverage.    Class: E-Prescribe    blood glucose (NO BRAND  SPECIFIED) test strip  100 strip 3 7/5/2024 --   [address]    Sig: To use to test glucose level in the blood Use to test blood sugar 4 times daily as directed. To accompany glucose monitor brands per insurance coverage.    Class: E-Prescribe    blood glucose monitoring (NO BRAND SPECIFIED) meter device kit  1 kit 0 7/5/2024 --   [address]    Sig: Use as directed Per insurance coverage    Class: E-Prescribe    carvedilol (COREG) 3.125 MG tablet  60 tablet 3 7/5/2024 --   [address]    Sig: Take 1 tablet (3.125 mg) by mouth 2 times daily (with meals)    Class: E-Prescribe    Route: Oral    fluticasone (FLONASE) 50 MCG/ACT nasal spray  -- -- 6/24/2023 --       Sig: Spray 1 spray into both nostrils daily as needed for allergies or rhinitis    Class: Historical    Route: Both Nostrils    ibuprofen (ADVIL/MOTRIN) 400 MG tablet  16 tablet 0 7/15/2024 --   [place], [place] - [address] [place] [address] [phone]    Sig: Take 1 tablet (400 mg) by mouth every 6 hours as needed for moderate pain    Class: E-Prescribe    Route: Oral    insulin aspart (NOVOLOG PEN) 100 UNIT/ML pen  15 mL 1 7/5/2024 --   [address]    Sig: Inject 1-7 Units Subcutaneous at bedtime HIGH INSULIN RESISTANCE DOSING  Do Not give Bedtime Correction Insulin if BG less than 200. For  - 224 give 1 units. For  - 249 give 2 units. For  - 274 give 3 units. For  - 299 give 4 units. For  - 324 give 5 units. For  - 349 give 6 units. For BG greater than or equal to 350 give 7 units. Notify provider if glucose greater than or equal to 350 mg/dL after administration of correction dose.    Class: E-Prescribe    Route: Subcutaneous    insulin aspart (NOVOLOG PEN) 100  UNIT/ML pen  3 mL 1 7/5/2024 --   [place], [place] - [address] [place] [address]    Sig: Inject 6 Units Subcutaneous 3 times daily (with meals)    Class: E-Prescribe    Route: Subcutaneous    insulin aspart (NOVOLOG PEN) 100 UNIT/ML pen  15 mL 1 7/5/2024 --   [facility] - [address]    Sig: Inject 1-10 Units Subcutaneous 3 times daily (before meals) Correction Scale - MEDIUM INSULIN RESISTANCE DOSING   Do Not give Correction Insulin if BG < 140. For  - 189 give 1 unit. For  - 239 give 2 units. For  - 289 give 3 units. For  - 339 give 4 units. For BG = or > 340 give 5 units. Check blood glucose before meals/bolus feedings (or q4h if NPO) and administer based on blood glucose. Notify MD if glucose > or = 350 mg/dL after administration of correction dose.    Class: E-Prescribe    Route: Subcutaneous    insulin glargine (LANTUS PEN) 100 UNIT/ML pen  15 mL 1 7/6/2024 --   [address]    Sig: Inject 25 Units Subcutaneous daily    Class: E-Prescribe    Notes to Pharmacy: If Lantus is not covered by insurance, may substitute Basaglar or Semglee or other insulin glargine product per insurance preference at same dose and frequency.    Route: Subcutaneous    insulin pen needle (32G X 4 MM) 32G X 4 MM miscellaneous  200 each 1 7/5/2024 --   [address]    Sig: Use as directed by provider Per insurance coverage    Class: E-Prescribe    magnesium oxide (MAG-OX) 400 MG tablet  360 tablet 1 7/30/2024 --   [place], [place] - [address] [place] [address] [phone]    Sig: Take 2 tablets (800 mg) by mouth 2 times daily    Class: E-Prescribe    Route: Oral    No prior authorization was found for this prescription.    Found prior authorization for another prescription for the same medication: Denied     melatonin 5 MG tablet  -- -- 2024 --       Sig: Take 1 tablet (5 mg) by mouth nightly as needed for sleep    Class: OTC    Route: Oral    methocarbamol (ROBAXIN) 500 MG tablet  15 tablet 0 2024 --   [place], [place] - [address] [place] [address]    Sig: Take 1 tablet (500 mg) by mouth 4 times daily as needed for muscle spasms    Class: E-Prescribe    Route: Oral    mycophenolate (GENERIC EQUIVALENT) 250 MG capsule  540 capsule 1 2024 --   [address]    Sig: Take 3 capsules (750 mg) by mouth 2 times daily    Class: E-Prescribe    Notes to Pharmacy: TXP DT 2024 (Liver) TXP Dischg DT 2024 DX Liver replaced by transplant Z94.4 [facility] ([place], [place])    Route: Oral    oxyCODONE (ROXICODONE) 5 MG tablet  8 tablet 0 2024 --   [place], [place] - [address] [place] [address]    Si.5-1 tablets (2.5-5 mg) by Oral or Feeding Tube route every 6 hours as needed for severe pain    Class: E-Prescribe    Earliest Fill Date: 2024    Route: Oral or Feeding Tube    pantoprazole (PROTONIX) 40 MG EC tablet  90 tablet 1 2024 --   [address]    Sig: Take 1 tablet (40 mg) by mouth daily    Class: E-Prescribe    Route: Oral    polyethylene glycol (MIRALAX) 17 GM/Dose powder  -- -- 2024 --       Sig: Take 17 g by mouth daily    Class: OTC    Route: Oral    predniSONE (DELTASONE) 5 MG tablet  90 tablet 1 2024 --   [address]    Sig: Take 1 tablet (5 mg) by mouth daily    Class: E-Prescribe    Notes to Pharmacy: TXP DT 2024 (Liver) TXP Dischg DT 2024 DX Liver replaced by transplant Z94.4 [facility] ([place], [place])    Route: Oral     sennosides (SENOKOT) 8.6 MG tablet  -- -- 2024 --       Si tablet by Oral or Feeding Tube route 2 times daily    Class: OTC    Route: Oral or Feeding Tube    Sharps Container MISC  1 each 1 2024 --   [place], [place] - [address] [place] [address]    Sig: Use as directed to dispose of needles, lancets and other sharps    Class: E-Prescribe    sulfamethoxazole-trimethoprim (BACTRIM) 400-80 MG tablet  90 tablet 1 2024 --   [address]     Sig: Take 1 tablet by mouth daily    Class: E-Prescribe    Route: Oral    tacrolimus (GENERIC EQUIVALENT) 0.5 MG capsule  60 capsule 11 2024 --   [place], [place] - [address] [place] [address]    Sig: Tacrolimus 0.5 mg capsules BID to allow for dose adjustments.    Class: E-Prescribe    tacrolimus (GENERIC EQUIVALENT) 1 MG capsule  720 capsule 1 2024 --   [address]     Sig: Take 4 capsules (4 mg) by mouth every 12 hours    Class: E-Prescribe    Notes to Pharmacy: TXP DT 2024 (Liver) TXP Dischg DT 2024 DX Liver replaced by transplant Z94.4 [facility] ([place], [place])    Route: Oral    ursodiol (ACTIGALL) 300 MG capsule  60 capsule 3 2024 --   [address]     Sig: Take 1 capsule (300 mg) by mouth 2 times daily    Class: E-Prescribe    Route: Oral    valGANciclovir (VALCYTE) 450 MG tablet  90 tablet 1 2024 --   [address]     Sig: Take 1 tablet (450 mg) by mouth daily    Class: E-Prescribe    Route: Oral            O:   Pulse:  [78] 78  BP: (145)/(83) 145/83  SpO2:  [99 %] 99 %  General Appearance: in no apparent distress.   Skin: Normal, no rashes or jaundice  Heart:  "regular rate and rhythm, normal S1 and S2  Lungs: easy respirations, no audible wheezing.  Abdomen: rounded, The wound is dry and intact, without hernia. The abdomen is non-tender. The liver graft is not tender.  There is no ascites.  Extremities: Tremor absent.   Edema: absent.         Latest Ref Rng & Units 8/1/2024     8:44 AM 7/29/2024     9:05 AM 7/25/2024     7:54 AM 7/22/2024     8:35 AM 7/18/2024     7:36 AM   Transplant Immunosuppression Labs   Creat 0.67 - 1.17 mg/dL 0.76  0.85  0.81  0.76  0.74    Urea Nitrogen 6.0 - 20.0 mg/dL 17.6  19.5  16.8  15.7  14.5    WBC 4.0 - 11.0 10e3/uL  4.0 - 11.0 10e3/uL 5.4     5.4  5.3     5.3  4.8     4.8  4.7     4.7  2.4     2.4    Neutrophil % 67  68  60  60  49        Chemistries:   Recent Labs   Lab Test 08/01/24  0844   BUN 17.6   CR 0.76   GFRESTIMATED >90   *     Lab Results   Component Value Date    A1C 6.1 06/27/2024     Recent Labs   Lab Test 08/01/24  0844   ALBUMIN 4.3   BILITOTAL 0.9   ALKPHOS 88   AST 19   ALT 24     Urine Studies:  Recent Labs   Lab Test 07/16/24  1349 06/26/24  1748   COLOR Yellow Light Yellow   APPEARANCE Clear Clear   URINEGLC 70* Negative   URINEBILI Negative Negative   URINEKETONE Negative Negative   SG 1.017 1.015   UBLD Negative Negative   URINEPH 6.0 7.5*   PROTEIN Negative Negative   NITRITE Negative Negative   LEUKEST Negative Negative   RBCU  --  1   WBCU  --  1     No lab results found.  Hematology:   Recent Labs   Lab Test 08/01/24  0844 07/29/24  0905 07/25/24  0754   HGB 14.5 14.0 14.1    158 170   WBC 5.4  5.4 5.3  5.3 4.8  4.8     Coags:   Recent Labs   Lab Test 06/30/24  0542 06/29/24  0302   INR 1.34* 1.52*     HLA antibodies:   No results found for: \"SI5JIFDOW\", \"YE4HRSNYVU\", \"QV5IFXEDX\", \"GR8PWNNFJW\"    Assessment: West WONG Kevinprasannasebastien is doing well s/p Liver Tx:  Issues we addressed during his visit include:    Plan:    1. Graft function: stable allograft function  2. Immunosuppression Management: No " change continue same IS   .  Complexity of management:Medium.  Contributing factors:  liver txp  3. Incision; healed well.    Mental health:  has mental health appt scheduled  Epididymitis:  follows with urology Tuesday.  States his pain is tolerable     Cleared to go labs in [place].  Mother is unwell and is hospitalized    Would hold off driving for now until he feels stronger.      Followup: as directed    Total Time: 30 min,   Counselling Time: 20 min.           Again, thank you for allowing me to participate in the care of your patient.        Sincerely,        Monae Connor NP

## 2024-08-01 NOTE — TELEPHONE ENCOUNTER
Received a phone call from Monae Connor and West during his appt today. West will be going back to [place] today, his mom is hospitalized and the plan for her is unknown at this time. West would like to get his labs drawn at the [facility]. Lab orders faxed.     Call to West. Informed him that his lab orders were faxed and he can get labs drawn in either [place] or [place].

## 2024-08-01 NOTE — LETTER
OUTPATIENT LABORATORY TEST ORDER   [facility]  [phone]    Patient Name: West Van   YOB: 1975     [facility] MR# [if applicable]: [id number]   Date & Time: August 1, 2024  10:51 AM  Expiration Date: 1 year after date issued       Diagnosis: Liver Transplant (ICD-10 Z94.4)   Aftercare following organ transplant (ICD-10 Z48.288)   Long term use of medications (ICD-10 Z79.899)   Contact with and (suspected) exposure to other viral communicable   diseases (Z20.828)      We ask your assistance in obtaining the following laboratory tests, which are part of our routine surveillance program for Solid Organ Transplant patients.     Please fax each result to [phone], same day as resulted/available    Critical lab results page [phone]    First 8 weeks post-transplant (through 9/6/2024)  Labs 2x/week (Monday/Thursday)  CBC with Platelets   Basic Metabolic Panel   Phosphorous  Magnesium  Hepatic panel   Tacrolimus drug level - 12-hour trough, please document time of last dose     Month 3 post-transplant (9/9-10/18/24)  Labs weekly (Monday or Tuesday)  CBC with Platelets   Basic Metabolic Panel   Phosphorous  Magnesium  Hepatic panel   Tacrolimus drug level - 12-hour trough, please document time of last dose   Alpha Fetoprotein (AFP) if HCC High Risk Recurrence- Once around 9/27/24    Months 4 post-transplant (10/21-11/15/24)  Labs every other week  CBC with Platelets   Basic Metabolic Panel   Phosphorous  Magnesium  Hepatic panel   Tacrolimus drug level - 12-hour trough, please document time of last dose     Months 5-12 post-transplant (11/18/24-6/27/25)  Labs monthly  CBC with Platelets   Basic Metabolic Panel   Phosphorous  Magnesium  Hepatic panel   Tacrolimus drug level - 12-hour trough, please document time of last dose    Alpha Fetoprotein (AFP) if HCC Approximately 12/27/24   Alpha Fetoprotein (AFP) if HCC Approximately 3/27/25    12-months  post-transplant  Fasting Lipid Panel  Urine protein/creatinine ratio  UA with reflex to micro  Hepatitis B DNA PCR on all patients  Alpha Fetoprotein (AFP) IF Hepatocellular Carcinoma (HCC) present at time of transplant  Magnesium    If you have any questions, please call [facility]- [phone] or [phone], Fax- [phone].    .  Rivera Chance in Immunology and Transplantation  Surgical Director, Kidney & Pancreas Transplant Programs  Medical Director, Solid Organ Transplant Unit

## 2024-08-01 NOTE — PROGRESS NOTES
Transplant Surgery Progress Note    Transplants:  2024 (Liver); Postoperative day:  35  S: West Van is a 48 year with PMHx of hepatocellcular carcinoma s/p liver ablation 2022 and steatohepatitis, neuroendocrine tumor s/p right lobectomy, and DM2, s/p  donor liver transplant w/ bilary stent  with Dr. Dodson on 24.     No fevers or chills.    Eating well    States his mom is hospitalized with likely a cancer reoccurrence.  He states he is overwhelmed.      No nausea or vomiting.      States he does not feel ready to drive yet.       Transplant History:    Transplant Type:  Liver Tx  Donor Type:     Transplant Date:  2024 (Liver)  Baseline Cr: 0.7-0.9   DeNovo DSA: No    Acute Rejection Hx:  No    Present Maintenance Immunosuppression:  Tacrolimus and Mycophenolate mofetil    CMV IgG Ab Discordance:  Yes  EBV IgG Ab Discordance:  Yes    BK Viremia:  No  EBV Viremia:  No    Transplant Coordinator: Clair Meza     Transplant Office Phone Number: [phone]     Immunosuppressant Medications       Immunosuppressive Agents Disp Start End     mycophenolate (GENERIC EQUIVALENT) 250 MG capsule 540 capsule 2024 --    Sig - Route: Take 3 capsules (750 mg) by mouth 2 times daily - Oral    Class: E-Prescribe    Notes to Pharmacy: TXP DT 2024 (Liver) TXP Dischg DT 2024 DX Liver replaced by transplant Z94.4 TX [facility] ([place], [place])     tacrolimus (GENERIC EQUIVALENT) 0.5 MG capsule 60 capsule 2024 --    Sig: Tacrolimus 0.5 mg capsules BID to allow for dose adjustments.    Class: E-Prescribe     tacrolimus (GENERIC EQUIVALENT) 1 MG capsule 720 capsule 2024 --    Sig - Route: Take 4 capsules (4 mg) by mouth every 12 hours - Oral    Class: E-Prescribe    Notes to Pharmacy: TXP DT 2024 (Liver) TXP Dischg DT 2024 DX Liver replaced by transplant Z94.4 TX [facility]  ([place], [place])            Possible Immunosuppression-related side effects:   []             headache  []             vivid dreams  []             irritability  []             cognitive difficuties  []             fine tremor  []             nausea  []             diarrhea  []             neuropathy      []             edema  []             renal calcineurin toxicity  []             hyperkalemia  []             post-transplant diabetes  []             decreased appetite  []             increased appetite  []             other:  []             none    Prescription Medications as of 8/1/2024         Rx Number Disp Refills Start End Last Dispensed Date Next Fill Date Owning Pharmacy    acetaminophen (TYLENOL) 325 MG tablet  -- -- 7/5/2024 --       Sig: Take 2 tablets (650 mg) by mouth every 6 hours as needed for mild pain    Class: OTC    Route: Oral    Alcohol Swabs PADS  200 each 1 7/5/2024 --   [address]    Sig: Use to swab the area of the injection or nay as directed Per insurance coverage    Class: E-Prescribe    aspirin (ASA) 81 MG chewable tablet  90 tablet 1 7/30/2024 --   [place], [place] - [address] [facility] [address]    Sig: Take 1 tablet (81 mg) by mouth or Feeding Tube daily    Class: E-Prescribe    Route: Oral or Feeding Tube    blood glucose (NO BRAND SPECIFIED) lancets standard  200 each 1 7/5/2024 --   [address]    Sig: To use to test glucose level in the blood Use to test blood sugar 4  times daily as directed. To accompany glucose monitor brands per insurance coverage.    Class: E-Prescribe    blood glucose (NO BRAND SPECIFIED) test strip  100 strip 3 7/5/2024 --   [address]    Sig: To use to test glucose level in the blood Use to test blood sugar 4 times daily as directed. To  accompany glucose monitor brands per insurance coverage.    Class: E-Prescribe    blood glucose monitoring (NO BRAND SPECIFIED) meter device kit  1 kit 0 7/5/2024 --   [address]    Sig: Use as directed Per insurance coverage    Class: E-Prescribe    carvedilol (COREG) 3.125 MG tablet  60 tablet 3 7/5/2024 --   [address]    Sig: Take 1 tablet (3.125 mg) by mouth 2 times daily (with meals)    Class: E-Prescribe    Route: Oral    fluticasone (FLONASE) 50 MCG/ACT nasal spray  -- -- 6/24/2023 --       Sig: Spray 1 spray into both nostrils daily as needed for allergies or rhinitis    Class: Historical    Route: Both Nostrils    ibuprofen (ADVIL/MOTRIN) 400 MG tablet  16 tablet 0 7/15/2024 --   [address]    Sig: Take 1 tablet (400 mg) by mouth every 6 hours as needed for moderate pain    Class: E-Prescribe    Route: Oral    insulin aspart (NOVOLOG PEN) 100 UNIT/ML pen  15 mL 1 7/5/2024 --   [address]    Sig: Inject 1-7 Units Subcutaneous at bedtime HIGH INSULIN RESISTANCE DOSING  Do Not give Bedtime Correction Insulin if BG less than 200. For  - 224 give 1 units. For  - 249 give 2 units. For  - 274 give 3 units. For  - 299 give 4 units. For  - 324 give 5 units. For  - 349 give 6 units. For BG greater than or equal to 350 give 7 units. Notify provider if glucose greater than or equal to 350 mg/dL after administration of correction dose.    Class: E-Prescribe    Route: Subcutaneous    insulin aspart (NOVOLOG PEN) 100 UNIT/ML pen  3 mL 1 7/5/2024 --   [address]    Sig: Inject 6 Units Subcutaneous 3 times daily (with meals)    Class: E-Prescribe    Route: Subcutaneous    insulin  aspart (NOVOLOG PEN) 100 UNIT/ML pen  15 mL 1 7/5/2024 --   [place], [place] - [address] [place] [address]    Sig: Inject 1-10 Units Subcutaneous 3 times daily (before meals) Correction Scale - MEDIUM INSULIN RESISTANCE DOSING   Do Not give Correction Insulin if BG < 140. For  - 189 give 1 unit. For  - 239 give 2 units. For  - 289 give 3 units. For  - 339 give 4 units. For BG = or > 340 give 5 units. Check blood glucose before meals/bolus feedings (or q4h if NPO) and administer based on blood glucose. Notify MD if glucose > or = 350 mg/dL after administration of correction dose.    Class: E-Prescribe    Route: Subcutaneous    insulin glargine (LANTUS PEN) 100 UNIT/ML pen  15 mL 1 7/6/2024 --   [address]    Sig: Inject 25 Units Subcutaneous daily    Class: E-Prescribe    Notes to Pharmacy: If Lantus is not covered by insurance, may substitute Basaglar or Semglee or other insulin glargine product per insurance preference at same dose and frequency.    Route: Subcutaneous    insulin pen needle (32G X 4 MM) 32G X 4 MM miscellaneous  200 each 1 7/5/2024 --   [address]    Sig: Use as directed by provider Per insurance coverage    Class: E-Prescribe    magnesium oxide (MAG-OX) 400 MG tablet  360 tablet 1 7/30/2024 --   [place], [place] - [address] [place] [address] [address]    Sig: Take 2 tablets (800 mg) by mouth 2 times daily    Class: E-Prescribe    Route: Oral    No prior authorization was found for this prescription.    Found prior authorization for another prescription for the same medication: Denied    melatonin 5 MG tablet  -- -- 7/5/2024 --       Sig: Take 1 tablet (5 mg) by mouth nightly as needed for sleep    Class: OTC    Route: Oral    methocarbamol (ROBAXIN) 500 MG tablet  15 tablet 0 7/5/2024 --   [facility]  [address]    Sig: Take 1 tablet (500 mg) by mouth 4 times daily as needed for muscle spasms    Class: E-Prescribe    Route: Oral    mycophenolate (GENERIC EQUIVALENT) 250 MG capsule  540 capsule 1 2024 --   [address]    Sig: Take 3 capsules (750 mg) by mouth 2 times daily    Class: E-Prescribe    Notes to Pharmacy: TXP DT 2024 (Liver) TXP Dischg DT 2024 DX Liver replaced by transplant Z94.4 [facility] ([place], [place])    Route: Oral    oxyCODONE (ROXICODONE) 5 MG tablet  8 tablet 0 2024 --   [place], [place] - [address] [place] [address]    Si.5-1 tablets (2.5-5 mg) by Oral or Feeding Tube route every 6 hours as needed for severe pain    Class: E-Prescribe    Earliest Fill Date: 2024    Route: Oral or Feeding Tube    pantoprazole (PROTONIX) 40 MG EC tablet  90 tablet 1 2024 --   [address]    Sig: Take 1 tablet (40 mg) by mouth daily    Class: E-Prescribe    Route: Oral    polyethylene glycol (MIRALAX) 17 GM/Dose powder  -- -- 2024 --       Sig: Take 17 g by mouth daily    Class: OTC    Route: Oral    predniSONE (DELTASONE) 5 MG tablet  90 tablet 1 2024 --   [address]    Sig: Take 1 tablet (5 mg) by mouth daily    Class: E-Prescribe    Notes to Pharmacy: TXP DT 2024 (Liver) TXP Dischg DT 2024 DX Liver replaced by transplant Z94.4 [facility] ([place], [place])    Route: Oral    sennosides (SENOKOT) 8.6 MG tablet  -- -- 2024 --       Si tablet by Oral or Feeding Tube route 2 times daily    Class: OTC    Route: Oral or Feeding Tube    Sharps Container MISC  1 each 1 2024 --   [place] [facility] [address]    Sig: Use as directed to dispose of needles, lancets and other sharps    Class: E-Prescribe    sulfamethoxazole-trimethoprim (BACTRIM) 400-80 MG tablet  90 tablet 1 7/30/2024 --   [address]    Sig: Take 1 tablet by mouth daily    Class: E-Prescribe    Route: Oral    tacrolimus (GENERIC EQUIVALENT) 0.5 MG capsule  60 capsule 11 7/5/2024 --   [address]    Sig: Tacrolimus 0.5 mg capsules BID to allow for dose adjustments.    Class: E-Prescribe    tacrolimus (GENERIC EQUIVALENT) 1 MG capsule  720 capsule 1 7/30/2024 --   [address]    Sig: Take 4 capsules (4 mg) by mouth every 12 hours    Class: E-Prescribe    Notes to Pharmacy: TXP DT 6/27/2024 (Liver) TXP Dischg DT 7/5/2024 DX Liver replaced by transplant Z94.4 [facility] ([place], [place])    Route: Oral    ursodiol (ACTIGALL) 300 MG capsule  60 capsule 3 7/30/2024 --   [address]    Sig: Take 1 capsule (300 mg) by mouth 2 times daily    Class: E-Prescribe    Route: Oral    valGANciclovir (VALCYTE) 450 MG tablet  90 tablet 1 7/30/2024 --   [address]    Sig: Take 1 tablet (450 mg) by mouth daily    Class: E-Prescribe    Route: Oral            O:   Pulse:  [78] 78  BP: (145)/(83) 145/83  SpO2:  [99 %] 99 %  General Appearance: in no apparent distress.   Skin: Normal, no rashes or jaundice  Heart: regular rate and rhythm, normal S1 and S2  Lungs: easy respirations, no audible wheezing.  Abdomen: rounded, The wound is dry and intact, without hernia. The abdomen is non-tender. The liver graft is not tender.  There is  "no ascites.  Extremities: Tremor absent.   Edema: absent.         Latest Ref Rng & Units 8/1/2024     8:44 AM 7/29/2024     9:05 AM 7/25/2024     7:54 AM 7/22/2024     8:35 AM 7/18/2024     7:36 AM   Transplant Immunosuppression Labs   Creat 0.67 - 1.17 mg/dL 0.76  0.85  0.81  0.76  0.74    Urea Nitrogen 6.0 - 20.0 mg/dL 17.6  19.5  16.8  15.7  14.5    WBC 4.0 - 11.0 10e3/uL  4.0 - 11.0 10e3/uL 5.4     5.4  5.3     5.3  4.8     4.8  4.7     4.7  2.4     2.4    Neutrophil % 67  68  60  60  49        Chemistries:   Recent Labs   Lab Test 08/01/24  0844   BUN 17.6   CR 0.76   GFRESTIMATED >90   *     Lab Results   Component Value Date    A1C 6.1 06/27/2024     Recent Labs   Lab Test 08/01/24  0844   ALBUMIN 4.3   BILITOTAL 0.9   ALKPHOS 88   AST 19   ALT 24     Urine Studies:  Recent Labs   Lab Test 07/16/24  1349 06/26/24  1748   COLOR Yellow Light Yellow   APPEARANCE Clear Clear   URINEGLC 70* Negative   URINEBILI Negative Negative   URINEKETONE Negative Negative   SG 1.017 1.015   UBLD Negative Negative   URINEPH 6.0 7.5*   PROTEIN Negative Negative   NITRITE Negative Negative   LEUKEST Negative Negative   RBCU  --  1   WBCU  --  1     No lab results found.  Hematology:   Recent Labs   Lab Test 08/01/24  0844 07/29/24  0905 07/25/24  0754   HGB 14.5 14.0 14.1    158 170   WBC 5.4  5.4 5.3  5.3 4.8  4.8     Coags:   Recent Labs   Lab Test 06/30/24  0542 06/29/24  0302   INR 1.34* 1.52*     HLA antibodies:   No results found for: \"NW5SQZZKY\", \"SR2WKQCPGO\", \"LD5UQFXIB\", \"NZ5WTJCOYA\"    Assessment: West Van is doing well s/p Liver Tx:  Issues we addressed during his visit include:    Plan:    1. Graft function: stable allograft function  2. Immunosuppression Management: No change continue same IS   .  Complexity of management:Medium.  Contributing factors:  liver txp  3. Incision; healed well.    Mental health:  has mental health appt scheduled  Epididymitis:  follows with urology Tuesday.  " States his pain is tolerable     Cleared to go labs in [place].  Mother is unwell and is hospitalized    Would hold off driving for now until he feels stronger.      Followup: as directed    Total Time: 30 min,   Counselling Time: 20 min.

## 2024-08-05 ENCOUNTER — VIRTUAL VISIT (OUTPATIENT)
Dept: TRANSPLANT | Facility: CLINIC | Age: 49
End: 2024-08-05
Attending: SURGERY
Payer: COMMERCIAL

## 2024-08-05 ENCOUNTER — TELEPHONE (OUTPATIENT)
Dept: PHARMACY | Facility: CLINIC | Age: 49
End: 2024-08-05

## 2024-08-05 DIAGNOSIS — Z94.4 LIVER REPLACED BY TRANSPLANT (H): ICD-10-CM

## 2024-08-05 NOTE — LETTER
8/5/2024      West Van  [address]  [place] [address]      Dear Colleague,    Thank you for referring your patient, West Van, to the [facility]. Please see a copy of my visit note below.    Pt evaluated via billable telephone visit. Time spent: 15 min  Provider location: offsite    [facility] SOLID ORGAN TRANSPLANT  OUTPATIENT MNT: POST LIVER TXP    TIME SPENT: 15 minutes  VISIT TYPE: follow up (saw pt for txp wait list visit 1/2024)  REFERRING PHYSICIAN: Ivory  PT ACCOMPANIED BY: self     Date of txp: 6/27/24    NUTRITION ASSESSMENT // DIET RECALL  H/o DM II-  today working with PharmD on BG management  Trying to eat TID meals  B- scr eggs, omelet, Ensure or protein bar, almonds/nuts  L/D- corn on the cob w/ rotisserie chicken; protein bar (Pure Protein bar- 21 g protein); cheeseburgers or chicken strips   Sn in the PM- cashews, almonds, protein bars     Lisha: coffee, water (4-5 bottles/day), seldom pop    Trying to be mindful of walking up to the road/highway and moving around more, especially to help w/ BG    NUTRITION DIAGNOSIS   Predicted suboptimal protein intake related to increased protein needs s/p transplant.    NUTRITION INTERVENTION   1. Discussed importance of consuming adequate calories and protein for 2 months post-txp to help heal and reduce muscle/fat wasting. Discussed sources of protein and ways to ensure he is increasing his protein intake.    2. Reviewed BG and how this should improve over time. Following with PharmD.     3. Reviewed importance of food safety and increased risk for food-borne illness post-txp. Also discussed potential need to monitor K+, CHO, and Na+ intakes d/t medication side effects.     4. Avoid the following post txp d/t risk for rejection, unknown effects on the organs, and/or potential interactions with immunosuppressants:  - Herbal, Chinese, holistic, chiropractic, natural, alternative medicines and supplements  -  Detoxes and cleanses  - Weight loss pills  - Protein powders or other products with extracts or herbs (ie green tea extract)    Patient Understanding: Pt verbalized understanding of education provided.  Expected Engagement: Good  Follow-Up Plans: PRN     NUTRITION GOALS   Continue with high protein diet x 2 months post txp     Miroslava Redding RD, LD, CCTD                                Again, thank you for allowing me to participate in the care of your patient.        Sincerely,        Miroslava Redding RD

## 2024-08-05 NOTE — TELEPHONE ENCOUNTER
Clinical Pharmacy Consult:                                                      Transplant Specific: 1 month post transplant discharge medication review  Date of Transplant: 06/27/2024  Type of Transplant: liver  First Transplant: yes  History of rejection: no    Immunosuppression Regimen   TAC 4mg qAM & 4mg qPM, Prednisone 5mg qAM, and MMF 750mg qAM & 750mg qPM  Patient specific goal: 8-12  Most recent level: 10.1, date 8/1/24  Immunosuppressant Levels:  Therapeutic  Pt adherent to lab draws: yes  Scr:   Lab Results   Component Value Date    CR 0.76 08/01/2024     Side effects: Tremors    Prophylactic Medications  Antibacterial:  Bactrim SS daily  Scheduled Discontinue Date: 6 months    Antifungal: Nystatin  Scheduled Discontinue Date:  discontinued at discharge    Antiviral: CrCl 40 to 59 mL/minute: Valcyte 450 mg once daily   Scheduled Discontinue Date: 6 months    Acid Reducer: Protonix (pantoprazole)  Scheduled Reviewed Date:  TBD    Thrombosis Prevention: Aspirin 81 mg PO daily  Scheduled Discontinue Date:  TBD    Blood Pressure Management  Frequency of home Blood Pressure checks: once daily  Most recent home BP: 120/90  Patient Blood pressure goal: <140/90  Patient blood pressure at goal:  yes  Hospitalizations/ER visits since last assessment: 0    Med rec/DUR performed: yes  Med Rec Discrepancies: no    Spoke with West via phone today. He is doing good. Has not been comfortable using pill box, been using chart and alarms. Reported no missed dose with this system. Tremors seems to be improving slightly.No other side effects to report. Was on metformin and Ozempic for diabetes control prior to transplant. Patient hopes to return to that therapy in the future. Currently using Lantus AM and Novolog per sliding scale. Denied fever, pain, tenderness, swelling, ascites. Weight is stable. Affirmed some discomfort at surgical site, but nothing severe.    BP at goal.    Wants future fills refills through [facility]. Updated address for mail order.    No other questions or concerns today. Follow up in 1 month.    Navarro Gibson, Pharm D  [facility]

## 2024-08-05 NOTE — PROGRESS NOTES
Pt evaluated via billable telephone visit. Time spent: 15 min  Provider location: offsite    [facility] SOLID ORGAN TRANSPLANT  OUTPATIENT MNT: POST LIVER TXP    TIME SPENT: 15 minutes  VISIT TYPE: follow up (saw pt for txp wait list visit 1/2024)  REFERRING PHYSICIAN: Ivory  PT ACCOMPANIED BY: self     Date of txp: 6/27/24    NUTRITION ASSESSMENT // DIET RECALL  H/o DM II-  today working with PharmD on BG management  Trying to eat TID meals  B- scr eggs, omelet, Ensure or protein bar, almonds/nuts  L/D- corn on the cob w/ rotisserie chicken; protein bar (Pure Protein bar- 21 g protein); cheeseburgers or chicken strips   Sn in the PM- cashews, almonds, protein bars     Lisha: coffee, water (4-5 bottles/day), seldom pop    Trying to be mindful of walking up to the road/highway and moving around more, especially to help w/ BG    NUTRITION DIAGNOSIS   Predicted suboptimal protein intake related to increased protein needs s/p transplant.    NUTRITION INTERVENTION   1. Discussed importance of consuming adequate calories and protein for 2 months post-txp to help heal and reduce muscle/fat wasting. Discussed sources of protein and ways to ensure he is increasing his protein intake.    2. Reviewed BG and how this should improve over time. Following with PharmD.     3. Reviewed importance of food safety and increased risk for food-borne illness post-txp. Also discussed potential need to monitor K+, CHO, and Na+ intakes d/t medication side effects.     4. Avoid the following post txp d/t risk for rejection, unknown effects on the organs, and/or potential interactions with immunosuppressants:  - Herbal, Chinese, holistic, chiropractic, natural, alternative medicines and supplements  - Detoxes and cleanses  - Weight loss pills  - Protein powders or other products with extracts or herbs (ie green tea extract)    Patient Understanding: Pt verbalized understanding of education provided.  Expected Engagement:  Good  Follow-Up Plans: PRN     NUTRITION GOALS   Continue with high protein diet x 2 months post txp     Miroslava Redding, RD, LD, CCTD

## 2024-08-06 ENCOUNTER — OFFICE VISIT (OUTPATIENT)
Dept: UROLOGY | Facility: CLINIC | Age: 49
End: 2024-08-06
Payer: COMMERCIAL

## 2024-08-06 VITALS
HEIGHT: 74 IN | HEART RATE: 87 BPM | WEIGHT: 198 LBS | TEMPERATURE: 98.5 F | BODY MASS INDEX: 25.41 KG/M2 | DIASTOLIC BLOOD PRESSURE: 85 MMHG | OXYGEN SATURATION: 98 % | SYSTOLIC BLOOD PRESSURE: 134 MMHG

## 2024-08-06 DIAGNOSIS — N50.811 PAIN IN BOTH TESTICLES: ICD-10-CM

## 2024-08-06 DIAGNOSIS — Z94.4 LIVER REPLACED BY TRANSPLANT (H): ICD-10-CM

## 2024-08-06 DIAGNOSIS — N50.812 PAIN IN BOTH TESTICLES: ICD-10-CM

## 2024-08-06 PROCEDURE — 99203 OFFICE O/P NEW LOW 30 MIN: CPT | Performed by: STUDENT IN AN ORGANIZED HEALTH CARE EDUCATION/TRAINING PROGRAM

## 2024-08-06 ASSESSMENT — PAIN SCALES - GENERAL: PAINLEVEL: NO PAIN (0)

## 2024-08-06 NOTE — NURSING NOTE
"Initial /85   Pulse 87   Temp 98.5  F (36.9  C) (Tympanic)   Ht 1.88 m (6' 2\")   Wt 89.8 kg (198 lb)   SpO2 98%   BMI 25.42 kg/m   Estimated body mass index is 25.42 kg/m  as calculated from the following:    Height as of this encounter: 1.88 m (6' 2\").    Weight as of this encounter: 89.8 kg (198 lb). .  Tiffany VELÁSQUEZ CMA 08/06/24 11:02 AM  "

## 2024-08-06 NOTE — PROGRESS NOTES
Chief Complaint:   Testicular pain         History of Present Illness:   [place] is a 48 year old male with a history of hepatocellular carcinoma s/p liver transplant, T2 DM, and HTN who presents for evaluation of testicular pain.     The patient reports intermittent, bilateral (right > left) scrotal pain that started shortly after his liver transplant in late June.     He has been taking Tylenol, which has been helpful. His transplant team was hesitant about NSAID use affecting his tacrolimus.     He has seen overall improvement in the last week.     Testicular US from 7/16/2024 was notable for a right spermatocele.          Past Medical History:     Past Medical History:   Diagnosis Date    Benign essential HTN 04/13/2016    Diabetes (H)     Kidney stone     Liver cancer (H) 10/04/2022    Liver cirrhosis secondary to nonalcoholic steatohepatitis (RIOS) (H)     RIOS (nonalcoholic steatohepatitis) 06/02/2023    Neuroendocrine carcinoma of lung (H)     right lung, lobectomy 3/2023    PONV (postoperative nausea and vomiting)             Past Surgical History:     Past Surgical History:   Procedure Laterality Date    BRONCHOSCOPY, WITH BIOPSY, ROBOT ASSISTED N/A 1/17/2023    Procedure: BRONCHOSCOPY, USING OPTICAL TRACKING SYSTEM, ion;  Surgeon: Ani Guillaume MD;  Location: [facility] OR    DAVINCI LOBECTOMY LUNG Right 3/8/2023    Procedure: Robot-assisted right thoracoscopic lower lobectomy, mediastinal lymph node dissection, intercostal nerve cryoablation ;  Surgeon: Matheus Haas MD;  Location: [facility] OR    ENDOBRONCHIAL ULTRASOUND FLEXIBLE N/A 1/17/2023    Procedure: endobronchial  ultrasound and transbronchial biopsies;  Surgeon: Ani Guillaume MD;  Location: [facility] OR    EXCISE TUMOR CHEST WALL Right 2/1/2024    Procedure: Excision chest wall mass;  Surgeon: Matheus Haas MD;  Location: [facility]    LAPAROSCOPIC ABLATION LIVER TUMOR N/A 12/6/2022    Procedure: Diagnostic Laparoscopy, Hepatic Ultrasound,  Laparoscopic ultrasound guided microwave ablation of liver tumor x1;  Surgeon: Armando Munguia MD;  Location: [facility]    LAPAROSCOPIC BIOPSY LIVER N/A 2022    Procedure: Laparoscopic Ultrasound Guided liver biopsy;  Surgeon: Armando Munguia MD;  Location: [facility]    LAPAROSCOPIC CHOLECYSTECTOMY N/A 2022    Procedure: Laparoscopic cholecystectomy;  Surgeon: Armando Munguia MD;  Location: [facility]    TRANSPLANT LIVER RECIPIENT  DONOR N/A 2024    Procedure: Transplant liver recipient  donor with bile duct stent placement;  Surgeon: Radu Dodson MD;  Location: [facility] OR            Medications     Current Outpatient Medications   Medication Sig Dispense Refill    acetaminophen (TYLENOL) 325 MG tablet Take 2 tablets (650 mg) by mouth every 6 hours as needed for mild pain      predniSONE (DELTASONE) 5 MG tablet Take 1 tablet (5 mg) by mouth daily 90 tablet 1    sulfamethoxazole-trimethoprim (BACTRIM) 400-80 MG tablet Take 1 tablet by mouth daily 90 tablet 1    Alcohol Swabs PADS Use to swab the area of the injection or nay as directed Per insurance coverage 200 each 1    aspirin (ASA) 81 MG chewable tablet Take 1 tablet (81 mg) by mouth or Feeding Tube daily 90 tablet 1    blood glucose (NO BRAND SPECIFIED) lancets standard To use to test glucose level in the blood Use to test blood sugar 4  times daily as directed. To accompany glucose monitor brands per insurance coverage. 200 each 1    blood glucose (NO BRAND SPECIFIED) test strip To use to test glucose level in the blood Use to test blood sugar 4 times daily as directed. To accompany glucose monitor brands per insurance coverage. 100 strip 3    blood glucose monitoring (NO BRAND SPECIFIED) meter device kit Use as directed Per insurance coverage 1 kit 0    carvedilol (COREG) 3.125 MG tablet Take 1 tablet (3.125 mg) by mouth 2 times daily (with meals) 60 tablet 3    fluticasone (FLONASE) 50 MCG/ACT nasal spray Spray 1 spray into both  nostrils daily as needed for allergies or rhinitis      ibuprofen (ADVIL/MOTRIN) 400 MG tablet Take 1 tablet (400 mg) by mouth every 6 hours as needed for moderate pain (Patient not taking: Reported on 8/6/2024) 16 tablet 0    insulin aspart (NOVOLOG PEN) 100 UNIT/ML pen Inject 1-7 Units Subcutaneous at bedtime HIGH INSULIN RESISTANCE DOSING  Do Not give Bedtime Correction Insulin if BG less than 200. For  - 224 give 1 units. For  - 249 give 2 units. For  - 274 give 3 units. For  - 299 give 4 units. For  - 324 give 5 units. For  - 349 give 6 units. For BG greater than or equal to 350 give 7 units. Notify provider if glucose greater than or equal to 350 mg/dL after administration of correction dose. 15 mL 1    insulin aspart (NOVOLOG PEN) 100 UNIT/ML pen Inject 6 Units Subcutaneous 3 times daily (with meals) 3 mL 1    insulin aspart (NOVOLOG PEN) 100 UNIT/ML pen Inject 1-10 Units Subcutaneous 3 times daily (before meals) Correction Scale - MEDIUM INSULIN RESISTANCE DOSING   Do Not give Correction Insulin if BG < 140. For  - 189 give 1 unit. For  - 239 give 2 units. For  - 289 give 3 units. For  - 339 give 4 units. For BG = or > 340 give 5 units. Check blood glucose before meals/bolus feedings (or q4h if NPO) and administer based on blood glucose. Notify MD if glucose > or = 350 mg/dL after administration of correction dose. 15 mL 1    insulin glargine (LANTUS PEN) 100 UNIT/ML pen Inject 25 Units Subcutaneous daily 15 mL 1    insulin pen needle (32G X 4 MM) 32G X 4 MM miscellaneous Use as directed by provider Per insurance coverage 200 each 1    magnesium oxide (MAG-OX) 400 MG tablet Take 2 tablets (800 mg) by mouth 2 times daily 360 tablet 1    melatonin 5 MG tablet Take 1 tablet (5 mg) by mouth nightly as needed for sleep      methocarbamol (ROBAXIN) 500 MG tablet Take 1 tablet (500 mg) by mouth 4 times daily as needed for muscle spasms 15 tablet 0     "mycophenolate (GENERIC EQUIVALENT) 250 MG capsule Take 3 capsules (750 mg) by mouth 2 times daily 540 capsule 1    oxyCODONE (ROXICODONE) 5 MG tablet 0.5-1 tablets (2.5-5 mg) by Oral or Feeding Tube route every 6 hours as needed for severe pain 8 tablet 0    pantoprazole (PROTONIX) 40 MG EC tablet Take 1 tablet (40 mg) by mouth daily 90 tablet 1    polyethylene glycol (MIRALAX) 17 GM/Dose powder Take 17 g by mouth daily (Patient not taking: Reported on 7/10/2024)      sennosides (SENOKOT) 8.6 MG tablet 1 tablet by Oral or Feeding Tube route 2 times daily (Patient taking differently: Take 1 tablet by mouth or Feeding Tube daily)      Sharps Container MISC Use as directed to dispose of needles, lancets and other sharps 1 each 1    tacrolimus (GENERIC EQUIVALENT) 0.5 MG capsule Tacrolimus 0.5 mg capsules BID to allow for dose adjustments. 60 capsule 11    tacrolimus (GENERIC EQUIVALENT) 1 MG capsule Take 4 capsules (4 mg) by mouth every 12 hours 720 capsule 1    ursodiol (ACTIGALL) 300 MG capsule Take 1 capsule (300 mg) by mouth 2 times daily 60 capsule 3    valGANciclovir (VALCYTE) 450 MG tablet Take 1 tablet (450 mg) by mouth daily 90 tablet 1     No current facility-administered medications for this visit.            Allergies:   Patient has no known allergies.         Review of Systems:  From intake questionnaire   Negative 14 system review except as noted on HPI, nurse's note.         Physical Exam:   Patient is a 48 year old  male   Vitals: Blood pressure 134/85, pulse 87, temperature 98.5  F (36.9  C), temperature source Tympanic, height 1.88 m (6' 2\"), weight 89.8 kg (198 lb), SpO2 98%.  General Appearance Adult: Alert, no acute distress, oriented.  Lungs: Non-labored breathing.  Heart: No obvious jugular venous distension present.  Neuro: Alert, oriented, speech and mentation normal  : mild tenderness to palpation of the right testicle, palpable right epididymal head cyst      Labs and Pathology:    I " personally reviewed all applicable laboratory data and went over findings with patient  Significant for:    CBC RESULTS:  Recent Labs   Lab Test 08/01/24  0844 07/29/24  0905 07/25/24  0754 07/22/24  0835   WBC 5.4  5.4 5.3  5.3 4.8  4.8 4.7  4.7   HGB 14.5 14.0 14.1 13.5    158 170 171        BMP RESULTS:  Recent Labs   Lab Test 08/01/24  0844 07/29/24  0905 07/25/24  0754 07/22/24  0835    139 142 142   POTASSIUM 3.8 4.6 4.0 4.2   CHLORIDE 105 104 105 106   CO2 26 26 28 26   ANIONGAP 10 9 9 10   * 269* 120* 165*   BUN 17.6 19.5 16.8 15.7   CR 0.76 0.85 0.81 0.76   GFRESTIMATED >90 >90 >90 >90   CHUCK 10.0 9.9 10.1 9.7       UA RESULTS:   Recent Labs   Lab Test 07/16/24  1349 06/26/24  1748 06/20/23  0815   SG 1.017 1.015 1.021   URINEPH 6.0 7.5* 6.5   NITRITE Negative Negative Negative   RBCU  --  1 <1   WBCU  --  1 1       PSA RESULTS  Prostate Specific Antigen Screen   Date Value Ref Range Status   06/20/2023 1.02 0.00 - 2.50 ng/mL Final           Imaging:    I personally reviewed all applicable imaging and went over findings with patient.  Significant for:    Results for orders placed or performed in visit on 07/16/24   US Testicular & Scrotum w Doppler Ltd    Narrative    EXAMINATION: US TESTICULAR AND SCROTAL, 7/16/2024 3:07 PM     COMPARISON: None available    HISTORY: Liver replaced by transplant. Bilateral scrotal and  testicular pain and erythema for 7 days. History of epididymitis 3-4  years ago. Minor trauma.    TECHNIQUE: The scrotum was scanned in standard fashion with  specialized ultrasound transducer(s) using grey scale, color Doppler,  and spectral flow  techniques.    Findings:    The testes demonstrate normal and symmetric echotexture and  vascularity. No evidence of a focal lesion.  The right testicle  measures 3.8 x 2.6 x 5.5 cm and the left measures 4.3 x 3.0 x 4.8  cm.  There is no evidence of torsion.    No hydrocele or varicocele demonstrated.    The left epididymis  is within normal limits with unremarkable  appearance    The right epididymal head measures 4.4 x 2.6 x 3.5 cm with cyst with  septation and internal echoes/clustered cysts     No significant scrotal wall thickening.        Impression    Impression:     1. No testicular mass or evidence for torsion.  2. Right epididymal head cyst with septations/clustered cysts favored  to represent a spermatocele versus epididymal head cysts      I have personally reviewed the examination and initial interpretation  and I agree with the findings.    BIJU PALACIO MD         SYSTEM ID:  [id number]            Assessment and Plan:     Assessment: 48 year old male seen in evaluation for intermittent, bilateral (right > left) scrotal pain that started shortly after his liver transplant in late June.     He has been taking Tylenol, which has been helpful. His transplant team was hesitant about NSAID use affecting tacrolimus. He has seen overall improvement in the last week. Testicular US from 7/16/2024 was notable for a right spermatocele.     On exam, there is mild tenderness to the right testicle. He is taking Bactrim post-transplant. We discussed that if he has a low level epididymo-orchitis, this would be an appropriate antibiotic. I would be hesitant switching his medications significantly given his very recent transplant. We discussed that scrotal inflammation can take six weeks to complete resolve. Given the overall improvement in his symptoms, I would continue to observe. If his symptoms do not continue to improve, we could consider pelvic floor PT vs procedural consideration with Dr. Álvarez.     Plan:  Follow up with urology as needed. Patient will update me on his symptoms in a few weeks.     CHELSIE GALLEGO PA-C  Department of Urology

## 2024-08-10 ASSESSMENT — ANXIETY QUESTIONNAIRES
6. BECOMING EASILY ANNOYED OR IRRITABLE: SEVERAL DAYS
1. FEELING NERVOUS, ANXIOUS, OR ON EDGE: MORE THAN HALF THE DAYS
7. FEELING AFRAID AS IF SOMETHING AWFUL MIGHT HAPPEN: NOT AT ALL
5. BEING SO RESTLESS THAT IT IS HARD TO SIT STILL: SEVERAL DAYS
8. IF YOU CHECKED OFF ANY PROBLEMS, HOW DIFFICULT HAVE THESE MADE IT FOR YOU TO DO YOUR WORK, TAKE CARE OF THINGS AT HOME, OR GET ALONG WITH OTHER PEOPLE?: SOMEWHAT DIFFICULT
2. NOT BEING ABLE TO STOP OR CONTROL WORRYING: SEVERAL DAYS
GAD7 TOTAL SCORE: 7
IF YOU CHECKED OFF ANY PROBLEMS ON THIS QUESTIONNAIRE, HOW DIFFICULT HAVE THESE PROBLEMS MADE IT FOR YOU TO DO YOUR WORK, TAKE CARE OF THINGS AT HOME, OR GET ALONG WITH OTHER PEOPLE: SOMEWHAT DIFFICULT
GAD7 TOTAL SCORE: 7
3. WORRYING TOO MUCH ABOUT DIFFERENT THINGS: SEVERAL DAYS
4. TROUBLE RELAXING: SEVERAL DAYS
7. FEELING AFRAID AS IF SOMETHING AWFUL MIGHT HAPPEN: NOT AT ALL

## 2024-08-12 ENCOUNTER — TELEPHONE (OUTPATIENT)
Dept: TRANSPLANT | Facility: CLINIC | Age: 49
End: 2024-08-12
Payer: COMMERCIAL

## 2024-08-13 NOTE — TELEPHONE ENCOUNTER
Jason called, as he was completely unsure as to whether or not he took his tacrolimus 8/12/2024 evening.  He knows he took his meds this AM and his MMF 8/12/2024 PM. He was in the middle of taking his tacrolimus bottle out of his med bag when his mom called.    RNCC reviewed labs, labs from 8/12/2024 pending. Tacro level 8/8 just above goal. Liver labs from 8/8 consistent with prior lab draws.     RNCC advised not to take additional (?) tacrolimus 8/12/2024 PM.  Continue with scheduled meds in the AM.    Use this experience to put safe-guards in place to avoid missed medications in the future (RNCC discussed alarm-setting system, pill box system).    [place] voiced understanding.

## 2024-08-14 ENCOUNTER — TELEPHONE (OUTPATIENT)
Dept: TRANSPLANT | Facility: CLINIC | Age: 49
End: 2024-08-14
Payer: COMMERCIAL

## 2024-08-14 DIAGNOSIS — Z94.4 LIVER REPLACED BY TRANSPLANT (H): ICD-10-CM

## 2024-08-14 RX ORDER — TACROLIMUS 1 MG/1
3 CAPSULE ORAL EVERY 12 HOURS
Qty: 540 CAPSULE | Refills: 3 | Status: SHIPPED | OUTPATIENT
Start: 2024-08-14

## 2024-08-14 ASSESSMENT — PATIENT HEALTH QUESTIONNAIRE - PHQ9
SUM OF ALL RESPONSES TO PHQ QUESTIONS 1-9: 6
10. IF YOU CHECKED OFF ANY PROBLEMS, HOW DIFFICULT HAVE THESE PROBLEMS MADE IT FOR YOU TO DO YOUR WORK, TAKE CARE OF THINGS AT HOME, OR GET ALONG WITH OTHER PEOPLE: SOMEWHAT DIFFICULT
SUM OF ALL RESPONSES TO PHQ QUESTIONS 1-9: 6

## 2024-08-14 NOTE — TELEPHONE ENCOUNTER
ISSUE:   Tacrolimus IR level 13.3 on 8/12, goal 8-12, dose 4 mg BID.    PLAN:   Call Patient and confirm this was an accurate 12-hour trough.   Verify Tacrolimus IR dose 4 mg BID.   Confirm no new medications or or missed doses.   Confirm no new illness / infection / diarrhea.   If accurate trough and accurate dose, decrease Tacrolimus IR dose to 3 mg BID     Is this more than a 50% increase or decrease in current IS dose: No    Repeat labs in 1 days.      OUTCOME:   Spoke with Patient, they confirm accurate trough level and current dose 4 mg BID.   Patient confirmed dose change to 3 mg BID.  Patient agreed to repeat labs in 1 days.   Orders sent to preferred pharmacy for dose change and lab for repeat labs.   Patient voiced understanding of plan.

## 2024-08-15 ENCOUNTER — VIRTUAL VISIT (OUTPATIENT)
Dept: PSYCHOLOGY | Facility: CLINIC | Age: 49
End: 2024-08-15
Payer: COMMERCIAL

## 2024-08-15 DIAGNOSIS — F43.23 ADJUSTMENT DISORDER WITH MIXED ANXIETY AND DEPRESSED MOOD: Primary | ICD-10-CM

## 2024-08-15 PROCEDURE — 90791 PSYCH DIAGNOSTIC EVALUATION: CPT | Mod: 95

## 2024-08-15 NOTE — PROGRESS NOTES
Health Psychology Outpatient Psychodiagnostic Evaluation  [facility]     Patient: West Van    Referral requested by: MIN Zhou, [facility]   Reason for referral: Assistance with coping with liver transplant    Patient Demographics (per chart):   Age: 48 year old  Biological Sex: male  Race: White  Ethnicity: Not  or     Medical History (per chart):  Patient Active Problem List   Diagnosis    Anxiety    Benign essential HTN    Calculus of gallbladder without cholecystitis without obstruction    Dermoid cyst of scalp    Eczema    Elevated LFTs    History of recent stressful life event    Hypertension    Lesion of liver    Malignant neoplasm of liver (H)    Obesity (BMI 30-39.9)    Psychophysiological insomnia    Sciatica of right side    SI (sacroiliac) joint dysfunction    Type 2 diabetes mellitus without complication, without long-term current use of insulin (H)    Primary malignant neuroendocrine tumor of lung (H)    Liver cirrhosis secondary to nonalcoholic steatohepatitis (RIOS) (H)    Kidney stone    Chronic cough    Environmental and seasonal allergies    Mass of chest wall, right    Adjustment disorder with mixed anxiety and depressed mood    Seborrheic keratoses    Liver transplant candidate    HCC (hepatocellular carcinoma) (H)    S/P liver transplant (H)    Acute post-operative pain    Immunosuppressed status (H24)    Anemia due to blood loss, acute    Moderate malnutrition (H24)    Steroid-induced hyperglycemia    Hypomagnesemia      Past Medical History:   Diagnosis Date    Benign essential HTN 04/13/2016    Diabetes (H)     Kidney stone     Liver cancer (H) 10/04/2022    Liver cirrhosis secondary to nonalcoholic steatohepatitis (RIOS) (H)     RIOS (nonalcoholic steatohepatitis) 06/02/2023    Neuroendocrine carcinoma of lung (H)     right lung, lobectomy 3/2023    PONV (postoperative nausea and vomiting)      Past Surgical History:    Procedure Laterality Date    BRONCHOSCOPY, WITH BIOPSY, ROBOT ASSISTED N/A 2023    DAVINCI LOBECTOMY LUNG Right 3/8/2023    ENDOBRONCHIAL ULTRASOUND FLEXIBLE N/A 2023    EXCISE TUMOR CHEST WALL Right 2024    LAPAROSCOPIC ABLATION LIVER TUMOR N/A 2022    LAPAROSCOPIC BIOPSY LIVER N/A 2022    LAPAROSCOPIC CHOLECYSTECTOMY N/A 2022    TRANSPLANT LIVER RECIPIENT  DONOR N/A 2024     Current Outpatient Medications   Medication Sig Dispense Refill    acetaminophen (TYLENOL) 325 MG tablet Take 2 tablets (650 mg) by mouth every 6 hours as needed for mild pain      Alcohol Swabs PADS Use to swab the area of the injection or nay as directed Per insurance coverage 200 each 1    aspirin (ASA) 81 MG chewable tablet Take 1 tablet (81 mg) by mouth or Feeding Tube daily 90 tablet 1    blood glucose (NO BRAND SPECIFIED) lancets standard To use to test glucose level in the blood Use to test blood sugar 4  times daily as directed. To accompany glucose monitor brands per insurance coverage. 200 each 1    blood glucose (NO BRAND SPECIFIED) test strip To use to test glucose level in the blood Use to test blood sugar 4 times daily as directed. To accompany glucose monitor brands per insurance coverage. 100 strip 3    blood glucose monitoring (NO BRAND SPECIFIED) meter device kit Use as directed Per insurance coverage 1 kit 0    carvedilol (COREG) 3.125 MG tablet Take 1 tablet (3.125 mg) by mouth 2 times daily (with meals) 60 tablet 3    fluticasone (FLONASE) 50 MCG/ACT nasal spray Spray 1 spray into both nostrils daily as needed for allergies or rhinitis      insulin aspart (NOVOLOG PEN) 100 UNIT/ML pen Inject 1-7 Units Subcutaneous at bedtime HIGH INSULIN RESISTANCE DOSING  Do Not give Bedtime Correction Insulin if BG less than 200. For  - 224 give 1 units. For  - 249 give 2 units. For  - 274 give 3 units. For  - 299 give 4 units. For  - 324 give 5 units. For BG  325 - 349 give 6 units. For BG greater than or equal to 350 give 7 units. Notify provider if glucose greater than or equal to 350 mg/dL after administration of correction dose. 15 mL 1    insulin aspart (NOVOLOG PEN) 100 UNIT/ML pen Inject 6 Units Subcutaneous 3 times daily (with meals) 3 mL 1    insulin aspart (NOVOLOG PEN) 100 UNIT/ML pen Inject 1-10 Units Subcutaneous 3 times daily (before meals) Correction Scale - MEDIUM INSULIN RESISTANCE DOSING   Do Not give Correction Insulin if BG < 140. For  - 189 give 1 unit. For  - 239 give 2 units. For  - 289 give 3 units. For  - 339 give 4 units. For BG = or > 340 give 5 units. Check blood glucose before meals/bolus feedings (or q4h if NPO) and administer based on blood glucose. Notify MD if glucose > or = 350 mg/dL after administration of correction dose. 15 mL 1    insulin glargine (LANTUS PEN) 100 UNIT/ML pen Inject 25 Units Subcutaneous daily 15 mL 1    insulin pen needle (32G X 4 MM) 32G X 4 MM miscellaneous Use as directed by provider Per insurance coverage 200 each 1    magnesium oxide (MAG-OX) 400 MG tablet Take 2 tablets (800 mg) by mouth 2 times daily 360 tablet 1    melatonin 5 MG tablet Take 1 tablet (5 mg) by mouth nightly as needed for sleep      methocarbamol (ROBAXIN) 500 MG tablet Take 1 tablet (500 mg) by mouth 4 times daily as needed for muscle spasms 15 tablet 0    mycophenolate (GENERIC EQUIVALENT) 250 MG capsule Take 3 capsules (750 mg) by mouth 2 times daily 540 capsule 1    oxyCODONE (ROXICODONE) 5 MG tablet 0.5-1 tablets (2.5-5 mg) by Oral or Feeding Tube route every 6 hours as needed for severe pain 8 tablet 0    pantoprazole (PROTONIX) 40 MG EC tablet Take 1 tablet (40 mg) by mouth daily 90 tablet 1    predniSONE (DELTASONE) 5 MG tablet Take 1 tablet (5 mg) by mouth daily 90 tablet 1    Sharps Container MISC Use as directed to dispose of needles, lancets and other sharps 1 each 1    sulfamethoxazole-trimethoprim  "(BACTRIM) 400-80 MG tablet Take 1 tablet by mouth daily 90 tablet 1    tacrolimus (GENERIC EQUIVALENT) 0.5 MG capsule Tacrolimus 0.5 mg capsules BID to allow for dose adjustments. 60 capsule 11    tacrolimus (GENERIC EQUIVALENT) 1 MG capsule Take 3 capsules (3 mg) by mouth every 12 hours 540 capsule 3    ursodiol (ACTIGALL) 300 MG capsule Take 1 capsule (300 mg) by mouth 2 times daily 60 capsule 3    valGANciclovir (VALCYTE) 450 MG tablet Take 1 tablet (450 mg) by mouth daily 90 tablet 1     No current facility-administered medications for this visit.     Weight History (per chart):  Wt Readings from Last 4 Encounters:   08/06/24 89.8 kg (198 lb)   08/01/24 90.1 kg (198 lb 11.2 oz)   07/22/24 89.8 kg (198 lb)   07/15/24 89 kg (196 lb 4.8 oz)      Estimated body mass index is 25.42 kg/m  as calculated from the following:    Height as of 8/6/24: 1.88 m (6' 2\").    Weight as of 8/6/24: 89.8 kg (198 lb).     History of Presenting Concerns:  Mr. Van is a 48 year oldy/o, White, male who presents to the Health Psychology clinic with recent liver transplant 6/27/24 who is experiencing difficulty adjusting post-transplant with associated mild depression and anxiety sxs.     Social History:  Current living arrangements: Pt reported that he currently lives in a home by himself in [facility]. Pt reported that he has a dog who is currently being being watched by pt's aunt and uncle while pt recovers from transplant.   Relationship status/family: Pt denied being in a current romantic relationship. Pt reported some contact with mother and extended family. Pt reported that his father passed away. Pt denied having children.   Social support network: Patient reported a social support network including a few close friends and family members.   Occupational history: Pt reported being currently unemployed. 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 " "  Educational history: Pt reported completing high school.     Health Behaviors:  Caffeine: Pt reported having ~1 8oz cup of coffee a day.   ETOH: Patient denied ETOH use.   Cannabis use: Patient denied cannabis use.   Illicit substance use: Patient denied illicit substance use.   Tobacco use: Patient denied tobacco use.   Physical activity: Patient denied regular physical activity or exercise.   Leisure/hobbies: Patient reported having hobbies including   Sleep: Patient denied sleep related concerns.   Medication adherence: Patient denied concerns related to medication adherence.     Psychiatric History:  Depression: Patient reported current sxs of depression including feel down/depressed and experiencing less pleasure/enjoyment from activities that used to be enjoyable. Pt reported a hx of \"feeling down at times\" prior to current.   Kristie: Patient denied a history of, or current, manic symptoms.  Psychosis: Patient denied a history of, or current, psychotic symptoms.  Anxiety: Patient reported current mild anxiety mostly related to current medical status. Pt stated that he has worry most days about making sure he is rehabing correctly. Pt grossly denied a hx of anxiety.   Panic: Patient denied a history of, or current, panic symptoms.  Agoraphobia: Patient denied a history of, or current, agoraphobic symptoms.   Post Traumatic Stress Disorder: Patient denied a history of, or current, PTSD symptoms.   Eating Disorder: Patient denied a history of, or current, disordered eating symptoms.  ADHD: Patient denied a history of, or current, ADHD symptoms.  Autism Spectrum Disorder: Patient denied a history of, or current, ASD symptoms.  Obsessive Compulsive Disorder: Patient denied a history of, or current, OCD symptoms.    Mental health treatment: Patient reported a hx of mental health care utilization. Pt reported having received psychotherapy 2x in the past for depression sxs. Pt denied current utilization.   Substance " use treatment: Patient denied a history of, or current, substance use treatment.   Psychiatric hospitalization: Patient denied a history of psychiatric hospitalization.     Assessment:   Depression Symptoms (Patient Health Questionnaire 9; PHQ-9)  The PHQ-9 is a measure of depressive symptoms.  Scores on this measure range from 0 to 27 with higher scores reflecting greater levels and frequency of depressive symptoms. Typical symptom ranges include: 0-4 minimal, 5-9 mild, 10-14 moderate, 15-19 moderately severe, 20-27 severe.       8/14/2024    10:43 AM   PHQ-9 SCORE   PHQ-9 Total Score MyChart 6 (Mild depression)   PHQ-9 Total Score 6     Anxiety Symptoms (Generalized Anxiety Disorder 7; KAELYN-7)  The KAELYN-7 is a measure of anxiety and panic symptoms.  Scores on this measure range from 0 to 21 with higher scores reflecting greater levels and frequency of anxiety symptoms. Typical symptom ranges include: 0-4 minimal, 5-9 mild, 10-14 moderate, 15-21 severe.       8/10/2024    10:46 AM   KAELYN-7 SCORE   Total Score 7 (mild anxiety)   Total Score 7     Alcohol and Drug Abuse (CAGE - Adapted to Include Drugs; CAGE-AID)  The CAGE-AID is a screening tool to assess for symptoms of alcohol or drug abuse or dependence. Scores on this measure range from 0 to 4, with higher scores reflecting experiences consistent with problem use.  CAGE-AID score  > 1 is a positive screen, suggesting further discussion is needed to determine if evaluation for alcohol or substance abuse is appropriate.  A score > 2 is considered clinically significant, suggesting further evaluation of alcohol or substance-related problems is indicated.      8/10/2024    10:48 AM   CAGE-AID Total Score   Total Score 0   Total Score MyChart 0 (A total score of 2 or greater is considered clinically significant)     Global Health (Patient-Reported Outcomes Measurement Information System; PROMIS-10)  The PROMIS-10 is a measure of global physical and mental health. Total  scores on this measure range from 8 to 40, with higher scores reflecting greater levels of perceived health.       8/10/2024    10:48 AM   PROMIS-10 Scores Only   Global Mental Health Score 12   Global Physical Health Score 14   PROMIS TOTAL - SUBSCORES 26     Suicidality (Recluse Suicide Severity Rating Scale Screener Past Month)      6/26/2024     4:00 PM   Recluse Suicide Severity Rating (Short Version)   Q1 Wished to be Dead (Past Month) 0-->no   Q2 Suicidal Thoughts (Past Month) 0-->no   Q6 Suicide Behavior (Lifetime) 0-->no   Level of Risk per Screen no risks indicated       Mental Status/Interview:  Appearance:   Appropriate   Eye Contact:   Good   Psychomotor Behavior:  Normal   Attitude:   Cooperative   Orientation:   All  Speech   Rate / Production: Normal    Volume:  Normal   Mood:    Anxious  Depressed   Affect:    Appropriate   Thought Content:   Clear  Thought Form:   Coherent  Logical     Insight:    Did not formally assess at this time.     Suicidal ideation: Pt denied active suicidal ideation.   Homicidal ideation: Pt denied active homicidal ideation.     Impression:  Based on this interview and results from the assessments administered on this date, Mr. Van appears to be experiencing symptoms of depression and anxiety that appear to be related to recent liver transplant. According to self-report and interviewed data patient appears to be experiencing depression symptoms in the mild range and anxiety symptoms in the mild range. Currently, the patient appears to be coping with some success. This patient has the following strengths: engagement in health care. Patient engages in the following healthy behaviors: abstinence from substances. Patient would benefit from CBT for coping with liver transplant.     Recommendations/Plan:  Return for therapy sessions.     Diagnosis:  Adjustment disorder with mixed anxiety and depressed mood (ICD-10: F43.20)    Date of Service:  08/15/24    Session  Length:  Start Time: 10:00am  End Time: 10:45am    Modality: Telemedicine Information:  Type of service:   Telemedicine psychotherapy  Patient location:   Patient home in [place]    Patient telephone number: [phone]  Provider location:  [facility] Floor 3 Health Psychology Office  Mode of Communication:   Video Conference via [employer]   Patient consent to telemedicine services? Yes  It has been determined that telemedicine service is appropriate and effective for this patient.   The patient has been notified that: Video visits will be conducted via a call with their psychologist to provide the care they need with a video conversation. Video visits may be billed at different rates depending on insurance coverage.  Patients are advised to contact their insurance provider with any questions about their health insurance coverage. If during the course of a call the psychologist feels a video visit is not appropriate, patients will not be charged for this service.    Confidential Summary of Health Psychology Evaluation:  Patient was provided information about Health Psychology Services, including billing and limits to confidentiality and documentation in electronic medical records. Provided opportunity to ask questions and obtained verbal consent for treatment.    Obdulio Patel, PhD  Clinical Health Psychology Fellow    *This case is being supervised by Danielle Diaz, PhD, .  *This note was completed using Dragon voice recognition software. Although reviewed after completion, some word and grammatical errors may occur.  *In accordance with the Rules of the [place] Board [employer], it is noted that psychological descriptions and scientific procedures underlying psychological evaluations have limitations.  Absolute predictions cannot be made based on information in this report.

## 2024-08-24 ASSESSMENT — ANXIETY QUESTIONNAIRES
7. FEELING AFRAID AS IF SOMETHING AWFUL MIGHT HAPPEN: SEVERAL DAYS
GAD7 TOTAL SCORE: 7
3. WORRYING TOO MUCH ABOUT DIFFERENT THINGS: SEVERAL DAYS
1. FEELING NERVOUS, ANXIOUS, OR ON EDGE: SEVERAL DAYS
4. TROUBLE RELAXING: SEVERAL DAYS
2. NOT BEING ABLE TO STOP OR CONTROL WORRYING: SEVERAL DAYS
5. BEING SO RESTLESS THAT IT IS HARD TO SIT STILL: SEVERAL DAYS
IF YOU CHECKED OFF ANY PROBLEMS ON THIS QUESTIONNAIRE, HOW DIFFICULT HAVE THESE PROBLEMS MADE IT FOR YOU TO DO YOUR WORK, TAKE CARE OF THINGS AT HOME, OR GET ALONG WITH OTHER PEOPLE: NOT DIFFICULT AT ALL
6. BECOMING EASILY ANNOYED OR IRRITABLE: SEVERAL DAYS

## 2024-08-27 ASSESSMENT — ANXIETY QUESTIONNAIRES
3. WORRYING TOO MUCH ABOUT DIFFERENT THINGS: SEVERAL DAYS
4. TROUBLE RELAXING: SEVERAL DAYS
GAD7 TOTAL SCORE: 7
6. BECOMING EASILY ANNOYED OR IRRITABLE: SEVERAL DAYS
7. FEELING AFRAID AS IF SOMETHING AWFUL MIGHT HAPPEN: SEVERAL DAYS
1. FEELING NERVOUS, ANXIOUS, OR ON EDGE: SEVERAL DAYS
2. NOT BEING ABLE TO STOP OR CONTROL WORRYING: SEVERAL DAYS
IF YOU CHECKED OFF ANY PROBLEMS ON THIS QUESTIONNAIRE, HOW DIFFICULT HAVE THESE PROBLEMS MADE IT FOR YOU TO DO YOUR WORK, TAKE CARE OF THINGS AT HOME, OR GET ALONG WITH OTHER PEOPLE: NOT DIFFICULT AT ALL
GAD7 TOTAL SCORE: 7
7. FEELING AFRAID AS IF SOMETHING AWFUL MIGHT HAPPEN: SEVERAL DAYS
5. BEING SO RESTLESS THAT IT IS HARD TO SIT STILL: SEVERAL DAYS
GAD7 TOTAL SCORE: 7
8. IF YOU CHECKED OFF ANY PROBLEMS, HOW DIFFICULT HAVE THESE MADE IT FOR YOU TO DO YOUR WORK, TAKE CARE OF THINGS AT HOME, OR GET ALONG WITH OTHER PEOPLE?: NOT DIFFICULT AT ALL

## 2024-08-28 ENCOUNTER — VIRTUAL VISIT (OUTPATIENT)
Dept: PHARMACY | Facility: CLINIC | Age: 49
End: 2024-08-28
Payer: COMMERCIAL

## 2024-08-28 DIAGNOSIS — E11.9 TYPE 2 DIABETES MELLITUS WITHOUT COMPLICATION, WITHOUT LONG-TERM CURRENT USE OF INSULIN (H): Primary | ICD-10-CM

## 2024-08-28 PROCEDURE — 99207 PR NO CHARGE LOS: CPT | Mod: 93 | Performed by: PHARMACIST

## 2024-08-28 NOTE — PROGRESS NOTES
Disease State Management Encounter:                          West Van is a 48 year old male called for a follow-up visit.      Reason for visit: diabetes follow-up .  Changes last visit:  Start giving Novolog base 6 units if sugar is between , then add sliding scale if it is over 140. Give before breakfast.   : 6 units  140-189: 7 units  190-239: 8 units  240-289: 9 units  290-339: 10 units  Over 340: 11 units  If lunchtime and dinnertime sugars do not drop below 150 after 3 days, you can increase Novolog to a base of 8 units before meals plus sliding scale. That is, start your sliding scale dose at 8 units instead of 6 units.    Diabetes   Lantus 28 units daily.  Novolog 8 units 3 times daily sliding scale with meals. Has been skipping morning dose as sugars haven't been over 140.  Steroids: Prednisone 5mg every morning  Patient is not experiencing side effects.  Current diabetes symptoms:  denies sx  Blood sugar monitorin time(s) daily; Ranges: (patient reported)    Date FBG/ 2hours post Lunch/2hours post Dinner /2hours post    129 212     120 162 226    145 223 165    143 149 280    127 159 304    120 162 226           Date FBG/ 2hours post Lunch/2hours post Dinner /2hours post    125 253      128      150 234 249    116 226 225    119 339 240    120 276          Today's Vitals: There were no vitals taken for this visit.    Assessment/Plan:    Increase Novolog to 12 units baseline.  : 12 units  140-189: 13 units  190-239: 14 units  240-289: 15 units  290-339: 16 units  Over 340: 17 units    Follow-up:     I spent 6 minutes with this patient today. All changes were made via collaborative practice agreement with Dr. Dodson. A copy of the visit note was provided to the patient's provider(s).    A summary of these recommendations was sent via [employer].    Zafar Arroyo PharmD  [facility] Pharmacist    Phone: [phone]      Medication  Therapy Recommendations  Type 2 diabetes mellitus without complication, without long-term current use of insulin (H)    Current Medication: insulin aspart (NOVOLOG PEN) 100 UNIT/ML pen   Rationale: Dose too low - Dosage too low - Effectiveness   Recommendation: Increase Dose   Status: Accepted per CPA

## 2024-08-28 NOTE — PATIENT INSTRUCTIONS
"Recommendations from today's MTM visit:                                                      Increase Novolog to 12 units baseline.  : 12 units  140-189: 13 units  190-239: 14 units  240-289: 15 units  290-339: 16 units  Over 340: 17 units    Follow-up: 9/30     It was great speaking with you today.  I value your experience and would be very thankful for your time in providing feedback in our clinic survey. In the next few days, you may receive an email or text message from [employer] with a link to a survey related to your  clinical pharmacist.\"     To schedule another MTM appointment, please call the clinic directly or you may call the MTM scheduling line at [phone] or toll-free at [phone].     My Clinical Pharmacist's contact information:                                                      Please feel free to contact me with any questions or concerns you have.      Zafar Arroyo, PharmD  [facility] Pharmacist    Phone: [phone]     "

## 2024-08-30 ENCOUNTER — VIRTUAL VISIT (OUTPATIENT)
Dept: PSYCHOLOGY | Facility: CLINIC | Age: 49
End: 2024-08-30
Payer: COMMERCIAL

## 2024-08-30 DIAGNOSIS — F43.23 ADJUSTMENT DISORDER WITH MIXED ANXIETY AND DEPRESSED MOOD: Primary | ICD-10-CM

## 2024-08-30 PROCEDURE — 90834 PSYTX W PT 45 MINUTES: CPT | Mod: 95

## 2024-08-30 NOTE — PROGRESS NOTES
Health Psychology Outpatient Follow Up  [facility]     Patient: West Van   Date of Service: 08/30/24    Diagnosis:  Adjustment disorder with mixed anxiety and depressed mood (ICD-10: F43.20)     Patient Demographics (per chart):   Age: 48 year old  Biological Sex: male  Race: White  Ethnicity: Not  or     Patient Medications (per chart):  Current Outpatient Medications   Medication Sig Dispense Refill    acetaminophen (TYLENOL) 325 MG tablet Take 2 tablets (650 mg) by mouth every 6 hours as needed for mild pain      Alcohol Swabs PADS Use to swab the area of the injection or nay as directed Per insurance coverage 200 each 1    aspirin (ASA) 81 MG chewable tablet Take 1 tablet (81 mg) by mouth or Feeding Tube daily 90 tablet 1    blood glucose (NO BRAND SPECIFIED) lancets standard To use to test glucose level in the blood Use to test blood sugar 4  times daily as directed. To accompany glucose monitor brands per insurance coverage. 200 each 1    blood glucose (NO BRAND SPECIFIED) test strip To use to test glucose level in the blood Use to test blood sugar 4 times daily as directed. To accompany glucose monitor brands per insurance coverage. 100 strip 3    blood glucose monitoring (NO BRAND SPECIFIED) meter device kit Use as directed Per insurance coverage 1 kit 0    carvedilol (COREG) 3.125 MG tablet Take 1 tablet (3.125 mg) by mouth 2 times daily (with meals) 60 tablet 3    fluticasone (FLONASE) 50 MCG/ACT nasal spray Spray 1 spray into both nostrils daily as needed for allergies or rhinitis      insulin aspart (NOVOLOG PEN) 100 UNIT/ML pen Inject 1-7 Units Subcutaneous at bedtime HIGH INSULIN RESISTANCE DOSING  Do Not give Bedtime Correction Insulin if BG less than 200. For  - 224 give 1 units. For  - 249 give 2 units. For  - 274 give 3 units. For  - 299 give 4 units. For  - 324 give 5 units. For  - 349 give 6  units. For BG greater than or equal to 350 give 7 units. Notify provider if glucose greater than or equal to 350 mg/dL after administration of correction dose. 15 mL 1    insulin aspart (NOVOLOG PEN) 100 UNIT/ML pen Inject 6 Units Subcutaneous 3 times daily (with meals) 3 mL 1    insulin aspart (NOVOLOG PEN) 100 UNIT/ML pen Inject 1-10 Units Subcutaneous 3 times daily (before meals) Correction Scale - MEDIUM INSULIN RESISTANCE DOSING   Do Not give Correction Insulin if BG < 140. For  - 189 give 1 unit. For  - 239 give 2 units. For  - 289 give 3 units. For  - 339 give 4 units. For BG = or > 340 give 5 units. Check blood glucose before meals/bolus feedings (or q4h if NPO) and administer based on blood glucose. Notify MD if glucose > or = 350 mg/dL after administration of correction dose. 15 mL 1    insulin glargine (LANTUS PEN) 100 UNIT/ML pen Inject 25 Units Subcutaneous daily 15 mL 1    insulin pen needle (32G X 4 MM) 32G X 4 MM miscellaneous Use as directed by provider Per insurance coverage 200 each 1    magnesium oxide (MAG-OX) 400 MG tablet Take 2 tablets (800 mg) by mouth 2 times daily 360 tablet 1    melatonin 5 MG tablet Take 1 tablet (5 mg) by mouth nightly as needed for sleep      methocarbamol (ROBAXIN) 500 MG tablet Take 1 tablet (500 mg) by mouth 4 times daily as needed for muscle spasms 15 tablet 0    mycophenolate (GENERIC EQUIVALENT) 250 MG capsule Take 3 capsules (750 mg) by mouth 2 times daily 540 capsule 1    oxyCODONE (ROXICODONE) 5 MG tablet 0.5-1 tablets (2.5-5 mg) by Oral or Feeding Tube route every 6 hours as needed for severe pain 8 tablet 0    pantoprazole (PROTONIX) 40 MG EC tablet Take 1 tablet (40 mg) by mouth daily 90 tablet 1    predniSONE (DELTASONE) 5 MG tablet Take 1 tablet (5 mg) by mouth daily 90 tablet 1    Sharps Container MISC Use as directed to dispose of needles, lancets and other sharps 1 each 1    sulfamethoxazole-trimethoprim (BACTRIM) 400-80 MG  "tablet Take 1 tablet by mouth daily 90 tablet 1    tacrolimus (GENERIC EQUIVALENT) 0.5 MG capsule Tacrolimus 0.5 mg capsules BID to allow for dose adjustments. 60 capsule 11    tacrolimus (GENERIC EQUIVALENT) 1 MG capsule Take 3 capsules (3 mg) by mouth every 12 hours 540 capsule 3    ursodiol (ACTIGALL) 300 MG capsule Take 1 capsule (300 mg) by mouth 2 times daily 60 capsule 3    valGANciclovir (VALCYTE) 450 MG tablet Take 1 tablet (450 mg) by mouth daily 90 tablet 1     No current facility-administered medications for this visit.       Weight History (per chart):  Wt Readings from Last 4 Encounters:   08/06/24 89.8 kg (198 lb)   08/01/24 90.1 kg (198 lb 11.2 oz)   07/22/24 89.8 kg (198 lb)   07/15/24 89 kg (196 lb 4.8 oz)      Estimated body mass index is 25.42 kg/m  as calculated from the following:    Height as of 8/6/24: 1.88 m (6' 2\").    Weight as of 8/6/24: 89.8 kg (198 lb).     Subjective:  Engaged pt in psychotherapy (cognitive behavioral therapy) for coping with liver transplant. Reviewed updates to physical and emotional health since previous session. Reviewed treatment plan and goals. Patient reported treatment goals including increasing anxiety/stress coping skills and increasing engagement in value-consistent activities. Patient was provided basic psychoeducation on CBT for coping with liver transplant. Patient reported agreement with treatment plan.    Pt agreed to the following assignment(s) to complete by next follow up:   - begin worry logging/journaling     Objective   Appearance:   Appropriate   Eye Contact:   Good   Psychomotor Behavior:  Normal   Attitude:   Cooperative   Orientation:   All  Speech   Rate / Production: Normal    Volume:  Normal   Mood:    Anxious   Affect:    Appropriate   Thought Content:   Clear  Thought Form:   Coherent  Logical     Insight:    Did not formally assess at this time.     Depression Symptoms (Patient Health Questionnaire 9; PHQ-9)  The PHQ-9 is a measure of " depressive symptoms.  Scores on this measure range from 0 to 27 with higher scores reflecting greater levels and frequency of depressive symptoms. Typical symptom ranges include: 0-4 minimal, 5-9 mild, 10-14 moderate, 15-19 moderately severe, 20-27 severe.       8/14/2024    10:43 AM   PHQ-9 SCORE   PHQ-9 Total Score MyChart 6 (Mild depression)   PHQ-9 Total Score 6     Anxiety Symptoms (Generalized Anxiety Disorder 7; KAELYN-7)  The KAELYN-7 is a measure of anxiety and panic symptoms.  Scores on this measure range from 0 to 21 with higher scores reflecting greater levels and frequency of anxiety symptoms. Typical symptom ranges include: 0-4 minimal, 5-9 mild, 10-14 moderate, 15-21 severe.       8/10/2024    10:46 AM 8/24/2024     9:28 AM 8/27/2024     3:48 PM   KAELYN-7 SCORE   Total Score 7 (mild anxiety) 7 (mild anxiety) 7 (mild anxiety)   Total Score 7 7    7 7     Alcohol and Drug Abuse (CAGE - Adapted to Include Drugs; CAGE-AID)  The CAGE-AID is a screening tool to assess for symptoms of alcohol or drug abuse or dependence. Scores on this measure range from 0 to 4, with higher scores reflecting experiences consistent with problem use.  CAGE-AID score  > 1 is a positive screen, suggesting further discussion is needed to determine if evaluation for alcohol or substance abuse is appropriate.  A score > 2 is considered clinically significant, suggesting further evaluation of alcohol or substance-related problems is indicated.      8/10/2024    10:48 AM   CAGE-AID Total Score   Total Score 0   Total Score MyChart 0 (A total score of 2 or greater is considered clinically significant)     Global Health (Patient-Reported Outcomes Measurement Information System; PROMIS-10)  The PROMIS-10 is a measure of global physical and mental health. Total scores on this measure range from 8 to 40, with higher scores reflecting greater levels of perceived health.       8/10/2024    10:48 AM   PROMIS-10 Scores Only   Global Mental Health  Score 12   Global Physical Health Score 14   PROMIS TOTAL - SUBSCORES 26     Suicidality ([place] Suicide Severity Rating Scale Screener Past Month)      6/26/2024     4:00 PM   [place] Suicide Severity Rating (Short Version)   Q1 Wished to be Dead (Past Month) 0-->no   Q2 Suicidal Thoughts (Past Month) 0-->no   Q6 Suicide Behavior (Lifetime) 0-->no     Assessment:  Patient was engaged in treatment. Patient appears to be experiencing depression symptoms in the mild range and anxiety symptoms in the mild range. Currently, the patient appears to be coping with some success. Progress towards treatment goals: ongoing.     Plan:  Return for therapy sessions.     Session Length:  Start Time: 2:00pm  End Time: 2:40pm    Modality: Telemedicine Information:  Type of service:   Telemedicine psychotherapy  Patient location:   Patient home in [place]    Patient telephone number: [phone]  Provider location:  [facility] 3 Health Psychology Office  Mode of Communication:   Video Conference via [employer]   Patient consent to telemedicine services? Yes  It has been determined that telemedicine service is appropriate and effective for this patient.   The patient has been notified that: Video visits will be conducted via a call with their psychologist to provide the care they need with a video conversation. Video visits may be billed at different rates depending on insurance coverage.  Patients are advised to contact their insurance provider with any questions about their health insurance coverage. If during the course of a call the psychologist feels a video visit is not appropriate, patients will not be charged for this service.    Obdulio Patel, PhD  Clinical Health Psychology Fellow    *This case is being supervised by Amos Franco, Ph.D., A.B.P.P., L.P..  *This note was completed using Dragon voice recognition software. Although reviewed after completion, some word and grammatical errors may occur.    Outpatient  Treatment Plan Summary     Date of Treatment Plan: 08/30/24  Date of Initial Service: 8/15/24  90-Day Review Date: NA    DSM-V Diagnosis: Adjustment disorder with mixed anxiety and depressed mood (ICD-10: F43.20)   Current symptoms and circumstances that substantiate the diagnosis: See diagnostic assessment.   How symptoms and/or behaviors are affecting level of functioning: See diagnostic assessment.    Risk Assessment:   Suicide:  Assessed Level of Immediate Risk: None  Ideation: No   Plan:  No  Means: No  Intent: No     Homicide/Violence:  Assessed Level of Immediate Risk: None  Ideation: No  Plan:  No  Means: No  Intent: No    If on a medication, please include name and dosage:  See chart.     Symptom/Problem Measurable Goals Interventions  Gains Made   High anxiety/worry Objective measure of goal progress will include: decrease in KAELYN-7 and PHQ-9 scores  CBT for anxiety  NA   Minimal engagement in meaningful/value congruent activities post-transplant  Objective measure of goal progress will include: self reported increase in activities engaged in  CBT for coping with transplant NA     Frequency of Sessions: ~1x a week   Discharge and Aftercare Goals: TBD  Expected duration of treatment:  TBD  Participants in therapy plan (family, friends, support network): TBD    Discussed and created this plan with the patient who has access to this treatment plan via [employer].     Regulatory Guidelines for Updating Treatment Plan  [place] Medical Assistance: Reviewed & signed at least every 90days  Medicare:  Update per policy

## 2024-09-05 ENCOUNTER — TELEPHONE (OUTPATIENT)
Dept: PHARMACY | Facility: CLINIC | Age: 49
End: 2024-09-05
Payer: COMMERCIAL

## 2024-09-05 NOTE — TELEPHONE ENCOUNTER
Clinical Pharmacy Consult:                                                      Transplant Specific: 2 month post transplant discharge medication review  Date of Transplant: 06/27/2024  Type of Transplant: liver  First Transplant: yes  History of rejection: no    Immunosuppression Regimen   TAC 3mg qAM & 3mg qPM, Prednisone 5mg qAM, and MMF 750mg qAM & 750mg qPM  Patient specific goal: 8-12  Most recent level: 11.9, date 8/29/24  Immunosuppressant Levels:  Therapeutic  Pt adherent to lab draws: yes  Scr:   Lab Results   Component Value Date    CR 0.76 08/01/2024     Side effects: Tremors, hot flashes    Prophylactic Medications  Antibacterial:  Bactrim SS daily  Scheduled Discontinue Date: 6 months    Antifungal: Nystatin  Scheduled Discontinue Date:  discontinued at discharge    Antiviral: CrCl 40 to 59 mL/minute: Valcyte 450 mg once daily   Scheduled Discontinue Date: 6 months    Acid Reducer: Protonix (pantoprazole)  Scheduled Reviewed Date:  TBD    Thrombosis Prevention: Aspirin 81 mg PO daily  Scheduled Discontinue Date:  TBD    Blood Pressure Management  Frequency of home Blood Pressure checks: once daily  Most recent home BP: 120-130/80-85  Patient Blood pressure goal: <140/90  Patient blood pressure at goal:  yes  Hospitalizations/ER visits since last assessment: 0    Med rec/DUR performed: yes  Med Rec Discrepancies: no    I spoke to West today via phone. He reports doing okay. He said his routine to take his medication makes him feel a bit robotic. He sometimes forgets if he took his meds shortly after taking them. West has phone alarms and calendar reminders to help him take his medication regularly and feels like he doesn't actually forget, he just feels like he forgets shortly after doing so. He asked us to transfer in all of his medications from his local pharmacy to keep everything together.    West reports checking his blood pressure every morning. It is normally in the 120s-130s/80-85 which is at  goal.    Wants future fills refills through [facility]/Mail order. Updated address for mail order.    No other questions or concerns today. Follow up with MTM in 1 month.    Lane Beckman, PharmD  [phone]

## 2024-09-19 ASSESSMENT — ANXIETY QUESTIONNAIRES
8. IF YOU CHECKED OFF ANY PROBLEMS, HOW DIFFICULT HAVE THESE MADE IT FOR YOU TO DO YOUR WORK, TAKE CARE OF THINGS AT HOME, OR GET ALONG WITH OTHER PEOPLE?: SOMEWHAT DIFFICULT
GAD7 TOTAL SCORE: 7
7. FEELING AFRAID AS IF SOMETHING AWFUL MIGHT HAPPEN: SEVERAL DAYS

## 2024-09-24 ENCOUNTER — VIRTUAL VISIT (OUTPATIENT)
Dept: PSYCHOLOGY | Facility: CLINIC | Age: 49
End: 2024-09-24
Payer: COMMERCIAL

## 2024-09-24 DIAGNOSIS — F43.23 ADJUSTMENT DISORDER WITH MIXED ANXIETY AND DEPRESSED MOOD: Primary | ICD-10-CM

## 2024-09-24 PROCEDURE — 90834 PSYTX W PT 45 MINUTES: CPT | Mod: 95

## 2024-09-24 NOTE — PROGRESS NOTES
Health Psychology Outpatient Follow Up  [facility]     Patient: West Van   Date of Service: 09/24/24    Diagnosis:  Adjustment disorder with mixed anxiety and depressed mood (ICD-10: F43.20)     Patient Demographics (per chart):   Age: 48 year old  Biological Sex: male  Race: White  Ethnicity: Not  or     Patient Medications (per chart):  Current Outpatient Medications   Medication Sig Dispense Refill    acetaminophen (TYLENOL) 325 MG tablet Take 2 tablets (650 mg) by mouth every 6 hours as needed for mild pain      Alcohol Swabs PADS Use to swab the area of the injection or nay as directed Per insurance coverage 200 each 1    aspirin (ASA) 81 MG chewable tablet Take 1 tablet (81 mg) by mouth or Feeding Tube daily 90 tablet 1    blood glucose (NO BRAND SPECIFIED) lancets standard To use to test glucose level in the blood Use to test blood sugar 4  times daily as directed. To accompany glucose monitor brands per insurance coverage. 200 each 1    blood glucose (NO BRAND SPECIFIED) test strip To use to test glucose level in the blood Use to test blood sugar 4 times daily as directed. To accompany glucose monitor brands per insurance coverage. 100 strip 3    blood glucose monitoring (NO BRAND SPECIFIED) meter device kit Use as directed Per insurance coverage 1 kit 0    carvedilol (COREG) 3.125 MG tablet Take 1 tablet (3.125 mg) by mouth 2 times daily (with meals) 60 tablet 3    fluticasone (FLONASE) 50 MCG/ACT nasal spray Spray 1 spray into both nostrils daily as needed for allergies or rhinitis      insulin aspart (NOVOLOG PEN) 100 UNIT/ML pen Inject 1-7 Units Subcutaneous at bedtime HIGH INSULIN RESISTANCE DOSING  Do Not give Bedtime Correction Insulin if BG less than 200. For  - 224 give 1 units. For  - 249 give 2 units. For  - 274 give 3 units. For  - 299 give 4 units. For  - 324 give 5 units. For  - 349 give 6  units. For BG greater than or equal to 350 give 7 units. Notify provider if glucose greater than or equal to 350 mg/dL after administration of correction dose. 15 mL 1    insulin aspart (NOVOLOG PEN) 100 UNIT/ML pen Inject 6 Units Subcutaneous 3 times daily (with meals) 3 mL 1    insulin aspart (NOVOLOG PEN) 100 UNIT/ML pen Inject 1-10 Units Subcutaneous 3 times daily (before meals) Correction Scale - MEDIUM INSULIN RESISTANCE DOSING   Do Not give Correction Insulin if BG < 140. For  - 189 give 1 unit. For  - 239 give 2 units. For  - 289 give 3 units. For  - 339 give 4 units. For BG = or > 340 give 5 units. Check blood glucose before meals/bolus feedings (or q4h if NPO) and administer based on blood glucose. Notify MD if glucose > or = 350 mg/dL after administration of correction dose. 15 mL 1    insulin glargine (LANTUS PEN) 100 UNIT/ML pen Inject 25 Units Subcutaneous daily 15 mL 1    insulin pen needle (32G X 4 MM) 32G X 4 MM miscellaneous Use as directed by provider Per insurance coverage 200 each 1    magnesium oxide (MAG-OX) 400 MG tablet Take 2 tablets (800 mg) by mouth 2 times daily 360 tablet 1    melatonin 5 MG tablet Take 1 tablet (5 mg) by mouth nightly as needed for sleep      methocarbamol (ROBAXIN) 500 MG tablet Take 1 tablet (500 mg) by mouth 4 times daily as needed for muscle spasms 15 tablet 0    mycophenolate (GENERIC EQUIVALENT) 250 MG capsule Take 3 capsules (750 mg) by mouth 2 times daily 540 capsule 1    oxyCODONE (ROXICODONE) 5 MG tablet 0.5-1 tablets (2.5-5 mg) by Oral or Feeding Tube route every 6 hours as needed for severe pain 8 tablet 0    pantoprazole (PROTONIX) 40 MG EC tablet Take 1 tablet (40 mg) by mouth daily 90 tablet 1    predniSONE (DELTASONE) 5 MG tablet Take 1 tablet (5 mg) by mouth daily 90 tablet 1    Sharps Container MISC Use as directed to dispose of needles, lancets and other sharps 1 each 1    sulfamethoxazole-trimethoprim (BACTRIM) 400-80 MG  tablet Take 1 tablet by mouth daily 90 tablet 1    tacrolimus (GENERIC EQUIVALENT) 0.5 MG capsule Tacrolimus 0.5 mg capsules BID to allow for dose adjustments. 60 capsule 11    tacrolimus (GENERIC EQUIVALENT) 1 MG capsule Take 3 capsules (3 mg) by mouth every 12 hours 540 capsule 3    ursodiol (ACTIGALL) 300 MG capsule Take 1 capsule (300 mg) by mouth 2 times daily 60 capsule 3    valGANciclovir (VALCYTE) 450 MG tablet Take 1 tablet (450 mg) by mouth daily 90 tablet 1     No current facility-administered medications for this visit.     Subjective:  Engaged pt in psychotherapy (CBT) for coping with recent transplant and ongoing medical recovery. Reviewed updates to physical and emotional health since previous session and progress with treatment plan goals. Patient reported that he has been pretty good overall citing ongoing busyness related to his own medical care including medical appointments and helping with tasks with his parents.  Patient did note continued moments of frustration related to perceived lack of social support during recovery process from previous friends and coworkers.  Engaged patient in supportive listening and cognitive processing of ongoing medical management posttransplant, and mood symptoms.  Regarding medical recovery, patient reported treating medical management as if it was his job and noted minimal mood symptoms associated with that.  Engaged patient in continued psychoeducation and skills training related to cognitive processing particularly of perceived minimal social support.  Discussed strategies such as journaling and behavioral activation activities such as getting out of the house and socializing with neighbors.    Patient reported understanding of education and agreement with plan.     Objective   Appearance:   Appropriate   Eye Contact:   Good   Psychomotor Behavior:  Normal   Attitude:   Cooperative   Orientation:   All  Speech   Rate / Production: Normal    Volume:  Normal  "  Mood:    Euthymic  Affect:    Appropriate   Thought Content:   Clear  Thought Form:   Coherent  Logical     Insight:    Did not formally assess at this time.   Safety:    No safety concerns at this time.     Weight (per chart):  Wt Readings from Last 4 Encounters:   08/06/24 89.8 kg (198 lb)   08/01/24 90.1 kg (198 lb 11.2 oz)   07/22/24 89.8 kg (198 lb)   07/15/24 89 kg (196 lb 4.8 oz)        BMI (per chart):  Estimated body mass index is 25.42 kg/m  as calculated from the following:    Height as of 8/6/24: 1.88 m (6' 2\").    Weight as of 8/6/24: 89.8 kg (198 lb).     Depression Symptoms (Patient Health Questionnaire 9; PHQ-9)  The PHQ-9 is a measure of depressive symptoms.  Scores on this measure range from 0 to 27 with higher scores reflecting greater levels and frequency of depressive symptoms. Typical symptom ranges include: 0-4 minimal, 5-9 mild, 10-14 moderate, 15-19 moderately severe, 20-27 severe.       8/14/2024    10:43 AM   PHQ-9 SCORE   PHQ-9 Total Score MyChart 6 (Mild depression)   PHQ-9 Total Score 6     Anxiety Symptoms (Generalized Anxiety Disorder 7; KAELYN-7)  The KAELYN-7 is a measure of anxiety and panic symptoms.  Scores on this measure range from 0 to 21 with higher scores reflecting greater levels and frequency of anxiety symptoms. Typical symptom ranges include: 0-4 minimal, 5-9 mild, 10-14 moderate, 15-21 severe.       8/24/2024     9:28 AM 8/27/2024     3:48 PM 9/19/2024     8:02 AM   KAELYN-7 SCORE   Total Score 7 (mild anxiety) 7 (mild anxiety) 7 (mild anxiety)   Total Score 7    7 7 7     Alcohol and Drug Abuse (CAGE - Adapted to Include Drugs; CAGE-AID)  The CAGE-AID is a screening tool to assess for symptoms of alcohol or drug abuse or dependence. Scores on this measure range from 0 to 4, with higher scores reflecting experiences consistent with problem use.  CAGE-AID score  > 1 is a positive screen, suggesting further discussion is needed to determine if evaluation for alcohol or substance " abuse is appropriate.  A score > 2 is considered clinically significant, suggesting further evaluation of alcohol or substance-related problems is indicated.      8/10/2024    10:48 AM   CAGE-AID Total Score   Total Score 0   Total Score MyChart 0 (A total score of 2 or greater is considered clinically significant)     Global Health (Patient-Reported Outcomes Measurement Information System; PROMIS-10)  The PROMIS-10 is a measure of global physical and mental health. Total scores on this measure range from 8 to 40, with higher scores reflecting greater levels of perceived health.       8/10/2024    10:48 AM   PROMIS-10 Scores Only   Global Mental Health Score 12   Global Physical Health Score 14   PROMIS TOTAL - SUBSCORES 26     Suicidality ([place] Suicide Severity Rating Scale Screener Past Month)      6/26/2024     4:00 PM   [place] Suicide Severity Rating (Short Version)   Q1 Wished to be Dead (Past Month) 0-->no   Q2 Suicidal Thoughts (Past Month) 0-->no   Q6 Suicide Behavior (Lifetime) 0-->no     Assessment:  Patient was engaged in treatment. Patient appears to be experiencing depression symptoms in the mild range and anxiety symptoms in the mild range. Currently, the patient appears to be coping with moderate success. Progress towards treatment goals: ongoing.     Plan:  Return for therapy sessions.     Session Length:  Start Time: 2:00pm  End Time: 2:40pm    Modality: Telemedicine Information:  Type of service:   Telemedicine psychotherapy  Patient location:   Patient home in [place]    Patient telephone number: [phone]  Provider location:  [facility] Health Psychology Office  Mode of Communication:   Video Conference via [employer]   Patient consent to telemedicine services? Yes  It has been determined that telemedicine service is appropriate and effective for this patient.   The patient has been notified that: Video visits will be conducted via a call with their psychologist to provide the care  they need with a video conversation. Video visits may be billed at different rates depending on insurance coverage.  Patients are advised to contact their insurance provider with any questions about their health insurance coverage. If during the course of a call the psychologist feels a video visit is not appropriate, patients will not be charged for this service.    Obdulio Patel, PhD  Clinical Health Psychology Fellow    *This case is being supervised by Danielle Diaz, PhD, LP.  *This note was completed using Dragon voice recognition software. Although reviewed after completion, some word and grammatical errors may occur.    Last treatment plan completed: 8/30/24  Next treatment plan due: 8/30/25

## 2024-09-29 PROCEDURE — 99358 PROLONG SERVICE W/O CONTACT: CPT | Performed by: INTERNAL MEDICINE

## 2024-09-30 ENCOUNTER — VIRTUAL VISIT (OUTPATIENT)
Dept: PHARMACY | Facility: CLINIC | Age: 49
End: 2024-09-30
Payer: COMMERCIAL

## 2024-09-30 ENCOUNTER — ANCILLARY PROCEDURE (OUTPATIENT)
Dept: GENERAL RADIOLOGY | Facility: CLINIC | Age: 49
End: 2024-09-30
Attending: SURGERY
Payer: COMMERCIAL

## 2024-09-30 ENCOUNTER — LAB (OUTPATIENT)
Dept: LAB | Facility: CLINIC | Age: 49
End: 2024-09-30
Attending: SURGERY
Payer: COMMERCIAL

## 2024-09-30 ENCOUNTER — OFFICE VISIT (OUTPATIENT)
Dept: GASTROENTEROLOGY | Facility: CLINIC | Age: 49
End: 2024-09-30
Attending: SURGERY
Payer: COMMERCIAL

## 2024-09-30 VITALS
OXYGEN SATURATION: 97 % | DIASTOLIC BLOOD PRESSURE: 93 MMHG | SYSTOLIC BLOOD PRESSURE: 158 MMHG | BODY MASS INDEX: 25.99 KG/M2 | WEIGHT: 202.4 LBS | HEART RATE: 82 BPM | TEMPERATURE: 98 F

## 2024-09-30 DIAGNOSIS — Z94.4 LIVER REPLACED BY TRANSPLANT (H): ICD-10-CM

## 2024-09-30 DIAGNOSIS — D84.9 IMMUNOSUPPRESSED STATUS (H): ICD-10-CM

## 2024-09-30 DIAGNOSIS — Z94.4 S/P LIVER TRANSPLANT (H): ICD-10-CM

## 2024-09-30 DIAGNOSIS — I10 ESSENTIAL HYPERTENSION: ICD-10-CM

## 2024-09-30 DIAGNOSIS — Z76.82 LIVER TRANSPLANT CANDIDATE: ICD-10-CM

## 2024-09-30 DIAGNOSIS — C22.0 HCC (HEPATOCELLULAR CARCINOMA) (H): ICD-10-CM

## 2024-09-30 DIAGNOSIS — E11.9 TYPE 2 DIABETES MELLITUS WITHOUT COMPLICATION, WITHOUT LONG-TERM CURRENT USE OF INSULIN (H): ICD-10-CM

## 2024-09-30 DIAGNOSIS — C7A.8 PRIMARY MALIGNANT NEUROENDOCRINE TUMOR OF LUNG (H): Primary | ICD-10-CM

## 2024-09-30 DIAGNOSIS — Z48.298 CARE AFTER ORGAN TRANSPLANT: ICD-10-CM

## 2024-09-30 DIAGNOSIS — K21.9 GASTROESOPHAGEAL REFLUX DISEASE, UNSPECIFIED WHETHER ESOPHAGITIS PRESENT: ICD-10-CM

## 2024-09-30 DIAGNOSIS — Z78.9 TAKES DIETARY SUPPLEMENTS: ICD-10-CM

## 2024-09-30 DIAGNOSIS — Z94.4 S/P LIVER TRANSPLANT (H): Primary | ICD-10-CM

## 2024-09-30 PROBLEM — R79.89 ELEVATED LFTS: Status: RESOLVED | Noted: 2022-09-06 | Resolved: 2024-09-30

## 2024-09-30 PROBLEM — R22.2 MASS OF CHEST WALL, RIGHT: Status: RESOLVED | Noted: 2024-01-16 | Resolved: 2024-09-30

## 2024-09-30 LAB
ALBUMIN SERPL BCG-MCNC: 4.3 G/DL (ref 3.5–5.2)
ALP SERPL-CCNC: 72 U/L (ref 40–150)
ALT SERPL W P-5'-P-CCNC: 29 U/L (ref 0–70)
ANION GAP SERPL CALCULATED.3IONS-SCNC: 10 MMOL/L (ref 7–15)
AST SERPL W P-5'-P-CCNC: 18 U/L (ref 0–45)
BASOPHILS # BLD AUTO: 0 10E3/UL (ref 0–0.2)
BASOPHILS NFR BLD AUTO: 1 %
BILIRUB DIRECT SERPL-MCNC: 0.35 MG/DL (ref 0–0.3)
BILIRUB SERPL-MCNC: 1.3 MG/DL
BUN SERPL-MCNC: 19.2 MG/DL (ref 6–20)
CALCIUM SERPL-MCNC: 9.8 MG/DL (ref 8.8–10.4)
CHLORIDE SERPL-SCNC: 105 MMOL/L (ref 98–107)
CREAT SERPL-MCNC: 0.86 MG/DL (ref 0.67–1.17)
EGFRCR SERPLBLD CKD-EPI 2021: >90 ML/MIN/1.73M2
EOSINOPHIL # BLD AUTO: 0 10E3/UL (ref 0–0.7)
EOSINOPHIL NFR BLD AUTO: 1 %
ERYTHROCYTE [DISTWIDTH] IN BLOOD BY AUTOMATED COUNT: 15.1 % (ref 10–15)
GLUCOSE SERPL-MCNC: 138 MG/DL (ref 70–99)
HCO3 SERPL-SCNC: 26 MMOL/L (ref 22–29)
HCT VFR BLD AUTO: 41.6 % (ref 40–53)
HGB BLD-MCNC: 14.2 G/DL (ref 13.3–17.7)
IMM GRANULOCYTES # BLD: 0 10E3/UL
IMM GRANULOCYTES NFR BLD: 2 %
LYMPHOCYTES # BLD AUTO: 1.2 10E3/UL (ref 0.8–5.3)
LYMPHOCYTES NFR BLD AUTO: 43 %
MAGNESIUM SERPL-MCNC: 1.7 MG/DL (ref 1.7–2.3)
MCH RBC QN AUTO: 28.3 PG (ref 26.5–33)
MCHC RBC AUTO-ENTMCNC: 34.1 G/DL (ref 31.5–36.5)
MCV RBC AUTO: 83 FL (ref 78–100)
MONOCYTES # BLD AUTO: 0.4 10E3/UL (ref 0–1.3)
MONOCYTES NFR BLD AUTO: 14 %
NEUTROPHILS # BLD AUTO: 1 10E3/UL (ref 1.6–8.3)
NEUTROPHILS NFR BLD AUTO: 39 %
NRBC # BLD AUTO: 0 10E3/UL
NRBC BLD AUTO-RTO: 0 /100
PHOSPHATE SERPL-MCNC: 2.9 MG/DL (ref 2.5–4.5)
PLATELET # BLD AUTO: 101 10E3/UL (ref 150–450)
POTASSIUM SERPL-SCNC: 4 MMOL/L (ref 3.4–5.3)
PROT SERPL-MCNC: 6.5 G/DL (ref 6.4–8.3)
RBC # BLD AUTO: 5.02 10E6/UL (ref 4.4–5.9)
SODIUM SERPL-SCNC: 141 MMOL/L (ref 135–145)
WBC # BLD AUTO: 2.7 10E3/UL (ref 4–11)
WBC # BLD AUTO: 2.7 10E3/UL (ref 4–11)

## 2024-09-30 PROCEDURE — 99215 OFFICE O/P EST HI 40 MIN: CPT | Performed by: INTERNAL MEDICINE

## 2024-09-30 PROCEDURE — 99207 PR NO CHARGE LOS: CPT | Mod: 93 | Performed by: PHARMACIST

## 2024-09-30 PROCEDURE — 36415 COLL VENOUS BLD VENIPUNCTURE: CPT | Performed by: PATHOLOGY

## 2024-09-30 PROCEDURE — 82248 BILIRUBIN DIRECT: CPT | Performed by: PATHOLOGY

## 2024-09-30 PROCEDURE — 80053 COMPREHEN METABOLIC PANEL: CPT | Performed by: PATHOLOGY

## 2024-09-30 PROCEDURE — G2211 COMPLEX E/M VISIT ADD ON: HCPCS | Performed by: INTERNAL MEDICINE

## 2024-09-30 PROCEDURE — 85025 COMPLETE CBC W/AUTO DIFF WBC: CPT | Performed by: PATHOLOGY

## 2024-09-30 PROCEDURE — 99213 OFFICE O/P EST LOW 20 MIN: CPT | Performed by: INTERNAL MEDICINE

## 2024-09-30 PROCEDURE — 74019 RADEX ABDOMEN 2 VIEWS: CPT | Performed by: RADIOLOGY

## 2024-09-30 PROCEDURE — 83735 ASSAY OF MAGNESIUM: CPT | Performed by: PATHOLOGY

## 2024-09-30 PROCEDURE — 84100 ASSAY OF PHOSPHORUS: CPT | Performed by: PATHOLOGY

## 2024-09-30 RX ORDER — PANTOPRAZOLE SODIUM 20 MG/1
20 TABLET, DELAYED RELEASE ORAL DAILY
Qty: 90 TABLET | Refills: 3 | Status: SHIPPED | OUTPATIENT
Start: 2024-09-30

## 2024-09-30 RX ORDER — MYCOPHENOLATE MOFETIL 250 MG/1
500 CAPSULE ORAL 2 TIMES DAILY
Qty: 120 CAPSULE | Refills: 0 | Status: SHIPPED | OUTPATIENT
Start: 2024-09-30

## 2024-09-30 ASSESSMENT — PAIN SCALES - GENERAL: PAINLEVEL: NO PAIN (0)

## 2024-09-30 NOTE — PATIENT INSTRUCTIONS
"Recommendations from today's MTM visit:                                                      Reduce Pantoprazole to 20mg daily x 1 month. THen if you are off Mycophenolate Mofetil and Prednisone can stop if tolerated.   Talk to PCP about transitioning to oral medications or once weekly injections.   When you decrease prednisone, drop your mealtime by 2 units. If you have lows during the day drop again.   In 2025 due for Prevnar 20.     Follow-up: as needed     It was great speaking with you today.  I value your experience and would be very thankful for your time in providing feedback in our clinic survey. In the next few days, you may receive an email or text message from [employer] with a link to a survey related to your  clinical pharmacist.\"     To schedule another MTM appointment, please call the clinic directly or you may call the MTM scheduling line at [phone] or toll-free at [phone].     My Clinical Pharmacist's contact information:                                                      Please feel free to contact me with any questions or concerns you have.      Zafar Arroyo, PharmD  MTM Pharmacist    Phone: [phone]     "

## 2024-09-30 NOTE — LETTER
OUTPATIENT RADIOLOGY TEST ORDER   David Thompson   [phone]    Patient Name: West Van   YOB: 1975     [facility] MR# [if applicable]: [id number]   Date & Time: September 30, 2024  3:17 PM  Expiration Date: 1 year after date issued       Diagnosis: Liver Transplant (ICD-10 Z94.4)   Aftercare following organ transplant (ICD-10 Z48.288)   Long term use of medications (ICD-10 Z79.899)   Hepatocellular Carcinoma (ICD-10 C22.0)     We ask your assistance in obtaining the following laboratory tests, which are part of our routine surveillance program for Solid Organ Transplant patients.     Please fax each result to [phone], same day as resulted/available    Critical results page [phone]    Please perform the following imaging tests for HCC surveillance in December 2024 and June 2025:     MRI liver with and without contrast   CT chest without contrast    If you have any questions, please call [facility]- [phone] or [phone], Fax- [phone].      Thomas M. Leventhal, M.D.   of Medicine  Advanced & Transplant Hepatology  [facility]

## 2024-09-30 NOTE — PROGRESS NOTES
Disease State Management Encounter:                          West Van is a 48 year old male called for a follow-up visit.      Reason for visit: 3 months post.    Allergies/ADRs: Reviewed in chart  Past Medical History: Reviewed in chart  Tobacco: He reports that he has never smoked. He has been exposed to tobacco smoke. He has never used smokeless tobacco.  Alcohol: not currently using     Medication Adherence/Access: no issues reported    Liver Transplant:    Tacrolimus 3 mg twice daily  Mycophenolate Mofetil 750 mg twice daily  Prednisone 5 mg every morning. Pt will start tapering pred soon.   Pt reports tremors off an on.   Transplant date: 24  Vascular Prophylaxis: Aspirin 81mg daily. Denies gastrointestinal bleed sx.   Other meds: Ursodiol 300mg twice daily.   CMV prophylaxis: CrCl >40 mL/minute: Valcyte 450 mg once daily Donor (+), Recipient (-), treat 6 months post tx   PJP prophylaxis: Bactrim SS daily for 6 months post txp  PPI use: Pantoprazole 40mg daily. Denies GERD sx.   Tx Coordinator: Dean Figueroa MD: Dr. Leventhal, Dr. Dodson, Using Med Card: Yes  Immunizations: annual flu shot ; [id number]:  ; Prevnar 20: unknown; TDaP:  2017; Shingrix: x2, HBV: immunity per last titers, COVID: Primary x 3    Estimated Creatinine Clearance: 136.4 mL/min (based on SCr of 0.86 mg/dL).     Supplements:   Mag Oxide 800mg twice daily ( 2 hours from MMF) .  Lab Results   Component Value Date    MAG 1.7 2024     Diabetes   Lantus 28 units daily.  Novolog 12 units 3 times daily sliding scale with meals. Has been skipping morning dose as sugars haven't been over 140.  Steroids: Prednisone 5mg every morning  Patient is not experiencing side effects.  Current diabetes symptoms:  denies sx  Blood sugar monitorin time(s) daily; Ranges: (patient reported)    Date FBG/ 2hours post Lunch/2hours post Dinner /2hours post    130 160     136 151 107    118 181     114 115  180   9/26 124 150 162     Date FBG/ 2hours post Lunch/2hours post Dinner /2hours post   8/28 129 212    8/27 120 162 226   8/26 145 223 165   8/25 143 149 280   8/24 127 159 304   8/23 120 162 226       Hypertension   Carvedilol 3.125mg twice daily  Patient reports no current medication side effects  Patient self monitors blood pressure.  Home BP monitoring 120//90s .       Today's Vitals: There were no vitals taken for this visit.    Assessment/Plan:    Reduce Pantoprazole to 20mg daily x 1 month. THen if you are off Mycophenolate Mofetil and Prednisone can stop if tolerated.   Talk to PCP about transitioning to oral medications or once weekly injections.   When you decrease prednisone, drop your mealtime by 2 units. If you have lows during the day drop again.   In 2025 due for Prevnar 20.     Follow-up: as needed    I spent 15 minutes with this patient today. All changes were made via collaborative practice agreement with Dr. Leventhal. A copy of the visit note was provided to the patient's provider(s).    A summary of these recommendations was sent via [employer].    Zafar Arroyo, PharmD  MTM Pharmacist    Phone: [phone]      Medication Therapy Recommendations  Gastroesophageal reflux disease, unspecified whether esophagitis present    Current Medication: pantoprazole (PROTONIX) 40 MG EC tablet   Rationale: No medical indication at this time - Unnecessary medication therapy - Indication   Recommendation: Discontinue Medication   Status: Accepted per CPA         S/P liver transplant (H)    Rationale: Preventive therapy - Needs additional medication therapy - Indication   Recommendation: Start Medication - Prevnar 20 0.5 ML Cora   Status: Accepted - no CPA Needed         Type 2 diabetes mellitus without complication, without long-term current use of insulin (H)    Current Medication: insulin aspart (NOVOLOG PEN) 100 UNIT/ML pen   Rationale: Dose too high - Dosage too high - Safety   Recommendation:  Decrease Dose   Status: Accepted per CPA

## 2024-09-30 NOTE — PROGRESS NOTES
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    Had a relatively uneventful transplant course, with short hospitalization.  He underwent a  donor liver transplant from a DBD donor via the piggyback technique with biliary anastomosis over a stent.    Notes one of the biggest issues that he has had in the posttransplant setting is dealing with worsening anxiety and depression.  He is maintain close relationship with a psychotherapist and he continues to be active.  He had many issues in the pretransplant setting including caregiver support as well as work that he feels \"let him down\" and has complicated his posttransplant course.    Notes very minimal aches and pains in the posttransplant setting.  He continues to remain physically active, does consider getting his dog back from friends that are watching it to help and normalization of his life.    He is currently on triple drug immunosuppression with tacrolimus, MMF, and prednisone.  His tacrolimus goal trough level is 8-12, consistently running at 9 or higher for the last month      Surgical Course  - OLT date: 2024  - etiology: MASLD and HCC  - donor: type DBD  - CMV status D+/R-  - operation: Surgical technique piggyback, biliary anastomosis: duct to duct over a stent  - CiT 302, WiT 49  - EBL: 500mL  - Episodes of Rejection: none  - Biliary complications: none  - Other complications of immediate post-operative course: none    Past " Medical History:  Past Medical History:   Diagnosis Date    Benign essential HTN 2016    Diabetes (H)     Kidney stone     Liver cancer (H) 10/04/2022    Liver cirrhosis secondary to nonalcoholic steatohepatitis (RIOS) (H)     RIOS (nonalcoholic steatohepatitis) 2023    Neuroendocrine carcinoma of lung (H)     right lung, lobectomy 3/2023    PONV (postoperative nausea and vomiting)        Past Surgical History:  Past Surgical History:   Procedure Laterality Date    BRONCHOSCOPY, WITH BIOPSY, ROBOT ASSISTED N/A 2023    Procedure: BRONCHOSCOPY, USING OPTICAL TRACKING SYSTEM, ion;  Surgeon: Ani Guillaume MD;  Location: [facility] OR    DAVINCI LOBECTOMY LUNG Right 3/8/2023    Procedure: Robot-assisted right thoracoscopic lower lobectomy, mediastinal lymph node dissection, intercostal nerve cryoablation ;  Surgeon: Matheus Haas MD;  Location: [facility] OR    ENDOBRONCHIAL ULTRASOUND FLEXIBLE N/A 2023    Procedure: endobronchial  ultrasound and transbronchial biopsies;  Surgeon: Ani Guillaume MD;  Location: [facility] OR    EXCISE TUMOR CHEST WALL Right 2024    Procedure: Excision chest wall mass;  Surgeon: Matheus Haas MD;  Location: [facility] OR    LAPAROSCOPIC ABLATION LIVER TUMOR N/A 2022    Procedure: Diagnostic Laparoscopy, Hepatic Ultrasound, Laparoscopic ultrasound guided microwave ablation of liver tumor x1;  Surgeon: Armando Munguia MD;  Location: [facility] OR    LAPAROSCOPIC BIOPSY LIVER N/A 2022    Procedure: Laparoscopic Ultrasound Guided liver biopsy;  Surgeon: Armando Munguia MD;  Location: [facility]    LAPAROSCOPIC CHOLECYSTECTOMY N/A 2022    Procedure: Laparoscopic cholecystectomy;  Surgeon: Armando Munguia MD;  Location: [facility]    TRANSPLANT LIVER RECIPIENT  DONOR N/A 2024    Procedure: Transplant liver recipient  donor with bile duct stent placement;  Surgeon: Radu Dodson MD;  Location: [facility] OR       Past Social History:  Social History     Tobacco Use     Smoking status: Never     Passive exposure: Past    Smokeless tobacco: Never   Vaping Use    Vaping status: Never Used   Substance Use Topics    Alcohol use: Not Currently     Comment: Last ETOH 1999    Drug use: Never        Family History:  Family History   Problem Relation Age of Onset    Breast Cancer Mother     Cancer Father         lip    Melanoma Cousin     Cirrhosis No family hx of        Review of Systems  A complete 10 point review of systems was asked and answered in the negative unless specifically commented upon in the HPI    Current Outpatient Medications   Medication Sig Dispense Refill    acetaminophen (TYLENOL) 325 MG tablet Take 2 tablets (650 mg) by mouth every 6 hours as needed for mild pain      Alcohol Swabs PADS Use to swab the area of the injection or nay as directed Per insurance coverage 200 each 1    aspirin (ASA) 81 MG chewable tablet Take 1 tablet (81 mg) by mouth or Feeding Tube daily 90 tablet 1    blood glucose (NO BRAND SPECIFIED) lancets standard To use to test glucose level in the blood Use to test blood sugar 4  times daily as directed. To accompany glucose monitor brands per insurance coverage. 200 each 1    blood glucose (NO BRAND SPECIFIED) test strip To use to test glucose level in the blood Use to test blood sugar 4 times daily as directed. To accompany glucose monitor brands per insurance coverage. 100 strip 3    blood glucose monitoring (NO BRAND SPECIFIED) meter device kit Use as directed Per insurance coverage 1 kit 0    carvedilol (COREG) 3.125 MG tablet Take 1 tablet (3.125 mg) by mouth 2 times daily (with meals) 60 tablet 3    fluticasone (FLONASE) 50 MCG/ACT nasal spray Spray 1 spray into both nostrils daily as needed for allergies or rhinitis      insulin aspart (NOVOLOG PEN) 100 UNIT/ML pen Inject 1-7 Units Subcutaneous at bedtime HIGH INSULIN RESISTANCE DOSING  Do Not give Bedtime Correction Insulin if BG less than 200. For  - 224 give 1 units. For  -  249 give 2 units. For  - 274 give 3 units. For  - 299 give 4 units. For  - 324 give 5 units. For  - 349 give 6 units. For BG greater than or equal to 350 give 7 units. Notify provider if glucose greater than or equal to 350 mg/dL after administration of correction dose. 15 mL 1    insulin aspart (NOVOLOG PEN) 100 UNIT/ML pen Inject 6 Units Subcutaneous 3 times daily (with meals) 3 mL 1    insulin aspart (NOVOLOG PEN) 100 UNIT/ML pen Inject 1-10 Units Subcutaneous 3 times daily (before meals) Correction Scale - MEDIUM INSULIN RESISTANCE DOSING   Do Not give Correction Insulin if BG < 140. For  - 189 give 1 unit. For  - 239 give 2 units. For  - 289 give 3 units. For  - 339 give 4 units. For BG = or > 340 give 5 units. Check blood glucose before meals/bolus feedings (or q4h if NPO) and administer based on blood glucose. Notify MD if glucose > or = 350 mg/dL after administration of correction dose. 15 mL 1    insulin glargine (LANTUS PEN) 100 UNIT/ML pen Inject 25 Units Subcutaneous daily 15 mL 1    insulin pen needle (32G X 4 MM) 32G X 4 MM miscellaneous Use as directed by provider Per insurance coverage 200 each 1    magnesium oxide (MAG-OX) 400 MG tablet Take 2 tablets (800 mg) by mouth 2 times daily 360 tablet 1    mycophenolate (GENERIC EQUIVALENT) 250 MG capsule Take 3 capsules (750 mg) by mouth 2 times daily 540 capsule 1    pantoprazole (PROTONIX) 40 MG EC tablet Take 1 tablet (40 mg) by mouth daily 90 tablet 1    predniSONE (DELTASONE) 5 MG tablet Take 1 tablet (5 mg) by mouth daily 90 tablet 1    Sharps Container MISC Use as directed to dispose of needles, lancets and other sharps 1 each 1    sulfamethoxazole-trimethoprim (BACTRIM) 400-80 MG tablet Take 1 tablet by mouth daily 90 tablet 1    tacrolimus (GENERIC EQUIVALENT) 0.5 MG capsule Tacrolimus 0.5 mg capsules BID to allow for dose adjustments. (Patient not taking: Reported on 9/30/2024) 60 capsule 11     tacrolimus (GENERIC EQUIVALENT) 1 MG capsule Take 3 capsules (3 mg) by mouth every 12 hours 540 capsule 3    ursodiol (ACTIGALL) 300 MG capsule Take 1 capsule (300 mg) by mouth 2 times daily 60 capsule 3    valGANciclovir (VALCYTE) 450 MG tablet Take 1 tablet (450 mg) by mouth daily 90 tablet 1    pantoprazole (PROTONIX) 20 MG EC tablet Take 1 tablet (20 mg) by mouth daily. Can stop after 30 days if you are off Mycophenolate Mofetil and prednisone. 90 tablet 3     No current facility-administered medications for this visit.       Objective:         Vitals:    09/30/24 1047   BP: (!) 158/93   Pulse: 82   Temp: 98  F (36.7  C)   TempSrc: Oral   SpO2: 97%   Weight: 91.8 kg (202 lb 6.4 oz)     Body mass index is 25.99 kg/m .     Physical Exam    Vitals reviewed.   Constitutional: Well-developed, well-nourished, in no apparent distress.    HEENT: Normocephalic. no scleral icterus. Moist oral mucosa. Dentition normal  Neck/Lymph: Normal ROM, supple.  Cardiac:  Regular rate  Respiratory: Normal respiratory excursion   GI:  Abdomen soft, non-distended, non-tender. BS present. Bilateral subcostal incision - well healed. no hernia  Skin:  Skin is warm and dry. No rash noted.  Peripheral Vascular: no lower extremity edema. 2+ pulses in all extremities  Musculoskeletal:  ROM intact, decreased muscle bulk    Psychiatric: Anxious  Neuro: no tremor, A&Ox3    Labs and Diagnostic tests:  CBC w/Diff    Lab Results   Component Value Date/Time    WBC 2.7 (L) 09/30/2024 09:30 AM    WBC 2.7 (L) 09/30/2024 09:30 AM    RBC 5.02 09/30/2024 09:30 AM    HGB 14.2 09/30/2024 09:30 AM    HCT 41.6 09/30/2024 09:30 AM    MCV 83 09/30/2024 09:30 AM    MCH 28.3 09/30/2024 09:30 AM    MCHC 34.1 09/30/2024 09:30 AM    RDW 15.1 (H) 09/30/2024 09:30 AM         Comprehensive Metabolic Profile    Lab Results   Component Value Date/Time     09/30/2024 09:30 AM    CO2 26 09/30/2024 09:30 AM    CO2 29 05/11/2023 08:44 AM    BUN 19.2 09/30/2024 09:30 AM     BUN 12 2023 08:44 AM    CR 0.86 2024 09:30 AM    Lab Results   Component Value Date/Time    ALBUMIN 4.3 2024 09:30 AM    ALBUMIN 3.9 2023 08:44 AM    ALKPHOS 72 2024 09:30 AM    AST 18 2024 09:30 AM    ALT 29 2024 09:30 AM        Pathology:  A. LIVER (1064.9 gm), EXPLANTED AT TRANSPLANTATION:  Neoadjuvant treated hepatocellular carcinoma, no residual or recurrent tumor identified  - See gross description for ablation site size  - No evidence to suggest pre-treatment vascular invasion or extrahepatic extension  Advanced background chronic liver disease (clinically RIOS) with nodule formation (cirrhosis), Laennec fibrosis stage 4b, now inactive/quiescent  - Iron stain show 2+ iron deposition on a scale of 0-4  - PAS/PAS-D stains show no abnormal cytoplasmic accumulations      Imaging:  [id number] 2024  Findings:   AP view of the abdomen. Surgical clips in the right upper abdomen.  Biliary stent is identified in the left mid abdomen. Non-obstructive  bowel gas pattern. Mild colonic stool burden. No evidence of ectopic  air. Imaged lung bases are normal. No acute osseous abnormality.    Colonoscopy: 2023  Impression:       - Non-bleeding internal hemorrhoids.        - Two small polyps in the proximal rectum and at the recto-sigmoid        colon, removed with a jumbo cold forceps. Resected and retrieved.        - The cecum is normal. Biopsied.        - The examination was otherwise normal on direct and retroflexion views.        - The examined portion of the ileum was normal. Biopsied.     Assessment and Plan:    S/P Liver Transplantation:   -Has a history of cirrhosis related to MASLD that was complicated by presence of hepatocellular carcinoma  -On a  donor liver transplant on  from a DBD donor  -Biliary anastomosis was a Duct to Duct over stent: Abdominal x-ray from  demonstrated stent is no longer in the biliary tree and is in the colon  -We  will continue to follow his labs per protocol; can decrease to weekly or every other week at this time    Immunosuppression:   -Currently on a regimen of tacrolimus, MMF, and prednisone  -Given is 3 months out we will plan to wean his MMF off first given his mild neutropenia  -Once his liver's tests are stable on this regimen would ensure that his tacrolimus trough level is at goal at 6-8  - Then would wean prednisone off  - Will plan to for intensive monitoring of immunosuppression trough levels per protocol to assess for adequate target dosing and will assess CBC and kidney function for toxicity of medications    Infectious Prophylaxis:   - PJP and CMV prophylaxis per protocol    History of HCC:  - Has a history of HCC that was treated with ablation  - Explant without evidence of recurrent disease  - Will plan on MRI w/wo liver and CT non-con chest in December 2024 and June 2025    Kidney Health:   - no acute issues at this time    Colon Cancer Screening:  - Surveillance due in 1437-6323    Carcinoid Tumor of the Lung:  - Low grade neuroendocrine tumor of the right lung  - Nodes negative for mets    Nutrition/Weight:    It is very common for patients to put on significant weight after liver transplantation, as they become conditioned to eating as much as possible to maintain a healthy weight pre-transplant.  There is a very significant risk of developing fatty liver and non-alcoholic steatohepatitis in post-transplant patients, even in those who were not transplanted for RIOS cirrhosis.  - Please work on eating a diet that is rice in fresh fruits and vegetables, moderate amounts of lean protein and dairy, and modest amounts of carbohydrates and fats.  - An exercise plan/regimen is an essential part of remaining healthy in the post-transplant setting and we recommend 30-35 minutes of exercise at least 4 days a week    Routine Health Care:  - You need to establish and maintain care with a primary care physician to  manage: hypertension, high cholesterol, abnormal blood sugars - as these are all potential complications of your health after liver transplantation and will need a local physician to manage these issues.  - All patients with liver diseases (even post transplant) are at an increased risk for osteoporosis.  We strongly recommend screening for Vitamin D deficiency at least twice yearly with aggressive supplementation/replacement as indicated.    - We recommend a screening DEXA scan to evaluate for osteoporosis.  If present, should treat with calcium, Vitamin D supplementation, and recommend consideration of bisphosphonate therapy.  Also recommend follow up DEXA scans to evaluate for improvement of bone density on therapy.  - Recommend yearly skin exams with dermatology, and if skin cancers are found, recommend at least twice yearly exams  - Recommend post transplant patients stay up to date for cancer screening: mammograms/PAP for women and prostate cancer screening for men, and colon cancer screening for all.  - Practice good hygiene with washing of hands and maintaining a clean living space as this will decrease your chances of developing an infection in the post-transplantation setting  - Eat a health diet: rich in fresh fruits and vegetables, lean proteins, and minimize carbohydrate and fat intake.  - Remain physically active: this is key to keeping the liver transplant healthy and patient's feeling strong and healthy       Thank you very much for the opportunity to participate in the care of this patient.  If you have any further questions, please don't hesitate to contact our office.    __________________________________  Thomas M. Leventhal, M.D.  Associate Professor of Medicine  Advanced & Transplant Hepatology  [facility]       The longitudinal plan of care for liver transplantation and immunosuppression management (and possible complications) was addressed during this visit. Due to  the added complexity in care, I will continue to support this patient in the subsequent management of this condition(s) and with the ongoing continuity of care of this condition     Approximately 35 minutes of non face-to-face time were spent in review of the patient's medical record on 9/29/2024.  This included review of previous: clinic visits, hospital records, lab results, imaging studies, and procedural documentation.  The findings from this review are summarized in the above note.

## 2024-09-30 NOTE — Clinical Note
-Given is 3 months out we will plan to wean his MMF off first given his mild neutropenia -Once his liver's tests are stable on this regimen would ensure that his tacrolimus trough level is at goal at 6-8 - Then would wean prednisone off

## 2024-09-30 NOTE — Clinical Note
-Given is 3 months out we will plan to wean his MMF off first given his mild neutropenia -Once his liver's tests are stable on this regimen would ensure that his tacrolimus trough level is at goal at 6-8 -, Then would wean prednisone off

## 2024-09-30 NOTE — PROGRESS NOTES
"Met with West in clinic with Dr. Leventhal. Plan for 90 day mmf/pred wean per Dr. Leventhal as follows:    \"-Given is 3 months out we will plan to wean his MMF off first given his mild neutropenia   -Once his liver's tests are stable on this regimen would ensure that his tacrolimus trough level is at goal at 6-8   - Then would wean prednisone off\"    In clinic had discussed weaning off prednisone first. West requested a [employer] message be sent with plan, message with above plan sent.     In regards to HCC surveillance, Dr. Leventhal would like every 6 month:     \"MRI w/wo liver and CT non-con chest\" beginning 12/2024.     Ok to having imaging done close to home. Radiology orders faxed.   "

## 2024-09-30 NOTE — LETTER
"2024      West Van  [address]  [facility] [address]      Dear Colleague,    Thank you for referring your patient, West Van, to the [facility] [place]. Please see a copy of my visit note below.    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    Had a relatively uneventful transplant course, with short hospitalization.  He underwent a  donor liver transplant from a DBD donor via the piggyback technique with biliary anastomosis over a stent.    Notes one of the biggest issues that he has had in the posttransplant setting is dealing with worsening anxiety and depression.  He is maintain close relationship with a psychotherapist and he continues to be active.  He had many issues in the pretransplant setting including caregiver support as well as work that he feels \"let him down\" and has complicated his posttransplant course.    Notes very minimal aches and pains in the posttransplant setting.  He continues to remain physically active, does consider getting his dog back from friends that are watching it to help and normalization of his life.    He is currently on triple drug immunosuppression with tacrolimus, MMF, and prednisone.  His tacrolimus goal trough level is 8-12, consistently running at 9 or higher for the last month      Surgical Course  - OLT date: 2024  - etiology: MASLD and HCC  - donor: type DBD  - CMV status D+/R-  - operation: " Surgical technique piggyback, biliary anastomosis: duct to duct over a stent  - CiT 302, WiT 49  - EBL: 500mL  - Episodes of Rejection: none  - Biliary complications: none  - Other complications of immediate post-operative course: none    Past Medical History:  Past Medical History:   Diagnosis Date     Benign essential HTN 04/13/2016     Diabetes (H)      Kidney stone      Liver cancer (H) 10/04/2022     Liver cirrhosis secondary to nonalcoholic steatohepatitis (RIOS) (H)      RIOS (nonalcoholic steatohepatitis) 06/02/2023     Neuroendocrine carcinoma of lung (H)     right lung, lobectomy 3/2023     PONV (postoperative nausea and vomiting)        Past Surgical History:  Past Surgical History:   Procedure Laterality Date     BRONCHOSCOPY, WITH BIOPSY, ROBOT ASSISTED N/A 1/17/2023    Procedure: BRONCHOSCOPY, USING OPTICAL TRACKING SYSTEM, ion;  Surgeon: Ani Guillaume MD;  Location: [facility] OR     DAVINCI LOBECTOMY LUNG Right 3/8/2023    Procedure: Robot-assisted right thoracoscopic lower lobectomy, mediastinal lymph node dissection, intercostal nerve cryoablation ;  Surgeon: Matheus Haas MD;  Location: [facility] OR     ENDOBRONCHIAL ULTRASOUND FLEXIBLE N/A 1/17/2023    Procedure: endobronchial  ultrasound and transbronchial biopsies;  Surgeon: Ani Guillaume MD;  Location: [facility] OR     EXCISE TUMOR CHEST WALL Right 2/1/2024    Procedure: Excision chest wall mass;  Surgeon: Matheus Haas MD;  Location: [facility] OR     LAPAROSCOPIC ABLATION LIVER TUMOR N/A 12/6/2022    Procedure: Diagnostic Laparoscopy, Hepatic Ultrasound, Laparoscopic ultrasound guided microwave ablation of liver tumor x1;  Surgeon: Armando Munguia MD;  Location: [facility] OR     LAPAROSCOPIC BIOPSY LIVER N/A 12/6/2022    Procedure: Laparoscopic Ultrasound Guided liver biopsy;  Surgeon: Armando Munguia MD;  Location: [facility]     LAPAROSCOPIC CHOLECYSTECTOMY N/A 12/6/2022    Procedure: Laparoscopic cholecystectomy;  Surgeon: Armando Munguia MD;  Location: [facility] OR      TRANSPLANT LIVER RECIPIENT  DONOR N/A 2024    Procedure: Transplant liver recipient  donor with bile duct stent placement;  Surgeon: Radu Dodson MD;  Location: [facility] OR       Past Social History:  Social History     Tobacco Use     Smoking status: Never     Passive exposure: Past     Smokeless tobacco: Never   Vaping Use     Vaping status: Never Used   Substance Use Topics     Alcohol use: Not Currently     Comment: Last ETOH      Drug use: Never        Family History:  Family History   Problem Relation Age of Onset     Breast Cancer Mother      Cancer Father         lip     Melanoma Cousin      Cirrhosis No family hx of        Review of Systems  A complete 10 point review of systems was asked and answered in the negative unless specifically commented upon in the HPI    Current Outpatient Medications   Medication Sig Dispense Refill     acetaminophen (TYLENOL) 325 MG tablet Take 2 tablets (650 mg) by mouth every 6 hours as needed for mild pain       Alcohol Swabs PADS Use to swab the area of the injection or nay as directed Per insurance coverage 200 each 1     aspirin (ASA) 81 MG chewable tablet Take 1 tablet (81 mg) by mouth or Feeding Tube daily 90 tablet 1     blood glucose (NO BRAND SPECIFIED) lancets standard To use to test glucose level in the blood Use to test blood sugar 4  times daily as directed. To accompany glucose monitor brands per insurance coverage. 200 each 1     blood glucose (NO BRAND SPECIFIED) test strip To use to test glucose level in the blood Use to test blood sugar 4 times daily as directed. To accompany glucose monitor brands per insurance coverage. 100 strip 3     blood glucose monitoring (NO BRAND SPECIFIED) meter device kit Use as directed Per insurance coverage 1 kit 0     carvedilol (COREG) 3.125 MG tablet Take 1 tablet (3.125 mg) by mouth 2 times daily (with meals) 60 tablet 3     fluticasone (FLONASE) 50 MCG/ACT nasal spray Spray 1 spray into both  nostrils daily as needed for allergies or rhinitis       insulin aspart (NOVOLOG PEN) 100 UNIT/ML pen Inject 1-7 Units Subcutaneous at bedtime HIGH INSULIN RESISTANCE DOSING  Do Not give Bedtime Correction Insulin if BG less than 200. For  - 224 give 1 units. For  - 249 give 2 units. For  - 274 give 3 units. For  - 299 give 4 units. For  - 324 give 5 units. For  - 349 give 6 units. For BG greater than or equal to 350 give 7 units. Notify provider if glucose greater than or equal to 350 mg/dL after administration of correction dose. 15 mL 1     insulin aspart (NOVOLOG PEN) 100 UNIT/ML pen Inject 6 Units Subcutaneous 3 times daily (with meals) 3 mL 1     insulin aspart (NOVOLOG PEN) 100 UNIT/ML pen Inject 1-10 Units Subcutaneous 3 times daily (before meals) Correction Scale - MEDIUM INSULIN RESISTANCE DOSING   Do Not give Correction Insulin if BG < 140. For  - 189 give 1 unit. For  - 239 give 2 units. For  - 289 give 3 units. For  - 339 give 4 units. For BG = or > 340 give 5 units. Check blood glucose before meals/bolus feedings (or q4h if NPO) and administer based on blood glucose. Notify MD if glucose > or = 350 mg/dL after administration of correction dose. 15 mL 1     insulin glargine (LANTUS PEN) 100 UNIT/ML pen Inject 25 Units Subcutaneous daily 15 mL 1     insulin pen needle (32G X 4 MM) 32G X 4 MM miscellaneous Use as directed by provider Per insurance coverage 200 each 1     magnesium oxide (MAG-OX) 400 MG tablet Take 2 tablets (800 mg) by mouth 2 times daily 360 tablet 1     mycophenolate (GENERIC EQUIVALENT) 250 MG capsule Take 3 capsules (750 mg) by mouth 2 times daily 540 capsule 1     pantoprazole (PROTONIX) 40 MG EC tablet Take 1 tablet (40 mg) by mouth daily 90 tablet 1     predniSONE (DELTASONE) 5 MG tablet Take 1 tablet (5 mg) by mouth daily 90 tablet 1     Sharps Container MISC Use as directed to dispose of needles, lancets and other sharps  1 each 1     sulfamethoxazole-trimethoprim (BACTRIM) 400-80 MG tablet Take 1 tablet by mouth daily 90 tablet 1     tacrolimus (GENERIC EQUIVALENT) 0.5 MG capsule Tacrolimus 0.5 mg capsules BID to allow for dose adjustments. (Patient not taking: Reported on 9/30/2024) 60 capsule 11     tacrolimus (GENERIC EQUIVALENT) 1 MG capsule Take 3 capsules (3 mg) by mouth every 12 hours 540 capsule 3     ursodiol (ACTIGALL) 300 MG capsule Take 1 capsule (300 mg) by mouth 2 times daily 60 capsule 3     valGANciclovir (VALCYTE) 450 MG tablet Take 1 tablet (450 mg) by mouth daily 90 tablet 1     pantoprazole (PROTONIX) 20 MG EC tablet Take 1 tablet (20 mg) by mouth daily. Can stop after 30 days if you are off Mycophenolate Mofetil and prednisone. 90 tablet 3     No current facility-administered medications for this visit.       Objective:         Vitals:    09/30/24 1047   BP: (!) 158/93   Pulse: 82   Temp: 98  F (36.7  C)   TempSrc: Oral   SpO2: 97%   Weight: 91.8 kg (202 lb 6.4 oz)     Body mass index is 25.99 kg/m .     Physical Exam    Vitals reviewed.   Constitutional: Well-developed, well-nourished, in no apparent distress.    HEENT: Normocephalic. no scleral icterus. Moist oral mucosa. Dentition normal  Neck/Lymph: Normal ROM, supple.  Cardiac:  Regular rate  Respiratory: Normal respiratory excursion   GI:  Abdomen soft, non-distended, non-tender. BS present. Bilateral subcostal incision - well healed. no hernia  Skin:  Skin is warm and dry. No rash noted.  Peripheral Vascular: no lower extremity edema. 2+ pulses in all extremities  Musculoskeletal:  ROM intact, decreased muscle bulk    Psychiatric: Anxious  Neuro: no tremor, A&Ox3    Labs and Diagnostic tests:  CBC w/Diff    Lab Results   Component Value Date/Time    WBC 2.7 (L) 09/30/2024 09:30 AM    WBC 2.7 (L) 09/30/2024 09:30 AM    RBC 5.02 09/30/2024 09:30 AM    HGB 14.2 09/30/2024 09:30 AM    HCT 41.6 09/30/2024 09:30 AM    MCV 83 09/30/2024 09:30 AM    MCH 28.3  09/30/2024 09:30 AM    MCHC 34.1 09/30/2024 09:30 AM    RDW 15.1 (H) 09/30/2024 09:30 AM         Comprehensive Metabolic Profile    Lab Results   Component Value Date/Time     09/30/2024 09:30 AM    CO2 26 09/30/2024 09:30 AM    CO2 29 05/11/2023 08:44 AM    BUN 19.2 09/30/2024 09:30 AM    BUN 12 05/11/2023 08:44 AM    CR 0.86 09/30/2024 09:30 AM    Lab Results   Component Value Date/Time    ALBUMIN 4.3 09/30/2024 09:30 AM    ALBUMIN 3.9 05/11/2023 08:44 AM    ALKPHOS 72 09/30/2024 09:30 AM    AST 18 09/30/2024 09:30 AM    ALT 29 09/30/2024 09:30 AM        Pathology:  A. LIVER (1064.9 gm), EXPLANTED AT TRANSPLANTATION:  Neoadjuvant treated hepatocellular carcinoma, no residual or recurrent tumor identified  - See gross description for ablation site size  - No evidence to suggest pre-treatment vascular invasion or extrahepatic extension  Advanced background chronic liver disease (clinically RIOS) with nodule formation (cirrhosis), Laennec fibrosis stage 4b, now inactive/quiescent  - Iron stain show 2+ iron deposition on a scale of 0-4  - PAS/PAS-D stains show no abnormal cytoplasmic accumulations      Imaging:  AXR 9/30/2024  Findings:   AP view of the abdomen. Surgical clips in the right upper abdomen.  Biliary stent is identified in the left mid abdomen. Non-obstructive  bowel gas pattern. Mild colonic stool burden. No evidence of ectopic  air. Imaged lung bases are normal. No acute osseous abnormality.    Colonoscopy: 7/12/2023  Impression:       - Non-bleeding internal hemorrhoids.        - Two small polyps in the proximal rectum and at the recto-sigmoid        colon, removed with a jumbo cold forceps. Resected and retrieved.        - The cecum is normal. Biopsied.        - The examination was otherwise normal on direct and retroflexion views.        - The examined portion of the ileum was normal. Biopsied.     Assessment and Plan:    S/P Liver Transplantation:   -Has a history of cirrhosis related to MASLD  that was complicated by presence of hepatocellular carcinoma  -On a  donor liver transplant on  from a DBD donor  -Biliary anastomosis was a Duct to Duct over stent: Abdominal x-ray from  demonstrated stent is no longer in the biliary tree and is in the colon  -We will continue to follow his labs per protocol; can decrease to weekly or every other week at this time    Immunosuppression:   -Currently on a regimen of tacrolimus, MMF, and prednisone  -Given is 3 months out we will plan to wean his MMF off first given his mild neutropenia  -Once his liver's tests are stable on this regimen would ensure that his tacrolimus trough level is at goal at 6-8  - Then would wean prednisone off  - Will plan to for intensive monitoring of immunosuppression trough levels per protocol to assess for adequate target dosing and will assess CBC and kidney function for toxicity of medications    Infectious Prophylaxis:   - PJP and CMV prophylaxis per protocol    History of HCC:  - Has a history of HCC that was treated with ablation  - Explant without evidence of recurrent disease  - Will plan on MRI w/wo liver and CT non-con chest in 2024 and 2025    Kidney Health:   - no acute issues at this time    Colon Cancer Screening:  - Surveillance due in [id number]    Carcinoid Tumor of the Lung:  - Low grade neuroendocrine tumor of the right lung  - Nodes negative for mets    Nutrition/Weight:    It is very common for patients to put on significant weight after liver transplantation, as they become conditioned to eating as much as possible to maintain a healthy weight pre-transplant.  There is a very significant risk of developing fatty liver and non-alcoholic steatohepatitis in post-transplant patients, even in those who were not transplanted for RIOS cirrhosis.  - Please work on eating a diet that is rice in fresh fruits and vegetables, moderate amounts of lean protein and dairy, and modest amounts of  carbohydrates and fats.  - An exercise plan/regimen is an essential part of remaining healthy in the post-transplant setting and we recommend 30-35 minutes of exercise at least 4 days a week    Routine Health Care:  - You need to establish and maintain care with a primary care physician to manage: hypertension, high cholesterol, abnormal blood sugars - as these are all potential complications of your health after liver transplantation and will need a local physician to manage these issues.  - All patients with liver diseases (even post transplant) are at an increased risk for osteoporosis.  We strongly recommend screening for Vitamin D deficiency at least twice yearly with aggressive supplementation/replacement as indicated.    - We recommend a screening DEXA scan to evaluate for osteoporosis.  If present, should treat with calcium, Vitamin D supplementation, and recommend consideration of bisphosphonate therapy.  Also recommend follow up DEXA scans to evaluate for improvement of bone density on therapy.  - Recommend yearly skin exams with dermatology, and if skin cancers are found, recommend at least twice yearly exams  - Recommend post transplant patients stay up to date for cancer screening: mammograms/PAP for women and prostate cancer screening for men, and colon cancer screening for all.  - Practice good hygiene with washing of hands and maintaining a clean living space as this will decrease your chances of developing an infection in the post-transplantation setting  - Eat a health diet: rich in fresh fruits and vegetables, lean proteins, and minimize carbohydrate and fat intake.  - Remain physically active: this is key to keeping the liver transplant healthy and patient's feeling strong and healthy       Thank you very much for the opportunity to participate in the care of this patient.  If you have any further questions, please don't hesitate to contact our  office.    __________________________________  Thomas M. Leventhal, M.D.  Associate Professor of Medicine  Advanced & Transplant Hepatology  [facility]       The longitudinal plan of care for liver transplantation and immunosuppression management (and possible complications) was addressed during this visit. Due to the added complexity in care, I will continue to support this patient in the subsequent management of this condition(s) and with the ongoing continuity of care of this condition     Approximately 35 minutes of non face-to-face time were spent in review of the patient's medical record on 9/29/2024.  This included review of previous: clinic visits, hospital records, lab results, imaging studies, and procedural documentation.  The findings from this review are summarized in the above note.      Again, thank you for allowing me to participate in the care of your patient.        Sincerely,        Thomas M. Leventhal, MD

## 2024-10-06 ENCOUNTER — HEALTH MAINTENANCE LETTER (OUTPATIENT)
Age: 49
End: 2024-10-06

## 2024-10-07 ENCOUNTER — RESULTS ONLY (OUTPATIENT)
Dept: LAB | Facility: CLINIC | Age: 49
End: 2024-10-07
Payer: COMMERCIAL

## 2024-10-07 LAB
% BASOPHILS (EXTERNAL): 0.4 %
% EOSINOPHILS (EXTERNAL): 1.8 %
% LYMPHOCYTES (EXTERNAL): 44.8 %
% MONOCYTES (EXTERNAL): 14.4 %
% NEUTROPHILS (EXTERNAL): 36.8 %
ABSOLUTE BASOPHILS (EXTERNAL): 0 K/UL (ref 0–0.2)
ABSOLUTE EOSINOPHILS (EXTERNAL): 0.1 K/UL (ref 0–0.7)
ABSOLUTE LYMPHOCYTES (EXTERNAL): 1.2 K/UL (ref 0.8–4.1)
ABSOLUTE MONOCYTES (EXTERNAL): 0.4 K/UL (ref 0–1)
ABSOLUTE NEUTROPHILS (EXTERNAL): 1 K/UL (ref 1.8–8)
METHOD (EXTERNAL): ABNORMAL

## 2024-10-08 LAB
ALBUMIN (EXTERNAL): 4.2 G/DL (ref 3.5–5.2)
ALK PHOSPHATASE (EXTERNAL): 67 U/L (ref 40–150)
ALT SERPL-CCNC: 41 U/L (ref 6–55)
ANION GAP SERPL CALC-SCNC: 11 MEQ/L (ref 6–20)
AST SERPL-CCNC: 24 U/L
BILIRUB SERPL-MCNC: 1.7 MG/DL (ref 0.2–1.2)
BUN SERPL-MCNC: 17 MG/DL (ref 6–22)
BUN/CREATININE RATIO (EXTERNAL): 23.3 RATIO (ref 10–250)
CALCIUM (EXTERNAL): 9.5 MG/DL (ref 8.5–10.5)
CHLORIDE (EXTERNAL): 105 MEQ/L (ref 98–109)
CO2 (EXTERNAL): 27 MEQ/L (ref 22–31)
CREATININE (EXTERNAL): 0.73 MG/DL (ref 0.73–1.18)
GFR ESTIMATED (EXTERNAL): >90 ML/MIN/1.73M2
GLUCOSE (EXTERNAL): 133 MG/DL (ref 70–99)
POTASSIUM (EXTERNAL): 3.7 MEQ/L (ref 3.5–5.1)
PROTEIN TOTAL (EXTERNAL): 6.2 G/DL (ref 6–8.3)
SODIUM (EXTERNAL): 139 MEQ/L (ref 136–145)

## 2024-10-09 DIAGNOSIS — Z76.82 LIVER TRANSPLANT CANDIDATE: ICD-10-CM

## 2024-10-09 LAB — Lab: 6.8 NG/ML (ref 0–8.8)

## 2024-10-09 RX ORDER — MYCOPHENOLATE MOFETIL 250 MG/1
250 CAPSULE ORAL 2 TIMES DAILY
Qty: 60 CAPSULE | Refills: 0 | Status: SHIPPED | OUTPATIENT
Start: 2024-10-09 | End: 2024-10-23

## 2024-10-16 NOTE — LETTER
UPDATED OUTPATIENT LABORATORY TEST ORDER   [facility]  [phone]    Patient Name: West Van   YOB: 1975     [facility] MR# [if applicable]: [id number]   Date & Time: October 16, 2024  3:16 PM  Expiration Date: 1 year after date issued       Diagnosis: Liver Transplant (ICD-10 Z94.4)   Aftercare following organ transplant (ICD-10 Z48.288)   Long term use of medications (ICD-10 Z79.899)   Contact with and (suspected) exposure to other viral communicable   diseases (Z20.828)      We ask your assistance in obtaining the following laboratory tests, which are part of our routine surveillance program for Solid Organ Transplant patients.     Please fax each result to [phone], same day as resulted/available    Critical lab results page [phone]    Months 4&5 post-transplant (10/21-11/30/24)  Labs weekly (Monday or Tuesday)  CBC with Platelets   Basic Metabolic Panel   Phosphorous  Magnesium  Hepatic panel   Tacrolimus drug level - 12-hour trough, please document time of last dose     Months 6 post-transplant (12/1-12/31/24)  Labs every other week  CBC with Platelets   Basic Metabolic Panel   Phosphorous  Magnesium  Hepatic panel   Tacrolimus drug level - 12-hour trough, please document time of last dose   Alpha Fetoprotein (AFP) if HCC Approximately 12/27          Months 7-12 post-transplant (1/1-6/27/25)  Labs monthly  CBC with Platelets   Basic Metabolic Panel   Phosphorous  Magnesium  Hepatic panel   Tacrolimus drug level - 12-hour trough, please document time of last dose   Alpha Fetoprotein (AFP) if HCC Approximately 3/27/25    12-months post-transplant Due approximately 6/27/25  Fasting Lipid Panel  Urine protein/creatinine ratio  UA with reflex to micro  Hepatitis B DNA PCR on all patients  Alpha Fetoprotein (AFP) Magnesium    If you have any questions, please call [facility]- [phone] or [phone], Fax- [phone].      Hugh RUBIO  Leventhal, M.D.   of Medicine  Advanced & Transplant Hepatology  [facility]

## 2024-10-16 NOTE — PROGRESS NOTES
Liver labs good, stop mycophenolate.     MyChart message from West, he was started on a 6 week course of levofloxacin by his PCP for prostatitis. Informed him that levofloxacin can increase tacrolimus level and that we will watch levels weekly over the course of treatment.     Per Dr. Leventhal on last visit, will start prednisone wean when liver labs stable off mycophenolate. Likely start wean in a few weeks, depending on labs, West aware.     New lab orders faxed.    To get better and follow your care plan as instructed.

## 2024-10-23 ENCOUNTER — MYC MEDICAL ADVICE (OUTPATIENT)
Dept: TRANSPLANT | Facility: CLINIC | Age: 49
End: 2024-10-23
Payer: COMMERCIAL

## 2024-10-23 DIAGNOSIS — Z94.4 S/P LIVER TRANSPLANT (H): ICD-10-CM

## 2024-10-23 RX ORDER — MAGNESIUM OXIDE 400 MG/1
800 TABLET ORAL DAILY
Qty: 180 TABLET | Refills: 3 | Status: SHIPPED | OUTPATIENT
Start: 2024-10-23

## 2024-10-24 ENCOUNTER — VIRTUAL VISIT (OUTPATIENT)
Dept: PSYCHOLOGY | Facility: CLINIC | Age: 49
End: 2024-10-24
Payer: COMMERCIAL

## 2024-10-24 DIAGNOSIS — F43.23 ADJUSTMENT DISORDER WITH MIXED ANXIETY AND DEPRESSED MOOD: Primary | ICD-10-CM

## 2024-10-24 PROCEDURE — 90834 PSYTX W PT 45 MINUTES: CPT | Mod: 95

## 2024-10-24 NOTE — PROGRESS NOTES
Health Psychology Outpatient Follow Up  [facility]     Patient: West Van   Date of Service: 10/24/24    Diagnosis:  Adjustment disorder with mixed anxiety and depressed mood (ICD-10: F43.20)     Next treatment plan due: 8/30/25    Patient Demographics (per chart):   Age: 48 year old  Biological Sex: male  Race: White  Ethnicity: Not  or     Patient Medications (per chart):  Current Outpatient Medications   Medication Sig Dispense Refill    acetaminophen (TYLENOL) 325 MG tablet Take 2 tablets (650 mg) by mouth every 6 hours as needed for mild pain      Alcohol Swabs PADS Use to swab the area of the injection or nay as directed Per insurance coverage 200 each 1    aspirin (ASA) 81 MG chewable tablet Take 1 tablet (81 mg) by mouth or Feeding Tube daily 90 tablet 1    blood glucose (NO BRAND SPECIFIED) lancets standard To use to test glucose level in the blood Use to test blood sugar 4  times daily as directed. To accompany glucose monitor brands per insurance coverage. 200 each 1    blood glucose (NO BRAND SPECIFIED) test strip To use to test glucose level in the blood Use to test blood sugar 4 times daily as directed. To accompany glucose monitor brands per insurance coverage. 100 strip 3    blood glucose monitoring (NO BRAND SPECIFIED) meter device kit Use as directed Per insurance coverage 1 kit 0    carvedilol (COREG) 3.125 MG tablet Take 1 tablet (3.125 mg) by mouth 2 times daily (with meals) 60 tablet 3    fluticasone (FLONASE) 50 MCG/ACT nasal spray Spray 1 spray into both nostrils daily as needed for allergies or rhinitis      insulin aspart (NOVOLOG PEN) 100 UNIT/ML pen Inject 1-7 Units Subcutaneous at bedtime HIGH INSULIN RESISTANCE DOSING  Do Not give Bedtime Correction Insulin if BG less than 200. For  - 224 give 1 units. For  - 249 give 2 units. For  - 274 give 3 units. For  - 299 give 4 units. For  - 324  give 5 units. For  - 349 give 6 units. For BG greater than or equal to 350 give 7 units. Notify provider if glucose greater than or equal to 350 mg/dL after administration of correction dose. 15 mL 1    insulin aspart (NOVOLOG PEN) 100 UNIT/ML pen Inject 6 Units Subcutaneous 3 times daily (with meals) 3 mL 1    insulin aspart (NOVOLOG PEN) 100 UNIT/ML pen Inject 1-10 Units Subcutaneous 3 times daily (before meals) Correction Scale - MEDIUM INSULIN RESISTANCE DOSING   Do Not give Correction Insulin if BG < 140. For  - 189 give 1 unit. For  - 239 give 2 units. For  - 289 give 3 units. For  - 339 give 4 units. For BG = or > 340 give 5 units. Check blood glucose before meals/bolus feedings (or q4h if NPO) and administer based on blood glucose. Notify MD if glucose > or = 350 mg/dL after administration of correction dose. 15 mL 1    insulin glargine (LANTUS PEN) 100 UNIT/ML pen Inject 25 Units Subcutaneous daily 15 mL 1    insulin pen needle (32G X 4 MM) 32G X 4 MM miscellaneous Use as directed by provider Per insurance coverage 200 each 1    magnesium oxide (MAG-OX) 400 MG tablet Take 2 tablets (800 mg) by mouth daily. 180 tablet 3    pantoprazole (PROTONIX) 20 MG EC tablet Take 1 tablet (20 mg) by mouth daily. Can stop after 30 days if you are off Mycophenolate Mofetil and prednisone. 90 tablet 3    predniSONE (DELTASONE) 5 MG tablet Take 1 tablet (5 mg) by mouth daily 90 tablet 1    Sharps Container MISC Use as directed to dispose of needles, lancets and other sharps 1 each 1    sulfamethoxazole-trimethoprim (BACTRIM) 400-80 MG tablet Take 1 tablet by mouth daily 90 tablet 1    tacrolimus (GENERIC EQUIVALENT) 1 MG capsule Take 3 capsules (3 mg) by mouth every 12 hours 540 capsule 3    ursodiol (ACTIGALL) 300 MG capsule Take 1 capsule (300 mg) by mouth 2 times daily 60 capsule 3    valGANciclovir (VALCYTE) 450 MG tablet Take 1 tablet (450 mg) by mouth daily 90 tablet 1     No current  "facility-administered medications for this visit.     Subjective:  Engaged pt in psychotherapy (CBT) for coping with liver transplant. Reviewed updates to physical and emotional health since previous session and progress with treatment plan goals. Patient reported that he is doing \"pretty good\" though he has experienced some highs and lows since last appointment. Engaged patient in supportive listening and cognitive processing of ongoing sxs and stressors. Pt reported that he received positive news regarding his liver transplant recovery which he reported feeling \"very happy\" about. He also reported some ongoing frustration and anger regarding some of the social and work related challenges that occurred during his pre-operation time, including his dismissal from work and feeling abandoned by several close friends. Spent time processing these situations and how pt feels each affected the way he views himself and others. Engaged pt in planning and problem solving for upcoming goals which include planning for his dog to come home to live with him as his strength returns and beginning to put together his resume for potential future employment.     Objective   Appearance:   Appropriate   Eye Contact:   Good   Psychomotor Behavior:  Normal   Attitude:   Cooperative   Orientation:   All  Speech   Rate / Production: Normal    Volume:  Normal   Mood:    Euthymic  Affect:    Appropriate   Thought Content:   Clear  Thought Form:   Coherent  Logical     Insight:    Did not formally assess at this time.   Safety:    No safety concerns at this time.     Weight (per chart):  Wt Readings from Last 4 Encounters:   09/30/24 91.8 kg (202 lb 6.4 oz)   08/06/24 89.8 kg (198 lb)   08/01/24 90.1 kg (198 lb 11.2 oz)   07/22/24 89.8 kg (198 lb)        BMI (per chart):  Estimated body mass index is 25.99 kg/m  as calculated from the following:    Height as of 8/6/24: 1.88 m (6' 2\").    Weight as of 9/30/24: 91.8 kg (202 lb 6.4 oz). "     Depression Symptoms (Patient Health Questionnaire 9; PHQ-9)  The PHQ-9 is a measure of depressive symptoms.  Scores on this measure range from 0 to 27 with higher scores reflecting greater levels and frequency of depressive symptoms. Typical symptom ranges include: 0-4 minimal, 5-9 mild, 10-14 moderate, 15-19 moderately severe, 20-27 severe.       8/14/2024    10:43 AM   PHQ-9 SCORE   PHQ-9 Total Score MyChart 6 (Mild depression)   PHQ-9 Total Score 6     Anxiety Symptoms (Generalized Anxiety Disorder 7; KAELYN-7)  The KAELYN-7 is a measure of anxiety and panic symptoms.  Scores on this measure range from 0 to 21 with higher scores reflecting greater levels and frequency of anxiety symptoms. Typical symptom ranges include: 0-4 minimal, 5-9 mild, 10-14 moderate, 15-21 severe.       8/24/2024     9:28 AM 8/27/2024     3:48 PM 9/19/2024     8:02 AM   KAELYN-7 SCORE   Total Score 7 (mild anxiety) 7 (mild anxiety) 7 (mild anxiety)   Total Score 7    7 7 7     Alcohol and Drug Abuse (CAGE - Adapted to Include Drugs; CAGE-AID)  The CAGE-AID is a screening tool to assess for symptoms of alcohol or drug abuse or dependence. Scores on this measure range from 0 to 4, with higher scores reflecting experiences consistent with problem use.  CAGE-AID score  > 1 is a positive screen, suggesting further discussion is needed to determine if evaluation for alcohol or substance abuse is appropriate.  A score > 2 is considered clinically significant, suggesting further evaluation of alcohol or substance-related problems is indicated.      8/10/2024    10:48 AM   CAGE-AID Total Score   Total Score 0   Total Score MyChart 0 (A total score of 2 or greater is considered clinically significant)     Global Health (Patient-Reported Outcomes Measurement Information System; PROMIS-10)  The PROMIS-10 is a measure of global physical and mental health. Total scores on this measure range from 8 to 40, with higher scores reflecting greater levels of  perceived health.       8/10/2024    10:48 AM   PROMIS-10 Scores Only   Global Mental Health Score 12   Global Physical Health Score 14   PROMIS TOTAL - SUBSCORES 26     Suicidality ([place] Suicide Severity Rating Scale Screener Past Month)      6/26/2024     4:00 PM   [place] Suicide Severity Rating (Short Version)   Q1 Wished to be Dead (Past Month) 0-->no   Q2 Suicidal Thoughts (Past Month) 0-->no   Q6 Suicide Behavior (Lifetime) 0-->no       Assessment:  Patient was engaged in treatment. Patient appears to be experiencing depression symptoms in the mild range and anxiety symptoms in the mild range. Currently, the patient appears to be coping with success. Progress towards treatment goals: ongoing.     Plan:  Return for therapy sessions.     Session Length:  Start Time: 10:00am  End Time: 10:45am    Modality: Telemedicine Information:  Type of service:   Telemedicine psychotherapy  Patient location:   Patient home in [place]    Patient telephone number: [phone]  Provider location:  [facility] [place] [facility] 3 Health Psychology Office  Mode of Communication:   Video Conference via [employer]   Patient consent to telemedicine services? Yes  It has been determined that telemedicine service is appropriate and effective for this patient.   The patient has been notified that: Video visits will be conducted via a call with their psychologist to provide the care they need with a video conversation. Video visits may be billed at different rates depending on insurance coverage.  Patients are advised to contact their insurance provider with any questions about their health insurance coverage. If during the course of a call the psychologist feels a video visit is not appropriate, patients will not be charged for this service.    Obdulio Patel, PhD, LP  Clinical Health Psychology Fellow    *This case is being supervised by Danielle Diaz, PhD, LP.  *This note was completed using Dragon voice recognition software. Although reviewed  after completion, some word and grammatical errors may occur.

## 2024-10-29 DIAGNOSIS — Z94.4 S/P LIVER TRANSPLANT (H): ICD-10-CM

## 2024-10-29 RX ORDER — CARVEDILOL 3.12 MG/1
3.12 TABLET ORAL 2 TIMES DAILY WITH MEALS
Qty: 60 TABLET | Refills: 3 | OUTPATIENT
Start: 2024-10-29

## 2024-10-30 DIAGNOSIS — T38.0X5A STEROID-INDUCED HYPERGLYCEMIA: ICD-10-CM

## 2024-10-30 DIAGNOSIS — R73.9 STEROID-INDUCED HYPERGLYCEMIA: ICD-10-CM

## 2024-10-30 DIAGNOSIS — C22.0 HCC (HEPATOCELLULAR CARCINOMA) (H): ICD-10-CM

## 2024-10-31 DIAGNOSIS — Z94.4 S/P LIVER TRANSPLANT (H): ICD-10-CM

## 2024-10-31 RX ORDER — CARVEDILOL 3.12 MG/1
3.12 TABLET ORAL 2 TIMES DAILY WITH MEALS
Qty: 60 TABLET | Refills: 3 | Status: CANCELLED | OUTPATIENT
Start: 2024-10-31

## 2024-11-04 NOTE — TELEPHONE ENCOUNTER
Sorry pt never updated the new provider with us so the tech never changed the provider we do have the correct provider now I am so sorry to keep bothering you with this

## 2024-11-09 ASSESSMENT — ANXIETY QUESTIONNAIRES
1. FEELING NERVOUS, ANXIOUS, OR ON EDGE: SEVERAL DAYS
6. BECOMING EASILY ANNOYED OR IRRITABLE: SEVERAL DAYS
7. FEELING AFRAID AS IF SOMETHING AWFUL MIGHT HAPPEN: SEVERAL DAYS
GAD7 TOTAL SCORE: 7
5. BEING SO RESTLESS THAT IT IS HARD TO SIT STILL: SEVERAL DAYS
8. IF YOU CHECKED OFF ANY PROBLEMS, HOW DIFFICULT HAVE THESE MADE IT FOR YOU TO DO YOUR WORK, TAKE CARE OF THINGS AT HOME, OR GET ALONG WITH OTHER PEOPLE?: NOT DIFFICULT AT ALL
GAD7 TOTAL SCORE: 7
4. TROUBLE RELAXING: SEVERAL DAYS
3. WORRYING TOO MUCH ABOUT DIFFERENT THINGS: SEVERAL DAYS
IF YOU CHECKED OFF ANY PROBLEMS ON THIS QUESTIONNAIRE, HOW DIFFICULT HAVE THESE PROBLEMS MADE IT FOR YOU TO DO YOUR WORK, TAKE CARE OF THINGS AT HOME, OR GET ALONG WITH OTHER PEOPLE: NOT DIFFICULT AT ALL
7. FEELING AFRAID AS IF SOMETHING AWFUL MIGHT HAPPEN: SEVERAL DAYS
2. NOT BEING ABLE TO STOP OR CONTROL WORRYING: SEVERAL DAYS
GAD7 TOTAL SCORE: 7

## 2024-11-13 DIAGNOSIS — C22.0 HCC (HEPATOCELLULAR CARCINOMA) (H): ICD-10-CM

## 2024-11-13 DIAGNOSIS — Z94.4 S/P LIVER TRANSPLANT (H): ICD-10-CM

## 2024-11-13 RX ORDER — PREDNISONE 5 MG/1
2.5 TABLET ORAL DAILY
Status: SHIPPED
Start: 2024-11-13

## 2024-11-13 NOTE — PROGRESS NOTES
Liver labs have looked good for a few weeks after mycophenolate weaned off and tacrolimus goal lowered. Wean prednisone to 2.5 mg daily. West escobar.

## 2024-11-14 ENCOUNTER — VIRTUAL VISIT (OUTPATIENT)
Dept: PSYCHOLOGY | Facility: CLINIC | Age: 49
End: 2024-11-14
Payer: COMMERCIAL

## 2024-11-14 DIAGNOSIS — F43.23 ADJUSTMENT DISORDER WITH MIXED ANXIETY AND DEPRESSED MOOD: Primary | ICD-10-CM

## 2024-11-14 PROCEDURE — 90834 PSYTX W PT 45 MINUTES: CPT | Mod: 95

## 2024-11-14 NOTE — PROGRESS NOTES
"Health Psychology Outpatient Follow Up  [facility]     Patient: West Van   Date of Service: 11/14/24  Treatment Plan Due: 8/30/25    Diagnosis:  Adjustment disorder with mixed anxiety and depressed mood (ICD-10: F43.20)    Patient Demographics (per chart):   Age: 48 year old  Biological Sex: male  Race: White  Ethnicity: Not  or     Subjective:  Engaged pt in psychotherapy (CBT) for coping with chronic medical illness. Reviewed updates to physical and emotional health since previous session and progress with treatment plan goals. Patient reported that the past several weeks have been \" a little more stressful\" due to number of medical appointments. Engaged patient in supportive listening and cognitive processing of ongoing sxs and stressors. Pt reported continued negative mental chatter related to his loss of friendships during his transplant recovery. Allowed pt space to process this stressors and discussed ways to cope including scheduling time to problem solve followed by engagement in cognitive defusion skills. Pt additionally reported that he is planning to have his dog come back to live with him in next 2 weeks. Pt reported both excitement and some stress about this. Engaged pt in planning and problem solving around dog related concerns (e.g., ability to walk dog adequately).     Objective   Appearance:   Appropriate   Eye Contact:   Good   Psychomotor Behavior:  Normal   Attitude:   Cooperative   Orientation:   All  Speech   Rate / Production: Normal    Volume:  Normal   Mood:    Anxious   Affect:    Appropriate   Thought Content:   Clear  Thought Form:   Coherent  Logical     Insight:    Did not formally assess at this time.   Safety:    No safety concerns at this time.     Weight (per chart):  Wt Readings from Last 4 Encounters:   09/30/24 91.8 kg (202 lb 6.4 oz)   08/06/24 89.8 kg (198 lb)   08/01/24 90.1 kg (198 lb 11.2 oz)   07/22/24 " "89.8 kg (198 lb)      BMI (per chart):  Estimated body mass index is 25.99 kg/m  as calculated from the following:    Height as of 8/6/24: 1.88 m (6' 2\").    Weight as of 9/30/24: 91.8 kg (202 lb 6.4 oz).     Depression Symptoms (Patient Health Questionnaire 9; PHQ-9)  The PHQ-9 is a measure of depressive symptoms.  Scores on this measure range from 0 to 27 with higher scores reflecting greater levels and frequency of depressive symptoms. Typical symptom ranges include: 0-4 minimal, 5-9 mild, 10-14 moderate, 15-19 moderately severe, 20-27 severe.       8/14/2024    10:43 AM   PHQ-9 SCORE   PHQ-9 Total Score MyChart 6 (Mild depression)   PHQ-9 Total Score 6     Anxiety Symptoms (Generalized Anxiety Disorder 7; KAELYN-7)  The KAELYN-7 is a measure of anxiety and panic symptoms.  Scores on this measure range from 0 to 21 with higher scores reflecting greater levels and frequency of anxiety symptoms. Typical symptom ranges include: 0-4 minimal, 5-9 mild, 10-14 moderate, 15-21 severe.       8/27/2024     3:48 PM 9/19/2024     8:02 AM 11/9/2024     9:32 AM   KAELYN-7 SCORE   Total Score 7 (mild anxiety) 7 (mild anxiety) 7 (mild anxiety)   Total Score 7 7 7        Patient-reported     Alcohol and Drug Abuse (CAGE - Adapted to Include Drugs; CAGE-AID)  The CAGE-AID is a screening tool to assess for symptoms of alcohol or drug abuse or dependence. Scores on this measure range from 0 to 4, with higher scores reflecting experiences consistent with problem use.  CAGE-AID score  > 1 is a positive screen, suggesting further discussion is needed to determine if evaluation for alcohol or substance abuse is appropriate.  A score > 2 is considered clinically significant, suggesting further evaluation of alcohol or substance-related problems is indicated.      8/10/2024    10:48 AM   CAGE-AID Total Score   Total Score 0   Total Score MyChart 0 (A total score of 2 or greater is considered clinically significant)     Global Health " (Patient-Reported Outcomes Measurement Information System; PROMIS-10)  The PROMIS-10 is a measure of global physical and mental health. Total scores on this measure range from 8 to 40, with higher scores reflecting greater levels of perceived health.       8/10/2024    10:48 AM 11/9/2024     9:33 AM   PROMIS-10 Scores Only   Global Mental Health Score 12 12    Global Physical Health Score 14 14    PROMIS TOTAL - SUBSCORES 26 26        Patient-reported     Suicidality ([place] Suicide Severity Rating Scale Screener Past Month)      6/26/2024     4:00 PM   [place] Suicide Severity Rating (Short Version)   Q1 Wished to be Dead (Past Month) 0-->no   Q2 Suicidal Thoughts (Past Month) 0-->no   Q6 Suicide Behavior (Lifetime) 0-->no     Assessment:  Patient was engaged in treatment. Patient appears to be experiencing depression symptoms in the mild range and anxiety symptoms in the mild range. Currently, the patient appears to be coping with moderate success. Progress towards treatment goals: ongoing.     Plan:  Return for therapy sessions.     Session Length:  Start Time: 1:00pm  End Time: 1:48am    Modality: Telemedicine Information:  Type of service:   Telemedicine psychotherapy  Patient location:   Patient home in [place]    Patient telephone number: [phone]  Provider location:  [facility] [place] [facility] 3 Health Psychology Office  Mode of Communication:   Video Conference [email or web address]   Patient consent to telemedicine services? Yes  It has been determined that telemedicine service is appropriate and effective for this patient.   The patient has been notified that: Video visits will be conducted via a call with their psychologist to provide the care they need with a video conversation. Video visits may be billed at different rates depending on insurance coverage.  Patients are advised to contact their insurance provider with any questions about their health insurance coverage. If during the course of a call the  psychologist feels a video visit is not appropriate, patients will not be charged for this service.    Obdulio Patel, PhD, LP  Clinical Health Psychology Fellow    *This case is being supervised by Danielle Diaz, PhD, LP.  *This note was completed using Dragon voice recognition software. Although reviewed after completion, some word and grammatical errors may occur.

## 2024-11-21 ENCOUNTER — TELEPHONE (OUTPATIENT)
Dept: TRANSPLANT | Facility: CLINIC | Age: 49
End: 2024-11-21
Payer: COMMERCIAL

## 2024-11-21 ENCOUNTER — TRANSFERRED RECORDS (OUTPATIENT)
Dept: HEALTH INFORMATION MANAGEMENT | Facility: CLINIC | Age: 49
End: 2024-11-21
Payer: COMMERCIAL

## 2024-11-21 DIAGNOSIS — C22.0 HCC (HEPATOCELLULAR CARCINOMA) (H): ICD-10-CM

## 2024-11-21 DIAGNOSIS — Z94.4 S/P LIVER TRANSPLANT (H): Primary | ICD-10-CM

## 2024-11-21 LAB — RETINOPATHY: POSITIVE

## 2024-11-21 NOTE — TELEPHONE ENCOUNTER
West wanting to follow up with Coordinator, regarding earlier conversation no other details was provided.

## 2024-11-21 NOTE — LETTER
OUTPATIENT LABORATORY TEST ORDER     Patient Name: West Van   YOB: 1975     Former[facility] MR# [if applicable]: [id number]   Date & Time: November 21, 2024  1:37 PM  Expiration Date: 1 year after date issued       Diagnosis: Liver Transplant (ICD-10 Z94.4)   Aftercare following organ transplant (ICD-10 Z48.288)   Long term use of medications (ICD-10 Z79.899)   Contact with and (suspected) exposure to other viral communicable   diseases (Z20.828)      We ask your assistance in obtaining the following laboratory tests, which are part of our routine surveillance program for Solid Organ Transplant patients.     Please fax each result to [phone], same day as resulted/available    Critical lab results page [phone]    *Please draw a CMV quantitative PCR today 11/21/24    If you have any questions, please call [facility]- [phone] or [phone], Fax- [phone].      Thomas M. Leventhal, M.D.   of Medicine  Advanced & Transplant Hepatology  [facility]

## 2024-11-21 NOTE — TELEPHONE ENCOUNTER
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     Increase valcyte to 900 mg BID. Small lesion, will start with systemic treatment and recheck retina in 1 week. Weekly CBC while on increased valcyte. Needs CMV PCR, will check weekly for now. Dr. Syed will call in prescription for valcyte.    Office number [place]- [phone] Dr. Zafar Syed, will likely see other retinal specialists too.     Updated lab orders faxed. Message sent to Dr. Leventhal and Dr. Lay.

## 2024-11-21 NOTE — LETTER
OUTPATIENT LABORATORY TEST ORDER     Patient Name: West Van   YOB: 1975     [facility] MR# [if applicable]: [id number]   Date & Time: November 21, 2024  1:11 PM  Expiration Date: 1 year after date issued       Diagnosis: Liver Transplant (ICD-10 Z94.4)   Aftercare following organ transplant (ICD-10 Z48.288)   Long term use of medications (ICD-10 Z79.899)   Contact with and (suspected) exposure to other viral communicable   diseases (Z20.828)      We ask your assistance in obtaining the following laboratory tests, which are part of our routine surveillance program for Solid Organ Transplant patients.     Please fax each result to [phone], same day as resulted/available    Critical lab results page [phone]    *Please note updated lab orders*    11/25/24-12/31/24  Labs every other week  Basic Metabolic Panel   Phosphorous  Magnesium  Hepatic panel   Tacrolimus drug level - 12-hour trough, please document time of last dose   Alpha Fetoprotein (AFP) (draw approximately 12/27/24)     Labs weekly beginning 11/25/24 through 1/31/25   CBC with differential and platelets  CMV quantitative PCR     Months 7-12 post-transplant (1/1-6/27/25)  Labs monthly  CBC with Platelets   Basic Metabolic Panel   Phosphorous  Magnesium  Hepatic panel   Tacrolimus drug level - 12-hour trough, please document time of last dose   Alpha Fetoprotein (AFP) if HCC (approximately 3/27/25)      12-months post-transplant (approximately 6/27/25)  Fasting Lipid Panel  Urine protein/creatinine ratio  UA with reflex to micro  Hepatitis B DNA PCR on all patients  Alpha Fetoprotein (AFP) IF Hepatocellular Carcinoma (HCC) present at time of transplant    If you have any questions, please call [facility]- [phone] or [phone], Fax- [phone].      Thomas M. Leventhal, M.D.   of Medicine  Advanced & Transplant Hepatology  [facility]

## 2024-12-02 ENCOUNTER — TELEPHONE (OUTPATIENT)
Dept: TRANSPLANT | Facility: CLINIC | Age: 49
End: 2024-12-02
Payer: COMMERCIAL

## 2024-12-02 DIAGNOSIS — C22.0 HCC (HEPATOCELLULAR CARCINOMA) (H): ICD-10-CM

## 2024-12-02 NOTE — LETTER
OUTPATIENT RADIOLOGY ORDER   [facility]  [phone]    Patient Name: West Van   YOB: 1975     [facility] MR# [if applicable]: [id number]   Date & Time: December 2, 2024  11:16 AM  Expiration Date: 1 year after date issued       Diagnosis: Liver Transplant (ICD-10 Z94.4)   Aftercare following organ transplant (ICD-10 Z48.288)   Long term use of medications (ICD-10 Z79.899)   Contact with and (suspected) exposure to other viral communicable   diseases (Z20.828)      We ask your assistance in obtaining the following laboratory tests, which are part of our routine surveillance program for Solid Organ Transplant patients.     Please fax each result to [phone], same day as resulted/available    Critical lab results page [phone]    Please perform the following imaging tests for HCC surveillance in December 2024 and June 2025:     MRI liver with and without contrast   CT chest without contrast    If you have any questions, please call [facility]- [phone] or [phone], Fax- [phone].      Thomas M. Leventhal, M.D.   of Medicine  Advanced & Transplant Hepatology  [facility]

## 2024-12-02 NOTE — TELEPHONE ENCOUNTER
Tavia from [facility] urology called to see if we have received the CT results and images from them. Informed her that we have received the report, but that images are not visible yet. She stated that [facility] is working on pushing images to [facility].     West had already sent message to [facility] regarding liver results from the CT.

## 2024-12-02 NOTE — PROGRESS NOTES
MyChart message received from West stating that he was trying to schedule his MRI and CT at [facility] and couldn't because they say they do not have the orders. Orders were faxed 9/30/24. Orders refaxed.

## 2024-12-03 ENCOUNTER — TRANSFERRED RECORDS (OUTPATIENT)
Dept: HEALTH INFORMATION MANAGEMENT | Facility: CLINIC | Age: 49
End: 2024-12-03
Payer: COMMERCIAL

## 2024-12-03 LAB — RETINOPATHY: POSITIVE

## 2024-12-09 ENCOUNTER — TELEPHONE (OUTPATIENT)
Dept: TRANSPLANT | Facility: CLINIC | Age: 49
End: 2024-12-09
Payer: COMMERCIAL

## 2024-12-09 DIAGNOSIS — C22.0 HCC (HEPATOCELLULAR CARCINOMA) (H): ICD-10-CM

## 2024-12-09 NOTE — TELEPHONE ENCOUNTER
Call to Matthew. She had questions about West's stent. Explained that stent has likely passed and that even if it hasn't, it is plastic so should not be any issue with MRI.

## 2024-12-11 NOTE — NURSING NOTE
Is the patient currently in the state of [place]? YES    Visit mode:VIDEO    If the visit is dropped, the patient can be reconnected by: VIDEO VISIT: Send to e-mail at: [email or web address]    Will anyone else be joining the visit? YES: How would they like to receive their invitation? Text to cell phone: [phone]  (If patient encounters technical issues they should call [phone]    How would you like to obtain your AVS? MyChart    Are changes needed to the allergy or medication list?  Yes  Pt takes 400 mg of Gabapentin four times daily.     Are refills needed on medications prescribed by this physician? NO    Reason for visit: RECHECK (Return [facility])    Gisela VEGA      
11-Dec-2024 08:23

## 2024-12-15 ENCOUNTER — HEALTH MAINTENANCE LETTER (OUTPATIENT)
Age: 49
End: 2024-12-15

## 2024-12-15 ASSESSMENT — ANXIETY QUESTIONNAIRES
4. TROUBLE RELAXING: SEVERAL DAYS
3. WORRYING TOO MUCH ABOUT DIFFERENT THINGS: SEVERAL DAYS
5. BEING SO RESTLESS THAT IT IS HARD TO SIT STILL: SEVERAL DAYS
8. IF YOU CHECKED OFF ANY PROBLEMS, HOW DIFFICULT HAVE THESE MADE IT FOR YOU TO DO YOUR WORK, TAKE CARE OF THINGS AT HOME, OR GET ALONG WITH OTHER PEOPLE?: SOMEWHAT DIFFICULT
7. FEELING AFRAID AS IF SOMETHING AWFUL MIGHT HAPPEN: SEVERAL DAYS
6. BECOMING EASILY ANNOYED OR IRRITABLE: SEVERAL DAYS
2. NOT BEING ABLE TO STOP OR CONTROL WORRYING: SEVERAL DAYS
GAD7 TOTAL SCORE: 7
7. FEELING AFRAID AS IF SOMETHING AWFUL MIGHT HAPPEN: SEVERAL DAYS
GAD7 TOTAL SCORE: 7
IF YOU CHECKED OFF ANY PROBLEMS ON THIS QUESTIONNAIRE, HOW DIFFICULT HAVE THESE PROBLEMS MADE IT FOR YOU TO DO YOUR WORK, TAKE CARE OF THINGS AT HOME, OR GET ALONG WITH OTHER PEOPLE: SOMEWHAT DIFFICULT
1. FEELING NERVOUS, ANXIOUS, OR ON EDGE: SEVERAL DAYS
GAD7 TOTAL SCORE: 7

## 2024-12-19 ENCOUNTER — TRANSFERRED RECORDS (OUTPATIENT)
Dept: HEALTH INFORMATION MANAGEMENT | Facility: CLINIC | Age: 49
End: 2024-12-19
Payer: COMMERCIAL

## 2024-12-20 ENCOUNTER — VIRTUAL VISIT (OUTPATIENT)
Dept: PSYCHOLOGY | Facility: CLINIC | Age: 49
End: 2024-12-20
Payer: COMMERCIAL

## 2024-12-20 DIAGNOSIS — F43.23 ADJUSTMENT DISORDER WITH MIXED ANXIETY AND DEPRESSED MOOD: Primary | ICD-10-CM

## 2024-12-20 NOTE — PROGRESS NOTES
Health Psychology Outpatient Follow Up  [facility]     Patient: West Van   Date of Service: 12/20/24  Treatment Plan Due: 8/30/25     Diagnosis:  Adjustment disorder with mixed anxiety and depressed mood (ICD-10: F43.20)    Patient Demographics (per chart):   Age: 49 year old  Biological Sex: male  Race: White  Ethnicity: Not  or     Patient Medications (per chart):  Current Outpatient Medications   Medication Sig Dispense Refill    acetaminophen (TYLENOL) 325 MG tablet Take 2 tablets (650 mg) by mouth every 6 hours as needed for mild pain      Alcohol Swabs PADS Use to swab the area of the injection or nay as directed Per insurance coverage 200 each 1    aspirin (ASA) 81 MG chewable tablet Take 1 tablet (81 mg) by mouth or Feeding Tube daily 90 tablet 1    blood glucose (NO BRAND SPECIFIED) lancets standard To use to test glucose level in the blood Use to test blood sugar 4  times daily as directed. To accompany glucose monitor brands per insurance coverage. 200 each 1    blood glucose (NO BRAND SPECIFIED) test strip To use to test glucose level in the blood Use to test blood sugar 4 times daily as directed. To accompany glucose monitor brands per insurance coverage. 100 strip 3    blood glucose monitoring (NO BRAND SPECIFIED) meter device kit Use as directed Per insurance coverage 1 kit 0    carvedilol (COREG) 3.125 MG tablet Take 1 tablet (3.125 mg) by mouth 2 times daily (with meals) 60 tablet 3    fluticasone (FLONASE) 50 MCG/ACT nasal spray Spray 1 spray into both nostrils daily as needed for allergies or rhinitis      insulin aspart (NOVOLOG PEN) 100 UNIT/ML pen Inject 1-7 Units Subcutaneous at bedtime HIGH INSULIN RESISTANCE DOSING  Do Not give Bedtime Correction Insulin if BG less than 200. For  - 224 give 1 units. For  - 249 give 2 units. For  - 274 give 3 units. For  - 299 give 4 units. For  - 324 give 5  units. For  - 349 give 6 units. For BG greater than or equal to 350 give 7 units. Notify provider if glucose greater than or equal to 350 mg/dL after administration of correction dose. 15 mL 1    insulin aspart (NOVOLOG PEN) 100 UNIT/ML pen Inject 6 Units Subcutaneous 3 times daily (with meals) 3 mL 1    insulin aspart (NOVOLOG PEN) 100 UNIT/ML pen Inject 1-10 Units Subcutaneous 3 times daily (before meals) Correction Scale - MEDIUM INSULIN RESISTANCE DOSING   Do Not give Correction Insulin if BG < 140. For  - 189 give 1 unit. For  - 239 give 2 units. For  - 289 give 3 units. For  - 339 give 4 units. For BG = or > 340 give 5 units. Check blood glucose before meals/bolus feedings (or q4h if NPO) and administer based on blood glucose. Notify MD if glucose > or = 350 mg/dL after administration of correction dose. 15 mL 1    insulin glargine (LANTUS PEN) 100 UNIT/ML pen Inject 25 Units Subcutaneous daily 15 mL 1    insulin pen needle (32G X 4 MM) 32G X 4 MM miscellaneous Use as directed by provider Per insurance coverage 200 each 1    magnesium oxide (MAG-OX) 400 MG tablet Take 2 tablets (800 mg) by mouth daily. 180 tablet 3    pantoprazole (PROTONIX) 20 MG EC tablet Take 1 tablet (20 mg) by mouth daily. Can stop after 30 days if you are off Mycophenolate Mofetil and prednisone. 90 tablet 3    predniSONE (DELTASONE) 5 MG tablet Take 0.5 tablets (2.5 mg) by mouth daily.      Sharps Container MISC Use as directed to dispose of needles, lancets and other sharps 1 each 1    sulfamethoxazole-trimethoprim (BACTRIM) 400-80 MG tablet Take 1 tablet by mouth daily 90 tablet 1    tacrolimus (GENERIC EQUIVALENT) 1 MG capsule Take 3 capsules (3 mg) by mouth every morning AND 2 capsules (2 mg) every evening. 450 capsule 3    ursodiol (ACTIGALL) 300 MG capsule Take 1 capsule (300 mg) by mouth 2 times daily 60 capsule 3    valGANciclovir (VALCYTE) 450 MG tablet Take 1 tablet (450 mg) by mouth daily 90  "tablet 1     No current facility-administered medications for this visit.     Subjective:  Engaged pt in psychotherapy (CBT) for coping with chronic medical illness. Reviewed updates to physical and emotional health since previous session and progress with treatment plan goals. Patient reported that he has been fighting some sxs of frustration since last appointment, citing feeling slightly overwhelmed by the amount of follow up doctors appointments he has had recently. Pt otherwise reported doing well emotionally. Engaged pt in supportive listening and cognitive processing of ongoing sxs ans stressors. Pt reported that he feels he has little time to complete household chores, process his emotions, or relax due to the amount of medical follow up appointments he has leading to frustration. Discussed ways to utilize limited free time to better cope and recover overall. Additionally discussed shortening today's encounter and extending interval between follow ups for our sessions moving forward. Pt and I were in agreement to this plan.     Objective   Appearance:   Appropriate   Eye Contact:   Good   Psychomotor Behavior:  Normal   Attitude:   Cooperative   Orientation:   All  Speech   Rate / Production: Normal    Volume:  Normal   Mood:    Euthymic  Affect:    Appropriate   Thought Content:   Clear  Thought Form:   Coherent  Logical     Insight:    Did not formally assess at this time.   Safety:    No safety concerns at this time.     Weight (per chart):  Wt Readings from Last 4 Encounters:   09/30/24 91.8 kg (202 lb 6.4 oz)   08/06/24 89.8 kg (198 lb)   08/01/24 90.1 kg (198 lb 11.2 oz)   07/22/24 89.8 kg (198 lb)      BMI (per chart):  Estimated body mass index is 25.99 kg/m  as calculated from the following:    Height as of 8/6/24: 1.88 m (6' 2\").    Weight as of 9/30/24: 91.8 kg (202 lb 6.4 oz).     Depression Symptoms (Patient Health Questionnaire 9; PHQ-9)  The PHQ-9 is a measure of depressive symptoms.  Scores " on this measure range from 0 to 27 with higher scores reflecting greater levels and frequency of depressive symptoms. Typical symptom ranges include: 0-4 minimal, 5-9 mild, 10-14 moderate, 15-19 moderately severe, 20-27 severe.       8/14/2024    10:43 AM   PHQ-9 SCORE   PHQ-9 Total Score MyChart 6 (Mild depression)   PHQ-9 Total Score 6     Anxiety Symptoms (Generalized Anxiety Disorder 7; KAELYN-7)  The KAELYN-7 is a measure of anxiety and panic symptoms.  Scores on this measure range from 0 to 21 with higher scores reflecting greater levels and frequency of anxiety symptoms. Typical symptom ranges include: 0-4 minimal, 5-9 mild, 10-14 moderate, 15-21 severe.       11/9/2024     9:32 AM 11/30/2024     6:02 PM 12/15/2024     8:46 AM   KAELYN-7 SCORE   Total Score 7 (mild anxiety) 6 (mild anxiety) 7 (mild anxiety)   Total Score 7  6  7        Patient-reported     Alcohol and Drug Abuse (CAGE - Adapted to Include Drugs; CAGE-AID)  The CAGE-AID is a screening tool to assess for symptoms of alcohol or drug abuse or dependence. Scores on this measure range from 0 to 4, with higher scores reflecting experiences consistent with problem use.  CAGE-AID score  > 1 is a positive screen, suggesting further discussion is needed to determine if evaluation for alcohol or substance abuse is appropriate.  A score > 2 is considered clinically significant, suggesting further evaluation of alcohol or substance-related problems is indicated.      8/10/2024    10:48 AM   CAGE-AID Total Score   Total Score 0   Total Score MyChart 0 (A total score of 2 or greater is considered clinically significant)     Global Health (Patient-Reported Outcomes Measurement Information System; PROMIS-10)  The PROMIS-10 is a measure of global physical and mental health. Total scores on this measure range from 8 to 40, with higher scores reflecting greater levels of perceived health.       8/10/2024    10:48 AM 11/9/2024     9:33 AM   PROMIS-10 Scores Only   Global  Mental Health Score 12 12    Global Physical Health Score 14 14    PROMIS TOTAL - SUBSCORES 26 26        Patient-reported     Suicidality ([place] Suicide Severity Rating Scale Screener Past Month)      6/26/2024     4:00 PM   [place] Suicide Severity Rating (Short Version)   Q1 Wished to be Dead (Past Month) 0-->no   Q2 Suicidal Thoughts (Past Month) 0-->no   Q6 Suicide Behavior (Lifetime) 0-->no     Assessment:  Patient was engaged in treatment. Patient appears to be experiencing depression symptoms in the mild range and anxiety symptoms in the mild range. Currently, the patient appears to be coping with some success. Progress towards treatment goals: ongoing.     Plan:  Return for therapy sessions.     Session Length:  Start Time: 2:00pm  End Time: 2:30pm    Modality: Telemedicine Information:  Type of service:   Telemedicine psychotherapy  Patient location:   Patient home in [place]    Patient telephone number: [phone]  Provider location:  At home office in [place]  Mode of Communication:   Video Conference via [employer]   Patient consent to telemedicine services? Yes  It has been determined that telemedicine service is appropriate and effective for this patient.   The patient has been notified that: Video visits will be conducted via a call with their psychologist to provide the care they need with a video conversation. Video visits may be billed at different rates depending on insurance coverage.  Patients are advised to contact their insurance provider with any questions about their health insurance coverage. If during the course of a call the psychologist feels a video visit is not appropriate, patients will not be charged for this service.    Obdulio Patel, PhD, LP  Clinical Health Psychology Fellow    *This case is being supervised by Danielle Diaz, PhD, LP.  *This note was completed using Dragon voice recognition software. Although reviewed after completion, some word and grammatical errors may  occur.

## 2024-12-26 DIAGNOSIS — Z94.4 LIVER REPLACED BY TRANSPLANT (H): ICD-10-CM

## 2024-12-26 DIAGNOSIS — C22.0 HCC (HEPATOCELLULAR CARCINOMA) (H): ICD-10-CM

## 2024-12-26 RX ORDER — TACROLIMUS 1 MG/1
2 CAPSULE ORAL 2 TIMES DAILY
Qty: 360 CAPSULE | Refills: 3 | Status: SHIPPED | OUTPATIENT
Start: 2024-12-26

## 2024-12-26 ASSESSMENT — ENCOUNTER SYMPTOMS: NEW SYMPTOMS OF CORONARY ARTERY DISEASE: 0

## 2024-12-26 NOTE — PROGRESS NOTES
ISSUE:   Tacrolimus IR level 10 on 12/23, goal 6-8, dose 3 mg AM, 2 mg PM.    PLAN:   Call Patient and confirm this was an accurate 12-hour trough.   Verify Tacrolimus IR dose 3 mg AM, 2 mg PM.   Confirm no new medications or or missed doses.   Confirm no new illness / infection / diarrhea.   If accurate trough and accurate dose, decrease Tacrolimus IR dose to 2 mg BID     Is this more than a 50% increase or decrease in current IS dose: No    Repeat labs on Monday 1/6.      OUTCOME:   Spoke with Patient, they confirm accurate trough level and current dose 3 mg AM, 2 mg PM.   Patient confirmed dose change to 2 mg BID.  Patient agreed to repeat labs on Monday 1/6.   Orders sent to preferred pharmacy for dose change and lab for repeat labs.   Patient voiced understanding of plan.     Med list updated. Stopped aspirin and bactrim. Discontinued prednisone, ursodiol, and pantoprazole- all previously discontinued by West.

## 2025-01-06 ENCOUNTER — TELEPHONE (OUTPATIENT)
Dept: PHARMACY | Facility: CLINIC | Age: 50
End: 2025-01-06
Payer: COMMERCIAL

## 2025-01-06 NOTE — TELEPHONE ENCOUNTER
Clinical Pharmacy Consult:                                                      Transplant Specific: 6 month post transplant discharge medication review  Date of Transplant: 06/27/2024  Type of Transplant: liver  First Transplant: yes  History of rejection: no    Immunosuppression Regimen   TAC 2mg qAM & 2mg qPM, Prednisone 5mg qAM  Patient specific goal: 6-8  Most recent level: 10, date 12/26/24  Immunosuppressant Levels:  Supratherapeutic, pending lab today  Pt adherent to lab draws: yes  Scr:   Lab Results   Component Value Date    CR 0.76 08/01/2024     Side effects: neuropathy    Prophylactic Medications  Antibacterial:  Bactrim SS daily  Scheduled Discontinue Date: 6 months- completed    Antifungal: Nystatin  Scheduled Discontinue Date:  discontinued at discharge    Antiviral: CrCl 40 to 59 mL/minute: Valcyte 450 mg once daily   Scheduled Discontinue Date: 6 months - continued due to eye    Acid Reducer: Protonix (pantoprazole)  Scheduled Reviewed Date:  TBD    Thrombosis Prevention: Aspirin 81 mg PO daily  Scheduled Discontinue Date:  TBD    Blood Pressure Management  Frequency of home Blood Pressure checks: once daily  Most recent home BP: 110's/60-70's  Patient Blood pressure goal: <130/80  Patient blood pressure at goal:  yes  Hospitalizations/ER visits since last assessment: 0    Med rec/DUR performed: yes  Med Rec Discrepancies: no    Spoke to West via phone today. Things are still improving day to day. Denied any missed dose. Reported neuropathy in feet. No other medication related side effects.  Denied any GI symptoms, no N/V/D.  Pt was on Metformin and Ozempic prior to transplant, is planning to talk to primary about switching back to them once prednisone course is complete.  Denied fever, pain, swelling, jaundice symptoms or ascites. Advocate lacks of energy after 5pm. Appetite improving. Weight back to near pre-transplant weight.  Tac was high, patient confirmed taking adjusted dose 2mg BID. Labs  pending today.  BP is at goal.    No other questions or concerns today. Follow up in 6 month.    Navarro Gibson, Pharm D  [facility]

## 2025-01-09 ENCOUNTER — TELEPHONE (OUTPATIENT)
Dept: TRANSPLANT | Facility: CLINIC | Age: 50
End: 2025-01-09
Payer: COMMERCIAL

## 2025-01-09 DIAGNOSIS — C22.0 HCC (HEPATOCELLULAR CARCINOMA) (H): ICD-10-CM

## 2025-01-09 DIAGNOSIS — Z94.4 LIVER REPLACED BY TRANSPLANT (H): ICD-10-CM

## 2025-01-09 RX ORDER — TACROLIMUS 1 MG/1
1 CAPSULE ORAL 2 TIMES DAILY
Qty: 180 CAPSULE | Refills: 3 | Status: SHIPPED | OUTPATIENT
Start: 2025-01-09

## 2025-01-09 NOTE — LETTER
OUTPATIENT LABORATORY TEST ORDER   [facility]  [phone]    Patient Name: West Van   YOB: 1975     [facility] MR# [if applicable]: [id number]   Date & Time: January 9, 2025  10:19 AM  Expiration Date: 1 year after date issued       Diagnosis: Liver Transplant (ICD-10 Z94.4)   Aftercare following organ transplant (ICD-10 Z48.288)   Long term use of medications (ICD-10 Z79.899)   Contact with and (suspected) exposure to other viral communicable   diseases (Z20.828)      We ask your assistance in obtaining the following laboratory tests, which are part of our routine surveillance program for Solid Organ Transplant patients.     Please fax each result to [phone], same day as resulted/available    Critical lab results page [phone]    Please draw the following labs on Monday 1/13/25, in addition to already ordered labs:     Hepatic panel   Tacrolimus level    If you have any questions, please call [facility]- [phone] or [phone], Fax- [phone].      Thomas M. Leventhal, M.D.   of Medicine  Advanced & Transplant Hepatology  [facility]

## 2025-01-09 NOTE — TELEPHONE ENCOUNTER
ISSUE:   Tacrolimus IR level 9.7 on 1/6, goal 6-8, dose 2 mg BID.    PLAN:   Call Patient and confirm this was an accurate 12-hour trough.   Verify Tacrolimus IR dose 2 mg BID.   Confirm no new medications or or missed doses.   Confirm no new illness / infection / diarrhea.   If accurate trough and accurate dose, decrease Tacrolimus IR dose to 1 mg BID     Is this more than a 50% increase or decrease in current IS dose: Yes  If YES, justification: level higher than goal    Repeat labs in 1 weeks.    Confirm that prednisone was stopped in November. Note from pharmacy visit earlier this week stated that he was still on prednisone 2.5 mg daily.     OUTCOME:   Spoke with Patient, they confirm accurate trough level and current dose 2 mg BID.   Patient confirmed dose change to 1 mg BID.  Patient agreed to repeat labs in 1 weeks.   Orders sent to preferred pharmacy for dose change and lab for repeat labs.   Patient voiced understanding of plan.     Confirmed with West that he stopped prednisone in November.

## 2025-01-19 ENCOUNTER — HEALTH MAINTENANCE LETTER (OUTPATIENT)
Age: 50
End: 2025-01-19

## 2025-01-19 ASSESSMENT — ANXIETY QUESTIONNAIRES
3. WORRYING TOO MUCH ABOUT DIFFERENT THINGS: SEVERAL DAYS
7. FEELING AFRAID AS IF SOMETHING AWFUL MIGHT HAPPEN: SEVERAL DAYS
1. FEELING NERVOUS, ANXIOUS, OR ON EDGE: SEVERAL DAYS
GAD7 TOTAL SCORE: 7
GAD7 TOTAL SCORE: 7
2. NOT BEING ABLE TO STOP OR CONTROL WORRYING: SEVERAL DAYS
IF YOU CHECKED OFF ANY PROBLEMS ON THIS QUESTIONNAIRE, HOW DIFFICULT HAVE THESE PROBLEMS MADE IT FOR YOU TO DO YOUR WORK, TAKE CARE OF THINGS AT HOME, OR GET ALONG WITH OTHER PEOPLE: SOMEWHAT DIFFICULT
5. BEING SO RESTLESS THAT IT IS HARD TO SIT STILL: SEVERAL DAYS
GAD7 TOTAL SCORE: 7
7. FEELING AFRAID AS IF SOMETHING AWFUL MIGHT HAPPEN: SEVERAL DAYS
6. BECOMING EASILY ANNOYED OR IRRITABLE: SEVERAL DAYS
8. IF YOU CHECKED OFF ANY PROBLEMS, HOW DIFFICULT HAVE THESE MADE IT FOR YOU TO DO YOUR WORK, TAKE CARE OF THINGS AT HOME, OR GET ALONG WITH OTHER PEOPLE?: SOMEWHAT DIFFICULT
4. TROUBLE RELAXING: SEVERAL DAYS

## 2025-01-22 DIAGNOSIS — C22.0 HCC (HEPATOCELLULAR CARCINOMA) (H): ICD-10-CM

## 2025-01-22 DIAGNOSIS — Z94.4 LIVER REPLACED BY TRANSPLANT (H): ICD-10-CM

## 2025-01-22 RX ORDER — TACROLIMUS 0.5 MG/1
0.5 CAPSULE ORAL 2 TIMES DAILY
Qty: 180 CAPSULE | Refills: 3 | Status: SHIPPED | OUTPATIENT
Start: 2025-01-22

## 2025-01-22 RX ORDER — TACROLIMUS 1 MG/1
1 CAPSULE ORAL 2 TIMES DAILY
Qty: 180 CAPSULE | Refills: 3 | Status: SHIPPED | OUTPATIENT
Start: 2025-01-22

## 2025-01-22 NOTE — LETTER
OUTPATIENT LABORATORY TEST ORDER   [facility]   [phone]    Patient Name: West Van   YOB: 1975     [facility] MR# [if applicable]: [id number]   Date & Time: January 22, 2025  2:43 PM  Expiration Date: 1 year after date issued       Diagnosis: Liver Transplant (ICD-10 Z94.4)   Aftercare following organ transplant (ICD-10 Z48.288)   Long term use of medications (ICD-10 Z79.899)   Contact with and (suspected) exposure to other viral communicable   diseases (Z20.828)      We ask your assistance in obtaining the following laboratory tests, which are part of our routine surveillance program for Solid Organ Transplant patients.     Please fax each result to [phone], same day as resulted/available    Critical lab results page [phone]      Please draw the following labs every 2 weeks 2/3-2/28/25    CBC with Platelets   Basic Metabolic Panel   Phosphorous  Magnesium  Hepatic panel   Tacrolimus drug level - 12-hour trough, please document time of last dose   CMV quantitative PCR    Months 5-12 post-transplant (3/1-6/27/25)  Labs monthly  CBC with Platelets   Basic Metabolic Panel   Phosphorous  Magnesium  Hepatic panel   Tacrolimus drug level - 12-hour trough, please document time of last dose   CMV quantitative PCR  Alpha Fetoprotein (AFP) approximately 3/27/25      12-months post-transplant due approximately 6/27/25  Fasting Lipid Panel  Urine protein/creatinine ratio  UA with reflex to micro  Hepatitis B DNA PCR on all patients  Alpha Fetoprotein (AFP)     If you have any questions, please call [facility]- [phone] or [phone], Fax- [phone].      Thomas M. Leventhal, M.D.   of Medicine  Advanced & Transplant Hepatology  [facility]

## 2025-01-22 NOTE — PROGRESS NOTES
ISSUE:   Tacrolimus IR level 5.1 on 1/20, goal 6-8, dose 1 mg BID.    PLAN:   Call Patient and confirm this was an accurate 12-hour trough.   Verify Tacrolimus IR dose 1 mg BID.   Confirm no new medications or or missed doses.   Confirm no new illness / infection / diarrhea.   If accurate trough and accurate dose, increase Tacrolimus IR dose to 1.5 mg BID     Is this more than a 50% increase or decrease in current IS dose: No    Repeat labs in 2 weeks.      OUTCOME:   Spoke with Patient, they confirm accurate trough level and current dose 1 mg BID.   Patient confirmed dose change to 1.5 mg BID.  Patient agreed to repeat labs in 2 weeks.   Orders sent to preferred pharmacy for dose change and lab for repeat labs.   Patient voiced understanding of plan.     New lab orders faxed.

## 2025-01-24 ENCOUNTER — VIRTUAL VISIT (OUTPATIENT)
Dept: PSYCHOLOGY | Facility: CLINIC | Age: 50
End: 2025-01-24
Payer: COMMERCIAL

## 2025-01-24 DIAGNOSIS — F43.23 ADJUSTMENT DISORDER WITH MIXED ANXIETY AND DEPRESSED MOOD: Primary | ICD-10-CM

## 2025-01-24 PROCEDURE — 90834 PSYTX W PT 45 MINUTES: CPT | Mod: 95 | Performed by: COUNSELOR

## 2025-01-24 NOTE — PROGRESS NOTES
Health Psychology Outpatient Follow Up  [facility]     Patient: West Van   Date of Service: 1/24/25  Treatment Plan Due: 8/30/25     Diagnosis:  Adjustment disorder with mixed anxiety and depressed mood (ICD-10: F43.20)    Patient Demographics (per chart):   Age: 49 year old  Biological Sex: male  Race: White  Ethnicity: Not  or     Patient Medications (per chart):  Current Outpatient Medications   Medication Sig Dispense Refill    acetaminophen (TYLENOL) 325 MG tablet Take 2 tablets (650 mg) by mouth every 6 hours as needed for mild pain      Alcohol Swabs PADS Use to swab the area of the injection or nay as directed Per insurance coverage 200 each 1    blood glucose (NO BRAND SPECIFIED) lancets standard To use to test glucose level in the blood Use to test blood sugar 4  times daily as directed. To accompany glucose monitor brands per insurance coverage. 200 each 1    blood glucose (NO BRAND SPECIFIED) test strip To use to test glucose level in the blood Use to test blood sugar 4 times daily as directed. To accompany glucose monitor brands per insurance coverage. 100 strip 3    blood glucose monitoring (NO BRAND SPECIFIED) meter device kit Use as directed Per insurance coverage 1 kit 0    carvedilol (COREG) 3.125 MG tablet Take 1 tablet (3.125 mg) by mouth 2 times daily (with meals) 60 tablet 3    fluticasone (FLONASE) 50 MCG/ACT nasal spray Spray 1 spray into both nostrils daily as needed for allergies or rhinitis      insulin aspart (NOVOLOG PEN) 100 UNIT/ML pen Inject 1-7 Units Subcutaneous at bedtime HIGH INSULIN RESISTANCE DOSING  Do Not give Bedtime Correction Insulin if BG less than 200. For  - 224 give 1 units. For  - 249 give 2 units. For  - 274 give 3 units. For  - 299 give 4 units. For  - 324 give 5 units. For  - 349 give 6 units. For BG greater than or equal to 350 give 7 units. Notify provider if  "glucose greater than or equal to 350 mg/dL after administration of correction dose. 15 mL 1    insulin aspart (NOVOLOG PEN) 100 UNIT/ML pen Inject 6 Units Subcutaneous 3 times daily (with meals) 3 mL 1    insulin aspart (NOVOLOG PEN) 100 UNIT/ML pen Inject 1-10 Units Subcutaneous 3 times daily (before meals) Correction Scale - MEDIUM INSULIN RESISTANCE DOSING   Do Not give Correction Insulin if BG < 140. For  - 189 give 1 unit. For  - 239 give 2 units. For  - 289 give 3 units. For  - 339 give 4 units. For BG = or > 340 give 5 units. Check blood glucose before meals/bolus feedings (or q4h if NPO) and administer based on blood glucose. Notify MD if glucose > or = 350 mg/dL after administration of correction dose. 15 mL 1    insulin glargine (LANTUS PEN) 100 UNIT/ML pen Inject 25 Units Subcutaneous daily 15 mL 1    insulin pen needle (32G X 4 MM) 32G X 4 MM miscellaneous Use as directed by provider Per insurance coverage 200 each 1    magnesium oxide (MAG-OX) 400 MG tablet Take 2 tablets (800 mg) by mouth daily. 180 tablet 3    Sharps Container MISC Use as directed to dispose of needles, lancets and other sharps 1 each 1    tacrolimus (GENERIC EQUIVALENT) 0.5 MG capsule Take 1 capsule (0.5 mg) by mouth 2 times daily. Total dose 1.5 mg  capsule 3    tacrolimus (GENERIC EQUIVALENT) 1 MG capsule Take 1 capsule (1 mg) by mouth 2 times daily. Total dose 1.5 mg  capsule 3    valGANciclovir (VALCYTE) 450 MG tablet Take 1 tablet (450 mg) by mouth daily 90 tablet 1     No current facility-administered medications for this visit.     Subjective:  Engaged pt in psychotherapy (CBT) for coping with chronic medical illness. Reviewed updates to physical and emotional health since previous session and progress with treatment plan goals. Patient reported that he has been doing \"okay\" since last appointment, citing a small recent decrease in amount of follow up appointments, but a likely increase in " "appointments upcoming. Engaged pt in supportive listening and cognitive processing of ongoing stressors and sxs. Pt reported that he received a letter from his donor's family that he has been emotionally processing as of late. Pt reported feeling positively about the letter overall. Pt stated that he has been struggling with falling asleep due to a racing mind that focuses on his transplant and recovery processes. Engaged pt in goal setting, planning, and problem solving of ways to combat late night worry including journaling and establishing a worry schedule throughout the day.     Objective   Appearance:   Appropriate   Eye Contact:   Good   Psychomotor Behavior:  Normal   Attitude:   Cooperative   Orientation:   All  Speech   Rate / Production: Normal    Volume:  Normal   Mood:    Euthymic  Affect:    Appropriate   Thought Content:   Clear  Thought Form:   Coherent  Logical     Insight:    Did not formally assess at this time.   Safety:    No safety concerns at this time.     Weight (per chart):  Wt Readings from Last 4 Encounters:   09/30/24 91.8 kg (202 lb 6.4 oz)   08/06/24 89.8 kg (198 lb)   08/01/24 90.1 kg (198 lb 11.2 oz)   07/22/24 89.8 kg (198 lb)      BMI (per chart):  Estimated body mass index is 25.99 kg/m  as calculated from the following:    Height as of 8/6/24: 1.88 m (6' 2\").    Weight as of 9/30/24: 91.8 kg (202 lb 6.4 oz).     Depression Symptoms (Patient Health Questionnaire 9; PHQ-9)  The PHQ-9 is a measure of depressive symptoms.  Scores on this measure range from 0 to 27 with higher scores reflecting greater levels and frequency of depressive symptoms. Typical symptom ranges include: 0-4 minimal, 5-9 mild, 10-14 moderate, 15-19 moderately severe, 20-27 severe.       8/14/2024    10:43 AM   PHQ-9 SCORE   PHQ-9 Total Score MyChart 6 (Mild depression)   PHQ-9 Total Score 6     Anxiety Symptoms (Generalized Anxiety Disorder 7; KAELYN-7)  The KAELYN-7 is a measure of anxiety and panic symptoms.  Scores " on this measure range from 0 to 21 with higher scores reflecting greater levels and frequency of anxiety symptoms. Typical symptom ranges include: 0-4 minimal, 5-9 mild, 10-14 moderate, 15-21 severe.       11/30/2024     6:02 PM 12/15/2024     8:46 AM 1/19/2025     2:07 PM   KAELYN-7 SCORE   Total Score 6 (mild anxiety) 7 (mild anxiety) 7 (mild anxiety)   Total Score 6  7  7        Patient-reported     Alcohol and Drug Abuse (CAGE - Adapted to Include Drugs; CAGE-AID)  The CAGE-AID is a screening tool to assess for symptoms of alcohol or drug abuse or dependence. Scores on this measure range from 0 to 4, with higher scores reflecting experiences consistent with problem use.  CAGE-AID score  > 1 is a positive screen, suggesting further discussion is needed to determine if evaluation for alcohol or substance abuse is appropriate.  A score > 2 is considered clinically significant, suggesting further evaluation of alcohol or substance-related problems is indicated.      8/10/2024    10:48 AM   CAGE-AID Total Score   Total Score 0   Total Score MyChart 0 (A total score of 2 or greater is considered clinically significant)     Global Health (Patient-Reported Outcomes Measurement Information System; PROMIS-10)  The PROMIS-10 is a measure of global physical and mental health. Total scores on this measure range from 8 to 40, with higher scores reflecting greater levels of perceived health.       8/10/2024    10:48 AM 11/9/2024     9:33 AM   PROMIS-10 Scores Only   Global Mental Health Score 12 12    Global Physical Health Score 14 14    PROMIS TOTAL - SUBSCORES 26 26        Patient-reported     Suicidality (Chancellor Suicide Severity Rating Scale Screener Past Month)      6/26/2024     4:00 PM   Chancellor Suicide Severity Rating (Short Version)   Q1 Wished to be Dead (Past Month) 0-->no   Q2 Suicidal Thoughts (Past Month) 0-->no   Q6 Suicide Behavior (Lifetime) 0-->no     Assessment:  Patient was engaged in treatment. Patient  appears to be experiencing depression symptoms in the mild range and anxiety symptoms in the mild range. Currently, the patient appears to be coping with some success. Progress towards treatment goals: ongoing.     Plan:  Return for therapy sessions.     Session Length:  Start Time: 3:00pm  End Time: 3:40pm    Modality: Telemedicine Information:  Type of service:   Telemedicine psychotherapy  Patient location:   Patient home in [place]    Patient telephone number: [phone]  Provider location:  At home office in [place]  Mode of Communication:   Video Conference via [employer]   Patient consent to telemedicine services? Yes  It has been determined that telemedicine service is appropriate and effective for this patient.   The patient has been notified that: Video visits will be conducted via a call with their psychologist to provide the care they need with a video conversation. Video visits may be billed at different rates depending on insurance coverage.  Patients are advised to contact their insurance provider with any questions about their health insurance coverage. If during the course of a call the psychologist feels a video visit is not appropriate, patients will not be charged for this service.    Obdulio Patel, PhD,   Clinical Health Psychologist    *This note was completed using Dragon voice recognition software. Although reviewed after completion, some word and grammatical errors may occur.

## 2025-02-04 ENCOUNTER — PATIENT OUTREACH (OUTPATIENT)
Dept: ONCOLOGY | Facility: CLINIC | Age: 50
End: 2025-02-04
Payer: COMMERCIAL

## 2025-02-04 NOTE — PROGRESS NOTES
[facility]: Cancer Care                                                                                        Completed chart audit to assign Oncology Care Coordination enrollment status.    Barry Bailey RN, BSN, OCN   RN Care Coordinator   [facility]

## 2025-02-06 DIAGNOSIS — C22.0 HCC (HEPATOCELLULAR CARCINOMA) (H): ICD-10-CM

## 2025-02-06 DIAGNOSIS — Z94.4 LIVER REPLACED BY TRANSPLANT (H): ICD-10-CM

## 2025-02-06 RX ORDER — TACROLIMUS 1 MG/1
1 CAPSULE ORAL 2 TIMES DAILY
Qty: 180 CAPSULE | Refills: 3 | Status: SHIPPED | OUTPATIENT
Start: 2025-02-06

## 2025-02-06 RX ORDER — TACROLIMUS 0.5 MG/1
0.5 CAPSULE ORAL 2 TIMES DAILY PRN
Qty: 180 CAPSULE | Refills: 3 | Status: SHIPPED | OUTPATIENT
Start: 2025-02-06

## 2025-02-06 NOTE — PROGRESS NOTES
ISSUE:   Tacrolimus IR level 8.6 on 2/3, goal 5-7, dose 1.5 mg BID.    PLAN:   Call Patient and confirm this was an accurate 12-hour trough.   Verify Tacrolimus IR dose 1.5 mg BID.   Confirm no new medications or or missed doses.   Confirm no new illness / infection / diarrhea.   If accurate trough and accurate dose, decrease Tacrolimus IR dose to 1 mg BID     Is this more than a 50% increase or decrease in current IS dose: No    Repeat labs in 3 weeks.      OUTCOME:   Spoke with Patient, they confirm accurate trough level and current dose 1.5 mg BID.   Patient confirmed dose change to 1 mg BID.  Patient agreed to repeat labs in 3 weeks.   Orders sent to preferred pharmacy for dose change and lab for repeat labs.   Patient voiced understanding of plan.

## 2025-03-16 ENCOUNTER — HEALTH MAINTENANCE LETTER (OUTPATIENT)
Age: 50
End: 2025-03-16

## 2025-03-21 ENCOUNTER — TRANSFERRED RECORDS (OUTPATIENT)
Dept: HEALTH INFORMATION MANAGEMENT | Facility: CLINIC | Age: 50
End: 2025-03-21
Payer: COMMERCIAL

## 2025-03-21 LAB — RETINOPATHY: POSITIVE

## 2025-03-31 ENCOUNTER — LAB (OUTPATIENT)
Dept: LAB | Facility: CLINIC | Age: 50
End: 2025-03-31
Attending: INTERNAL MEDICINE
Payer: COMMERCIAL

## 2025-03-31 ENCOUNTER — OFFICE VISIT (OUTPATIENT)
Dept: GASTROENTEROLOGY | Facility: CLINIC | Age: 50
End: 2025-03-31
Attending: INTERNAL MEDICINE
Payer: COMMERCIAL

## 2025-03-31 VITALS
DIASTOLIC BLOOD PRESSURE: 104 MMHG | WEIGHT: 222 LBS | SYSTOLIC BLOOD PRESSURE: 149 MMHG | OXYGEN SATURATION: 97 % | HEART RATE: 103 BPM | HEIGHT: 74 IN | TEMPERATURE: 98.6 F | BODY MASS INDEX: 28.49 KG/M2

## 2025-03-31 DIAGNOSIS — C22.0 HCC (HEPATOCELLULAR CARCINOMA) (H): ICD-10-CM

## 2025-03-31 DIAGNOSIS — Z94.4 S/P LIVER TRANSPLANT (H): ICD-10-CM

## 2025-03-31 DIAGNOSIS — Z94.4 LIVER REPLACED BY TRANSPLANT (H): Primary | ICD-10-CM

## 2025-03-31 DIAGNOSIS — D84.9 IMMUNOSUPPRESSED STATUS: ICD-10-CM

## 2025-03-31 DIAGNOSIS — C7A.8 PRIMARY MALIGNANT NEUROENDOCRINE TUMOR OF LUNG (H): Primary | ICD-10-CM

## 2025-03-31 DIAGNOSIS — Z94.4 LIVER REPLACED BY TRANSPLANT (H): ICD-10-CM

## 2025-03-31 PROBLEM — Z86.59 HISTORY OF RECENT STRESSFUL LIFE EVENT: Status: RESOLVED | Noted: 2021-03-01 | Resolved: 2025-03-31

## 2025-03-31 PROBLEM — G89.18 ACUTE POST-OPERATIVE PAIN: Status: RESOLVED | Noted: 2024-07-03 | Resolved: 2025-03-31

## 2025-03-31 LAB
AFP SERPL-MCNC: 5.3 NG/ML
ALBUMIN SERPL BCG-MCNC: 4.3 G/DL (ref 3.5–5.2)
ALP SERPL-CCNC: 100 U/L (ref 40–150)
ALT SERPL W P-5'-P-CCNC: 26 U/L (ref 0–70)
ANION GAP SERPL CALCULATED.3IONS-SCNC: 10 MMOL/L (ref 7–15)
AST SERPL W P-5'-P-CCNC: 24 U/L (ref 0–45)
BASOPHILS # BLD AUTO: 0.1 10E3/UL (ref 0–0.2)
BASOPHILS NFR BLD AUTO: 1 %
BILIRUB DIRECT SERPL-MCNC: 0.5 MG/DL (ref 0–0.3)
BILIRUB SERPL-MCNC: 1.4 MG/DL
BUN SERPL-MCNC: 12.1 MG/DL (ref 6–20)
CALCIUM SERPL-MCNC: 9.6 MG/DL (ref 8.8–10.4)
CHLORIDE SERPL-SCNC: 104 MMOL/L (ref 98–107)
CMV DNA SPEC NAA+PROBE-ACNC: NOT DETECTED IU/ML
CREAT SERPL-MCNC: 0.77 MG/DL (ref 0.67–1.17)
EGFRCR SERPLBLD CKD-EPI 2021: >90 ML/MIN/1.73M2
EOSINOPHIL # BLD AUTO: 0.1 10E3/UL (ref 0–0.7)
EOSINOPHIL NFR BLD AUTO: 2 %
ERYTHROCYTE [DISTWIDTH] IN BLOOD BY AUTOMATED COUNT: 12.6 % (ref 10–15)
GLUCOSE SERPL-MCNC: 162 MG/DL (ref 70–99)
HCO3 SERPL-SCNC: 24 MMOL/L (ref 22–29)
HCT VFR BLD AUTO: 42.4 % (ref 40–53)
HGB BLD-MCNC: 14.8 G/DL (ref 13.3–17.7)
IMM GRANULOCYTES # BLD: 0 10E3/UL
IMM GRANULOCYTES NFR BLD: 1 %
LYMPHOCYTES # BLD AUTO: 1.2 10E3/UL (ref 0.8–5.3)
LYMPHOCYTES NFR BLD AUTO: 29 %
MAGNESIUM SERPL-MCNC: 1.7 MG/DL (ref 1.7–2.3)
MCH RBC QN AUTO: 29.2 PG (ref 26.5–33)
MCHC RBC AUTO-ENTMCNC: 34.9 G/DL (ref 31.5–36.5)
MCV RBC AUTO: 84 FL (ref 78–100)
MONOCYTES # BLD AUTO: 0.3 10E3/UL (ref 0–1.3)
MONOCYTES NFR BLD AUTO: 7 %
NEUTROPHILS # BLD AUTO: 2.5 10E3/UL (ref 1.6–8.3)
NEUTROPHILS NFR BLD AUTO: 60 %
NRBC # BLD AUTO: 0 10E3/UL
NRBC BLD AUTO-RTO: 0 /100
PHOSPHATE SERPL-MCNC: 2.6 MG/DL (ref 2.5–4.5)
PLATELET # BLD AUTO: 119 10E3/UL (ref 150–450)
POTASSIUM SERPL-SCNC: 4.2 MMOL/L (ref 3.4–5.3)
PROT SERPL-MCNC: 7.2 G/DL (ref 6.4–8.3)
RBC # BLD AUTO: 5.07 10E6/UL (ref 4.4–5.9)
SODIUM SERPL-SCNC: 138 MMOL/L (ref 135–145)
SPECIMEN TYPE: NORMAL
TACROLIMUS BLD-MCNC: 6.2 UG/L (ref 5–15)
TME LAST DOSE: NORMAL H
TME LAST DOSE: NORMAL H
WBC # BLD AUTO: 4.2 10E3/UL (ref 4–11)

## 2025-03-31 PROCEDURE — 82248 BILIRUBIN DIRECT: CPT | Performed by: PATHOLOGY

## 2025-03-31 PROCEDURE — 3080F DIAST BP >= 90 MM HG: CPT | Performed by: INTERNAL MEDICINE

## 2025-03-31 PROCEDURE — 82105 ALPHA-FETOPROTEIN SERUM: CPT | Performed by: INTERNAL MEDICINE

## 2025-03-31 PROCEDURE — 1126F AMNT PAIN NOTED NONE PRSNT: CPT | Performed by: INTERNAL MEDICINE

## 2025-03-31 PROCEDURE — 99215 OFFICE O/P EST HI 40 MIN: CPT | Performed by: INTERNAL MEDICINE

## 2025-03-31 PROCEDURE — G2211 COMPLEX E/M VISIT ADD ON: HCPCS | Performed by: INTERNAL MEDICINE

## 2025-03-31 PROCEDURE — 99000 SPECIMEN HANDLING OFFICE-LAB: CPT | Performed by: PATHOLOGY

## 2025-03-31 PROCEDURE — 99213 OFFICE O/P EST LOW 20 MIN: CPT | Performed by: INTERNAL MEDICINE

## 2025-03-31 PROCEDURE — 36415 COLL VENOUS BLD VENIPUNCTURE: CPT | Performed by: PATHOLOGY

## 2025-03-31 PROCEDURE — 83735 ASSAY OF MAGNESIUM: CPT | Performed by: PATHOLOGY

## 2025-03-31 PROCEDURE — 84100 ASSAY OF PHOSPHORUS: CPT | Performed by: PATHOLOGY

## 2025-03-31 PROCEDURE — 3077F SYST BP >= 140 MM HG: CPT | Performed by: INTERNAL MEDICINE

## 2025-03-31 PROCEDURE — 85025 COMPLETE CBC W/AUTO DIFF WBC: CPT | Performed by: PATHOLOGY

## 2025-03-31 PROCEDURE — 80053 COMPREHEN METABOLIC PANEL: CPT | Performed by: PATHOLOGY

## 2025-03-31 PROCEDURE — 80197 ASSAY OF TACROLIMUS: CPT | Performed by: SURGERY

## 2025-03-31 ASSESSMENT — PAIN SCALES - GENERAL: PAINLEVEL_OUTOF10: NO PAIN (0)

## 2025-03-31 NOTE — PROGRESS NOTES
[place] [facility] kendy a cbc and tacrolimus level, the remainder of his transplant lab orders are no longer in his chart. Reordered monthly transplant labs.     [employer] message to West requesting that he go back to lab to get the remainder of his transplant labs drawn. If they can't get him back in, he can get labs at home tomorrow.

## 2025-03-31 NOTE — Clinical Note
3/31/2025      West Van  [address]  [facility] [address]      Dear Colleague,    Thank you for referring your patient, West Van, to the [facility] [place]. Please see a copy of my visit note below.    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    Since last seen he has been doing okay.  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    He continues on immunosuppression with tacrolimus as a sole agent; with his current trough goal of 5-7    He has not yet establish care with dermatology    He underwent surveillance imaging for his history of HCC in December with an MRI of the liver and CT of the chest; these were both negative for evidence of recurrence    Surgical Course  - OLT date: 2024  - etiology: MASLD and HCC  - donor: type DBD  - CMV status D+/R-  - operation: Surgical technique  piggyback, biliary anastomosis: duct to duct over a stent  - CiT 302, WiT 49  - EBL: 500mL  - Episodes of Rejection: none  - Biliary complications: none  - Other complications of immediate post-operative course: none    Past Medical History:  Past Medical History:   Diagnosis Date    Benign essential HTN 04/13/2016    Diabetes (H)     Kidney stone     Liver cancer (H) 10/04/2022    Liver cirrhosis secondary to nonalcoholic steatohepatitis (RIOS) (H)     RIOS (nonalcoholic steatohepatitis) 06/02/2023    Neuroendocrine carcinoma of lung (H)     right lung, lobectomy 3/2023    PONV (postoperative nausea and vomiting)        Past Surgical History:  Past Surgical History:   Procedure Laterality Date    BRONCHOSCOPY, WITH BIOPSY, ROBOT ASSISTED N/A 1/17/2023    Procedure: BRONCHOSCOPY, USING OPTICAL TRACKING SYSTEM, ion;  Surgeon: Ani Guillaume MD;  Location: [facility] OR    DAVINCI LOBECTOMY LUNG Right 3/8/2023    Procedure: Robot-assisted right thoracoscopic lower lobectomy, mediastinal lymph node dissection, intercostal nerve cryoablation ;  Surgeon: Matheus Haas MD;  Location: [facility] OR    ENDOBRONCHIAL ULTRASOUND FLEXIBLE N/A 1/17/2023    Procedure: endobronchial  ultrasound and transbronchial biopsies;  Surgeon: Ani Guillaume MD;  Location: [facility] OR    EXCISE TUMOR CHEST WALL Right 2/1/2024    Procedure: Excision chest wall mass;  Surgeon: Matheus Haas MD;  Location: [facility] OR    LAPAROSCOPIC ABLATION LIVER TUMOR N/A 12/6/2022    Procedure: Diagnostic Laparoscopy, Hepatic Ultrasound, Laparoscopic ultrasound guided microwave ablation of liver tumor x1;  Surgeon: Armando Munguia MD;  Location: [facility] OR    LAPAROSCOPIC BIOPSY LIVER N/A 12/6/2022    Procedure: Laparoscopic Ultrasound Guided liver biopsy;  Surgeon: Armando Munguia MD;  Location: [facility] OR    LAPAROSCOPIC CHOLECYSTECTOMY N/A 12/6/2022    Procedure: Laparoscopic cholecystectomy;  Surgeon: Armando Munguia MD;  Location: [facility] OR    TRANSPLANT LIVER RECIPIENT   DONOR N/A 2024    Procedure: Transplant liver recipient  donor with bile duct stent placement;  Surgeon: Radu Dodson MD;  Location: [facility] OR       Past Social History:  Social History     Tobacco Use    Smoking status: Never     Passive exposure: Past    Smokeless tobacco: Never   Vaping Use    Vaping status: Never Used   Substance Use Topics    Alcohol use: Not Currently     Comment: Last ETOH     Drug use: Never        Family History:  Family History   Problem Relation Age of Onset    Breast Cancer Mother     Cancer Father         lip    Melanoma Cousin     Cirrhosis No family hx of        Review of Systems  A complete 10 point review of systems was asked and answered in the negative unless specifically commented upon in the HPI    Current Outpatient Medications   Medication Sig Dispense Refill    acetaminophen (TYLENOL) 325 MG tablet Take 2 tablets (650 mg) by mouth every 6 hours as needed for mild pain      Alcohol Swabs PADS Use to swab the area of the injection or nay as directed Per insurance coverage 200 each 1    blood glucose (NO BRAND SPECIFIED) lancets standard To use to test glucose level in the blood Use to test blood sugar 4  times daily as directed. To accompany glucose monitor brands per insurance coverage. 200 each 1    blood glucose (NO BRAND SPECIFIED) test strip To use to test glucose level in the blood Use to test blood sugar 4 times daily as directed. To accompany glucose monitor brands per insurance coverage. 100 strip 3    blood glucose monitoring (NO BRAND SPECIFIED) meter device kit Use as directed Per insurance coverage 1 kit 0    carvedilol (COREG) 3.125 MG tablet Take 1 tablet (3.125 mg) by mouth 2 times daily (with meals) 60 tablet 3    fluticasone (FLONASE) 50 MCG/ACT nasal spray Spray 1 spray into both nostrils daily as needed for allergies or rhinitis      insulin aspart (NOVOLOG PEN) 100 UNIT/ML pen Inject 1-7 Units Subcutaneous at bedtime HIGH  INSULIN RESISTANCE DOSING  Do Not give Bedtime Correction Insulin if BG less than 200. For  - 224 give 1 units. For  - 249 give 2 units. For  - 274 give 3 units. For  - 299 give 4 units. For  - 324 give 5 units. For  - 349 give 6 units. For BG greater than or equal to 350 give 7 units. Notify provider if glucose greater than or equal to 350 mg/dL after administration of correction dose. 15 mL 1    insulin aspart (NOVOLOG PEN) 100 UNIT/ML pen Inject 6 Units Subcutaneous 3 times daily (with meals) 3 mL 1    insulin aspart (NOVOLOG PEN) 100 UNIT/ML pen Inject 1-10 Units Subcutaneous 3 times daily (before meals) Correction Scale - MEDIUM INSULIN RESISTANCE DOSING   Do Not give Correction Insulin if BG < 140. For  - 189 give 1 unit. For  - 239 give 2 units. For  - 289 give 3 units. For  - 339 give 4 units. For BG = or > 340 give 5 units. Check blood glucose before meals/bolus feedings (or q4h if NPO) and administer based on blood glucose. Notify MD if glucose > or = 350 mg/dL after administration of correction dose. 15 mL 1    insulin glargine (LANTUS PEN) 100 UNIT/ML pen Inject 25 Units Subcutaneous daily 15 mL 1    insulin pen needle (32G X 4 MM) 32G X 4 MM miscellaneous Use as directed by provider Per insurance coverage 200 each 1    magnesium oxide (MAG-OX) 400 MG tablet Take 2 tablets (800 mg) by mouth daily. 180 tablet 3    Sharps Container MISC Use as directed to dispose of needles, lancets and other sharps 1 each 1    tacrolimus (GENERIC EQUIVALENT) 0.5 MG capsule Take 1 capsule (0.5 mg) by mouth 2 times daily as needed (For dose changes). 180 capsule 3    tacrolimus (GENERIC EQUIVALENT) 1 MG capsule Take 1 capsule (1 mg) by mouth 2 times daily. 180 capsule 3    valGANciclovir (VALCYTE) 450 MG tablet Take 1 tablet (450 mg) by mouth daily 90 tablet 1     No current facility-administered medications for this visit.       Objective:         Vitals:     "03/31/25 0951   BP: (!) 149/104   Pulse: 103   Temp: 98.6  F (37  C)   TempSrc: Oral   SpO2: 97%   Weight: 100.7 kg (222 lb)   Height: 1.88 m (6' 2\")     Body mass index is 28.5 kg/m .     Physical Exam    Vitals reviewed.   Constitutional: Well-developed, well-nourished, in no apparent distress.    HEENT: Normocephalic. no scleral icterus. Moist oral mucosa. Dentition normal  Neck/Lymph: Normal ROM, supple.  Cardiac:  Regular rate  Respiratory: Normal respiratory excursion   GI:  Abdomen soft, non-distended, non-tender. BS present.   Skin:  Skin is warm and dry. No rash noted.  Peripheral Vascular: no lower extremity edema. 2+ pulses in all extremities  Musculoskeletal:  ROM intact, decreased muscle bulk    Psychiatric: Anxious  Neuro: no tremor, A&Ox3    Labs and Diagnostic tests:  CBC w/Diff    Lab Results   Component Value Date/Time    WBC 4.2 03/31/2025 08:45 AM    RBC 5.07 03/31/2025 08:45 AM    HGB 14.8 03/31/2025 08:45 AM    HCT 42.4 03/31/2025 08:45 AM    MCV 84 03/31/2025 08:45 AM    MCH 29.2 03/31/2025 08:45 AM    MCHC 34.9 03/31/2025 08:45 AM    RDW 12.6 03/31/2025 08:45 AM         Comprehensive Metabolic Profile    Lab Results   Component Value Date/Time     03/31/2025 09:10 AM    CO2 24 03/31/2025 09:10 AM    CO2 29 05/11/2023 08:44 AM    BUN 12.1 03/31/2025 09:10 AM    BUN 12 05/11/2023 08:44 AM    CR 0.77 03/31/2025 09:10 AM    Lab Results   Component Value Date/Time    ALBUMIN 4.3 03/31/2025 09:10 AM    ALBUMIN 3.9 05/11/2023 08:44 AM    ALKPHOS 100 03/31/2025 09:10 AM    AST 24 03/31/2025 09:10 AM    ALT 26 03/31/2025 09:10 AM        Pathology:  A. LIVER (1064.9 gm), EXPLANTED AT TRANSPLANTATION:  Neoadjuvant treated hepatocellular carcinoma, no residual or recurrent tumor identified  - See gross description for ablation site size  - No evidence to suggest pre-treatment vascular invasion or extrahepatic extension  Advanced background chronic liver disease (clinically RIOS) with nodule " formation (cirrhosis), Laennec fibrosis stage 4b, now inactive/quiescent  - Iron stain show 2+ iron deposition on a scale of 0-4  - PAS/PAS-D stains show no abnormal cytoplasmic accumulations        Colonoscopy: 2023  Impression:       - Non-bleeding internal hemorrhoids.        - Two small polyps in the proximal rectum and at the recto-sigmoid        colon, removed with a jumbo cold forceps. Resected and retrieved.        - The cecum is normal. Biopsied.        - The examination was otherwise normal on direct and retroflexion views.        - The examined portion of the ileum was normal. Biopsied.     Assessment and Plan:    S/P Liver Transplantation:   -Has a history of cirrhosis related to MASLD that was complicated by presence of hepatocellular carcinoma  -On a  donor liver transplant on  from a DBD donor  -We will continue to follow his labs per protocol    Immunosuppression:   -He has not had any episodes of rejection  -Given concerns for CMV and diabetes felt appropriate to continue with further weaning of tacrolimus trough level  -We will now target a trough level of 4-6 for the next 3 months and then at 1 year post we will decrease target further to 3-5  - Will plan to for intensive monitoring of immunosuppression trough levels per protocol to assess for adequate target dosing and will assess CBC and kidney function for toxicity of medications    CMV Viremia:  - On therapy    History of HCC:  - Has a history of HCC that was treated with ablation  - Explant without evidence of recurrent disease  -CT/MRI 2024 without evidence of recurrence  RETREAT Score  Predictor of HCC recurrence risk   Predictor Value Points   AFP at Liver Transplant 0-20 = 0 points  21-99 = 1 point  100-999  = 2 points  >1000 = 3 points   1   Presence of MICROvascular invasion 2 points   0   Largest viable tumor diameter (cm) PLUS number of viable lesions Less than 1.0 = 0 points  1.1-4.9 = 1 point  5-9.9 = 2  points  > 10 = 3 points     0     Total RETREAT Score: 1  --------------  Low Recurrence Risk: RETREAT score 0 to 3  Surveillance Every 6 months for 2 years:  1. CT chest without contrast  2. MR abdomen and pelvis with and without contrast or CT abdomen and pelvis with and without contrast  3. Serum AFP      Kidney Health:   - no acute issues at this time    Colon Cancer Screening:  - Surveillance due in [id number]    Carcinoid Tumor of the Lung:  - Low grade neuroendocrine tumor of the right lung  - Nodes negative for mets    Nutrition/Weight:    It is very common for patients to put on significant weight after liver transplantation, as they become conditioned to eating as much as possible to maintain a healthy weight pre-transplant.  There is a very significant risk of developing fatty liver and non-alcoholic steatohepatitis in post-transplant patients, even in those who were not transplanted for RIOS cirrhosis.  - Please work on eating a diet that is rice in fresh fruits and vegetables, moderate amounts of lean protein and dairy, and modest amounts of carbohydrates and fats.  - An exercise plan/regimen is an essential part of remaining healthy in the post-transplant setting and we recommend 30-35 minutes of exercise at least 4 days a week    Routine Health Care:  - You need to establish and maintain care with a primary care physician to manage: hypertension, high cholesterol, abnormal blood sugars - as these are all potential complications of your health after liver transplantation and will need a local physician to manage these issues.  - All patients with liver diseases (even post transplant) are at an increased risk for osteoporosis.  We strongly recommend screening for Vitamin D deficiency at least twice yearly with aggressive supplementation/replacement as indicated.    - We recommend a screening DEXA scan to evaluate for osteoporosis.  If present, should treat with calcium, Vitamin D supplementation, and  recommend consideration of bisphosphonate therapy.  Also recommend follow up DEXA scans to evaluate for improvement of bone density on therapy.  - Recommend yearly skin exams with dermatology, and if skin cancers are found, recommend at least twice yearly exams  - Recommend post transplant patients stay up to date for cancer screening: mammograms/PAP for women and prostate cancer screening for men, and colon cancer screening for all.  - Practice good hygiene with washing of hands and maintaining a clean living space as this will decrease your chances of developing an infection in the post-transplantation setting  - Eat a health diet: rich in fresh fruits and vegetables, lean proteins, and minimize carbohydrate and fat intake.  - Remain physically active: this is key to keeping the liver transplant healthy and patient's feeling strong and healthy       Thank you very much for the opportunity to participate in the care of this patient.  If you have any further questions, please don't hesitate to contact our office.    __________________________________  Thomas M. Leventhal, M.D.  Associate Professor of Medicine  Advanced & Transplant Hepatology  [facility]       The longitudinal plan of care for liver transplantation and immunosuppression management (and possible complications) was addressed during this visit. Due to the added complexity in care, I will continue to support this patient in the subsequent management of this condition(s) and with the ongoing continuity of care of this condition    I spent 40 minutes on the date of the encounter doing chart review, history and exam, documentation and further activities as noted above.         Again, thank you for allowing me to participate in the care of your patient.        Sincerely,        Thomas M. Leventhal, MD    Electronically signed

## 2025-03-31 NOTE — PROGRESS NOTES
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    Since last seen he has been doing okay.  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    He continues on immunosuppression with tacrolimus as a sole agent; with his current trough goal of 5-7    He has not yet establish care with dermatology    He underwent surveillance imaging for his history of HCC in December with an MRI of the liver and CT of the chest; these were both negative for evidence of recurrence    Surgical Course  - OLT date: 2024  - etiology: MASLD and HCC  - donor: type DBD  - CMV status D+/R-  - operation: Surgical technique piggyback, biliary anastomosis: duct to duct over a stent  - CiT 302, WiT 49  - EBL: 500mL  - Episodes of Rejection: none  - Biliary complications: none  - Other complications of immediate post-operative course: none    Past Medical History:  Past  Medical History:   Diagnosis Date    Benign essential HTN 2016    Diabetes (H)     Kidney stone     Liver cancer (H) 10/04/2022    Liver cirrhosis secondary to nonalcoholic steatohepatitis (RIOS) (H)     RIOS (nonalcoholic steatohepatitis) 2023    Neuroendocrine carcinoma of lung (H)     right lung, lobectomy 3/2023    PONV (postoperative nausea and vomiting)        Past Surgical History:  Past Surgical History:   Procedure Laterality Date    BRONCHOSCOPY, WITH BIOPSY, ROBOT ASSISTED N/A 2023    Procedure: BRONCHOSCOPY, USING OPTICAL TRACKING SYSTEM, ion;  Surgeon: Ani Guillaume MD;  Location: [facility]    DAVINCI LOBECTOMY LUNG Right 3/8/2023    Procedure: Robot-assisted right thoracoscopic lower lobectomy, mediastinal lymph node dissection, intercostal nerve cryoablation ;  Surgeon: Matheus Haas MD;  Location: [facility]    ENDOBRONCHIAL ULTRASOUND FLEXIBLE N/A 2023    Procedure: endobronchial  ultrasound and transbronchial biopsies;  Surgeon: Ani Guillaume MD;  Location: [facility]    EXCISE TUMOR CHEST WALL Right 2024    Procedure: Excision chest wall mass;  Surgeon: Matheus Haas MD;  Location: [facility]    LAPAROSCOPIC ABLATION LIVER TUMOR N/A 2022    Procedure: Diagnostic Laparoscopy, Hepatic Ultrasound, Laparoscopic ultrasound guided microwave ablation of liver tumor x1;  Surgeon: Armando Munguia MD;  Location: [facility]    LAPAROSCOPIC BIOPSY LIVER N/A 2022    Procedure: Laparoscopic Ultrasound Guided liver biopsy;  Surgeon: Armando Munguia MD;  Location: [facility]    LAPAROSCOPIC CHOLECYSTECTOMY N/A 2022    Procedure: Laparoscopic cholecystectomy;  Surgeon: Armando Munguia MD;  Location: [facility]    TRANSPLANT LIVER RECIPIENT  DONOR N/A 2024    Procedure: Transplant liver recipient  donor with bile duct stent placement;  Surgeon: Radu Dodson MD;  Location: [facility] OR       Past Social History:  Social History     Tobacco Use    Smoking status: Never      Passive exposure: Past    Smokeless tobacco: Never   Vaping Use    Vaping status: Never Used   Substance Use Topics    Alcohol use: Not Currently     Comment: Last ETOH 1999    Drug use: Never        Family History:  Family History   Problem Relation Age of Onset    Breast Cancer Mother     Cancer Father         lip    Melanoma Cousin     Cirrhosis No family hx of        Review of Systems  A complete 10 point review of systems was asked and answered in the negative unless specifically commented upon in the HPI    Current Outpatient Medications   Medication Sig Dispense Refill    acetaminophen (TYLENOL) 325 MG tablet Take 2 tablets (650 mg) by mouth every 6 hours as needed for mild pain      Alcohol Swabs PADS Use to swab the area of the injection or nay as directed Per insurance coverage 200 each 1    blood glucose (NO BRAND SPECIFIED) lancets standard To use to test glucose level in the blood Use to test blood sugar 4  times daily as directed. To accompany glucose monitor brands per insurance coverage. 200 each 1    blood glucose (NO BRAND SPECIFIED) test strip To use to test glucose level in the blood Use to test blood sugar 4 times daily as directed. To accompany glucose monitor brands per insurance coverage. 100 strip 3    blood glucose monitoring (NO BRAND SPECIFIED) meter device kit Use as directed Per insurance coverage 1 kit 0    carvedilol (COREG) 3.125 MG tablet Take 1 tablet (3.125 mg) by mouth 2 times daily (with meals) 60 tablet 3    fluticasone (FLONASE) 50 MCG/ACT nasal spray Spray 1 spray into both nostrils daily as needed for allergies or rhinitis      insulin aspart (NOVOLOG PEN) 100 UNIT/ML pen Inject 1-7 Units Subcutaneous at bedtime HIGH INSULIN RESISTANCE DOSING  Do Not give Bedtime Correction Insulin if BG less than 200. For  - 224 give 1 units. For  - 249 give 2 units. For  - 274 give 3 units. For  - 299 give 4 units. For  - 324 give 5 units. For  - 349  "give 6 units. For BG greater than or equal to 350 give 7 units. Notify provider if glucose greater than or equal to 350 mg/dL after administration of correction dose. 15 mL 1    insulin aspart (NOVOLOG PEN) 100 UNIT/ML pen Inject 6 Units Subcutaneous 3 times daily (with meals) 3 mL 1    insulin aspart (NOVOLOG PEN) 100 UNIT/ML pen Inject 1-10 Units Subcutaneous 3 times daily (before meals) Correction Scale - MEDIUM INSULIN RESISTANCE DOSING   Do Not give Correction Insulin if BG < 140. For  - 189 give 1 unit. For  - 239 give 2 units. For  - 289 give 3 units. For  - 339 give 4 units. For BG = or > 340 give 5 units. Check blood glucose before meals/bolus feedings (or q4h if NPO) and administer based on blood glucose. Notify MD if glucose > or = 350 mg/dL after administration of correction dose. 15 mL 1    insulin glargine (LANTUS PEN) 100 UNIT/ML pen Inject 25 Units Subcutaneous daily 15 mL 1    insulin pen needle (32G X 4 MM) 32G X 4 MM miscellaneous Use as directed by provider Per insurance coverage 200 each 1    magnesium oxide (MAG-OX) 400 MG tablet Take 2 tablets (800 mg) by mouth daily. 180 tablet 3    Sharps Container MISC Use as directed to dispose of needles, lancets and other sharps 1 each 1    tacrolimus (GENERIC EQUIVALENT) 0.5 MG capsule Take 1 capsule (0.5 mg) by mouth 2 times daily as needed (For dose changes). 180 capsule 3    tacrolimus (GENERIC EQUIVALENT) 1 MG capsule Take 1 capsule (1 mg) by mouth 2 times daily. 180 capsule 3    valGANciclovir (VALCYTE) 450 MG tablet Take 1 tablet (450 mg) by mouth daily 90 tablet 1     No current facility-administered medications for this visit.       Objective:         Vitals:    03/31/25 0951   BP: (!) 149/104   Pulse: 103   Temp: 98.6  F (37  C)   TempSrc: Oral   SpO2: 97%   Weight: 100.7 kg (222 lb)   Height: 1.88 m (6' 2\")     Body mass index is 28.5 kg/m .     Physical Exam    Vitals reviewed.   Constitutional: Well-developed, " well-nourished, in no apparent distress.    HEENT: Normocephalic. no scleral icterus. Moist oral mucosa. Dentition normal  Neck/Lymph: Normal ROM, supple.  Cardiac:  Regular rate  Respiratory: Normal respiratory excursion   GI:  Abdomen soft, non-distended, non-tender. BS present.   Skin:  Skin is warm and dry. No rash noted.  Peripheral Vascular: no lower extremity edema. 2+ pulses in all extremities  Musculoskeletal:  ROM intact, decreased muscle bulk    Psychiatric: Anxious  Neuro: no tremor, A&Ox3    Labs and Diagnostic tests:  CBC w/Diff    Lab Results   Component Value Date/Time    WBC 4.2 03/31/2025 08:45 AM    RBC 5.07 03/31/2025 08:45 AM    HGB 14.8 03/31/2025 08:45 AM    HCT 42.4 03/31/2025 08:45 AM    MCV 84 03/31/2025 08:45 AM    MCH 29.2 03/31/2025 08:45 AM    MCHC 34.9 03/31/2025 08:45 AM    RDW 12.6 03/31/2025 08:45 AM         Comprehensive Metabolic Profile    Lab Results   Component Value Date/Time     03/31/2025 09:10 AM    CO2 24 03/31/2025 09:10 AM    CO2 29 05/11/2023 08:44 AM    BUN 12.1 03/31/2025 09:10 AM    BUN 12 05/11/2023 08:44 AM    CR 0.77 03/31/2025 09:10 AM    Lab Results   Component Value Date/Time    ALBUMIN 4.3 03/31/2025 09:10 AM    ALBUMIN 3.9 05/11/2023 08:44 AM    ALKPHOS 100 03/31/2025 09:10 AM    AST 24 03/31/2025 09:10 AM    ALT 26 03/31/2025 09:10 AM        Pathology:  A. LIVER (1064.9 gm), EXPLANTED AT TRANSPLANTATION:  Neoadjuvant treated hepatocellular carcinoma, no residual or recurrent tumor identified  - See gross description for ablation site size  - No evidence to suggest pre-treatment vascular invasion or extrahepatic extension  Advanced background chronic liver disease (clinically RIOS) with nodule formation (cirrhosis), Laennec fibrosis stage 4b, now inactive/quiescent  - Iron stain show 2+ iron deposition on a scale of 0-4  - PAS/PAS-D stains show no abnormal cytoplasmic accumulations        Colonoscopy: 7/12/2023  Impression:       - Non-bleeding internal  hemorrhoids.        - Two small polyps in the proximal rectum and at the recto-sigmoid        colon, removed with a jumbo cold forceps. Resected and retrieved.        - The cecum is normal. Biopsied.        - The examination was otherwise normal on direct and retroflexion views.        - The examined portion of the ileum was normal. Biopsied.     Assessment and Plan:    S/P Liver Transplantation:   -Has a history of cirrhosis related to MASLD that was complicated by presence of hepatocellular carcinoma  -On a  donor liver transplant on  from a DBD donor  -We will continue to follow his labs per protocol    Immunosuppression:   -He has not had any episodes of rejection  -Given concerns for CMV and diabetes felt appropriate to continue with further weaning of tacrolimus trough level  -We will now target a trough level of 4-6 for the next 3 months and then at 1 year post we will decrease target further to 3-5  - Will plan to for intensive monitoring of immunosuppression trough levels per protocol to assess for adequate target dosing and will assess CBC and kidney function for toxicity of medications    CMV Viremia:  - On therapy    History of HCC:  - Has a history of HCC that was treated with ablation  - Explant without evidence of recurrent disease  -CT/MRI 2024 without evidence of recurrence  RETREAT Score  Predictor of HCC recurrence risk   Predictor Value Points   AFP at Liver Transplant 0-20 = 0 points  21-99 = 1 point  100-999  = 2 points  >1000 = 3 points   1   Presence of MICROvascular invasion 2 points   0   Largest viable tumor diameter (cm) PLUS number of viable lesions Less than 1.0 = 0 points  1.1-4.9 = 1 point  5-9.9 = 2 points  > 10 = 3 points     0     Total RETREAT Score: 1  --------------  Low Recurrence Risk: RETREAT score 0 to 3  Surveillance Every 6 months for 2 years:  1. CT chest without contrast  2. MR abdomen and pelvis with and without contrast or CT abdomen and pelvis with  and without contrast  3. Serum AFP      Kidney Health:   - no acute issues at this time    Colon Cancer Screening:  - Surveillance due in [id number]    Carcinoid Tumor of the Lung:  - Low grade neuroendocrine tumor of the right lung  - Nodes negative for mets    Nutrition/Weight:    It is very common for patients to put on significant weight after liver transplantation, as they become conditioned to eating as much as possible to maintain a healthy weight pre-transplant.  There is a very significant risk of developing fatty liver and non-alcoholic steatohepatitis in post-transplant patients, even in those who were not transplanted for RIOS cirrhosis.  - Please work on eating a diet that is rice in fresh fruits and vegetables, moderate amounts of lean protein and dairy, and modest amounts of carbohydrates and fats.  - An exercise plan/regimen is an essential part of remaining healthy in the post-transplant setting and we recommend 30-35 minutes of exercise at least 4 days a week    Routine Health Care:  - You need to establish and maintain care with a primary care physician to manage: hypertension, high cholesterol, abnormal blood sugars - as these are all potential complications of your health after liver transplantation and will need a local physician to manage these issues.  - All patients with liver diseases (even post transplant) are at an increased risk for osteoporosis.  We strongly recommend screening for Vitamin D deficiency at least twice yearly with aggressive supplementation/replacement as indicated.    - We recommend a screening DEXA scan to evaluate for osteoporosis.  If present, should treat with calcium, Vitamin D supplementation, and recommend consideration of bisphosphonate therapy.  Also recommend follow up DEXA scans to evaluate for improvement of bone density on therapy.  - Recommend yearly skin exams with dermatology, and if skin cancers are found, recommend at least twice yearly exams  -  Recommend post transplant patients stay up to date for cancer screening: mammograms/PAP for women and prostate cancer screening for men, and colon cancer screening for all.  - Practice good hygiene with washing of hands and maintaining a clean living space as this will decrease your chances of developing an infection in the post-transplantation setting  - Eat a health diet: rich in fresh fruits and vegetables, lean proteins, and minimize carbohydrate and fat intake.  - Remain physically active: this is key to keeping the liver transplant healthy and patient's feeling strong and healthy       Thank you very much for the opportunity to participate in the care of this patient.  If you have any further questions, please don't hesitate to contact our office.    __________________________________  Thomas M. Leventhal, M.D.  Associate Professor of Medicine  Advanced & Transplant Hepatology  [facility]       The longitudinal plan of care for liver transplantation and immunosuppression management (and possible complications) was addressed during this visit. Due to the added complexity in care, I will continue to support this patient in the subsequent management of this condition(s) and with the ongoing continuity of care of this condition    I spent 40 minutes on the date of the encounter doing chart review, history and exam, documentation and further activities as noted above.

## 2025-03-31 NOTE — Clinical Note
- MRI Abd w/wo contrast in June 2025 AND again in December 2025 (can be done local) - AFP around June and December - For TAC:  target a trough level of 4-6 for the next 3 months and then at 1 year post we will decrease target further to 3-5 - Can we refer to Derm in [place] for him in late summer/fall

## 2025-03-31 NOTE — PATIENT INSTRUCTIONS
- we will get an MRI of the liver and CT of the chest in June and December of 2025    - see you in a year

## 2025-03-31 NOTE — NURSING NOTE
"Chief Complaint   Patient presents with    RECHECK     Follow Up     BP (!) 149/104   Pulse 103   Temp 98.6  F (37  C) (Oral)   Ht 1.88 m (6' 2\")   Wt 100.7 kg (222 lb)   SpO2 97%   BMI 28.50 kg/m    Lesley Mata on 3/31/2025 at 9:52 AM    "

## 2025-04-27 ENCOUNTER — HEALTH MAINTENANCE LETTER (OUTPATIENT)
Age: 50
End: 2025-04-27

## 2025-05-28 ENCOUNTER — TRANSFERRED RECORDS (OUTPATIENT)
Dept: HEALTH INFORMATION MANAGEMENT | Facility: CLINIC | Age: 50
End: 2025-05-28
Payer: COMMERCIAL

## 2025-05-28 LAB — RETINOPATHY: NORMAL

## 2025-06-05 ENCOUNTER — TELEPHONE (OUTPATIENT)
Dept: TRANSPLANT | Facility: CLINIC | Age: 50
End: 2025-06-05
Payer: COMMERCIAL

## 2025-06-05 DIAGNOSIS — C22.0 HCC (HEPATOCELLULAR CARCINOMA) (H): ICD-10-CM

## 2025-06-05 NOTE — TELEPHONE ENCOUNTER
Call to Giselle. Dr. Belcher wants to speak to Dr. Leventhal. Asked Giselle if there is a certain topic, as not sure if Dr. Leventhal available today. Giselle checked with Dr. Belcher and he was not willing to give specifics only that it isn't urgent but he would like Dr. Leventhal to call his cell when he has a minute. Message to Dr. Leventhal with cell number.     [phone]

## 2025-06-05 NOTE — TELEPHONE ENCOUNTER
Provider Call: General  Route to LPN    Reason for call: Call from Clinic- Dr Belcher would like to review patient with Dr Leventhal today     Call back needed? Yes    Return Call Needed  Same as documented in contacts section  When to return call?: Same day: Route High Priority

## 2025-07-03 ENCOUNTER — RESULTS FOLLOW-UP (OUTPATIENT)
Dept: TRANSPLANT | Facility: CLINIC | Age: 50
End: 2025-07-03

## 2025-07-03 ENCOUNTER — TELEPHONE (OUTPATIENT)
Dept: PHARMACY | Facility: CLINIC | Age: 50
End: 2025-07-03
Payer: COMMERCIAL

## 2025-07-03 DIAGNOSIS — C22.0 HCC (HEPATOCELLULAR CARCINOMA) (H): ICD-10-CM

## 2025-07-03 NOTE — TELEPHONE ENCOUNTER
Clinical Pharmacy Consult:                                                      Transplant Specific: 12 month post transplant discharge medication review  Date of Transplant: 06/27/2024  Type of Transplant: liver  First Transplant: yes  History of rejection: no    Immunosuppression Regimen   TAC 1mg qAM & 1mg qPM  Most recent level: 7.0, date 06/27/2025  Immunosuppressant Levels:  therapeutic  Pt adherent to lab draws: yes  Scr: 0.71 mg/dl as of 06/27/2025  Lab Results   Component Value Date    CR 0.76 08/01/2024     Side effects: neuropathy    Prophylactic Medications  Antibacterial:  Bactrim SS daily  Scheduled Discontinue Date: 6 months- completed    Antifungal: Nystatin  Scheduled Discontinue Date: discontinued at discharge    Antiviral: CrCl 40 to 59 mL/minute: Valcyte 450 mg once daily   Scheduled Discontinue Date: 6 months - therapy complete    Acid Reducer: Protonix (pantoprazole)  Scheduled Reviewed Date: TBD    Thrombosis Prevention: Aspirin 81 mg PO daily  Scheduled Discontinue Date: TBD    Blood Pressure Management  Frequency of home Blood Pressure checks: once daily  Most recent home BP: 110's/60-70's  Patient blood pressure at goal:  yes  Hospitalizations/ER visits since last assessment: 0    Med rec/DUR performed: yes  Med Rec Discrepancies: no    Spoke to West via phone today. He says he is doing well. He is still noticing neuropathy in his feet, and has had some issues with his urinary system that he is following up with his urologist for. No other medication related side effects and overall doing well.    Most recent tac level was slightly above goal but no dose changes were needed.    BP is at goal.    No other questions or concerns today. Follow up in 12 months.    Johanny Marie, Pharm D  [facility]

## 2025-07-08 ENCOUNTER — TRANSFERRED RECORDS (OUTPATIENT)
Dept: HEALTH INFORMATION MANAGEMENT | Facility: CLINIC | Age: 50
End: 2025-07-08
Payer: COMMERCIAL

## 2025-07-16 DIAGNOSIS — C22.0 HCC (HEPATOCELLULAR CARCINOMA) (H): ICD-10-CM

## 2025-07-16 ASSESSMENT — ENCOUNTER SYMPTOMS: NEW SYMPTOMS OF CORONARY ARTERY DISEASE: 0

## 2025-07-16 NOTE — LETTER
OUTPATIENT RADIOLOGY ORDER     Patient Name: West Van   YOB: 1975     Formerly [facility] MR# [if applicable]: [id number]   Date & Time: July 16, 2025  2:23 PM  Expiration Date: 1 year after date issued       Diagnosis: Liver Transplant (ICD-10 Z94.4)   Aftercare following organ transplant (ICD-10 Z48.288)   Long term use of medications (ICD-10 Z79.899)      We ask your assistance in obtaining the following radiology tests, which are part of our routine HCC surveillance program for Liver Transplant patients.     Please fax each result to [phone], same day as resulted/available    Critical lab results page [phone]    Please obtain the following radiology exams in December 2025:      MRI abdomen with and without contrast              CT chest without contrast    Please push images to [facility] system as soon as possible after scans are done.     If you have any questions, please call [facility]- [phone] or [phone], Fax- [phone].      Thomas M. Leventhal, M.D.   of Medicine  Advanced & Transplant Hepatology  [facility]

## 2025-07-16 NOTE — PROGRESS NOTES
Updated lab orders faxed. HCC surveillance MRI and CT orders for December 2025 faxed.     Call placed to patient for Annual [employer] Patient Status Update -     Questions asked are standardized questions that the [employer] requires all transplant centers to report back to the [employer].  [employer] requests this information for all transplant recipients (kidney, liver, heart, lung, pancreas, etc).  [employer] utilizes this information to best understand recipient outcomes and equitable distribution of organs.       Patient status form completed.

## 2025-07-16 NOTE — LETTER
OUTPATIENT LABORATORY TEST ORDER   [facility]  [phone]    Patient Name: West Van   YOB: 1975     Formerly [facility] MR# [if applicable]: [id number]   Date & Time: July 16, 2025  2:18 PM  Expiration Date: 1 year after date issued       Diagnosis: Liver Transplant (ICD-10 Z94.4)   Aftercare following organ transplant (ICD-10 Z48.288)   Long term use of medications (ICD-10 Z79.899)      We ask your assistance in obtaining the following laboratory tests, which are part of our routine surveillance program for Solid Organ Transplant patients.     Please fax each result to [phone], same day as resulted/available    Critical lab results page [phone]    >1-year post-transplant  Every 2-3 months  CBC with Platelets   Basic Metabolic Panel   Phosphorous  Magnesium  Hepatic panel   Tacrolimus drug level - trough level, please document time of last dose   Alpha Fetoprotein (AFP)     Labs annually  Hepatic panel   Fasting Lipid Panel  Urine protein/creatinine ratio  Random urine/protein creatinine      If you have any questions, please call [facility]- [phone] or [phone], Fax- [phone].      Thomas M. Leventhal, M.D.   of Medicine  Advanced & Transplant Hepatology  [facility]

## 2025-07-23 ENCOUNTER — TRANSFERRED RECORDS (OUTPATIENT)
Dept: HEALTH INFORMATION MANAGEMENT | Facility: CLINIC | Age: 50
End: 2025-07-23
Payer: COMMERCIAL

## 2025-07-23 LAB — RETINOPATHY: POSITIVE

## 2025-08-10 ENCOUNTER — HEALTH MAINTENANCE LETTER (OUTPATIENT)
Age: 50
End: 2025-08-10

## 2025-08-21 PROBLEM — B99.9 INFECTION: Status: ACTIVE | Noted: 2025-06-05

## 2025-08-27 ENCOUNTER — TELEPHONE (OUTPATIENT)
Dept: TRANSPLANT | Facility: CLINIC | Age: 50
End: 2025-08-27
Payer: COMMERCIAL

## 2025-08-27 DIAGNOSIS — C22.0 HCC (HEPATOCELLULAR CARCINOMA) (H): ICD-10-CM

## 2025-09-03 ENCOUNTER — TELEPHONE (OUTPATIENT)
Dept: GASTROENTEROLOGY | Facility: CLINIC | Age: 50
End: 2025-09-03
Payer: COMMERCIAL

## 2025-09-03 DIAGNOSIS — C22.0 HCC (HEPATOCELLULAR CARCINOMA) (H): ICD-10-CM

## (undated) DEVICE — DEFIB PRO-PADZ LVP LQD GEL ADULT 8900-2105-01

## (undated) DEVICE — LABEL MEDICATION SYSTEM 3303-P

## (undated) DEVICE — SU VICRYL 4-0 FS-1 27" J441H

## (undated) DEVICE — DRSG MEPILEX BORDER SACRUM 6.3X7.9" 282055

## (undated) DEVICE — SU PROLENE 5-0 RB-1DA 36"  8556H

## (undated) DEVICE — DRAPE STERI FLUOROSCOPE 35X43"1012 LATEX FREE

## (undated) DEVICE — POUCH TISSUE RETRIEVAL 15MM 6.00" INTRO TRS190SB2

## (undated) DEVICE — DAVINCI XI RETR ENDOWRIST SMALL GRAPTOR 470318

## (undated) DEVICE — SUCTION TIP POOLE K770

## (undated) DEVICE — DRAPE IOBAN INCISE 23X17" 6650EZ

## (undated) DEVICE — HEMOSTAT HEMOBLAST AGENT L10 SH CANNULA BQF02-US

## (undated) DEVICE — SU PDS II 6-0 RB-2DA 30" Z149H

## (undated) DEVICE — ENDO FORCEP ALLIGATOR JAW BIOPSY 2MMX100CM FB-211D

## (undated) DEVICE — DAVINCI XI DRAPE COLUMN 470341

## (undated) DEVICE — LINEN TOWEL PACK X30 5481

## (undated) DEVICE — PREP CHLORAPREP 26ML TINTED HI-LITE ORANGE 930815

## (undated) DEVICE — SU PROLENE 6-0 RB-2DA 30" 8711H

## (undated) DEVICE — KIT PATIENT POSITIONING PIGAZZI LATEX FREE 40580

## (undated) DEVICE — RX SURGIFLO HEMOSTATIC MATRIX W/THROMBIN 8ML 2994

## (undated) DEVICE — LINEN TOWEL PACK X6 WHITE 5487

## (undated) DEVICE — ADH SKIN CLOSURE PREMIERPRO EXOFIN 1.0ML 3470

## (undated) DEVICE — SOL NACL 0.9% IRRIG 1000ML BOTTLE 2F7124

## (undated) DEVICE — DRSG STERI STRIP 1/2X4" R1547

## (undated) DEVICE — SUCTION DRY CHEST DRAIN OASIS 3600-100

## (undated) DEVICE — STPL LINEAR 30X2.5MM VASC TX30V

## (undated) DEVICE — LIGHT HANDLE X2

## (undated) DEVICE — DRAPE SHEET REV FOLD 3/4 9349

## (undated) DEVICE — ESU ENDO SILS HOOK 5MM ARTICULATING MONO SILSHOOK36

## (undated) DEVICE — SU PDS II 5-0 RB-1 27" Z303H

## (undated) DEVICE — DRAPE STERI TOWEL LG 1010

## (undated) DEVICE — SYR 01ML 27GA 0.5" NDL TBC 309623

## (undated) DEVICE — ESU ENDO SCISSORS 5MM CVD 5DCS

## (undated) DEVICE — DRSG PRIMAPORE 02X3" 7133

## (undated) DEVICE — LINEN TOWEL PACK X5 5464

## (undated) DEVICE — DRSG GAUZE 2X2" 8042

## (undated) DEVICE — SU PROLENE 2-0 SHDA 36" 8523H

## (undated) DEVICE — WIPES FOLEY CARE SURESTEP PROVON DFC100

## (undated) DEVICE — ENDO VALVE SUCTION ULTRASOUND BRONCH MAJ-1414

## (undated) DEVICE — SUCTION MANIFOLD NEPTUNE 2 SYS 4 PORT 0702-020-000

## (undated) DEVICE — DRAIN CHEST TUBE 28FR STR 8028

## (undated) DEVICE — Device

## (undated) DEVICE — ENDO TROCAR FIRST ENTRY KII FIOS Z-THRD 05X100MM CTF03

## (undated) DEVICE — ENDO TROCAR BLUNT TIP KII BALLOON 12X130MM C0R50

## (undated) DEVICE — SU PROLENE 7-0 BV-1DA 30" 8703H

## (undated) DEVICE — SU SILK 2-0 TIE 12X30" A305H

## (undated) DEVICE — DAVINCI XI ENDOWRIST STAPLER RELOAD 30MM BLUE 48630B

## (undated) DEVICE — DECANTER VIAL 2006S

## (undated) DEVICE — TUBING SUCTION 12"X1/4" N612

## (undated) DEVICE — DEVICE SUTURE PASSER 14GA WECK EFX EFXSP2

## (undated) DEVICE — SOL ADH LIQUID BENZOIN SWAB 0.6ML C1544

## (undated) DEVICE — SYSTEM LAPAROVUE VISIBILITY LAPVUE10

## (undated) DEVICE — CUSTOM PROCEDURE KIT

## (undated) DEVICE — ENDO VALVE SUCTION BRONCH EVIS MAJ-209

## (undated) DEVICE — LUBRICANT INST KIT ENDO-LUBE 220-90

## (undated) DEVICE — DAVINCI HOT SHEARS TIP COVER  400180

## (undated) DEVICE — VESSEL LOOPS YELLOW MINI 31145660

## (undated) DEVICE — NDL COUNTER 40CT  31142311

## (undated) DEVICE — CONNECTOR SWIVEL 7.0MM ION 490108

## (undated) DEVICE — PACK MINOR SBA15MIFSE

## (undated) DEVICE — DRAPE BREAST/CHEST 29420

## (undated) DEVICE — DAVINCI XI REDUCER 8-12MM 470381

## (undated) DEVICE — ADAPTER PROBE/SUCTION VISION ION 490101

## (undated) DEVICE — SU SILK 2-0 SH 30" K833H

## (undated) DEVICE — SPONGE LAP 18X18" X8435

## (undated) DEVICE — SU VICRYL 0 UR-6 27" J603H

## (undated) DEVICE — TUBING SUCTION MEDI-VAC SOFT 3/16"X20' N520A

## (undated) DEVICE — SU PDS II 4-0 SH 27" Z315H

## (undated) DEVICE — TUBING SUCTION 10'X3/16" N510

## (undated) DEVICE — ESU ELEC BLADE 2.75" COATED/INSULATED E1455

## (undated) DEVICE — ENDO TROCAR FIRST ENTRY KII FIOS Z-THRD 12X100MM CTF73

## (undated) DEVICE — ENDO TROCAR CONMED AIRSEAL BLADELESS 12X120MM IAS12-120LP

## (undated) DEVICE — SYR 30ML SLIP TIP W/O NDL 302833

## (undated) DEVICE — STPL ENDO LINEAR CUT ECHELON GST 45MM COMPACT PCEE45A

## (undated) DEVICE — SU SILK 0 SH 30" K834H

## (undated) DEVICE — ENDO POUCH UNIV RETRIEVAL SYSTEM INZII 10MM CD001

## (undated) DEVICE — ESU PENCIL SMOKE EVAC W/ROCKER SWITCH 0703-047-000

## (undated) DEVICE — GOWN IMPERVIOUS BREATHABLE SMART XLG 89045

## (undated) DEVICE — SYR 10ML FINGER CONTROL W/O NDL 309695

## (undated) DEVICE — ESU HANDPIECE ABC BEND-A-BEAM 6" 134006

## (undated) DEVICE — SU VICRYL 2-0 CT-1 27" UND J259H

## (undated) DEVICE — GLOVE BIOGEL PI MICRO SZ 7.5 48575

## (undated) DEVICE — SURGICEL HEMOSTAT 4X8" 1952

## (undated) DEVICE — STPL DAVINCI SUREFORM 45MM RELOAD WHITE 48345W

## (undated) DEVICE — CATH TRAY FOLEY SURESTEP 16FR W/URNE MTR STLK LATEX A303316A

## (undated) DEVICE — SU ETHILON 3-0 PS-1 18" 1663H

## (undated) DEVICE — TAPE DURAPORE 3" SILK 1538-3

## (undated) DEVICE — TUBE FEEDING NEOCONNECT SIL ENFIT 5FR 40CM PFTS5.0S-NC

## (undated) DEVICE — CLIP APPLIER ENDO 5MM M/L LIGAMAX EL5ML

## (undated) DEVICE — SU SILK 4-0 TIE 12X30" A303H

## (undated) DEVICE — STPL DAVINCI SUREFORM 45MM RELOAD GREEN 48345G

## (undated) DEVICE — SU MONOCRYL 4-0 PS-2 18" UND Y496G

## (undated) DEVICE — SU VICRYL 1 CT-1 27" UND J261H

## (undated) DEVICE — CLIP HORIZON LG ORANGE 004200

## (undated) DEVICE — DRAIN JACKSON PRATT RESERVOIR 400ML SU130-1000

## (undated) DEVICE — DRSG MEDIPORE 3 1/2X13 3/4" 3573

## (undated) DEVICE — ADH LIQUID MASTISOL TOPICAL VIAL 2-3ML 0523-48

## (undated) DEVICE — LINEN GOWN XLG 5407

## (undated) DEVICE — SU VICRYL 2-0 SH 27" J317H

## (undated) DEVICE — PAD CHUX UNDERPAD 23X24" 7136

## (undated) DEVICE — KIT ENDO FIRST STEP DISINFECTANT 200ML W/POUCH EP-4

## (undated) DEVICE — SU VICRYL+ 3-0 27IN SH UND VCP416H

## (undated) DEVICE — SU PROLENE 3-0 SHDA 36" 8522H

## (undated) DEVICE — SUCTION TIP YANKAUER W/O VENT K86

## (undated) DEVICE — DAVINCI XI GRASPER ENDOWRIST BIPOLAR LONG 470400

## (undated) DEVICE — SOL WATER IRRIG 1000ML BOTTLE 2F7114

## (undated) DEVICE — ESU ELEC BLADE 6" COATED E1450-6

## (undated) DEVICE — NDL BIOPSY TEMNO 18GAX11CM ACT1811

## (undated) DEVICE — PITCHER STERILE 1000ML  SSK9004A

## (undated) DEVICE — DAVINCI XI OBTURATOR BLADELESS 8MM 470359

## (undated) DEVICE — SU VICRYL 3-0 SH 27" UND J416H

## (undated) DEVICE — LUBRICANT INST ELECTROLUBE EL101

## (undated) DEVICE — SURGICEL ABSORBABLE HEMOSTAT SNOW 4"X4" 2083

## (undated) DEVICE — ESU GROUND PAD ADULT W/CORD E7507

## (undated) DEVICE — GLOVE PROTEXIS MICRO 7.5  2D73PM75

## (undated) DEVICE — SU SILK 3-0 TIE 12X30" A304H

## (undated) DEVICE — PACK SET-UP STD 9102

## (undated) DEVICE — SURGICEL ABSORBABLE HEMOSTAT SNOW 2"X4" 2082

## (undated) DEVICE — TUBING SMOKE EVAC PNEUMOCLEAR HIGH FLOW 0620050250

## (undated) DEVICE — DAVINCI SEAL CANNULA AND STPL 12MM 470380

## (undated) DEVICE — CLIP HORIZON MED BLUE 002200

## (undated) DEVICE — ESU GROUND PAD UNIVERSAL W/O CORD

## (undated) DEVICE — CLIP APPLIER 11" MED LIGACLIP MCM30

## (undated) DEVICE — STPL RELOAD LINEAR 30X2.5MM VASC XR30V

## (undated) DEVICE — ENDO ADPT BRONCH SWIVEL Y A1002

## (undated) DEVICE — DRAIN JACKSON PRATT ROUND SIL 19FR W/TROCAR LF JP-2232

## (undated) DEVICE — PROBE ABLATION 13GX25CM CERTUS 140 NWSR25

## (undated) DEVICE — DRSG TEGADERM 8X12" 1629

## (undated) DEVICE — TUBE FEEDING NEOCONNECT POLY ENFIT 8FR 24" PFTM8.0P-NC

## (undated) DEVICE — DRSG TEGADERM 2 1/2X 2 3/4"

## (undated) DEVICE — SU PROLENE 4-0 SHDA 36" 8521H

## (undated) DEVICE — TROCAR AIRSEAL BLADELESS 12X120MM IAS12-120

## (undated) DEVICE — GLOVE PROTEXIS BLUE W/NEU-THERA 8.0  2D73EB80

## (undated) DEVICE — SU VICRYL 4-0 PS-2 18" UND J496H

## (undated) DEVICE — SU SILK 0 TIE 6X30" A306H

## (undated) DEVICE — DRSG TELFA 3X8" 1238

## (undated) DEVICE — SUCTION IRR STRYKERFLOW II W/TIP 250-070-520

## (undated) DEVICE — BLADE CLIPPER SGL USE 9680

## (undated) DEVICE — CLAMP BULLDOG VASCUSTATT II MINI STR YELLOW 1001-572

## (undated) DEVICE — DAVINCI XI ENDOWRIST STAPLER RELOAD 30MM WHITE 48630W

## (undated) DEVICE — ENDO TROCAR SLEEVE KII Z-THREADED 05X100MM CTS02

## (undated) DEVICE — DAVINCI XI ENDOWRIST STAPLER RELOAD 30MM GREEN 48630G

## (undated) DEVICE — STPL SKIN 35W ROTATING HEAD PRW35

## (undated) DEVICE — SPECIMEN CONTAINER 5OZ STERILE 2600SA

## (undated) DEVICE — DRAPE IOBAN C-SECTION W/POUCH 30X35" 6657

## (undated) DEVICE — TUBING CONMED AIRSEAL SMOKE EVAC INSUFFLATION ASM-EVAC

## (undated) DEVICE — SU SILK 1 TIE 6X30" A307H

## (undated) DEVICE — CLIP HORIZON SM RED WIDE SLOT 001201

## (undated) DEVICE — NDL BIOPSY 21G FLEXISION ION 490103

## (undated) DEVICE — SU SILK 3-0 SH CR 8X18" C013D

## (undated) DEVICE — GOWN XLG DISP 9545

## (undated) DEVICE — ESU PENCIL W/HOLSTER E2350H

## (undated) DEVICE — SU PDS II 0 TP-1 60" Z991G

## (undated) DEVICE — ENDO SCOPE WARMER SEAL  C3101

## (undated) DEVICE — CATH PROBE CRYOABLATION MALL FLEX 8MM BALL 180DEG CRYOS

## (undated) DEVICE — NDL INSUFFLATION 13GA 120MM C2201

## (undated) DEVICE — APPLICATOR LAPAROSCOPIC LAP01-US

## (undated) DEVICE — ESU ELEC BLADE 6" COATED/INSULATED E1455-6

## (undated) DEVICE — SU SILK 0 24" TIE SA76G

## (undated) DEVICE — SYR BULB IRRIG DOVER 60 ML LATEX FREE 67000

## (undated) DEVICE — DRAPE FLUID WARMING 52 X 60" ORS-321

## (undated) DEVICE — SU MONOCRYL 4-0 PS-2 27" UND Y426H

## (undated) DEVICE — ANTIFOG SOLUTION W/FOAM PAD 31142527

## (undated) DEVICE — DAVINCI XI DRAPE ARM 470015

## (undated) DEVICE — ESU LIGASURE IMPACT OPEN SEALER/DVDR CVD LG JAW LF4418

## (undated) DEVICE — DAVINCI XI SEAL UNIVERSAL 5-8MM 470361

## (undated) DEVICE — SYR 30ML LL W/O NDL 302832

## (undated) DEVICE — SU PROLENE 3-0 RB-1DA 36"  8558H

## (undated) DEVICE — ESU GROUND PAD THERMOGUARD PLUS ABC PEDS 7-382

## (undated) RX ORDER — FENTANYL CITRATE 50 UG/ML
INJECTION, SOLUTION INTRAMUSCULAR; INTRAVENOUS
Status: DISPENSED
Start: 2022-12-06

## (undated) RX ORDER — ESMOLOL HYDROCHLORIDE 10 MG/ML
INJECTION INTRAVENOUS
Status: DISPENSED
Start: 2024-06-27

## (undated) RX ORDER — HYDROMORPHONE HYDROCHLORIDE 1 MG/ML
INJECTION, SOLUTION INTRAMUSCULAR; INTRAVENOUS; SUBCUTANEOUS
Status: DISPENSED
Start: 2022-12-06

## (undated) RX ORDER — SODIUM CHLORIDE 9 MG/ML
INJECTION, SOLUTION INTRAVENOUS
Status: DISPENSED
Start: 2023-12-27

## (undated) RX ORDER — HYDROMORPHONE HYDROCHLORIDE 1 MG/ML
INJECTION, SOLUTION INTRAMUSCULAR; INTRAVENOUS; SUBCUTANEOUS
Status: DISPENSED
Start: 2023-03-08

## (undated) RX ORDER — METOPROLOL TARTRATE 1 MG/ML
INJECTION, SOLUTION INTRAVENOUS
Status: DISPENSED
Start: 2023-06-22

## (undated) RX ORDER — PROPOFOL 10 MG/ML
INJECTION, EMULSION INTRAVENOUS
Status: DISPENSED
Start: 2022-12-06

## (undated) RX ORDER — PROPOFOL 10 MG/ML
INJECTION, EMULSION INTRAVENOUS
Status: DISPENSED
Start: 2023-01-17

## (undated) RX ORDER — FENTANYL CITRATE 50 UG/ML
INJECTION, SOLUTION INTRAMUSCULAR; INTRAVENOUS
Status: DISPENSED
Start: 2024-06-27

## (undated) RX ORDER — FENTANYL CITRATE 50 UG/ML
INJECTION, SOLUTION INTRAMUSCULAR; INTRAVENOUS
Status: DISPENSED
Start: 2023-12-27

## (undated) RX ORDER — METRONIDAZOLE 500 MG/100ML
INJECTION, SOLUTION INTRAVENOUS
Status: DISPENSED
Start: 2022-12-06

## (undated) RX ORDER — PROPOFOL 10 MG/ML
INJECTION, EMULSION INTRAVENOUS
Status: DISPENSED
Start: 2024-02-01

## (undated) RX ORDER — BUPIVACAINE HYDROCHLORIDE 2.5 MG/ML
INJECTION, SOLUTION EPIDURAL; INFILTRATION; INTRACAUDAL
Status: DISPENSED
Start: 2023-03-08

## (undated) RX ORDER — FENTANYL CITRATE 50 UG/ML
INJECTION, SOLUTION INTRAMUSCULAR; INTRAVENOUS
Status: DISPENSED
Start: 2024-02-01

## (undated) RX ORDER — LABETALOL HYDROCHLORIDE 5 MG/ML
INJECTION, SOLUTION INTRAVENOUS
Status: DISPENSED
Start: 2022-12-06

## (undated) RX ORDER — DEXAMETHASONE SODIUM PHOSPHATE 4 MG/ML
INJECTION, SOLUTION INTRA-ARTICULAR; INTRALESIONAL; INTRAMUSCULAR; INTRAVENOUS; SOFT TISSUE
Status: DISPENSED
Start: 2023-03-08

## (undated) RX ORDER — DEXAMETHASONE SODIUM PHOSPHATE 4 MG/ML
INJECTION, SOLUTION INTRA-ARTICULAR; INTRALESIONAL; INTRAMUSCULAR; INTRAVENOUS; SOFT TISSUE
Status: DISPENSED
Start: 2024-02-01

## (undated) RX ORDER — ONDANSETRON 2 MG/ML
INJECTION INTRAMUSCULAR; INTRAVENOUS
Status: DISPENSED
Start: 2022-12-06

## (undated) RX ORDER — OXYCODONE HYDROCHLORIDE 5 MG/1
TABLET ORAL
Status: DISPENSED
Start: 2024-02-01

## (undated) RX ORDER — ACETAMINOPHEN 325 MG/1
TABLET ORAL
Status: DISPENSED
Start: 2024-02-01

## (undated) RX ORDER — HYDROMORPHONE HYDROCHLORIDE 1 MG/ML
INJECTION, SOLUTION INTRAMUSCULAR; INTRAVENOUS; SUBCUTANEOUS
Status: DISPENSED
Start: 2024-06-27

## (undated) RX ORDER — FENTANYL CITRATE 50 UG/ML
INJECTION, SOLUTION INTRAMUSCULAR; INTRAVENOUS
Status: DISPENSED
Start: 2023-03-08

## (undated) RX ORDER — BUPIVACAINE HYDROCHLORIDE AND EPINEPHRINE 2.5; 5 MG/ML; UG/ML
INJECTION, SOLUTION EPIDURAL; INFILTRATION; INTRACAUDAL; PERINEURAL
Status: DISPENSED
Start: 2024-02-01

## (undated) RX ORDER — ENOXAPARIN SODIUM 100 MG/ML
INJECTION SUBCUTANEOUS
Status: DISPENSED
Start: 2024-02-01

## (undated) RX ORDER — PROPOFOL 10 MG/ML
INJECTION, EMULSION INTRAVENOUS
Status: DISPENSED
Start: 2024-06-27

## (undated) RX ORDER — NITROGLYCERIN 0.4 MG/1
TABLET SUBLINGUAL
Status: DISPENSED
Start: 2023-06-22

## (undated) RX ORDER — ENOXAPARIN SODIUM 100 MG/ML
INJECTION SUBCUTANEOUS
Status: DISPENSED
Start: 2023-03-08

## (undated) RX ORDER — HEPARIN SODIUM 1000 [USP'U]/ML
INJECTION, SOLUTION INTRAVENOUS; SUBCUTANEOUS
Status: DISPENSED
Start: 2024-06-27

## (undated) RX ORDER — FENTANYL CITRATE-0.9 % NACL/PF 10 MCG/ML
PLASTIC BAG, INJECTION (ML) INTRAVENOUS
Status: DISPENSED
Start: 2024-06-27

## (undated) RX ORDER — PAPAVERINE HYDROCHLORIDE 30 MG/ML
INJECTION INTRAMUSCULAR; INTRAVENOUS
Status: DISPENSED
Start: 2024-06-27

## (undated) RX ORDER — LIDOCAINE HYDROCHLORIDE 10 MG/ML
INJECTION, SOLUTION EPIDURAL; INFILTRATION; INTRACAUDAL; PERINEURAL
Status: DISPENSED
Start: 2023-12-27

## (undated) RX ORDER — CEFAZOLIN SODIUM/WATER 2 G/20 ML
SYRINGE (ML) INTRAVENOUS
Status: DISPENSED
Start: 2023-03-08

## (undated) RX ORDER — LEVOFLOXACIN 5 MG/ML
INJECTION, SOLUTION INTRAVENOUS
Status: DISPENSED
Start: 2022-12-06

## (undated) RX ORDER — ONDANSETRON 2 MG/ML
INJECTION INTRAMUSCULAR; INTRAVENOUS
Status: DISPENSED
Start: 2023-01-17

## (undated) RX ORDER — DEXAMETHASONE SODIUM PHOSPHATE 4 MG/ML
INJECTION, SOLUTION INTRA-ARTICULAR; INTRALESIONAL; INTRAMUSCULAR; INTRAVENOUS; SOFT TISSUE
Status: DISPENSED
Start: 2022-12-06

## (undated) RX ORDER — ONDANSETRON 2 MG/ML
INJECTION INTRAMUSCULAR; INTRAVENOUS
Status: DISPENSED
Start: 2023-03-08

## (undated) RX ORDER — ENOXAPARIN SODIUM 100 MG/ML
INJECTION SUBCUTANEOUS
Status: DISPENSED
Start: 2022-12-06

## (undated) RX ORDER — PROPOFOL 10 MG/ML
INJECTION, EMULSION INTRAVENOUS
Status: DISPENSED
Start: 2023-03-08

## (undated) RX ORDER — METOPROLOL TARTRATE 50 MG
TABLET ORAL
Status: DISPENSED
Start: 2023-06-22

## (undated) RX ORDER — FENTANYL CITRATE 50 UG/ML
INJECTION, SOLUTION INTRAMUSCULAR; INTRAVENOUS
Status: DISPENSED
Start: 2023-01-17

## (undated) RX ORDER — VERAPAMIL HYDROCHLORIDE 2.5 MG/ML
INJECTION, SOLUTION INTRAVENOUS
Status: DISPENSED
Start: 2024-06-27

## (undated) RX ORDER — DEXAMETHASONE SODIUM PHOSPHATE 4 MG/ML
INJECTION, SOLUTION INTRA-ARTICULAR; INTRALESIONAL; INTRAMUSCULAR; INTRAVENOUS; SOFT TISSUE
Status: DISPENSED
Start: 2023-01-17

## (undated) RX ORDER — FENTANYL CITRATE 0.05 MG/ML
INJECTION, SOLUTION INTRAMUSCULAR; INTRAVENOUS
Status: DISPENSED
Start: 2023-03-08

## (undated) RX ORDER — CEFAZOLIN SODIUM/WATER 2 G/20 ML
SYRINGE (ML) INTRAVENOUS
Status: DISPENSED
Start: 2024-02-01

## (undated) RX ORDER — IVABRADINE 5 MG/1
TABLET, FILM COATED ORAL
Status: DISPENSED
Start: 2023-06-22

## (undated) RX ORDER — ESMOLOL HYDROCHLORIDE 10 MG/ML
INJECTION INTRAVENOUS
Status: DISPENSED
Start: 2022-12-06